# Patient Record
Sex: FEMALE | Race: WHITE | NOT HISPANIC OR LATINO | Employment: OTHER | URBAN - METROPOLITAN AREA
[De-identification: names, ages, dates, MRNs, and addresses within clinical notes are randomized per-mention and may not be internally consistent; named-entity substitution may affect disease eponyms.]

---

## 2017-01-03 RX ORDER — AZITHROMYCIN 250 MG/1
TABLET, FILM COATED ORAL
Qty: 30 TABLET | Refills: 5 | Status: SHIPPED | OUTPATIENT
Start: 2017-01-03 | End: 2017-07-01 | Stop reason: SDUPTHER

## 2017-01-03 RX ORDER — SIMVASTATIN 40 MG
TABLET ORAL
Qty: 30 TABLET | Refills: 5 | Status: SHIPPED | OUTPATIENT
Start: 2017-01-03 | End: 2017-02-28 | Stop reason: SDUPTHER

## 2017-02-01 RX ORDER — PANTOPRAZOLE SODIUM 40 MG/1
TABLET, DELAYED RELEASE ORAL
Qty: 30 TABLET | Refills: 0 | Status: SHIPPED | OUTPATIENT
Start: 2017-02-01 | End: 2017-02-08 | Stop reason: SDUPTHER

## 2017-02-01 RX ORDER — ROFLUMILAST 500 UG/1
TABLET ORAL
Qty: 30 TABLET | Refills: 0 | Status: SHIPPED | OUTPATIENT
Start: 2017-02-01 | End: 2017-02-08 | Stop reason: SDUPTHER

## 2017-02-08 ENCOUNTER — OFFICE VISIT (OUTPATIENT)
Dept: INTERNAL MEDICINE | Facility: CLINIC | Age: 77
End: 2017-02-08

## 2017-02-08 VITALS
RESPIRATION RATE: 18 BRPM | DIASTOLIC BLOOD PRESSURE: 72 MMHG | WEIGHT: 196 LBS | HEART RATE: 64 BPM | BODY MASS INDEX: 34.72 KG/M2 | SYSTOLIC BLOOD PRESSURE: 146 MMHG | TEMPERATURE: 98 F

## 2017-02-08 DIAGNOSIS — K21.00 GASTROESOPHAGEAL REFLUX DISEASE WITH ESOPHAGITIS: Primary | ICD-10-CM

## 2017-02-08 DIAGNOSIS — I10 ESSENTIAL HYPERTENSION: ICD-10-CM

## 2017-02-08 DIAGNOSIS — E03.8 OTHER SPECIFIED HYPOTHYROIDISM: ICD-10-CM

## 2017-02-08 DIAGNOSIS — E78.5 HYPERLIPIDEMIA, UNSPECIFIED HYPERLIPIDEMIA TYPE: ICD-10-CM

## 2017-02-08 LAB
ALBUMIN SERPL-MCNC: 4.4 G/DL (ref 3.2–4.8)
ALBUMIN/GLOB SERPL: 1.6 G/DL (ref 1.5–2.5)
ALP SERPL-CCNC: 91 U/L (ref 25–100)
ALT SERPL W P-5'-P-CCNC: 16 U/L (ref 7–40)
ANION GAP SERPL CALCULATED.3IONS-SCNC: 15 MMOL/L (ref 3–11)
ARTICHOKE IGE QN: 109 MG/DL (ref 0–130)
AST SERPL-CCNC: 19 U/L (ref 0–33)
BILIRUB SERPL-MCNC: 0.6 MG/DL (ref 0.3–1.2)
BUN BLD-MCNC: 9 MG/DL (ref 9–23)
BUN/CREAT SERPL: 18 (ref 7–25)
CALCIUM SPEC-SCNC: 10.1 MG/DL (ref 8.7–10.4)
CHLORIDE SERPL-SCNC: 94 MMOL/L (ref 99–109)
CHOLEST SERPL-MCNC: 180 MG/DL (ref 0–200)
CO2 SERPL-SCNC: 30 MMOL/L (ref 20–31)
CREAT BLD-MCNC: 0.5 MG/DL (ref 0.6–1.3)
GFR SERPL CREATININE-BSD FRML MDRD: 120 ML/MIN/1.73
GLOBULIN UR ELPH-MCNC: 2.8 GM/DL
GLUCOSE BLD-MCNC: 113 MG/DL (ref 70–100)
HDLC SERPL-MCNC: 53 MG/DL (ref 40–60)
POTASSIUM BLD-SCNC: 4 MMOL/L (ref 3.5–5.5)
PROT SERPL-MCNC: 7.2 G/DL (ref 5.7–8.2)
SODIUM BLD-SCNC: 139 MMOL/L (ref 132–146)
TRIGL SERPL-MCNC: 209 MG/DL (ref 0–150)
TSH SERPL DL<=0.05 MIU/L-ACNC: 1.25 MIU/ML (ref 0.35–5.35)

## 2017-02-08 PROCEDURE — 99214 OFFICE O/P EST MOD 30 MIN: CPT | Performed by: INTERNAL MEDICINE

## 2017-02-08 PROCEDURE — 80053 COMPREHEN METABOLIC PANEL: CPT | Performed by: INTERNAL MEDICINE

## 2017-02-08 PROCEDURE — 84443 ASSAY THYROID STIM HORMONE: CPT | Performed by: INTERNAL MEDICINE

## 2017-02-08 PROCEDURE — 36415 COLL VENOUS BLD VENIPUNCTURE: CPT | Performed by: INTERNAL MEDICINE

## 2017-02-08 PROCEDURE — 80061 LIPID PANEL: CPT | Performed by: INTERNAL MEDICINE

## 2017-02-08 RX ORDER — ROFLUMILAST 500 UG/1
500 TABLET ORAL DAILY
Qty: 30 TABLET | Refills: 5 | Status: SHIPPED | OUTPATIENT
Start: 2017-02-08 | End: 2017-08-30 | Stop reason: SDUPTHER

## 2017-02-08 RX ORDER — PANTOPRAZOLE SODIUM 40 MG/1
40 TABLET, DELAYED RELEASE ORAL DAILY
Qty: 30 TABLET | Refills: 5 | Status: SHIPPED | OUTPATIENT
Start: 2017-02-08 | End: 2017-08-30 | Stop reason: SDUPTHER

## 2017-02-08 NOTE — PROGRESS NOTES
Chief Complaint   Patient presents with   • Follow-up      4 MONTH       History of Present Illness      The patient is on pantoprazole for her gastroesophageal reflux. The medication is taken on a regular basis and gives complete relief of the symptoms. She has not elevated the headboard of her bed or tried sleeping on multiple pillows. She reports no abdominal pain, belching, chest pain, diarrhea, dysphagia, early satiety, heartburn, hoarseness, nausea, odynophagia, rectal bleeding, vomiting or weight loss. The GERD has no known aggravating factors.    The patient presents for a follow-up related to hyperlipidemia. She is following a low fat diet. She reports that she is exercising. She is taking simvastatin. The patient is taking her medication as prescribed. She reports no medication side effects, including muscle cramps, abdominal pain, headaches or weakness. She denies shortness of breath, orthopnea, paroxysmal nocturnal dyspnea, dyspnea on exertion, edema, palpitations or syncope.    The patient presents for a follow-up related to hypertension. The patient reports that she has had no headaches or blurred vision. She states that she is taking her medication as prescribed. She is not having medication side effects. She checks her blood pressures at home. The home readings are normal.    As relates to hypothyroidism, the patient reports a good energy level. She reports no hair loss, heat intolerance, cold intolerance, constipation or sweats. She is taking her medication as prescribed her medication as prescribed.    Medications      Current Outpatient Prescriptions:   •  albuterol (PROAIR HFA) 108 (90 BASE) MCG/ACT inhaler, Inhale 2 puffs every 4 (four) hours as needed., Disp: , Rfl:   •  aspirin (ASPIRIN LOW DOSE) 81 MG chewable tablet, Chew 1 tablet daily., Disp: , Rfl:   •  atenolol (TENORMIN) 50 MG tablet, take 1/2 tablet by mouth twice a day, Disp: 30 tablet, Rfl: 5  •  azithromycin (ZITHROMAX) 250 MG  tablet, take 1 tablet by mouth once daily, Disp: 30 tablet, Rfl: 5  •  Calcium Carbonate-Vitamin D (CALCIUM-VITAMIN D) 500-200 MG-UNIT per tablet, Take 1 tablet by mouth daily., Disp: , Rfl:   •  clopidogrel (PLAVIX) 75 MG tablet, take 1 tablet by mouth once daily, Disp: 30 tablet, Rfl: 5  •  COMBIVENT RESPIMAT  MCG/ACT inhaler, inhale 1 puff by mouth four times a day WITH A MAX OF 6 PUFFS IN 24 HOURS, Disp: 4 g, Rfl: 5  •  FLOVENT  MCG/ACT inhaler, use 2 sprays by mouth twice a day Rinse mouth after use, Disp: 12 inhaler, Rfl: 5  •  isosorbide mononitrate (IMDUR) 30 MG 24 hr tablet, take 1 tablet by mouth once daily, Disp: 30 tablet, Rfl: 5  •  levothyroxine (SYNTHROID, LEVOTHROID) 150 MCG tablet, take 1 tablet by mouth once daily as directed, Disp: 30 tablet, Rfl: 5  •  Multiple Vitamins-Minerals (PRESERVISION AREDS) capsule, Take 1 tablet by mouth 2 (two) times a day., Disp: , Rfl:   •  nitroglycerin (NITROSTAT) 0.4 MG SL tablet, Place 1 tablet under the tongue as needed., Disp: , Rfl:   •  pantoprazole (PROTONIX) 40 MG EC tablet, Take 1 tablet by mouth Daily., Disp: 30 tablet, Rfl: 5  •  roflumilast (DALIRESP) 500 MCG tablet tablet, Take 1 tablet by mouth Daily., Disp: 30 tablet, Rfl: 5  •  sertraline (ZOLOFT) 100 MG tablet, take 1 tablet by mouth once daily, Disp: 30 tablet, Rfl: 5  •  simvastatin (ZOCOR) 40 MG tablet, take 1 tablet by mouth at bedtime, Disp: 30 tablet, Rfl: 5     Allergies    Allergies   Allergen Reactions   • Codeine Mental Status Change   • Penicillins Hives and Swelling   • Dopamine Other (See Comments) and Palpitations     Other reaction(s): Hypertension       Problem List    Patient Active Problem List   Diagnosis   • Coronary arteriosclerosis in native artery   • Chronic obstructive pulmonary disease   • Depression   • Gastroesophageal reflux disease with esophagitis   • Hyperlipidemia   • Hypertension   • Hypothyroidism   • Osteoarthritis   • Peripheral arterial occlusive  disease   • Solitary pulmonary nodule   • Vitamin D deficiency   • Lower back pain   • UTI (urinary tract infection)   • Carotid bruit   • Hypoxemia   • Muscle strain of right thigh   • Chest pain       Physical Examination    Visit Vitals   • /72 (BP Location: Left arm, Patient Position: Sitting, Cuff Size: Adult)   • Pulse 64   • Temp 98 °F (36.7 °C) (Temporal Artery )   • Resp 18   • Wt 196 lb (88.9 kg)   • BMI 34.72 kg/m2     HEENT: Head- Normocephalic Atraumatic. Facies- Within normal limits. Pinnas- Normal texture and shape bilaterally. Canals- Normal bilaterally. TMs- Normal bilaterally. Nares- Patent bilaterally. Nasal Septum- is normal. There is no tenderness to palpation over the frontal or maxillary sinuses. Lids- Normal bilaterally. Conjunctiva- Clear bilaterally. Sclera- Anicteric bilaterally. Oropharynx- Moist with no lesions. Tonsils- No enlargement, erythema or exudate.    Neck: Thyroid- non enlarged, symmetric and has no nodules. No bruits are detected. ROM- Normal Range of Motion with no rigidity.    Lungs: Auscultation- Clear to auscultation bilaterally. There are no retractions, clubbing or cyanosis. The Expiratory to Inspiratory ratio is equal.    Cardiovascular: There are no carotid bruits. Heart- Normal Rate with Regular rhythm and no murmurs. There are no gallops. There are no rubs. In the lower extremities there is no edema. The upper extremities do not have edema.    Abdomen: Soft, benign, non-tender with no masses, hernias, organomegaly or scars.    Impression and Assessment    Gastroesophageal Reflux Disease.     Hyperlipidemia.     Hypertension.     Hypothyroidism.     Plan    Gastroesophageal Reflux Disease Plan: The current plan was continued.    Hyperlipidemia Plan: She was instructed to eat a low fat diet. She was encouraged to exercise daily. The patient was instructed to continue the current medications.    Hypertension Plan: The condition is stable. No change was made in the  current plan.    Hypothyroidism Plan: Further plans will be formulated after test results are reviewed.    Avelina was seen today for follow-up.    Diagnoses and all orders for this visit:    Gastroesophageal reflux disease with esophagitis  -     Comprehensive Metabolic Panel    Essential hypertension  -     Comprehensive Metabolic Panel    Other specified hypothyroidism  -     TSH    Hyperlipidemia, unspecified hyperlipidemia type  -     Comprehensive Metabolic Panel  -     Lipid Panel    Other orders  -     roflumilast (DALIRESP) 500 MCG tablet tablet; Take 1 tablet by mouth Daily.  -     pantoprazole (PROTONIX) 40 MG EC tablet; Take 1 tablet by mouth Daily.        Return to Office    The patient was instructed to return for follow-up in 4 months. Next Visit: Follow-up.    The patient was instructed to return sooner if the condition changes, worsens, or doesn't resolve.

## 2017-02-09 ENCOUNTER — TELEPHONE (OUTPATIENT)
Dept: INTERNAL MEDICINE | Facility: CLINIC | Age: 77
End: 2017-02-09

## 2017-02-09 RX ORDER — CYCLOBENZAPRINE HCL 10 MG
10 TABLET ORAL 2 TIMES DAILY PRN
Qty: 60 TABLET | Refills: 5 | Status: SHIPPED | OUTPATIENT
Start: 2017-02-09 | End: 2018-01-11 | Stop reason: SDUPTHER

## 2017-02-09 NOTE — TELEPHONE ENCOUNTER
I'm sorry but I don't recall flexeril.  What is she using this for and how much is she using?  Dre Moura MD  2:10 PM  02/09/17

## 2017-02-09 NOTE — TELEPHONE ENCOUNTER
S/W PT, STATES SHE HAS BEEN ON IT FOR SOMETIME AND JUST RUN OUT A WHILE BACK.  STATES SHE TAKES IT FOR HER BACK PAIN AND THAT IT DOES HELP QUITE A BIT.  STATES SHE USUALLY TAKES IT TWICE DAILY.

## 2017-02-22 ENCOUNTER — TELEPHONE (OUTPATIENT)
Dept: INTERNAL MEDICINE | Facility: CLINIC | Age: 77
End: 2017-02-22

## 2017-02-28 RX ORDER — SIMVASTATIN 40 MG
40 TABLET ORAL NIGHTLY
Qty: 90 TABLET | Refills: 1 | Status: SHIPPED | OUTPATIENT
Start: 2017-02-28 | End: 2017-11-07 | Stop reason: SDUPTHER

## 2017-03-20 RX ORDER — IPRATROPIUM/ALBUTEROL SULFATE 20-100 MCG
MIST INHALER (GRAM) INHALATION
Qty: 4 G | Refills: 5 | Status: SHIPPED | OUTPATIENT
Start: 2017-03-20 | End: 2017-10-04 | Stop reason: SDUPTHER

## 2017-04-03 RX ORDER — SERTRALINE HYDROCHLORIDE 100 MG/1
TABLET, FILM COATED ORAL
Qty: 30 TABLET | Refills: 5 | Status: SHIPPED | OUTPATIENT
Start: 2017-04-03 | End: 2017-10-03 | Stop reason: SDUPTHER

## 2017-04-24 RX ORDER — DEXAMETHASONE 4 MG/1
TABLET ORAL
Qty: 12 INHALER | Refills: 5 | Status: SHIPPED | OUTPATIENT
Start: 2017-04-24 | End: 2017-11-21 | Stop reason: SDUPTHER

## 2017-06-01 RX ORDER — ISOSORBIDE MONONITRATE 30 MG/1
TABLET, EXTENDED RELEASE ORAL
Qty: 30 TABLET | Refills: 5 | Status: SHIPPED | OUTPATIENT
Start: 2017-06-01 | End: 2017-11-28 | Stop reason: SDUPTHER

## 2017-06-01 RX ORDER — ATENOLOL 50 MG/1
TABLET ORAL
Qty: 30 TABLET | Refills: 5 | Status: SHIPPED | OUTPATIENT
Start: 2017-06-01 | End: 2017-11-28 | Stop reason: SDUPTHER

## 2017-06-01 RX ORDER — LEVOTHYROXINE SODIUM 0.15 MG/1
TABLET ORAL
Qty: 30 TABLET | Refills: 5 | Status: SHIPPED | OUTPATIENT
Start: 2017-06-01 | End: 2017-11-30 | Stop reason: SDUPTHER

## 2017-06-01 RX ORDER — CLOPIDOGREL BISULFATE 75 MG/1
TABLET ORAL
Qty: 30 TABLET | Refills: 5 | Status: SHIPPED | OUTPATIENT
Start: 2017-06-01 | End: 2017-11-28 | Stop reason: SDUPTHER

## 2017-06-14 ENCOUNTER — OFFICE VISIT (OUTPATIENT)
Dept: INTERNAL MEDICINE | Facility: CLINIC | Age: 77
End: 2017-06-14

## 2017-06-14 VITALS
RESPIRATION RATE: 18 BRPM | TEMPERATURE: 98.2 F | WEIGHT: 194 LBS | OXYGEN SATURATION: 92 % | SYSTOLIC BLOOD PRESSURE: 130 MMHG | DIASTOLIC BLOOD PRESSURE: 72 MMHG | BODY MASS INDEX: 34.37 KG/M2 | HEART RATE: 68 BPM

## 2017-06-14 DIAGNOSIS — B37.0 ORAL THRUSH: ICD-10-CM

## 2017-06-14 DIAGNOSIS — K21.00 GASTROESOPHAGEAL REFLUX DISEASE WITH ESOPHAGITIS: Primary | ICD-10-CM

## 2017-06-14 DIAGNOSIS — J44.9 CHRONIC OBSTRUCTIVE PULMONARY DISEASE, UNSPECIFIED COPD TYPE (HCC): ICD-10-CM

## 2017-06-14 DIAGNOSIS — E03.8 OTHER SPECIFIED HYPOTHYROIDISM: ICD-10-CM

## 2017-06-14 DIAGNOSIS — I10 ESSENTIAL HYPERTENSION: ICD-10-CM

## 2017-06-14 DIAGNOSIS — E78.5 HYPERLIPIDEMIA, UNSPECIFIED HYPERLIPIDEMIA TYPE: ICD-10-CM

## 2017-06-14 LAB
ALBUMIN SERPL-MCNC: 4.4 G/DL (ref 3.2–4.8)
ALBUMIN/GLOB SERPL: 1.6 G/DL (ref 1.5–2.5)
ALP SERPL-CCNC: 85 U/L (ref 25–100)
ALT SERPL W P-5'-P-CCNC: 13 U/L (ref 7–40)
ANION GAP SERPL CALCULATED.3IONS-SCNC: 6 MMOL/L (ref 3–11)
ARTICHOKE IGE QN: 95 MG/DL (ref 0–130)
AST SERPL-CCNC: 17 U/L (ref 0–33)
BILIRUB SERPL-MCNC: 0.3 MG/DL (ref 0.3–1.2)
BUN BLD-MCNC: 13 MG/DL (ref 9–23)
BUN/CREAT SERPL: 32.5 (ref 7–25)
CALCIUM SPEC-SCNC: 10.3 MG/DL (ref 8.7–10.4)
CHLORIDE SERPL-SCNC: 101 MMOL/L (ref 99–109)
CHOLEST SERPL-MCNC: 164 MG/DL (ref 0–200)
CO2 SERPL-SCNC: 34 MMOL/L (ref 20–31)
CREAT BLD-MCNC: 0.4 MG/DL (ref 0.6–1.3)
GFR SERPL CREATININE-BSD FRML MDRD: >150 ML/MIN/1.73
GLOBULIN UR ELPH-MCNC: 2.7 GM/DL
GLUCOSE BLD-MCNC: 119 MG/DL (ref 70–100)
HDLC SERPL-MCNC: 48 MG/DL (ref 40–60)
POTASSIUM BLD-SCNC: 4.2 MMOL/L (ref 3.5–5.5)
PROT SERPL-MCNC: 7.1 G/DL (ref 5.7–8.2)
SODIUM BLD-SCNC: 141 MMOL/L (ref 132–146)
TRIGL SERPL-MCNC: 184 MG/DL (ref 0–150)
TSH SERPL DL<=0.05 MIU/L-ACNC: 1.22 MIU/ML (ref 0.35–5.35)

## 2017-06-14 PROCEDURE — 36415 COLL VENOUS BLD VENIPUNCTURE: CPT | Performed by: INTERNAL MEDICINE

## 2017-06-14 PROCEDURE — 99214 OFFICE O/P EST MOD 30 MIN: CPT | Performed by: INTERNAL MEDICINE

## 2017-06-14 PROCEDURE — 80053 COMPREHEN METABOLIC PANEL: CPT | Performed by: INTERNAL MEDICINE

## 2017-06-14 PROCEDURE — 84443 ASSAY THYROID STIM HORMONE: CPT | Performed by: INTERNAL MEDICINE

## 2017-06-14 PROCEDURE — 80061 LIPID PANEL: CPT | Performed by: INTERNAL MEDICINE

## 2017-06-14 NOTE — PROGRESS NOTES
Chief Complaint   Patient presents with   • Follow-up     4 mth       History of Present Illness    The patient is on pantoprazole for her gastroesophageal reflux. The medication is taken on a regular basis and gives complete relief of the symptoms. She reports hoarseness but she denies abdominal pain, belching, chest pain, diarrhea, dysphagia, early satiety, heartburn, nausea, odynophagia, rectal bleeding, vomiting or weight loss. The GERD has no known aggravating factors.    The patient presents for a follow-up related to hyperlipidemia. She is following a low fat diet. She reports that she is exercising. She is taking simvastatin. The patient is taking her medication as prescribed. She reports no medication side effects, including muscle cramps, abdominal pain, headaches or weakness. She denies shortness of breath, orthopnea, paroxysmal nocturnal dyspnea, dyspnea on exertion, edema, palpitations or syncope.    The patient presents for a follow-up related to hypertension. The patient reports that she has had no headaches or blurred vision. She states that she is taking her medication as prescribed. She is not having medication side effects. She checks her blood pressures at home. The home readings are normal.    The patient presents for a follow-up related to Chronic Obstructive Pulmonary Disease. Rescue inhaler usage is reported to be multiple times daily. The patient reports that she has no known triggers. She currently reports no wheezing, cough or sputum production. She is not smoking. She has increased her home oxygen to 3 LPM NC and is getting much better relief, has better exercise tolerance and increased stamina.    Medications      Current Outpatient Prescriptions:   •  albuterol (PROAIR HFA) 108 (90 BASE) MCG/ACT inhaler, Inhale 2 puffs every 4 (four) hours as needed., Disp: , Rfl:   •  aspirin (ASPIRIN LOW DOSE) 81 MG chewable tablet, Chew 1 tablet daily., Disp: , Rfl:   •  atenolol (TENORMIN) 50 MG  tablet, take 1/2 tablet by mouth twice a day, Disp: 30 tablet, Rfl: 5  •  azithromycin (ZITHROMAX) 250 MG tablet, take 1 tablet by mouth once daily, Disp: 30 tablet, Rfl: 5  •  Calcium Carbonate-Vitamin D (CALCIUM-VITAMIN D) 500-200 MG-UNIT per tablet, Take 1 tablet by mouth daily., Disp: , Rfl:   •  clopidogrel (PLAVIX) 75 MG tablet, take 1 tablet by mouth once daily, Disp: 30 tablet, Rfl: 5  •  COMBIVENT RESPIMAT  MCG/ACT inhaler, inhale 1 puff by mouth four times a day, Disp: 4 g, Rfl: 5  •  cyclobenzaprine (FLEXERIL) 10 MG tablet, Take 1 tablet by mouth 2 (Two) Times a Day As Needed for muscle spasms., Disp: 60 tablet, Rfl: 5  •  FIBER SELECT GUMMIES PO, Take 2 tablets by mouth Daily., Disp: , Rfl:   •  FLOVENT  MCG/ACT inhaler, inhale 2 puffs by mouth twice a day Rinse mouth after use, Disp: 12 inhaler, Rfl: 5  •  isosorbide mononitrate (IMDUR) 30 MG 24 hr tablet, take 1 tablet by mouth once daily, Disp: 30 tablet, Rfl: 5  •  levothyroxine (SYNTHROID, LEVOTHROID) 150 MCG tablet, take 1 tablet by mouth once daily as directed, Disp: 30 tablet, Rfl: 5  •  Multiple Vitamins-Minerals (PRESERVISION AREDS) capsule, Take 1 tablet by mouth 2 (two) times a day., Disp: , Rfl:   •  nitroglycerin (NITROSTAT) 0.4 MG SL tablet, Place 1 tablet under the tongue as needed., Disp: , Rfl:   •  pantoprazole (PROTONIX) 40 MG EC tablet, Take 1 tablet by mouth Daily., Disp: 30 tablet, Rfl: 5  •  roflumilast (DALIRESP) 500 MCG tablet tablet, Take 1 tablet by mouth Daily., Disp: 30 tablet, Rfl: 5  •  sertraline (ZOLOFT) 100 MG tablet, take 1 tablet by mouth once daily, Disp: 30 tablet, Rfl: 5  •  simvastatin (ZOCOR) 40 MG tablet, Take 1 tablet by mouth Every Night., Disp: 90 tablet, Rfl: 1     Allergies    Allergies   Allergen Reactions   • Codeine Mental Status Change   • Penicillins Hives and Swelling   • Dopamine Other (See Comments) and Palpitations     Other reaction(s): Hypertension       Problem List    Patient  Active Problem List   Diagnosis   • Coronary arteriosclerosis in native artery   • Chronic obstructive pulmonary disease   • Depression   • Gastroesophageal reflux disease with esophagitis   • Hyperlipidemia   • Hypertension   • Hypothyroidism   • Osteoarthritis   • Peripheral arterial occlusive disease   • Solitary pulmonary nodule   • Vitamin D deficiency   • Lower back pain   • UTI (urinary tract infection)   • Carotid bruit   • Hypoxemia   • Muscle strain of right thigh   • Chest pain       Medications, Allergies, Problems List and Past History were reviewed and updated.    Physical Examination    /72 (BP Location: Left arm, Patient Position: Sitting, Cuff Size: Adult)  Pulse 68  Temp 98.2 °F (36.8 °C) (Temporal Artery )   Resp 18  Wt 194 lb (88 kg)  SpO2 92%  BMI 34.37 kg/m2    HEENT: Head- Normocephalic Atraumatic. Facies- Within normal limits. Pinnas- Normal texture and shape bilaterally. Canals- Normal bilaterally. TMs- Normal bilaterally. Nares- Patent bilaterally. Nasal Septum- is normal. There is no tenderness to palpation over the frontal or maxillary sinuses. Lids- Normal bilaterally. Conjunctiva- Clear bilaterally. Sclera- Anicteric bilaterally. Oropharynx- has candidal plaques. Tonsils- No enlargement, erythema or exudate.    Neck: Thyroid- non enlarged, symmetric and has no nodules. No bruits are detected. ROM- Normal Range of Motion with no rigidity.    Lungs: Auscultation- Clear to auscultation bilaterally. There are no retractions, clubbing or cyanosis. The Expiratory to Inspiratory ratio is equal.    Cardiovascular: There are no carotid bruits. Heart- Normal Rate with Regular rhythm and no murmurs. There are no gallops. There are no rubs. In the lower extremities there is no edema. The upper extremities do not have edema.    Abdomen: Soft, benign, non-tender with no masses, hernias, organomegaly or scars.    Impression and Assessment    Gastroesophageal Reflux  Disease.    Hyperlipidemia.    Hypertension.    COPD.    Oropharyngeal Candidiasis.    Plan    Gastroesophageal Reflux Disease Plan: The current plan was continued.    Hyperlipidemia Plan: She was instructed to eat a low fat diet. She was encouraged to exercise daily. Weight loss was encouraged. The patient was instructed to continue the current medications.    Hypertension Plan: The current plan was continued.    COPD Plan: The current plan was continued.    Oropharyngeal Candidiasis Plan: Medication will be added as noted below.    Avelina was seen today for follow-up.    Diagnoses and all orders for this visit:    Gastroesophageal reflux disease with esophagitis  -     Comprehensive Metabolic Panel    Essential hypertension  -     Comprehensive Metabolic Panel    Other specified hypothyroidism  -     TSH    Hyperlipidemia, unspecified hyperlipidemia type  -     Comprehensive Metabolic Panel  -     Lipid Panel    Chronic obstructive pulmonary disease, unspecified COPD type    Oral thrush  -     nystatin (MYCOSTATIN) 744720 UNIT/ML suspension; Swish and swallow 5 mL 4 (Four) Times a Day.        Return to Office    The patient was instructed to return for follow-up in 3 months.    The patient was instructed to return sooner if the condition changes, worsens, or doesn't resolve.

## 2017-07-03 RX ORDER — AZITHROMYCIN 250 MG/1
TABLET, FILM COATED ORAL
Qty: 30 TABLET | Refills: 5 | Status: SHIPPED | OUTPATIENT
Start: 2017-07-03 | End: 2017-12-28 | Stop reason: SDUPTHER

## 2017-07-28 DIAGNOSIS — B37.0 ORAL THRUSH: ICD-10-CM

## 2017-07-28 NOTE — TELEPHONE ENCOUNTER
----- Message from Katy Wolf sent at 7/28/2017 10:27 AM EDT -----  Patient is on a couple of different inhalers and therefore gets thrush in her mouth.  She takes Nystatin for thrush.  She took whole bottle last time and it cleared up but a couple weeks later she started having symptoms again even though she is using water with inhalers.  Pharmacy sent in refill request this morning for nystatin mouthwash to use every day.  It is written for 4 times a day but patient is going to only use at night to keep it from coming back and use saltwater during day the other 3 times.  Would like to go ahead and get this called in today so she can go ahead and start using to get rid of thrush.  Daughter, Clary Harman, can be reached at [phone].

## 2017-08-30 RX ORDER — PANTOPRAZOLE SODIUM 40 MG/1
TABLET, DELAYED RELEASE ORAL
Qty: 30 TABLET | Refills: 5 | Status: SHIPPED | OUTPATIENT
Start: 2017-08-30 | End: 2018-02-02 | Stop reason: SDUPTHER

## 2017-08-30 RX ORDER — ROFLUMILAST 500 UG/1
TABLET ORAL
Qty: 30 TABLET | Refills: 5 | Status: SHIPPED | OUTPATIENT
Start: 2017-08-30 | End: 2018-02-02 | Stop reason: SDUPTHER

## 2017-09-14 ENCOUNTER — OFFICE VISIT (OUTPATIENT)
Dept: INTERNAL MEDICINE | Facility: CLINIC | Age: 77
End: 2017-09-14

## 2017-09-14 VITALS
WEIGHT: 194 LBS | BODY MASS INDEX: 34.37 KG/M2 | HEART RATE: 78 BPM | SYSTOLIC BLOOD PRESSURE: 124 MMHG | RESPIRATION RATE: 18 BRPM | DIASTOLIC BLOOD PRESSURE: 76 MMHG | TEMPERATURE: 98.3 F

## 2017-09-14 DIAGNOSIS — E03.8 OTHER SPECIFIED HYPOTHYROIDISM: ICD-10-CM

## 2017-09-14 DIAGNOSIS — J44.9 CHRONIC OBSTRUCTIVE PULMONARY DISEASE, UNSPECIFIED COPD TYPE (HCC): ICD-10-CM

## 2017-09-14 DIAGNOSIS — K21.00 GASTROESOPHAGEAL REFLUX DISEASE WITH ESOPHAGITIS: Primary | ICD-10-CM

## 2017-09-14 DIAGNOSIS — I10 ESSENTIAL HYPERTENSION: ICD-10-CM

## 2017-09-14 DIAGNOSIS — E78.5 HYPERLIPIDEMIA, UNSPECIFIED HYPERLIPIDEMIA TYPE: ICD-10-CM

## 2017-09-14 LAB
ALBUMIN SERPL-MCNC: 4.2 G/DL (ref 3.2–4.8)
ALBUMIN/GLOB SERPL: 1.5 G/DL (ref 1.5–2.5)
ALP SERPL-CCNC: 100 U/L (ref 25–100)
ALT SERPL W P-5'-P-CCNC: 16 U/L (ref 7–40)
ANION GAP SERPL CALCULATED.3IONS-SCNC: 5 MMOL/L (ref 3–11)
ARTICHOKE IGE QN: 82 MG/DL (ref 0–130)
AST SERPL-CCNC: 19 U/L (ref 0–33)
BILIRUB SERPL-MCNC: 0.6 MG/DL (ref 0.3–1.2)
BUN BLD-MCNC: 9 MG/DL (ref 9–23)
BUN/CREAT SERPL: 22.5 (ref 7–25)
CALCIUM SPEC-SCNC: 9.2 MG/DL (ref 8.7–10.4)
CHLORIDE SERPL-SCNC: 98 MMOL/L (ref 99–109)
CHOLEST SERPL-MCNC: 148 MG/DL (ref 0–200)
CO2 SERPL-SCNC: 36 MMOL/L (ref 20–31)
CREAT BLD-MCNC: 0.4 MG/DL (ref 0.6–1.3)
GFR SERPL CREATININE-BSD FRML MDRD: >150 ML/MIN/1.73
GLOBULIN UR ELPH-MCNC: 2.8 GM/DL
GLUCOSE BLD-MCNC: 118 MG/DL (ref 70–100)
HDLC SERPL-MCNC: 43 MG/DL (ref 40–60)
POTASSIUM BLD-SCNC: 3.9 MMOL/L (ref 3.5–5.5)
PROT SERPL-MCNC: 7 G/DL (ref 5.7–8.2)
SODIUM BLD-SCNC: 139 MMOL/L (ref 132–146)
TRIGL SERPL-MCNC: 155 MG/DL (ref 0–150)
TSH SERPL DL<=0.05 MIU/L-ACNC: 0.7 MIU/ML (ref 0.35–5.35)

## 2017-09-14 PROCEDURE — 99214 OFFICE O/P EST MOD 30 MIN: CPT | Performed by: INTERNAL MEDICINE

## 2017-09-14 PROCEDURE — 80053 COMPREHEN METABOLIC PANEL: CPT | Performed by: INTERNAL MEDICINE

## 2017-09-14 PROCEDURE — 80061 LIPID PANEL: CPT | Performed by: INTERNAL MEDICINE

## 2017-09-14 PROCEDURE — 36415 COLL VENOUS BLD VENIPUNCTURE: CPT | Performed by: INTERNAL MEDICINE

## 2017-09-14 PROCEDURE — 84443 ASSAY THYROID STIM HORMONE: CPT | Performed by: INTERNAL MEDICINE

## 2017-09-14 NOTE — PROGRESS NOTES
Chief Complaint   Patient presents with   • Follow-up     3 Month       History of Present Illness      The patient is on pantoprazole for her gastroesophageal reflux. The medication is taken on a regular basis and gives complete relief of the symptoms. She reports no abdominal pain, belching, chest pain, diarrhea, dysphagia, early satiety, heartburn, hoarseness, nausea, odynophagia, rectal bleeding, vomiting or weight loss. The GERD has no known aggravating factors.    The patient presents for a follow-up related to hyperlipidemia. She is following a low fat diet. She reports that she is exercising. She is taking simvastatin. The patient is taking her medication as prescribed. She reports no medication side effects, including muscle cramps, abdominal pain, headaches or weakness. She denies shortness of breath, orthopnea, paroxysmal nocturnal dyspnea, dyspnea on exertion, edema, palpitations or syncope.    The patient presents for a follow-up related to hypertension. The patient reports that she has had no headaches or blurred vision. She states that she is taking her medication as prescribed. She is not having medication side effects. She does not check her blood pressures at home.    As relates to hypothyroidism, the patient reports a good energy level. She reports no hair loss, heat intolerance, cold intolerance, constipation or sweats. She is taking her medication as prescribed her medication as prescribed.    The patient presents for a follow-up related to Chronic Obstructive Pulmonary Disease. Rescue inhaler usage is reported to be daily. The patient reports that she has no known triggers. She currently reports no wheezing, cough or sputum production. She is not smoking.    Medications      Current Outpatient Prescriptions:   •  albuterol (PROAIR HFA) 108 (90 BASE) MCG/ACT inhaler, Inhale 2 puffs every 4 (four) hours as needed., Disp: , Rfl:   •  aspirin (ASPIRIN LOW DOSE) 81 MG chewable tablet, Chew 1 tablet  daily., Disp: , Rfl:   •  atenolol (TENORMIN) 50 MG tablet, take 1/2 tablet by mouth twice a day, Disp: 30 tablet, Rfl: 5  •  azithromycin (ZITHROMAX) 250 MG tablet, take 1 tablet by mouth once daily, Disp: 30 tablet, Rfl: 5  •  Calcium Carbonate-Vitamin D (CALCIUM-VITAMIN D) 500-200 MG-UNIT per tablet, Take 1 tablet by mouth daily., Disp: , Rfl:   •  clopidogrel (PLAVIX) 75 MG tablet, take 1 tablet by mouth once daily, Disp: 30 tablet, Rfl: 5  •  COMBIVENT RESPIMAT  MCG/ACT inhaler, inhale 1 puff by mouth four times a day, Disp: 4 g, Rfl: 5  •  cyclobenzaprine (FLEXERIL) 10 MG tablet, Take 1 tablet by mouth 2 (Two) Times a Day As Needed for muscle spasms., Disp: 60 tablet, Rfl: 5  •  DALIRESP 500 MCG tablet tablet, take 1 tablet by mouth once daily, Disp: 30 tablet, Rfl: 5  •  FIBER SELECT GUMMIES PO, Take 2 tablets by mouth Daily., Disp: , Rfl:   •  FLOVENT  MCG/ACT inhaler, inhale 2 puffs by mouth twice a day Rinse mouth after use, Disp: 12 inhaler, Rfl: 5  •  isosorbide mononitrate (IMDUR) 30 MG 24 hr tablet, take 1 tablet by mouth once daily, Disp: 30 tablet, Rfl: 5  •  levothyroxine (SYNTHROID, LEVOTHROID) 150 MCG tablet, take 1 tablet by mouth once daily as directed, Disp: 30 tablet, Rfl: 5  •  Multiple Vitamins-Minerals (PRESERVISION AREDS) capsule, Take 1 tablet by mouth 2 (two) times a day., Disp: , Rfl:   •  nitroglycerin (NITROSTAT) 0.4 MG SL tablet, Place 1 tablet under the tongue as needed., Disp: , Rfl:   •  nystatin (MYCOSTATIN) [id number] UNIT/ML suspension, swish and swallow 5 milliliter four times a day, Disp: 200 mL, Rfl: 0  •  pantoprazole (PROTONIX) 40 MG EC tablet, take 1 tablet by mouth once daily, Disp: 30 tablet, Rfl: 5  •  sertraline (ZOLOFT) 100 MG tablet, take 1 tablet by mouth once daily, Disp: 30 tablet, Rfl: 5  •  simvastatin (ZOCOR) 40 MG tablet, Take 1 tablet by mouth Every Night., Disp: 90 tablet, Rfl: 1     Allergies    Allergies   Allergen Reactions   • Codeine Mental  Status Change   • Penicillins Hives and Swelling   • Dopamine Other (See Comments) and Palpitations     Other reaction(s): Hypertension       Problem List    Patient Active Problem List   Diagnosis   • Coronary arteriosclerosis in native artery   • Chronic obstructive pulmonary disease   • Depression   • Gastroesophageal reflux disease with esophagitis   • Hyperlipidemia   • Hypertension   • Hypothyroidism   • Osteoarthritis   • Peripheral arterial occlusive disease   • Solitary pulmonary nodule   • Vitamin D deficiency   • Lower back pain   • Carotid bruit   • Hypoxemia       Medications, Allergies, Problems List and Past History were reviewed and updated.    Physical Examination    /76 (BP Location: Left arm, Patient Position: Sitting, Cuff Size: Adult)  Pulse 78  Temp 98.3 °F (36.8 °C) (Temporal Artery )   Resp 18  Wt 194 lb (88 kg)  BMI 34.37 kg/m2    HEENT: Head- Normocephalic Atraumatic. Facies- Within normal limits. Pinnas- Normal texture and shape bilaterally. Canals- Normal bilaterally. TMs- Normal bilaterally. Nares- Patent bilaterally. Nasal Septum- is normal. There is no tenderness to palpation over the frontal or maxillary sinuses. Lids- Normal bilaterally. Conjunctiva- Clear bilaterally. Sclera- Anicteric bilaterally. Oropharynx- Moist with no lesions. Tonsils- No enlargement, erythema or exudate.    Neck: Thyroid- non enlarged, symmetric and has no nodules. No bruits are detected. ROM- Normal Range of Motion with no rigidity.    Lungs: Auscultation- Clear to auscultation bilaterally. There are no retractions, clubbing or cyanosis. The Expiratory to Inspiratory ratio is equal.    Cardiovascular: There are no carotid bruits. Heart- Normal Rate with Regular rhythm and no murmurs. There are no gallops. There are no rubs. In the lower extremities there is no edema. The upper extremities do not have edema.    Abdomen: Soft, benign, non-tender with no masses, hernias, organomegaly or  scars.    Impression and Assessment    Gastroesophageal Reflux Disease.    Hyperlipidemia.    Essential Hypertension.    Hypothyroidism.    COPD.    Plan    Gastroesophageal Reflux Disease Plan: The current plan was continued.    Hyperlipidemia Plan: She was instructed to eat a low fat diet. She was encouraged to exercise daily. The patient was instructed to continue the current medications.    Essential Hypertension Plan: The current plan was continued.    Hypothyroidism Plan: The current plan was continued.    COPD Plan: The current plan was continued.    Avelina was seen today for follow-up.    Diagnoses and all orders for this visit:    Gastroesophageal reflux disease with esophagitis  -     Comprehensive Metabolic Panel    Essential hypertension  -     Comprehensive Metabolic Panel    Other specified hypothyroidism  -     TSH    Hyperlipidemia, unspecified hyperlipidemia type  -     Comprehensive Metabolic Panel  -     Lipid Panel    Chronic obstructive pulmonary disease, unspecified COPD type          Return to Office    The patient was instructed to return for follow-up in 3 months.    The patient was instructed to return sooner if the condition changes, worsens, or doesn't resolve.

## 2017-09-21 ENCOUNTER — OFFICE VISIT (OUTPATIENT)
Dept: INTERNAL MEDICINE | Facility: CLINIC | Age: 77
End: 2017-09-21

## 2017-09-21 ENCOUNTER — TELEPHONE (OUTPATIENT)
Dept: INTERNAL MEDICINE | Facility: CLINIC | Age: 77
End: 2017-09-21

## 2017-09-21 VITALS
WEIGHT: 197 LBS | HEART RATE: 73 BPM | DIASTOLIC BLOOD PRESSURE: 70 MMHG | SYSTOLIC BLOOD PRESSURE: 138 MMHG | BODY MASS INDEX: 34.9 KG/M2 | OXYGEN SATURATION: 93 % | TEMPERATURE: 97.5 F | RESPIRATION RATE: 16 BRPM

## 2017-09-21 DIAGNOSIS — Z00.00 INITIAL MEDICARE ANNUAL WELLNESS VISIT: Primary | ICD-10-CM

## 2017-09-21 DIAGNOSIS — Z23 NEED FOR PROPHYLACTIC VACCINATION AGAINST STREPTOCOCCUS PNEUMONIAE (PNEUMOCOCCUS): ICD-10-CM

## 2017-09-21 PROCEDURE — G0438 PPPS, INITIAL VISIT: HCPCS | Performed by: PHYSICIAN ASSISTANT

## 2017-09-21 PROCEDURE — 90670 PCV13 VACCINE IM: CPT | Performed by: PHYSICIAN ASSISTANT

## 2017-09-21 PROCEDURE — G0009 ADMIN PNEUMOCOCCAL VACCINE: HCPCS | Performed by: PHYSICIAN ASSISTANT

## 2017-09-21 NOTE — PROGRESS NOTES
QUICK REFERENCE INFORMATION:  The ABCs of the Annual Wellness Visit    Initial Medicare Wellness Visit    HEALTH RISK ASSESSMENT    1940    Recent Hospitalizations:  No hospitalization(s) within the last year..        Current Medical Providers:  Patient Care Team:  Dre Moura MD as PCP - General  Dre Moura MD as PCP - Family Medicine  Dre Moura MD as PCP - Claims Attributed  Rainer Dowd MD as Consulting Physician (Cardiology)  Ascencion Abdullahi MD as Consulting Physician (Ophthalmology)        Smoking Status:  History   Smoking Status   • Former Smoker   • Quit date: 2006   Smokeless Tobacco   • Not on file       Alcohol Consumption:  History   Alcohol Use   • Yes     Comment: 2-5 a year       Depression Screen:   PHQ-2/PHQ-9 Depression Screening 9/21/2017   Little interest or pleasure in doing things 3   Feeling down, depressed, or hopeless 1   Trouble falling or staying asleep, or sleeping too much 3   Feeling tired or having little energy 3   Poor appetite or overeating 3   Feeling bad about yourself - or that you are a failure or have let yourself or your family down 2   Trouble concentrating on things, such as reading the newspaper or watching television 0   Moving or speaking so slowly that other people could have noticed. Or the opposite - being so fidgety or restless that you have been moving around a lot more than usual 0   Thoughts that you would be better off dead, or of hurting yourself in some way 0   Total Score 15   If you checked off any problems, how difficult have these problems made it for you to do your work, take care of things at home, or get along with other people? Somewhat difficult       Health Habits and Functional and Cognitive Screening:  Functional & Cognitive Status 9/21/2017   Do you have difficulty preparing food and eating? No   Do you have difficulty bathing yourself? No   Do you have difficulty getting dressed? No   Do you have  difficulty using the toilet? No   Do you have difficulty moving around from place to place? No   In the past year have you fallen or experienced a near fall? No   Do you need help using the phone?  No   Are you deaf or do you have serious difficulty hearing?  No   Do you need help with transportation? No   Do you need help shopping? No   Do you need help preparing meals?  No   Do you need help with housework?  No   Do you need help with laundry? No   Do you need help taking your medications? No   Do you need help managing money? No   PT is on zoloft 100mg, daughter  and she is still grieving.    Health Habits  Current Diet: Unhealthy Diet  Exercise (times per week): 1 times per week  Current Exercise Activities Include: Walking          Does the patient have evidence of cognitive impairment? No    Asiprin use counseling: Taking ASA appropriately as indicated      Recent Lab Results:    Visual Acuity:  No exam data present    Age-appropriate Screening Schedule:  Refer to the list below for future screening recommendations based on patient's age, sex and/or medical conditions. Orders for these recommended tests are listed in the plan section. The patient has been provided with a written plan.    Health Maintenance   Topic Date Due   • TDAP/TD VACCINES (1 - Tdap) 2006   • PNEUMOCOCCAL VACCINES (65+ LOW/MEDIUM RISK) (2 of 2 - PCV13) 10/05/2007   • COLONOSCOPY  08/10/2016   • ZOSTER VACCINE  08/10/2016   • INFLUENZA VACCINE  2017   • MAMMOGRAM  12/15/2017   • LIPID PANEL  2018        Subjective   History of Present Illness    Avelina Ceron is a 76 y.o. female who presents for an Annual Wellness Visit.    The following portions of the patient's history were reviewed and updated as appropriate: allergies, current medications, past family history, past medical history, past social history, past surgical history and problem list.    Outpatient Medications Prior to Visit   Medication Sig Dispense Refill    • albuterol (PROAIR HFA) 108 (90 BASE) MCG/ACT inhaler Inhale 2 puffs every 4 (four) hours as needed.     • aspirin (ASPIRIN LOW DOSE) 81 MG chewable tablet Chew 1 tablet daily.     • atenolol (TENORMIN) 50 MG tablet take 1/2 tablet by mouth twice a day 30 tablet 5   • azithromycin (ZITHROMAX) 250 MG tablet take 1 tablet by mouth once daily 30 tablet 5   • Calcium Carbonate-Vitamin D (CALCIUM-VITAMIN D) 500-200 MG-UNIT per tablet Take 1 tablet by mouth daily.     • clopidogrel (PLAVIX) 75 MG tablet take 1 tablet by mouth once daily 30 tablet 5   • COMBIVENT RESPIMAT  MCG/ACT inhaler inhale 1 puff by mouth four times a day 4 g 5   • cyclobenzaprine (FLEXERIL) 10 MG tablet Take 1 tablet by mouth 2 (Two) Times a Day As Needed for muscle spasms. 60 tablet 5   • DALIRESP 500 MCG tablet tablet take 1 tablet by mouth once daily 30 tablet 5   • FIBER SELECT GUMMIES PO Take 2 tablets by mouth Daily.     • isosorbide mononitrate (IMDUR) 30 MG 24 hr tablet take 1 tablet by mouth once daily 30 tablet 5   • levothyroxine (SYNTHROID, LEVOTHROID) 150 MCG tablet take 1 tablet by mouth once daily as directed 30 tablet 5   • Multiple Vitamins-Minerals (PRESERVISION AREDS) capsule Take 1 tablet by mouth 2 (two) times a day.     • nitroglycerin (NITROSTAT) 0.4 MG SL tablet Place 1 tablet under the tongue as needed.     • nystatin (MYCOSTATIN) 512201 UNIT/ML suspension swish and swallow 5 milliliter four times a day 200 mL 0   • pantoprazole (PROTONIX) 40 MG EC tablet take 1 tablet by mouth once daily 30 tablet 5   • sertraline (ZOLOFT) 100 MG tablet take 1 tablet by mouth once daily 30 tablet 5   • simvastatin (ZOCOR) 40 MG tablet Take 1 tablet by mouth Every Night. 90 tablet 1   • FLOVENT  MCG/ACT inhaler inhale 2 puffs by mouth twice a day Rinse mouth after use 12 inhaler 5     No facility-administered medications prior to visit.        Patient Active Problem List   Diagnosis   • Coronary arteriosclerosis in native  artery   • Chronic obstructive pulmonary disease   • Depression   • Gastroesophageal reflux disease with esophagitis   • Hyperlipidemia   • Hypertension   • Hypothyroidism   • Osteoarthritis   • Peripheral arterial occlusive disease   • Solitary pulmonary nodule   • Vitamin D deficiency   • Lower back pain   • Carotid bruit   • Hypoxemia       Advance Care Planning:  power of  for healthcare on file    Identification of Risk Factors:  Risk factors include: weight , unhealthy diet, increased fall risk, depression and incontinence.    Review of Systems   Respiratory: Positive for shortness of breath (O2 at 2/min).    Psychiatric/Behavioral: Positive for sleep disturbance (sleeps during the day and has trouble at night.).       Compared to one year ago, the patient feels her physical health is the same.  Compared to one year ago, the patient feels her mental health is the same.    Objective     Physical Exam   Finger Rub Hearing{Test (right ear):passed  Finger Rub Hearing{Test (left ear):passed      Vitals:    09/21/17 0838   BP: 138/70   BP Location: Right arm   Patient Position: Sitting   Pulse: 73   Resp: 16   Temp: 97.5 °F (36.4 °C)   SpO2: 93%   Weight: 197 lb (89.4 kg)   PainSc:   4   PainLoc: Back       Body mass index is 34.9 kg/(m^2).  Discussed the patient's BMI with her. The BMI is above average; BMI management plan is completed.    Assessment/Plan   Patient Self-Management and Personalized Health Advice  The patient has been provided with information about: diet and weight management and preventive services including:   · Influenza vaccine, Pneumococcal vaccine , Zostavax vaccine (Herpes Zoster).    Visit Diagnoses:    ICD-10-CM ICD-9-CM   1. Initial Medicare annual wellness visit Z00.00 V70.0   2. Need for prophylactic vaccination against Streptococcus pneumoniae (pneumococcus) Z23 V03.82       Orders Placed This Encounter   Procedures   • Pneumococcal Conjugate Vaccine 13-Valent All        Outpatient Encounter Prescriptions as of 9/21/2017   Medication Sig Dispense Refill   • albuterol (PROAIR HFA) 108 (90 BASE) MCG/ACT inhaler Inhale 2 puffs every 4 (four) hours as needed.     • aspirin (ASPIRIN LOW DOSE) 81 MG chewable tablet Chew 1 tablet daily.     • atenolol (TENORMIN) 50 MG tablet take 1/2 tablet by mouth twice a day 30 tablet 5   • azithromycin (ZITHROMAX) 250 MG tablet take 1 tablet by mouth once daily 30 tablet 5   • Calcium Carbonate-Vitamin D (CALCIUM-VITAMIN D) 500-200 MG-UNIT per tablet Take 1 tablet by mouth daily.     • clopidogrel (PLAVIX) 75 MG tablet take 1 tablet by mouth once daily 30 tablet 5   • COMBIVENT RESPIMAT  MCG/ACT inhaler inhale 1 puff by mouth four times a day 4 g 5   • cyclobenzaprine (FLEXERIL) 10 MG tablet Take 1 tablet by mouth 2 (Two) Times a Day As Needed for muscle spasms. 60 tablet 5   • DALIRESP 500 MCG tablet tablet take 1 tablet by mouth once daily 30 tablet 5   • FIBER SELECT GUMMIES PO Take 2 tablets by mouth Daily.     • isosorbide mononitrate (IMDUR) 30 MG 24 hr tablet take 1 tablet by mouth once daily 30 tablet 5   • levothyroxine (SYNTHROID, LEVOTHROID) 150 MCG tablet take 1 tablet by mouth once daily as directed 30 tablet 5   • Multiple Vitamins-Minerals (PRESERVISION AREDS) capsule Take 1 tablet by mouth 2 (two) times a day.     • nitroglycerin (NITROSTAT) 0.4 MG SL tablet Place 1 tablet under the tongue as needed.     • nystatin (MYCOSTATIN) 415563 UNIT/ML suspension swish and swallow 5 milliliter four times a day 200 mL 0   • pantoprazole (PROTONIX) 40 MG EC tablet take 1 tablet by mouth once daily 30 tablet 5   • sertraline (ZOLOFT) 100 MG tablet take 1 tablet by mouth once daily 30 tablet 5   • simvastatin (ZOCOR) 40 MG tablet Take 1 tablet by mouth Every Night. 90 tablet 1   • FLOVENT  MCG/ACT inhaler inhale 2 puffs by mouth twice a day Rinse mouth after use 12 inhaler 5     No facility-administered encounter medications on  file as of 9/21/2017.        Reviewed use of high risk medication in the elderly: yes  Reviewed for potential of harmful drug interactions in the elderly: yes    Follow Up:  Return in about 1 year (around 9/21/2018).     An After Visit Summary and PPPS with all of these plans were given to the patient.

## 2017-09-21 NOTE — TELEPHONE ENCOUNTER
----- Message from Dee Garcia PA-C sent at 9/21/2017  9:32 AM EDT -----  pls call [facility] and have them fax last colonoscopy report to our office

## 2017-09-21 NOTE — TELEPHONE ENCOUNTER
Spoke with [facility] and requested pt's latest colonoscopy report. Call transferred to Dr. Gould's office, they do not have pt listed in there charts.

## 2017-10-03 RX ORDER — SERTRALINE HYDROCHLORIDE 100 MG/1
100 TABLET, FILM COATED ORAL DAILY
Qty: 30 TABLET | Refills: 5 | Status: SHIPPED | OUTPATIENT
Start: 2017-10-03 | End: 2018-03-03 | Stop reason: SDUPTHER

## 2017-10-04 ENCOUNTER — TELEPHONE (OUTPATIENT)
Dept: INTERNAL MEDICINE | Facility: CLINIC | Age: 77
End: 2017-10-04

## 2017-10-04 NOTE — TELEPHONE ENCOUNTER
----- Message from Cristel Nolan sent at 10/4/2017  9:54 AM EDT -----  [phone]  REFILL ON COMBIVENT INHALER   [facility] [place]

## 2017-11-07 RX ORDER — SIMVASTATIN 40 MG
TABLET ORAL
Qty: 90 TABLET | Refills: 1 | Status: SHIPPED | OUTPATIENT
Start: 2017-11-07 | End: 2018-03-16 | Stop reason: SDUPTHER

## 2017-11-21 RX ORDER — FLUTICASONE PROPIONATE 110 UG/1
2 AEROSOL, METERED RESPIRATORY (INHALATION)
Qty: 12 INHALER | Refills: 5 | Status: SHIPPED | OUTPATIENT
Start: 2017-11-21 | End: 2018-07-28 | Stop reason: SDUPTHER

## 2017-11-28 RX ORDER — ISOSORBIDE MONONITRATE 30 MG/1
TABLET, EXTENDED RELEASE ORAL
Qty: 30 TABLET | Refills: 5 | Status: SHIPPED | OUTPATIENT
Start: 2017-11-28 | End: 2018-05-07 | Stop reason: SDUPTHER

## 2017-11-28 RX ORDER — ATENOLOL 50 MG/1
TABLET ORAL
Qty: 30 TABLET | Refills: 5 | Status: SHIPPED | OUTPATIENT
Start: 2017-11-28 | End: 2018-05-07 | Stop reason: SDUPTHER

## 2017-11-28 RX ORDER — CLOPIDOGREL BISULFATE 75 MG/1
TABLET ORAL
Qty: 30 TABLET | Refills: 5 | Status: SHIPPED | OUTPATIENT
Start: 2017-11-28 | End: 2018-05-07 | Stop reason: SDUPTHER

## 2017-11-30 RX ORDER — LEVOTHYROXINE SODIUM 0.15 MG/1
150 TABLET ORAL DAILY
Qty: 30 TABLET | Refills: 5 | Status: SHIPPED | OUTPATIENT
Start: 2017-11-30 | End: 2018-05-07 | Stop reason: SDUPTHER

## 2017-12-28 RX ORDER — AZITHROMYCIN 250 MG/1
TABLET, FILM COATED ORAL
Qty: 30 TABLET | Refills: 5 | Status: SHIPPED | OUTPATIENT
Start: 2017-12-28 | End: 2018-05-30 | Stop reason: SDUPTHER

## 2018-01-11 ENCOUNTER — TELEPHONE (OUTPATIENT)
Dept: INTERNAL MEDICINE | Facility: CLINIC | Age: 78
End: 2018-01-11

## 2018-01-11 ENCOUNTER — OFFICE VISIT (OUTPATIENT)
Dept: INTERNAL MEDICINE | Facility: CLINIC | Age: 78
End: 2018-01-11

## 2018-01-11 VITALS
DIASTOLIC BLOOD PRESSURE: 72 MMHG | HEART RATE: 72 BPM | SYSTOLIC BLOOD PRESSURE: 136 MMHG | RESPIRATION RATE: 18 BRPM | WEIGHT: 188 LBS | TEMPERATURE: 97.9 F | BODY MASS INDEX: 33.3 KG/M2

## 2018-01-11 DIAGNOSIS — K21.00 GASTROESOPHAGEAL REFLUX DISEASE WITH ESOPHAGITIS: ICD-10-CM

## 2018-01-11 DIAGNOSIS — E78.5 HYPERLIPIDEMIA, UNSPECIFIED HYPERLIPIDEMIA TYPE: ICD-10-CM

## 2018-01-11 DIAGNOSIS — E03.8 OTHER SPECIFIED HYPOTHYROIDISM: ICD-10-CM

## 2018-01-11 DIAGNOSIS — I10 ESSENTIAL HYPERTENSION: Primary | ICD-10-CM

## 2018-01-11 DIAGNOSIS — F32.A DEPRESSION, UNSPECIFIED DEPRESSION TYPE: ICD-10-CM

## 2018-01-11 DIAGNOSIS — J44.9 CHRONIC OBSTRUCTIVE PULMONARY DISEASE, UNSPECIFIED COPD TYPE (HCC): ICD-10-CM

## 2018-01-11 DIAGNOSIS — Z23 NEED FOR INFLUENZA VACCINATION: ICD-10-CM

## 2018-01-11 LAB
ALBUMIN SERPL-MCNC: 4.5 G/DL (ref 3.2–4.8)
ALBUMIN/GLOB SERPL: 1.7 G/DL (ref 1.5–2.5)
ALP SERPL-CCNC: 88 U/L (ref 25–100)
ALT SERPL W P-5'-P-CCNC: 17 U/L (ref 7–40)
ANION GAP SERPL CALCULATED.3IONS-SCNC: 9 MMOL/L (ref 3–11)
ARTICHOKE IGE QN: 113 MG/DL (ref 0–130)
AST SERPL-CCNC: 20 U/L (ref 0–33)
BASOPHILS # BLD AUTO: 0.09 10*3/MM3 (ref 0–0.2)
BASOPHILS NFR BLD AUTO: 0.6 % (ref 0–1)
BILIRUB SERPL-MCNC: 0.5 MG/DL (ref 0.3–1.2)
BUN BLD-MCNC: 11 MG/DL (ref 9–23)
BUN/CREAT SERPL: 22 (ref 7–25)
CALCIUM SPEC-SCNC: 9.7 MG/DL (ref 8.7–10.4)
CHLORIDE SERPL-SCNC: 97 MMOL/L (ref 99–109)
CHOLEST SERPL-MCNC: 159 MG/DL (ref 0–200)
CO2 SERPL-SCNC: 33 MMOL/L (ref 20–31)
CREAT BLD-MCNC: 0.5 MG/DL (ref 0.6–1.3)
DEPRECATED RDW RBC AUTO: 45.3 FL (ref 37–54)
EOSINOPHIL # BLD AUTO: 0.25 10*3/MM3 (ref 0–0.3)
EOSINOPHIL NFR BLD AUTO: 1.7 % (ref 0–3)
ERYTHROCYTE [DISTWIDTH] IN BLOOD BY AUTOMATED COUNT: 13.5 % (ref 11.3–14.5)
GFR SERPL CREATININE-BSD FRML MDRD: 120 ML/MIN/1.73
GLOBULIN UR ELPH-MCNC: 2.6 GM/DL
GLUCOSE BLD-MCNC: 117 MG/DL (ref 70–100)
HCT VFR BLD AUTO: 40.3 % (ref 34.5–44)
HDLC SERPL-MCNC: 43 MG/DL (ref 40–60)
HGB BLD-MCNC: 12.3 G/DL (ref 11.5–15.5)
IMM GRANULOCYTES # BLD: 0.04 10*3/MM3 (ref 0–0.03)
IMM GRANULOCYTES NFR BLD: 0.3 % (ref 0–0.6)
LYMPHOCYTES # BLD AUTO: 2.81 10*3/MM3 (ref 0.6–4.8)
LYMPHOCYTES NFR BLD AUTO: 19.2 % (ref 24–44)
MCH RBC QN AUTO: 28.1 PG (ref 27–31)
MCHC RBC AUTO-ENTMCNC: 30.5 G/DL (ref 32–36)
MCV RBC AUTO: 92.2 FL (ref 80–99)
MONOCYTES # BLD AUTO: 1 10*3/MM3 (ref 0–1)
MONOCYTES NFR BLD AUTO: 6.8 % (ref 0–12)
NEUTROPHILS # BLD AUTO: 10.45 10*3/MM3 (ref 1.5–8.3)
NEUTROPHILS NFR BLD AUTO: 71.4 % (ref 41–71)
PLATELET # BLD AUTO: 317 10*3/MM3 (ref 150–450)
PMV BLD AUTO: 9.6 FL (ref 6–12)
POTASSIUM BLD-SCNC: 4 MMOL/L (ref 3.5–5.5)
PROT SERPL-MCNC: 7.1 G/DL (ref 5.7–8.2)
RBC # BLD AUTO: 4.37 10*6/MM3 (ref 3.89–5.14)
SODIUM BLD-SCNC: 139 MMOL/L (ref 132–146)
TRIGL SERPL-MCNC: 166 MG/DL (ref 0–150)
TSH SERPL DL<=0.05 MIU/L-ACNC: 0.67 MIU/ML (ref 0.35–5.35)
WBC NRBC COR # BLD: 14.64 10*3/MM3 (ref 3.5–10.8)

## 2018-01-11 PROCEDURE — 99214 OFFICE O/P EST MOD 30 MIN: CPT | Performed by: INTERNAL MEDICINE

## 2018-01-11 PROCEDURE — 90662 IIV NO PRSV INCREASED AG IM: CPT | Performed by: INTERNAL MEDICINE

## 2018-01-11 PROCEDURE — 84443 ASSAY THYROID STIM HORMONE: CPT | Performed by: INTERNAL MEDICINE

## 2018-01-11 PROCEDURE — 85025 COMPLETE CBC W/AUTO DIFF WBC: CPT | Performed by: INTERNAL MEDICINE

## 2018-01-11 PROCEDURE — 80061 LIPID PANEL: CPT | Performed by: INTERNAL MEDICINE

## 2018-01-11 PROCEDURE — G0008 ADMIN INFLUENZA VIRUS VAC: HCPCS | Performed by: INTERNAL MEDICINE

## 2018-01-11 PROCEDURE — 36415 COLL VENOUS BLD VENIPUNCTURE: CPT | Performed by: INTERNAL MEDICINE

## 2018-01-11 PROCEDURE — 80053 COMPREHEN METABOLIC PANEL: CPT | Performed by: INTERNAL MEDICINE

## 2018-01-11 RX ORDER — CYCLOBENZAPRINE HCL 10 MG
10 TABLET ORAL 2 TIMES DAILY PRN
Qty: 60 TABLET | Refills: 5 | Status: SHIPPED | OUTPATIENT
Start: 2018-01-11 | End: 2019-07-27 | Stop reason: SDUPTHER

## 2018-01-11 NOTE — TELEPHONE ENCOUNTER
----- Message from Katy Wolf sent at 1/11/2018  1:22 PM EST -----  Patient seen today and forgot she needs refill on cyclobenzaprine (FLEXERIL) 10 MG tablet sent to [facility] [place].

## 2018-01-11 NOTE — PROGRESS NOTES
Chief Complaint   Patient presents with   • Follow-up     3 MONTH FOLLOW UP CHRONIC MEDICAL PROBLEMS       History of Present Illness  Illness    The patient presents for a follow-up related to Chronic Obstructive Pulmonary Disease. Rescue inhaler usage is reported to be daily. The patient reports that she has no known triggers. She currently reports no wheezing, cough, sputum production or dyspnea. She is not smoking. She continues her home oxygen 24 hours daily with excellent benefit.    The patient presents for a follow-up related to hyperlipidemia. She is following a low fat diet. She reports that she is exercising. She is taking simvastatin. The patient is taking her medication as prescribed. She reports no medication side effects, including muscle cramps, abdominal pain, headaches or weakness. She denies chest pain, orthopnea, paroxysmal nocturnal dyspnea, dyspnea on exertion, edema, palpitations or syncope.    The patient presents for a follow-up related to hypertension. The patient reports that she has had no headaches or blurred vision. She states that she is taking her medication as prescribed. She is not having medication side effects. She does not check her blood pressures at home.    As relates to hypothyroidism, the patient reports a good energy level. She reports no hair loss, heat intolerance, cold intolerance, diarrhea, constipation or sweats. She is taking her medication as prescribed.    The patient is on pantoprazole for her gastroesophageal reflux. The medication is taken on a regular basis and gives complete relief of the symptoms. She reports no abdominal pain, belching, dysphagia, early satiety, heartburn, hoarseness, nausea, odynophagia, rectal bleeding, vomiting or weight loss. The GERD has no known aggravating factors.    The patient presents for follow-up of depression. She denies currently having depression symptoms. She denies suicidal ideation. Her energy level is good. She denies  agorophobia. She sleeps well. She is not tearful. She states that her current depression symptoms are stable. She is currently taking a medication. The current medication regimen consists of sertraline. The patient denies having medication side effects including nausea, headaches, anxiety, increased depression or fatigue.    Review of Systems     GENERAL- Denies Fever, Chills, Sweats, Weakness or Malaise.    CARDIOVASCULAR- Denies Claudication.    PULMONARY- Denies Hemoptysis or Pleuritic Chest Pain.    Medications      Current Outpatient Prescriptions:   •  albuterol (PROAIR HFA) 108 (90 BASE) MCG/ACT inhaler, Inhale 2 puffs every 4 (four) hours as needed., Disp: , Rfl:   •  aspirin (ASPIRIN LOW DOSE) 81 MG chewable tablet, Chew 1 tablet daily., Disp: , Rfl:   •  atenolol (TENORMIN) 50 MG tablet, take 1/2 tablet by mouth twice a day, Disp: 30 tablet, Rfl: 5  •  azithromycin (ZITHROMAX) 250 MG tablet, TAKE 1 TABLET DAILY, Disp: 30 tablet, Rfl: 5  •  Calcium Carbonate-Vitamin D (CALCIUM-VITAMIN D) 500-200 MG-UNIT per tablet, Take 1 tablet by mouth daily., Disp: , Rfl:   •  clopidogrel (PLAVIX) 75 MG tablet, take 1 tablet by mouth once daily, Disp: 30 tablet, Rfl: 5  •  DALIRESP 500 MCG tablet tablet, take 1 tablet by mouth once daily, Disp: 30 tablet, Rfl: 5  •  FIBER SELECT GUMMIES PO, Take 2 tablets by mouth Daily., Disp: , Rfl:   •  fluticasone (FLOVENT HFA) 110 MCG/ACT inhaler, Inhale 2 puffs 2 (Two) Times a Day., Disp: 12 inhaler, Rfl: 5  •  ipratropium-albuterol (COMBIVENT RESPIMAT)  MCG/ACT inhaler, Inhale 1 puff 4 (Four) Times a Day., Disp: 4 g, Rfl: 5  •  isosorbide mononitrate (IMDUR) 30 MG 24 hr tablet, take 1 tablet by mouth once daily, Disp: 30 tablet, Rfl: 5  •  levothyroxine (SYNTHROID, LEVOTHROID) 150 MCG tablet, Take 1 tablet by mouth Daily., Disp: 30 tablet, Rfl: 5  •  Multiple Vitamins-Minerals (PRESERVISION AREDS) capsule, Take 1 tablet by mouth 2 (two) times a day., Disp: , Rfl:   •   pantoprazole (PROTONIX) 40 MG EC tablet, take 1 tablet by mouth once daily, Disp: 30 tablet, Rfl: 5  •  sertraline (ZOLOFT) 100 MG tablet, Take 1 tablet by mouth Daily., Disp: 30 tablet, Rfl: 5  •  simvastatin (ZOCOR) 40 MG tablet, take 1 tablet by mouth at bedtime, Disp: 90 tablet, Rfl: 1  •  cyclobenzaprine (FLEXERIL) 10 MG tablet, Take 1 tablet by mouth 2 (Two) Times a Day As Needed for muscle spasms., Disp: 60 tablet, Rfl: 5  •  nitroglycerin (NITROSTAT) 0.4 MG SL tablet, Place 1 tablet under the tongue as needed., Disp: , Rfl:      Allergies    Allergies   Allergen Reactions   • Codeine Mental Status Change   • Penicillins Hives and Swelling   • Dopamine Other (See Comments) and Palpitations     Other reaction(s): Hypertension       Problem List    Patient Active Problem List   Diagnosis   • Coronary arteriosclerosis in native artery   • Chronic obstructive pulmonary disease   • Depression   • Gastroesophageal reflux disease with esophagitis   • Hyperlipidemia   • Hypertension   • Hypothyroidism   • Osteoarthritis   • Peripheral arterial occlusive disease   • Solitary pulmonary nodule   • Vitamin D deficiency   • Lower back pain   • Carotid bruit   • Hypoxemia       Medications, Allergies, Problems List and Past History were reviewed and updated.    Physical Examination    /72 (BP Location: Left arm, Patient Position: Sitting, Cuff Size: Adult)  Pulse 72  Temp 97.9 °F (36.6 °C) (Temporal Artery )   Resp 18  Wt 85.3 kg (188 lb)  BMI 33.3 kg/m2    HEENT: Head- Normocephalic Atraumatic. Facies- Within normal limits. Pinnas- Normal texture and shape bilaterally. Canals- Normal bilaterally. TMs- Normal bilaterally. Nares- Patent bilaterally. Nasal Septum- is normal. There is no tenderness to palpation over the frontal or maxillary sinuses. Lids- Normal bilaterally. Conjunctiva- Clear bilaterally. Sclera- Anicteric bilaterally. Oropharynx- Moist with no lesions. Tonsils- No enlargement, erythema or  exudate.    Neck: Thyroid- non enlarged, symmetric and has no nodules. No bruits are detected. ROM- Normal Range of Motion with no rigidity.    Lungs: Auscultation- Clear to auscultation bilaterally. There are no retractions, clubbing or cyanosis. The Expiratory to Inspiratory ratio is equal.    Cardiovascular: There are no carotid bruits. Heart- Normal Rate with Regular rhythm and no murmurs. There are no gallops. There are no rubs. In the lower extremities there is no edema. The upper extremities do not have edema.    Abdomen: Soft, benign, non-tender with no masses, hernias, organomegaly or scars.    Impression and Assessment    COPD.    Hyperlipidemia.    Essential Hypertension.    Hypothyroidism.    Gastroesophageal Reflux Disease.    Major Depressive Disorder Single Episode Unspecified.    Plan    Gastroesophageal Reflux Disease Plan: The current plan was continued.    Hyperlipidemia Plan: She was instructed to eat a low fat diet. She was encouraged to exercise daily. The patient was instructed to continue the current medications.    Essential Hypertension Plan: The current plan was continued.    Hypothyroidism Plan: The current plan was continued.    COPD Plan: The current plan was continued.    Major Depressive Disorder Single Episode Unspecified Plan: The current plan was continued.    Avelina was seen today for follow-up.    Diagnoses and all orders for this visit:    Essential hypertension  -     Comprehensive Metabolic Panel    Gastroesophageal reflux disease with esophagitis  -     Comprehensive Metabolic Panel  -     CBC & Differential    Hyperlipidemia, unspecified hyperlipidemia type  -     Comprehensive Metabolic Panel  -     Lipid Panel    Other specified hypothyroidism  -     TSH    Chronic obstructive pulmonary disease, unspecified COPD type    Depression, unspecified depression type          Return to Office    The patient was instructed to return for follow-up in 6 months. Next Visit: Physical.     The patient was instructed to return sooner if the condition changes, worsens, or doesn't resolve.

## 2018-01-11 NOTE — TELEPHONE ENCOUNTER
Please call in 6 month supply (Either 1 month with RF 5 or 3 months with RF 1).  Dre Moura MD  1:48 PM  01/11/18

## 2018-02-02 RX ORDER — ROFLUMILAST 500 UG/1
TABLET ORAL
Qty: 30 TABLET | Refills: 1 | Status: SHIPPED | OUTPATIENT
Start: 2018-02-02 | End: 2018-04-02 | Stop reason: SDUPTHER

## 2018-02-02 RX ORDER — PANTOPRAZOLE SODIUM 40 MG/1
TABLET, DELAYED RELEASE ORAL
Qty: 30 TABLET | Refills: 5 | Status: SHIPPED | OUTPATIENT
Start: 2018-02-02 | End: 2018-08-01 | Stop reason: SDUPTHER

## 2018-03-05 RX ORDER — SERTRALINE HYDROCHLORIDE 100 MG/1
TABLET, FILM COATED ORAL
Qty: 30 TABLET | Refills: 5 | Status: SHIPPED | OUTPATIENT
Start: 2018-03-05 | End: 2018-08-30 | Stop reason: SDUPTHER

## 2018-03-16 RX ORDER — SIMVASTATIN 40 MG
TABLET ORAL
Qty: 90 TABLET | Refills: 1 | Status: SHIPPED | OUTPATIENT
Start: 2018-03-16 | End: 2018-09-29 | Stop reason: SDUPTHER

## 2018-04-02 RX ORDER — ROFLUMILAST 500 UG/1
TABLET ORAL
Qty: 30 TABLET | Refills: 5 | Status: SHIPPED | OUTPATIENT
Start: 2018-04-02 | End: 2018-09-29 | Stop reason: SDUPTHER

## 2018-05-07 RX ORDER — ISOSORBIDE MONONITRATE 30 MG/1
TABLET, EXTENDED RELEASE ORAL
Qty: 30 TABLET | Refills: 5 | Status: SHIPPED | OUTPATIENT
Start: 2018-05-07 | End: 2018-10-30 | Stop reason: SDUPTHER

## 2018-05-07 RX ORDER — ATENOLOL 50 MG/1
TABLET ORAL
Qty: 30 TABLET | Refills: 5 | Status: SHIPPED | OUTPATIENT
Start: 2018-05-07 | End: 2018-10-30 | Stop reason: SDUPTHER

## 2018-05-07 RX ORDER — LEVOTHYROXINE SODIUM 0.15 MG/1
TABLET ORAL
Qty: 30 TABLET | Refills: 5 | Status: SHIPPED | OUTPATIENT
Start: 2018-05-07 | End: 2018-10-30 | Stop reason: SDUPTHER

## 2018-05-07 RX ORDER — CLOPIDOGREL BISULFATE 75 MG/1
TABLET ORAL
Qty: 30 TABLET | Refills: 5 | Status: SHIPPED | OUTPATIENT
Start: 2018-05-07 | End: 2018-10-30 | Stop reason: SDUPTHER

## 2018-05-30 NOTE — TELEPHONE ENCOUNTER
Please call in 6 month supply (Either 1 month with RF 5 or 3 months with RF 1).  Dre Moura MD  1:45 PM  05/30/18

## 2018-05-31 RX ORDER — AZITHROMYCIN 250 MG/1
TABLET, FILM COATED ORAL
Qty: 30 TABLET | Refills: 5 | Status: SHIPPED | OUTPATIENT
Start: 2018-05-31 | End: 2018-11-30 | Stop reason: SDUPTHER

## 2018-06-28 RX ORDER — IPRATROPIUM/ALBUTEROL SULFATE 20-100 MCG
MIST INHALER (GRAM) INHALATION
Qty: 4 G | Refills: 5 | Status: SHIPPED | OUTPATIENT
Start: 2018-06-28 | End: 2018-12-02 | Stop reason: SDUPTHER

## 2018-07-12 ENCOUNTER — OFFICE VISIT (OUTPATIENT)
Dept: INTERNAL MEDICINE | Facility: CLINIC | Age: 78
End: 2018-07-12

## 2018-07-12 VITALS
BODY MASS INDEX: 33.3 KG/M2 | RESPIRATION RATE: 18 BRPM | DIASTOLIC BLOOD PRESSURE: 74 MMHG | TEMPERATURE: 97.5 F | HEART RATE: 72 BPM | WEIGHT: 188 LBS | SYSTOLIC BLOOD PRESSURE: 118 MMHG

## 2018-07-12 DIAGNOSIS — J44.9 CHRONIC OBSTRUCTIVE PULMONARY DISEASE, UNSPECIFIED COPD TYPE (HCC): ICD-10-CM

## 2018-07-12 DIAGNOSIS — I10 ESSENTIAL HYPERTENSION: Primary | ICD-10-CM

## 2018-07-12 DIAGNOSIS — E78.5 HYPERLIPIDEMIA, UNSPECIFIED HYPERLIPIDEMIA TYPE: ICD-10-CM

## 2018-07-12 DIAGNOSIS — E03.8 OTHER SPECIFIED HYPOTHYROIDISM: ICD-10-CM

## 2018-07-12 DIAGNOSIS — K21.00 GASTROESOPHAGEAL REFLUX DISEASE WITH ESOPHAGITIS: ICD-10-CM

## 2018-07-12 LAB
ALBUMIN SERPL-MCNC: 4.55 G/DL (ref 3.2–4.8)
ALBUMIN/GLOB SERPL: 1.9 G/DL (ref 1.5–2.5)
ALP SERPL-CCNC: 92 U/L (ref 25–100)
ALT SERPL W P-5'-P-CCNC: 13 U/L (ref 7–40)
ANION GAP SERPL CALCULATED.3IONS-SCNC: 4 MMOL/L (ref 3–11)
ARTICHOKE IGE QN: 102 MG/DL (ref 0–130)
AST SERPL-CCNC: 16 U/L (ref 0–33)
BILIRUB SERPL-MCNC: 0.4 MG/DL (ref 0.3–1.2)
BUN BLD-MCNC: 10 MG/DL (ref 9–23)
BUN/CREAT SERPL: 19.2 (ref 7–25)
CALCIUM SPEC-SCNC: 9.3 MG/DL (ref 8.7–10.4)
CHLORIDE SERPL-SCNC: 103 MMOL/L (ref 99–109)
CHOLEST SERPL-MCNC: 160 MG/DL (ref 0–200)
CO2 SERPL-SCNC: 34 MMOL/L (ref 20–31)
CREAT BLD-MCNC: 0.52 MG/DL (ref 0.6–1.3)
GFR SERPL CREATININE-BSD FRML MDRD: 114 ML/MIN/1.73
GLOBULIN UR ELPH-MCNC: 2.5 GM/DL
GLUCOSE BLD-MCNC: 106 MG/DL (ref 70–100)
HDLC SERPL-MCNC: 47 MG/DL (ref 40–60)
POTASSIUM BLD-SCNC: 4.4 MMOL/L (ref 3.5–5.5)
PROT SERPL-MCNC: 7 G/DL (ref 5.7–8.2)
SODIUM BLD-SCNC: 141 MMOL/L (ref 132–146)
TRIGL SERPL-MCNC: 159 MG/DL (ref 0–150)
TSH SERPL DL<=0.05 MIU/L-ACNC: 0.6 MIU/ML (ref 0.35–5.35)

## 2018-07-12 PROCEDURE — 80061 LIPID PANEL: CPT | Performed by: INTERNAL MEDICINE

## 2018-07-12 PROCEDURE — 99214 OFFICE O/P EST MOD 30 MIN: CPT | Performed by: INTERNAL MEDICINE

## 2018-07-12 PROCEDURE — 36415 COLL VENOUS BLD VENIPUNCTURE: CPT | Performed by: INTERNAL MEDICINE

## 2018-07-12 PROCEDURE — 84443 ASSAY THYROID STIM HORMONE: CPT | Performed by: INTERNAL MEDICINE

## 2018-07-12 PROCEDURE — 80053 COMPREHEN METABOLIC PANEL: CPT | Performed by: INTERNAL MEDICINE

## 2018-07-12 NOTE — PROGRESS NOTES
Chief Complaint   Patient presents with   • Follow-up     6 MONTH FOLLOW UP CHRONIC MEDICAL PROBLEMS       History of Present Illness      The patient presents for a follow-up related to hyperlipidemia. She is following a low fat diet. She reports that she is exercising. She is taking simvastatin. The patient is taking her medication as prescribed. She reports no medication side effects, including muscle cramps, abdominal pain, headaches or weakness. She denies chest pain, shortness of breath, orthopnea, paroxysmal nocturnal dyspnea, dyspnea on exertion, edema, palpitations or syncope.    The patient presents for a follow-up related to hypertension. The patient reports that she has had no headaches or blurred vision. She states that she is taking her medication as prescribed. She is not having medication side effects.    The patient presents for a follow-up related to GERD. The patient is on pantoprazole for her gastroesophageal reflux. The medication is taken on a regular basis and gives complete relief of the symptoms. She reports no abdominal pain, belching, diarrhea, dysphagia, early satiety, heartburn, hoarseness, nausea, odynophagia, rectal bleeding, vomiting or weight loss. The GERD has no known aggravating factors.    The patient presents for a follow-up related to hypothyroidism. She reports a good energy level. She reports no hair loss, heat intolerance, cold intolerance, constipation or sweats. She is taking her medication as prescribed.    The patient presents for a follow-up related to Chronic Obstructive Pulmonary Disease. Rescue inhaler usage is reported to be multiple times daily. The patient reports that she has no known triggers. She currently reports no wheezing, cough or sputum production. She is not smoking. She continues home Oxygen with good benefit.    Review of Systems    GENERAL/CONSTITUTIONAL- Denies Fever, Chills, Sweats, Weakness or Malaise.    CARDIOVASCULAR- Denies  Claudication.    PULMONARY- Denies Hemoptysis or Pleuritic Chest Pain.    Medications      Current Outpatient Prescriptions:   •  albuterol (PROAIR HFA) 108 (90 BASE) MCG/ACT inhaler, Inhale 2 puffs every 4 (four) hours as needed., Disp: , Rfl:   •  aspirin (ASPIRIN LOW DOSE) 81 MG chewable tablet, Chew 1 tablet daily., Disp: , Rfl:   •  atenolol (TENORMIN) 50 MG tablet, take 1/2 tablet by mouth twice a day, Disp: 30 tablet, Rfl: 5  •  azithromycin (ZITHROMAX) 250 MG tablet, take 1 tablet by mouth once daily, Disp: 30 tablet, Rfl: 5  •  Calcium Carbonate-Vitamin D (CALCIUM-VITAMIN D) 500-200 MG-UNIT per tablet, Take 1 tablet by mouth daily., Disp: , Rfl:   •  clopidogrel (PLAVIX) 75 MG tablet, take 1 tablet by mouth once daily, Disp: 30 tablet, Rfl: 5  •  COMBIVENT RESPIMAT  MCG/ACT inhaler, inhale 1 puff by mouth four times a day, Disp: 4 g, Rfl: 5  •  cyclobenzaprine (FLEXERIL) 10 MG tablet, Take 1 tablet by mouth 2 (Two) Times a Day As Needed for Muscle Spasms., Disp: 60 tablet, Rfl: 5  •  DALIRESP 500 MCG tablet tablet, take 1 tablet once daily, Disp: 30 tablet, Rfl: 5  •  FIBER SELECT GUMMIES PO, Take 2 tablets by mouth Daily., Disp: , Rfl:   •  fluticasone (FLOVENT HFA) 110 MCG/ACT inhaler, Inhale 2 puffs 2 (Two) Times a Day., Disp: 12 inhaler, Rfl: 5  •  isosorbide mononitrate (IMDUR) 30 MG 24 hr tablet, take 1 tablet by mouth once daily, Disp: 30 tablet, Rfl: 5  •  levothyroxine (SYNTHROID, LEVOTHROID) 150 MCG tablet, take 1 tablet by mouth once daily, Disp: 30 tablet, Rfl: 5  •  Multiple Vitamins-Minerals (PRESERVISION AREDS) capsule, Take 1 tablet by mouth 2 (two) times a day., Disp: , Rfl:   •  pantoprazole (PROTONIX) 40 MG EC tablet, take 1 tablet by mouth once daily, Disp: 30 tablet, Rfl: 5  •  sertraline (ZOLOFT) 100 MG tablet, take 1 tablet by mouth once daily, Disp: 30 tablet, Rfl: 5  •  simvastatin (ZOCOR) 40 MG tablet, take 1 tablet by mouth at bedtime, Disp: 90 tablet, Rfl: 1  •   nitroglycerin (NITROSTAT) 0.4 MG SL tablet, Place 1 tablet under the tongue as needed., Disp: , Rfl:   •  Spacer/Aero Chamber Mouthpiece misc, 1 Units Take As Directed., Disp: 1 each, Rfl: 0     Allergies    Allergies   Allergen Reactions   • Codeine Mental Status Change   • Penicillins Hives and Swelling   • Dopamine Other (See Comments) and Palpitations     Other reaction(s): Hypertension       Problem List    Patient Active Problem List   Diagnosis   • Coronary arteriosclerosis in native artery   • Chronic obstructive pulmonary disease (CMS/HCC)   • Depression   • Gastroesophageal reflux disease with esophagitis   • Hyperlipidemia   • Hypertension   • Hypothyroidism   • Osteoarthritis   • Peripheral arterial occlusive disease (CMS/HCC)   • Solitary pulmonary nodule   • Vitamin D deficiency   • Lower back pain   • Carotid bruit   • Hypoxemia       Medications, Allergies, Problems List and Past History were reviewed and updated.    Physical Examination    /74 (BP Location: Left arm, Patient Position: Sitting, Cuff Size: Adult)   Pulse 72   Temp 97.5 °F (36.4 °C) (Temporal Artery )   Resp 18   Wt 85.3 kg (188 lb)   LMP  (LMP Unknown)   Breastfeeding? No   BMI 33.30 kg/m²     HEENT: Head- Normocephalic Atraumatic. Facies- Within normal limits. Pinnas- Normal texture and shape bilaterally. Canals- Normal bilaterally. TMs- Normal bilaterally. Nares- Patent bilaterally. Nasal Septum- is normal. There is no tenderness to palpation over the frontal or maxillary sinuses. Lids- Normal bilaterally. Conjunctiva- Clear bilaterally. Sclera- Anicteric bilaterally. Oropharynx- Moist with no lesions. Tonsils- No enlargement, erythema or exudate.    Neck: Thyroid- non enlarged, symmetric and has no nodules. No bruits are detected. ROM- Normal Range of Motion with no rigidity.    Lungs: Auscultation- Clear to auscultation bilaterally. There are no retractions, clubbing or cyanosis. The Expiratory to Inspiratory ratio is  equal.    Cardiovascular: There are no carotid bruits. Heart- Normal Rate with Regular rhythm and no murmurs. There are no gallops. There are no rubs. In the lower extremities there is no edema. The upper extremities do not have edema.    Abdomen: Soft, benign, non-tender with no masses, hernias, organomegaly or scars.    Impression and Assessment    Hyperlipidemia.    Essential Hypertension.    Gastroesophageal Reflux Disease.    Hypothyroidism.    COPD.    Plan    Gastroesophageal Reflux Disease Plan: The current plan was continued.    Hyperlipidemia Plan: The patient was instructed to exercise daily, eat a low fat diet and continue her medications.    Essential Hypertension Plan: The current plan was continued.    Hypothyroidism Plan: The current plan was continued.    COPD Plan: The current plan was continued.    Avelina was seen today for follow-up.    Diagnoses and all orders for this visit:    Essential hypertension  -     Comprehensive Metabolic Panel    Gastroesophageal reflux disease with esophagitis  -     Comprehensive Metabolic Panel    Hyperlipidemia, unspecified hyperlipidemia type  -     Comprehensive Metabolic Panel  -     Lipid Panel    Other specified hypothyroidism  -     TSH    Chronic obstructive pulmonary disease, unspecified COPD type (CMS/[facility])  -     Spacer/Aero Chamber Mouthpiece misc; 1 Units Take As Directed.        Return to Office    The patient was instructed to return for follow-up in 4 months.    The patient was instructed to return sooner if the condition changes, worsens, or doesn't resolve.

## 2018-07-30 RX ORDER — DEXAMETHASONE 4 MG/1
TABLET ORAL
Qty: 12 G | Refills: 5 | Status: SHIPPED | OUTPATIENT
Start: 2018-07-30 | End: 2019-01-25 | Stop reason: SDUPTHER

## 2018-08-01 RX ORDER — PANTOPRAZOLE SODIUM 40 MG/1
TABLET, DELAYED RELEASE ORAL
Qty: 30 TABLET | Refills: 5 | Status: SHIPPED | OUTPATIENT
Start: 2018-08-01 | End: 2019-04-24 | Stop reason: SDUPTHER

## 2018-08-30 RX ORDER — SERTRALINE HYDROCHLORIDE 100 MG/1
TABLET, FILM COATED ORAL
Qty: 30 TABLET | Refills: 5 | Status: SHIPPED | OUTPATIENT
Start: 2018-08-30 | End: 2020-03-16 | Stop reason: SDUPTHER

## 2018-10-01 RX ORDER — SIMVASTATIN 40 MG
TABLET ORAL
Qty: 90 TABLET | Refills: 1 | Status: SHIPPED | OUTPATIENT
Start: 2018-10-01 | End: 2019-04-02 | Stop reason: SDUPTHER

## 2018-10-01 RX ORDER — ROFLUMILAST 500 UG/1
TABLET ORAL
Qty: 30 TABLET | Refills: 5 | Status: SHIPPED | OUTPATIENT
Start: 2018-10-01 | End: 2019-11-27 | Stop reason: SDUPTHER

## 2018-10-09 ENCOUNTER — TELEPHONE (OUTPATIENT)
Dept: INTERNAL MEDICINE | Facility: CLINIC | Age: 78
End: 2018-10-09

## 2018-10-09 NOTE — TELEPHONE ENCOUNTER
Called pt in regards to scheduling Subsequent Medicare Wellness Visit same day as next f/u scheduled with Dr. Moura 11/14/18 8:45am.  LVM and requested call back.

## 2018-10-30 RX ORDER — LEVOTHYROXINE SODIUM 0.15 MG/1
TABLET ORAL
Qty: 30 TABLET | Refills: 5 | Status: SHIPPED | OUTPATIENT
Start: 2018-10-30 | End: 2019-02-26 | Stop reason: SDUPTHER

## 2018-10-30 RX ORDER — ATENOLOL 50 MG/1
TABLET ORAL
Qty: 30 TABLET | Refills: 5 | Status: SHIPPED | OUTPATIENT
Start: 2018-10-30 | End: 2019-02-26 | Stop reason: SDUPTHER

## 2018-10-30 RX ORDER — ISOSORBIDE MONONITRATE 30 MG/1
TABLET, EXTENDED RELEASE ORAL
Qty: 30 TABLET | Refills: 5 | Status: SHIPPED | OUTPATIENT
Start: 2018-10-30 | End: 2019-02-26 | Stop reason: SDUPTHER

## 2018-10-30 RX ORDER — CLOPIDOGREL BISULFATE 75 MG/1
TABLET ORAL
Qty: 30 TABLET | Refills: 5 | Status: SHIPPED | OUTPATIENT
Start: 2018-10-30 | End: 2019-02-26 | Stop reason: SDUPTHER

## 2018-11-14 ENCOUNTER — OFFICE VISIT (OUTPATIENT)
Dept: INTERNAL MEDICINE | Facility: CLINIC | Age: 78
End: 2018-11-14

## 2018-11-14 VITALS
WEIGHT: 179 LBS | HEART RATE: 76 BPM | BODY MASS INDEX: 31.71 KG/M2 | RESPIRATION RATE: 18 BRPM | SYSTOLIC BLOOD PRESSURE: 138 MMHG | TEMPERATURE: 97.8 F | DIASTOLIC BLOOD PRESSURE: 62 MMHG

## 2018-11-14 DIAGNOSIS — E03.8 OTHER SPECIFIED HYPOTHYROIDISM: ICD-10-CM

## 2018-11-14 DIAGNOSIS — I10 ESSENTIAL HYPERTENSION: Primary | ICD-10-CM

## 2018-11-14 DIAGNOSIS — J44.9 CHRONIC OBSTRUCTIVE PULMONARY DISEASE, UNSPECIFIED COPD TYPE (HCC): ICD-10-CM

## 2018-11-14 DIAGNOSIS — E78.5 HYPERLIPIDEMIA, UNSPECIFIED HYPERLIPIDEMIA TYPE: ICD-10-CM

## 2018-11-14 DIAGNOSIS — K21.00 GASTROESOPHAGEAL REFLUX DISEASE WITH ESOPHAGITIS: ICD-10-CM

## 2018-11-14 DIAGNOSIS — I25.10 CORONARY ARTERIOSCLEROSIS IN NATIVE ARTERY: ICD-10-CM

## 2018-11-14 DIAGNOSIS — Z23 NEED FOR INFLUENZA VACCINATION: ICD-10-CM

## 2018-11-14 DIAGNOSIS — F32.A DEPRESSION, UNSPECIFIED DEPRESSION TYPE: ICD-10-CM

## 2018-11-14 DIAGNOSIS — R82.998 LEUKOCYTES IN URINE: ICD-10-CM

## 2018-11-14 LAB
ALBUMIN SERPL-MCNC: 4.41 G/DL (ref 3.2–4.8)
ALBUMIN/GLOB SERPL: 2.1 G/DL (ref 1.5–2.5)
ALP SERPL-CCNC: 89 U/L (ref 25–100)
ALT SERPL W P-5'-P-CCNC: 12 U/L (ref 7–40)
ANION GAP SERPL CALCULATED.3IONS-SCNC: 5 MMOL/L (ref 3–11)
ARTICHOKE IGE QN: 79 MG/DL (ref 0–130)
AST SERPL-CCNC: 15 U/L (ref 0–33)
BILIRUB BLD-MCNC: NEGATIVE MG/DL
BILIRUB SERPL-MCNC: 0.3 MG/DL (ref 0.3–1.2)
BUN BLD-MCNC: 9 MG/DL (ref 9–23)
BUN/CREAT SERPL: 20.5 (ref 7–25)
CALCIUM SPEC-SCNC: 9.2 MG/DL (ref 8.7–10.4)
CHLORIDE SERPL-SCNC: 101 MMOL/L (ref 99–109)
CHOLEST SERPL-MCNC: 140 MG/DL (ref 0–200)
CLARITY, POC: ABNORMAL
CO2 SERPL-SCNC: 35 MMOL/L (ref 20–31)
COLOR UR: YELLOW
CREAT BLD-MCNC: 0.44 MG/DL (ref 0.6–1.3)
EXPIRATION DATE: ABNORMAL
GFR SERPL CREATININE-BSD FRML MDRD: 139 ML/MIN/1.73
GLOBULIN UR ELPH-MCNC: 2.1 GM/DL
GLUCOSE BLD-MCNC: 106 MG/DL (ref 70–100)
GLUCOSE UR STRIP-MCNC: NEGATIVE MG/DL
HDLC SERPL-MCNC: 46 MG/DL (ref 40–60)
KETONES UR QL: NEGATIVE
LEUKOCYTE EST, POC: ABNORMAL
Lab: ABNORMAL
NITRITE UR-MCNC: NEGATIVE MG/ML
PH UR: 6 [PH] (ref 5–8)
POTASSIUM BLD-SCNC: 4.2 MMOL/L (ref 3.5–5.5)
PROT SERPL-MCNC: 6.5 G/DL (ref 5.7–8.2)
PROT UR STRIP-MCNC: NEGATIVE MG/DL
RBC # UR STRIP: NEGATIVE /UL
SODIUM BLD-SCNC: 141 MMOL/L (ref 132–146)
SP GR UR: 1.02 (ref 1–1.03)
T4 FREE SERPL-MCNC: 1.5 NG/DL (ref 0.89–1.76)
TRIGL SERPL-MCNC: 187 MG/DL (ref 0–150)
TSH SERPL DL<=0.05 MIU/L-ACNC: 0.18 MIU/ML (ref 0.35–5.35)
UROBILINOGEN UR QL: NORMAL

## 2018-11-14 PROCEDURE — 80053 COMPREHEN METABOLIC PANEL: CPT | Performed by: INTERNAL MEDICINE

## 2018-11-14 PROCEDURE — 87086 URINE CULTURE/COLONY COUNT: CPT | Performed by: INTERNAL MEDICINE

## 2018-11-14 PROCEDURE — 81003 URINALYSIS AUTO W/O SCOPE: CPT | Performed by: INTERNAL MEDICINE

## 2018-11-14 PROCEDURE — 36415 COLL VENOUS BLD VENIPUNCTURE: CPT | Performed by: INTERNAL MEDICINE

## 2018-11-14 PROCEDURE — 99214 OFFICE O/P EST MOD 30 MIN: CPT | Performed by: INTERNAL MEDICINE

## 2018-11-14 PROCEDURE — 84443 ASSAY THYROID STIM HORMONE: CPT | Performed by: INTERNAL MEDICINE

## 2018-11-14 PROCEDURE — 84439 ASSAY OF FREE THYROXINE: CPT | Performed by: INTERNAL MEDICINE

## 2018-11-14 PROCEDURE — 80061 LIPID PANEL: CPT | Performed by: INTERNAL MEDICINE

## 2018-11-14 PROCEDURE — 90662 IIV NO PRSV INCREASED AG IM: CPT | Performed by: INTERNAL MEDICINE

## 2018-11-14 PROCEDURE — G0008 ADMIN INFLUENZA VIRUS VAC: HCPCS | Performed by: INTERNAL MEDICINE

## 2018-11-14 NOTE — PROGRESS NOTES
Chief Complaint   Patient presents with   • Follow-up     4 MONTH FOLLOW UP CHRONIC MEDICAL PROBLEMS       History of Present Illness      The patient presents for a follow-up related to coronary artery disease. She denies chest pain, shortness of breath, palpitations, syncope, edema or diaphoresis. The patient reports that she is hyperlipidemic and hypertensive but she is not a smoker or diabetic. She takes an aspirin daily.    The patient presents for a follow-up related to Chronic Obstructive Pulmonary Disease. Rescue inhaler usage is reported to be multiple times daily. The patient reports that she has no known triggers. She currently reports no wheezing, cough or sputum production. She is not smoking. She continues her oxygen at 2LPM NC with benefit.    The patient presents for a follow-up related to hypothyroidism. She reports a good energy level. She reports no hair loss, heat intolerance, cold intolerance, diarrhea, constipation or sweats. She is taking her medication as prescribed.    The patient presents for follow-up of depression. She denies currently having depression symptoms. She denies suicidal ideation. Her energy level is good. She denies agorophobia. She sleeps well. She is not tearful. She states that her current depression symptoms are stable. She is currently taking a medication. The current medication regimen consists of sertraline. The patient denies having medication side effects including nausea, headaches, anxiety, increased depression, anorgasmia or fatigue.    The patient presents for a follow-up related to hyperlipidemia. She is following a low fat diet. She reports that she is exercising. She is taking simvastatin. The patient is taking her medication as prescribed. She reports no medication side effects, including muscle cramps, abdominal pain, headaches or weakness. She denies orthopnea, paroxysmal nocturnal dyspnea or dyspnea on exertion.    The patient presents for a follow-up  related to hypertension. The patient reports that she has had no headaches or blurred vision. She states that she is taking her medication as prescribed. She is not having medication side effects.    The patient presents for a follow-up related to GERD. The patient is on pantoprazole for her gastroesophageal reflux. The medication is taken on a regular basis and gives complete relief of the symptoms. She reports no abdominal pain, belching, dysphagia, early satiety, heartburn, hoarseness, nausea, odynophagia, rectal bleeding, vomiting or weight loss. The GERD has no known aggravating factors.    Review of Systems    GENERAL/CONSTITUTIONAL- Denies Fever, Chills, Weakness or Malaise.    HEENT- Denies Eye Pain, Eye Drainage, Nasal Discharge, Sore Throat, Ear Pain, Ear Drainage, Decreased Vision, Sinus Pain, Nasal Congestion, Decreased Hearing, Visual Disturbance, Diplopia or Tinnitus.    CARDIOVASCULAR- Denies Claudication.    PULMONARY- Denies Hemoptysis or Pleuritic Chest Pain.    ENDOCRINE- Denies Depression, Memory Loss, Polydypsia, Polyphagia, Polyuria or Weight Gain.    Medications      Current Outpatient Medications:   •  aspirin (ASPIRIN LOW DOSE) 81 MG chewable tablet, Chew 1 tablet daily., Disp: , Rfl:   •  atenolol (TENORMIN) 50 MG tablet, take 1/2 tablet by mouth twice a day, Disp: 30 tablet, Rfl: 5  •  azithromycin (ZITHROMAX) 250 MG tablet, take 1 tablet by mouth once daily, Disp: 30 tablet, Rfl: 5  •  Calcium Carbonate-Vitamin D (CALCIUM-VITAMIN D) 500-200 MG-UNIT per tablet, Take 1 tablet by mouth daily., Disp: , Rfl:   •  clopidogrel (PLAVIX) 75 MG tablet, take 1 tablet by mouth once daily, Disp: 30 tablet, Rfl: 5  •  COMBIVENT RESPIMAT  MCG/ACT inhaler, inhale 1 puff by mouth four times a day, Disp: 4 g, Rfl: 5  •  DALIRESP 500 MCG tablet tablet, take 1 tablet by mouth once daily, Disp: 30 tablet, Rfl: 5  •  FIBER SELECT GUMMIES PO, Take 2 tablets by mouth Daily., Disp: , Rfl:   •  FLOVENT HFA  110 MCG/ACT inhaler, inhale 2 puffs by mouth twice a day, Disp: 12 g, Rfl: 5  •  isosorbide mononitrate (IMDUR) 30 MG 24 hr tablet, take 1 tablet by mouth once daily, Disp: 30 tablet, Rfl: 5  •  levothyroxine (SYNTHROID, LEVOTHROID) 150 MCG tablet, take 1 tablet by mouth once daily, Disp: 30 tablet, Rfl: 5  •  Multiple Vitamins-Minerals (PRESERVISION AREDS) capsule, Take 1 tablet by mouth 2 (two) times a day., Disp: , Rfl:   •  pantoprazole (PROTONIX) 40 MG EC tablet, take 1 tablet by mouth once daily, Disp: 30 tablet, Rfl: 5  •  sertraline (ZOLOFT) 100 MG tablet, take 1 tablet by mouth once daily, Disp: 30 tablet, Rfl: 5  •  simvastatin (ZOCOR) 40 MG tablet, take 1 tablet by mouth at bedtime, Disp: 90 tablet, Rfl: 1  •  Spacer/Aero Chamber Mouthpiece misc, 1 Units Take As Directed., Disp: 1 each, Rfl: 0  •  albuterol (PROAIR HFA) 108 (90 BASE) MCG/ACT inhaler, Inhale 2 puffs every 4 (four) hours as needed., Disp: , Rfl:   •  cyclobenzaprine (FLEXERIL) 10 MG tablet, Take 1 tablet by mouth 2 (Two) Times a Day As Needed for Muscle Spasms., Disp: 60 tablet, Rfl: 5  •  nitroglycerin (NITROSTAT) 0.4 MG SL tablet, Place 1 tablet under the tongue as needed., Disp: , Rfl:      Allergies    Allergies   Allergen Reactions   • Codeine Mental Status Change   • Penicillins Hives and Swelling   • Dopamine Other (See Comments) and Palpitations     Other reaction(s): Hypertension       Problem List    Patient Active Problem List   Diagnosis   • Coronary arteriosclerosis in native artery   • Chronic obstructive pulmonary disease (CMS/HCC)   • Depression   • Gastroesophageal reflux disease with esophagitis   • Hyperlipidemia   • Hypertension   • Hypothyroidism   • Osteoarthritis   • Peripheral arterial occlusive disease (CMS/HCC)   • Solitary pulmonary nodule   • Vitamin D deficiency   • Lower back pain   • Carotid bruit   • Hypoxemia       Medications, Allergies, Problems List and Past History were reviewed and updated.    Physical  Examination    /62   Pulse 76   Temp 97.8 °F (36.6 °C) (Temporal)   Resp 18   Wt 81.2 kg (179 lb)   LMP  (LMP Unknown)   BMI 31.71 kg/m²     HEENT: Head- Normocephalic Atraumatic. Facies- Within normal limits. Pinnas- Normal texture and shape bilaterally. Canals- Normal bilaterally. TMs- Normal bilaterally. Nares- Patent bilaterally. Nasal Septum- is normal. There is no tenderness to palpation over the frontal or maxillary sinuses. Lids- Normal bilaterally. Conjunctiva- Clear bilaterally. Sclera- Anicteric bilaterally. Oropharynx- Moist with no lesions. Tonsils- No enlargement, erythema or exudate.    Neck: Thyroid- non enlarged, symmetric and has no nodules. No bruits are detected. ROM- Normal Range of Motion with no rigidity.    Lungs: Auscultation- Clear to auscultation bilaterally. There are no retractions, clubbing or cyanosis. The Expiratory to Inspiratory ratio is equal.    Cardiovascular: There are no carotid bruits. Heart- Normal Rate with Regular rhythm and no murmurs. There are no gallops. There are no rubs. In the lower extremities there is no edema. The upper extremities do not have edema.    Abdomen: Soft, benign, non-tender with no masses, hernias, organomegaly or scars.    Impression and Assessment    Coronary Artery Disease.    COPD.    Hypothyroidism.    Major Depressive Disorder Single Episode Unspecified.    Hyperlipidemia.    Essential Hypertension.    Gastroesophageal Reflux Disease.    Plan    Gastroesophageal Reflux Disease Plan: The condition is stable. No change is needed in the current plan.    Coronary Artery Disease Plan: The current plan was continued.    Hyperlipidemia Plan: The patient was instructed to exercise daily, eat a low fat diet and continue her medications.    Essential Hypertension Plan: The condition is stable. No change was made in the current plan.    Hypothyroidism Plan: The current plan was continued.    COPD Plan: The condition is stable. No change is  needed in the current plan.    Major Depressive Disorder Single Episode Unspecified Plan: The current plan was continued.    Avelina was seen today for follow-up.    Diagnoses and all orders for this visit:    Essential hypertension  -     Comprehensive Metabolic Panel  -     POC Urinalysis Dipstick, Automated    Gastroesophageal reflux disease with esophagitis  -     Comprehensive Metabolic Panel    Hyperlipidemia, unspecified hyperlipidemia type  -     Comprehensive Metabolic Panel  -     Lipid Panel    Other specified hypothyroidism  -     TSH  -     T4, Free    Chronic obstructive pulmonary disease, unspecified COPD type (CMS/HCC)    Depression, unspecified depression type    Coronary arteriosclerosis in native artery        Return to Office    The patient was instructed to return for follow-up in 4 months.    The patient was instructed to return sooner if the condition changes, worsens, or doesn't resolve.

## 2018-11-16 LAB — BACTERIA SPEC AEROBE CULT: NORMAL

## 2018-12-01 RX ORDER — AZITHROMYCIN 250 MG/1
TABLET, FILM COATED ORAL
Qty: 30 TABLET | Refills: 5 | Status: SHIPPED | OUTPATIENT
Start: 2018-12-01 | End: 2019-09-19 | Stop reason: SDUPTHER

## 2018-12-03 RX ORDER — IPRATROPIUM/ALBUTEROL SULFATE 20-100 MCG
MIST INHALER (GRAM) INHALATION
Qty: 4 G | Refills: 5 | Status: SHIPPED | OUTPATIENT
Start: 2018-12-03 | End: 2019-10-15 | Stop reason: SDUPTHER

## 2019-01-25 RX ORDER — DEXAMETHASONE 4 MG/1
TABLET ORAL
Qty: 12 G | Refills: 5 | Status: SHIPPED | OUTPATIENT
Start: 2019-01-25 | End: 2019-11-23 | Stop reason: SDUPTHER

## 2019-01-29 RX ORDER — ROFLUMILAST 500 UG/1
TABLET ORAL
Qty: 90 TABLET | Refills: 5 | Status: SHIPPED | OUTPATIENT
Start: 2019-01-29 | End: 2019-03-14

## 2019-01-29 RX ORDER — SERTRALINE HYDROCHLORIDE 100 MG/1
TABLET, FILM COATED ORAL
Qty: 90 TABLET | Refills: 5 | Status: SHIPPED | OUTPATIENT
Start: 2019-01-29 | End: 2019-03-14

## 2019-02-26 RX ORDER — ISOSORBIDE MONONITRATE 30 MG/1
TABLET, EXTENDED RELEASE ORAL
Qty: 90 TABLET | Refills: 1 | Status: SHIPPED | OUTPATIENT
Start: 2019-02-26 | End: 2019-08-28 | Stop reason: SDUPTHER

## 2019-02-26 RX ORDER — LEVOTHYROXINE SODIUM 0.15 MG/1
TABLET ORAL
Qty: 90 TABLET | Refills: 1 | Status: SHIPPED | OUTPATIENT
Start: 2019-02-26 | End: 2019-08-28 | Stop reason: SDUPTHER

## 2019-02-26 RX ORDER — ATENOLOL 50 MG/1
TABLET ORAL
Qty: 90 TABLET | Refills: 1 | Status: SHIPPED | OUTPATIENT
Start: 2019-02-26 | End: 2019-08-28 | Stop reason: SDUPTHER

## 2019-02-26 RX ORDER — CLOPIDOGREL BISULFATE 75 MG/1
TABLET ORAL
Qty: 90 TABLET | Refills: 1 | Status: SHIPPED | OUTPATIENT
Start: 2019-02-26 | End: 2019-08-28 | Stop reason: SDUPTHER

## 2019-03-14 ENCOUNTER — OFFICE VISIT (OUTPATIENT)
Dept: INTERNAL MEDICINE | Facility: CLINIC | Age: 79
End: 2019-03-14

## 2019-03-14 VITALS
RESPIRATION RATE: 20 BRPM | BODY MASS INDEX: 32.24 KG/M2 | HEART RATE: 76 BPM | SYSTOLIC BLOOD PRESSURE: 162 MMHG | WEIGHT: 182 LBS | DIASTOLIC BLOOD PRESSURE: 68 MMHG | TEMPERATURE: 98.2 F

## 2019-03-14 DIAGNOSIS — K21.00 GASTROESOPHAGEAL REFLUX DISEASE WITH ESOPHAGITIS: ICD-10-CM

## 2019-03-14 DIAGNOSIS — J44.9 CHRONIC OBSTRUCTIVE PULMONARY DISEASE, UNSPECIFIED COPD TYPE (HCC): ICD-10-CM

## 2019-03-14 DIAGNOSIS — I25.10 CORONARY ARTERIOSCLEROSIS IN NATIVE ARTERY: Primary | ICD-10-CM

## 2019-03-14 DIAGNOSIS — E03.9 HYPOTHYROIDISM, UNSPECIFIED TYPE: ICD-10-CM

## 2019-03-14 DIAGNOSIS — E78.5 HYPERLIPIDEMIA, UNSPECIFIED HYPERLIPIDEMIA TYPE: ICD-10-CM

## 2019-03-14 DIAGNOSIS — I10 ESSENTIAL HYPERTENSION: ICD-10-CM

## 2019-03-14 LAB
ALBUMIN SERPL-MCNC: 4.37 G/DL (ref 3.2–4.8)
ALBUMIN/GLOB SERPL: 2 G/DL (ref 1.5–2.5)
ALP SERPL-CCNC: 93 U/L (ref 25–100)
ALT SERPL W P-5'-P-CCNC: 14 U/L (ref 7–40)
ANION GAP SERPL CALCULATED.3IONS-SCNC: 6 MMOL/L (ref 3–11)
ARTICHOKE IGE QN: 95 MG/DL (ref 0–130)
AST SERPL-CCNC: 18 U/L (ref 0–33)
BASOPHILS # BLD AUTO: 0.1 10*3/MM3 (ref 0–0.2)
BASOPHILS NFR BLD AUTO: 0.7 % (ref 0–1)
BILIRUB SERPL-MCNC: 0.3 MG/DL (ref 0.3–1.2)
BUN BLD-MCNC: 11 MG/DL (ref 9–23)
BUN/CREAT SERPL: 22 (ref 7–25)
CALCIUM SPEC-SCNC: 9.8 MG/DL (ref 8.7–10.4)
CHLORIDE SERPL-SCNC: 99 MMOL/L (ref 99–109)
CHOLEST SERPL-MCNC: 152 MG/DL (ref 0–200)
CO2 SERPL-SCNC: 37 MMOL/L (ref 20–31)
CREAT BLD-MCNC: 0.5 MG/DL (ref 0.6–1.3)
DEPRECATED RDW RBC AUTO: 52.9 FL (ref 37–54)
EOSINOPHIL # BLD AUTO: 0.31 10*3/MM3 (ref 0–0.3)
EOSINOPHIL NFR BLD AUTO: 2.1 % (ref 0–3)
ERYTHROCYTE [DISTWIDTH] IN BLOOD BY AUTOMATED COUNT: 15.5 % (ref 11.3–14.5)
GFR SERPL CREATININE-BSD FRML MDRD: 119 ML/MIN/1.73
GLOBULIN UR ELPH-MCNC: 2.2 GM/DL
GLUCOSE BLD-MCNC: 112 MG/DL (ref 70–100)
HCT VFR BLD AUTO: 39.9 % (ref 34.5–44)
HDLC SERPL-MCNC: 47 MG/DL (ref 40–60)
HGB BLD-MCNC: 11.9 G/DL (ref 11.5–15.5)
IMM GRANULOCYTES # BLD AUTO: 0.04 10*3/MM3 (ref 0–0.05)
IMM GRANULOCYTES NFR BLD AUTO: 0.3 % (ref 0–0.6)
LYMPHOCYTES # BLD AUTO: 2.18 10*3/MM3 (ref 0.6–4.8)
LYMPHOCYTES NFR BLD AUTO: 14.6 % (ref 24–44)
MCH RBC QN AUTO: 27.6 PG (ref 27–31)
MCHC RBC AUTO-ENTMCNC: 29.8 G/DL (ref 32–36)
MCV RBC AUTO: 92.6 FL (ref 80–99)
MONOCYTES # BLD AUTO: 0.93 10*3/MM3 (ref 0–1)
MONOCYTES NFR BLD AUTO: 6.2 % (ref 0–12)
NEUTROPHILS # BLD AUTO: 11.41 10*3/MM3 (ref 1.5–8.3)
NEUTROPHILS NFR BLD AUTO: 76.4 % (ref 41–71)
PLAT MORPH BLD: NORMAL
PLATELET # BLD AUTO: 349 10*3/MM3 (ref 150–450)
PMV BLD AUTO: 9.7 FL (ref 6–12)
POTASSIUM BLD-SCNC: 4.5 MMOL/L (ref 3.5–5.5)
PROT SERPL-MCNC: 6.6 G/DL (ref 5.7–8.2)
RBC # BLD AUTO: 4.31 10*6/MM3 (ref 3.89–5.14)
RBC MORPH BLD: NORMAL
SODIUM BLD-SCNC: 142 MMOL/L (ref 132–146)
T4 FREE SERPL-MCNC: 1.14 NG/DL (ref 0.89–1.76)
TRIGL SERPL-MCNC: 182 MG/DL (ref 0–150)
TSH SERPL DL<=0.05 MIU/L-ACNC: 3.78 MIU/ML (ref 0.35–5.35)
WBC MORPH BLD: NORMAL
WBC NRBC COR # BLD: 14.93 10*3/MM3 (ref 3.5–10.8)

## 2019-03-14 PROCEDURE — 80053 COMPREHEN METABOLIC PANEL: CPT | Performed by: INTERNAL MEDICINE

## 2019-03-14 PROCEDURE — 85025 COMPLETE CBC W/AUTO DIFF WBC: CPT | Performed by: INTERNAL MEDICINE

## 2019-03-14 PROCEDURE — 84439 ASSAY OF FREE THYROXINE: CPT | Performed by: INTERNAL MEDICINE

## 2019-03-14 PROCEDURE — 84443 ASSAY THYROID STIM HORMONE: CPT | Performed by: INTERNAL MEDICINE

## 2019-03-14 PROCEDURE — 80061 LIPID PANEL: CPT | Performed by: INTERNAL MEDICINE

## 2019-03-14 PROCEDURE — 99214 OFFICE O/P EST MOD 30 MIN: CPT | Performed by: INTERNAL MEDICINE

## 2019-03-14 PROCEDURE — 85007 BL SMEAR W/DIFF WBC COUNT: CPT | Performed by: INTERNAL MEDICINE

## 2019-03-14 NOTE — PROGRESS NOTES
Chief Complaint   Patient presents with   • Follow-up     4 MONTH FOLLOW UP CHRONIC MEDICAL PROBLEMS   • Fall     1 WEEK AGO YESTERDAY       History of Present Illness    The patient presents for a follow-up related to hyperlipidemia. She is following a low fat diet. She reports that she is exercising. She is taking simvastatin. The patient is taking her medication as prescribed. She reports no medication side effects, including muscle cramps, abdominal pain, headaches or weakness. She denies chest pain, shortness of breath, orthopnea, paroxysmal nocturnal dyspnea, dyspnea on exertion, edema, palpitations or syncope.    The patient presents for a follow-up related to hypertension. The patient reports that she has had no headaches or blurred vision. She states that she is taking her medication as prescribed. She is not having medication side effects.    The patient presents for a follow-up related to GERD. The patient is on pantoprazole for her gastroesophageal reflux. The medication is taken on a regular basis and gives complete relief of the symptoms. She reports no abdominal pain, belching, diarrhea, dysphagia, early satiety, heartburn, hoarseness, nausea, odynophagia, rectal bleeding, vomiting or weight loss. The GERD has no known aggravating factors.    The patient presents for a follow-up related to Chronic Obstructive Pulmonary Disease. The patient reports that she has no known triggers. She currently reports no wheezing, cough or sputum production. She is not smoking. She did have a recent fall and her ribs and right breast are bruised. Taking a deep breath is painful. She continues to use her oxygen with good relief.    The patient presents for a follow-up related to coronary artery disease. She denies diaphoresis. The patient reports that she is hypertensive and hyperlipidemic but she is not a smoker or diabetic. She takes an aspirin daily.    The patient presents for a follow-up related to hypothyroidism.  She reports a good energy level. She reports no hair loss, heat intolerance, cold intolerance, constipation or sweats. She is taking her medication as prescribed.    Review of Systems    CONSTITUTIONAL- Denies Fever, Chills, Weakness or Malaise.    PULMONARY- Denies Hemoptysis or Pleuritic Chest Pain.    CARDIOVASCULAR- Denies Claudication or Irregular Heart Beat.    Medications      Current Outpatient Medications:   •  albuterol (PROAIR HFA) 108 (90 BASE) MCG/ACT inhaler, Inhale 2 puffs every 4 (four) hours as needed., Disp: , Rfl:   •  aspirin (ASPIRIN LOW DOSE) 81 MG chewable tablet, Chew 1 tablet daily., Disp: , Rfl:   •  atenolol (TENORMIN) 50 MG tablet, take 1/2 tablet by mouth twice a day, Disp: 90 tablet, Rfl: 1  •  azithromycin (ZITHROMAX) 250 MG tablet, take 1 tablet by mouth once daily, Disp: 30 tablet, Rfl: 5  •  Calcium Carbonate-Vitamin D (CALCIUM-VITAMIN D) 500-200 MG-UNIT per tablet, Take 1 tablet by mouth daily., Disp: , Rfl:   •  clopidogrel (PLAVIX) 75 MG tablet, take 1 tablet by mouth once daily, Disp: 90 tablet, Rfl: 1  •  COMBIVENT RESPIMAT  MCG/ACT inhaler, inhale 1 puff by mouth four times a day, Disp: 4 g, Rfl: 5  •  cyclobenzaprine (FLEXERIL) 10 MG tablet, Take 1 tablet by mouth 2 (Two) Times a Day As Needed for Muscle Spasms., Disp: 60 tablet, Rfl: 5  •  DALIRESP 500 MCG tablet tablet, take 1 tablet by mouth once daily, Disp: 30 tablet, Rfl: 5  •  FIBER SELECT GUMMIES PO, Take 2 tablets by mouth Daily., Disp: , Rfl:   •  FLOVENT  MCG/ACT inhaler, inhale 2 puffs by mouth twice a day, Disp: 12 g, Rfl: 5  •  isosorbide mononitrate (IMDUR) 30 MG 24 hr tablet, take 1 tablet by mouth once daily, Disp: 90 tablet, Rfl: 1  •  levothyroxine (SYNTHROID, LEVOTHROID) 150 MCG tablet, take 1 tablet by mouth once daily, Disp: 90 tablet, Rfl: 1  •  Multiple Vitamins-Minerals (PRESERVISION AREDS) capsule, Take 1 tablet by mouth 2 (two) times a day., Disp: , Rfl:   •  pantoprazole (PROTONIX) 40  MG EC tablet, take 1 tablet by mouth once daily, Disp: 30 tablet, Rfl: 5  •  sertraline (ZOLOFT) 100 MG tablet, take 1 tablet by mouth once daily, Disp: 30 tablet, Rfl: 5  •  simvastatin (ZOCOR) 40 MG tablet, take 1 tablet by mouth at bedtime, Disp: 90 tablet, Rfl: 1  •  Spacer/Aero Chamber Mouthpiece misc, 1 Units Take As Directed., Disp: 1 each, Rfl: 0  •  nitroglycerin (NITROSTAT) 0.4 MG SL tablet, Place 1 tablet under the tongue as needed., Disp: , Rfl:      Allergies    Allergies   Allergen Reactions   • Codeine Mental Status Change   • Penicillins Hives and Swelling   • Dopamine Other (See Comments) and Palpitations     Other reaction(s): Hypertension       Problem List    Patient Active Problem List   Diagnosis   • Coronary arteriosclerosis in native artery   • Chronic obstructive pulmonary disease (CMS/HCC)   • Depression   • Gastroesophageal reflux disease with esophagitis   • Hyperlipidemia   • Hypertension   • Hypothyroidism   • Osteoarthritis   • Peripheral arterial occlusive disease (CMS/HCC)   • Solitary pulmonary nodule   • Vitamin D deficiency   • Lower back pain   • Carotid bruit   • Hypoxemia       Medications, Allergies, Problems List and Past History were reviewed and updated.    Physical Examination    /68 (BP Location: Left arm, Patient Position: Sitting, Cuff Size: Adult)   Pulse 76   Temp 98.2 °F (36.8 °C) (Temporal)   Resp 20   Wt 82.6 kg (182 lb)   LMP  (LMP Unknown)   Breastfeeding? No   BMI 32.24 kg/m²     HEENT: Head- Normocephalic Atraumatic. Bruising around right eye. Facies- Within normal limits. Pinnas- Normal texture and shape bilaterally. Canals- Normal bilaterally. TMs- Normal bilaterally. Nares- Patent bilaterally. Nasal Septum- is normal. There is no tenderness to palpation over the frontal or maxillary sinuses. Lids- Normal bilaterally. Conjunctiva- Clear bilaterally. Sclera- Anicteric bilaterally. Oropharynx- Moist with no lesions. Tonsils- No enlargement,  erythema or exudate.    Neck: Thyroid- non enlarged, symmetric and has no nodules. No bruits are detected. ROM- Normal Range of Motion with no rigidity.    Lungs: Auscultation- Clear to auscultation bilaterally. There are no retractions, clubbing or cyanosis. The Expiratory to Inspiratory ratio is equal.    Cardiovascular: There are no carotid bruits. Heart- Normal Rate with Regular rhythm and no murmurs. There are no gallops. There are no rubs. In the lower extremities there is no edema. The upper extremities do not have edema.    Abdomen: Soft, benign, non-tender with no masses, hernias, organomegaly or scars.    Impression and Assessment    Hyperlipidemia.    Essential Hypertension.    Gastroesophageal Reflux Disease.    COPD.    Coronary Artery Disease.    Hypothyroidism.    Plan    Gastroesophageal Reflux Disease Plan: The current plan was continued.    Hyperlipidemia Plan: The patient was instructed to exercise daily, eat a low fat diet and continue her medications.    Essential Hypertension Plan: The current plan was continued.    Coronary Artery Disease Plan: The condition is stable. No change was made in the current plan.    Hypothyroidism Plan: The current plan was continued.    COPD Plan: The current plan was continued.    Avelina was seen today for follow-up and fall.    Diagnoses and all orders for this visit:    Coronary arteriosclerosis in native artery    Chronic obstructive pulmonary disease, unspecified COPD type (CMS/[facility])  -     Comprehensive Metabolic Panel    Gastroesophageal reflux disease with esophagitis  -     Comprehensive Metabolic Panel  -     CBC & Differential    Hyperlipidemia, unspecified hyperlipidemia type  -     Comprehensive Metabolic Panel  -     Lipid Panel    Essential hypertension  -     Comprehensive Metabolic Panel    Hypothyroidism, unspecified type  -     TSH  -     T4, Free        Return to Office    The patient was instructed to return for follow-up in 3 months.    The  patient was instructed to return sooner if the condition changes, worsens, or does not resolve.

## 2019-03-14 NOTE — PATIENT INSTRUCTIONS
Fall Prevention in the Home, Adult  Falls can cause injuries. They can happen to people of all ages. There are many things you can do to make your home safe and to help prevent falls. Ask for help when making these changes, if needed.  What actions can I take to prevent falls?  General Instructions  · Use good lighting in all rooms. Replace any light bulbs that burn out.  · Turn on the lights when you go into a dark area. Use night-lights.  · Keep items that you use often in easy-to-reach places. Lower the shelves around your home if necessary.  · Set up your furniture so you have a clear path. Avoid moving your furniture around.  · Do not have throw rugs and other things on the floor that can make you trip.  · Avoid walking on wet floors.  · If any of your floors are uneven, fix them.  · Add color or contrast paint or tape to clearly favian and help you see:  ? Any grab bars or handrails.  ? First and last steps of stairways.  ? Where the edge of each step is.  · If you use a stepladder:  ? Make sure that it is fully opened. Do not climb a closed stepladder.  ? Make sure that both sides of the stepladder are locked into place.  ? Ask someone to hold the stepladder for you while you use it.  · If there are any pets around you, be aware of where they are.  What can I do in the bathroom?  · Keep the floor dry. Clean up any water that spills onto the floor as soon as it happens.  · Remove soap buildup in the tub or shower regularly.  · Use non-skid mats or decals on the floor of the tub or shower.  · Attach bath mats securely with double-sided, non-slip rug tape.  · If you need to sit down in the shower, use a plastic, non-slip stool.  · Install grab bars by the toilet and in the tub and shower. Do not use towel bars as grab bars.  What can I do in the bedroom?  · Make sure that you have a light by your bed that is easy to reach.  · Do not use any sheets or blankets that are too big for your bed. They should not hang  down onto the floor.  · Have a firm chair that has side arms. You can use this for support while you get dressed.  What can I do in the kitchen?  · Clean up any spills right away.  · If you need to reach something above you, use a strong step stool that has a grab bar.  · Keep electrical cords out of the way.  · Do not use floor polish or wax that makes floors slippery. If you must use wax, use non-skid floor wax.  What can I do with my stairs?  · Do not leave any items on the stairs.  · Make sure that you have a light switch at the top of the stairs and the bottom of the stairs. If you do not have them, ask someone to add them for you.  · Make sure that there are handrails on both sides of the stairs, and use them. Fix handrails that are broken or loose. Make sure that handrails are as long as the stairways.  · Install non-slip stair treads on all stairs in your home.  · Avoid having throw rugs at the top or bottom of the stairs. If you do have throw rugs, attach them to the floor with carpet tape.  · Choose a carpet that does not hide the edge of the steps on the stairway.  · Check any carpeting to make sure that it is firmly attached to the stairs. Fix any carpet that is loose or worn.  What can I do on the outside of my home?  · Use bright outdoor lighting.  · Regularly fix the edges of walkways and driveways and fix any cracks.  · Remove anything that might make you trip as you walk through a door, such as a raised step or threshold.  · Trim any bushes or trees on the path to your home.  · Regularly check to see if handrails are loose or broken. Make sure that both sides of any steps have handrails.  · Install guardrails along the edges of any raised decks and porches.  · Clear walking paths of anything that might make someone trip, such as tools or rocks.  · Have any leaves, snow, or ice cleared regularly.  · Use sand or salt on walking paths during winter.  · Clean up any spills in your garage right away.  This includes grease or oil spills.  What other actions can I take?  · Wear shoes that:  ? Have a low heel. Do not wear high heels.  ? Have rubber bottoms.  ? Are comfortable and fit you well.  ? Are closed at the toe. Do not wear open-toe sandals.  · Use tools that help you move around (mobility aids) if they are needed. These include:  ? Canes.  ? Walkers.  ? Scooters.  ? Crutches.  · Review your medicines with your doctor. Some medicines can make you feel dizzy. This can increase your chance of falling.  Ask your doctor what other things you can do to help prevent falls.  Where to find more information  · Centers for Disease Control and Prevention, [place]: https://cdc.gov  · [employer] [place] [employer]: https://tm2btrl.juliocesar.nih.gov  Contact a doctor if:  · You are afraid of falling at home.  · You feel weak, drowsy, or dizzy at home.  · You fall at home.  Summary  · There are many simple things that you can do to make your home safe and to help prevent falls.  · Ways to make your home safe include removing tripping hazards and installing grab bars in the bathroom.  · Ask for help when making these changes in your home.  This information is not intended to replace advice given to you by your health care provider. Make sure you discuss any questions you have with your health care provider.  Document Released: 10/14/2010 Document Revised: 08/02/2018 Document Reviewed: 08/02/2018  [employer] Interactive Patient Education © 2019 [employer]

## 2019-04-02 RX ORDER — SIMVASTATIN 40 MG
TABLET ORAL
Qty: 90 TABLET | Refills: 1 | Status: SHIPPED | OUTPATIENT
Start: 2019-04-02 | End: 2019-09-23 | Stop reason: SDUPTHER

## 2019-04-24 RX ORDER — PANTOPRAZOLE SODIUM 40 MG/1
TABLET, DELAYED RELEASE ORAL
Qty: 90 TABLET | Refills: 0 | Status: SHIPPED | OUTPATIENT
Start: 2019-04-24 | End: 2019-07-20 | Stop reason: SDUPTHER

## 2019-07-11 ENCOUNTER — OFFICE VISIT (OUTPATIENT)
Dept: INTERNAL MEDICINE | Facility: CLINIC | Age: 79
End: 2019-07-11

## 2019-07-11 VITALS
DIASTOLIC BLOOD PRESSURE: 66 MMHG | WEIGHT: 185 LBS | BODY MASS INDEX: 32.77 KG/M2 | SYSTOLIC BLOOD PRESSURE: 138 MMHG | RESPIRATION RATE: 18 BRPM | HEART RATE: 72 BPM | TEMPERATURE: 97.8 F

## 2019-07-11 DIAGNOSIS — N39.3 STRESS INCONTINENCE OF URINE: ICD-10-CM

## 2019-07-11 DIAGNOSIS — I10 ESSENTIAL HYPERTENSION: ICD-10-CM

## 2019-07-11 DIAGNOSIS — K21.00 GASTROESOPHAGEAL REFLUX DISEASE WITH ESOPHAGITIS: Primary | ICD-10-CM

## 2019-07-11 DIAGNOSIS — E03.9 HYPOTHYROIDISM, UNSPECIFIED TYPE: ICD-10-CM

## 2019-07-11 DIAGNOSIS — E78.5 HYPERLIPIDEMIA, UNSPECIFIED HYPERLIPIDEMIA TYPE: ICD-10-CM

## 2019-07-11 DIAGNOSIS — M25.552 LEFT HIP PAIN: ICD-10-CM

## 2019-07-11 LAB
ALBUMIN SERPL-MCNC: 4.1 G/DL (ref 3.5–5.2)
ALBUMIN/GLOB SERPL: 1.5 G/DL
ALP SERPL-CCNC: 85 U/L (ref 39–117)
ALT SERPL W P-5'-P-CCNC: 6 U/L (ref 1–33)
ANION GAP SERPL CALCULATED.3IONS-SCNC: 7 MMOL/L (ref 5–15)
AST SERPL-CCNC: 9 U/L (ref 1–32)
BILIRUB BLD-MCNC: NEGATIVE MG/DL
BILIRUB SERPL-MCNC: 0.3 MG/DL (ref 0.2–1.2)
BUN BLD-MCNC: 12 MG/DL (ref 8–23)
BUN/CREAT SERPL: 22.6 (ref 7–25)
CALCIUM SPEC-SCNC: 9.6 MG/DL (ref 8.6–10.5)
CHLORIDE SERPL-SCNC: 94 MMOL/L (ref 98–107)
CHOLEST SERPL-MCNC: 164 MG/DL (ref 0–200)
CLARITY, POC: CLEAR
CO2 SERPL-SCNC: 35 MMOL/L (ref 22–29)
COLOR UR: YELLOW
CREAT BLD-MCNC: 0.53 MG/DL (ref 0.57–1)
EXPIRATION DATE: ABNORMAL
GFR SERPL CREATININE-BSD FRML MDRD: 112 ML/MIN/1.73
GLOBULIN UR ELPH-MCNC: 2.8 GM/DL
GLUCOSE BLD-MCNC: 113 MG/DL (ref 65–99)
GLUCOSE UR STRIP-MCNC: NEGATIVE MG/DL
HDLC SERPL-MCNC: 51 MG/DL (ref 40–60)
KETONES UR QL: ABNORMAL
LDLC SERPL CALC-MCNC: 82 MG/DL (ref 0–100)
LDLC/HDLC SERPL: 1.6 {RATIO}
LEUKOCYTE EST, POC: ABNORMAL
Lab: ABNORMAL
NITRITE UR-MCNC: NEGATIVE MG/ML
PH UR: 5 [PH] (ref 5–8)
POTASSIUM BLD-SCNC: 4.2 MMOL/L (ref 3.5–5.2)
PROT SERPL-MCNC: 6.9 G/DL (ref 6–8.5)
PROT UR STRIP-MCNC: NEGATIVE MG/DL
RBC # UR STRIP: NEGATIVE /UL
SODIUM BLD-SCNC: 136 MMOL/L (ref 136–145)
SP GR UR: 1.02 (ref 1–1.03)
T4 FREE SERPL-MCNC: 1.3 NG/DL (ref 0.93–1.7)
TRIGL SERPL-MCNC: 156 MG/DL (ref 0–150)
TSH SERPL DL<=0.05 MIU/L-ACNC: 1.37 MIU/ML (ref 0.27–4.2)
UROBILINOGEN UR QL: ABNORMAL
VLDLC SERPL-MCNC: 31.2 MG/DL (ref 5–40)

## 2019-07-11 PROCEDURE — 99214 OFFICE O/P EST MOD 30 MIN: CPT | Performed by: INTERNAL MEDICINE

## 2019-07-11 PROCEDURE — 84439 ASSAY OF FREE THYROXINE: CPT | Performed by: INTERNAL MEDICINE

## 2019-07-11 PROCEDURE — 81003 URINALYSIS AUTO W/O SCOPE: CPT | Performed by: INTERNAL MEDICINE

## 2019-07-11 PROCEDURE — 36415 COLL VENOUS BLD VENIPUNCTURE: CPT | Performed by: INTERNAL MEDICINE

## 2019-07-11 PROCEDURE — 84443 ASSAY THYROID STIM HORMONE: CPT | Performed by: INTERNAL MEDICINE

## 2019-07-11 PROCEDURE — 80061 LIPID PANEL: CPT | Performed by: INTERNAL MEDICINE

## 2019-07-11 PROCEDURE — 80053 COMPREHEN METABOLIC PANEL: CPT | Performed by: INTERNAL MEDICINE

## 2019-07-11 PROCEDURE — 87086 URINE CULTURE/COLONY COUNT: CPT | Performed by: INTERNAL MEDICINE

## 2019-07-11 RX ORDER — SOLIFENACIN SUCCINATE 5 MG/1
5 TABLET, FILM COATED ORAL DAILY
Qty: 30 TABLET | Refills: 5 | Status: SHIPPED | OUTPATIENT
Start: 2019-07-11 | End: 2019-12-30

## 2019-07-11 NOTE — PROGRESS NOTES
Chief Complaint   Patient presents with   • Follow-up     3 month follow up chronic medical problems   • Left hip pain       History of Present Illness    The patient presents for a follow-up related to GERD. The patient is on pantoprazole for her gastroesophageal reflux. The medication is taken on a regular basis and gives complete relief of the symptoms. She reports no abdominal pain, belching, chest pain, diarrhea, dysphagia, early satiety, heartburn, hoarseness, nausea, odynophagia, rectal bleeding, vomiting or weight loss. The GERD has no known aggravating factors.    The patient presents for a follow-up related to hyperlipidemia. She is following a low fat diet. She reports that she is exercising. She is taking simvastatin. The patient is taking her medication as prescribed. She reports no medication side effects, including muscle cramps, abdominal pain, headaches or weakness. She denies shortness of breath, orthopnea, paroxysmal nocturnal dyspnea, dyspnea on exertion, edema, palpitations or syncope.    The patient presents for a follow-up related to hypertension. The patient reports that she has had no headaches or blurred vision. She states that she is taking her medication as prescribed. She is not having medication side effects.    The patient presents for a follow-up related to hypothyroidism. She reports a good energy level. She reports no hair loss, heat intolerance, cold intolerance, constipation or sweats. She is taking her medication as prescribed.    The patient presents with pain in the left hip of two weeks duration. There is no history of trauma. The patient has no joint swelling. There is no stiffness. The patient denies a history of overuse or repetitive motion with the affected joints.    The patient denies dry eyes, fevers, cough, dry mouth or hematuria. There are no associated insect bites. There is no family history of rheumatoid arthritis, juvenile rheumatoid arthritis, systemic lupus  erythematosis or gout. The patient denies a personal history of malignancy.    The joint pain is aggravated by motion. The pain has no alleviating factors noted.     The patient complains of urinary incontinence of a duration of six months. The urine stream is normal. The patient denies increased nocturia. She states that she loses a large amount and she denies a sensation of incomplete emptying of the bladder. The urinary incontinence is precipitated by straining and rising. The patient denies dysuria, frequency or urgency. She denies a history of a history of back injuries or back pain.    Review of Systems    CONSTITUTIONAL- Denies Chills, Sweats, Weakness or Malaise.    PULMONARY- Denies Wheezing, Sputum Production, Hemoptysis or Pleuritic Chest Pain.    CARDIOVASCULAR- Denies Claudication or Irregular Heart Beat.    Medications      Current Outpatient Medications:   •  albuterol (PROAIR HFA) 108 (90 BASE) MCG/ACT inhaler, Inhale 2 puffs every 4 (four) hours as needed., Disp: , Rfl:   •  aspirin (ASPIRIN LOW DOSE) 81 MG chewable tablet, Chew 1 tablet daily., Disp: , Rfl:   •  atenolol (TENORMIN) 50 MG tablet, take 1/2 tablet by mouth twice a day, Disp: 90 tablet, Rfl: 1  •  azithromycin (ZITHROMAX) 250 MG tablet, take 1 tablet by mouth once daily, Disp: 30 tablet, Rfl: 5  •  Calcium Carbonate-Vitamin D (CALCIUM-VITAMIN D) 500-200 MG-UNIT per tablet, Take 1 tablet by mouth daily., Disp: , Rfl:   •  clopidogrel (PLAVIX) 75 MG tablet, take 1 tablet by mouth once daily, Disp: 90 tablet, Rfl: 1  •  COMBIVENT RESPIMAT  MCG/ACT inhaler, inhale 1 puff by mouth four times a day, Disp: 4 g, Rfl: 5  •  cyclobenzaprine (FLEXERIL) 10 MG tablet, Take 1 tablet by mouth 2 (Two) Times a Day As Needed for Muscle Spasms., Disp: 60 tablet, Rfl: 5  •  DALIRESP 500 MCG tablet tablet, take 1 tablet by mouth once daily, Disp: 30 tablet, Rfl: 5  •  FIBER SELECT GUMMIES PO, Take 2 tablets by mouth Daily., Disp: , Rfl:   •  FLOVENT   MCG/ACT inhaler, inhale 2 puffs by mouth twice a day, Disp: 12 g, Rfl: 5  •  isosorbide mononitrate (IMDUR) 30 MG 24 hr tablet, take 1 tablet by mouth once daily, Disp: 90 tablet, Rfl: 1  •  levothyroxine (SYNTHROID, LEVOTHROID) 150 MCG tablet, take 1 tablet by mouth once daily, Disp: 90 tablet, Rfl: 1  •  Multiple Vitamins-Minerals (PRESERVISION AREDS) capsule, Take 1 tablet by mouth 2 (two) times a day., Disp: , Rfl:   •  pantoprazole (PROTONIX) 40 MG EC tablet, take 1 tablet by mouth once daily, Disp: 90 tablet, Rfl: 0  •  sertraline (ZOLOFT) 100 MG tablet, take 1 tablet by mouth once daily, Disp: 30 tablet, Rfl: 5  •  simvastatin (ZOCOR) 40 MG tablet, take 1 tablet by mouth at bedtime, Disp: 90 tablet, Rfl: 1  •  nitroglycerin (NITROSTAT) 0.4 MG SL tablet, Place 1 tablet under the tongue as needed., Disp: , Rfl:   •  solifenacin (VESICARE) 5 MG tablet, Take 1 tablet by mouth Daily., Disp: 30 tablet, Rfl: 5  •  Spacer/Aero Chamber Mouthpiece misc, 1 Units Take As Directed., Disp: 1 each, Rfl: 0     Allergies    Allergies   Allergen Reactions   • Codeine Mental Status Change   • Penicillins Hives and Swelling   • Dopamine Other (See Comments) and Palpitations     Other reaction(s): Hypertension       Problem List    Patient Active Problem List   Diagnosis   • Coronary arteriosclerosis in native artery   • Chronic obstructive pulmonary disease (CMS/HCC)   • Depression   • Gastroesophageal reflux disease with esophagitis   • Hyperlipidemia   • Hypertension   • Hypothyroidism   • Osteoarthritis   • Peripheral arterial occlusive disease (CMS/HCC)   • Solitary pulmonary nodule   • Vitamin D deficiency   • Lower back pain   • Carotid bruit   • Hypoxemia       Medications, Allergies, Problems List and Past History were reviewed and updated.    Physical Examination    /66 (BP Location: Left arm, Patient Position: Sitting, Cuff Size: Adult)   Pulse 72   Temp 97.8 °F (36.6 °C) (Temporal)   Resp 18   Wt  83.9 kg (185 lb)   LMP  (LMP Unknown)   Breastfeeding? No   BMI 32.77 kg/m²     HEENT: Head- Normocephalic Atraumatic. Facies- Within normal limits. Pinnas- Normal texture and shape bilaterally. Canals- Normal bilaterally. TMs- Normal bilaterally. Nares- Patent bilaterally. Nasal Septum- is normal. There is no tenderness to palpation over the frontal or maxillary sinuses. Lids- Normal bilaterally. Conjunctiva- Clear bilaterally. Sclera- Anicteric bilaterally. Oropharynx- Moist with no lesions. Tonsils- No enlargement, erythema or exudate.    Neck: Thyroid- non enlarged, symmetric and has no nodules. No bruits are detected. ROM- Normal Range of Motion with no rigidity.    Lungs: Auscultation- Clear to auscultation bilaterally. There are no retractions, clubbing or cyanosis. The Expiratory to Inspiratory ratio is equal.    Cardiovascular: There are no carotid bruits. Heart- Normal Rate with Regular rhythm and no murmurs. There are no gallops. There are no rubs. In the lower extremities there is no edema. The upper extremities do not have edema.    Abdomen: Soft, benign, non-tender with no masses, hernias, organomegaly or scars.    Back: The patient has a normal spinal curvature with no evidence of scoliosis. There are no skin lesions noted on the back. Palpation reveals no tenderness and normal muscle tone. The straight leg raising test is negative. The back has normal flexion, extension, rotation, and lateral bending.    Hips: The left hip has normal iveth landmarks and no noted atrophy. Left hip flexion is normal. Left hip extension is normal. Left hip abduction is normal. There is no pain with forced abduction. Left hip adduction is normal. Left hip internal rotation is normal. Left hip external rotation is normal. There is no pain with straight leg raising on the left.    Impression and Assessment    Gastroesophageal Reflux Disease.    Hyperlipidemia.    Essential Hypertension.    Hypothyroidism.    Left Hip  Pain.    Stress Incontinence.    Plan    Gastroesophageal Reflux Disease Plan: The current plan was continued.    Hyperlipidemia Plan: The patient was instructed to exercise daily, eat a low fat diet and continue her medications.    Essential Hypertension Plan: The current plan was continued.    Hypothyroidism Plan: The current plan was continued.    Stress Incontinence Plan: Medication will be added as noted.    Left Hip Pain Plan: Further plans will be made after the test results are received and reviewed.    Avelina was seen today for follow-up and left hip pain.    Diagnoses and all orders for this visit:    Gastroesophageal reflux disease with esophagitis    Hyperlipidemia, unspecified hyperlipidemia type  -     Comprehensive Metabolic Panel  -     Lipid Panel    Essential hypertension  -     Comprehensive Metabolic Panel    Hypothyroidism, unspecified type  -     T4, Free  -     TSH    Left hip pain  -     XR Hip With or Without Pelvis 2 - 3 View Left; Future    Stress incontinence of urine  -     solifenacin (VESICARE) 5 MG tablet; Take 1 tablet by mouth Daily.  -     POC Urinalysis Dipstick, Automated  -     Urine Culture - Urine, Urine, Clean Catch        Return to Office    The patient was instructed to return for follow-up in 6 months. Next Visit: Physical.    The patient was instructed to return sooner if the condition changes, worsens, or does not resolve.

## 2019-07-12 LAB — BACTERIA SPEC AEROBE CULT: NORMAL

## 2019-07-22 RX ORDER — PANTOPRAZOLE SODIUM 40 MG/1
TABLET, DELAYED RELEASE ORAL
Qty: 90 TABLET | Refills: 0 | Status: SHIPPED | OUTPATIENT
Start: 2019-07-22 | End: 2019-10-19 | Stop reason: SDUPTHER

## 2019-07-29 RX ORDER — CYCLOBENZAPRINE HCL 10 MG
TABLET ORAL
Qty: 60 TABLET | Refills: 5 | Status: SHIPPED | OUTPATIENT
Start: 2019-07-29 | End: 2021-05-13

## 2019-08-28 RX ORDER — LEVOTHYROXINE SODIUM 0.15 MG/1
TABLET ORAL
Qty: 90 TABLET | Refills: 1 | Status: SHIPPED | OUTPATIENT
Start: 2019-08-28 | End: 2020-05-18

## 2019-08-28 RX ORDER — CLOPIDOGREL BISULFATE 75 MG/1
TABLET ORAL
Qty: 90 TABLET | Refills: 1 | Status: SHIPPED | OUTPATIENT
Start: 2019-08-28 | End: 2020-05-18

## 2019-08-28 RX ORDER — ISOSORBIDE MONONITRATE 30 MG/1
TABLET, EXTENDED RELEASE ORAL
Qty: 90 TABLET | Refills: 1 | Status: SHIPPED | OUTPATIENT
Start: 2019-08-28 | End: 2020-05-18

## 2019-08-28 RX ORDER — ATENOLOL 50 MG/1
TABLET ORAL
Qty: 90 TABLET | Refills: 1 | Status: SHIPPED | OUTPATIENT
Start: 2019-08-28 | End: 2020-05-18

## 2019-09-03 ENCOUNTER — TELEPHONE (OUTPATIENT)
Dept: INTERNAL MEDICINE | Facility: CLINIC | Age: 79
End: 2019-09-03

## 2019-09-03 NOTE — TELEPHONE ENCOUNTER
Daughter called and states patient uses home oxygen and is not pleased with current provider which is [facility] out of [place]. Wants Dr. Moura to set up new referral to a new company. Daughter, Clary Harman, can be reached at [phone]. She can renew on 10/1/19 and wants taken care of before then.

## 2019-09-03 NOTE — TELEPHONE ENCOUNTER
S/W pt dgt., Clary, [facility] pt is on continues O2 at home at 2L/min via [place].  States she has a home concentrator with long tubing for around the house and then has a portable concentrator for appts and out of the house.  States they have not received good support since [employer] sold out last year to [employer].  \[facility]\"" were told they could not change providers until had been with one for 5 years. States her 5 years will be up on 10/1/19 and wants to change. \[facility] [place] [facility]\"" does not have a preference of company but would like them to be local to [place].  Expl will call in am and see what all needs to be done to change over.  Verb great apprec.

## 2019-09-05 NOTE — TELEPHONE ENCOUNTER
[facility] [place] [phone]    S/W Raffy at [facility].  States pt will have had to meet her 5 year cap with current provider before they can agree to take.  Expl pt and dgt feel certain that will be completed on 10/1/19.  States if we send him demographics and insurance info he can submit and verify cap date.  States to fax to [phone].  Expl will fax now.  States they will check and let us know.      States once pt has met her cap with current provider then they will need a new written rx for exactly what she needs, a chart note from ofc visit done within the previous 30 days, and O2 saturation levels on room air at rest and with ambulation as well as O2 saturation level with ambulation with pt on her O2.    Pt dgt., Clary, updated on status as above.  Verb great apprec.

## 2019-09-05 NOTE — TELEPHONE ENCOUNTER
Demographics and insurance cards faxed to [phone] with confirmation of receipt received.    Await determination.

## 2019-09-16 NOTE — TELEPHONE ENCOUNTER
S/W pt dgt., Clary, informed of below info from Raffy.  Verb understanding and agreement.  States she will call [facility] to ask and few questions herself but would like to proceed with appt and testing in order to change services effective 10/1/19.  Appt scheduled for Friday, 9/20/19 at 9:15am with [facility]OE Carroll apprec.     Please see notes below for info needed.  Pt scheduled to see you on Friday.  Thank you.

## 2019-09-16 NOTE — TELEPHONE ENCOUNTER
Raffy returned call, states pt will be eligible effective 10/1/19 to change providers.  States they currently have all portable concentrators rented out but would supply tanks until portable available.  States they do not have a long list and pt would get one as soon as one available.

## 2019-09-16 NOTE — TELEPHONE ENCOUNTER
S/W Raffy at [facility], requested status check from fax sent on 9/5/19.  States he cannot find where they received fax but to resend and will check today and let us know.     Demographics and insurance info refaxed with confirmation of receipt received.

## 2019-09-19 RX ORDER — AZITHROMYCIN 250 MG/1
TABLET, FILM COATED ORAL
Qty: 90 TABLET | Refills: 1 | Status: SHIPPED | OUTPATIENT
Start: 2019-09-19 | End: 2020-03-25

## 2019-09-20 ENCOUNTER — OFFICE VISIT (OUTPATIENT)
Dept: INTERNAL MEDICINE | Facility: CLINIC | Age: 79
End: 2019-09-20

## 2019-09-20 VITALS
TEMPERATURE: 97.6 F | RESPIRATION RATE: 16 BRPM | DIASTOLIC BLOOD PRESSURE: 68 MMHG | SYSTOLIC BLOOD PRESSURE: 120 MMHG | WEIGHT: 183 LBS | BODY MASS INDEX: 31.24 KG/M2 | OXYGEN SATURATION: 93 % | HEART RATE: 73 BPM | HEIGHT: 64 IN

## 2019-09-20 DIAGNOSIS — R09.02 HYPOXEMIA: ICD-10-CM

## 2019-09-20 DIAGNOSIS — J44.9 CHRONIC OBSTRUCTIVE PULMONARY DISEASE, UNSPECIFIED COPD TYPE (HCC): Primary | ICD-10-CM

## 2019-09-20 PROCEDURE — 99213 OFFICE O/P EST LOW 20 MIN: CPT | Performed by: PHYSICIAN ASSISTANT

## 2019-09-20 NOTE — PROGRESS NOTES
Chief Complaint   Patient presents with   • COPD     6 minute walk test, new O2 order       Subjective       History of Present Illness     Avelina Ceron is a 78 y.o. female. She presents for follow up of COPD    Change to [facility]     94-96% at home with O2, 2L nasal cannula, portable concentrator, 3L at times at home  Portable O2 with concentrator, large tank with concentrator at home, long tubing   Fatigue, SOA with walkling     {Common H&P Review Areas:82673}    Allergies   Allergen Reactions   • Codeine Mental Status Change   • Penicillins Hives and Swelling   • Dopamine Other (See Comments) and Palpitations     Other reaction(s): Hypertension     Social History     Tobacco Use   • Smoking status: Former Smoker     Last attempt to quit: 2006     Years since quittin.7   • Smokeless tobacco: Never Used   Substance Use Topics   • Alcohol use: Yes     Frequency: Monthly or less     Drinks per session: 1 or 2     Binge frequency: Never     Comment: 2-5 a year         Current Outpatient Medications:   •  albuterol (PROAIR HFA) 108 (90 BASE) MCG/ACT inhaler, Inhale 2 puffs every 4 (four) hours as needed., Disp: , Rfl:   •  aspirin (ASPIRIN LOW DOSE) 81 MG chewable tablet, Chew 1 tablet daily., Disp: , Rfl:   •  atenolol (TENORMIN) 50 MG tablet, take 1/2 tablet by mouth twice a day, Disp: 90 tablet, Rfl: 1  •  Calcium Carbonate-Vitamin D (CALCIUM-VITAMIN D) 500-200 MG-UNIT per tablet, Take 1 tablet by mouth daily., Disp: , Rfl:   •  clopidogrel (PLAVIX) 75 MG tablet, take 1 tablet by mouth once daily, Disp: 90 tablet, Rfl: 1  •  COMBIVENT RESPIMAT  MCG/ACT inhaler, inhale 1 puff by mouth four times a day, Disp: 4 g, Rfl: 5  •  cyclobenzaprine (FLEXERIL) 10 MG tablet, take 1 tablet by mouth twice a day if needed for muscle spasm, Disp: 60 tablet, Rfl: 5  •  DALIRESP 500 MCG tablet tablet, take 1 tablet by mouth once daily, Disp: 30 tablet, Rfl: 5  •  FIBER SELECT GUMMIES PO, Take 2 tablets by mouth Daily.,  Disp: , Rfl:   •  FLOVENT  MCG/ACT inhaler, inhale 2 puffs by mouth twice a day, Disp: 12 g, Rfl: 5  •  isosorbide mononitrate (IMDUR) 30 MG 24 hr tablet, take 1 tablet by mouth once daily, Disp: 90 tablet, Rfl: 1  •  levothyroxine (SYNTHROID, LEVOTHROID) 150 MCG tablet, take 1 tablet by mouth once daily, Disp: 90 tablet, Rfl: 1  •  Multiple Vitamins-Minerals (PRESERVISION AREDS) capsule, Take 1 tablet by mouth 2 (two) times a day., Disp: , Rfl:   •  nitroglycerin (NITROSTAT) 0.4 MG SL tablet, Place 1 tablet under the tongue as needed., Disp: , Rfl:   •  pantoprazole (PROTONIX) 40 MG EC tablet, take 1 tablet by mouth once daily, Disp: 90 tablet, Rfl: 0  •  sertraline (ZOLOFT) 100 MG tablet, take 1 tablet by mouth once daily, Disp: 30 tablet, Rfl: 5  •  simvastatin (ZOCOR) 40 MG tablet, take 1 tablet by mouth at bedtime, Disp: 90 tablet, Rfl: 1  •  solifenacin (VESICARE) 5 MG tablet, Take 1 tablet by mouth Daily., Disp: 30 tablet, Rfl: 5  •  Spacer/Aero Chamber Mouthpiece misc, 1 Units Take As Directed., Disp: 1 each, Rfl: 0  •  azithromycin (ZITHROMAX) 250 MG tablet, take 1 tablet by mouth once daily, Disp: 90 tablet, Rfl: 1    Review of Systems    Objective   Vitals:    09/20/19 0913   BP: 120/68   Pulse: 73   Resp: 16   Temp: 97.6 °F (36.4 °C)   SpO2: 92%     SpO2 at rest:   SpO2 at rest with 2L via nasal cannula: 92%          Physical Exam           Assessment/Plan   Avelina was seen today for copd.    Diagnoses and all orders for this visit:    Chronic obstructive pulmonary disease, unspecified COPD type (CMS/HCC)  -     Walking Oximetry      O2 therapy to [facility]              Return for Next scheduled follow up.

## 2019-09-20 NOTE — PROGRESS NOTES
Chief Complaint   Patient presents with   • COPD     6 minute walk test, new O2 order       Subjective       History of Present Illness     Avelina Ceron is a 78 y.o. female. She presents for face to face evaluation for supplemental O2. Pt has been with current provider for 5 years and is not pleased with their services for oxygen, and would like new order to be sent to [facility]. Pt does have COPD and uses home oxygen daily and continuous at night. She has SOA with even mild exertion without her oxygen. She does report fatigue both with and without oxygen, but worsened without her O2. She reports home O2 saturation at 94% to 96% on average with O2 use, and this is her baseline with O2 use. No recent worsening symptoms. She does report occasional cough but chronic with her COPD. No wheezing. She is using her Combivent as directed, and rescue inhaler PRN. She is taking Daliresp as directed. These therapies + O2 keep her COPD symptoms stable and SOA at a minimum.     Pt has portable O2 with concentrator, large tank at home with concentrator, extra long tubing accessory, and nasal cannula.     The following portions of the patient's history were reviewed and updated as appropriate: allergies, current medications, past medical history, past social history and problem list.    Allergies   Allergen Reactions   • Codeine Mental Status Change   • Penicillins Hives and Swelling   • Dopamine Other (See Comments) and Palpitations     Other reaction(s): Hypertension     Social History     Tobacco Use   • Smoking status: Former Smoker     Last attempt to quit: 2006     Years since quittin.7   • Smokeless tobacco: Never Used   Substance Use Topics   • Alcohol use: Yes     Frequency: Monthly or less     Drinks per session: 1 or 2     Binge frequency: Never     Comment: 2-5 a year         Current Outpatient Medications:   •  albuterol (PROAIR HFA) 108 (90 BASE) MCG/ACT inhaler, Inhale 2 puffs every 4 (four) hours as needed.,  Disp: , Rfl:   •  aspirin (ASPIRIN LOW DOSE) 81 MG chewable tablet, Chew 1 tablet daily., Disp: , Rfl:   •  atenolol (TENORMIN) 50 MG tablet, take 1/2 tablet by mouth twice a day, Disp: 90 tablet, Rfl: 1  •  Calcium Carbonate-Vitamin D (CALCIUM-VITAMIN D) 500-200 MG-UNIT per tablet, Take 1 tablet by mouth daily., Disp: , Rfl:   •  clopidogrel (PLAVIX) 75 MG tablet, take 1 tablet by mouth once daily, Disp: 90 tablet, Rfl: 1  •  COMBIVENT RESPIMAT  MCG/ACT inhaler, inhale 1 puff by mouth four times a day, Disp: 4 g, Rfl: 5  •  cyclobenzaprine (FLEXERIL) 10 MG tablet, take 1 tablet by mouth twice a day if needed for muscle spasm, Disp: 60 tablet, Rfl: 5  •  DALIRESP 500 MCG tablet tablet, take 1 tablet by mouth once daily, Disp: 30 tablet, Rfl: 5  •  FIBER SELECT GUMMIES PO, Take 2 tablets by mouth Daily., Disp: , Rfl:   •  FLOVENT  MCG/ACT inhaler, inhale 2 puffs by mouth twice a day, Disp: 12 g, Rfl: 5  •  isosorbide mononitrate (IMDUR) 30 MG 24 hr tablet, take 1 tablet by mouth once daily, Disp: 90 tablet, Rfl: 1  •  levothyroxine (SYNTHROID, LEVOTHROID) 150 MCG tablet, take 1 tablet by mouth once daily, Disp: 90 tablet, Rfl: 1  •  Multiple Vitamins-Minerals (PRESERVISION AREDS) capsule, Take 1 tablet by mouth 2 (two) times a day., Disp: , Rfl:   •  nitroglycerin (NITROSTAT) 0.4 MG SL tablet, Place 1 tablet under the tongue as needed., Disp: , Rfl:   •  pantoprazole (PROTONIX) 40 MG EC tablet, take 1 tablet by mouth once daily, Disp: 90 tablet, Rfl: 0  •  sertraline (ZOLOFT) 100 MG tablet, take 1 tablet by mouth once daily, Disp: 30 tablet, Rfl: 5  •  solifenacin (VESICARE) 5 MG tablet, Take 1 tablet by mouth Daily., Disp: 30 tablet, Rfl: 5  •  Spacer/Aero Chamber Mouthpiece misc, 1 Units Take As Directed., Disp: 1 each, Rfl: 0  •  azithromycin (ZITHROMAX) 250 MG tablet, take 1 tablet by mouth once daily, Disp: 90 tablet, Rfl: 1  •  simvastatin (ZOCOR) 40 MG tablet, take 1 tablet by mouth at bedtime,  "Disp: 90 tablet, Rfl: 1    Review of Systems   Constitutional: Positive for fatigue. Negative for chills and fever.   HENT: Negative for congestion, ear pain and sore throat.    Eyes: Negative for visual disturbance.   Respiratory: Positive for cough and shortness of breath. Negative for wheezing.    Cardiovascular: Negative for chest pain.   Gastrointestinal: Negative for abdominal pain, nausea and vomiting.   Genitourinary: Negative for hematuria.   Skin: Negative for rash.   Neurological: Negative for dizziness, syncope and headache.       Objective   Vitals:    09/20/19 0913 09/20/19 1001 09/20/19 1002   BP: 120/68     BP Location: Left arm     Patient Position: Sitting     Cuff Size: Adult     Pulse: 73     Resp: 16     Temp: 97.6 °F (36.4 °C)     TempSrc: Temporal     SpO2: 92% (!) 87% 93%   Weight: 83 kg (183 lb)     Height: 161.3 cm (63.5\")       SpO2 at rest (room air): 88%  SpO2 at rest with 2L supplemental O2 via nasal cannula: 92%  SpO2 walking oximetry (room air): 87%  SpO2 walking oximetry with 2L supplemental O2 via nasal canula: 93%      Physical Exam   Constitutional: She appears well-developed and well-nourished.   HENT:   Head: Normocephalic and atraumatic.   Right Ear: Tympanic membrane, external ear and ear canal normal.   Left Ear: Tympanic membrane, external ear and ear canal normal.   Mouth/Throat: Oropharynx is clear and moist and mucous membranes are normal.   Eyes: Conjunctivae are normal.   Cardiovascular: Normal rate, regular rhythm and intact distal pulses.   No murmur heard.  Pulmonary/Chest: Effort normal. No respiratory distress. She has decreased breath sounds. She has no wheezes. She has no rhonchi. She has no rales.   +decreased breath sounds of R and L lungs.    Abdominal: There is no hepatosplenomegaly.   Lymphadenopathy:     She has no cervical adenopathy.   Psychiatric: She has a normal mood and affect.              Assessment/Plan   Avelina was seen today for copd.    Diagnoses " and all orders for this visit:    Chronic obstructive pulmonary disease, unspecified COPD type (CMS/[facility])  -     Walking Oximetry  -     Oxygen Therapy    Hypoxemia  -     Oxygen Therapy        Six minute walk test as noted above- pt does qualify for continuation of oxygen therapy.  [facility] with expiration of 5 year contract with current provider.                Return for Next scheduled follow up.

## 2019-09-23 RX ORDER — SIMVASTATIN 40 MG
TABLET ORAL
Qty: 90 TABLET | Refills: 1 | Status: SHIPPED | OUTPATIENT
Start: 2019-09-23 | End: 2020-06-15

## 2019-09-26 ENCOUNTER — TELEPHONE (OUTPATIENT)
Dept: INTERNAL MEDICINE | Facility: CLINIC | Age: 79
End: 2019-09-26

## 2019-09-26 NOTE — TELEPHONE ENCOUNTER
Patients daughter Clary Harman called and stated that they need the paperwork sent in for her oxygen tank renewal. Clary said she just got off the phone with the company and they had not received it yet. Clary can be reached at [phone].

## 2019-09-26 NOTE — TELEPHONE ENCOUNTER
Clary  [phone].  Spoke to pt's daughter. Confirmed orders have been faxed to [employer] @ [phone]. Fax confirmation has been received, advised they will contact the pt to schedule delivery. Good verbal understanding. Closing call.

## 2019-10-17 RX ORDER — IPRATROPIUM/ALBUTEROL SULFATE 20-100 MCG
MIST INHALER (GRAM) INHALATION
Qty: 4 G | Refills: 3 | Status: SHIPPED | OUTPATIENT
Start: 2019-10-17 | End: 2020-02-26

## 2019-10-21 RX ORDER — PANTOPRAZOLE SODIUM 40 MG/1
TABLET, DELAYED RELEASE ORAL
Qty: 90 TABLET | Refills: 1 | Status: SHIPPED | OUTPATIENT
Start: 2019-10-21 | End: 2020-04-15 | Stop reason: SDUPTHER

## 2019-10-24 RX ORDER — NITROGLYCERIN 0.4 MG/1
TABLET SUBLINGUAL
Qty: 25 TABLET | Refills: 0 | Status: SHIPPED | OUTPATIENT
Start: 2019-10-24 | End: 2020-03-23 | Stop reason: SDUPTHER

## 2019-11-25 RX ORDER — DEXAMETHASONE 4 MG/1
TABLET ORAL
Qty: 12 G | Refills: 5 | Status: SHIPPED | OUTPATIENT
Start: 2019-11-25 | End: 2020-07-27 | Stop reason: SDUPTHER

## 2019-11-27 RX ORDER — ROFLUMILAST 500 UG/1
TABLET ORAL
Qty: 90 TABLET | Refills: 2 | Status: SHIPPED | OUTPATIENT
Start: 2019-11-27 | End: 2020-02-17

## 2019-12-28 DIAGNOSIS — N39.3 STRESS INCONTINENCE OF URINE: ICD-10-CM

## 2019-12-30 RX ORDER — SOLIFENACIN SUCCINATE 5 MG/1
TABLET, FILM COATED ORAL
Qty: 90 TABLET | Refills: 0 | Status: SHIPPED | OUTPATIENT
Start: 2019-12-30 | End: 2020-03-23 | Stop reason: SDUPTHER

## 2020-01-14 ENCOUNTER — OFFICE VISIT (OUTPATIENT)
Dept: INTERNAL MEDICINE | Facility: CLINIC | Age: 80
End: 2020-01-14

## 2020-01-14 ENCOUNTER — HOSPITAL ENCOUNTER (OUTPATIENT)
Dept: GENERAL RADIOLOGY | Facility: HOSPITAL | Age: 80
Discharge: HOME | End: 2020-01-14
Admitting: INTERNAL MEDICINE

## 2020-01-14 VITALS
DIASTOLIC BLOOD PRESSURE: 64 MMHG | RESPIRATION RATE: 20 BRPM | BODY MASS INDEX: 31.73 KG/M2 | SYSTOLIC BLOOD PRESSURE: 142 MMHG | WEIGHT: 182 LBS | TEMPERATURE: 98.2 F | HEART RATE: 72 BPM

## 2020-01-14 DIAGNOSIS — K21.00 GASTROESOPHAGEAL REFLUX DISEASE WITH ESOPHAGITIS: Primary | ICD-10-CM

## 2020-01-14 DIAGNOSIS — R82.998 LEUKOCYTES IN URINE: ICD-10-CM

## 2020-01-14 DIAGNOSIS — E03.9 HYPOTHYROIDISM, UNSPECIFIED TYPE: ICD-10-CM

## 2020-01-14 DIAGNOSIS — F32.A DEPRESSION, UNSPECIFIED DEPRESSION TYPE: ICD-10-CM

## 2020-01-14 DIAGNOSIS — I10 ESSENTIAL HYPERTENSION: ICD-10-CM

## 2020-01-14 DIAGNOSIS — E78.5 HYPERLIPIDEMIA, UNSPECIFIED HYPERLIPIDEMIA TYPE: ICD-10-CM

## 2020-01-14 DIAGNOSIS — Z12.31 ENCOUNTER FOR SCREENING MAMMOGRAM FOR BREAST CANCER: ICD-10-CM

## 2020-01-14 DIAGNOSIS — M25.552 LEFT HIP PAIN: ICD-10-CM

## 2020-01-14 DIAGNOSIS — J44.9 CHRONIC OBSTRUCTIVE PULMONARY DISEASE, UNSPECIFIED COPD TYPE (HCC): ICD-10-CM

## 2020-01-14 LAB
ALBUMIN SERPL-MCNC: 4.3 G/DL (ref 3.5–5.2)
ALBUMIN/GLOB SERPL: 1.3 G/DL
ALP SERPL-CCNC: 80 U/L (ref 39–117)
ALT SERPL W P-5'-P-CCNC: 8 U/L (ref 1–33)
ANION GAP SERPL CALCULATED.3IONS-SCNC: 12.6 MMOL/L (ref 5–15)
AST SERPL-CCNC: 14 U/L (ref 1–32)
BASOPHILS # BLD AUTO: 0.12 10*3/MM3 (ref 0–0.2)
BASOPHILS NFR BLD AUTO: 0.9 % (ref 0–1.5)
BILIRUB BLD-MCNC: NEGATIVE MG/DL
BILIRUB SERPL-MCNC: 0.5 MG/DL (ref 0.2–1.2)
BUN BLD-MCNC: 12 MG/DL (ref 8–23)
BUN/CREAT SERPL: 20 (ref 7–25)
CALCIUM SPEC-SCNC: 9.5 MG/DL (ref 8.6–10.5)
CHLORIDE SERPL-SCNC: 95 MMOL/L (ref 98–107)
CHOLEST SERPL-MCNC: 134 MG/DL (ref 0–200)
CLARITY, POC: ABNORMAL
CO2 SERPL-SCNC: 31.4 MMOL/L (ref 22–29)
COLOR UR: YELLOW
CREAT BLD-MCNC: 0.6 MG/DL (ref 0.57–1)
DEPRECATED RDW RBC AUTO: 43.7 FL (ref 37–54)
EOSINOPHIL # BLD AUTO: 0.17 10*3/MM3 (ref 0–0.4)
EOSINOPHIL NFR BLD AUTO: 1.2 % (ref 0.3–6.2)
ERYTHROCYTE [DISTWIDTH] IN BLOOD BY AUTOMATED COUNT: 13.2 % (ref 12.3–15.4)
EXPIRATION DATE: ABNORMAL
GFR SERPL CREATININE-BSD FRML MDRD: 96 ML/MIN/1.73
GLOBULIN UR ELPH-MCNC: 3.2 GM/DL
GLUCOSE BLD-MCNC: 99 MG/DL (ref 65–99)
GLUCOSE UR STRIP-MCNC: NEGATIVE MG/DL
HCT VFR BLD AUTO: 37.4 % (ref 34–46.6)
HDLC SERPL-MCNC: 45 MG/DL (ref 40–60)
HGB BLD-MCNC: 12.1 G/DL (ref 12–15.9)
IMM GRANULOCYTES # BLD AUTO: 0.04 10*3/MM3 (ref 0–0.05)
IMM GRANULOCYTES NFR BLD AUTO: 0.3 % (ref 0–0.5)
KETONES UR QL: ABNORMAL
LDLC SERPL CALC-MCNC: 65 MG/DL (ref 0–100)
LDLC/HDLC SERPL: 1.45 {RATIO}
LEUKOCYTE EST, POC: ABNORMAL
LYMPHOCYTES # BLD AUTO: 2.15 10*3/MM3 (ref 0.7–3.1)
LYMPHOCYTES NFR BLD AUTO: 15.7 % (ref 19.6–45.3)
Lab: ABNORMAL
MCH RBC QN AUTO: 29.2 PG (ref 26.6–33)
MCHC RBC AUTO-ENTMCNC: 32.4 G/DL (ref 31.5–35.7)
MCV RBC AUTO: 90.1 FL (ref 79–97)
MONOCYTES # BLD AUTO: 0.84 10*3/MM3 (ref 0.1–0.9)
MONOCYTES NFR BLD AUTO: 6.1 % (ref 5–12)
NEUTROPHILS # BLD AUTO: 10.35 10*3/MM3 (ref 1.7–7)
NEUTROPHILS NFR BLD AUTO: 75.8 % (ref 42.7–76)
NITRITE UR-MCNC: NEGATIVE MG/ML
NRBC BLD AUTO-RTO: 0 /100 WBC (ref 0–0.2)
PH UR: 5 [PH] (ref 5–8)
PLATELET # BLD AUTO: 312 10*3/MM3 (ref 140–450)
PMV BLD AUTO: 10.6 FL (ref 6–12)
POTASSIUM BLD-SCNC: 4 MMOL/L (ref 3.5–5.2)
PROT SERPL-MCNC: 7.5 G/DL (ref 6–8.5)
PROT UR STRIP-MCNC: ABNORMAL MG/DL
RBC # BLD AUTO: 4.15 10*6/MM3 (ref 3.77–5.28)
RBC # UR STRIP: NEGATIVE /UL
SODIUM BLD-SCNC: 139 MMOL/L (ref 136–145)
SP GR UR: 1.02 (ref 1–1.03)
T4 FREE SERPL-MCNC: 1.39 NG/DL (ref 0.93–1.7)
TRIGL SERPL-MCNC: 119 MG/DL (ref 0–150)
TSH SERPL DL<=0.05 MIU/L-ACNC: 1.28 UIU/ML (ref 0.27–4.2)
UROBILINOGEN UR QL: NORMAL
VLDLC SERPL-MCNC: 23.8 MG/DL (ref 5–40)
WBC NRBC COR # BLD: 13.67 10*3/MM3 (ref 3.4–10.8)

## 2020-01-14 PROCEDURE — 87086 URINE CULTURE/COLONY COUNT: CPT | Performed by: INTERNAL MEDICINE

## 2020-01-14 PROCEDURE — 84443 ASSAY THYROID STIM HORMONE: CPT | Performed by: INTERNAL MEDICINE

## 2020-01-14 PROCEDURE — 36415 COLL VENOUS BLD VENIPUNCTURE: CPT | Performed by: INTERNAL MEDICINE

## 2020-01-14 PROCEDURE — 81003 URINALYSIS AUTO W/O SCOPE: CPT | Performed by: INTERNAL MEDICINE

## 2020-01-14 PROCEDURE — 85025 COMPLETE CBC W/AUTO DIFF WBC: CPT | Performed by: INTERNAL MEDICINE

## 2020-01-14 PROCEDURE — 80061 LIPID PANEL: CPT | Performed by: INTERNAL MEDICINE

## 2020-01-14 PROCEDURE — 80053 COMPREHEN METABOLIC PANEL: CPT | Performed by: INTERNAL MEDICINE

## 2020-01-14 PROCEDURE — 99214 OFFICE O/P EST MOD 30 MIN: CPT | Performed by: INTERNAL MEDICINE

## 2020-01-14 PROCEDURE — 73502 X-RAY EXAM HIP UNI 2-3 VIEWS: CPT

## 2020-01-14 PROCEDURE — 84439 ASSAY OF FREE THYROXINE: CPT | Performed by: INTERNAL MEDICINE

## 2020-01-14 NOTE — PATIENT INSTRUCTIONS
Heart-Healthy Eating Plan  Heart-healthy meal planning includes:  · Eating less unhealthy fats.  · Eating more healthy fats.  · Making other changes in your diet.  · Talk with your doctor or a diet specialist (dietitian) to create an eating plan that is right for you.  What are tips for following this plan?  Cooking  Avoid frying your food. Try to bake, boil, grill, or broil it instead. You can also reduce fat by:  · Removing the skin from poultry.  · Removing all visible fats from meats.  · Steaming vegetables in water or broth.  Meal planning    · At meals, divide your plate into four equal parts:  ? Fill one-half of your plate with vegetables and green salads.  ? Fill one-fourth of your plate with whole grains.  ? Fill one-fourth of your plate with lean protein foods.  · Eat 4-5 servings of vegetables per day. A serving of vegetables is:  ? 1 cup of raw or cooked vegetables.  ? 2 cups of raw leafy greens.  · Eat 4-5 servings of fruit per day. A serving of fruit is:  ? 1 medium whole fruit.  ? ¼ cup of dried fruit.  ? ½ cup of fresh, frozen, or canned fruit.  ? ½ cup of 100% fruit juice.  · Eat more foods that have soluble fiber. These are apples, broccoli, carrots, beans, peas, and barley. Try to get 20-30 g of fiber per day.  · Eat 4-5 servings of nuts, legumes, and seeds per week:  ? 1 serving of dried beans or legumes equals ½ cup after being cooked.  ? 1 serving of nuts is ¼ cup.  ? 1 serving of seeds equals 1 tablespoon.  General information  · Eat more home-cooked food. Eat less restaurant, buffet, and fast food.  · Limit or avoid alcohol.  · Limit foods that are high in starch and sugar.  · Avoid fried foods.  · Lose weight if you are overweight.  · Keep track of how much salt (sodium) you eat. This is important if you have high blood pressure. Ask your doctor to tell you more about this.  · Try to add vegetarian meals each week.  Fats  · Choose healthy fats. These include olive oil and canola oil,  flaxseeds, walnuts, almonds, and seeds.  · Eat more omega-3 fats. These include salmon, mackerel, sardines, tuna, flaxseed oil, and ground flaxseeds. Try to eat fish at least 2 times each week.  · Check food labels. Avoid foods with trans fats or high amounts of saturated fat.  · Limit saturated fats.  ? These are often found in animal products, such as meats, butter, and cream.  ? These are also found in plant foods, such as palm oil, palm kernel oil, and coconut oil.  · Avoid foods with partially hydrogenated oils in them. These have trans fats. Examples are stick margarine, some tub margarines, cookies, crackers, and other baked goods.  What foods can I eat?  Fruits  All fresh, canned (in natural juice), or frozen fruits.  Vegetables  Fresh or frozen vegetables (raw, steamed, roasted, or grilled). Green salads.  Grains  Most grains. Choose whole wheat and whole grains most of the time. Rice and pasta, including brown rice and pastas made with whole wheat.  Meats and other proteins  Lean, well-trimmed beef, veal, pork, and lamb. Chicken and turkey without skin. All fish and shellfish. Wild duck, rabbit, pheasant, and venison. Egg whites or low-cholesterol egg substitutes. Dried beans, peas, lentils, and tofu. Seeds and most nuts.  Dairy  Low-fat or nonfat cheeses, including ricotta and mozzarella. Skim or 1% milk that is liquid, powdered, or evaporated. Buttermilk that is made with low-fat milk. Nonfat or low-fat yogurt.  Fats and oils  Non-hydrogenated (trans-free) margarines. Vegetable oils, including soybean, sesame, sunflower, olive, peanut, safflower, corn, canola, and cottonseed. Salad dressings or mayonnaise made with a vegetable oil.  Beverages  Mineral water. Coffee and tea. Diet carbonated beverages.  Sweets and desserts  Sherbet, gelatin, and fruit ice. Small amounts of dark chocolate.  Limit all sweets and desserts.  Seasonings and condiments  All seasonings and condiments.  The items listed above may  not be a complete list of foods and drinks you can eat. Contact a dietitian for more options.  What foods should I avoid?  Fruits  Canned fruit in heavy syrup. Fruit in cream or butter sauce. Fried fruit. Limit coconut.  Vegetables  Vegetables cooked in cheese, cream, or butter sauce. Fried vegetables.  Grains  Breads that are made with saturated or trans fats, oils, or whole milk. Croissants. Sweet rolls. Donuts. High-fat crackers, such as cheese crackers.  Meats and other proteins  Fatty meats, such as hot dogs, ribs, sausage, salcedo, rib-eye roast or steak. High-fat deli meats, such as salami and bologna. Caviar. Domestic duck and goose. Organ meats, such as liver.  Dairy  Cream, sour cream, cream cheese, and creamed cottage cheese. Whole-milk cheeses. Whole or 2% milk that is liquid, evaporated, or condensed. Whole buttermilk. Cream sauce or high-fat cheese sauce. Yogurt that is made from whole milk.  Fats and oils  Meat fat, or shortening. Cocoa butter, hydrogenated oils, palm oil, coconut oil, palm kernel oil. Solid fats and shortenings, including salcedo fat, salt pork, lard, and butter. Nondairy cream substitutes. Salad dressings with cheese or sour cream.  Beverages  Regular sodas and juice drinks with added sugar.  Sweets and desserts  Frosting. Pudding. Cookies. Cakes. Pies. Milk chocolate or white chocolate. Buttered syrups. Full-fat ice cream or ice cream drinks.  The items listed above may not be a complete list of foods and drinks to avoid. Contact a dietitian for more information.  Summary  · Heart-healthy meal planning includes eating less unhealthy fats, eating more healthy fats, and making other changes in your diet.  · Eat a balanced diet. This includes fruits and vegetables, low-fat or nonfat dairy, lean protein, nuts and legumes, whole grains, and heart-healthy oils and fats.  This information is not intended to replace advice given to you by your health care provider. Make sure you discuss any  questions you have with your health care provider.  Document Released: 06/18/2013 Document Revised: 01/25/2019 Document Reviewed: 01/25/2019  [employer] Interactive Patient Education © 2019 [employer]

## 2020-01-14 NOTE — PROGRESS NOTES
Chief Complaint   Patient presents with   • Extended follow up       History of Present Illness    The patient presents for a follow-up related to GERD. The patient is on pantoprazole for her gastroesophageal reflux. The medication is taken on a regular basis and gives complete relief of the symptoms. She reports no abdominal pain, belching, chest pain, diarrhea, dysphagia, early satiety, heartburn, hoarseness, nausea, odynophagia, rectal bleeding, vomiting or weight loss. The GERD has no known aggravating factors.    The patient presents for a follow-up related to hyperlipidemia. She is following a low fat diet. She reports that she is exercising. She is taking simvastatin. The patient is taking her medication as prescribed. She reports no medication side effects, including muscle cramps, abdominal pain, headaches or weakness. She denies shortness of breath, orthopnea, paroxysmal nocturnal dyspnea, dyspnea on exertion, edema, palpitations or syncope.    The patient presents for a follow-up related to hypothyroidism. She reports a good energy level. She reports no hair loss, heat intolerance, cold intolerance, constipation or sweats. She is taking her medication as prescribed.    The patient presents for a follow-up related to hypertension. She states that she is taking her medication as prescribed. She is not having medication side effects.    The patient presents with pain in the left hip of many months duration. There is no history of trauma. The patient has no joint swelling. There is stiffness. The joint stiffness is present most of the time. The patient denies a history of overuse or repetitive motion with the affected joints.    The patient denies dry eyes or dry mouth. There are no associated insect bites. There is no family history of rheumatoid arthritis, juvenile rheumatoid arthritis, systemic lupus erythematosis or gout. The patient denies a personal history of malignancy.    The joint pain is aggravated  by motion and walking. The pain has no alleviating factors noted.    The patient presents for a follow-up related to Chronic Obstructive Pulmonary Disease. Rescue inhaler usage is reported to be multiple times daily. The patient reports that she has no known triggers. She is not smoking.    The patient presents for follow-up of depression. She reports currently having depression symptoms. She denies suicidal ideation. Her energy level is good. She denies agorophobia. She sleeps well. She is not tearful. She states that her current depression symptoms are worsened. She is currently taking a medication. The current medication regimen consists of sertraline. The patient denies having medication side effects including nausea, headaches, anxiety, increased depression, anorgasmia or fatigue.    Review of Systems    CONSTITUTIONAL- Denies Fever, Chills, Sweats, Weakness or Malaise.    NECK- Denies Decreased Range of Motion, Stiffness, Thyroid Nodules, Enlarged Lymph Nodes or Goiter.    EYES- Denies Eye Pain, Eye Drainage, Eye Redness, Eye Discharge, Decreased Vision, Visual Disturbance, Diplopia, Visual Loss or Swollen Eyelid.    HENT- Denies Nasal Discharge, Sore Throat, Ear Pain, Ear Drainage, Headache, Sinus Pain, Nasal Congestion, Decreased Hearing or Tinnitus.    PULMONARY- Denies Wheezing, Sputum Production, Cough, Hemoptysis or Pleuritic Chest Pain.    CARDIOVASCULAR- Denies Claudication or Irregular Heart Beat.    GENITOURINARY- Denies Dysuria, Hematuria, Urinary Frequency, Urinary Leakage, Pelvic Pain, Vaginal Prolapse, Vaginal Discharge, Dyspareunia or Abnormal Menses.    ENDOCRINE- Reports: Depression. Denies: Memory Loss, Polydypsia, Polyphagia, Polyuria, Sleep Disturbance or Weight Gain.    NEUROLOGIC- Denies Seizures, Visual Changes or Confusion.    MUSCULOSKELETAL- Reports: Joint Pain. Denies: Joint Stiffness, Decreased Range of Motion or Joint Swelling.    INTEGUMENTARY- Denies Rash, Lumps, Sores, Itching,  Dryness, Color Change, Changes in Hair, Brittle Nails, Discolored Nails, Thickened Nails, Growths or Alopecia.    Medications      Current Outpatient Medications:   •  albuterol (PROAIR HFA) 108 (90 BASE) MCG/ACT inhaler, Inhale 2 puffs every 4 (four) hours as needed., Disp: , Rfl:   •  aspirin (ASPIRIN LOW DOSE) 81 MG chewable tablet, Chew 1 tablet daily., Disp: , Rfl:   •  atenolol (TENORMIN) 50 MG tablet, take 1/2 tablet by mouth twice a day, Disp: 90 tablet, Rfl: 1  •  azithromycin (ZITHROMAX) 250 MG tablet, take 1 tablet by mouth once daily, Disp: 90 tablet, Rfl: 1  •  Calcium Carbonate-Vitamin D (CALCIUM-VITAMIN D) 500-200 MG-UNIT per tablet, Take 1 tablet by mouth daily., Disp: , Rfl:   •  clopidogrel (PLAVIX) 75 MG tablet, take 1 tablet by mouth once daily, Disp: 90 tablet, Rfl: 1  •  COMBIVENT RESPIMAT  MCG/ACT inhaler, inhale 1 puff by mouth four times a day, Disp: 4 g, Rfl: 3  •  cyclobenzaprine (FLEXERIL) 10 MG tablet, take 1 tablet by mouth twice a day if needed for muscle spasm, Disp: 60 tablet, Rfl: 5  •  DALIRESP 500 MCG tablet tablet, take 1 tablet by mouth once daily, Disp: 90 tablet, Rfl: 2  •  FIBER SELECT GUMMIES PO, Take 2 tablets by mouth Daily., Disp: , Rfl:   •  FLOVENT  MCG/ACT inhaler, inhale 2 puffs by mouth twice a day, Disp: 12 g, Rfl: 5  •  isosorbide mononitrate (IMDUR) 30 MG 24 hr tablet, take 1 tablet by mouth once daily, Disp: 90 tablet, Rfl: 1  •  levothyroxine (SYNTHROID, LEVOTHROID) 150 MCG tablet, take 1 tablet by mouth once daily, Disp: 90 tablet, Rfl: 1  •  Multiple Vitamins-Minerals (PRESERVISION AREDS) capsule, Take 1 tablet by mouth 2 (two) times a day., Disp: , Rfl:   •  pantoprazole (PROTONIX) 40 MG EC tablet, take 1 tablet by mouth once daily, Disp: 90 tablet, Rfl: 1  •  sertraline (ZOLOFT) 100 MG tablet, take 1 tablet by mouth once daily, Disp: 30 tablet, Rfl: 5  •  simvastatin (ZOCOR) 40 MG tablet, take 1 tablet by mouth at bedtime, Disp: 90 tablet, Rfl:  1  •  solifenacin (VESICARE) 5 MG tablet, take 1 tablet by mouth once daily, Disp: 90 tablet, Rfl: 0  •  Spacer/Aero Chamber Mouthpiece misc, 1 Units Take As Directed., Disp: 1 each, Rfl: 0  •  nitroglycerin (NITROSTAT) 0.4 MG SL tablet, place 1 tablet under the tongue if needed every 5 minutes for chest pain for 3 doses IF NO RELIEF AF, Disp: 25 tablet, Rfl: 0     Allergies    Allergies   Allergen Reactions   • Codeine Mental Status Change   • Penicillins Hives and Swelling   • Dopamine Other (See Comments) and Palpitations     Other reaction(s): Hypertension       Problem List    Patient Active Problem List   Diagnosis   • Coronary arteriosclerosis in native artery   • Chronic obstructive pulmonary disease (CMS/HCC)   • Depression   • Gastroesophageal reflux disease with esophagitis   • Hyperlipidemia   • Hypertension   • Hypothyroidism   • Osteoarthritis   • Peripheral arterial occlusive disease (CMS/HCC)   • Solitary pulmonary nodule   • Vitamin D deficiency   • Lower back pain   • Carotid bruit   • Hypoxemia       Medications, Allergies, Problems List and Past History were reviewed and updated.    Physical Examination    /64 (BP Location: Left arm, Patient Position: Sitting, Cuff Size: Adult)   Pulse 72   Temp 98.2 °F (36.8 °C) (Temporal)   Resp 20   Wt 82.6 kg (182 lb)   LMP  (LMP Unknown)   Breastfeeding No   BMI 31.73 kg/m²     HEENT: Head- Normocephalic Atraumatic. Facies- Within normal limits. Pinnas- Normal texture and shape bilaterally. Canals- Normal bilaterally. TMs- Normal bilaterally. Nares- Patent bilaterally. Nasal Septum- is normal. There is no tenderness to palpation over the frontal or maxillary sinuses. Lids- Normal bilaterally. Conjunctiva- Clear bilaterally. Sclera- Anicteric bilaterally. Oropharynx- Moist with no lesions. Tonsils- No enlargement, erythema or exudate.    Neck: Thyroid- non enlarged, symmetric and has no nodules. No bruits are detected. ROM- Normal Range of Motion  with no rigidity.    Lungs: Auscultation- Clear to auscultation bilaterally. There are no retractions, clubbing or cyanosis. The Expiratory to Inspiratory ratio is equal.    Lymph Nodes: Cervical- no enlarged lymph nodes noted. Clavicular- no enlarged supraclavicular lymph nodes noted. Axillary- no enlarged axillary lymph nodes noted. Inguinal- no enlarged inguinal lymph nodes noted.    Cardiovascular: There are no carotid bruits. Heart- Normal Rate with Regular rhythm and no murmurs. There are no gallops. There are no rubs. In the lower extremities there is no edema. The upper extremities do not have edema.    Abdomen: Soft, benign, non-tender with no masses, hernias, organomegaly or scars.    Breast: Normal contours bilaterally with no masses, discharge, skin changes or lumps. There are no scars noted.    Hips: The hips are symmetric with normal iveth landmarks, equal iliac crest height, and no noted atrophy. Hip flexion is normal bilaterally. Hip Extension is normal bilaterally. Hip abduction is normal bilaterally. There is no pain with forced abduction. Hip adduction is normal bilaterally. Internal rotation is normal bilaterally. External rotation is normal bilaterally. There is no pain with straight leg raising on either side.    Radiology    My personal interpretation of the x-rays is as stated below:    The X-rays of the left hip reveal degenerative joint. The degenerative changes seen include joint space narrowing and iveth sclerosis of the articular margins.    Impression and Assessment    Encounter for Screening Mammogram for Malignant Neoplasm of the Breast.    Gastroesophageal Reflux Disease.    Hyperlipidemia.    Hypothyroidism.    Essential Hypertension.    Osteoarthritis.    COPD.    Major Depressive Disorder Single Episode Unspecified.    Plan    Gastroesophageal Reflux Disease Plan: The current plan was continued.    Hyperlipidemia Plan: The patient was instructed to exercise daily and eat a low fat  diet.    Essential Hypertension Plan: The current plan was continued.    Hypothyroidism Plan: The current plan was continued.    COPD Plan: The current plan was continued.    Osteoarthritis Plan: She will consider whether she wants to see orthopedic surgery and will let me know. She has seen Dr. Krishna in the past.    Major Depressive Disorder Single Episode Unspecified Plan: She will add vitamin D3 1000- IU daily.    The following was ordered for screening and health maintenance: Screening Mammogram.    Avelina was seen today for extended follow up.    Diagnoses and all orders for this visit:    Gastroesophageal reflux disease with esophagitis  -     CBC & Differential    Hyperlipidemia, unspecified hyperlipidemia type  -     Comprehensive Metabolic Panel  -     Lipid Panel    Essential hypertension  -     Comprehensive Metabolic Panel  -     CBC & Differential  -     POC Urinalysis Dipstick, Automated    Hypothyroidism, unspecified type  -     TSH  -     T4, Free    Left hip pain  -     XR Hip With or Without Pelvis 2 - 3 View Left; Future    Chronic obstructive pulmonary disease, unspecified COPD type (CMS/HCC)    Depression, unspecified depression type  -     TSH    Encounter for screening mammogram for breast cancer  -     Mammo Screening Digital Tomosynthesis Bilateral With CAD; Future        Return to Office    The patient was instructed to return for follow-up in 6 months. Next Visit: Follow-up.    The patient was instructed to return sooner if the condition changes, worsens, or does not resolve.

## 2020-01-15 LAB — BACTERIA SPEC AEROBE CULT: NO GROWTH

## 2020-02-17 RX ORDER — ROFLUMILAST 500 UG/1
TABLET ORAL
Qty: 90 TABLET | Refills: 2 | Status: SHIPPED | OUTPATIENT
Start: 2020-02-17 | End: 2020-11-16 | Stop reason: SDUPTHER

## 2020-02-26 RX ORDER — IPRATROPIUM/ALBUTEROL SULFATE 20-100 MCG
MIST INHALER (GRAM) INHALATION
Qty: 4 G | Refills: 3 | Status: SHIPPED | OUTPATIENT
Start: 2020-02-26 | End: 2020-04-09 | Stop reason: SDUPTHER

## 2020-03-02 ENCOUNTER — TELEPHONE (OUTPATIENT)
Dept: INTERNAL MEDICINE | Facility: CLINIC | Age: 80
End: 2020-03-02

## 2020-03-02 NOTE — TELEPHONE ENCOUNTER
S/W pt dgt., Clary, informed that Dr Guajardo said that he can do that.  Verb good understanding and great apprec.

## 2020-03-02 NOTE — TELEPHONE ENCOUNTER
Avelina is having hip surgery with Dr Alfred Pelayo on 9/29/2020   She has her Cardiac Clearance exam scheduled with her Cardiologist .   Her  preop Clearance is scheduled with Dr Moura.     The surgeon wants her to have a Pulmonology Clearance done and said it is ok for Dr Moura to give clearance.   Nan - daughter  is wondering if Dr Moura will be able to do that at her pre op Clearance appointment  On 9/8/2020

## 2020-03-02 NOTE — TELEPHONE ENCOUNTER
PT DAUGHTER CALLED IN STATED THEY WOULD LIKE A CB PERTAINING TO HER HIP REPLACEMENT SGY AND THEY HAVE A QUESTION REGARDING A PRE-OP CLEARANCE      PT CB NUMBER: [phone]

## 2020-03-04 ENCOUNTER — TELEPHONE (OUTPATIENT)
Dept: INTERNAL MEDICINE | Facility: CLINIC | Age: 80
End: 2020-03-04

## 2020-03-04 NOTE — TELEPHONE ENCOUNTER
----- Message from Avelina Ceron sent at 3/4/2020  4:54 PM EST -----  Regarding: Non-Urgent Medical Question  Contact: [phone]  I have surgery scheduled for April 1 2020 with Dr Hamlet Thomas to be performed at [facility] (drooping eyelids corrected). He requires pre-op blood metabolic panel, chest x-ray and EKG.  He also needs to know if and when I might be able to temporarily go off blood thinning meds (clopidigrel & low dose aspirin) prior to the surgery.   I’m requesting an appointment ASAP to comply with these pre-op requirements or if an appointment isn’t necessary then the orders put in for the required actions please. Thanks.

## 2020-03-04 NOTE — TELEPHONE ENCOUNTER
S/W pt, informed will need pre-op appt .  Appt scheduled for Wed., 3/18 at 10:15am with Dr Guajardo.  Aminata yates apprec.

## 2020-03-16 RX ORDER — SERTRALINE HYDROCHLORIDE 100 MG/1
100 TABLET, FILM COATED ORAL DAILY
Qty: 90 TABLET | Refills: 1 | Status: SHIPPED | OUTPATIENT
Start: 2020-03-16 | End: 2020-09-08

## 2020-03-23 DIAGNOSIS — N39.3 STRESS INCONTINENCE OF URINE: ICD-10-CM

## 2020-03-23 RX ORDER — NITROGLYCERIN 0.4 MG/1
0.4 TABLET SUBLINGUAL SEE ADMIN INSTRUCTIONS
Qty: 25 TABLET | Refills: 0 | Status: SHIPPED | OUTPATIENT
Start: 2020-03-23 | End: 2020-09-16 | Stop reason: SDUPTHER

## 2020-03-23 RX ORDER — SOLIFENACIN SUCCINATE 5 MG/1
5 TABLET, FILM COATED ORAL DAILY
Qty: 90 TABLET | Refills: 1 | Status: SHIPPED | OUTPATIENT
Start: 2020-03-23 | End: 2020-09-14

## 2020-03-24 ENCOUNTER — APPOINTMENT (OUTPATIENT)
Dept: MAMMOGRAPHY | Facility: HOSPITAL | Age: 80
End: 2020-03-24

## 2020-03-25 RX ORDER — AZITHROMYCIN 250 MG/1
TABLET, FILM COATED ORAL
Qty: 90 TABLET | Refills: 1 | Status: SHIPPED | OUTPATIENT
Start: 2020-03-25 | End: 2020-05-23 | Stop reason: SDUPTHER

## 2020-04-15 RX ORDER — PANTOPRAZOLE SODIUM 40 MG/1
40 TABLET, DELAYED RELEASE ORAL DAILY
Qty: 90 TABLET | Refills: 1 | Status: SHIPPED | OUTPATIENT
Start: 2020-04-15 | End: 2020-10-14

## 2020-05-18 RX ORDER — LEVOTHYROXINE SODIUM 0.15 MG/1
TABLET ORAL
Qty: 90 TABLET | Refills: 0 | Status: SHIPPED | OUTPATIENT
Start: 2020-05-18 | End: 2020-08-15

## 2020-05-18 RX ORDER — ISOSORBIDE MONONITRATE 30 MG/1
TABLET, EXTENDED RELEASE ORAL
Qty: 90 TABLET | Refills: 0 | Status: SHIPPED | OUTPATIENT
Start: 2020-05-18 | End: 2020-08-15

## 2020-05-18 RX ORDER — ATENOLOL 50 MG/1
TABLET ORAL
Qty: 90 TABLET | Refills: 0 | Status: SHIPPED | OUTPATIENT
Start: 2020-05-18 | End: 2020-08-15

## 2020-05-18 RX ORDER — CLOPIDOGREL BISULFATE 75 MG/1
TABLET ORAL
Qty: 90 TABLET | Refills: 0 | Status: SHIPPED | OUTPATIENT
Start: 2020-05-18 | End: 2020-08-15

## 2020-05-26 RX ORDER — AZITHROMYCIN 250 MG/1
250 TABLET, FILM COATED ORAL DAILY
Qty: 90 TABLET | Refills: 1 | Status: SHIPPED | OUTPATIENT
Start: 2020-05-26 | End: 2020-05-29 | Stop reason: SDUPTHER

## 2020-05-29 ENCOUNTER — TELEPHONE (OUTPATIENT)
Dept: INTERNAL MEDICINE | Facility: CLINIC | Age: 80
End: 2020-05-29

## 2020-05-29 DIAGNOSIS — J44.9 CHRONIC OBSTRUCTIVE PULMONARY DISEASE, UNSPECIFIED COPD TYPE (HCC): Primary | ICD-10-CM

## 2020-05-29 RX ORDER — AZITHROMYCIN 250 MG/1
250 TABLET, FILM COATED ORAL DAILY
Qty: 90 TABLET | Refills: 1 | Status: SHIPPED | OUTPATIENT
Start: 2020-05-29 | End: 2020-11-09 | Stop reason: SDUPTHER

## 2020-05-29 NOTE — TELEPHONE ENCOUNTER
Sorin from [facility] states she needs a diagnosis in order to fill the Azithromycin.  She can be reached at [phone]

## 2020-06-11 ENCOUNTER — TELEPHONE (OUTPATIENT)
Dept: INTERNAL MEDICINE | Facility: CLINIC | Age: 80
End: 2020-06-11

## 2020-06-11 NOTE — TELEPHONE ENCOUNTER
Pt daughter called stating pts pre-op appointments needed to be rescheduled before 7/13    Please advise at: [phone]

## 2020-06-15 RX ORDER — SIMVASTATIN 40 MG
TABLET ORAL
Qty: 90 TABLET | Refills: 1 | Status: SHIPPED | OUTPATIENT
Start: 2020-06-15 | End: 2020-12-13

## 2020-06-30 ENCOUNTER — HOSPITAL ENCOUNTER (OUTPATIENT)
Dept: GENERAL RADIOLOGY | Facility: HOSPITAL | Age: 80
Discharge: HOME | End: 2020-06-30
Admitting: INTERNAL MEDICINE

## 2020-06-30 ENCOUNTER — OFFICE VISIT (OUTPATIENT)
Dept: INTERNAL MEDICINE | Facility: CLINIC | Age: 80
End: 2020-06-30

## 2020-06-30 VITALS
RESPIRATION RATE: 20 BRPM | TEMPERATURE: 97.5 F | HEART RATE: 68 BPM | DIASTOLIC BLOOD PRESSURE: 70 MMHG | SYSTOLIC BLOOD PRESSURE: 162 MMHG | BODY MASS INDEX: 32.08 KG/M2 | WEIGHT: 184 LBS

## 2020-06-30 DIAGNOSIS — E78.5 HYPERLIPIDEMIA, UNSPECIFIED HYPERLIPIDEMIA TYPE: ICD-10-CM

## 2020-06-30 DIAGNOSIS — Z01.818 PREOP EXAM FOR INTERNAL MEDICINE: ICD-10-CM

## 2020-06-30 DIAGNOSIS — E03.9 HYPOTHYROIDISM, UNSPECIFIED TYPE: ICD-10-CM

## 2020-06-30 DIAGNOSIS — F32.A DEPRESSION, UNSPECIFIED DEPRESSION TYPE: ICD-10-CM

## 2020-06-30 DIAGNOSIS — I10 ESSENTIAL HYPERTENSION: ICD-10-CM

## 2020-06-30 DIAGNOSIS — J44.9 CHRONIC OBSTRUCTIVE PULMONARY DISEASE, UNSPECIFIED COPD TYPE (HCC): ICD-10-CM

## 2020-06-30 DIAGNOSIS — Z00.00 MEDICARE ANNUAL WELLNESS VISIT, SUBSEQUENT: Primary | ICD-10-CM

## 2020-06-30 DIAGNOSIS — K21.00 GASTROESOPHAGEAL REFLUX DISEASE WITH ESOPHAGITIS: ICD-10-CM

## 2020-06-30 LAB
APTT PPP: 26.7 SECONDS (ref 24–37)
BASOPHILS # BLD AUTO: 0.09 10*3/MM3 (ref 0–0.2)
BASOPHILS NFR BLD AUTO: 0.6 % (ref 0–1.5)
DEPRECATED RDW RBC AUTO: 41.9 FL (ref 37–54)
EOSINOPHIL # BLD AUTO: 0.18 10*3/MM3 (ref 0–0.4)
EOSINOPHIL NFR BLD AUTO: 1.3 % (ref 0.3–6.2)
ERYTHROCYTE [DISTWIDTH] IN BLOOD BY AUTOMATED COUNT: 12.9 % (ref 12.3–15.4)
HCT VFR BLD AUTO: 34.7 % (ref 34–46.6)
HGB BLD-MCNC: 11.3 G/DL (ref 12–15.9)
IMM GRANULOCYTES # BLD AUTO: 0.07 10*3/MM3 (ref 0–0.05)
IMM GRANULOCYTES NFR BLD AUTO: 0.5 % (ref 0–0.5)
INR PPP: 1.04 (ref 0.85–1.16)
LYMPHOCYTES # BLD AUTO: 2.56 10*3/MM3 (ref 0.7–3.1)
LYMPHOCYTES NFR BLD AUTO: 18.2 % (ref 19.6–45.3)
MCH RBC QN AUTO: 28.8 PG (ref 26.6–33)
MCHC RBC AUTO-ENTMCNC: 32.6 G/DL (ref 31.5–35.7)
MCV RBC AUTO: 88.3 FL (ref 79–97)
MONOCYTES # BLD AUTO: 0.85 10*3/MM3 (ref 0.1–0.9)
MONOCYTES NFR BLD AUTO: 6.1 % (ref 5–12)
NEUTROPHILS NFR BLD AUTO: 10.28 10*3/MM3 (ref 1.7–7)
NEUTROPHILS NFR BLD AUTO: 73.3 % (ref 42.7–76)
NRBC BLD AUTO-RTO: 0 /100 WBC (ref 0–0.2)
PLATELET # BLD AUTO: 312 10*3/MM3 (ref 140–450)
PMV BLD AUTO: 10.1 FL (ref 6–12)
PROTHROMBIN TIME: 13.3 SECONDS (ref 11.5–14)
RBC # BLD AUTO: 3.93 10*6/MM3 (ref 3.77–5.28)
WBC # BLD AUTO: 14.03 10*3/MM3 (ref 3.4–10.8)

## 2020-06-30 PROCEDURE — 80053 COMPREHEN METABOLIC PANEL: CPT | Performed by: INTERNAL MEDICINE

## 2020-06-30 PROCEDURE — 84443 ASSAY THYROID STIM HORMONE: CPT | Performed by: INTERNAL MEDICINE

## 2020-06-30 PROCEDURE — G0439 PPPS, SUBSEQ VISIT: HCPCS | Performed by: INTERNAL MEDICINE

## 2020-06-30 PROCEDURE — 93000 ELECTROCARDIOGRAM COMPLETE: CPT | Performed by: INTERNAL MEDICINE

## 2020-06-30 PROCEDURE — 36415 COLL VENOUS BLD VENIPUNCTURE: CPT | Performed by: INTERNAL MEDICINE

## 2020-06-30 PROCEDURE — 71046 X-RAY EXAM CHEST 2 VIEWS: CPT

## 2020-06-30 PROCEDURE — 85730 THROMBOPLASTIN TIME PARTIAL: CPT | Performed by: INTERNAL MEDICINE

## 2020-06-30 PROCEDURE — 85025 COMPLETE CBC W/AUTO DIFF WBC: CPT | Performed by: INTERNAL MEDICINE

## 2020-06-30 PROCEDURE — 86803 HEPATITIS C AB TEST: CPT | Performed by: INTERNAL MEDICINE

## 2020-06-30 PROCEDURE — 85610 PROTHROMBIN TIME: CPT | Performed by: INTERNAL MEDICINE

## 2020-06-30 PROCEDURE — 84439 ASSAY OF FREE THYROXINE: CPT | Performed by: INTERNAL MEDICINE

## 2020-06-30 RX ORDER — VITAMIN E 268 MG
400 CAPSULE ORAL DAILY
COMMUNITY
End: 2021-01-14

## 2020-06-30 NOTE — PATIENT INSTRUCTIONS
Medicare Wellness  Personal Prevention Plan of Service     Date of Office Visit:  2020  Encounter Provider:  Dre Moura MD  Place of Service:  [facility]  Patient Name: Avelina Ceron  :  1940    As part of the Medicare Wellness portion of your visit today, we are providing you with this personalized preventive plan of services (PPPS). This plan is based upon recommendations of the [place] Preventive Services Task Force (USPSTF) and the Advisory Committee on Immunization Practices (ACIP).    This lists the preventive care services that should be considered, and provides dates of when you are due. Items listed as completed are up-to-date and do not require any further intervention.    Health Maintenance   Topic Date Due   • TDAP/TD VACCINES (1 - Tdap) 1951   • ZOSTER VACCINE (1 of 2) 1990   • HEPATITIS C SCREENING  08/10/2016   • MAMMOGRAM  12/15/2017   • MEDICARE ANNUAL WELLNESS  2018   • INFLUENZA VACCINE  2020   • LIPID PANEL  2021   • COLONOSCOPY  10/15/2024   • Pneumococcal Vaccine Once at 65 Years Old  Completed       Orders Placed This Encounter   Procedures   • Hepatitis C Antibody       No follow-ups on file.          Advance Care Planning and Advance Directives     You make decisions on a daily basis - decisions about where you want to live, your career, your home, your life. Perhaps one of the most important decisions you face is your choice for future medical care. Take time to talk with your family and your healthcare team and start planning today.  Advance Care Planning is a process that can help you:  · Understand possible future healthcare decisions in light of your own experiences  · Reflect on those decision in light of your goals and values  · Discuss your decisions with those closest to you and the healthcare professionals that care for you  · Make a plan by creating a document that  reflects your wishes    Surrogate Decision Maker  In the event of a medical emergency, which has left you unable to communicate or to make your own decisions, you would need someone to make decisions for you.  It is important to discuss your preferences for medical treatment with this person while you are in good health.     Qualities of a surrogate decision maker:  • Willing to take on this role and responsibility  • Knows what you want for future medical care  • Willing to follow your wishes even if they don't agree with them  • Able to make difficult medical decisions under stressful circumstances    Advance Directives  These are legal documents you can create that will guide your healthcare team and decision maker(s) when needed. These documents can be stored in the electronic medical record.    · Living Will - a legal document to guide your care if you have a terminal condition or a serious illness and are unable to communicate. States vary by statute in document names/types, but most forms may include one or more of the following:        -  Directions regarding life-prolonging treatments        -  Directions regarding artificially provided nutrition/hydration        -  Choosing a healthcare decision maker        -  Direction regarding organ/tissue donation    · Durable Power of  for Healthcare - this document names an -in-fact to make medical decisions for you, but it may also allow this person to make personal and financial decisions for you. Please seek the advice of an  if you need this type of document.    **Advance Directives are not required and no one may discriminate against you if you do not sign one.    Medical Orders  Many states allow specific forms/orders signed by your physician to record your wishes for medical treatment in your current state of health. This form, signed in personal communication with your physician, addresses resuscitation and other medical interventions  that you may or may not want.      For more information or to schedule a time with a [facility] Advance Care Planning Facilitator contact: [facility] or call [phone] and someone will contact you directly.    Fall Prevention in the Home, Adult  Falls can cause injuries. They can happen to people of all ages. There are many things you can do to make your home safe and to help prevent falls. Ask for help when making these changes, if needed.  What actions can I take to prevent falls?  General Instructions  · Use good lighting in all rooms. Replace any light bulbs that burn out.  · Turn on the lights when you go into a dark area. Use night-lights.  · Keep items that you use often in easy-to-reach places. Lower the shelves around your home if necessary.  · Set up your furniture so you have a clear path. Avoid moving your furniture around.  · Do not have throw rugs and other things on the floor that can make you trip.  · Avoid walking on wet floors.  · If any of your floors are uneven, fix them.  · Add color or contrast paint or tape to clearly favian and help you see:  ? Any grab bars or handrails.  ? First and last steps of stairways.  ? Where the edge of each step is.  · If you use a stepladder:  ? Make sure that it is fully opened. Do not climb a closed stepladder.  ? Make sure that both sides of the stepladder are locked into place.  ? Ask someone to hold the stepladder for you while you use it.  · If there are any pets around you, be aware of where they are.  What can I do in the bathroom?         · Keep the floor dry. Clean up any water that spills onto the floor as soon as it happens.  · Remove soap buildup in the tub or shower regularly.  · Use non-skid mats or decals on the floor of the tub or shower.  · Attach bath mats securely with double-sided, non-slip rug tape.  · If you need to sit down in the shower, use a plastic, non-slip stool.  · Install grab bars by the toilet and in the tub and  shower. Do not use towel bars as grab bars.  What can I do in the bedroom?  · Make sure that you have a light by your bed that is easy to reach.  · Do not use any sheets or blankets that are too big for your bed. They should not hang down onto the floor.  · Have a firm chair that has side arms. You can use this for support while you get dressed.  What can I do in the kitchen?  · Clean up any spills right away.  · If you need to reach something above you, use a strong step stool that has a grab bar.  · Keep electrical cords out of the way.  · Do not use floor polish or wax that makes floors slippery. If you must use wax, use non-skid floor wax.  What can I do with my stairs?  · Do not leave any items on the stairs.  · Make sure that you have a light switch at the top of the stairs and the bottom of the stairs. If you do not have them, ask someone to add them for you.  · Make sure that there are handrails on both sides of the stairs, and use them. Fix handrails that are broken or loose. Make sure that handrails are as long as the stairways.  · Install non-slip stair treads on all stairs in your home.  · Avoid having throw rugs at the top or bottom of the stairs. If you do have throw rugs, attach them to the floor with carpet tape.  · Choose a carpet that does not hide the edge of the steps on the stairway.  · Check any carpeting to make sure that it is firmly attached to the stairs. Fix any carpet that is loose or worn.  What can I do on the outside of my home?  · Use bright outdoor lighting.  · Regularly fix the edges of walkways and driveways and fix any cracks.  · Remove anything that might make you trip as you walk through a door, such as a raised step or threshold.  · Trim any bushes or trees on the path to your home.  · Regularly check to see if handrails are loose or broken. Make sure that both sides of any steps have handrails.  · Install guardrails along the edges of any raised decks and porches.  · Clear  walking paths of anything that might make someone trip, such as tools or rocks.  · Have any leaves, snow, or ice cleared regularly.  · Use sand or salt on walking paths during winter.  · Clean up any spills in your garage right away. This includes grease or oil spills.  What other actions can I take?  · Wear shoes that:  ? Have a low heel. Do not wear high heels.  ? Have rubber bottoms.  ? Are comfortable and fit you well.  ? Are closed at the toe. Do not wear open-toe sandals.  · Use tools that help you move around (mobility aids) if they are needed. These include:  ? Canes.  ? Walkers.  ? Scooters.  ? Crutches.  · Review your medicines with your doctor. Some medicines can make you feel dizzy. This can increase your chance of falling.  Ask your doctor what other things you can do to help prevent falls.  Where to find more information  · Centers for Disease Control and Prevention, [place]: https://cdc.gov  · National [place] on Aging: https://mm2cvdh.juliocesar.nih.gov  Contact a doctor if:  · You are afraid of falling at home.  · You feel weak, drowsy, or dizzy at home.  · You fall at home.  Summary  · There are many simple things that you can do to make your home safe and to help prevent falls.  · Ways to make your home safe include removing tripping hazards and installing grab bars in the bathroom.  · Ask for help when making these changes in your home.  This information is not intended to replace advice given to you by your health care provider. Make sure you discuss any questions you have with your health care provider.  Document Released: 10/14/2010 Document Revised: 04/09/2020 Document Reviewed: 08/02/2018  [employer] Patient Education © 2020 [employer]      Heart-Healthy Eating Plan  Heart-healthy meal planning includes:  · Eating less unhealthy fats.  · Eating more healthy fats.  · Making other changes in your diet.  Talk with your doctor or a diet specialist (dietitian) to create an eating plan that is right  for you.  What is my plan?  Your doctor may recommend an eating plan that includes:  · Total fat: ______% or less of total calories a day.  · Saturated fat: ______% or less of total calories a day.  · Cholesterol: less than _________mg a day.  What are tips for following this plan?  Cooking  Avoid frying your food. Try to bake, boil, grill, or broil it instead. You can also reduce fat by:  · Removing the skin from poultry.  · Removing all visible fats from meats.  · Steaming vegetables in water or broth.  Meal planning    · At meals, divide your plate into four equal parts:  ? Fill one-half of your plate with vegetables and green salads.  ? Fill one-fourth of your plate with whole grains.  ? Fill one-fourth of your plate with lean protein foods.  · Eat 4-5 servings of vegetables per day. A serving of vegetables is:  ? 1 cup of raw or cooked vegetables.  ? 2 cups of raw leafy greens.  · Eat 4-5 servings of fruit per day. A serving of fruit is:  ? 1 medium whole fruit.  ? ¼ cup of dried fruit.  ? ½ cup of fresh, frozen, or canned fruit.  ? ½ cup of 100% fruit juice.  · Eat more foods that have soluble fiber. These are apples, broccoli, carrots, beans, peas, and barley. Try to get 20-30 g of fiber per day.  · Eat 4-5 servings of nuts, legumes, and seeds per week:  ? 1 serving of dried beans or legumes equals ½ cup after being cooked.  ? 1 serving of nuts is ¼ cup.  ? 1 serving of seeds equals 1 tablespoon.  General information  · Eat more home-cooked food. Eat less restaurant, buffet, and fast food.  · Limit or avoid alcohol.  · Limit foods that are high in starch and sugar.  · Avoid fried foods.  · Lose weight if you are overweight.  · Keep track of how much salt (sodium) you eat. This is important if you have high blood pressure. Ask your doctor to tell you more about this.  · Try to add vegetarian meals each week.  Fats  · Choose healthy fats. These include olive oil and canola oil, flaxseeds, walnuts, almonds, and  seeds.  · Eat more omega-3 fats. These include salmon, mackerel, sardines, tuna, flaxseed oil, and ground flaxseeds. Try to eat fish at least 2 times each week.  · Check food labels. Avoid foods with trans fats or high amounts of saturated fat.  · Limit saturated fats.  ? These are often found in animal products, such as meats, butter, and cream.  ? These are also found in plant foods, such as palm oil, palm kernel oil, and coconut oil.  · Avoid foods with partially hydrogenated oils in them. These have trans fats. Examples are stick margarine, some tub margarines, cookies, crackers, and other baked goods.  What foods can I eat?  Fruits  All fresh, canned (in natural juice), or frozen fruits.  Vegetables  Fresh or frozen vegetables (raw, steamed, roasted, or grilled). Green salads.  Grains  Most grains. Choose whole wheat and whole grains most of the time. Rice and pasta, including brown rice and pastas made with whole wheat.  Meats and other proteins  Lean, well-trimmed beef, veal, pork, and lamb. Chicken and turkey without skin. All fish and shellfish. Wild duck, rabbit, pheasant, and venison. Egg whites or low-cholesterol egg substitutes. Dried beans, peas, lentils, and tofu. Seeds and most nuts.  Dairy  Low-fat or nonfat cheeses, including ricotta and mozzarella. Skim or 1% milk that is liquid, powdered, or evaporated. Buttermilk that is made with low-fat milk. Nonfat or low-fat yogurt.  Fats and oils  Non-hydrogenated (trans-free) margarines. Vegetable oils, including soybean, sesame, sunflower, olive, peanut, safflower, corn, canola, and cottonseed. Salad dressings or mayonnaise made with a vegetable oil.  Beverages  Mineral water. Coffee and tea. Diet carbonated beverages.  Sweets and desserts  Sherbet, gelatin, and fruit ice. Small amounts of dark chocolate.  Limit all sweets and desserts.  Seasonings and condiments  All seasonings and condiments.  The items listed above may not be a complete list of foods  and drinks you can eat. Contact a dietitian for more options.  What foods should I avoid?  Fruits  Canned fruit in heavy syrup. Fruit in cream or butter sauce. Fried fruit. Limit coconut.  Vegetables  Vegetables cooked in cheese, cream, or butter sauce. Fried vegetables.  Grains  Breads that are made with saturated or trans fats, oils, or whole milk. Croissants. Sweet rolls. Donuts. High-fat crackers, such as cheese crackers.  Meats and other proteins  Fatty meats, such as hot dogs, ribs, sausage, salcedo, rib-eye roast or steak. High-fat deli meats, such as salami and bologna. Caviar. Domestic duck and goose. Organ meats, such as liver.  Dairy  Cream, sour cream, cream cheese, and creamed cottage cheese. Whole-milk cheeses. Whole or 2% milk that is liquid, evaporated, or condensed. Whole buttermilk. Cream sauce or high-fat cheese sauce. Yogurt that is made from whole milk.  Fats and oils  Meat fat, or shortening. Cocoa butter, hydrogenated oils, palm oil, coconut oil, palm kernel oil. Solid fats and shortenings, including salcedo fat, salt pork, lard, and butter. Nondairy cream substitutes. Salad dressings with cheese or sour cream.  Beverages  Regular sodas and juice drinks with added sugar.  Sweets and desserts  Frosting. Pudding. Cookies. Cakes. Pies. Milk chocolate or white chocolate. Buttered syrups. Full-fat ice cream or ice cream drinks.  The items listed above may not be a complete list of foods and drinks to avoid. Contact a dietitian for more information.  Summary  · Heart-healthy meal planning includes eating less unhealthy fats, eating more healthy fats, and making other changes in your diet.  · Eat a balanced diet. This includes fruits and vegetables, low-fat or nonfat dairy, lean protein, nuts and legumes, whole grains, and heart-healthy oils and fats.  This information is not intended to replace advice given to you by your health care provider. Make sure you discuss any questions you have with your  health care provider.  Document Released: 06/18/2013 Document Revised: 02/21/2019 Document Reviewed: 01/25/2019  [employer] Patient Education © 2020 [employer]

## 2020-06-30 NOTE — PROGRESS NOTES
Chief Complaint   Patient presents with   • pre-op consult     Left total hip replacement 7/13/20 by Dr Krishna   • Medicare Wellness-subsequent       History of Present Illness      The patient present for a pre-operative examination prior to elective non-cardiovascular surgery.    The patient presents for a follow-up related to chronic obstructive pulmonary disease. Rescue inhaler usage is reported to be daily. The patient reports that she has no known triggers. Since the last office visit, the patient has not sought emergency care. She currently reports that she is experiencing cough and sputum production but experiences no wheezing. The patient is using an inhaled steroid. She does rinse her mouth after using her steroid inhaler. She thrush symptoms or a sore mouth. She denies hemoptysis. She is not a smoker.    The patient presents for a follow-up related to GERD. The patient is on pantoprazole for her gastroesophageal reflux. The medication is taken on a regular basis and gives complete relief of the symptoms. She reports no abdominal pain, belching, chest pain, diarrhea, dysphagia, early satiety, heartburn, hoarseness, nausea, odynophagia, rectal bleeding, vomiting or weight loss. The GERD has no known aggravating factors.    The patient presents for a follow-up related to hypertension. She states that she is taking her medication as prescribed. She is not having medication side effects.    The patient presents for a follow-up related to hyperlipidemia. She is following a low fat diet. She reports that she is not exercising. She is taking simvastatin. The patient is taking her medication as prescribed. She reports no medication side effects, including muscle cramps, abdominal pain, headaches or weakness. She denies orthopnea, paroxysmal nocturnal dyspnea or dyspnea on exertion.    The patient presents for a follow-up related to hypothyroidism. She reports a good energy level. She reports no sweats. She is  taking her medication as prescribed.    The patient presents for a follow-up related to depression. She denies currently having depression symptoms. She denies suicidal ideation. Her energy level is good. She denies agorophobia. She sleeps well. She is not tearful. She states that her current depression symptoms are stable. She is currently taking a medication. The current medication regimen consists of sertraline. The patient denies having medication side effects including nausea, headaches, anxiety, increased depression or fatigue.    The patient presents for a follow-up related to coronary artery disease. She takes an aspirin daily.    Review of Systems    CONSTITUTIONAL- Denies Fever, Chills, Sweats, Fatigue, Weakness or Malaise.    NECK- Denies Decreased Range of Motion, Stiffness, Thyroid Nodules, Enlarged Lymph Nodes or Goiter.    EYES- Denies Eye Pain, Eye Drainage, Eye Redness, Eye Discharge, Decreased Vision, Visual Disturbance, Diplopia, Visual Loss or Swollen Eyelid.    HENT- Denies Nasal Discharge, Sore Throat, Ear Pain, Ear Drainage, Headache, Sinus Pain, Nasal Congestion, Decreased Hearing or Tinnitus.    PULMONARY- Denies Hemoptysis.    GASTROINTESTINAL- Denies Constipation.    CARDIOVASCULAR- Denies Claudication, Edema, Syncope, Palpitations or Irregular Heart Beat.    GENITOURINARY- Denies Dysuria, Hematuria, Urinary Frequency, Urinary Leakage, Pelvic Pain, Vaginal Prolapse, Vaginal Discharge, Dyspareunia or Abnormal Menses.    ENDOCRINE- Denies Cold Intolerance, Depression, Hair Loss, Heat Intolerance, Memory Loss, Polydypsia, Polyphagia, Polyuria, Sleep Disturbance or Weight Gain.    NEUROLOGIC- Denies Seizures, Visual Changes, Confusion or Excessive Daytime Sleepiness.    MUSCULOSKELETAL- Denies Joint Pain, Joint Stiffness, Decreased Range of Motion, Joint Swelling or Erythema of Joints.    INTEGUMENTARY- Denies Rash, Lumps, Sores, Itching, Dryness, Color Change, Changes in Hair, Brittle Nails,  Discolored Nails, Thickened Nails, Growths or Alopecia.    Medications      Current Outpatient Medications:   •  albuterol (PROAIR HFA) 108 (90 BASE) MCG/ACT inhaler, Inhale 2 puffs every 4 (four) hours as needed., Disp: , Rfl:   •  aspirin (ASPIRIN LOW DOSE) 81 MG chewable tablet, Chew 1 tablet daily., Disp: , Rfl:   •  atenolol (TENORMIN) 50 MG tablet, TAKE 1/2 TABLET BY MOUTH TWICE A DAY, Disp: 90 tablet, Rfl: 0  •  azithromycin (ZITHROMAX) 250 MG tablet, Take 1 tablet by mouth Daily., Disp: 90 tablet, Rfl: 1  •  Calcium Carbonate-Vitamin D (CALCIUM-VITAMIN D) 500-200 MG-UNIT per tablet, Take 1 tablet by mouth daily., Disp: , Rfl:   •  clopidogrel (PLAVIX) 75 MG tablet, TAKE 1 TABLET BY MOUTH ONCE DAILY, Disp: 90 tablet, Rfl: 0  •  DALIRESP 500 MCG tablet tablet, TAKE 1 TABLET BY MOUTH ONCE DAILY, Disp: 90 tablet, Rfl: 2  •  FIBER SELECT GUMMIES PO, Take 2 tablets by mouth Daily., Disp: , Rfl:   •  FLOVENT  MCG/ACT inhaler, inhale 2 puffs by mouth twice a day, Disp: 12 g, Rfl: 5  •  ipratropium-albuterol (Combivent Respimat)  MCG/ACT inhaler, Inhale 1 puff 4 (Four) Times a Day., Disp: 12 g, Rfl: 1  •  isosorbide mononitrate (IMDUR) 30 MG 24 hr tablet, TAKE 1 TABLET BY MOUTH ONCE DAILY, Disp: 90 tablet, Rfl: 0  •  levothyroxine (SYNTHROID, LEVOTHROID) 150 MCG tablet, TAKE 1 TABLET BY MOUTH ONCE DAILY, Disp: 90 tablet, Rfl: 0  •  Multiple Vitamins-Minerals (PRESERVISION AREDS) capsule, Take 1 tablet by mouth 2 (two) times a day., Disp: , Rfl:   •  pantoprazole (PROTONIX) 40 MG EC tablet, Take 1 tablet by mouth Daily., Disp: 90 tablet, Rfl: 1  •  sertraline (ZOLOFT) 100 MG tablet, Take 1 tablet by mouth Daily., Disp: 90 tablet, Rfl: 1  •  simvastatin (ZOCOR) 40 MG tablet, TAKE 1 TABLET BY MOUTH AT BEDTIME, Disp: 90 tablet, Rfl: 1  •  solifenacin (VESICARE) 5 MG tablet, Take 1 tablet by mouth Daily., Disp: 90 tablet, Rfl: 1  •  Spacer/Aero Chamber Mouthpiece misc, 1 Units Take As Directed., Disp: 1 each,  Rfl: 0  •  vitamin E 400 UNIT capsule, Take 400 Units by mouth Daily., Disp: , Rfl:   •  cyclobenzaprine (FLEXERIL) 10 MG tablet, take 1 tablet by mouth twice a day if needed for muscle spasm, Disp: 60 tablet, Rfl: 5  •  nitroglycerin (NITROSTAT) 0.4 MG SL tablet, Place 1 tablet under the tongue See Admin Instructions. Place 1 tablet under the tongue if needed every 5 minutes for chest pain for 3 doses if no relief af, Disp: 25 tablet, Rfl: 0     Allergies    Allergies   Allergen Reactions   • Codeine Mental Status Change   • Penicillins Hives and Swelling   • Dopamine Other (See Comments) and Palpitations     Other reaction(s): Hypertension       Problem List    Patient Active Problem List   Diagnosis   • Coronary arteriosclerosis in native artery   • Chronic obstructive pulmonary disease (CMS/HCC)   • Depression   • Gastroesophageal reflux disease with esophagitis   • Hyperlipidemia   • Hypertension   • Hypothyroidism   • Osteoarthritis   • Peripheral arterial occlusive disease (CMS/HCC)   • Solitary pulmonary nodule   • Vitamin D deficiency   • Lower back pain   • Carotid bruit   • Hypoxemia     Past Medical History:   Diagnosis Date   • COPD (chronic obstructive pulmonary disease) (CMS/HCC)    • Degenerative lumbar disc    • History of colonoscopy    • Osteoarthritis      Past Surgical History:   Procedure Laterality Date   • GALLBLADDER SURGERY     • HYSTERECTOMY     • PARTIAL HIP ARTHROPLASTY Right 2005     Social History     Socioeconomic History   • Marital status:      Spouse name: Not on file   • Number of children: Not on file   • Years of education: Not on file   • Highest education level: Not on file   Tobacco Use   • Smoking status: Former Smoker     Last attempt to quit: 2006     Years since quittin.5   • Smokeless tobacco: Never Used   Substance and Sexual Activity   • Alcohol use: Not Currently     Comment: 2-5 a year   • Drug use: No        Medications, Allergies, Problems  List and Past History were reviewed and updated.    Physical Examination    /70 (BP Location: Left arm, Patient Position: Sitting, Cuff Size: Adult)   Pulse 68   Temp 97.5 °F (36.4 °C) (Temporal)   Resp 20   Wt 83.5 kg (184 lb)   LMP  (LMP Unknown)   Breastfeeding No   BMI 32.08 kg/m²       HEENT: Head- Normocephalic Atraumatic. Facies- Within normal limits. Pinnas- Normal texture and shape bilaterally. Canals- Normal bilaterally. TMs- Normal bilaterally. Nares- Patent bilaterally. Nasal Septum- is normal. There is no tenderness to palpation over the frontal or maxillary sinuses. Lids- Normal bilaterally. Conjunctiva- Clear bilaterally. Sclera- Anicteric bilaterally. Oropharynx- Moist with no lesions. Tonsils- No enlargement, erythema or exudate.    Neck: Thyroid- non enlarged, symmetric and has no nodules. No bruits are detected. ROM- Normal Range of Motion with no rigidity.    Lungs: Auscultation- Clear to auscultation bilaterally. There are no retractions, clubbing or cyanosis. The Expiratory to Inspiratory ratio is equal.    Lymph Nodes: Cervical- no enlarged lymph nodes noted. Clavicular- Deferred. Axillary- Deferred. Inguinal- Deferred.    Cardiovascular: There are no carotid bruits. Heart- Normal Rate with Regular rhythm and no murmurs. There are no gallops. There are no rubs. In the lower extremities there is no edema. The upper extremities do not have edema.    Abdomen: Soft, benign, non-tender with no masses, hernias, organomegaly or scars.      ECG 12 Lead  Date/Time: 6/30/2020 1:21 PM  Performed by: Dre Moura MD  Authorized by: Dre Moura MD   Comparison: not compared with previous ECG   Rhythm: sinus rhythm  Rate: normal  Conduction: conduction normal  ST Segments: ST segments normal  T Waves: T waves normal  QRS axis: normal  Other: no other findings    Clinical impression: normal ECG            Radiology    My personal interpretation of the x-rays is as stated  below:    Chest X-ray (PA and Lateral) 06/30/2020 : The CXR is normal with no infiltrates, atelectasis, cardiomegaly or effusions.    Impression and Assessment    Pre-operative evaluation prior to a left total hip replacement.    Chronic Obstructive Pulmonary Disease.    Gastroesophageal Reflux Disease.    Essential Hypertension.    Hyperlipidemia.    Hypothyroidism.    Major Depressive Disorder Single Episode Unspecified.    Coronary Artery Disease.    Plan    Pre-operative Evaluation Plan: The patient is an acceptable risk for the proposed procedure under general or local anesthesia. She will see her cardiologist prior to surgery. Hold aspirin and plavix for 10 days prior to surgery.    Gastroesophageal Reflux Disease Plan: The current plan was continued.    Essential Hypertension Plan: The current plan was continued.    Hyperlipidemia Plan: The patient was instructed to exercise daily, eat a low fat diet and continue her medications.    Coronary Artery Disease Plan: The patient will follow-up with her cardiologist. The patient was instructed to continue the current medications.    Hypothyroidism Plan: The current plan was continued.    Chronic Obstructive Pulmonary Disease Plan: Continue the current medication regimen.  Aggressive pulmonary toilet around time of surgery.    Major Depressive Disorder Single Episode Unspecified Plan: The current plan was continued.    Avelina was seen today for pre-op consult and medicare wellness-subsequent.    Diagnoses and all orders for this visit:    Medicare annual wellness visit, subsequent  -     Hepatitis C Antibody    Chronic obstructive pulmonary disease, unspecified COPD type (CMS/[facility])  -     XR Chest PA & Lateral; Future  -     aPTT  -     Protime-INR    Gastroesophageal reflux disease with esophagitis    Hyperlipidemia, unspecified hyperlipidemia type  -     Comprehensive Metabolic Panel    Essential hypertension  -     ECG 12 Lead  -     XR Chest PA & Lateral;  Future  -     Comprehensive Metabolic Panel  -     CBC & Differential  -     aPTT  -     Protime-INR  -     CBC Auto Differential    Hypothyroidism, unspecified type  -     TSH  -     T4, Free    Depression, unspecified depression type    Preop exam for internal medicine  -     aPTT  -     Protime-INR        Return to Office    The patient was instructed to return for follow-up in 3 months.    The patient was instructed to return sooner if the condition changes, worsens, or does not resolve.

## 2020-06-30 NOTE — PROGRESS NOTES
The ABCs of the Annual Wellness Visit  Subsequent Medicare Wellness Visit    Chief Complaint   Patient presents with   • pre-op consult     Left total hip replacement 20 by Dr Krishna   • Medicare Wellness-subsequent       Subjective   History of Present Illness:  Avelina Ceron is a 79 y.o. female who presents for a Subsequent Medicare Wellness Visit.    HEALTH RISK ASSESSMENT    Recent Hospitalizations:  No hospitalization(s) within the last year.    Current Medical Providers:  Patient Care Team:  Dre Moura MD as PCP - General  Dre Moura MD as PCP - Family Medicine  Ascencion Abdullahi MD as PCP - Claims Attributed  Rainer Dowd MD as Consulting Physician (Cardiology)  Ascencion Abdullahi MD as Consulting Physician (Ophthalmology)    Smoking Status:  Social History     Tobacco Use   Smoking Status Former Smoker   • Last attempt to quit:    • Years since quittin.5   Smokeless Tobacco Never Used       Alcohol Consumption:  Social History     Substance and Sexual Activity   Alcohol Use Not Currently    Comment: 2-5 a year       Depression Screen:   PHQ-2/PHQ-9 Depression Screening 2020   Little interest or pleasure in doing things 3   Feeling down, depressed, or hopeless 1   Trouble falling or staying asleep, or sleeping too much 3   Feeling tired or having little energy 3   Poor appetite or overeating 0   Feeling bad about yourself - or that you are a failure or have let yourself or your family down 3   Trouble concentrating on things, such as reading the newspaper or watching television 0   Moving or speaking so slowly that other people could have noticed. Or the opposite - being so fidgety or restless that you have been moving around a lot more than usual 0   Thoughts that you would be better off dead, or of hurting yourself in some way 0   Total Score 13   If you checked off any problems, how difficult have these problems made it for you to do your work, take  care of things at home, or get along with other people? Very difficult       Fall Risk Screen:  TONY Fall Risk Assessment was completed, and patient is at HIGH risk for falls. Assessment completed on:6/30/2020    Health Habits and Functional and Cognitive Screening:  Functional & Cognitive Status 6/30/2020   Do you have difficulty preparing food and eating? Yes   Do you have difficulty bathing yourself, getting dressed or grooming yourself? No   Do you have difficulty using the toilet? No   Do you have difficulty moving around from place to place? Yes   Do you have trouble with steps or getting out of a bed or a chair? Yes   Current Diet Well Balanced Diet   Dental Exam Up to date   Eye Exam Up to date   Exercise (times per week) 0 times per week   Current Exercise Activities Include None   Do you need help using the phone?  No   Are you deaf or do you have serious difficulty hearing?  Yes   Do you need help with transportation? Yes   Do you need help shopping? Yes   Do you need help preparing meals?  Yes   Do you need help with housework?  Yes   Do you need help with laundry? Yes   Do you need help taking your medications? No   Do you need help managing money? No   Do you ever drive or ride in a car without wearing a seat belt? No   Have you felt unusual stress, anger or loneliness in the last month? Yes   Who do you live with? Child   If you need help, do you have trouble finding someone available to you? No   Have you been bothered in the last four weeks by sexual problems? No   Do you have difficulty concentrating, remembering or making decisions? Yes         Does the patient have evidence of cognitive impairment? Yes    Asprin use counseling:Taking ASA appropriately as indicated    Age-appropriate Screening Schedule:  Refer to the list below for future screening recommendations based on patient's age, sex and/or medical conditions. Orders for these recommended tests are listed in the plan section. The patient  has been provided with a written plan.    Health Maintenance   Topic Date Due   • TDAP/TD VACCINES (1 - Tdap) 12/16/1951   • ZOSTER VACCINE (1 of 2) 12/16/1990   • MAMMOGRAM  12/15/2017   • INFLUENZA VACCINE  08/01/2020   • LIPID PANEL  01/14/2021   • COLONOSCOPY  10/15/2024          The following portions of the patient's history were reviewed and updated as appropriate: allergies, current medications, past family history, past medical history, past social history, past surgical history and problem list.    Outpatient Medications Prior to Visit   Medication Sig Dispense Refill   • albuterol (PROAIR HFA) 108 (90 BASE) MCG/ACT inhaler Inhale 2 puffs every 4 (four) hours as needed.     • aspirin (ASPIRIN LOW DOSE) 81 MG chewable tablet Chew 1 tablet daily.     • atenolol (TENORMIN) 50 MG tablet TAKE 1/2 TABLET BY MOUTH TWICE A DAY 90 tablet 0   • azithromycin (ZITHROMAX) 250 MG tablet Take 1 tablet by mouth Daily. 90 tablet 1   • Calcium Carbonate-Vitamin D (CALCIUM-VITAMIN D) 500-200 MG-UNIT per tablet Take 1 tablet by mouth daily.     • clopidogrel (PLAVIX) 75 MG tablet TAKE 1 TABLET BY MOUTH ONCE DAILY 90 tablet 0   • DALIRESP 500 MCG tablet tablet TAKE 1 TABLET BY MOUTH ONCE DAILY 90 tablet 2   • FIBER SELECT GUMMIES PO Take 2 tablets by mouth Daily.     • FLOVENT  MCG/ACT inhaler inhale 2 puffs by mouth twice a day 12 g 5   • ipratropium-albuterol (Combivent Respimat)  MCG/ACT inhaler Inhale 1 puff 4 (Four) Times a Day. 12 g 1   • isosorbide mononitrate (IMDUR) 30 MG 24 hr tablet TAKE 1 TABLET BY MOUTH ONCE DAILY 90 tablet 0   • levothyroxine (SYNTHROID, LEVOTHROID) 150 MCG tablet TAKE 1 TABLET BY MOUTH ONCE DAILY 90 tablet 0   • Multiple Vitamins-Minerals (PRESERVISION AREDS) capsule Take 1 tablet by mouth 2 (two) times a day.     • pantoprazole (PROTONIX) 40 MG EC tablet Take 1 tablet by mouth Daily. 90 tablet 1   • sertraline (ZOLOFT) 100 MG tablet Take 1 tablet by mouth Daily. 90 tablet 1   •  simvastatin (ZOCOR) 40 MG tablet TAKE 1 TABLET BY MOUTH AT BEDTIME 90 tablet 1   • solifenacin (VESICARE) 5 MG tablet Take 1 tablet by mouth Daily. 90 tablet 1   • Spacer/Aero Chamber Mouthpiece misc 1 Units Take As Directed. 1 each 0   • vitamin E 400 UNIT capsule Take 400 Units by mouth Daily.     • cyclobenzaprine (FLEXERIL) 10 MG tablet take 1 tablet by mouth twice a day if needed for muscle spasm 60 tablet 5   • nitroglycerin (NITROSTAT) 0.4 MG SL tablet Place 1 tablet under the tongue See Admin Instructions. Place 1 tablet under the tongue if needed every 5 minutes for chest pain for 3 doses if no relief af 25 tablet 0     No facility-administered medications prior to visit.        Patient Active Problem List   Diagnosis   • Coronary arteriosclerosis in native artery   • Chronic obstructive pulmonary disease (CMS/HCC)   • Depression   • Gastroesophageal reflux disease with esophagitis   • Hyperlipidemia   • Hypertension   • Hypothyroidism   • Osteoarthritis   • Peripheral arterial occlusive disease (CMS/HCC)   • Solitary pulmonary nodule   • Vitamin D deficiency   • Lower back pain   • Carotid bruit   • Hypoxemia       Advanced Care Planning:  ACP discussion was held with the patient during this visit. Patient has an advance directive (not in EMR), copy requested.    Review of Systems    Compared to one year ago, the patient feels her physical health is the same.  Compared to one year ago, the patient feels her mental health is the same.    Reviewed chart for potential of high risk medication in the elderly: yes  Reviewed chart for potential of harmful drug interactions in the elderly:yes    Objective         Vitals:    06/30/20 1046   BP: 162/70   BP Location: Left arm   Patient Position: Sitting   Cuff Size: Adult   Pulse: 68   Resp: 20   Temp: 97.5 °F (36.4 °C)   TempSrc: Temporal   Weight: 83.5 kg (184 lb)   PainSc:   6   PainLoc: Hip       Body mass index is 32.08 kg/m².  Discussed the patient's BMI with  her. The BMI is above average; BMI management plan is completed.    Physical Exam Finger Rub Hearing{Test (right ear):passed  Finger Rub Hearing{Test (left ear):passed              Assessment/Plan   Medicare Risks and Personalized Health Plan  CMS Preventative Services Quick Reference  Advance Directive Discussion  Fall Risk  Immunizations Discussed/Encouraged (specific immunizations; adacel Tdap and Shingrix )  Obesity/Overweight     The above risks/problems have been discussed with the patient.  Pertinent information has been shared with the patient in the After Visit Summary.  Follow up plans and orders are seen below in the Assessment/Plan Section.    Diagnoses and all orders for this visit:    1. Medicare annual wellness visit, subsequent (Primary)  -     Hepatitis C Antibody      Follow Up:  No follow-ups on file.     An After Visit Summary and PPPS were given to the patient.

## 2020-07-01 LAB
ALBUMIN SERPL-MCNC: 4.3 G/DL (ref 3.5–5.2)
ALBUMIN/GLOB SERPL: 1.4 G/DL
ALP SERPL-CCNC: 66 U/L (ref 39–117)
ALT SERPL W P-5'-P-CCNC: 14 U/L (ref 1–33)
ANION GAP SERPL CALCULATED.3IONS-SCNC: 11.5 MMOL/L (ref 5–15)
AST SERPL-CCNC: 15 U/L (ref 1–32)
BILIRUB SERPL-MCNC: 0.3 MG/DL (ref 0.2–1.2)
BUN SERPL-MCNC: 8 MG/DL (ref 8–23)
BUN/CREAT SERPL: 18.6 (ref 7–25)
CALCIUM SPEC-SCNC: 9.8 MG/DL (ref 8.6–10.5)
CHLORIDE SERPL-SCNC: 94 MMOL/L (ref 98–107)
CO2 SERPL-SCNC: 33.5 MMOL/L (ref 22–29)
CREAT SERPL-MCNC: 0.43 MG/DL (ref 0.57–1)
GFR SERPL CREATININE-BSD FRML MDRD: 142 ML/MIN/1.73
GLOBULIN UR ELPH-MCNC: 3.1 GM/DL
GLUCOSE SERPL-MCNC: 105 MG/DL (ref 65–99)
HCV AB SER DONR QL: NORMAL
POTASSIUM SERPL-SCNC: 4.3 MMOL/L (ref 3.5–5.2)
PROT SERPL-MCNC: 7.4 G/DL (ref 6–8.5)
SODIUM SERPL-SCNC: 139 MMOL/L (ref 136–145)
T4 FREE SERPL-MCNC: 1.32 NG/DL (ref 0.93–1.7)
TSH SERPL DL<=0.05 MIU/L-ACNC: 1.48 UIU/ML (ref 0.27–4.2)

## 2020-07-07 ENCOUNTER — TELEPHONE (OUTPATIENT)
Dept: INTERNAL MEDICINE | Facility: CLINIC | Age: 80
End: 2020-07-07

## 2020-07-07 NOTE — TELEPHONE ENCOUNTER
[facility] returned our call about surgery clearance. She stated that they need the note addended for this patient. She can be reached at [phone]. Fax [phone].

## 2020-07-08 ENCOUNTER — TELEPHONE (OUTPATIENT)
Dept: INTERNAL MEDICINE | Facility: CLINIC | Age: 80
End: 2020-07-08

## 2020-07-27 DIAGNOSIS — J44.9 CHRONIC OBSTRUCTIVE PULMONARY DISEASE, UNSPECIFIED COPD TYPE (HCC): Primary | ICD-10-CM

## 2020-07-27 RX ORDER — FLUTICASONE PROPIONATE 110 UG/1
2 AEROSOL, METERED RESPIRATORY (INHALATION) 2 TIMES DAILY
Qty: 12 G | Refills: 5 | Status: SHIPPED | OUTPATIENT
Start: 2020-07-27 | End: 2020-08-24

## 2020-07-27 NOTE — TELEPHONE ENCOUNTER
----- Message from Una Mejia RN sent at 7/27/2020 11:03 AM EDT -----  Regarding: FW: Referral Request  Contact: [phone]      ----- Message -----  From: Avelina Ceron  Sent: 7/27/2020  10:52 AM EDT  To: Niko Montague [facility]  Subject: Referral Request                                 In light of recent circumstances, we need a referral for a Pulmonary specialist for continued care.  If you could please refer us to someone who is taking patients - without extreme wait times for appointments - we would very much appreciate. Someone in the [facility] or [facility] is fine if [facility] is full.  Thanks,  Avelina Ceron / nl

## 2020-08-04 ENCOUNTER — HOSPITAL ENCOUNTER (OUTPATIENT)
Dept: CT IMAGING | Facility: HOSPITAL | Age: 80
Discharge: HOME | End: 2020-08-04

## 2020-08-04 ENCOUNTER — OFFICE VISIT (OUTPATIENT)
Dept: INTERNAL MEDICINE | Facility: CLINIC | Age: 80
End: 2020-08-04

## 2020-08-04 ENCOUNTER — HOSPITAL ENCOUNTER (OUTPATIENT)
Dept: GENERAL RADIOLOGY | Facility: HOSPITAL | Age: 80
Discharge: HOME | End: 2020-08-04
Admitting: INTERNAL MEDICINE

## 2020-08-04 VITALS
BODY MASS INDEX: 31.55 KG/M2 | TEMPERATURE: 97.3 F | HEART RATE: 81 BPM | OXYGEN SATURATION: 93 % | HEIGHT: 64 IN | SYSTOLIC BLOOD PRESSURE: 132 MMHG | DIASTOLIC BLOOD PRESSURE: 74 MMHG | WEIGHT: 184.8 LBS | RESPIRATION RATE: 18 BRPM

## 2020-08-04 DIAGNOSIS — R53.83 FATIGUE, UNSPECIFIED TYPE: ICD-10-CM

## 2020-08-04 DIAGNOSIS — M25.512 ACUTE PAIN OF LEFT SHOULDER: ICD-10-CM

## 2020-08-04 DIAGNOSIS — R06.00 DYSPNEA, UNSPECIFIED TYPE: ICD-10-CM

## 2020-08-04 DIAGNOSIS — M25.552 LEFT HIP PAIN: Primary | ICD-10-CM

## 2020-08-04 DIAGNOSIS — R10.84 GENERALIZED ABDOMINAL PAIN: ICD-10-CM

## 2020-08-04 DIAGNOSIS — D64.9 ANEMIA, UNSPECIFIED TYPE: ICD-10-CM

## 2020-08-04 DIAGNOSIS — R60.9 EDEMA, UNSPECIFIED TYPE: ICD-10-CM

## 2020-08-04 DIAGNOSIS — M25.552 LEFT HIP PAIN: ICD-10-CM

## 2020-08-04 LAB
ALBUMIN SERPL-MCNC: 3.6 G/DL (ref 3.5–5.2)
ALBUMIN/GLOB SERPL: 1.2 G/DL
ALP SERPL-CCNC: 182 U/L (ref 39–117)
ALT SERPL W P-5'-P-CCNC: 8 U/L (ref 1–33)
ANION GAP SERPL CALCULATED.3IONS-SCNC: 10 MMOL/L (ref 5–15)
AST SERPL-CCNC: 15 U/L (ref 1–32)
BASOPHILS # BLD AUTO: 0.12 10*3/MM3 (ref 0–0.2)
BASOPHILS NFR BLD AUTO: 0.8 % (ref 0–1.5)
BILIRUB SERPL-MCNC: 0.3 MG/DL (ref 0–1.2)
BUN SERPL-MCNC: 8 MG/DL (ref 8–23)
BUN/CREAT SERPL: 15.7 (ref 7–25)
CALCIUM SPEC-SCNC: 9 MG/DL (ref 8.6–10.5)
CHLORIDE SERPL-SCNC: 101 MMOL/L (ref 98–107)
CO2 SERPL-SCNC: 29 MMOL/L (ref 22–29)
CREAT SERPL-MCNC: 0.51 MG/DL (ref 0.57–1)
D DIMER PPP FEU-MCNC: 3.87 MCGFEU/ML (ref 0–0.56)
DEPRECATED RDW RBC AUTO: 50.9 FL (ref 37–54)
EOSINOPHIL # BLD AUTO: 0.19 10*3/MM3 (ref 0–0.4)
EOSINOPHIL NFR BLD AUTO: 1.2 % (ref 0.3–6.2)
ERYTHROCYTE [DISTWIDTH] IN BLOOD BY AUTOMATED COUNT: 14.8 % (ref 12.3–15.4)
EXPIRATION DATE: ABNORMAL
GFR SERPL CREATININE-BSD FRML MDRD: 116 ML/MIN/1.73
GLOBULIN UR ELPH-MCNC: 3.1 GM/DL
GLUCOSE SERPL-MCNC: 111 MG/DL (ref 65–99)
HCT VFR BLD AUTO: 30.3 % (ref 34–46.6)
HGB BLD-MCNC: 8.7 G/DL (ref 12–15.9)
HGB BLDA-MCNC: 8.8 G/DL (ref 12–17)
IMM GRANULOCYTES # BLD AUTO: 0.07 10*3/MM3 (ref 0–0.05)
IMM GRANULOCYTES NFR BLD AUTO: 0.5 % (ref 0–0.5)
LYMPHOCYTES # BLD AUTO: 2.53 10*3/MM3 (ref 0.7–3.1)
LYMPHOCYTES NFR BLD AUTO: 16.3 % (ref 19.6–45.3)
Lab: ABNORMAL
MCH RBC QN AUTO: 27 PG (ref 26.6–33)
MCHC RBC AUTO-ENTMCNC: 28.7 G/DL (ref 31.5–35.7)
MCV RBC AUTO: 94.1 FL (ref 79–97)
MONOCYTES # BLD AUTO: 0.86 10*3/MM3 (ref 0.1–0.9)
MONOCYTES NFR BLD AUTO: 5.6 % (ref 5–12)
NEUTROPHILS NFR BLD AUTO: 11.72 10*3/MM3 (ref 1.7–7)
NEUTROPHILS NFR BLD AUTO: 75.6 % (ref 42.7–76)
NRBC BLD AUTO-RTO: 0 /100 WBC (ref 0–0.2)
PLATELET # BLD AUTO: 405 10*3/MM3 (ref 140–450)
PMV BLD AUTO: 8.6 FL (ref 6–12)
POTASSIUM SERPL-SCNC: 4.5 MMOL/L (ref 3.5–5.2)
PROT SERPL-MCNC: 6.7 G/DL (ref 6–8.5)
RBC # BLD AUTO: 3.22 10*6/MM3 (ref 3.77–5.28)
SODIUM SERPL-SCNC: 140 MMOL/L (ref 136–145)
WBC # BLD AUTO: 15.49 10*3/MM3 (ref 3.4–10.8)

## 2020-08-04 PROCEDURE — 0 IOPAMIDOL PER 1 ML: Performed by: INTERNAL MEDICINE

## 2020-08-04 PROCEDURE — 73030 X-RAY EXAM OF SHOULDER: CPT

## 2020-08-04 PROCEDURE — 71275 CT ANGIOGRAPHY CHEST: CPT

## 2020-08-04 PROCEDURE — 99214 OFFICE O/P EST MOD 30 MIN: CPT | Performed by: INTERNAL MEDICINE

## 2020-08-04 PROCEDURE — 73502 X-RAY EXAM HIP UNI 2-3 VIEWS: CPT

## 2020-08-04 PROCEDURE — 82565 ASSAY OF CREATININE: CPT

## 2020-08-04 PROCEDURE — 74176 CT ABD & PELVIS W/O CONTRAST: CPT

## 2020-08-04 PROCEDURE — 80053 COMPREHEN METABOLIC PANEL: CPT | Performed by: INTERNAL MEDICINE

## 2020-08-04 PROCEDURE — 71046 X-RAY EXAM CHEST 2 VIEWS: CPT

## 2020-08-04 PROCEDURE — 85018 HEMOGLOBIN: CPT | Performed by: INTERNAL MEDICINE

## 2020-08-04 PROCEDURE — 85379 FIBRIN DEGRADATION QUANT: CPT | Performed by: INTERNAL MEDICINE

## 2020-08-04 PROCEDURE — 85025 COMPLETE CBC W/AUTO DIFF WBC: CPT | Performed by: INTERNAL MEDICINE

## 2020-08-04 RX ADMIN — IOPAMIDOL 75 ML: 755 INJECTION, SOLUTION INTRAVENOUS at 13:40

## 2020-08-04 NOTE — PROGRESS NOTES
Chief Complaint   Patient presents with   • Fall     Out of the bed, landed on surgical side, S/P Left total hip replacement 7/13/20 by Dr Krishna       History of Present Illness    Had a hip replacement on 7/13 and then went into respiratory failure and was in ICU for several days.  After discharge, went home and had a fall onto left hip.  Now has severe pain up and down left side along with edema and dyspnea.  No nausea, vomiting, diarrhea.  Denies bruising on left side.  No wheezing.  Describes the pain as constant and severe.   She also has left shoulder pain.  She denies chest pain other than bruising.      The patient presents with pain in the left hip of one week duration. There is a history of trauma. The trauma is described as a fall onto lateral hip. The trauma occurred one week ago. She fell onto the hip that was recently replaced.       The patient has no joint swelling. There is stiffness. The joint stiffness is present most of the time.    The joint pain is aggravated by motion, walking, internal rotation and external rotation. The pain has no alleviating factors noted.    The patient reports pain in the left shoulder of one week duration. There is a history of trauma. The trauma is described as a fall onto the shoulder. The trauma occurred one week ago.       The patient has no joint swelling. There is stiffness. The joint stiffness is present most of the time. The patient denies a history of overuse or repetitive motion with the affected joints.    The patient presents for an acute visit for anemia. There is a chronic history of anemia. There are no reports of blood loss. The patient has fatigue and abdominal pain but does not have a dry cough, a wet cough, wheezing, fever, a headache, nausea, vomiting, diarrhea, myalgias, abdominal pain, sweats, weight loss, decreased appetite, chills or lightheadedness. The patient's energy level is decreased. There are no reports of chest pain, shortness of  breath, palpitations or syncope.    The patient has complaints of being fatigued. She describes her symptoms as decreased energy and the symptoms have been present for one week.       She denies tearfulness, loneliness, hopelessness, anxiety or suicidal thoughts.       The patient denies facial pain, eye drainage, ear pain, ear drainage, sore throat, nasal discharge, rectal bleeding or night sweats. There are no symptoms of heat intolerance, cold intolerance, excessive hair loss or constipation. The patient denies polyuria, polydypsia or polyphagia. There are no known exposures to mononucleosis. The patient does not exercise regularly.    The patient falls asleep easily and typically does not awaken at night. The patient does not snore and denies gasping or stopping breathing at night. She feels well rested during the day and she denies falling asleep while watching television, while driving or during conversation.       Review of Systems    CONSTITUTIONAL- Denies Weakness.    HENT- Denies Sinus Pain, Nasal Congestion, Decreased Hearing or Tinnitus.    GASTROINTESTINAL- Denies Heartburn.    PULMONARY- Denies Sputum Production, Cough, Hemoptysis or Pleuritic Chest Pain.    CARDIOVASCULAR- Reports: Edema. Denies: Claudication or Irregular Heart Beat.    Medications      Current Outpatient Medications:   •  albuterol (PROAIR HFA) 108 (90 BASE) MCG/ACT inhaler, Inhale 2 puffs every 4 (four) hours as needed., Disp: , Rfl:   •  aspirin (ASPIRIN LOW DOSE) 81 MG chewable tablet, Chew 1 tablet daily., Disp: , Rfl:   •  azithromycin (ZITHROMAX) 250 MG tablet, Take 1 tablet by mouth Daily., Disp: 90 tablet, Rfl: 1  •  Calcium Carbonate-Vitamin D (CALCIUM-VITAMIN D) 500-200 MG-UNIT per tablet, Take 1 tablet by mouth daily., Disp: , Rfl:   •  cyclobenzaprine (FLEXERIL) 10 MG tablet, take 1 tablet by mouth twice a day if needed for muscle spasm, Disp: 60 tablet, Rfl: 5  •  DALIRESP 500 MCG tablet tablet, TAKE 1 TABLET BY MOUTH  ONCE DAILY, Disp: 90 tablet, Rfl: 2  •  FIBER SELECT GUMMIES PO, Take 2 tablets by mouth Daily., Disp: , Rfl:   •  fluticasone (Flovent HFA) 110 MCG/ACT inhaler, Inhale 2 puffs 2 (Two) Times a Day., Disp: 12 g, Rfl: 5  •  ipratropium-albuterol (Combivent Respimat)  MCG/ACT inhaler, Inhale 1 puff 4 (Four) Times a Day., Disp: 12 g, Rfl: 1  •  Multiple Vitamins-Minerals (PRESERVISION AREDS) capsule, Take 1 tablet by mouth 2 (two) times a day., Disp: , Rfl:   •  nitroglycerin (NITROSTAT) 0.4 MG SL tablet, Place 1 tablet under the tongue See Admin Instructions. Place 1 tablet under the tongue if needed every 5 minutes for chest pain for 3 doses if no relief af, Disp: 25 tablet, Rfl: 0  •  pantoprazole (PROTONIX) 40 MG EC tablet, Take 1 tablet by mouth Daily., Disp: 90 tablet, Rfl: 1  •  sertraline (ZOLOFT) 100 MG tablet, Take 1 tablet by mouth Daily., Disp: 90 tablet, Rfl: 1  •  simvastatin (ZOCOR) 40 MG tablet, TAKE 1 TABLET BY MOUTH AT BEDTIME, Disp: 90 tablet, Rfl: 1  •  solifenacin (VESICARE) 5 MG tablet, Take 1 tablet by mouth Daily., Disp: 90 tablet, Rfl: 1  •  Spacer/Aero Chamber Mouthpiece misc, 1 Units Take As Directed., Disp: 1 each, Rfl: 0  •  vitamin E 400 UNIT capsule, Take 400 Units by mouth Daily., Disp: , Rfl:   •  atenolol (TENORMIN) 50 MG tablet, TAKE 1/2 TABLET BY MOUTH TWICE DAILY, Disp: 90 tablet, Rfl: 1  •  clopidogrel (PLAVIX) 75 MG tablet, TAKE 1 TABLET BY MOUTH EVERY DAY, Disp: 90 tablet, Rfl: 1  •  Iron, Ferrous Sulfate, 325 (65 Fe) MG tablet, Take 1 tablet by mouth 2 (two) times a day., Disp: 60 tablet, Rfl: 2  •  isosorbide mononitrate (IMDUR) 30 MG 24 hr tablet, TAKE 1 TABLET BY MOUTH EVERY DAY, Disp: 90 tablet, Rfl: 1  •  levothyroxine (SYNTHROID, LEVOTHROID) 150 MCG tablet, TAKE 1 TABLET BY MOUTH EVERY DAY, Disp: 90 tablet, Rfl: 1  •  traMADol (ULTRAM) 50 MG tablet, Take 1 tablet by mouth Every 6 (Six) Hours As Needed for Moderate Pain ., Disp: 30 tablet, Rfl: 0    "  Allergies    Allergies   Allergen Reactions   • Codeine Mental Status Change   • Penicillins Hives and Swelling   • Dopamine Other (See Comments) and Palpitations     Other reaction(s): Hypertension       Problem List    Patient Active Problem List   Diagnosis   • Coronary arteriosclerosis in native artery   • Chronic obstructive pulmonary disease (CMS/HCC)   • Depression   • Gastroesophageal reflux disease with esophagitis   • Hyperlipidemia   • Hypertension   • Hypothyroidism   • Osteoarthritis   • Peripheral arterial occlusive disease (CMS/HCC)   • Solitary pulmonary nodule   • Vitamin D deficiency   • Lower back pain   • Carotid bruit   • Hypoxemia       Medications, Allergies, Problems List and Past History were reviewed and updated.    Physical Examination    /74   Pulse 81   Temp 97.3 °F (36.3 °C) (Temporal)   Resp 18   Ht 161.3 cm (63.5\")   Wt 83.8 kg (184 lb 12.8 oz)   LMP  (LMP Unknown)   SpO2 93% Comment: 2 liters of o2  BMI 32.22 kg/m²     HEENT: Head- Normocephalic Atraumatic. Facies- Within normal limits. Pinnas- Normal texture and shape bilaterally. Canals- Normal bilaterally. TMs- Normal bilaterally. Nares- Patent bilaterally. Nasal Septum- is normal. There is no tenderness to palpation over the frontal or maxillary sinuses. Lids- Normal bilaterally. Conjunctiva- Clear bilaterally. Sclera- Anicteric bilaterally. Oropharynx- Moist with no lesions. Tonsils- No enlargement, erythema or exudate.    Neck: Thyroid- non enlarged, symmetric and has no nodules. No bruits are detected. ROM- Normal Range of Motion with no rigidity.    Lungs: Auscultation- Clear to auscultation bilaterally. There are no retractions, clubbing or cyanosis. The Expiratory to Inspiratory ratio is equal.    Lymph Nodes: Cervical- no enlarged lymph nodes noted.    Cardiovascular: There are no carotid bruits. Heart- Normal Rate with Regular rhythm and no murmurs. There are no gallops. There are no rubs. In the lower " extremities there is no edema. The upper extremities do not have edema.    Abdomen: Soft, benign, non-tender with no masses, hernias, organomegaly or scars.    Shoulder: The left shoulder has normal iveth landmarks and no noted atrophy. There are no skin lesions noted. There is no scapular winging on the left. Abduction to 90 degrees with the left palm facing downward (pure glenohumeral motion test) is normal. Abduction from 90 degrees to 150 degrees with the left palm facing upward (ScapuloThoracic Motion Test) is normal on the left. The patient is able to fully externally rotate and abduct the left shoulder by placing the left hand behind the neck. The patient is able to fully internally rotate and abduct the left shoulder by placing the right hand behind the back. The left acromioclavicular joint is normal when the left arm is adducted across the chest (Cross-Over test). There is no tenderness over the greater tuberosity of the humerus at the insertion of the Supraspinatus, Infraspinatus, and Teres minor on the left. The patient is able to hold the left arm fully abducted at 90 degress (Drop Arm test). The left biceps tendon is normal. The left supraspinatus is normal as demonstrated by abduction of the shoulder against resistance. The left subscapularis is normal as demonstrated by medial rotation of the shoulder against resistance. The left-sided infraspinatus and teres minor are normal as demonstrated by lateral rotation of the shoulder against resistance. The left clavicle is normal.    Hips: The left hip has normal iveth landmarks and no noted atrophy. Left hip flexion is normal. Left hip extension is normal. Left hip abduction is normal. There is no pain with forced abduction. Left hip adduction is normal. Left hip internal rotation is painful. Left hip external rotation is painful.    Impression and Assessment    Left Hip Pain.    Left Shoulder Pain.    Anemia.    Fatigue.    Plan    Left Hip Pain Plan:  Monitor the symptoms closely.    Left Shoulder Pain Plan: Monitor the symptoms closely.    Fatigue Plan: Further plans will be made after test results are received and reviewed.    Anemia Plan: The current plan was continued.    Avelina was seen today for fall.    Diagnoses and all orders for this visit:    Left hip pain  -     XR Hip With or Without Pelvis 2 - 3 View Left; Future  -     CT Abdomen Pelvis Without Contrast; Future    Acute pain of left shoulder  -     XR Shoulder 2+ View Left; Future    Anemia, unspecified type  -     POC Hemoglobin  -     CT Abdomen Pelvis Without Contrast; Future  -     CBC & Differential  -     Comprehensive Metabolic Panel  -     D-dimer, Quantitative  -     CBC Auto Differential    Fatigue, unspecified type  -     POC Hemoglobin  -     CBC & Differential  -     Comprehensive Metabolic Panel  -     D-dimer, Quantitative  -     CBC Auto Differential    Dyspnea, unspecified type  -     XR Chest PA & Lateral; Future  -     CT Angiogram Chest With & Without Contrast; Future  -     CBC & Differential  -     Comprehensive Metabolic Panel  -     D-dimer, Quantitative  -     CBC Auto Differential    Edema, unspecified type  -     CT Angiogram Chest With & Without Contrast; Future  -     CBC & Differential  -     Comprehensive Metabolic Panel  -     D-dimer, Quantitative  -     CBC Auto Differential    Generalized abdominal pain  -     CT Abdomen Pelvis Without Contrast; Future        Return to Office    The patient was instructed to return for follow-up in 1 week.    The patient was instructed to return sooner if the condition changes, worsens, or does not resolve.

## 2020-08-05 ENCOUNTER — OFFICE VISIT (OUTPATIENT)
Dept: INTERNAL MEDICINE | Facility: CLINIC | Age: 80
End: 2020-08-05

## 2020-08-05 VITALS
RESPIRATION RATE: 22 BRPM | SYSTOLIC BLOOD PRESSURE: 144 MMHG | BODY MASS INDEX: 32.26 KG/M2 | WEIGHT: 185 LBS | TEMPERATURE: 97.3 F | DIASTOLIC BLOOD PRESSURE: 58 MMHG | HEART RATE: 84 BPM

## 2020-08-05 DIAGNOSIS — D64.9 ANEMIA, UNSPECIFIED TYPE: Primary | ICD-10-CM

## 2020-08-05 LAB
EXPIRATION DATE: ABNORMAL
HGB BLDA-MCNC: 8.7 G/DL (ref 12–17)
Lab: ABNORMAL

## 2020-08-05 PROCEDURE — 99212 OFFICE O/P EST SF 10 MIN: CPT | Performed by: INTERNAL MEDICINE

## 2020-08-05 PROCEDURE — 85018 HEMOGLOBIN: CPT | Performed by: INTERNAL MEDICINE

## 2020-08-05 NOTE — PROGRESS NOTES
Chief Complaint   Patient presents with   • Follow-up     1 day follow up       History of Present Illness    The patient presents for a follow-up related to anemia. There are no reports of blood loss. The patient has fatigue but does not have a dry cough, a wet cough, wheezing, fever, a headache, nausea, vomiting, diarrhea, myalgias, abdominal pain, sweats, weight loss, decreased appetite or chills. The patient's energy level is decreased. There are no reports of chest pain, shortness of breath, palpitations or syncope.    Review of Systems    CONSTITUTIONAL- Denies Weakness.    PULMONARY- Denies Sputum Production, Cough, Hemoptysis or Pleuritic Chest Pain.    GASTROINTESTINAL- Reports: bloating. Denies: Blood per Rectum, Constipation or Heartburn.    CARDIOVASCULAR- Denies Claudication, Edema or Irregular Heart Beat.    Medications      Current Outpatient Medications:   •  albuterol (PROAIR HFA) 108 (90 BASE) MCG/ACT inhaler, Inhale 2 puffs every 4 (four) hours as needed., Disp: , Rfl:   •  aspirin (ASPIRIN LOW DOSE) 81 MG chewable tablet, Chew 1 tablet daily., Disp: , Rfl:   •  atenolol (TENORMIN) 50 MG tablet, TAKE 1/2 TABLET BY MOUTH TWICE A DAY, Disp: 90 tablet, Rfl: 0  •  azithromycin (ZITHROMAX) 250 MG tablet, Take 1 tablet by mouth Daily., Disp: 90 tablet, Rfl: 1  •  Calcium Carbonate-Vitamin D (CALCIUM-VITAMIN D) 500-200 MG-UNIT per tablet, Take 1 tablet by mouth daily., Disp: , Rfl:   •  clopidogrel (PLAVIX) 75 MG tablet, TAKE 1 TABLET BY MOUTH ONCE DAILY, Disp: 90 tablet, Rfl: 0  •  cyclobenzaprine (FLEXERIL) 10 MG tablet, take 1 tablet by mouth twice a day if needed for muscle spasm, Disp: 60 tablet, Rfl: 5  •  DALIRESP 500 MCG tablet tablet, TAKE 1 TABLET BY MOUTH ONCE DAILY, Disp: 90 tablet, Rfl: 2  •  FIBER SELECT GUMMIES PO, Take 2 tablets by mouth Daily., Disp: , Rfl:   •  fluticasone (Flovent HFA) 110 MCG/ACT inhaler, Inhale 2 puffs 2 (Two) Times a Day., Disp: 12 g, Rfl: 5  •   ipratropium-albuterol (Combivent Respimat)  MCG/ACT inhaler, Inhale 1 puff 4 (Four) Times a Day., Disp: 12 g, Rfl: 1  •  isosorbide mononitrate (IMDUR) 30 MG 24 hr tablet, TAKE 1 TABLET BY MOUTH ONCE DAILY, Disp: 90 tablet, Rfl: 0  •  levothyroxine (SYNTHROID, LEVOTHROID) 150 MCG tablet, TAKE 1 TABLET BY MOUTH ONCE DAILY, Disp: 90 tablet, Rfl: 0  •  Multiple Vitamins-Minerals (PRESERVISION AREDS) capsule, Take 1 tablet by mouth 2 (two) times a day., Disp: , Rfl:   •  pantoprazole (PROTONIX) 40 MG EC tablet, Take 1 tablet by mouth Daily., Disp: 90 tablet, Rfl: 1  •  sertraline (ZOLOFT) 100 MG tablet, Take 1 tablet by mouth Daily., Disp: 90 tablet, Rfl: 1  •  simvastatin (ZOCOR) 40 MG tablet, TAKE 1 TABLET BY MOUTH AT BEDTIME, Disp: 90 tablet, Rfl: 1  •  solifenacin (VESICARE) 5 MG tablet, Take 1 tablet by mouth Daily., Disp: 90 tablet, Rfl: 1  •  Spacer/Aero Chamber Mouthpiece misc, 1 Units Take As Directed., Disp: 1 each, Rfl: 0  •  vitamin E 400 UNIT capsule, Take 400 Units by mouth Daily., Disp: , Rfl:   •  nitroglycerin (NITROSTAT) 0.4 MG SL tablet, Place 1 tablet under the tongue See Admin Instructions. Place 1 tablet under the tongue if needed every 5 minutes for chest pain for 3 doses if no relief af, Disp: 25 tablet, Rfl: 0  No current facility-administered medications for this visit.      Allergies    Allergies   Allergen Reactions   • Codeine Mental Status Change   • Penicillins Hives and Swelling   • Dopamine Other (See Comments) and Palpitations     Other reaction(s): Hypertension       Problem List    Patient Active Problem List   Diagnosis   • Coronary arteriosclerosis in native artery   • Chronic obstructive pulmonary disease (CMS/HCC)   • Depression   • Gastroesophageal reflux disease with esophagitis   • Hyperlipidemia   • Hypertension   • Hypothyroidism   • Osteoarthritis   • Peripheral arterial occlusive disease (CMS/HCC)   • Solitary pulmonary nodule   • Vitamin D deficiency   • Lower back  pain   • Carotid bruit   • Hypoxemia       Medications, Allergies, Problems List and Past History were reviewed and updated.    Physical Examination    /58 (BP Location: Left arm, Patient Position: Sitting, Cuff Size: Adult)   Pulse 84   Temp 97.3 °F (36.3 °C) (Infrared)   Resp 22   Wt 83.9 kg (185 lb)   LMP  (LMP Unknown)   Breastfeeding No   BMI 32.26 kg/m²     HEENT: Facies- Within normal limits. Lids- Normal bilaterally. Conjunctiva- Clear bilaterally. Sclera- Anicteric bilaterally.    Neck: Thyroid- non enlarged, symmetric and has no nodules. No bruits are detected.    Lungs: Auscultation- Clear to auscultation bilaterally. There are no retractions, clubbing or cyanosis. The Expiratory to Inspiratory ratio is equal.    Lymph Nodes: Cervical- no enlarged lymph nodes noted.    Cardiovascular: There are no carotid bruits. Heart- Normal Rate with Regular rhythm and no murmurs. There are no gallops. There are no rubs. In the lower extremities there is no edema. The upper extremities do not have edema.    Abdomen: Soft, benign, non-tender with no masses, hernias, organomegaly or scars.    Impression and Assessment    Anemia.    Plan    Anemia Plan: Stable. Labs reviewed. Will recheck in one week. No evidence of bleeding on CT scan.    Avelina was seen today for follow-up.    Diagnoses and all orders for this visit:    Anemia, unspecified type  -     POC Hemoglobin        Return to Office    The patient was instructed to return for follow-up in 1 week.    The patient was instructed to return sooner if the condition changes, worsens, or does not resolve.

## 2020-08-06 LAB — CREAT BLDA-MCNC: 0.4 MG/DL (ref 0.6–1.3)

## 2020-08-10 RX ORDER — TRAMADOL HYDROCHLORIDE 50 MG/1
50 TABLET ORAL EVERY 6 HOURS PRN
Qty: 30 TABLET | Refills: 0 | Status: SHIPPED | OUTPATIENT
Start: 2020-08-10 | End: 2020-10-01 | Stop reason: SDUPTHER

## 2020-08-12 ENCOUNTER — OFFICE VISIT (OUTPATIENT)
Dept: INTERNAL MEDICINE | Facility: CLINIC | Age: 80
End: 2020-08-12

## 2020-08-12 VITALS
DIASTOLIC BLOOD PRESSURE: 74 MMHG | OXYGEN SATURATION: 96 % | HEART RATE: 76 BPM | BODY MASS INDEX: 31.28 KG/M2 | HEIGHT: 64 IN | WEIGHT: 183.2 LBS | RESPIRATION RATE: 18 BRPM | TEMPERATURE: 98 F | SYSTOLIC BLOOD PRESSURE: 142 MMHG

## 2020-08-12 DIAGNOSIS — D64.9 ANEMIA, UNSPECIFIED TYPE: Primary | ICD-10-CM

## 2020-08-12 DIAGNOSIS — M25.552 LEFT HIP PAIN: ICD-10-CM

## 2020-08-12 LAB
EXPIRATION DATE: ABNORMAL
HGB BLDA-MCNC: 9.1 G/DL (ref 12–17)
Lab: ABNORMAL

## 2020-08-12 PROCEDURE — 85018 HEMOGLOBIN: CPT | Performed by: INTERNAL MEDICINE

## 2020-08-12 PROCEDURE — 85025 COMPLETE CBC W/AUTO DIFF WBC: CPT | Performed by: INTERNAL MEDICINE

## 2020-08-12 PROCEDURE — 36415 COLL VENOUS BLD VENIPUNCTURE: CPT | Performed by: INTERNAL MEDICINE

## 2020-08-12 PROCEDURE — 80048 BASIC METABOLIC PNL TOTAL CA: CPT | Performed by: INTERNAL MEDICINE

## 2020-08-12 PROCEDURE — 99213 OFFICE O/P EST LOW 20 MIN: CPT | Performed by: INTERNAL MEDICINE

## 2020-08-13 LAB
ANION GAP SERPL CALCULATED.3IONS-SCNC: 11.3 MMOL/L (ref 5–15)
BASOPHILS # BLD AUTO: 0.07 10*3/MM3 (ref 0–0.2)
BASOPHILS NFR BLD AUTO: 0.7 % (ref 0–1.5)
BUN SERPL-MCNC: 8 MG/DL (ref 8–23)
BUN/CREAT SERPL: 19.5 (ref 7–25)
CALCIUM SPEC-SCNC: 9.7 MG/DL (ref 8.6–10.5)
CHLORIDE SERPL-SCNC: 99 MMOL/L (ref 98–107)
CO2 SERPL-SCNC: 28.7 MMOL/L (ref 22–29)
CREAT SERPL-MCNC: 0.41 MG/DL (ref 0.57–1)
DEPRECATED RDW RBC AUTO: 43.4 FL (ref 37–54)
EOSINOPHIL # BLD AUTO: 0.22 10*3/MM3 (ref 0–0.4)
EOSINOPHIL NFR BLD AUTO: 2.1 % (ref 0.3–6.2)
ERYTHROCYTE [DISTWIDTH] IN BLOOD BY AUTOMATED COUNT: 13.6 % (ref 12.3–15.4)
GFR SERPL CREATININE-BSD FRML MDRD: 150 ML/MIN/1.73
GLUCOSE SERPL-MCNC: 82 MG/DL (ref 65–99)
HCT VFR BLD AUTO: 28.4 % (ref 34–46.6)
HGB BLD-MCNC: 8.5 G/DL (ref 12–15.9)
IMM GRANULOCYTES # BLD AUTO: 0.04 10*3/MM3 (ref 0–0.05)
IMM GRANULOCYTES NFR BLD AUTO: 0.4 % (ref 0–0.5)
LYMPHOCYTES # BLD AUTO: 2.55 10*3/MM3 (ref 0.7–3.1)
LYMPHOCYTES NFR BLD AUTO: 24.2 % (ref 19.6–45.3)
MCH RBC QN AUTO: 26.1 PG (ref 26.6–33)
MCHC RBC AUTO-ENTMCNC: 29.9 G/DL (ref 31.5–35.7)
MCV RBC AUTO: 87.1 FL (ref 79–97)
MONOCYTES # BLD AUTO: 0.96 10*3/MM3 (ref 0.1–0.9)
MONOCYTES NFR BLD AUTO: 9.1 % (ref 5–12)
NEUTROPHILS NFR BLD AUTO: 6.71 10*3/MM3 (ref 1.7–7)
NEUTROPHILS NFR BLD AUTO: 63.5 % (ref 42.7–76)
NRBC BLD AUTO-RTO: 0.1 /100 WBC (ref 0–0.2)
PLATELET # BLD AUTO: 427 10*3/MM3 (ref 140–450)
PMV BLD AUTO: 9.3 FL (ref 6–12)
POTASSIUM SERPL-SCNC: 4.4 MMOL/L (ref 3.5–5.2)
RBC # BLD AUTO: 3.26 10*6/MM3 (ref 3.77–5.28)
SODIUM SERPL-SCNC: 139 MMOL/L (ref 136–145)
WBC # BLD AUTO: 10.55 10*3/MM3 (ref 3.4–10.8)

## 2020-08-15 RX ORDER — CLOPIDOGREL BISULFATE 75 MG/1
TABLET ORAL
Qty: 90 TABLET | Refills: 1 | Status: SHIPPED | OUTPATIENT
Start: 2020-08-15 | End: 2021-02-13

## 2020-08-15 RX ORDER — ISOSORBIDE MONONITRATE 30 MG/1
TABLET, EXTENDED RELEASE ORAL
Qty: 90 TABLET | Refills: 1 | Status: SHIPPED | OUTPATIENT
Start: 2020-08-15 | End: 2021-02-15 | Stop reason: SDUPTHER

## 2020-08-15 RX ORDER — LEVOTHYROXINE SODIUM 0.15 MG/1
TABLET ORAL
Qty: 90 TABLET | Refills: 1 | Status: SHIPPED | OUTPATIENT
Start: 2020-08-15 | End: 2021-02-13

## 2020-08-15 RX ORDER — ATENOLOL 50 MG/1
TABLET ORAL
Qty: 90 TABLET | Refills: 1 | Status: SHIPPED | OUTPATIENT
Start: 2020-08-15 | End: 2021-02-13

## 2020-08-19 ENCOUNTER — OFFICE VISIT (OUTPATIENT)
Dept: INTERNAL MEDICINE | Facility: CLINIC | Age: 80
End: 2020-08-19

## 2020-08-19 VITALS
RESPIRATION RATE: 20 BRPM | SYSTOLIC BLOOD PRESSURE: 166 MMHG | BODY MASS INDEX: 31.91 KG/M2 | TEMPERATURE: 97.5 F | HEART RATE: 80 BPM | WEIGHT: 183 LBS | DIASTOLIC BLOOD PRESSURE: 72 MMHG

## 2020-08-19 DIAGNOSIS — D64.9 ANEMIA, UNSPECIFIED TYPE: Primary | ICD-10-CM

## 2020-08-19 LAB
EXPIRATION DATE: ABNORMAL
HGB BLDA-MCNC: 8.4 G/DL (ref 12–17)
Lab: ABNORMAL

## 2020-08-19 PROCEDURE — 85018 HEMOGLOBIN: CPT | Performed by: INTERNAL MEDICINE

## 2020-08-19 PROCEDURE — 99213 OFFICE O/P EST LOW 20 MIN: CPT | Performed by: INTERNAL MEDICINE

## 2020-08-19 PROCEDURE — 36415 COLL VENOUS BLD VENIPUNCTURE: CPT | Performed by: INTERNAL MEDICINE

## 2020-08-19 NOTE — PROGRESS NOTES
Chief Complaint   Patient presents with   • Follow-up     1 week follow up       History of Present Illness      The patient presents for a follow-up related to anemia. There are no reports of blood loss. The patient has fatigue but does not have a dry cough, a wet cough, wheezing, fever, a headache, nausea, vomiting, diarrhea, myalgias, abdominal pain, sweats, weight loss, decreased appetite or chills. The patient's energy level is decreased. There are no reports of chest pain, shortness of breath, palpitations or syncope. Overall, she is feeling better.    Review of Systems    HENT- Denies Nasal Discharge, Sore Throat, Ear Pain, Ear Drainage, Sinus Pain, Nasal Congestion, Decreased Hearing or Tinnitus.    GASTROINTESTINAL- Denies Blood per Rectum, Constipation or Heartburn.    PULMONARY- Denies Sputum Production, Cough, Hemoptysis or Pleuritic Chest Pain.    CARDIOVASCULAR- Denies Claudication, Edema or Irregular Heart Beat.    Medications      Current Outpatient Medications:   •  albuterol (PROAIR HFA) 108 (90 BASE) MCG/ACT inhaler, Inhale 2 puffs every 4 (four) hours as needed., Disp: , Rfl:   •  aspirin (ASPIRIN LOW DOSE) 81 MG chewable tablet, Chew 1 tablet daily., Disp: , Rfl:   •  atenolol (TENORMIN) 50 MG tablet, TAKE 1/2 TABLET BY MOUTH TWICE DAILY, Disp: 90 tablet, Rfl: 1  •  azithromycin (ZITHROMAX) 250 MG tablet, Take 1 tablet by mouth Daily., Disp: 90 tablet, Rfl: 1  •  Calcium Carbonate-Vitamin D (CALCIUM-VITAMIN D) 500-200 MG-UNIT per tablet, Take 1 tablet by mouth daily., Disp: , Rfl:   •  clopidogrel (PLAVIX) 75 MG tablet, TAKE 1 TABLET BY MOUTH EVERY DAY, Disp: 90 tablet, Rfl: 1  •  cyclobenzaprine (FLEXERIL) 10 MG tablet, take 1 tablet by mouth twice a day if needed for muscle spasm, Disp: 60 tablet, Rfl: 5  •  DALIRESP 500 MCG tablet tablet, TAKE 1 TABLET BY MOUTH ONCE DAILY, Disp: 90 tablet, Rfl: 2  •  FIBER SELECT GUMMIES PO, Take 2 tablets by mouth Daily., Disp: , Rfl:   •  fluticasone  (Flovent HFA) 110 MCG/ACT inhaler, Inhale 2 puffs 2 (Two) Times a Day., Disp: 12 g, Rfl: 5  •  ipratropium-albuterol (Combivent Respimat)  MCG/ACT inhaler, Inhale 1 puff 4 (Four) Times a Day., Disp: 12 g, Rfl: 1  •  isosorbide mononitrate (IMDUR) 30 MG 24 hr tablet, TAKE 1 TABLET BY MOUTH EVERY DAY, Disp: 90 tablet, Rfl: 1  •  levothyroxine (SYNTHROID, LEVOTHROID) 150 MCG tablet, TAKE 1 TABLET BY MOUTH EVERY DAY, Disp: 90 tablet, Rfl: 1  •  Multiple Vitamins-Minerals (PRESERVISION AREDS) capsule, Take 1 tablet by mouth 2 (two) times a day., Disp: , Rfl:   •  pantoprazole (PROTONIX) 40 MG EC tablet, Take 1 tablet by mouth Daily., Disp: 90 tablet, Rfl: 1  •  sertraline (ZOLOFT) 100 MG tablet, Take 1 tablet by mouth Daily., Disp: 90 tablet, Rfl: 1  •  simvastatin (ZOCOR) 40 MG tablet, TAKE 1 TABLET BY MOUTH AT BEDTIME, Disp: 90 tablet, Rfl: 1  •  solifenacin (VESICARE) 5 MG tablet, Take 1 tablet by mouth Daily., Disp: 90 tablet, Rfl: 1  •  Spacer/Aero Chamber Mouthpiece misc, 1 Units Take As Directed., Disp: 1 each, Rfl: 0  •  traMADol (ULTRAM) 50 MG tablet, Take 1 tablet by mouth Every 6 (Six) Hours As Needed for Moderate Pain ., Disp: 30 tablet, Rfl: 0  •  vitamin E 400 UNIT capsule, Take 400 Units by mouth Daily., Disp: , Rfl:   •  Iron, Ferrous Sulfate, 325 (65 Fe) MG tablet, Take 1 tablet by mouth 2 (two) times a day., Disp: 60 tablet, Rfl: 2  •  nitroglycerin (NITROSTAT) 0.4 MG SL tablet, Place 1 tablet under the tongue See Admin Instructions. Place 1 tablet under the tongue if needed every 5 minutes for chest pain for 3 doses if no relief af, Disp: 25 tablet, Rfl: 0     Allergies    Allergies   Allergen Reactions   • Codeine Mental Status Change   • Penicillins Hives and Swelling   • Dopamine Other (See Comments) and Palpitations     Other reaction(s): Hypertension       Problem List    Patient Active Problem List   Diagnosis   • Coronary arteriosclerosis in native artery   • Chronic obstructive pulmonary  disease (CMS/HCC)   • Depression   • Gastroesophageal reflux disease with esophagitis   • Hyperlipidemia   • Hypertension   • Hypothyroidism   • Osteoarthritis   • Peripheral arterial occlusive disease (CMS/HCC)   • Solitary pulmonary nodule   • Vitamin D deficiency   • Lower back pain   • Carotid bruit   • Hypoxemia       Medications, Allergies, Problems List and Past History were reviewed and updated.    Physical Examination    /72 (BP Location: Left arm, Patient Position: Sitting, Cuff Size: Adult)   Pulse 80   Temp 97.5 °F (36.4 °C) (Infrared)   Resp 20   Wt 83 kg (183 lb)   LMP  (LMP Unknown)   Breastfeeding No   BMI 31.91 kg/m²     HEENT: Facies- Within normal limits. Lids- Normal bilaterally. Conjunctiva- Clear bilaterally. Sclera- Anicteric bilaterally.    Neck: Thyroid- non enlarged, symmetric and has no nodules. No bruits are detected.    Lungs: Auscultation- Clear to auscultation bilaterally. There are no retractions, clubbing or cyanosis. The Expiratory to Inspiratory ratio is equal.    Lymph Nodes: Cervical- no enlarged lymph nodes noted.    Cardiovascular: There are no carotid bruits. Heart- Normal Rate with Regular rhythm and no murmurs. There are no gallops. There are no rubs. In the lower extremities there is no edema. The upper extremities do not have edema.    Impression and Assessment    Anemia.    Plan    Anemia Plan: Medication was added as noted below.    Avelina was seen today for follow-up.    Diagnoses and all orders for this visit:    Anemia, unspecified type  -     POC Hemoglobin  -     Iron, Ferrous Sulfate, 325 (65 Fe) MG tablet; Take 1 tablet by mouth 2 (two) times a day.        Return to Office    The patient was instructed to return for follow-up in 6 weeks.    The patient was instructed to return sooner if the condition changes, worsens, or does not resolve.

## 2020-08-23 NOTE — PROGRESS NOTES
Chief Complaint   Patient presents with   • Follow-up       History of Present Illness    The patient presents for a follow-up related to anemia. The patient has fatigue but does not have a dry cough, a wet cough, wheezing, fever, a headache, nausea, vomiting, diarrhea, myalgias, abdominal pain, sweats, weight loss, decreased appetite or chills. The patient's energy level is decreased. There are no reports of chest pain, shortness of breath, palpitations or syncope.    The patient presents with pain in the left hip of two weeks duration. There is no history of trauma. The patient has no joint swelling. There is no stiffness. The patient denies a history of overuse or repetitive motion with the affected joints.    The joint pain is aggravated by motion, walking, internal rotation and external rotation. The pain has no alleviating factors noted.    Review of Systems    CONSTITUTIONAL- Denies Weakness.    PULMONARY- Denies Sputum Production, Cough, Hemoptysis or Pleuritic Chest Pain.    CARDIOVASCULAR- Denies Claudication, Edema or Irregular Heart Beat.    Medications      Current Outpatient Medications:   •  albuterol (PROAIR HFA) 108 (90 BASE) MCG/ACT inhaler, Inhale 2 puffs every 4 (four) hours as needed., Disp: , Rfl:   •  aspirin (ASPIRIN LOW DOSE) 81 MG chewable tablet, Chew 1 tablet daily., Disp: , Rfl:   •  azithromycin (ZITHROMAX) 250 MG tablet, Take 1 tablet by mouth Daily., Disp: 90 tablet, Rfl: 1  •  Calcium Carbonate-Vitamin D (CALCIUM-VITAMIN D) 500-200 MG-UNIT per tablet, Take 1 tablet by mouth daily., Disp: , Rfl:   •  cyclobenzaprine (FLEXERIL) 10 MG tablet, take 1 tablet by mouth twice a day if needed for muscle spasm, Disp: 60 tablet, Rfl: 5  •  DALIRESP 500 MCG tablet tablet, TAKE 1 TABLET BY MOUTH ONCE DAILY, Disp: 90 tablet, Rfl: 2  •  FIBER SELECT GUMMIES PO, Take 2 tablets by mouth Daily., Disp: , Rfl:   •  fluticasone (Flovent HFA) 110 MCG/ACT inhaler, Inhale 2 puffs 2 (Two) Times a Day., Disp:  12 g, Rfl: 5  •  ipratropium-albuterol (Combivent Respimat)  MCG/ACT inhaler, Inhale 1 puff 4 (Four) Times a Day., Disp: 12 g, Rfl: 1  •  Multiple Vitamins-Minerals (PRESERVISION AREDS) capsule, Take 1 tablet by mouth 2 (two) times a day., Disp: , Rfl:   •  nitroglycerin (NITROSTAT) 0.4 MG SL tablet, Place 1 tablet under the tongue See Admin Instructions. Place 1 tablet under the tongue if needed every 5 minutes for chest pain for 3 doses if no relief af, Disp: 25 tablet, Rfl: 0  •  pantoprazole (PROTONIX) 40 MG EC tablet, Take 1 tablet by mouth Daily., Disp: 90 tablet, Rfl: 1  •  sertraline (ZOLOFT) 100 MG tablet, Take 1 tablet by mouth Daily., Disp: 90 tablet, Rfl: 1  •  simvastatin (ZOCOR) 40 MG tablet, TAKE 1 TABLET BY MOUTH AT BEDTIME, Disp: 90 tablet, Rfl: 1  •  solifenacin (VESICARE) 5 MG tablet, Take 1 tablet by mouth Daily., Disp: 90 tablet, Rfl: 1  •  Spacer/Aero Chamber Mouthpiece misc, 1 Units Take As Directed., Disp: 1 each, Rfl: 0  •  traMADol (ULTRAM) 50 MG tablet, Take 1 tablet by mouth Every 6 (Six) Hours As Needed for Moderate Pain ., Disp: 30 tablet, Rfl: 0  •  vitamin E 400 UNIT capsule, Take 400 Units by mouth Daily., Disp: , Rfl:   •  atenolol (TENORMIN) 50 MG tablet, TAKE 1/2 TABLET BY MOUTH TWICE DAILY, Disp: 90 tablet, Rfl: 1  •  clopidogrel (PLAVIX) 75 MG tablet, TAKE 1 TABLET BY MOUTH EVERY DAY, Disp: 90 tablet, Rfl: 1  •  Iron, Ferrous Sulfate, 325 (65 Fe) MG tablet, Take 1 tablet by mouth 2 (two) times a day., Disp: 60 tablet, Rfl: 2  •  isosorbide mononitrate (IMDUR) 30 MG 24 hr tablet, TAKE 1 TABLET BY MOUTH EVERY DAY, Disp: 90 tablet, Rfl: 1  •  levothyroxine (SYNTHROID, LEVOTHROID) 150 MCG tablet, TAKE 1 TABLET BY MOUTH EVERY DAY, Disp: 90 tablet, Rfl: 1     Allergies    Allergies   Allergen Reactions   • Codeine Mental Status Change   • Penicillins Hives and Swelling   • Dopamine Other (See Comments) and Palpitations     Other reaction(s): Hypertension       Problem  "List    Patient Active Problem List   Diagnosis   • Coronary arteriosclerosis in native artery   • Chronic obstructive pulmonary disease (CMS/HCC)   • Depression   • Gastroesophageal reflux disease with esophagitis   • Hyperlipidemia   • Hypertension   • Hypothyroidism   • Osteoarthritis   • Peripheral arterial occlusive disease (CMS/HCC)   • Solitary pulmonary nodule   • Vitamin D deficiency   • Lower back pain   • Carotid bruit   • Hypoxemia       Medications, Allergies, Problems List and Past History were reviewed and updated.    Physical Examination    /74 (BP Location: Right arm, Patient Position: Sitting, Cuff Size: Adult)   Pulse 76   Temp 98 °F (36.7 °C) (Temporal)   Resp 18   Ht 161.3 cm (63.5\")   Wt 83.1 kg (183 lb 3.2 oz)   LMP  (LMP Unknown)   SpO2 96%   BMI 31.94 kg/m²     HEENT: Facies- Within normal limits. Lids- Normal bilaterally. Conjunctiva- Clear bilaterally. Sclera- Anicteric bilaterally.    Neck: Thyroid- non enlarged, symmetric and has no nodules. No bruits are detected. ROM- Normal Range of Motion with no rigidity.    Lungs: Auscultation- Clear to auscultation bilaterally. There are no retractions, clubbing or cyanosis. The Expiratory to Inspiratory ratio is equal.    Cardiovascular: Heart- Normal Rate with Regular rhythm and no murmurs.    Abdomen: Soft, benign, non-tender with no masses, hernias, organomegaly or scars.    Impression and Assessment    Anemia.    Hip Pain.    Plan    Hip Pain Plan: The current plan was continued.    Anemia Plan: The condition is stable. No change is needed in the current plan.    Avelina was seen today for follow-up.    Diagnoses and all orders for this visit:    Anemia, unspecified type  -     POC Hemoglobin  -     Basic Metabolic Panel  -     CBC & Differential  -     CBC Auto Differential    Left hip pain          Return to Office    The patient was instructed to return for follow-up in 10 days.    The patient was instructed to return sooner if " the condition changes, worsens, or does not resolve.

## 2020-08-24 RX ORDER — DEXAMETHASONE 4 MG/1
TABLET ORAL
Qty: 12 G | Refills: 5 | Status: SHIPPED | OUTPATIENT
Start: 2020-08-24 | End: 2020-09-02

## 2020-09-02 ENCOUNTER — OFFICE VISIT (OUTPATIENT)
Dept: PULMONOLOGY | Facility: CLINIC | Age: 80
End: 2020-09-02

## 2020-09-02 VITALS
TEMPERATURE: 97.1 F | DIASTOLIC BLOOD PRESSURE: 84 MMHG | SYSTOLIC BLOOD PRESSURE: 138 MMHG | HEIGHT: 64 IN | BODY MASS INDEX: 31 KG/M2 | HEART RATE: 88 BPM | WEIGHT: 181.6 LBS | OXYGEN SATURATION: 98 %

## 2020-09-02 DIAGNOSIS — J96.12 CHRONIC RESPIRATORY FAILURE WITH HYPOXIA AND HYPERCAPNIA (HCC): ICD-10-CM

## 2020-09-02 DIAGNOSIS — J96.11 CHRONIC RESPIRATORY FAILURE WITH HYPOXIA AND HYPERCAPNIA (HCC): ICD-10-CM

## 2020-09-02 DIAGNOSIS — J44.9 CHRONIC OBSTRUCTIVE PULMONARY DISEASE, UNSPECIFIED COPD TYPE (HCC): Primary | ICD-10-CM

## 2020-09-02 PROCEDURE — 99205 OFFICE O/P NEW HI 60 MIN: CPT | Performed by: INTERNAL MEDICINE

## 2020-09-02 NOTE — PROGRESS NOTES
Initial Pulmonary Consult Note    Subjective   Reason for consultation/Chief Complaint: Shortness of Breath    Avelina Ceron is a 79 y.o. female is being seen for consultation today at the request of Dre Moura MD    History of Present Illness    Ms. Ceron is a 80yo F who is referred for shortness of breath and hypoxia. She was admitted to [facility] for hip replacement surgery in July 2020. After surgery, she developed acute on chronic respiratory failure requiring Bipap. She was treated for a COPD exacerbation with bronchodilators and IV steroids. She was also given scheduled IV Lasix for diuresis. She was weaned back to nasal cannula and discharged home with a Trilogy machine at night.     She presents to clinic today for further evaluation. She has been continued on Flovent inhaler and Combivent. She is on Daliresp 500mcg tablet daily. She is also on Azithromycin 250mg daily for recurrent exacerbations. She has an albuterol inhaler.     She was first diagnosed with COPD in 2014 and saw Dr. Louis Jain at [facility]. He retired a year later and Dr. Moura has managed her COPD since that time. She has not had any episodes of respiratory failure until after her hip surgery. She has been on home O2 since 2014. She has some shortness of breath which is stable. She is currently in PT for her hip and does water therapy. She is using her Trilogy at least 4 hours nightly. She does sleep a lot during the day.      Active Ambulatory Problems     Diagnosis Date Noted   • Coronary arteriosclerosis in native artery 08/10/2016   • Chronic obstructive pulmonary disease (CMS/HCC) 08/10/2016   • Depression 08/10/2016   • Gastroesophageal reflux disease with esophagitis 08/10/2016   • Hyperlipidemia 08/10/2016   • Hypertension 08/10/2016   • Hypothyroidism 08/10/2016   • Osteoarthritis 08/10/2016   • Peripheral arterial occlusive disease (CMS/HCC) 08/10/2016   • Solitary pulmonary nodule 08/10/2016   • Vitamin  D deficiency 08/10/2016   • Lower back pain 2016   • Carotid bruit 2016   • Hypoxemia 2016   • Chronic respiratory failure with hypoxia and hypercapnia (CMS/HCC) 2020     Resolved Ambulatory Problems     Diagnosis Date Noted   • UTI (urinary tract infection) 2016   • Muscle strain of right thigh 2016   • Chest pain 2016     Past Medical History:   Diagnosis Date   • COPD (chronic obstructive pulmonary disease) (CMS/[facility])    • Degenerative lumbar disc    • History of colonoscopy        Past Surgical History:   Procedure Laterality Date   • GALLBLADDER SURGERY     • HYSTERECTOMY     • PARTIAL HIP ARTHROPLASTY Right 2005       Family History   Problem Relation Age of Onset   • Cancer Other         BREAST, COLON, OVARIAN   • Depression Other    • Hyperlipidemia Other    • Hypertension Other    • Heart disease Mother    • Hypertension Mother    • Arthritis Daughter    • Cancer Daughter    • Hyperlipidemia Daughter    • Liver disease Daughter    • Thyroid disease Daughter    • Cancer Maternal Aunt    • Diabetes Maternal Uncle    • Alzheimer's disease Sister        Social History     Socioeconomic History   • Marital status:      Spouse name: Not on file   • Number of children: Not on file   • Years of education: Not on file   • Highest education level: Not on file   Tobacco Use   • Smoking status: Former Smoker     Packs/day: 1.00     Years: 50.00     Pack years: 50.00     Types: Cigarettes     Last attempt to quit: 2006     Years since quittin.6   • Smokeless tobacco: Never Used   Substance and Sexual Activity   • Alcohol use: Not Currently     Comment: 2-5 a year   • Drug use: No   • Sexual activity: Defer       Allergies   Allergen Reactions   • Codeine Mental Status Change   • Penicillins Hives and Swelling   • Dopamine Other (See Comments) and Palpitations     Other reaction(s): Hypertension       Current Outpatient Medications   Medication Sig Dispense  Refill   • albuterol (PROAIR HFA) 108 (90 BASE) MCG/ACT inhaler Inhale 2 puffs every 4 (four) hours as needed.     • aspirin (ASPIRIN LOW DOSE) 81 MG chewable tablet Chew 1 tablet daily.     • atenolol (TENORMIN) 50 MG tablet TAKE 1/2 TABLET BY MOUTH TWICE DAILY 90 tablet 1   • azithromycin (ZITHROMAX) 250 MG tablet Take 1 tablet by mouth Daily. 90 tablet 1   • Calcium Carbonate-Vitamin D (CALCIUM-VITAMIN D) 500-200 MG-UNIT per tablet Take 1 tablet by mouth daily.     • clopidogrel (PLAVIX) 75 MG tablet TAKE 1 TABLET BY MOUTH EVERY DAY 90 tablet 1   • cyclobenzaprine (FLEXERIL) 10 MG tablet take 1 tablet by mouth twice a day if needed for muscle spasm 60 tablet 5   • DALIRESP 500 MCG tablet tablet TAKE 1 TABLET BY MOUTH ONCE DAILY 90 tablet 2   • FIBER SELECT GUMMIES PO Take 2 tablets by mouth Daily.     • ipratropium-albuterol (Combivent Respimat)  MCG/ACT inhaler Inhale 1 puff 4 (Four) Times a Day. 12 g 1   • Iron, Ferrous Sulfate, 325 (65 Fe) MG tablet Take 1 tablet by mouth 2 (two) times a day. 60 tablet 2   • isosorbide mononitrate (IMDUR) 30 MG 24 hr tablet TAKE 1 TABLET BY MOUTH EVERY DAY 90 tablet 1   • levothyroxine (SYNTHROID, LEVOTHROID) 150 MCG tablet TAKE 1 TABLET BY MOUTH EVERY DAY 90 tablet 1   • Multiple Vitamins-Minerals (PRESERVISION AREDS) capsule Take 1 tablet by mouth 2 (two) times a day.     • nitroglycerin (NITROSTAT) 0.4 MG SL tablet Place 1 tablet under the tongue See Admin Instructions. Place 1 tablet under the tongue if needed every 5 minutes for chest pain for 3 doses if no relief af 25 tablet 0   • pantoprazole (PROTONIX) 40 MG EC tablet Take 1 tablet by mouth Daily. 90 tablet 1   • sertraline (ZOLOFT) 100 MG tablet Take 1 tablet by mouth Daily. 90 tablet 1   • simvastatin (ZOCOR) 40 MG tablet TAKE 1 TABLET BY MOUTH AT BEDTIME 90 tablet 1   • solifenacin (VESICARE) 5 MG tablet Take 1 tablet by mouth Daily. 90 tablet 1   • Spacer/Aero Chamber Mouthpiece misc 1 Units Take As  "Directed. 1 each 0   • traMADol (ULTRAM) 50 MG tablet Take 1 tablet by mouth Every 6 (Six) Hours As Needed for Moderate Pain . 30 tablet 0   • vitamin E 400 UNIT capsule Take 400 Units by mouth Daily.     • Fluticasone-Umeclidin-Vilant 100-62.5-25 MCG/INH aerosol powder  Inhale 1 puff Daily. 28 each 6     No current facility-administered medications for this visit.        Review of Systems   Constitutional: Positive for activity change and fatigue.   HENT: Positive for sneezing.    Eyes: Negative.    Respiratory: Positive for chest tightness and shortness of breath.    Cardiovascular: Positive for leg swelling.   Gastrointestinal: Negative.    Endocrine: Negative.    Genitourinary: Negative.    Musculoskeletal: Positive for arthralgias, back pain, gait problem and joint swelling.   Skin: Negative.    Allergic/Immunologic: Negative.    Hematological: Bruises/bleeds easily.   Psychiatric/Behavioral: Positive for agitation, dysphoric mood and sleep disturbance. The patient is nervous/anxious.          Objective   Blood pressure 138/84, pulse 88, temperature 97.1 °F (36.2 °C), height 161.3 cm (63.5\"), weight 82.4 kg (181 lb 9.6 oz), SpO2 98 %, not currently breastfeeding.  Physical Exam   Constitutional: She is oriented to person, place, and time. She appears well-developed and well-nourished. No distress.   HENT:   Head: Normocephalic and atraumatic.   Mouth/Throat: Oropharynx is clear and moist.   Eyes: Pupils are equal, round, and reactive to light. Conjunctivae are normal. No scleral icterus.   Neck: Normal range of motion. Neck supple. No tracheal deviation present. No thyromegaly present.   Cardiovascular: Normal rate, regular rhythm and normal heart sounds.   Pulmonary/Chest: Effort normal. No respiratory distress. She has decreased breath sounds. She has no wheezes.   Abdominal: Soft. Bowel sounds are normal. There is no tenderness.   Musculoskeletal: Normal range of motion. She exhibits no edema. "   Lymphadenopathy:     She has no cervical adenopathy.   Neurological: She is alert and oriented to person, place, and time. She exhibits normal muscle tone. Coordination normal.   Skin: Skin is warm and dry. No rash noted. No erythema.   Psychiatric: She has a normal mood and affect. Her speech is normal and behavior is normal. Judgment normal.   Nursing note and vitals reviewed.      PFTs:  No PFTs available.     Imaging:  CTA of the chest from 8/4/20 reviewed. There is no PE. There is a moderate sized hiatal hernia. There are small areas of subpleural scarring/interstitial disease with peribronchial thickening in the posterior right upper lobe and left upper lobe. There are no consolidations. There is moderate upper lobe bullous change. There are similar but milder interstitial changes present on the prior study.       Assessment/Plan   Avelina was seen today for copd.    Diagnoses and all orders for this visit:    Chronic obstructive pulmonary disease, unspecified COPD type (CMS/HCC)    Chronic respiratory failure with hypoxia and hypercapnia (CMS/HCC)    Other orders  -     Fluticasone-Umeclidin-Vilant 100-62.5-25 MCG/INH aerosol powder ; Inhale 1 puff Daily.        Discussion:  Ms. Ceron is a 80yo F who presents to establish care for COPD.     1. Severe COPD  - Currently on Flovent, Combivent and PRN Albuterol  - We will change her to Trelegy inhaler. She will stop Flovent. She may continue to use Combivent or Albuterol as needed.   - Rx for Trelegy sent. I have given her samples in clinic today with instructions for use.   - Continue Daliresp 500mcg daily.   - Continue Azithromycin 250mg daily. If she remains exacerbation free, we may be able to discontinue this at her next visit  - Flu shot in the Fall.     2. Chronic Hypoxic and Hypercapnic Respiratory Failure  - Continue supplemental O2 during the day. I will send a new Rx to [facility] for 2L continuous via portable tanks rather than her POC as she does not  feel that the POC works as well.   - Continue Trilogy at night. [facility] if she continues to have difficulty with mask placement at night.     Follow up in 3 months. I have asked her to call us with any questions or concerns.          I have spent 60 minutes face to face with the patient with greater than 50% of the time spent counseling on her history of COPD, management of COPD, management of portable O2 and Trilogy.           Florence V Case, DO  Pulmonary and Critical Care Medicine  Note Electronically Signed

## 2020-09-08 RX ORDER — SERTRALINE HYDROCHLORIDE 100 MG/1
100 TABLET, FILM COATED ORAL DAILY
Qty: 90 TABLET | Refills: 1 | Status: SHIPPED | OUTPATIENT
Start: 2020-09-08 | End: 2020-12-12 | Stop reason: SDUPTHER

## 2020-09-13 DIAGNOSIS — N39.3 STRESS INCONTINENCE OF URINE: ICD-10-CM

## 2020-09-14 RX ORDER — SOLIFENACIN SUCCINATE 5 MG/1
5 TABLET, FILM COATED ORAL DAILY
Qty: 90 TABLET | Refills: 1 | Status: SHIPPED | OUTPATIENT
Start: 2020-09-14 | End: 2020-12-12 | Stop reason: SDUPTHER

## 2020-09-16 RX ORDER — NITROGLYCERIN 0.4 MG/1
0.4 TABLET SUBLINGUAL SEE ADMIN INSTRUCTIONS
Qty: 25 TABLET | Refills: 0 | Status: SHIPPED | OUTPATIENT
Start: 2020-09-16 | End: 2020-12-14 | Stop reason: SDUPTHER

## 2020-10-01 ENCOUNTER — OFFICE VISIT (OUTPATIENT)
Dept: INTERNAL MEDICINE | Facility: CLINIC | Age: 80
End: 2020-10-01

## 2020-10-01 VITALS
TEMPERATURE: 97.1 F | DIASTOLIC BLOOD PRESSURE: 78 MMHG | WEIGHT: 188 LBS | BODY MASS INDEX: 32.78 KG/M2 | RESPIRATION RATE: 20 BRPM | HEART RATE: 84 BPM | SYSTOLIC BLOOD PRESSURE: 168 MMHG

## 2020-10-01 DIAGNOSIS — K21.00 GASTROESOPHAGEAL REFLUX DISEASE WITH ESOPHAGITIS WITHOUT HEMORRHAGE: ICD-10-CM

## 2020-10-01 DIAGNOSIS — E78.5 HYPERLIPIDEMIA, UNSPECIFIED HYPERLIPIDEMIA TYPE: ICD-10-CM

## 2020-10-01 DIAGNOSIS — J44.9 CHRONIC OBSTRUCTIVE PULMONARY DISEASE, UNSPECIFIED COPD TYPE (HCC): Primary | ICD-10-CM

## 2020-10-01 DIAGNOSIS — E03.9 HYPOTHYROIDISM, UNSPECIFIED TYPE: ICD-10-CM

## 2020-10-01 DIAGNOSIS — I10 ESSENTIAL HYPERTENSION: ICD-10-CM

## 2020-10-01 DIAGNOSIS — Z23 IMMUNIZATION DUE: ICD-10-CM

## 2020-10-01 LAB
ALBUMIN SERPL-MCNC: 4.2 G/DL (ref 3.5–5.2)
ALBUMIN/GLOB SERPL: 1.4 G/DL
ALP SERPL-CCNC: 195 U/L (ref 39–117)
ALT SERPL W P-5'-P-CCNC: 12 U/L (ref 1–33)
ANION GAP SERPL CALCULATED.3IONS-SCNC: 5.4 MMOL/L (ref 5–15)
AST SERPL-CCNC: 18 U/L (ref 1–32)
BILIRUB SERPL-MCNC: 0.2 MG/DL (ref 0–1.2)
BUN SERPL-MCNC: 8 MG/DL (ref 8–23)
BUN/CREAT SERPL: 15.7 (ref 7–25)
CALCIUM SPEC-SCNC: 9.8 MG/DL (ref 8.6–10.5)
CHLORIDE SERPL-SCNC: 99 MMOL/L (ref 98–107)
CHOLEST SERPL-MCNC: 183 MG/DL (ref 0–200)
CO2 SERPL-SCNC: 34.6 MMOL/L (ref 22–29)
CREAT SERPL-MCNC: 0.51 MG/DL (ref 0.57–1)
GFR SERPL CREATININE-BSD FRML MDRD: 116 ML/MIN/1.73
GLOBULIN UR ELPH-MCNC: 3.1 GM/DL
GLUCOSE SERPL-MCNC: 108 MG/DL (ref 65–99)
HDLC SERPL-MCNC: 49 MG/DL (ref 40–60)
LDLC SERPL CALC-MCNC: 93 MG/DL (ref 0–100)
LDLC/HDLC SERPL: 1.89 {RATIO}
POTASSIUM SERPL-SCNC: 4.1 MMOL/L (ref 3.5–5.2)
PROT SERPL-MCNC: 7.3 G/DL (ref 6–8.5)
SODIUM SERPL-SCNC: 139 MMOL/L (ref 136–145)
T4 FREE SERPL-MCNC: 0.93 NG/DL (ref 0.93–1.7)
TRIGL SERPL-MCNC: 207 MG/DL (ref 0–150)
TSH SERPL DL<=0.05 MIU/L-ACNC: 7.02 UIU/ML (ref 0.27–4.2)
VLDLC SERPL-MCNC: 41.4 MG/DL (ref 5–40)

## 2020-10-01 PROCEDURE — 84443 ASSAY THYROID STIM HORMONE: CPT | Performed by: INTERNAL MEDICINE

## 2020-10-01 PROCEDURE — 99214 OFFICE O/P EST MOD 30 MIN: CPT | Performed by: INTERNAL MEDICINE

## 2020-10-01 PROCEDURE — 80053 COMPREHEN METABOLIC PANEL: CPT | Performed by: INTERNAL MEDICINE

## 2020-10-01 PROCEDURE — 36415 COLL VENOUS BLD VENIPUNCTURE: CPT | Performed by: INTERNAL MEDICINE

## 2020-10-01 PROCEDURE — G0008 ADMIN INFLUENZA VIRUS VAC: HCPCS | Performed by: INTERNAL MEDICINE

## 2020-10-01 PROCEDURE — 84439 ASSAY OF FREE THYROXINE: CPT | Performed by: INTERNAL MEDICINE

## 2020-10-01 PROCEDURE — 80061 LIPID PANEL: CPT | Performed by: INTERNAL MEDICINE

## 2020-10-01 PROCEDURE — 90694 VACC AIIV4 NO PRSRV 0.5ML IM: CPT | Performed by: INTERNAL MEDICINE

## 2020-10-01 RX ORDER — CETIRIZINE HYDROCHLORIDE 10 MG/1
10 TABLET ORAL DAILY
COMMUNITY

## 2020-10-01 RX ORDER — GABAPENTIN 300 MG/1
300 CAPSULE ORAL NIGHTLY
COMMUNITY
End: 2020-11-16 | Stop reason: SDUPTHER

## 2020-10-01 RX ORDER — ACETAMINOPHEN 500 MG
1000 TABLET ORAL EVERY 8 HOURS PRN
COMMUNITY

## 2020-10-01 RX ORDER — TRAMADOL HYDROCHLORIDE 50 MG/1
50 TABLET ORAL EVERY 6 HOURS PRN
Qty: 30 TABLET | Refills: 0 | Status: SHIPPED | OUTPATIENT
Start: 2020-10-01 | End: 2021-05-13

## 2020-10-01 NOTE — PROGRESS NOTES
Chief Complaint   Patient presents with   • Follow-up     6 week follow up anemia       History of Present Illness    The patient presents for a follow-up related to hyperlipidemia. She is following a low fat diet. She reports that she is exercising. She is taking simvastatin. The patient is taking her medication as prescribed. She reports no medication side effects, including muscle cramps, abdominal pain, headaches or weakness. She denies chest pain, shortness of breath, orthopnea, paroxysmal nocturnal dyspnea, dyspnea on exertion, edema, palpitations or syncope.    The patient presents for a follow-up related to hypertension. The patient reports that she has had no headaches or blurred vision. She states that she is taking her medication as prescribed. She is not having medication side effects.    The patient presents for a follow-up related to hypothyroidism. She reports a good energy level. She reports no hair loss, heat intolerance, cold intolerance, diarrhea, constipation or sweats. She is taking her medication as prescribed.    The patient presents for a follow-up related to GERD. The patient is on pantoprazole for her gastroesophageal reflux. The medication is taken on a regular basis and gives complete relief of the symptoms. She reports no abdominal pain, belching, dysphagia, early satiety, heartburn, hoarseness, nausea, odynophagia, rectal bleeding, vomiting or weight loss. The GERD has no known aggravating factors.    The patient presents for a follow-up related to chronic obstructive pulmonary disease. Rescue inhaler usage is reported to be daily. The patient reports that she has no known triggers. Since the last office visit, the patient has not sought emergency care. She currently reports no wheezing, cough or sputum production. The patient is using an inhaled steroid. She does rinse her mouth after using her steroid inhaler. She thrush symptoms or a sore mouth. She denies hemoptysis. She is not a  smoker.    Review of Systems    CONSTITUTIONAL- Denies Fever, Chills, Sweats, Weakness or Malaise.    PULMONARY- Denies Hemoptysis or Pleuritic Chest Pain.    CARDIOVASCULAR- Denies Claudication or Irregular Heart Beat.    Medications      Current Outpatient Medications:   •  acetaminophen (TYLENOL) 500 MG tablet, Take 1,000 mg by mouth Every 8 (Eight) Hours As Needed for Mild Pain ., Disp: , Rfl:   •  albuterol (PROAIR HFA) 108 (90 BASE) MCG/ACT inhaler, Inhale 2 puffs every 4 (four) hours as needed., Disp: , Rfl:   •  aspirin (ASPIRIN LOW DOSE) 81 MG chewable tablet, Chew 1 tablet daily., Disp: , Rfl:   •  atenolol (TENORMIN) 50 MG tablet, TAKE 1/2 TABLET BY MOUTH TWICE DAILY, Disp: 90 tablet, Rfl: 1  •  azithromycin (ZITHROMAX) 250 MG tablet, Take 1 tablet by mouth Daily., Disp: 90 tablet, Rfl: 1  •  Calcium Carbonate-Vitamin D (CALCIUM-VITAMIN D) 500-200 MG-UNIT per tablet, Take 1 tablet by mouth daily., Disp: , Rfl:   •  cetirizine (zyrTEC) 10 MG tablet, Take 10 mg by mouth Daily., Disp: , Rfl:   •  clopidogrel (PLAVIX) 75 MG tablet, TAKE 1 TABLET BY MOUTH EVERY DAY, Disp: 90 tablet, Rfl: 1  •  cyclobenzaprine (FLEXERIL) 10 MG tablet, take 1 tablet by mouth twice a day if needed for muscle spasm, Disp: 60 tablet, Rfl: 5  •  DALIRESP 500 MCG tablet tablet, TAKE 1 TABLET BY MOUTH ONCE DAILY, Disp: 90 tablet, Rfl: 2  •  FIBER SELECT GUMMIES PO, Take 2 tablets by mouth Daily., Disp: , Rfl:   •  Fluticasone-Umeclidin-Vilant 100-62.5-25 MCG/INH aerosol powder , Inhale 1 puff Daily., Disp: 28 each, Rfl: 6  •  gabapentin (NEURONTIN) 300 MG capsule, Take 300 mg by mouth Every Night., Disp: , Rfl:   •  ipratropium-albuterol (Combivent Respimat)  MCG/ACT inhaler, Inhale 1 puff 4 (Four) Times a Day. (Patient taking differently: Inhale 1 puff 4 (Four) Times a Day As Needed.), Disp: 12 g, Rfl: 1  •  Iron, Ferrous Sulfate, 325 (65 Fe) MG tablet, Take 1 tablet by mouth 2 (two) times a day., Disp: 60 tablet, Rfl: 2  •   isosorbide mononitrate (IMDUR) 30 MG 24 hr tablet, TAKE 1 TABLET BY MOUTH EVERY DAY, Disp: 90 tablet, Rfl: 1  •  Latanoprost 0.005 % emulsion, Apply 1 drop to eye(s) as directed by provider Daily., Disp: , Rfl:   •  levothyroxine (SYNTHROID, LEVOTHROID) 150 MCG tablet, TAKE 1 TABLET BY MOUTH EVERY DAY, Disp: 90 tablet, Rfl: 1  •  Multiple Vitamins-Minerals (PRESERVISION AREDS) capsule, Take 1 tablet by mouth 2 (two) times a day., Disp: , Rfl:   •  pantoprazole (PROTONIX) 40 MG EC tablet, Take 1 tablet by mouth Daily., Disp: 90 tablet, Rfl: 1  •  sertraline (ZOLOFT) 100 MG tablet, TAKE 1 TABLET BY MOUTH DAILY, Disp: 90 tablet, Rfl: 1  •  simvastatin (ZOCOR) 40 MG tablet, TAKE 1 TABLET BY MOUTH AT BEDTIME, Disp: 90 tablet, Rfl: 1  •  solifenacin (VESICARE) 5 MG tablet, TAKE 1 TABLET BY MOUTH DAILY, Disp: 90 tablet, Rfl: 1  •  Spacer/Aero Chamber Mouthpiece misc, 1 Units Take As Directed., Disp: 1 each, Rfl: 0  •  traMADol (ULTRAM) 50 MG tablet, Take 1 tablet by mouth Every 6 (Six) Hours As Needed for Moderate Pain ., Disp: 30 tablet, Rfl: 0  •  vitamin E 400 UNIT capsule, Take 400 Units by mouth Daily., Disp: , Rfl:   •  nitroglycerin (NITROSTAT) 0.4 MG SL tablet, Place 1 tablet under the tongue See Admin Instructions. Place 1 tablet under the tongue if needed every 5 minutes for chest pain for 3 doses if no relief af, Disp: 25 tablet, Rfl: 0     Allergies    Allergies   Allergen Reactions   • Codeine Mental Status Change   • Penicillins Hives and Swelling   • Dopamine Other (See Comments) and Palpitations     Other reaction(s): Hypertension       Problem List    Patient Active Problem List   Diagnosis   • Coronary arteriosclerosis in native artery   • Chronic obstructive pulmonary disease (CMS/HCC)   • Depression   • Gastroesophageal reflux disease with esophagitis   • Hyperlipidemia   • Hypertension   • Hypothyroidism   • Osteoarthritis   • Peripheral arterial occlusive disease (CMS/HCC)   • Solitary pulmonary  nodule   • Vitamin D deficiency   • Lower back pain   • Carotid bruit   • Hypoxemia   • Chronic respiratory failure with hypoxia and hypercapnia (CMS/HCC)       Medications, Allergies, Problems List and Past History were reviewed and updated.    Physical Examination    /78 (BP Location: Left arm, Patient Position: Sitting, Cuff Size: Adult)   Pulse 84   Temp 97.1 °F (36.2 °C) (Infrared)   Resp 20   Wt 85.3 kg (188 lb)   LMP  (LMP Unknown)   Breastfeeding No   BMI 32.78 kg/m²     HEENT: Head- Normocephalic Atraumatic. Facies- Within normal limits. Pinnas- Normal texture and shape bilaterally. Canals- Normal bilaterally. TMs- Normal bilaterally. Nares- Patent bilaterally. Nasal Septum- is normal. There is no tenderness to palpation over the frontal or maxillary sinuses. Lids- Normal bilaterally. Conjunctiva- Clear bilaterally. Sclera- Anicteric bilaterally. Oropharynx- Moist with no lesions. Tonsils- No enlargement, erythema or exudate.    Neck: Thyroid- non enlarged, symmetric and has no nodules. No bruits are detected. ROM- Normal Range of Motion with no rigidity.    Lungs: Auscultation- Clear to auscultation bilaterally. There are no retractions, clubbing or cyanosis. The Expiratory to Inspiratory ratio is equal.    Cardiovascular: There are no carotid bruits. Heart- Normal Rate with Regular rhythm and no murmurs. There are no gallops. There are no rubs. In the lower extremities there is no edema. The upper extremities do not have edema.    Abdomen: Soft, benign, non-tender with no masses, hernias, organomegaly or scars.    Impression and Assessment    Encounter for Immunization Administration.    Hyperlipidemia.    Essential Hypertension.    Hypothyroidism.    Gastroesophageal Reflux Disease.    Chronic Obstructive Pulmonary Disease.    Plan    Gastroesophageal Reflux Disease Plan: The current plan was continued.    Hyperlipidemia Plan: The patient was instructed to exercise daily, eat a low fat diet  and continue her medications.    Essential Hypertension Plan: The current plan was continued.    Hypothyroidism Plan: The current plan was continued.    Chronic Obstructive Pulmonary Disease Plan: The current plan was continued.    Counseled regarding immunizations and applicable VIS given.    Immunizations Ordered and Administered: InfluenzaElla Quan was seen today for follow-up.    Diagnoses and all orders for this visit:    Chronic obstructive pulmonary disease, unspecified COPD type (CMS/[facility])    Gastroesophageal reflux disease with esophagitis without hemorrhage    Hyperlipidemia, unspecified hyperlipidemia type  -     Comprehensive Metabolic Panel  -     Lipid Panel    Essential hypertension  -     Comprehensive Metabolic Panel    Hypothyroidism, unspecified type  -     TSH  -     T4, Free    Immunization due  -     Fluad Quad >65 years    Other orders  -     traMADol (ULTRAM) 50 MG tablet; Take 1 tablet by mouth Every 6 (Six) Hours As Needed for Moderate Pain .        Return to Office    The patient was instructed to return for follow-up in 1 month. Next Visit: Recheck Blood Pressure.    The patient was instructed to return sooner if the condition changes, worsens, or does not resolve.

## 2020-10-14 RX ORDER — IPRATROPIUM/ALBUTEROL SULFATE 20-100 MCG
MIST INHALER (GRAM) INHALATION
Qty: 12 G | Refills: 3 | Status: SHIPPED | OUTPATIENT
Start: 2020-10-14 | End: 2021-01-14

## 2020-10-14 RX ORDER — PANTOPRAZOLE SODIUM 40 MG/1
40 TABLET, DELAYED RELEASE ORAL DAILY
Qty: 90 TABLET | Refills: 1 | Status: SHIPPED | OUTPATIENT
Start: 2020-10-14 | End: 2021-04-11

## 2020-11-05 ENCOUNTER — OFFICE VISIT (OUTPATIENT)
Dept: INTERNAL MEDICINE | Facility: CLINIC | Age: 80
End: 2020-11-05

## 2020-11-05 VITALS
SYSTOLIC BLOOD PRESSURE: 138 MMHG | DIASTOLIC BLOOD PRESSURE: 78 MMHG | HEART RATE: 56 BPM | WEIGHT: 183 LBS | RESPIRATION RATE: 20 BRPM | BODY MASS INDEX: 31.91 KG/M2 | TEMPERATURE: 97.1 F

## 2020-11-05 DIAGNOSIS — J44.9 CHRONIC OBSTRUCTIVE PULMONARY DISEASE, UNSPECIFIED COPD TYPE (HCC): Primary | ICD-10-CM

## 2020-11-05 DIAGNOSIS — D64.9 ANEMIA, UNSPECIFIED TYPE: ICD-10-CM

## 2020-11-05 DIAGNOSIS — I10 ESSENTIAL HYPERTENSION: ICD-10-CM

## 2020-11-05 LAB
BASOPHILS # BLD AUTO: 0.12 10*3/MM3 (ref 0–0.2)
BASOPHILS NFR BLD AUTO: 0.9 % (ref 0–1.5)
DEPRECATED RDW RBC AUTO: 52.1 FL (ref 37–54)
EOSINOPHIL # BLD AUTO: 0.14 10*3/MM3 (ref 0–0.4)
EOSINOPHIL NFR BLD AUTO: 1 % (ref 0.3–6.2)
ERYTHROCYTE [DISTWIDTH] IN BLOOD BY AUTOMATED COUNT: 16.7 % (ref 12.3–15.4)
HCT VFR BLD AUTO: 40.2 % (ref 34–46.6)
HGB BLD-MCNC: 13.1 G/DL (ref 12–15.9)
IMM GRANULOCYTES # BLD AUTO: 0.08 10*3/MM3 (ref 0–0.05)
IMM GRANULOCYTES NFR BLD AUTO: 0.6 % (ref 0–0.5)
LYMPHOCYTES # BLD AUTO: 2.59 10*3/MM3 (ref 0.7–3.1)
LYMPHOCYTES NFR BLD AUTO: 18.4 % (ref 19.6–45.3)
MCH RBC QN AUTO: 28.2 PG (ref 26.6–33)
MCHC RBC AUTO-ENTMCNC: 32.6 G/DL (ref 31.5–35.7)
MCV RBC AUTO: 86.5 FL (ref 79–97)
MONOCYTES # BLD AUTO: 0.94 10*3/MM3 (ref 0.1–0.9)
MONOCYTES NFR BLD AUTO: 6.7 % (ref 5–12)
NEUTROPHILS NFR BLD AUTO: 10.19 10*3/MM3 (ref 1.7–7)
NEUTROPHILS NFR BLD AUTO: 72.4 % (ref 42.7–76)
NRBC BLD AUTO-RTO: 0 /100 WBC (ref 0–0.2)
PLATELET # BLD AUTO: 306 10*3/MM3 (ref 140–450)
PMV BLD AUTO: 10.5 FL (ref 6–12)
RBC # BLD AUTO: 4.65 10*6/MM3 (ref 3.77–5.28)
WBC # BLD AUTO: 14.06 10*3/MM3 (ref 3.4–10.8)

## 2020-11-05 PROCEDURE — 84466 ASSAY OF TRANSFERRIN: CPT | Performed by: INTERNAL MEDICINE

## 2020-11-05 PROCEDURE — 99214 OFFICE O/P EST MOD 30 MIN: CPT | Performed by: INTERNAL MEDICINE

## 2020-11-05 PROCEDURE — 80053 COMPREHEN METABOLIC PANEL: CPT | Performed by: INTERNAL MEDICINE

## 2020-11-05 PROCEDURE — 85025 COMPLETE CBC W/AUTO DIFF WBC: CPT | Performed by: INTERNAL MEDICINE

## 2020-11-05 PROCEDURE — 36415 COLL VENOUS BLD VENIPUNCTURE: CPT | Performed by: INTERNAL MEDICINE

## 2020-11-05 PROCEDURE — 82728 ASSAY OF FERRITIN: CPT | Performed by: INTERNAL MEDICINE

## 2020-11-05 PROCEDURE — 83540 ASSAY OF IRON: CPT | Performed by: INTERNAL MEDICINE

## 2020-11-05 RX ORDER — DOCUSATE SODIUM 100 MG/1
100 CAPSULE, LIQUID FILLED ORAL DAILY
Qty: 30 CAPSULE | Refills: 5 | Status: SHIPPED | OUTPATIENT
Start: 2020-11-05 | End: 2021-05-13

## 2020-11-05 RX ORDER — DOCUSATE SODIUM 100 MG/1
100 CAPSULE, LIQUID FILLED ORAL DAILY
COMMUNITY
End: 2020-11-05 | Stop reason: SDUPTHER

## 2020-11-05 NOTE — PROGRESS NOTES
Chief Complaint   Patient presents with   • Follow-up     1 MONTH FOLLOW UP        History of Present Illness      The patient presents for a follow-up related to hypertension. The patient reports that she has had no headaches, chest pain, dyspnea, edema, syncope, blurred vision or palpitations. She states that she is taking her medication as prescribed. She is not having medication side effects.    The patient presents for a follow-up related to chronic obstructive pulmonary disease. The patient reports that she has no known triggers. Since the last office visit, the patient has not sought emergency care. She currently reports no wheezing, cough or sputum production. The patient is using an inhaled steroid. She does rinse her mouth after using her steroid inhaler. She thrush symptoms or a sore mouth. She denies hemoptysis.    The patient presents for a follow-up related to anemia. There are no reports of blood loss. The patient has fatigue but does not have a dry cough, a wet cough, fever, nausea, vomiting, diarrhea, myalgias, abdominal pain, sweats, weight loss, decreased appetite or chills. The patient's energy level is decreased.    Review of Systems    CONSTITUTIONAL- Denies Weakness.    PULMONARY- Denies Hemoptysis or Pleuritic Chest Pain.    CARDIOVASCULAR- Denies Claudication or Irregular Heart Beat.    Medications      Current Outpatient Medications:   •  acetaminophen (TYLENOL) 500 MG tablet, Take 1,000 mg by mouth Every 8 (Eight) Hours As Needed for Mild Pain ., Disp: , Rfl:   •  albuterol (PROAIR HFA) 108 (90 BASE) MCG/ACT inhaler, Inhale 2 puffs every 4 (four) hours as needed., Disp: , Rfl:   •  aspirin (ASPIRIN LOW DOSE) 81 MG chewable tablet, Chew 1 tablet daily., Disp: , Rfl:   •  atenolol (TENORMIN) 50 MG tablet, TAKE 1/2 TABLET BY MOUTH TWICE DAILY, Disp: 90 tablet, Rfl: 1  •  azithromycin (ZITHROMAX) 250 MG tablet, Take 1 tablet by mouth Daily., Disp: 90 tablet, Rfl: 1  •  Calcium  Carbonate-Vitamin D (CALCIUM-VITAMIN D) 500-200 MG-UNIT per tablet, Take 1 tablet by mouth daily., Disp: , Rfl:   •  cetirizine (zyrTEC) 10 MG tablet, Take 10 mg by mouth Daily., Disp: , Rfl:   •  clopidogrel (PLAVIX) 75 MG tablet, TAKE 1 TABLET BY MOUTH EVERY DAY, Disp: 90 tablet, Rfl: 1  •  Combivent Respimat  MCG/ACT inhaler, INHALE 1 PUFF BY MOUTH FOUR TIMES DAILY, Disp: 12 g, Rfl: 3  •  cyclobenzaprine (FLEXERIL) 10 MG tablet, take 1 tablet by mouth twice a day if needed for muscle spasm, Disp: 60 tablet, Rfl: 5  •  DALIRESP 500 MCG tablet tablet, TAKE 1 TABLET BY MOUTH ONCE DAILY, Disp: 90 tablet, Rfl: 2  •  docusate sodium (COLACE) 100 MG capsule, Take 1 capsule by mouth Daily., Disp: 30 capsule, Rfl: 5  •  FIBER SELECT GUMMIES PO, Take 2 tablets by mouth Daily., Disp: , Rfl:   •  Fluticasone-Umeclidin-Vilant 100-62.5-25 MCG/INH aerosol powder , Inhale 1 puff Daily., Disp: 28 each, Rfl: 6  •  gabapentin (NEURONTIN) 300 MG capsule, Take 300 mg by mouth Every Night., Disp: , Rfl:   •  Iron, Ferrous Sulfate, 325 (65 Fe) MG tablet, Take 1 tablet by mouth 2 (two) times a day., Disp: 60 tablet, Rfl: 2  •  isosorbide mononitrate (IMDUR) 30 MG 24 hr tablet, TAKE 1 TABLET BY MOUTH EVERY DAY, Disp: 90 tablet, Rfl: 1  •  Latanoprost 0.005 % emulsion, Apply 1 drop to eye(s) as directed by provider Daily., Disp: , Rfl:   •  levothyroxine (SYNTHROID, LEVOTHROID) 150 MCG tablet, TAKE 1 TABLET BY MOUTH EVERY DAY, Disp: 90 tablet, Rfl: 1  •  Multiple Vitamins-Minerals (PRESERVISION AREDS) capsule, Take 1 tablet by mouth 2 (two) times a day., Disp: , Rfl:   •  pantoprazole (PROTONIX) 40 MG EC tablet, TAKE 1 TABLET BY MOUTH DAILY, Disp: 90 tablet, Rfl: 1  •  sertraline (ZOLOFT) 100 MG tablet, TAKE 1 TABLET BY MOUTH DAILY, Disp: 90 tablet, Rfl: 1  •  simvastatin (ZOCOR) 40 MG tablet, TAKE 1 TABLET BY MOUTH AT BEDTIME, Disp: 90 tablet, Rfl: 1  •  solifenacin (VESICARE) 5 MG tablet, TAKE 1 TABLET BY MOUTH DAILY, Disp: 90  tablet, Rfl: 1  •  Spacer/Aero Chamber Mouthpiece misc, 1 Units Take As Directed., Disp: 1 each, Rfl: 0  •  traMADol (ULTRAM) 50 MG tablet, Take 1 tablet by mouth Every 6 (Six) Hours As Needed for Moderate Pain ., Disp: 30 tablet, Rfl: 0  •  vitamin E 400 UNIT capsule, Take 400 Units by mouth Daily., Disp: , Rfl:   •  nitroglycerin (NITROSTAT) 0.4 MG SL tablet, Place 1 tablet under the tongue See Admin Instructions. Place 1 tablet under the tongue if needed every 5 minutes for chest pain for 3 doses if no relief af, Disp: 25 tablet, Rfl: 0     Allergies    Allergies   Allergen Reactions   • Codeine Mental Status Change   • Penicillins Hives and Swelling   • Dopamine Other (See Comments) and Palpitations     Other reaction(s): Hypertension       Problem List    Patient Active Problem List   Diagnosis   • Coronary arteriosclerosis in native artery   • Chronic obstructive pulmonary disease (CMS/HCC)   • Depression   • Gastroesophageal reflux disease with esophagitis   • Hyperlipidemia   • Hypertension   • Hypothyroidism   • Osteoarthritis   • Peripheral arterial occlusive disease (CMS/HCC)   • Solitary pulmonary nodule   • Vitamin D deficiency   • Lower back pain   • Carotid bruit   • Hypoxemia   • Chronic respiratory failure with hypoxia and hypercapnia (CMS/HCC)       Medications, Allergies, Problems List and Past History were reviewed and updated.    Physical Examination    /78 (BP Location: Left arm, Patient Position: Sitting, Cuff Size: Adult)   Pulse 56   Temp 97.1 °F (36.2 °C) (Infrared)   Resp 20   Wt 83 kg (183 lb)   LMP  (LMP Unknown)   Breastfeeding No   BMI 31.91 kg/m²     HEENT: Head- Normocephalic Atraumatic. Facies- Within normal limits. Pinnas- Normal texture and shape bilaterally. Canals- Normal bilaterally. TMs- Normal bilaterally. Nares- Patent bilaterally. Nasal Septum- is normal. There is no tenderness to palpation over the frontal or maxillary sinuses. Lids- Normal bilaterally.  Conjunctiva- Clear bilaterally. Sclera- Anicteric bilaterally. Oropharynx- Moist with no lesions. Tonsils- No enlargement, erythema or exudate.    Neck: Thyroid- non enlarged, symmetric and has no nodules. No bruits are detected. ROM- Normal Range of Motion with no rigidity.    Lungs: Auscultation- Clear to auscultation bilaterally. There are no retractions, clubbing or cyanosis. The Expiratory to Inspiratory ratio is equal.    Lymph Nodes: Cervical- no enlarged lymph nodes noted.    Cardiovascular: There are no carotid bruits. Heart- Normal Rate with Regular rhythm and no murmurs. There are no gallops. There are no rubs. In the lower extremities there is no edema. The upper extremities do not have edema.    Abdomen: Soft, benign, non-tender with no masses, hernias, organomegaly or scars.    Impression and Assessment    Essential Hypertension.    Chronic Obstructive Pulmonary Disease.    Anemia.    Plan    Essential Hypertension Plan: The current plan was continued.    Chronic Obstructive Pulmonary Disease Plan: The current plan was continued.    Anemia Plan: Further plans will be made after results of tests are available.    Diagnoses and all orders for this visit:    1. Chronic obstructive pulmonary disease, unspecified COPD type (CMS/HCC) (Primary)  -     Comprehensive Metabolic Panel    2. Essential hypertension  -     Comprehensive Metabolic Panel    3. Anemia, unspecified type  -     CBC & Differential  -     Ferritin  -     Iron Profile  -     Iron, Ferrous Sulfate, 325 (65 Fe) MG tablet; Take 1 tablet by mouth 2 (two) times a day.  Dispense: 60 tablet; Refill: 2  -     Comprehensive Metabolic Panel    Other orders  -     docusate sodium (COLACE) 100 MG capsule; Take 1 capsule by mouth Daily.  Dispense: 30 capsule; Refill: 5        Return to Office    The patient was instructed to return for follow-up at the next scheduled visit.    The patient was instructed to return sooner if the condition changes,  worsens, or does not resolve.

## 2020-11-06 LAB
ALBUMIN SERPL-MCNC: 4.5 G/DL (ref 3.5–5.2)
ALBUMIN/GLOB SERPL: 1.6 G/DL
ALP SERPL-CCNC: 161 U/L (ref 39–117)
ALT SERPL W P-5'-P-CCNC: 12 U/L (ref 1–33)
ANION GAP SERPL CALCULATED.3IONS-SCNC: 6.9 MMOL/L (ref 5–15)
AST SERPL-CCNC: 18 U/L (ref 1–32)
BILIRUB SERPL-MCNC: 0.3 MG/DL (ref 0–1.2)
BUN SERPL-MCNC: 10 MG/DL (ref 8–23)
BUN/CREAT SERPL: 19.6 (ref 7–25)
CALCIUM SPEC-SCNC: 9.7 MG/DL (ref 8.6–10.5)
CHLORIDE SERPL-SCNC: 101 MMOL/L (ref 98–107)
CO2 SERPL-SCNC: 34.1 MMOL/L (ref 22–29)
CREAT SERPL-MCNC: 0.51 MG/DL (ref 0.57–1)
FERRITIN SERPL-MCNC: 57.4 NG/ML (ref 13–150)
GFR SERPL CREATININE-BSD FRML MDRD: 116 ML/MIN/1.73
GLOBULIN UR ELPH-MCNC: 2.9 GM/DL
GLUCOSE SERPL-MCNC: 101 MG/DL (ref 65–99)
IRON 24H UR-MRATE: 85 MCG/DL (ref 37–145)
IRON SATN MFR SERPL: 19 % (ref 20–50)
POTASSIUM SERPL-SCNC: 4.9 MMOL/L (ref 3.5–5.2)
PROT SERPL-MCNC: 7.4 G/DL (ref 6–8.5)
SODIUM SERPL-SCNC: 142 MMOL/L (ref 136–145)
TIBC SERPL-MCNC: 448 MCG/DL (ref 298–536)
TRANSFERRIN SERPL-MCNC: 301 MG/DL (ref 200–360)

## 2020-11-09 DIAGNOSIS — J44.9 CHRONIC OBSTRUCTIVE PULMONARY DISEASE, UNSPECIFIED COPD TYPE (HCC): ICD-10-CM

## 2020-11-09 RX ORDER — AZITHROMYCIN 250 MG/1
250 TABLET, FILM COATED ORAL DAILY
Qty: 90 TABLET | Refills: 1 | Status: SHIPPED | OUTPATIENT
Start: 2020-11-09 | End: 2021-05-13

## 2020-11-10 DIAGNOSIS — D64.9 ANEMIA, UNSPECIFIED TYPE: ICD-10-CM

## 2020-11-10 RX ORDER — FERROUS SULFATE 325(65) MG
TABLET ORAL
Qty: 60 TABLET | Refills: 2 | Status: SHIPPED | OUTPATIENT
Start: 2020-11-10 | End: 2021-02-01

## 2020-11-16 RX ORDER — ROFLUMILAST 500 UG/1
500 TABLET ORAL DAILY
Qty: 90 TABLET | Refills: 1 | Status: SHIPPED | OUTPATIENT
Start: 2020-11-16 | End: 2021-05-16

## 2020-11-16 RX ORDER — GABAPENTIN 300 MG/1
300 CAPSULE ORAL NIGHTLY
Qty: 30 CAPSULE | Refills: 3 | Status: SHIPPED | OUTPATIENT
Start: 2020-11-16 | End: 2020-12-12 | Stop reason: SDUPTHER

## 2020-12-12 DIAGNOSIS — N39.3 STRESS INCONTINENCE OF URINE: ICD-10-CM

## 2020-12-13 RX ORDER — SERTRALINE HYDROCHLORIDE 100 MG/1
100 TABLET, FILM COATED ORAL DAILY
Qty: 90 TABLET | Refills: 1 | Status: SHIPPED | OUTPATIENT
Start: 2020-12-13 | End: 2021-09-08

## 2020-12-13 RX ORDER — SIMVASTATIN 40 MG
TABLET ORAL
Qty: 90 TABLET | Refills: 1 | Status: SHIPPED | OUTPATIENT
Start: 2020-12-13 | End: 2021-01-14

## 2020-12-13 RX ORDER — SOLIFENACIN SUCCINATE 5 MG/1
5 TABLET, FILM COATED ORAL DAILY
Qty: 90 TABLET | Refills: 1 | Status: SHIPPED | OUTPATIENT
Start: 2020-12-13 | End: 2021-09-08

## 2020-12-13 RX ORDER — SIMVASTATIN 40 MG
40 TABLET ORAL
Qty: 90 TABLET | Refills: 1 | Status: SHIPPED | OUTPATIENT
Start: 2020-12-13 | End: 2021-09-20

## 2020-12-13 RX ORDER — GABAPENTIN 300 MG/1
300 CAPSULE ORAL NIGHTLY
Qty: 30 CAPSULE | Refills: 3 | Status: SHIPPED | OUTPATIENT
Start: 2020-12-13 | End: 2021-04-10 | Stop reason: SDUPTHER

## 2020-12-14 RX ORDER — NITROGLYCERIN 0.4 MG/1
0.4 TABLET SUBLINGUAL SEE ADMIN INSTRUCTIONS
Qty: 25 TABLET | Refills: 0 | Status: SHIPPED | OUTPATIENT
Start: 2020-12-14 | End: 2021-01-07

## 2021-01-07 RX ORDER — NITROGLYCERIN 0.4 MG/1
TABLET SUBLINGUAL
Qty: 25 TABLET | Refills: 0 | Status: SHIPPED | OUTPATIENT
Start: 2021-01-07

## 2021-01-13 ENCOUNTER — TELEPHONE (OUTPATIENT)
Dept: INTERNAL MEDICINE | Facility: CLINIC | Age: 81
End: 2021-01-13

## 2021-01-13 NOTE — TELEPHONE ENCOUNTER
PLEASE CALL CECILIO -[phone] PT IS STILL HAVING DRAINAGE/SNEEZING; PLEASE LEAVE MESSAGE IF NO ANSWER.

## 2021-01-13 NOTE — TELEPHONE ENCOUNTER
S/W pt dgt., Clary, states her mom has an appt with Dr Guajardo scheduled tomorrow but when they got their reminder call it said to call the ofc with any respiratory symptoms.  States she does not feel her mom could have Covid as they have stayed isolated and not around anyone.  States they even cancelled doctor appts in December because of increase numbers.  States her mom started sneezing back in September and continue to have increase issues with sneezing and runny nose so started Zyrtec Oct 1.  States has not seemed to help at all and that issues were at an all time intensity in November. Denies fever.  O2 sats 95%.  But continues to have frequent sneezing fits and a runny nose.  States the only change at all in environment, food or meds is that she started the Trilogy Inhaler and machine in September, right about time this started.  States pt doesn't want to stop med as it is once daily and she feels very beneficial to her.  States they want to keep appt tomorrow but was calling as requested.  [facility] pt also had some mystery bruising along her jaw couple weeks ago but has seemed to resolve but they took pics that they want to show Dr Guajardo.

## 2021-01-13 NOTE — TELEPHONE ENCOUNTER
S/W pt  Dgt., Clary, informed that Dr Guajardo said okay to keep in person appt and he will go over issues at appt.  Verb understanding and great apprec.

## 2021-01-14 ENCOUNTER — OFFICE VISIT (OUTPATIENT)
Dept: INTERNAL MEDICINE | Facility: CLINIC | Age: 81
End: 2021-01-14

## 2021-01-14 VITALS
RESPIRATION RATE: 20 BRPM | BODY MASS INDEX: 33.3 KG/M2 | SYSTOLIC BLOOD PRESSURE: 156 MMHG | HEART RATE: 72 BPM | WEIGHT: 191 LBS | TEMPERATURE: 97.1 F | DIASTOLIC BLOOD PRESSURE: 78 MMHG

## 2021-01-14 DIAGNOSIS — K21.00 GASTROESOPHAGEAL REFLUX DISEASE WITH ESOPHAGITIS WITHOUT HEMORRHAGE: ICD-10-CM

## 2021-01-14 DIAGNOSIS — I10 ESSENTIAL HYPERTENSION: Primary | ICD-10-CM

## 2021-01-14 DIAGNOSIS — D64.9 ANEMIA, UNSPECIFIED TYPE: ICD-10-CM

## 2021-01-14 DIAGNOSIS — E03.9 HYPOTHYROIDISM, UNSPECIFIED TYPE: ICD-10-CM

## 2021-01-14 DIAGNOSIS — J44.9 CHRONIC OBSTRUCTIVE PULMONARY DISEASE, UNSPECIFIED COPD TYPE (HCC): ICD-10-CM

## 2021-01-14 DIAGNOSIS — E78.5 HYPERLIPIDEMIA, UNSPECIFIED HYPERLIPIDEMIA TYPE: ICD-10-CM

## 2021-01-14 PROCEDURE — 84439 ASSAY OF FREE THYROXINE: CPT | Performed by: INTERNAL MEDICINE

## 2021-01-14 PROCEDURE — 85025 COMPLETE CBC W/AUTO DIFF WBC: CPT | Performed by: INTERNAL MEDICINE

## 2021-01-14 PROCEDURE — 80061 LIPID PANEL: CPT | Performed by: INTERNAL MEDICINE

## 2021-01-14 PROCEDURE — 84443 ASSAY THYROID STIM HORMONE: CPT | Performed by: INTERNAL MEDICINE

## 2021-01-14 PROCEDURE — 80053 COMPREHEN METABOLIC PANEL: CPT | Performed by: INTERNAL MEDICINE

## 2021-01-14 PROCEDURE — 99214 OFFICE O/P EST MOD 30 MIN: CPT | Performed by: INTERNAL MEDICINE

## 2021-01-14 NOTE — PROGRESS NOTES
Chief Complaint   Patient presents with   • Follow-up     3 month follow up chronic medical problems       History of Present Illness    The patient presents for a follow-up related to hypertension. The patient reports that she has had no headaches, chest pain, dyspnea, edema, syncope, blurred vision or palpitations. She states that she is taking her medication as prescribed. She is not having medication side effects.    The patient presents for a follow-up related to GERD. The patient is on pantoprazole for her gastroesophageal reflux. The medication is taken on a regular basis and gives complete relief of the symptoms. She reports no abdominal pain, belching, diarrhea, dysphagia, early satiety, heartburn, hoarseness, nausea, odynophagia, rectal bleeding, vomiting or weight loss. The GERD has no known aggravating factors.    The patient presents for a follow-up related to hyperlipidemia. She is following a low fat diet. She reports that she is exercising. She is taking simvastatin. The patient is taking her medication as prescribed. She reports no medication side effects, including muscle cramps, abdominal pain, headaches or weakness. She denies orthopnea, paroxysmal nocturnal dyspnea or dyspnea on exertion.    The patient presents for a follow-up related to hypothyroidism. She reports a good energy level. She reports no hair loss, heat intolerance, cold intolerance, constipation or sweats. She is taking her medication as prescribed.    The patient presents for a follow-up related to chronic obstructive pulmonary disease. Rescue inhaler usage is reported to be daily. The patient reports that she has no known triggers. Since the last office visit, the patient has not sought emergency care. She currently reports that she is experiencing sputum production but experiences no wheezing or cough. The patient is using an inhaled steroid. She does rinse her mouth after using her steroid inhaler. She thrush symptoms or a  sore mouth. She denies hemoptysis. She is not a smoker.    The patient presents for a follow-up related to anemia. There are no reports of blood loss. The patient has no symptoms of a dry cough, a wet cough, fever, myalgias, sweats, decreased appetite, chills or paresthesias. The patient's energy level is normal.    Review of Systems    CONSTITUTIONAL- Denies Weakness or Malaise.    HENT- Denies Nasal Discharge, Sore Throat, Ear Pain, Ear Drainage, Sinus Pain, Nasal Congestion, Decreased Hearing or Tinnitus.    PULMONARY- Denies Hemoptysis.    CARDIOVASCULAR- Denies Claudication or Irregular Heart Beat.    INTEGUMENTARY- Denies Rash, Lumps, Sores, Itching or Dryness.    Medications      Current Outpatient Medications:   •  acetaminophen (TYLENOL) 500 MG tablet, Take 1,000 mg by mouth Every 8 (Eight) Hours As Needed for Mild Pain ., Disp: , Rfl:   •  aspirin (ASPIRIN LOW DOSE) 81 MG chewable tablet, Chew 1 tablet daily., Disp: , Rfl:   •  atenolol (TENORMIN) 50 MG tablet, TAKE 1/2 TABLET BY MOUTH TWICE DAILY, Disp: 90 tablet, Rfl: 1  •  azithromycin (ZITHROMAX) 250 MG tablet, Take 1 tablet by mouth Daily., Disp: 90 tablet, Rfl: 1  •  Calcium Carbonate-Vitamin D (CALCIUM-VITAMIN D) 500-200 MG-UNIT per tablet, Take 1 tablet by mouth daily., Disp: , Rfl:   •  cetirizine (zyrTEC) 10 MG tablet, Take 10 mg by mouth Daily., Disp: , Rfl:   •  clopidogrel (PLAVIX) 75 MG tablet, TAKE 1 TABLET BY MOUTH EVERY DAY, Disp: 90 tablet, Rfl: 1  •  docusate sodium (COLACE) 100 MG capsule, Take 1 capsule by mouth Daily., Disp: 30 capsule, Rfl: 5  •  FeroSul 325 (65 Fe) MG tablet, TAKE 1 TABLET BY MOUTH TWICE DAILY, Disp: 60 tablet, Rfl: 2  •  FIBER SELECT GUMMIES PO, Take 2 tablets by mouth Daily., Disp: , Rfl:   •  Fluticasone-Umeclidin-Vilant 100-62.5-25 MCG/INH aerosol powder , Inhale 1 puff Daily., Disp: 3 each, Rfl: 3  •  gabapentin (NEURONTIN) 300 MG capsule, Take 1 capsule by mouth Every Night., Disp: 30 capsule, Rfl: 3  •   isosorbide mononitrate (IMDUR) 30 MG 24 hr tablet, TAKE 1 TABLET BY MOUTH EVERY DAY, Disp: 90 tablet, Rfl: 1  •  Latanoprost 0.005 % emulsion, Apply 1 drop to eye(s) as directed by provider Daily., Disp: , Rfl:   •  levothyroxine (SYNTHROID, LEVOTHROID) 150 MCG tablet, TAKE 1 TABLET BY MOUTH EVERY DAY, Disp: 90 tablet, Rfl: 1  •  Multiple Vitamins-Minerals (PRESERVISION AREDS) capsule, Take 1 tablet by mouth 2 (two) times a day., Disp: , Rfl:   •  pantoprazole (PROTONIX) 40 MG EC tablet, TAKE 1 TABLET BY MOUTH DAILY, Disp: 90 tablet, Rfl: 1  •  roflumilast (Daliresp) 500 MCG tablet tablet, Take 1 tablet by mouth Daily., Disp: 90 tablet, Rfl: 1  •  sertraline (ZOLOFT) 100 MG tablet, Take 1 tablet by mouth Daily., Disp: 90 tablet, Rfl: 1  •  simvastatin (ZOCOR) 40 MG tablet, Take 1 tablet by mouth every night at bedtime., Disp: 90 tablet, Rfl: 1  •  solifenacin (VESICARE) 5 MG tablet, Take 1 tablet by mouth Daily., Disp: 90 tablet, Rfl: 1  •  Spacer/Aero Chamber Mouthpiece misc, 1 Units Take As Directed., Disp: 1 each, Rfl: 0  •  traMADol (ULTRAM) 50 MG tablet, Take 1 tablet by mouth Every 6 (Six) Hours As Needed for Moderate Pain ., Disp: 30 tablet, Rfl: 0  •  albuterol (PROAIR HFA) 108 (90 BASE) MCG/ACT inhaler, Inhale 2 puffs every 4 (four) hours as needed., Disp: , Rfl:   •  cyclobenzaprine (FLEXERIL) 10 MG tablet, take 1 tablet by mouth twice a day if needed for muscle spasm, Disp: 60 tablet, Rfl: 5  •  nitroglycerin (NITROSTAT) 0.4 MG SL tablet, PLACE 1 TABLET UNDER THE TONGUE AS NEEDED EVERY 5 MINUTES FOR CHEST PAIN. CALL 911 IF NO RELIEF AFTER 2ND DOSE, Disp: 25 tablet, Rfl: 0     Allergies    Allergies   Allergen Reactions   • Codeine Mental Status Change   • Penicillins Hives and Swelling   • Dopamine Other (See Comments) and Palpitations     Other reaction(s): Hypertension       Problem List    Patient Active Problem List   Diagnosis   • Coronary arteriosclerosis in native artery   • Chronic obstructive  pulmonary disease (CMS/HCC)   • Depression   • Gastroesophageal reflux disease with esophagitis   • Hyperlipidemia   • Hypertension   • Hypothyroidism   • Osteoarthritis   • Peripheral arterial occlusive disease (CMS/HCC)   • Solitary pulmonary nodule   • Vitamin D deficiency   • Lower back pain   • Carotid bruit   • Hypoxemia   • Chronic respiratory failure with hypoxia and hypercapnia (CMS/HCC)       Medications, Allergies, Problems List and Past History were reviewed and updated.    Physical Examination    /78 (BP Location: Left arm, Patient Position: Sitting, Cuff Size: Adult)   Pulse 72   Temp 97.1 °F (36.2 °C) (Infrared)   Resp 20   Wt 86.6 kg (191 lb)   LMP  (LMP Unknown)   Breastfeeding No   BMI 33.30 kg/m²     HEENT: Head- Normocephalic Atraumatic. Facies- Within normal limits. Pinnas- Normal texture and shape bilaterally. Canals- Normal bilaterally. TMs- Normal bilaterally. Nares- Patent bilaterally. Nasal Septum- is normal. There is no tenderness to palpation over the frontal or maxillary sinuses. Lids- Normal bilaterally. Conjunctiva- Clear bilaterally. Sclera- Anicteric bilaterally. Oropharynx- Moist with no lesions. Tonsils- No enlargement, erythema or exudate.    Neck: Thyroid- non enlarged, symmetric and has no nodules. No bruits are detected. ROM- Normal Range of Motion with no rigidity.    Lungs: Auscultation- Clear to auscultation bilaterally. There are no retractions, clubbing or cyanosis. The Expiratory to Inspiratory ratio is equal.    Lymph Nodes: Cervical- no enlarged lymph nodes noted. Clavicular- Deferred. Axillary- Deferred. Inguinal- Deferred.    Cardiovascular: There are no carotid bruits. Heart- Normal Rate with Regular rhythm and no murmurs. There are no gallops. There are no rubs. In the lower extremities there is no edema. The upper extremities do not have edema.    Abdomen: Soft, benign, non-tender with no masses, hernias, organomegaly or scars.    Impression and  Assessment    Essential Hypertension.    Gastroesophageal Reflux Disease.    Hyperlipidemia.    Hypothyroidism.    Chronic Obstructive Pulmonary Disease.    Anemia.    Plan    Gastroesophageal Reflux Disease Plan: The current plan was continued.    Essential Hypertension Plan: The current plan was continued.    Hyperlipidemia Plan: The patient was instructed to exercise daily, eat a low fat diet and continue her medications.    Hypothyroidism Plan: The current plan was continued.    Chronic Obstructive Pulmonary Disease Plan: She will follow-up with pulmonology. Continue the current medication regimen.    Anemia Plan: Further plans will be made after results of tests are available.    Diagnoses and all orders for this visit:    1. Essential hypertension (Primary)  -     Comprehensive Metabolic Panel    2. Gastroesophageal reflux disease with esophagitis without hemorrhage    3. Hyperlipidemia, unspecified hyperlipidemia type  -     Comprehensive Metabolic Panel  -     Lipid Panel    4. Hypothyroidism, unspecified type  -     TSH  -     T4, Free    5. Chronic obstructive pulmonary disease, unspecified COPD type (CMS/[facility])    6. Anemia, unspecified type  -     CBC & Differential  -     CBC Auto Differential        Return to Office    The patient was instructed to return for follow-up in 4 months. The patient was instructed to return sooner if the condition changes, worsens, or does not resolve.

## 2021-01-15 LAB
ALBUMIN SERPL-MCNC: 4.7 G/DL (ref 3.5–5.2)
ALBUMIN/GLOB SERPL: 1.7 G/DL
ALP SERPL-CCNC: 116 U/L (ref 39–117)
ALT SERPL W P-5'-P-CCNC: 9 U/L (ref 1–33)
ANION GAP SERPL CALCULATED.3IONS-SCNC: 10.4 MMOL/L (ref 5–15)
AST SERPL-CCNC: 13 U/L (ref 1–32)
BASOPHILS # BLD AUTO: 0.14 10*3/MM3 (ref 0–0.2)
BASOPHILS NFR BLD AUTO: 0.9 % (ref 0–1.5)
BILIRUB SERPL-MCNC: 0.4 MG/DL (ref 0–1.2)
BUN SERPL-MCNC: 12 MG/DL (ref 8–23)
BUN/CREAT SERPL: 20.3 (ref 7–25)
CALCIUM SPEC-SCNC: 10 MG/DL (ref 8.6–10.5)
CHLORIDE SERPL-SCNC: 99 MMOL/L (ref 98–107)
CHOLEST SERPL-MCNC: 127 MG/DL (ref 0–200)
CO2 SERPL-SCNC: 31.6 MMOL/L (ref 22–29)
CREAT SERPL-MCNC: 0.59 MG/DL (ref 0.57–1)
DEPRECATED RDW RBC AUTO: 44.6 FL (ref 37–54)
EOSINOPHIL # BLD AUTO: 0.16 10*3/MM3 (ref 0–0.4)
EOSINOPHIL NFR BLD AUTO: 1 % (ref 0.3–6.2)
ERYTHROCYTE [DISTWIDTH] IN BLOOD BY AUTOMATED COUNT: 13.1 % (ref 12.3–15.4)
GFR SERPL CREATININE-BSD FRML MDRD: 98 ML/MIN/1.73
GLOBULIN UR ELPH-MCNC: 2.7 GM/DL
GLUCOSE SERPL-MCNC: 93 MG/DL (ref 65–99)
HCT VFR BLD AUTO: 39.7 % (ref 34–46.6)
HDLC SERPL-MCNC: 41 MG/DL (ref 40–60)
HGB BLD-MCNC: 13.3 G/DL (ref 12–15.9)
IMM GRANULOCYTES # BLD AUTO: 0.09 10*3/MM3 (ref 0–0.05)
IMM GRANULOCYTES NFR BLD AUTO: 0.6 % (ref 0–0.5)
LDLC SERPL CALC-MCNC: 59 MG/DL (ref 0–100)
LDLC/HDLC SERPL: 1.34 {RATIO}
LYMPHOCYTES # BLD AUTO: 2.52 10*3/MM3 (ref 0.7–3.1)
LYMPHOCYTES NFR BLD AUTO: 16 % (ref 19.6–45.3)
MCH RBC QN AUTO: 31.2 PG (ref 26.6–33)
MCHC RBC AUTO-ENTMCNC: 33.5 G/DL (ref 31.5–35.7)
MCV RBC AUTO: 93.2 FL (ref 79–97)
MONOCYTES # BLD AUTO: 0.87 10*3/MM3 (ref 0.1–0.9)
MONOCYTES NFR BLD AUTO: 5.5 % (ref 5–12)
NEUTROPHILS NFR BLD AUTO: 11.99 10*3/MM3 (ref 1.7–7)
NEUTROPHILS NFR BLD AUTO: 76 % (ref 42.7–76)
NRBC BLD AUTO-RTO: 0 /100 WBC (ref 0–0.2)
PLATELET # BLD AUTO: 316 10*3/MM3 (ref 140–450)
PMV BLD AUTO: 10.3 FL (ref 6–12)
POTASSIUM SERPL-SCNC: 4 MMOL/L (ref 3.5–5.2)
PROT SERPL-MCNC: 7.4 G/DL (ref 6–8.5)
RBC # BLD AUTO: 4.26 10*6/MM3 (ref 3.77–5.28)
SODIUM SERPL-SCNC: 141 MMOL/L (ref 136–145)
T4 FREE SERPL-MCNC: 1.32 NG/DL (ref 0.93–1.7)
TRIGL SERPL-MCNC: 155 MG/DL (ref 0–150)
TSH SERPL DL<=0.05 MIU/L-ACNC: 4.38 UIU/ML (ref 0.27–4.2)
VLDLC SERPL-MCNC: 27 MG/DL (ref 5–40)
WBC # BLD AUTO: 15.77 10*3/MM3 (ref 3.4–10.8)

## 2021-02-01 DIAGNOSIS — D64.9 ANEMIA, UNSPECIFIED TYPE: ICD-10-CM

## 2021-02-01 RX ORDER — FERROUS SULFATE 325(65) MG
TABLET ORAL
Qty: 60 TABLET | Refills: 3 | Status: SHIPPED | OUTPATIENT
Start: 2021-02-01 | End: 2021-06-09 | Stop reason: SDUPTHER

## 2021-02-12 NOTE — TELEPHONE ENCOUNTER
Last Office Visit: 01/14/2021  Next Office Visit: 05/13/2021    Labs completed in past 6 months? yes  Labs completed in past year? yes    Last Refill Date: 08/15/2020  Quantity: 90  Refills: 1     Pharmacy: [id number] [address] AT [facility] [address]  - [phone]  - [phone] FX

## 2021-02-13 RX ORDER — LEVOTHYROXINE SODIUM 0.15 MG/1
TABLET ORAL
Qty: 90 TABLET | Refills: 1 | Status: SHIPPED | OUTPATIENT
Start: 2021-02-13 | End: 2021-08-13

## 2021-02-13 RX ORDER — CLOPIDOGREL BISULFATE 75 MG/1
TABLET ORAL
Qty: 90 TABLET | Refills: 1 | Status: SHIPPED | OUTPATIENT
Start: 2021-02-13 | End: 2021-08-13

## 2021-02-13 RX ORDER — ATENOLOL 50 MG/1
TABLET ORAL
Qty: 90 TABLET | Refills: 1 | Status: SHIPPED | OUTPATIENT
Start: 2021-02-13 | End: 2021-08-13

## 2021-02-15 RX ORDER — ISOSORBIDE MONONITRATE 30 MG/1
30 TABLET, EXTENDED RELEASE ORAL DAILY
Qty: 90 TABLET | Refills: 1 | Status: SHIPPED | OUTPATIENT
Start: 2021-02-15 | End: 2021-08-13

## 2021-03-13 RX ORDER — SERTRALINE HYDROCHLORIDE 100 MG/1
100 TABLET, FILM COATED ORAL DAILY
Qty: 90 TABLET | Refills: 1 | Status: CANCELLED | OUTPATIENT
Start: 2021-03-13

## 2021-04-11 RX ORDER — PANTOPRAZOLE SODIUM 40 MG/1
40 TABLET, DELAYED RELEASE ORAL DAILY
Qty: 90 TABLET | Refills: 1 | Status: SHIPPED | OUTPATIENT
Start: 2021-04-11 | End: 2021-10-06

## 2021-04-12 RX ORDER — GABAPENTIN 300 MG/1
300 CAPSULE ORAL NIGHTLY
Qty: 30 CAPSULE | Refills: 2 | Status: SHIPPED | OUTPATIENT
Start: 2021-04-12 | End: 2021-04-14

## 2021-04-13 DIAGNOSIS — J44.9 CHRONIC OBSTRUCTIVE PULMONARY DISEASE, UNSPECIFIED COPD TYPE (HCC): Primary | ICD-10-CM

## 2021-04-14 RX ORDER — GABAPENTIN 300 MG/1
300 CAPSULE ORAL NIGHTLY
Qty: 30 CAPSULE | Refills: 2 | Status: SHIPPED | OUTPATIENT
Start: 2021-04-14 | End: 2021-07-19 | Stop reason: SDUPTHER

## 2021-05-06 ENCOUNTER — OFFICE VISIT (OUTPATIENT)
Dept: PULMONOLOGY | Facility: CLINIC | Age: 81
End: 2021-05-06

## 2021-05-06 VITALS
HEART RATE: 76 BPM | OXYGEN SATURATION: 91 % | HEIGHT: 64 IN | WEIGHT: 192 LBS | TEMPERATURE: 96.8 F | DIASTOLIC BLOOD PRESSURE: 80 MMHG | BODY MASS INDEX: 32.78 KG/M2 | SYSTOLIC BLOOD PRESSURE: 142 MMHG

## 2021-05-06 DIAGNOSIS — J96.11 CHRONIC RESPIRATORY FAILURE WITH HYPOXIA AND HYPERCAPNIA (HCC): ICD-10-CM

## 2021-05-06 DIAGNOSIS — J96.12 CHRONIC RESPIRATORY FAILURE WITH HYPOXIA AND HYPERCAPNIA (HCC): ICD-10-CM

## 2021-05-06 DIAGNOSIS — J44.9 CHRONIC OBSTRUCTIVE PULMONARY DISEASE, UNSPECIFIED COPD TYPE (HCC): Primary | ICD-10-CM

## 2021-05-06 PROCEDURE — 99214 OFFICE O/P EST MOD 30 MIN: CPT | Performed by: INTERNAL MEDICINE

## 2021-05-06 NOTE — PROGRESS NOTES
"Pulmonary Office Follow Up      Subjective   Chief Complaint: Shortness of Breath    Avelina Ceron is a 80 y.o. female is being seen in follow up for COPD    History of Present Illness    Ms. Ceron is an 79yo F who is followed for COPD and Chronic Respiratory Failure.     She was last seen in clinic on 9/2/20. Since that time, she has not had any exacerbations. She continues on Trelegy, Daliresp and Azithromycin. She notes that she started developing allergy type symptoms in November that resolved when she stopped using her Trilogy machine. She then resumed use and the symptoms returned so she has not used her Trilogy since that time.     The following portions of the patient's history were reviewed and updated as appropriate: allergies, current medications, past family history, past medical history, past social history, past surgical history and problem list.    Review of Systems   Constitutional: Negative.    HENT: Negative.    Eyes: Negative.    Respiratory: Positive for shortness of breath.    Cardiovascular: Negative.    Gastrointestinal: Negative.    Endocrine: Negative.    Genitourinary: Negative.    Musculoskeletal: Negative.    Skin: Negative.    Allergic/Immunologic: Negative.    Neurological: Negative.    Hematological: Negative.    Psychiatric/Behavioral: Negative.          Objective   Blood pressure 142/80, pulse 76, temperature 96.8 °F (36 °C), height 161.3 cm (63.5\"), weight 87.1 kg (192 lb), SpO2 91 %, not currently breastfeeding.  Physical Exam  Vitals and nursing note reviewed.   Constitutional:       General: She is not in acute distress.     Appearance: She is well-developed.   HENT:      Head: Normocephalic and atraumatic.   Eyes:      General: No scleral icterus.     Conjunctiva/sclera: Conjunctivae normal.      Pupils: Pupils are equal, round, and reactive to light.   Neck:      Thyroid: No thyromegaly.      Trachea: No tracheal deviation.   Cardiovascular:      Rate and Rhythm: Normal rate " and regular rhythm.      Heart sounds: Normal heart sounds.   Pulmonary:      Effort: Pulmonary effort is normal. No respiratory distress.      Breath sounds: Decreased breath sounds present. No wheezing.   Abdominal:      General: Bowel sounds are normal.      Palpations: Abdomen is soft.      Tenderness: There is no abdominal tenderness.   Musculoskeletal:         General: Normal range of motion.      Cervical back: Normal range of motion and neck supple.   Lymphadenopathy:      Cervical: No cervical adenopathy.   Skin:     General: Skin is warm and dry.      Findings: No erythema or rash.   Neurological:      Mental Status: She is alert and oriented to person, place, and time.      Motor: No abnormal muscle tone.      Coordination: Coordination normal.   Psychiatric:         Speech: Speech normal.         Behavior: Behavior normal.         Judgment: Judgment normal.         PFTs:  No new PFTs.     Imaging:  No new imaging.     Assessment/Plan   Diagnoses and all orders for this visit:    1. Chronic obstructive pulmonary disease, unspecified COPD type (CMS/HCC) (Primary)    2. Chronic respiratory failure with hypoxia and hypercapnia (CMS/HCC)        Discussion:  Ms. Ceron is a 79yo F who presents to establish care for COPD.      1. Severe COPD  - Continue Trelegy inhaler.    - Continue Daliresp 500mcg daily.   - Combivent/Albuterol as needed.   - We will attempt to stop Azithromycin 250mg daily. I have advised her to resume this if she notices more cough, wheezing or increased Albuterol usage.   - Mucinex as needed.   - COVID vaccination x 2.      2. Chronic Hypoxic and Hypercapnic Respiratory Failure  - Continue supplemental O2 during the day.   - I have asked her to try and resume her Trilogy. If she continues to have respiratory issues, we may need to see if we can get the machine switched out as she has already changed her mask and the filters.     Follow up in 6 months.        Florence Landrum, DO  Pulmonary  and Critical Care Medicine  Note Electronically Signed      Addendum:   Ms. Ceron tried a completely new Trilogy machine and continued to have an allergic reaction. We will unfortunately have to discontinue Trilogy at this time.     Electronically signed by Florence Landrum DO, 05/24/21, 3:48 PM EDT.

## 2021-05-11 ENCOUNTER — TELEPHONE (OUTPATIENT)
Dept: PULMONOLOGY | Facility: CLINIC | Age: 81
End: 2021-05-11

## 2021-05-11 NOTE — TELEPHONE ENCOUNTER
Patient called back to let us know she tried theTrilogy again, and she began all of the same symptoms. I am sending an order to [facility] to set her up with a new machine. To see if this will help.

## 2021-05-13 ENCOUNTER — OFFICE VISIT (OUTPATIENT)
Dept: INTERNAL MEDICINE | Facility: CLINIC | Age: 81
End: 2021-05-13

## 2021-05-13 VITALS
HEART RATE: 76 BPM | RESPIRATION RATE: 22 BRPM | BODY MASS INDEX: 34 KG/M2 | TEMPERATURE: 97.3 F | WEIGHT: 195 LBS | SYSTOLIC BLOOD PRESSURE: 158 MMHG | DIASTOLIC BLOOD PRESSURE: 76 MMHG

## 2021-05-13 DIAGNOSIS — K21.00 GASTROESOPHAGEAL REFLUX DISEASE WITH ESOPHAGITIS WITHOUT HEMORRHAGE: ICD-10-CM

## 2021-05-13 DIAGNOSIS — E78.5 HYPERLIPIDEMIA, UNSPECIFIED HYPERLIPIDEMIA TYPE: Primary | ICD-10-CM

## 2021-05-13 DIAGNOSIS — I10 ESSENTIAL HYPERTENSION: ICD-10-CM

## 2021-05-13 DIAGNOSIS — E03.9 HYPOTHYROIDISM, UNSPECIFIED TYPE: ICD-10-CM

## 2021-05-13 LAB
ALBUMIN SERPL-MCNC: 4.2 G/DL (ref 3.5–5.2)
ALBUMIN/GLOB SERPL: 1.6 G/DL
ALP SERPL-CCNC: 87 U/L (ref 39–117)
ALT SERPL W P-5'-P-CCNC: 14 U/L (ref 1–33)
ANION GAP SERPL CALCULATED.3IONS-SCNC: 7.2 MMOL/L (ref 5–15)
AST SERPL-CCNC: 13 U/L (ref 1–32)
BASOPHILS # BLD AUTO: 0.13 10*3/MM3 (ref 0–0.2)
BASOPHILS NFR BLD AUTO: 1.2 % (ref 0–1.5)
BILIRUB SERPL-MCNC: 0.2 MG/DL (ref 0–1.2)
BUN SERPL-MCNC: 11 MG/DL (ref 8–23)
BUN/CREAT SERPL: 25 (ref 7–25)
CALCIUM SPEC-SCNC: 9.7 MG/DL (ref 8.6–10.5)
CHLORIDE SERPL-SCNC: 99 MMOL/L (ref 98–107)
CHOLEST SERPL-MCNC: 137 MG/DL (ref 0–200)
CO2 SERPL-SCNC: 33.8 MMOL/L (ref 22–29)
CREAT SERPL-MCNC: 0.44 MG/DL (ref 0.57–1)
DEPRECATED RDW RBC AUTO: 41.1 FL (ref 37–54)
EOSINOPHIL # BLD AUTO: 0.2 10*3/MM3 (ref 0–0.4)
EOSINOPHIL NFR BLD AUTO: 1.8 % (ref 0.3–6.2)
ERYTHROCYTE [DISTWIDTH] IN BLOOD BY AUTOMATED COUNT: 11.8 % (ref 12.3–15.4)
GFR SERPL CREATININE-BSD FRML MDRD: 138 ML/MIN/1.73
GLOBULIN UR ELPH-MCNC: 2.6 GM/DL
GLUCOSE SERPL-MCNC: 92 MG/DL (ref 65–99)
HCT VFR BLD AUTO: 39.2 % (ref 34–46.6)
HDLC SERPL-MCNC: 40 MG/DL (ref 40–60)
HGB BLD-MCNC: 13.1 G/DL (ref 12–15.9)
IMM GRANULOCYTES # BLD AUTO: 0.09 10*3/MM3 (ref 0–0.05)
IMM GRANULOCYTES NFR BLD AUTO: 0.8 % (ref 0–0.5)
LDLC SERPL CALC-MCNC: 64 MG/DL (ref 0–100)
LDLC/HDLC SERPL: 1.42 {RATIO}
LYMPHOCYTES # BLD AUTO: 2.46 10*3/MM3 (ref 0.7–3.1)
LYMPHOCYTES NFR BLD AUTO: 21.9 % (ref 19.6–45.3)
MCH RBC QN AUTO: 31.9 PG (ref 26.6–33)
MCHC RBC AUTO-ENTMCNC: 33.4 G/DL (ref 31.5–35.7)
MCV RBC AUTO: 95.4 FL (ref 79–97)
MONOCYTES # BLD AUTO: 0.85 10*3/MM3 (ref 0.1–0.9)
MONOCYTES NFR BLD AUTO: 7.6 % (ref 5–12)
NEUTROPHILS NFR BLD AUTO: 66.7 % (ref 42.7–76)
NEUTROPHILS NFR BLD AUTO: 7.5 10*3/MM3 (ref 1.7–7)
NRBC BLD AUTO-RTO: 0 /100 WBC (ref 0–0.2)
PLATELET # BLD AUTO: 280 10*3/MM3 (ref 140–450)
PMV BLD AUTO: 10.2 FL (ref 6–12)
POTASSIUM SERPL-SCNC: 4.6 MMOL/L (ref 3.5–5.2)
PROT SERPL-MCNC: 6.8 G/DL (ref 6–8.5)
RBC # BLD AUTO: 4.11 10*6/MM3 (ref 3.77–5.28)
SODIUM SERPL-SCNC: 140 MMOL/L (ref 136–145)
T4 FREE SERPL-MCNC: 1.23 NG/DL (ref 0.93–1.7)
TRIGL SERPL-MCNC: 201 MG/DL (ref 0–150)
TSH SERPL DL<=0.05 MIU/L-ACNC: 8.44 UIU/ML (ref 0.27–4.2)
VLDLC SERPL-MCNC: 33 MG/DL (ref 5–40)
WBC # BLD AUTO: 11.23 10*3/MM3 (ref 3.4–10.8)

## 2021-05-13 PROCEDURE — 85025 COMPLETE CBC W/AUTO DIFF WBC: CPT | Performed by: INTERNAL MEDICINE

## 2021-05-13 PROCEDURE — 80061 LIPID PANEL: CPT | Performed by: INTERNAL MEDICINE

## 2021-05-13 PROCEDURE — 80053 COMPREHEN METABOLIC PANEL: CPT | Performed by: INTERNAL MEDICINE

## 2021-05-13 PROCEDURE — 84439 ASSAY OF FREE THYROXINE: CPT | Performed by: INTERNAL MEDICINE

## 2021-05-13 PROCEDURE — 84443 ASSAY THYROID STIM HORMONE: CPT | Performed by: INTERNAL MEDICINE

## 2021-05-13 PROCEDURE — 99214 OFFICE O/P EST MOD 30 MIN: CPT | Performed by: INTERNAL MEDICINE

## 2021-05-13 PROCEDURE — 36415 COLL VENOUS BLD VENIPUNCTURE: CPT | Performed by: INTERNAL MEDICINE

## 2021-05-13 RX ORDER — MULTIVIT-MIN/IRON/FOLIC ACID/K 18-600-40
CAPSULE ORAL DAILY
COMMUNITY

## 2021-05-13 NOTE — PROGRESS NOTES
Chief Complaint   Patient presents with   • Follow-up     4 MONTH FOLLOW UP CHRONIC MEDICAL PROBLEMS       History of Present Illness    The patient presents for a follow-up related to hypertension. The patient reports that she has had no headaches, chest pain, dyspnea, edema, syncope, blurred vision or palpitations. She states that she is taking her medication as prescribed. She is not having medication side effects. She checks her blood pressures at home. The home readings are normal.    The patient presents for a follow-up related to hyperlipidemia. She is following a low fat diet. She reports that she is exercising. She is not taking medication for hyperlipidemia. She denies orthopnea, paroxysmal nocturnal dyspnea or dyspnea on exertion.    The patient presents for a follow-up related to GERD. The patient is on pantoprazole for her gastroesophageal reflux. The medication is taken on a regular basis and gives complete relief of the symptoms. She reports no abdominal pain, belching, diarrhea, dysphagia, early satiety, heartburn, hoarseness, nausea, odynophagia, rectal bleeding or vomiting. The GERD has no known aggravating factors.    The patient presents for a follow-up related to hypothyroidism. She reports a good energy level. She reports no hair loss, heat intolerance, cold intolerance, constipation or sweats. She is taking her medication as prescribed.    Review of Systems    CONSTITUTIONAL- Denies Unexplained Weight Loss, Fever, Chills, Sweats, Fatigue, Weakness or Malaise.    PULMONARY- Denies Wheezing, Sputum Production, Cough, Hemoptysis or Pleuritic Chest Pain.    CARDIOVASCULAR- Denies Claudication or Irregular Heart Beat.    Medications      Current Outpatient Medications:   •  acetaminophen (TYLENOL) 500 MG tablet, Take 1,000 mg by mouth Every 8 (Eight) Hours As Needed for Mild Pain ., Disp: , Rfl:   •  albuterol (PROAIR HFA) 108 (90 BASE) MCG/ACT inhaler, Inhale 2 puffs every 4 (four) hours as  needed., Disp: , Rfl:   •  Ascorbic Acid (Vitamin C) 500 MG capsule, Take  by mouth Daily., Disp: , Rfl:   •  aspirin (ASPIRIN LOW DOSE) 81 MG chewable tablet, Chew 1 tablet daily., Disp: , Rfl:   •  atenolol (TENORMIN) 50 MG tablet, TAKE 1/2 TABLET BY MOUTH TWICE DAILY, Disp: 90 tablet, Rfl: 1  •  Calcium Carbonate-Vitamin D (CALCIUM-VITAMIN D) 500-200 MG-UNIT per tablet, Take 1 tablet by mouth daily., Disp: , Rfl:   •  cetirizine (zyrTEC) 10 MG tablet, Take 10 mg by mouth Daily., Disp: , Rfl:   •  Cholecalciferol (Vitamin D3) 25 MCG (1000 UT) capsule, Take  by mouth Daily., Disp: , Rfl:   •  clopidogrel (PLAVIX) 75 MG tablet, TAKE 1 TABLET BY MOUTH EVERY DAY, Disp: 90 tablet, Rfl: 1  •  FeroSul 325 (65 Fe) MG tablet, TAKE 1 TABLET BY MOUTH TWICE DAILY, Disp: 60 tablet, Rfl: 3  •  FIBER SELECT GUMMIES PO, Take 2 tablets by mouth Daily., Disp: , Rfl:   •  gabapentin (NEURONTIN) 300 MG capsule, TAKE 1 CAPSULE BY MOUTH EVERY NIGHT, Disp: 30 capsule, Rfl: 2  •  isosorbide mononitrate (IMDUR) 30 MG 24 hr tablet, Take 1 tablet by mouth Daily., Disp: 90 tablet, Rfl: 1  •  Latanoprost 0.005 % emulsion, Apply 1 drop to eye(s) as directed by provider Daily., Disp: , Rfl:   •  levothyroxine (SYNTHROID, LEVOTHROID) 150 MCG tablet, TAKE 1 TABLET BY MOUTH EVERY DAY, Disp: 90 tablet, Rfl: 1  •  Multiple Vitamins-Minerals (PRESERVISION AREDS) capsule, Take 1 tablet by mouth 2 (two) times a day., Disp: , Rfl:   •  pantoprazole (PROTONIX) 40 MG EC tablet, TAKE 1 TABLET BY MOUTH DAILY, Disp: 90 tablet, Rfl: 1  •  roflumilast (Daliresp) 500 MCG tablet tablet, Take 1 tablet by mouth Daily., Disp: 90 tablet, Rfl: 1  •  sertraline (ZOLOFT) 100 MG tablet, Take 1 tablet by mouth Daily., Disp: 90 tablet, Rfl: 1  •  simvastatin (ZOCOR) 40 MG tablet, Take 1 tablet by mouth every night at bedtime., Disp: 90 tablet, Rfl: 1  •  solifenacin (VESICARE) 5 MG tablet, Take 1 tablet by mouth Daily., Disp: 90 tablet, Rfl: 1  •  Spacer/Aero Chamber  Mouthpiece misc, 1 Units Take As Directed., Disp: 1 each, Rfl: 0  •  Trelegy Ellipta 100-62.5-25 MCG/INH aerosol powder , INHALE 1 PUFF BY MOUTH DAILY, Disp: 60 each, Rfl: 6  •  nitroglycerin (NITROSTAT) 0.4 MG SL tablet, PLACE 1 TABLET UNDER THE TONGUE AS NEEDED EVERY 5 MINUTES FOR CHEST PAIN. CALL 911 IF NO RELIEF AFTER 2ND DOSE, Disp: 25 tablet, Rfl: 0     Allergies    Allergies   Allergen Reactions   • Codeine Mental Status Change   • Penicillins Hives and Swelling   • Dopamine Other (See Comments) and Palpitations     Other reaction(s): Hypertension       Problem List    Patient Active Problem List   Diagnosis   • Coronary arteriosclerosis in native artery   • Chronic obstructive pulmonary disease (CMS/HCC)   • Depression   • Gastroesophageal reflux disease with esophagitis   • Hyperlipidemia   • Hypertension   • Hypothyroidism   • Osteoarthritis   • Peripheral arterial occlusive disease (CMS/HCC)   • Solitary pulmonary nodule   • Vitamin D deficiency   • Lower back pain   • Carotid bruit   • Hypoxemia   • Chronic respiratory failure with hypoxia and hypercapnia (CMS/HCC)       Medications, Allergies, Problems List and Past History were reviewed and updated.    Physical Examination    /76 (BP Location: Left arm, Patient Position: Sitting, Cuff Size: Adult)   Pulse 76   Temp 97.3 °F (36.3 °C) (Infrared)   Resp 22   Wt 88.5 kg (195 lb)   LMP  (LMP Unknown)   Breastfeeding No   BMI 34.00 kg/m²     HEENT: Facies- Within normal limits. Lids- Normal bilaterally. Conjunctiva- Clear bilaterally. Sclera- Anicteric bilaterally.    Neck: Thyroid- non enlarged, symmetric and has no nodules. No bruits are detected. ROM- Normal Range of Motion with no rigidity.    Lungs: Auscultation- Clear to auscultation bilaterally. There are no retractions, clubbing or cyanosis. The Expiratory to Inspiratory ratio is equal.    Cardiovascular: There are no carotid bruits. Heart- Normal Rate with Regular rhythm and no murmurs.  There are no gallops. There are no rubs. In the lower extremities there is no edema. The upper extremities do not have edema.    Abdomen: Soft, benign, non-tender with no masses, hernias, organomegaly or scars.    Impression and Assessment    Essential Hypertension.    Hyperlipidemia.    Gastroesophageal Reflux Disease.    Hypothyroidism.    Plan    Gastroesophageal Reflux Disease Plan: The patient was instructed to continue the current medications.    Essential Hypertension Plan: The patient was instructed to continue the current medications.    Hyperlipidemia Plan: The patient was instructed to exercise daily and eat a low fat diet.    Hypothyroidism Plan: The patient was instructed to continue the current medications.    Diagnoses and all orders for this visit:    1. Hyperlipidemia, unspecified hyperlipidemia type (Primary)  -     Comprehensive Metabolic Panel  -     Lipid Panel    2. Essential hypertension  -     Comprehensive Metabolic Panel  -     CBC & Differential    3. Gastroesophageal reflux disease with esophagitis without hemorrhage    4. Hypothyroidism, unspecified type  -     TSH  -     T4, Free        Return to Office    The patient was instructed to return for follow-up in 6 months. The patient was instructed to return sooner if the condition changes, worsens, or does not resolve.

## 2021-05-14 ENCOUNTER — TELEPHONE (OUTPATIENT)
Dept: PULMONOLOGY | Facility: CLINIC | Age: 81
End: 2021-05-14

## 2021-05-14 NOTE — TELEPHONE ENCOUNTER
I had a phone call from Melissa at [facility]. They went out to the patients house to set up a brand new machine and whitnessed the patient Sneezing 50 plus times while trying on new mask and set up. The patient c/o stopping up and stuffy nose. They believe she is allergic to some material and they would like for you to D/C Trilogy b/c the patient cannot use it due to allergic reaction.

## 2021-05-16 RX ORDER — ROFLUMILAST 500 UG/1
TABLET ORAL
Qty: 90 TABLET | Refills: 1 | Status: SHIPPED | OUTPATIENT
Start: 2021-05-16 | End: 2021-06-12 | Stop reason: SDUPTHER

## 2021-05-24 ENCOUNTER — TELEPHONE (OUTPATIENT)
Dept: PULMONOLOGY | Facility: CLINIC | Age: 81
End: 2021-05-24

## 2021-05-24 NOTE — TELEPHONE ENCOUNTER
I called and spoke with the patients daughter and told her that Dr. Landrum had written an order to [facility]. Order was faxed to [facility].

## 2021-06-09 DIAGNOSIS — D64.9 ANEMIA, UNSPECIFIED TYPE: ICD-10-CM

## 2021-06-10 RX ORDER — FERROUS SULFATE 325(65) MG
1 TABLET ORAL 2 TIMES DAILY
Qty: 60 TABLET | Refills: 3 | Status: SHIPPED | OUTPATIENT
Start: 2021-06-10 | End: 2021-10-04

## 2021-06-12 DIAGNOSIS — J44.9 CHRONIC OBSTRUCTIVE PULMONARY DISEASE, UNSPECIFIED COPD TYPE (HCC): ICD-10-CM

## 2021-06-14 RX ORDER — ROFLUMILAST 500 UG/1
500 TABLET ORAL DAILY
Qty: 90 TABLET | Refills: 1 | Status: SHIPPED | OUTPATIENT
Start: 2021-06-14 | End: 2021-11-15

## 2021-07-21 RX ORDER — GABAPENTIN 300 MG/1
300 CAPSULE ORAL NIGHTLY
Qty: 30 CAPSULE | Refills: 2 | Status: SHIPPED | OUTPATIENT
Start: 2021-07-21 | End: 2021-10-18 | Stop reason: SDUPTHER

## 2021-08-13 RX ORDER — ISOSORBIDE MONONITRATE 30 MG/1
30 TABLET, EXTENDED RELEASE ORAL DAILY
Qty: 90 TABLET | Refills: 1 | Status: SHIPPED | OUTPATIENT
Start: 2021-08-13 | End: 2021-11-18

## 2021-08-13 RX ORDER — CLOPIDOGREL BISULFATE 75 MG/1
TABLET ORAL
Qty: 90 TABLET | Refills: 1 | Status: SHIPPED | OUTPATIENT
Start: 2021-08-13 | End: 2021-11-18

## 2021-08-13 RX ORDER — LEVOTHYROXINE SODIUM 0.15 MG/1
TABLET ORAL
Qty: 90 TABLET | Refills: 1 | Status: SHIPPED | OUTPATIENT
Start: 2021-08-13 | End: 2021-11-18

## 2021-08-13 RX ORDER — ATENOLOL 50 MG/1
TABLET ORAL
Qty: 90 TABLET | Refills: 1 | Status: SHIPPED | OUTPATIENT
Start: 2021-08-13 | End: 2021-11-18

## 2021-09-08 DIAGNOSIS — N39.3 STRESS INCONTINENCE OF URINE: ICD-10-CM

## 2021-09-08 RX ORDER — SOLIFENACIN SUCCINATE 5 MG/1
5 TABLET, FILM COATED ORAL DAILY
Qty: 90 TABLET | Refills: 1 | Status: SHIPPED | OUTPATIENT
Start: 2021-09-08 | End: 2022-01-24 | Stop reason: SDUPTHER

## 2021-09-08 RX ORDER — SERTRALINE HYDROCHLORIDE 100 MG/1
100 TABLET, FILM COATED ORAL DAILY
Qty: 90 TABLET | Refills: 1 | Status: SHIPPED | OUTPATIENT
Start: 2021-09-08 | End: 2022-01-24 | Stop reason: SDUPTHER

## 2021-09-20 RX ORDER — SIMVASTATIN 40 MG
40 TABLET ORAL
Qty: 90 TABLET | Refills: 1 | Status: SHIPPED | OUTPATIENT
Start: 2021-09-20 | End: 2022-01-24 | Stop reason: SDUPTHER

## 2021-10-04 DIAGNOSIS — D64.9 ANEMIA, UNSPECIFIED TYPE: ICD-10-CM

## 2021-10-04 RX ORDER — FERROUS SULFATE 325(65) MG
TABLET ORAL
Qty: 60 TABLET | Refills: 3 | Status: SHIPPED | OUTPATIENT
Start: 2021-10-04 | End: 2022-01-07

## 2021-10-06 RX ORDER — PANTOPRAZOLE SODIUM 40 MG/1
40 TABLET, DELAYED RELEASE ORAL DAILY
Qty: 90 TABLET | Refills: 1 | Status: SHIPPED | OUTPATIENT
Start: 2021-10-06 | End: 2022-01-17

## 2021-10-18 RX ORDER — GABAPENTIN 300 MG/1
300 CAPSULE ORAL NIGHTLY
Qty: 30 CAPSULE | Refills: 2 | Status: SHIPPED | OUTPATIENT
Start: 2021-10-18 | End: 2022-01-17 | Stop reason: SDUPTHER

## 2021-10-19 ENCOUNTER — FLU SHOT (OUTPATIENT)
Dept: INTERNAL MEDICINE | Facility: CLINIC | Age: 81
End: 2021-10-19

## 2021-10-19 DIAGNOSIS — Z23 NEED FOR INFLUENZA VACCINATION: Primary | ICD-10-CM

## 2021-10-19 PROCEDURE — 90662 IIV NO PRSV INCREASED AG IM: CPT | Performed by: INTERNAL MEDICINE

## 2021-10-19 PROCEDURE — G0008 ADMIN INFLUENZA VIRUS VAC: HCPCS | Performed by: INTERNAL MEDICINE

## 2021-11-04 ENCOUNTER — OFFICE VISIT (OUTPATIENT)
Dept: PULMONOLOGY | Facility: CLINIC | Age: 81
End: 2021-11-04

## 2021-11-04 VITALS
WEIGHT: 194 LBS | DIASTOLIC BLOOD PRESSURE: 62 MMHG | BODY MASS INDEX: 33.12 KG/M2 | SYSTOLIC BLOOD PRESSURE: 110 MMHG | TEMPERATURE: 98.7 F | HEART RATE: 76 BPM | OXYGEN SATURATION: 92 % | HEIGHT: 64 IN

## 2021-11-04 DIAGNOSIS — J96.12 CHRONIC RESPIRATORY FAILURE WITH HYPOXIA AND HYPERCAPNIA (HCC): ICD-10-CM

## 2021-11-04 DIAGNOSIS — J96.11 CHRONIC RESPIRATORY FAILURE WITH HYPOXIA AND HYPERCAPNIA (HCC): ICD-10-CM

## 2021-11-04 DIAGNOSIS — J44.9 CHRONIC OBSTRUCTIVE PULMONARY DISEASE, UNSPECIFIED COPD TYPE (HCC): Primary | ICD-10-CM

## 2021-11-04 PROCEDURE — 99214 OFFICE O/P EST MOD 30 MIN: CPT | Performed by: NURSE PRACTITIONER

## 2021-11-04 NOTE — PROGRESS NOTES
"[facility], [place], operated by [facility] Follow up      Chief Complaint  Shortness of Breath and COPD (follow up )    Subjective          Avelina Ceron presents to [facility] for follow-up on her COPD with chronic respiratory failure.    She did well over the summer.  She would get in the pool daily.  She was rather active and felt a bit better.  Since the weather turned cool she is really slacked off on her activity.  Her daughter states she does not sleep most of the day now and stays up during the night.  She is dropping her saturations with activity fairly regularly.  She is currently on 2 L.  She has some mild congestion and occasional cough mostly in the mornings.  Nothing different or new for her.    She did try a trilogy for her chronic respiratory failure.  Unfortunately she was not able to tolerate that secondary to chronic sinus and congestion issues.    Objective     Vital Signs:   /62   Pulse 76   Temp 98.7 °F (37.1 °C)   Ht 161.3 cm (63.5\")   Wt 88 kg (194 lb)   SpO2 92% Comment: 2 liters of oxygen at rest  BMI 33.83 kg/m²       Immunization History   Administered Date(s) Administered   • COVID-19 (MODERNA) 02/04/2021, 03/04/2021   • Fluad Quad 65+ 10/01/2020   • Fluzone High Dose =>65 Years ([employer] ONLY) 10/05/2016, 01/11/2018, 11/14/2018, 10/29/2019   • Fluzone High-Dose 65+yrs 10/19/2021   • Pneumococcal Conjugate 13-Valent (PCV13) 09/21/2017   • Pneumococcal Polysaccharide (PPSV23) 10/05/2006   • Td 05/22/2006       Physical Exam  Vitals reviewed.   Constitutional:       General: She is not in acute distress.     Appearance: She is well-developed. She is obese. She is not ill-appearing.   HENT:      Head: Normocephalic and atraumatic.   Eyes:      Pupils: Pupils are equal, round, and reactive to light.   Cardiovascular:      Rate and Rhythm: Normal rate and regular rhythm.      Heart sounds: No murmur heard.      Pulmonary:      Effort: Pulmonary effort is " normal. No respiratory distress.      Breath sounds: Normal breath sounds. No wheezing or rales.      Comments: Decreased but no wheezing or rales  Abdominal:      General: Bowel sounds are normal. There is no distension.      Palpations: Abdomen is soft.   Musculoskeletal:         General: Normal range of motion.      Cervical back: Normal range of motion and neck supple.   Skin:     General: Skin is warm and dry.      Findings: No erythema.   Neurological:      Mental Status: She is alert and oriented to person, place, and time.   Psychiatric:         Behavior: Behavior normal.          Result Review :                         Assessment and Plan    Problem List Items Addressed This Visit        Pulmonary and Pneumonias    Chronic obstructive pulmonary disease (HCC) - Primary    Relevant Orders    Ambulatory Referral to Pulmonary Rehab    Chronic respiratory failure with hypoxia and hypercapnia (HCC)        We did discuss the importance of trying to keep up her activity today in the office.  She did do well over the summer when she was getting in the pool daily.  I encouraged her to try to exercise at home daily.  We discussed things such as exercise bike and NuStep.  Ultimately we did decide on referring her over to pulmonary rehab to come exercise here at the office a couple times a week.  They do live here in town, she lives with her daughter who could bring her here to the office.    She will continue on her current regimen of Trelegy daily and Daliresp.    She did try a trilogy, NIV at night and was not able to tolerate it.    Routine follow-up with Dr. Landrum in 6 months.    She is up-to-date on her vaccines and is getting her COVID-19 booster next week.      Follow Up     No follow-ups on file.  Patient was given instructions and counseling regarding her condition or for health maintenance advice. Please see specific information pulled into the AVS if appropriate.     I spent 35 minutes caring for Avelina on  this date of service. This time includes time spent by me in the following activities:preparing for the visit, reviewing tests, obtaining and/or reviewing a separately obtained history, performing a medically appropriate examination and/or evaluation , counseling and educating the patient/family/caregiver, ordering medications, tests, or procedures, referring and communicating with other health care professionals , documenting information in the medical record and independently interpreting results and communicating that information with the patient/family/caregiver    Moderate level of Medical Decision Making complexity based on 2 or more chronic stable illnesses and prescription drug management.    AMADOR Lucio, ACNP  [facility]  Electronically signed

## 2021-11-15 RX ORDER — ROFLUMILAST 500 UG/1
TABLET ORAL
Qty: 90 TABLET | Refills: 1 | Status: SHIPPED | OUTPATIENT
Start: 2021-11-15 | End: 2022-01-24 | Stop reason: SDUPTHER

## 2021-11-18 RX ORDER — ATENOLOL 50 MG/1
TABLET ORAL
Qty: 90 TABLET | Refills: 1 | Status: SHIPPED | OUTPATIENT
Start: 2021-11-18 | End: 2022-01-24 | Stop reason: SDUPTHER

## 2021-11-18 RX ORDER — ISOSORBIDE MONONITRATE 30 MG/1
30 TABLET, EXTENDED RELEASE ORAL DAILY
Qty: 90 TABLET | Refills: 1 | Status: SHIPPED | OUTPATIENT
Start: 2021-11-18 | End: 2022-01-24 | Stop reason: SDUPTHER

## 2021-11-18 RX ORDER — LEVOTHYROXINE SODIUM 0.15 MG/1
TABLET ORAL
Qty: 90 TABLET | Refills: 1 | Status: SHIPPED | OUTPATIENT
Start: 2021-11-18 | End: 2022-01-24 | Stop reason: SDUPTHER

## 2021-11-18 RX ORDER — CLOPIDOGREL BISULFATE 75 MG/1
TABLET ORAL
Qty: 90 TABLET | Refills: 1 | Status: SHIPPED | OUTPATIENT
Start: 2021-11-18 | End: 2022-01-24 | Stop reason: SDUPTHER

## 2021-11-24 ENCOUNTER — OFFICE VISIT (OUTPATIENT)
Dept: INTERNAL MEDICINE | Facility: CLINIC | Age: 81
End: 2021-11-24

## 2021-11-24 VITALS
RESPIRATION RATE: 28 BRPM | WEIGHT: 190 LBS | HEART RATE: 62 BPM | TEMPERATURE: 96.9 F | DIASTOLIC BLOOD PRESSURE: 62 MMHG | BODY MASS INDEX: 34.96 KG/M2 | HEIGHT: 62 IN | SYSTOLIC BLOOD PRESSURE: 138 MMHG

## 2021-11-24 DIAGNOSIS — G89.29 CHRONIC MIDLINE LOW BACK PAIN WITHOUT SCIATICA: ICD-10-CM

## 2021-11-24 DIAGNOSIS — M54.50 CHRONIC MIDLINE LOW BACK PAIN WITHOUT SCIATICA: ICD-10-CM

## 2021-11-24 DIAGNOSIS — I10 ESSENTIAL HYPERTENSION: ICD-10-CM

## 2021-11-24 DIAGNOSIS — E78.5 HYPERLIPIDEMIA, UNSPECIFIED HYPERLIPIDEMIA TYPE: ICD-10-CM

## 2021-11-24 DIAGNOSIS — Z00.00 MEDICARE ANNUAL WELLNESS VISIT, SUBSEQUENT: Primary | ICD-10-CM

## 2021-11-24 DIAGNOSIS — E03.9 HYPOTHYROIDISM, UNSPECIFIED TYPE: ICD-10-CM

## 2021-11-24 DIAGNOSIS — K21.00 GASTROESOPHAGEAL REFLUX DISEASE WITH ESOPHAGITIS WITHOUT HEMORRHAGE: ICD-10-CM

## 2021-11-24 LAB
ALBUMIN SERPL-MCNC: 4.4 G/DL (ref 3.5–5.2)
ALBUMIN/GLOB SERPL: 1.6 G/DL
ALP SERPL-CCNC: 60 U/L (ref 39–117)
ALT SERPL W P-5'-P-CCNC: 12 U/L (ref 1–33)
ANION GAP SERPL CALCULATED.3IONS-SCNC: 3.7 MMOL/L (ref 5–15)
AST SERPL-CCNC: 16 U/L (ref 1–32)
BASOPHILS # BLD AUTO: 0.08 10*3/MM3 (ref 0–0.2)
BASOPHILS NFR BLD AUTO: 0.6 % (ref 0–1.5)
BILIRUB SERPL-MCNC: 0.2 MG/DL (ref 0–1.2)
BUN SERPL-MCNC: 11 MG/DL (ref 8–23)
BUN/CREAT SERPL: 22 (ref 7–25)
CALCIUM SPEC-SCNC: 9.7 MG/DL (ref 8.6–10.5)
CHLORIDE SERPL-SCNC: 98 MMOL/L (ref 98–107)
CHOLEST SERPL-MCNC: 163 MG/DL (ref 0–200)
CO2 SERPL-SCNC: 39.3 MMOL/L (ref 22–29)
CREAT SERPL-MCNC: 0.5 MG/DL (ref 0.57–1)
DEPRECATED RDW RBC AUTO: 43.1 FL (ref 37–54)
EOSINOPHIL # BLD AUTO: 0.18 10*3/MM3 (ref 0–0.4)
EOSINOPHIL NFR BLD AUTO: 1.4 % (ref 0.3–6.2)
ERYTHROCYTE [DISTWIDTH] IN BLOOD BY AUTOMATED COUNT: 12.6 % (ref 12.3–15.4)
GFR SERPL CREATININE-BSD FRML MDRD: 119 ML/MIN/1.73
GLOBULIN UR ELPH-MCNC: 2.7 GM/DL
GLUCOSE SERPL-MCNC: 101 MG/DL (ref 65–99)
HCT VFR BLD AUTO: 38.1 % (ref 34–46.6)
HDLC SERPL-MCNC: 44 MG/DL (ref 40–60)
HGB BLD-MCNC: 12.6 G/DL (ref 12–15.9)
IMM GRANULOCYTES # BLD AUTO: 0.07 10*3/MM3 (ref 0–0.05)
IMM GRANULOCYTES NFR BLD AUTO: 0.5 % (ref 0–0.5)
LDLC SERPL CALC-MCNC: 83 MG/DL (ref 0–100)
LDLC/HDLC SERPL: 1.71 {RATIO}
LYMPHOCYTES # BLD AUTO: 2.13 10*3/MM3 (ref 0.7–3.1)
LYMPHOCYTES NFR BLD AUTO: 16.5 % (ref 19.6–45.3)
MCH RBC QN AUTO: 31 PG (ref 26.6–33)
MCHC RBC AUTO-ENTMCNC: 33.1 G/DL (ref 31.5–35.7)
MCV RBC AUTO: 93.6 FL (ref 79–97)
MONOCYTES # BLD AUTO: 0.78 10*3/MM3 (ref 0.1–0.9)
MONOCYTES NFR BLD AUTO: 6 % (ref 5–12)
NEUTROPHILS NFR BLD AUTO: 75 % (ref 42.7–76)
NEUTROPHILS NFR BLD AUTO: 9.66 10*3/MM3 (ref 1.7–7)
NRBC BLD AUTO-RTO: 0 /100 WBC (ref 0–0.2)
PLATELET # BLD AUTO: 276 10*3/MM3 (ref 140–450)
PMV BLD AUTO: 9.5 FL (ref 6–12)
POTASSIUM SERPL-SCNC: 4.7 MMOL/L (ref 3.5–5.2)
PROT SERPL-MCNC: 7.1 G/DL (ref 6–8.5)
RBC # BLD AUTO: 4.07 10*6/MM3 (ref 3.77–5.28)
SODIUM SERPL-SCNC: 141 MMOL/L (ref 136–145)
T4 FREE SERPL-MCNC: 1.24 NG/DL (ref 0.93–1.7)
TRIGL SERPL-MCNC: 218 MG/DL (ref 0–150)
TSH SERPL DL<=0.05 MIU/L-ACNC: 2.6 UIU/ML (ref 0.27–4.2)
VLDLC SERPL-MCNC: 36 MG/DL (ref 5–40)
WBC NRBC COR # BLD: 12.9 10*3/MM3 (ref 3.4–10.8)

## 2021-11-24 PROCEDURE — 84439 ASSAY OF FREE THYROXINE: CPT | Performed by: INTERNAL MEDICINE

## 2021-11-24 PROCEDURE — 84443 ASSAY THYROID STIM HORMONE: CPT | Performed by: INTERNAL MEDICINE

## 2021-11-24 PROCEDURE — 85025 COMPLETE CBC W/AUTO DIFF WBC: CPT | Performed by: INTERNAL MEDICINE

## 2021-11-24 PROCEDURE — G0439 PPPS, SUBSEQ VISIT: HCPCS | Performed by: INTERNAL MEDICINE

## 2021-11-24 PROCEDURE — 80053 COMPREHEN METABOLIC PANEL: CPT | Performed by: INTERNAL MEDICINE

## 2021-11-24 PROCEDURE — 80061 LIPID PANEL: CPT | Performed by: INTERNAL MEDICINE

## 2021-11-24 PROCEDURE — 1160F RVW MEDS BY RX/DR IN RCRD: CPT | Performed by: INTERNAL MEDICINE

## 2021-11-24 PROCEDURE — 36415 COLL VENOUS BLD VENIPUNCTURE: CPT | Performed by: INTERNAL MEDICINE

## 2021-11-24 PROCEDURE — 1170F FXNL STATUS ASSESSED: CPT | Performed by: INTERNAL MEDICINE

## 2021-11-24 RX ORDER — TRAMADOL HYDROCHLORIDE 50 MG/1
50 TABLET ORAL EVERY 8 HOURS PRN
Qty: 90 TABLET | Refills: 2 | Status: SHIPPED | OUTPATIENT
Start: 2021-11-24 | End: 2022-01-24 | Stop reason: SDUPTHER

## 2021-11-24 NOTE — PROGRESS NOTES
Chief Complaint   Patient presents with   • Medicare Wellness-subsequent       History of Present Illness      The patient presents for a follow-up related to hypertension. The patient reports that she has had no headaches, chest pain, dyspnea, edema, syncope, blurred vision or palpitations. She states that she is taking her medication as prescribed. She is not having medication side effects.    The patient presents for a follow-up related to hyperlipidemia. She is following a low fat diet. She reports that she is exercising. She is taking simvastatin. The patient is taking her medication as prescribed. She reports no medication side effects, including muscle cramps, abdominal pain, headaches or weakness. She denies orthopnea, paroxysmal nocturnal dyspnea or dyspnea on exertion.    The patient presents for a follow-up related to GERD. The patient is on pantoprazole for her gastroesophageal reflux. The medication is taken on a regular basis and gives complete relief of the symptoms. She reports no abdominal pain, belching, diarrhea, dysphagia, early satiety, heartburn, hoarseness, nausea, odynophagia, rectal bleeding, vomiting or weight loss. The GERD has no known aggravating factors.    The patient presents for a follow-up related to hypothyroidism. She reports a good energy level. She reports no hair loss, heat intolerance, cold intolerance, constipation or sweats. She is taking her medication as prescribed.    The patient presents for a follow-up related to low back pain which has been a chronic problem. There was no trauma prior to the start of the pain. The pain is not associated with rashes, dysuria, hematuria or a decreased urine stream. The pain does not radiate. There is no numbness. The patient denies loss of bladder control. The patient denies loss of bowel control. The patient has attempted Acetominophen. Tylenol did not give relief of the pain. The patient reports no aggravating factors.    Review of  Systems    CONSTITUTIONAL- Denies Fever, Chills, Sweats, Fatigue, Weakness or Malaise.    PULMONARY- Denies Wheezing, Sputum Production, Cough, Hemoptysis or Pleuritic Chest Pain.    CARDIOVASCULAR- Denies Claudication or Irregular Heart Beat.    Medications      Current Outpatient Medications:   •  acetaminophen (TYLENOL) 500 MG tablet, Take 1,000 mg by mouth Every 8 (Eight) Hours As Needed for Mild Pain ., Disp: , Rfl:   •  albuterol (PROAIR HFA) 108 (90 BASE) MCG/ACT inhaler, Inhale 2 puffs every 4 (four) hours as needed., Disp: , Rfl:   •  Ascorbic Acid (Vitamin C) 500 MG capsule, Take  by mouth Daily., Disp: , Rfl:   •  aspirin (ASPIRIN LOW DOSE) 81 MG chewable tablet, Chew 1 tablet daily., Disp: , Rfl:   •  atenolol (TENORMIN) 50 MG tablet, TAKE 1/2 TABLET BY MOUTH TWICE DAILY, Disp: 90 tablet, Rfl: 1  •  Calcium Carbonate-Vitamin D (CALCIUM-VITAMIN D) 500-200 MG-UNIT per tablet, Take 1 tablet by mouth daily., Disp: , Rfl:   •  cetirizine (zyrTEC) 10 MG tablet, Take 10 mg by mouth Daily., Disp: , Rfl:   •  Cholecalciferol (Vitamin D3) 25 MCG (1000 UT) capsule, Take  by mouth Daily., Disp: , Rfl:   •  clopidogrel (PLAVIX) 75 MG tablet, TAKE 1 TABLET BY MOUTH EVERY DAY, Disp: 90 tablet, Rfl: 1  •  Daliresp 500 MCG tablet tablet, TAKE 1 TABLET BY MOUTH EVERY DAY, Disp: 90 tablet, Rfl: 1  •  FeroSul 325 (65 Fe) MG tablet, TAKE 1 TABLET BY MOUTH TWICE DAILY, Disp: 60 tablet, Rfl: 3  •  FIBER SELECT GUMMIES PO, Take 2 tablets by mouth Daily., Disp: , Rfl:   •  Fluticasone-Umeclidin-Vilant (Trelegy Ellipta) 100-62.5-25 MCG/INH inhaler, Inhale 1 puff Daily., Disp: 60 each, Rfl: 6  •  gabapentin (NEURONTIN) 300 MG capsule, Take 1 capsule by mouth Every Night., Disp: 30 capsule, Rfl: 2  •  isosorbide mononitrate (IMDUR) 30 MG 24 hr tablet, TAKE 1 TABLET BY MOUTH DAILY, Disp: 90 tablet, Rfl: 1  •  Latanoprost 0.005 % emulsion, Apply 1 drop to eye(s) as directed by provider Daily., Disp: , Rfl:   •  levothyroxine  (SYNTHROID, LEVOTHROID) 150 MCG tablet, TAKE 1 TABLET BY MOUTH EVERY DAY, Disp: 90 tablet, Rfl: 1  •  Multiple Vitamins-Minerals (PRESERVISION AREDS) capsule, Take 1 tablet by mouth 2 (two) times a day., Disp: , Rfl:   •  nitroglycerin (NITROSTAT) 0.4 MG SL tablet, PLACE 1 TABLET UNDER THE TONGUE AS NEEDED EVERY 5 MINUTES FOR CHEST PAIN. CALL 911 IF NO RELIEF AFTER 2ND DOSE, Disp: 25 tablet, Rfl: 0  •  nystatin (MYCOSTATIN) 100,000 unit/mL suspension, Take 5 mL by mouth 4 (Four) Times a Day., Disp: 473 mL, Rfl: 0  •  pantoprazole (PROTONIX) 40 MG EC tablet, TAKE 1 TABLET BY MOUTH DAILY, Disp: 90 tablet, Rfl: 1  •  sertraline (ZOLOFT) 100 MG tablet, TAKE 1 TABLET BY MOUTH DAILY, Disp: 90 tablet, Rfl: 1  •  simvastatin (ZOCOR) 40 MG tablet, TAKE 1 TABLET BY MOUTH EVERY NIGHT AT BEDTIME, Disp: 90 tablet, Rfl: 1  •  solifenacin (VESICARE) 5 MG tablet, TAKE 1 TABLET BY MOUTH DAILY, Disp: 90 tablet, Rfl: 1  •  Spacer/Aero Chamber Mouthpiece misc, 1 Units Take As Directed., Disp: 1 each, Rfl: 0  •  traMADol (ULTRAM) 50 MG tablet, Take 1 tablet by mouth Every 8 (Eight) Hours As Needed for Moderate Pain ., Disp: 90 tablet, Rfl: 2     Allergies    Allergies   Allergen Reactions   • Codeine Mental Status Change   • Penicillins Hives and Swelling   • Dopamine Other (See Comments) and Palpitations     Other reaction(s): Hypertension       Problem List    Patient Active Problem List   Diagnosis   • Coronary arteriosclerosis in native artery   • Chronic obstructive pulmonary disease (HCC)   • Depression   • Gastroesophageal reflux disease with esophagitis   • Hyperlipidemia   • Hypertension   • Hypothyroidism   • Osteoarthritis   • Peripheral arterial occlusive disease (HCC)   • Solitary pulmonary nodule   • Vitamin D deficiency   • Lower back pain   • Carotid bruit   • Hypoxemia   • Chronic respiratory failure with hypoxia and hypercapnia (HCC)       Medications, Allergies, Problems List and Past History were reviewed and  "updated.    Physical Examination    /62 (BP Location: Right arm, Patient Position: Sitting, Cuff Size: Adult)   Pulse 62   Temp 96.9 °F (36.1 °C) (Infrared)   Resp 28   Ht 157.5 cm (62\")   Wt 86.2 kg (190 lb)   LMP  (LMP Unknown)   Breastfeeding No   BMI 34.75 kg/m²     HEENT: Facies- Within normal limits. Lids- Normal bilaterally. Conjunctiva- Clear bilaterally. Sclera- Anicteric bilaterally.    Neck: Thyroid- non enlarged, symmetric and has no nodules. No bruits are detected. ROM- Normal Range of Motion with no rigidity.    Lungs: Auscultation- Clear to auscultation bilaterally. There are no retractions, clubbing or cyanosis. The Expiratory to Inspiratory ratio is equal.    Cardiovascular: There are no carotid bruits. Heart- Normal Rate with Regular rhythm and no murmurs. There are no gallops. There are no rubs. In the lower extremities there is no edema. The upper extremities do not have edema.    Abdomen: Soft, benign, non-tender with no masses, hernias, organomegaly or scars.    Back: The patient has a normal spinal curvature with no evidence of scoliosis. There are no skin lesions noted on the back. Palpation reveals no tenderness and normal muscle tone. The straight leg raising test is negative. The back has normal flexion, extension, rotation, and lateral bending.    Lower Extremity Neuro Exam: Muscle Strength is 5/5 in all major lower extremity muscle groups. Deep Tendon Reflexes are 2+ and equal in the patellar and Achilles distributions bilaterally. Sensation is normal in all distributions.    Impression and Assessment    Essential Hypertension.    Hyperlipidemia.    Gastroesophageal Reflux Disease.    Hypothyroidism.    Low Back Pain.    Plan    Gastroesophageal Reflux Disease Plan: The patient was instructed to continue the current medications.    Essential Hypertension Plan: The patient was instructed to continue the current medications.    Hyperlipidemia Plan: The patient was instructed " to exercise daily, eat a low fat diet and continue her medications.    Hypothyroidism Plan: The patient was instructed to continue the current medications.    Low Back Pain Plan: Medication will be added as noted.    Diagnoses and all orders for this visit:    1. Medicare annual wellness visit, subsequent (Primary)    2. Hyperlipidemia, unspecified hyperlipidemia type  -     Comprehensive Metabolic Panel; Future  -     Lipid Panel; Future  -     Comprehensive Metabolic Panel  -     Lipid Panel    3. Essential hypertension  -     CBC & Differential; Future  -     Comprehensive Metabolic Panel; Future  -     CBC & Differential  -     Comprehensive Metabolic Panel    4. Gastroesophageal reflux disease with esophagitis without hemorrhage    5. Hypothyroidism, unspecified type  -     TSH; Future  -     T4, Free; Future  -     TSH  -     T4, Free    6. Chronic midline low back pain without sciatica  -     traMADol (ULTRAM) 50 MG tablet; Take 1 tablet by mouth Every 8 (Eight) Hours As Needed for Moderate Pain .  Dispense: 90 tablet; Refill: 2        Return to Office    The patient was instructed to return for follow-up in 3 months. The patient was instructed to return sooner if the condition changes, worsens, or does not resolve.

## 2021-11-24 NOTE — PROGRESS NOTES
The ABCs of the Annual Wellness Visit  Subsequent Medicare Wellness Visit    Chief Complaint   Patient presents with   • Medicare Wellness-subsequent      Subjective    History of Present Illness:  Avelina Ceron is a 80 y.o. female who presents for a Subsequent Medicare Wellness Visit.    The following portions of the patient's history were reviewed and   updated as appropriate: allergies, current medications, past family history, past medical history, past social history, past surgical history and problem list.    Compared to one year ago, the patient feels her physical   health is the same.    Compared to one year ago, the patient feels her mental   health is the same.    Recent Hospitalizations:  She was not admitted to the hospital during the last year.       Current Medical Providers:  Patient Care Team:  Dre Moura MD as PCP - General  Dre Moura MD as PCP - Family Medicine  Rainer Dowd MD as Consulting Physician (Cardiology)  Ascencion Abdullahi MD as Consulting Physician (Ophthalmology)    Outpatient Medications Prior to Visit   Medication Sig Dispense Refill   • acetaminophen (TYLENOL) 500 MG tablet Take 1,000 mg by mouth Every 8 (Eight) Hours As Needed for Mild Pain .     • albuterol (PROAIR HFA) 108 (90 BASE) MCG/ACT inhaler Inhale 2 puffs every 4 (four) hours as needed.     • Ascorbic Acid (Vitamin C) 500 MG capsule Take  by mouth Daily.     • aspirin (ASPIRIN LOW DOSE) 81 MG chewable tablet Chew 1 tablet daily.     • atenolol (TENORMIN) 50 MG tablet TAKE 1/2 TABLET BY MOUTH TWICE DAILY 90 tablet 1   • Calcium Carbonate-Vitamin D (CALCIUM-VITAMIN D) 500-200 MG-UNIT per tablet Take 1 tablet by mouth daily.     • cetirizine (zyrTEC) 10 MG tablet Take 10 mg by mouth Daily.     • Cholecalciferol (Vitamin D3) 25 MCG (1000 UT) capsule Take  by mouth Daily.     • clopidogrel (PLAVIX) 75 MG tablet TAKE 1 TABLET BY MOUTH EVERY DAY 90 tablet 1   • Daliresp 500 MCG tablet tablet TAKE  1 TABLET BY MOUTH EVERY DAY 90 tablet 1   • FeroSul 325 (65 Fe) MG tablet TAKE 1 TABLET BY MOUTH TWICE DAILY 60 tablet 3   • FIBER SELECT GUMMIES PO Take 2 tablets by mouth Daily.     • Fluticasone-Umeclidin-Vilant (Trelegy Ellipta) 100-62.5-25 MCG/INH inhaler Inhale 1 puff Daily. 60 each 6   • gabapentin (NEURONTIN) 300 MG capsule Take 1 capsule by mouth Every Night. 30 capsule 2   • isosorbide mononitrate (IMDUR) 30 MG 24 hr tablet TAKE 1 TABLET BY MOUTH DAILY 90 tablet 1   • Latanoprost 0.005 % emulsion Apply 1 drop to eye(s) as directed by provider Daily.     • levothyroxine (SYNTHROID, LEVOTHROID) 150 MCG tablet TAKE 1 TABLET BY MOUTH EVERY DAY 90 tablet 1   • Multiple Vitamins-Minerals (PRESERVISION AREDS) capsule Take 1 tablet by mouth 2 (two) times a day.     • nitroglycerin (NITROSTAT) 0.4 MG SL tablet PLACE 1 TABLET UNDER THE TONGUE AS NEEDED EVERY 5 MINUTES FOR CHEST PAIN. CALL 911 IF NO RELIEF AFTER 2ND DOSE 25 tablet 0   • nystatin (MYCOSTATIN) 100,000 unit/mL suspension Take 5 mL by mouth 4 (Four) Times a Day. 473 mL 0   • pantoprazole (PROTONIX) 40 MG EC tablet TAKE 1 TABLET BY MOUTH DAILY 90 tablet 1   • sertraline (ZOLOFT) 100 MG tablet TAKE 1 TABLET BY MOUTH DAILY 90 tablet 1   • simvastatin (ZOCOR) 40 MG tablet TAKE 1 TABLET BY MOUTH EVERY NIGHT AT BEDTIME 90 tablet 1   • solifenacin (VESICARE) 5 MG tablet TAKE 1 TABLET BY MOUTH DAILY 90 tablet 1   • Spacer/Aero Chamber Mouthpiece misc 1 Units Take As Directed. 1 each 0     No facility-administered medications prior to visit.       No opioid medication identified on active medication list. I have reviewed chart for other potential  high risk medication/s and harmful drug interactions in the elderly.          Aspirin is on active medication list. Aspirin use is indicated based on review of current medical condition/s. Pros and cons of this therapy have been discussed today. Benefits of this medication outweigh potential harm.  Patient has been  "encouraged to continue taking this medication.  .      Patient Active Problem List   Diagnosis   • Coronary arteriosclerosis in native artery   • Chronic obstructive pulmonary disease (HCC)   • Depression   • Gastroesophageal reflux disease with esophagitis   • Hyperlipidemia   • Hypertension   • Hypothyroidism   • Osteoarthritis   • Peripheral arterial occlusive disease (HCC)   • Solitary pulmonary nodule   • Vitamin D deficiency   • Lower back pain   • Carotid bruit   • Hypoxemia   • Chronic respiratory failure with hypoxia and hypercapnia (HCC)     Advance Care Planning  Advance Directive is not on file.  ACP discussion was held with the patient during this visit. Patient has an advance directive (not in EMR), copy requested.          Objective    Vitals:    11/24/21 1252   BP: 138/62   BP Location: Right arm   Patient Position: Sitting   Cuff Size: Adult   Pulse: 62   Resp: 28   Temp: 96.9 °F (36.1 °C)   TempSrc: Infrared   SpO2: 96%   Weight: 86.2 kg (190 lb)   Height: 157.5 cm (62\")   PainSc:   8     BMI Readings from Last 1 Encounters:   11/24/21 34.75 kg/m²   BMI is above normal parameters. Recommendations include: none (medical contraindication)    Does the patient have evidence of cognitive impairment? No    Physical Exam       Finger Rub Hearing{Test (right ear):passed  Finger Rub Hearing{Test (left ear):passed          HEALTH RISK ASSESSMENT    Smoking Status:  Social History     Tobacco Use   Smoking Status Former Smoker   • Packs/day: 1.00   • Years: 50.00   • Pack years: 50.00   • Types: Cigarettes   • Quit date: 2006   • Years since quitting: 15.9   Smokeless Tobacco Never Used     Alcohol Consumption:  Social History     Substance and Sexual Activity   Alcohol Use Not Currently    Comment: 2-5 a year     Fall Risk Screen:    STEADI Fall Risk Assessment was completed, and patient is at LOW risk for falls.Assessment completed on:11/24/2021    Depression Screening:  PHQ-2/PHQ-9 Depression Screening " 11/24/2021   Little interest or pleasure in doing things 0   Feeling down, depressed, or hopeless 0   Trouble falling or staying asleep, or sleeping too much -   Feeling tired or having little energy -   Poor appetite or overeating -   Feeling bad about yourself - or that you are a failure or have let yourself or your family down -   Trouble concentrating on things, such as reading the newspaper or watching television -   Moving or speaking so slowly that other people could have noticed. Or the opposite - being so fidgety or restless that you have been moving around a lot more than usual -   Thoughts that you would be better off dead, or of hurting yourself in some way -   Total Score 0   If you checked off any problems, how difficult have these problems made it for you to do your work, take care of things at home, or get along with other people? -       Health Habits and Functional and Cognitive Screening:  Functional & Cognitive Status 11/24/2021   Do you have difficulty preparing food and eating? Yes   Do you have difficulty bathing yourself, getting dressed or grooming yourself? Yes   Do you have difficulty using the toilet? Yes   Do you have difficulty moving around from place to place? Yes   Do you have trouble with steps or getting out of a bed or a chair? Yes   Current Diet Well Balanced Diet   Dental Exam Not up to date   Eye Exam Up to date   Exercise (times per week) 0 times per week   Current Exercises Include No Regular Exercise   Current Exercise Activities Include -   Do you need help using the phone?  No   Are you deaf or do you have serious difficulty hearing?  No   Do you need help with transportation? Yes   Do you need help shopping? Yes   Do you need help preparing meals?  Yes   Do you need help with housework?  Yes   Do you need help with laundry? Yes   Do you need help taking your medications? Yes   Do you need help managing money? No   Do you ever drive or ride in a car without wearing a seat  belt? No   Have you felt unusual stress, anger or loneliness in the last month? Yes   Who do you live with? Child   If you need help, do you have trouble finding someone available to you? No   Have you been bothered in the last four weeks by sexual problems? No   Do you have difficulty concentrating, remembering or making decisions? Yes       Age-appropriate Screening Schedule:  Refer to the list below for future screening recommendations based on patient's age, sex and/or medical conditions. Orders for these recommended tests are listed in the plan section. The patient has been provided with a written plan.    Health Maintenance   Topic Date Due   • DXA SCAN  Never done   • ZOSTER VACCINE (1 of 2) Never done   • TDAP/TD VACCINES (2 - Tdap) 05/22/2016   • MAMMOGRAM  12/15/2017   • LIPID PANEL  05/13/2022   • INFLUENZA VACCINE  Completed              Assessment/Plan   CMS Preventative Services Quick Reference  Risk Factors Identified During Encounter  Immunizations Discussed/Encouraged (specific Immunizations; Shingrix  The above risks/problems have been discussed with the patient.  Follow up actions/plans if indicated are seen below in the Assessment/Plan Section.  Pertinent information has been shared with the patient in the After Visit Summary.    Diagnoses and all orders for this visit:    1. Medicare annual wellness visit, subsequent (Primary)    2. Hyperlipidemia, unspecified hyperlipidemia type  -     Comprehensive Metabolic Panel; Future  -     Lipid Panel; Future  -     Comprehensive Metabolic Panel  -     Lipid Panel    3. Essential hypertension  -     CBC & Differential; Future  -     Comprehensive Metabolic Panel; Future  -     CBC & Differential  -     Comprehensive Metabolic Panel    4. Gastroesophageal reflux disease with esophagitis without hemorrhage    5. Hypothyroidism, unspecified type  -     TSH; Future  -     T4, Free; Future  -     TSH  -     T4, Free    6. Chronic midline low back pain without  sciatica  -     traMADol (ULTRAM) 50 MG tablet; Take 1 tablet by mouth Every 8 (Eight) Hours As Needed for Moderate Pain .  Dispense: 90 tablet; Refill: 2

## 2021-12-06 RX ORDER — ASPIRIN 81 MG
100 TABLET, DELAYED RELEASE (ENTERIC COATED) ORAL 2 TIMES DAILY
Qty: 60 TABLET | Refills: 2 | Status: SHIPPED | OUTPATIENT
Start: 2021-12-06

## 2022-01-07 DIAGNOSIS — D64.9 ANEMIA, UNSPECIFIED TYPE: ICD-10-CM

## 2022-01-07 RX ORDER — FERROUS SULFATE 325(65) MG
TABLET ORAL
Qty: 60 TABLET | Refills: 3 | Status: SHIPPED | OUTPATIENT
Start: 2022-01-07

## 2022-01-07 NOTE — TELEPHONE ENCOUNTER
Rx Refill Note  Requested Prescriptions     Pending Prescriptions Disp Refills   • FeroSul 325 (65 Fe) MG tablet [Pharmacy Med Name: FERROUS SULFATE 325MG (5GR) TABS] 60 tablet 3     Sig: TAKE 1 TABLET BY MOUTH TWICE DAILY      Last office visit with prescribing clinician: 11/24/2021      Next office visit with prescribing clinician: 3/24/2022        11/05/2020 (no recent Iron Profile labs to corollate)     TY CHOW MA  01/07/22, 12:52 EST

## 2022-01-17 RX ORDER — PANTOPRAZOLE SODIUM 40 MG/1
40 TABLET, DELAYED RELEASE ORAL DAILY
Qty: 90 TABLET | Refills: 1 | Status: SHIPPED | OUTPATIENT
Start: 2022-01-17 | End: 2022-01-24 | Stop reason: SDUPTHER

## 2022-01-17 RX ORDER — GABAPENTIN 300 MG/1
300 CAPSULE ORAL NIGHTLY
Qty: 30 CAPSULE | Refills: 2 | Status: SHIPPED | OUTPATIENT
Start: 2022-01-17 | End: 2022-01-24 | Stop reason: SDUPTHER

## 2022-01-24 DIAGNOSIS — N39.3 STRESS INCONTINENCE OF URINE: ICD-10-CM

## 2022-01-24 DIAGNOSIS — M54.50 CHRONIC MIDLINE LOW BACK PAIN WITHOUT SCIATICA: ICD-10-CM

## 2022-01-24 DIAGNOSIS — J44.9 CHRONIC OBSTRUCTIVE PULMONARY DISEASE, UNSPECIFIED COPD TYPE: ICD-10-CM

## 2022-01-24 DIAGNOSIS — G89.29 CHRONIC MIDLINE LOW BACK PAIN WITHOUT SCIATICA: ICD-10-CM

## 2022-01-24 RX ORDER — SIMVASTATIN 40 MG
40 TABLET ORAL
Qty: 90 TABLET | Refills: 1 | Status: SHIPPED | OUTPATIENT
Start: 2022-01-24 | End: 2022-01-24 | Stop reason: SDUPTHER

## 2022-01-24 RX ORDER — CLOPIDOGREL BISULFATE 75 MG/1
75 TABLET ORAL DAILY
Qty: 90 TABLET | Refills: 1 | Status: SHIPPED | OUTPATIENT
Start: 2022-01-24 | End: 2022-01-24 | Stop reason: SDUPTHER

## 2022-01-24 RX ORDER — SOLIFENACIN SUCCINATE 5 MG/1
5 TABLET, FILM COATED ORAL DAILY
Qty: 90 TABLET | Refills: 1 | Status: SHIPPED | OUTPATIENT
Start: 2022-01-24 | End: 2022-01-24 | Stop reason: SDUPTHER

## 2022-01-24 RX ORDER — GABAPENTIN 300 MG/1
300 CAPSULE ORAL NIGHTLY
Qty: 90 CAPSULE | Refills: 0 | Status: SHIPPED | OUTPATIENT
Start: 2022-01-24 | End: 2022-01-26 | Stop reason: SDUPTHER

## 2022-01-24 RX ORDER — TRAMADOL HYDROCHLORIDE 50 MG/1
50 TABLET ORAL EVERY 8 HOURS PRN
Qty: 90 TABLET | Refills: 2 | Status: SHIPPED | OUTPATIENT
Start: 2022-01-24 | End: 2022-03-24

## 2022-01-24 RX ORDER — ATENOLOL 50 MG/1
25 TABLET ORAL 2 TIMES DAILY
Qty: 90 TABLET | Refills: 1 | Status: SHIPPED | OUTPATIENT
Start: 2022-01-24 | End: 2022-01-24 | Stop reason: SDUPTHER

## 2022-01-24 RX ORDER — PANTOPRAZOLE SODIUM 40 MG/1
40 TABLET, DELAYED RELEASE ORAL DAILY
Qty: 90 TABLET | Refills: 1 | Status: SHIPPED | OUTPATIENT
Start: 2022-01-24 | End: 2022-08-30

## 2022-01-24 RX ORDER — ATENOLOL 50 MG/1
25 TABLET ORAL 2 TIMES DAILY
Qty: 90 TABLET | Refills: 1 | Status: SHIPPED | OUTPATIENT
Start: 2022-01-24 | End: 2022-07-14

## 2022-01-24 RX ORDER — SERTRALINE HYDROCHLORIDE 100 MG/1
100 TABLET, FILM COATED ORAL DAILY
Qty: 90 TABLET | Refills: 1 | Status: SHIPPED | OUTPATIENT
Start: 2022-01-24 | End: 2022-08-02

## 2022-01-24 RX ORDER — GABAPENTIN 300 MG/1
300 CAPSULE ORAL NIGHTLY
Qty: 90 CAPSULE | Refills: 0 | Status: SHIPPED | OUTPATIENT
Start: 2022-01-24 | End: 2022-01-24 | Stop reason: SDUPTHER

## 2022-01-24 RX ORDER — ISOSORBIDE MONONITRATE 30 MG/1
30 TABLET, EXTENDED RELEASE ORAL DAILY
Qty: 90 TABLET | Refills: 1 | Status: SHIPPED | OUTPATIENT
Start: 2022-01-24 | End: 2022-08-02

## 2022-01-24 RX ORDER — ROFLUMILAST 500 UG/1
500 TABLET ORAL DAILY
Qty: 90 TABLET | Refills: 1 | Status: SHIPPED | OUTPATIENT
Start: 2022-01-24 | End: 2022-07-14

## 2022-01-24 RX ORDER — ISOSORBIDE MONONITRATE 30 MG/1
30 TABLET, EXTENDED RELEASE ORAL DAILY
Qty: 90 TABLET | Refills: 1 | Status: SHIPPED | OUTPATIENT
Start: 2022-01-24 | End: 2022-01-24 | Stop reason: SDUPTHER

## 2022-01-24 RX ORDER — SOLIFENACIN SUCCINATE 5 MG/1
5 TABLET, FILM COATED ORAL DAILY
Qty: 90 TABLET | Refills: 1 | Status: SHIPPED | OUTPATIENT
Start: 2022-01-24 | End: 2022-08-02

## 2022-01-24 RX ORDER — SERTRALINE HYDROCHLORIDE 100 MG/1
100 TABLET, FILM COATED ORAL DAILY
Qty: 90 TABLET | Refills: 1 | Status: SHIPPED | OUTPATIENT
Start: 2022-01-24 | End: 2022-01-24 | Stop reason: SDUPTHER

## 2022-01-24 RX ORDER — CLOPIDOGREL BISULFATE 75 MG/1
75 TABLET ORAL DAILY
Qty: 90 TABLET | Refills: 1 | Status: SHIPPED | OUTPATIENT
Start: 2022-01-24 | End: 2022-07-14

## 2022-01-24 RX ORDER — LEVOTHYROXINE SODIUM 0.15 MG/1
150 TABLET ORAL DAILY
Qty: 90 TABLET | Refills: 1 | Status: SHIPPED | OUTPATIENT
Start: 2022-01-24 | End: 2022-12-29

## 2022-01-24 RX ORDER — LEVOTHYROXINE SODIUM 0.15 MG/1
150 TABLET ORAL DAILY
Qty: 90 TABLET | Refills: 1 | Status: SHIPPED | OUTPATIENT
Start: 2022-01-24 | End: 2022-01-24 | Stop reason: SDUPTHER

## 2022-01-24 RX ORDER — SIMVASTATIN 40 MG
40 TABLET ORAL
Qty: 90 TABLET | Refills: 1 | Status: SHIPPED | OUTPATIENT
Start: 2022-01-24 | End: 2022-08-02

## 2022-01-24 RX ORDER — PANTOPRAZOLE SODIUM 40 MG/1
40 TABLET, DELAYED RELEASE ORAL DAILY
Qty: 90 TABLET | Refills: 1 | Status: SHIPPED | OUTPATIENT
Start: 2022-01-24 | End: 2022-01-24 | Stop reason: SDUPTHER

## 2022-01-24 RX ORDER — ROFLUMILAST 500 UG/1
500 TABLET ORAL DAILY
Qty: 90 TABLET | Refills: 1 | Status: SHIPPED | OUTPATIENT
Start: 2022-01-24 | End: 2022-01-24 | Stop reason: SDUPTHER

## 2022-01-24 NOTE — TELEPHONE ENCOUNTER
Caller: Clary Harman    Relationship: Emergency Contact    Best call back number: [phone]    Requested Prescriptions:   Requested Prescriptions     Pending Prescriptions Disp Refills   • gabapentin (NEURONTIN) 300 MG capsule 90 capsule 0     Sig: Take 1 capsule by mouth Every Night.        Pharmacy where request should be sent: [facility]/PHARMACY #[id number] [place], [place] - [address] [facility] [phone] [facility] [phone] FX     Additional details provided by patient: PATIENT CHANGED TO MAIL ORDER HOWEVER THEY AREN'T SURE WHEN MAIL ORDER WILL COME IN AND PATIENT HAS 1 PILL LEFT. PATIENT NEEDS A 1 MONTH SENT TO LOCAL PHARMACY    Does the patient have less than a 3 day supply:  [x] Yes  [] No    Adrian Nielsen Rep   01/24/22 10:04 EST

## 2022-01-25 ENCOUNTER — TELEPHONE (OUTPATIENT)
Dept: INTERNAL MEDICINE | Facility: CLINIC | Age: 82
End: 2022-01-25

## 2022-01-25 NOTE — TELEPHONE ENCOUNTER
Patient daughter (Clary)is calling about her mother meds: gabapentin. She would like a 30 day supply. The  pharmacy is [facility] on [address] In [place], [place]. She is there waiting.

## 2022-01-26 DIAGNOSIS — G89.29 CHRONIC MIDLINE LOW BACK PAIN WITHOUT SCIATICA: ICD-10-CM

## 2022-01-26 DIAGNOSIS — M54.50 CHRONIC MIDLINE LOW BACK PAIN WITHOUT SCIATICA: ICD-10-CM

## 2022-01-26 RX ORDER — GABAPENTIN 300 MG/1
300 CAPSULE ORAL NIGHTLY
Qty: 30 CAPSULE | Refills: 0 | Status: SHIPPED | OUTPATIENT
Start: 2022-01-26 | End: 2022-01-26 | Stop reason: SDUPTHER

## 2022-01-26 RX ORDER — GABAPENTIN 300 MG/1
300 CAPSULE ORAL NIGHTLY
Qty: 30 CAPSULE | Refills: 0 | Status: SHIPPED | OUTPATIENT
Start: 2022-01-26 | End: 2022-05-02

## 2022-01-28 ENCOUNTER — TELEPHONE (OUTPATIENT)
Dept: INTERNAL MEDICINE | Facility: CLINIC | Age: 82
End: 2022-01-28

## 2022-01-28 NOTE — TELEPHONE ENCOUNTER
[facility] [phone]  Dee  Option 2  Advised pt has tolerated Tramadol in the past, OK to continue. Good verbal understanding.

## 2022-01-28 NOTE — TELEPHONE ENCOUNTER
Pharmacy Name:  [facility]    Pharmacy representative name: CASPER    Pharmacy representative phone number: [phone] [facility] [id number]    What medication are you calling in regards to: TRAMADOL    What question does the pharmacy have: ALLERGY TO CODEINE. NEED CLARIFICATION    Who is the provider that prescribed the medication: THOMAS    Additional notes:

## 2022-01-28 NOTE — TELEPHONE ENCOUNTER
Please call rep below and let her know that patient has tolerated Tramadol several times in the past, OK to continue with medication.

## 2022-03-24 ENCOUNTER — OFFICE VISIT (OUTPATIENT)
Dept: INTERNAL MEDICINE | Facility: CLINIC | Age: 82
End: 2022-03-24

## 2022-03-24 ENCOUNTER — LAB (OUTPATIENT)
Dept: LAB | Facility: HOSPITAL | Age: 82
End: 2022-03-24

## 2022-03-24 VITALS
HEART RATE: 86 BPM | DIASTOLIC BLOOD PRESSURE: 74 MMHG | TEMPERATURE: 97.3 F | WEIGHT: 197.2 LBS | BODY MASS INDEX: 36.07 KG/M2 | SYSTOLIC BLOOD PRESSURE: 180 MMHG | RESPIRATION RATE: 18 BRPM

## 2022-03-24 DIAGNOSIS — M54.50 CHRONIC MIDLINE LOW BACK PAIN WITHOUT SCIATICA: ICD-10-CM

## 2022-03-24 DIAGNOSIS — K21.00 GASTROESOPHAGEAL REFLUX DISEASE WITH ESOPHAGITIS WITHOUT HEMORRHAGE: ICD-10-CM

## 2022-03-24 DIAGNOSIS — G89.29 CHRONIC MIDLINE LOW BACK PAIN WITHOUT SCIATICA: ICD-10-CM

## 2022-03-24 DIAGNOSIS — E03.9 HYPOTHYROIDISM, UNSPECIFIED TYPE: ICD-10-CM

## 2022-03-24 DIAGNOSIS — I10 ESSENTIAL HYPERTENSION: ICD-10-CM

## 2022-03-24 DIAGNOSIS — J44.9 CHRONIC OBSTRUCTIVE PULMONARY DISEASE, UNSPECIFIED COPD TYPE: ICD-10-CM

## 2022-03-24 DIAGNOSIS — E78.5 HYPERLIPIDEMIA, UNSPECIFIED HYPERLIPIDEMIA TYPE: Primary | ICD-10-CM

## 2022-03-24 LAB
ALBUMIN SERPL-MCNC: 4.3 G/DL (ref 3.5–5.2)
ALBUMIN/GLOB SERPL: 1.5 G/DL
ALP SERPL-CCNC: 70 U/L (ref 39–117)
ALT SERPL W P-5'-P-CCNC: 12 U/L (ref 1–33)
ANION GAP SERPL CALCULATED.3IONS-SCNC: 9 MMOL/L (ref 5–15)
AST SERPL-CCNC: 11 U/L (ref 1–32)
BASOPHILS # BLD AUTO: 0.11 10*3/MM3 (ref 0–0.2)
BASOPHILS NFR BLD AUTO: 0.7 % (ref 0–1.5)
BILIRUB SERPL-MCNC: 0.3 MG/DL (ref 0–1.2)
BUN SERPL-MCNC: 16 MG/DL (ref 8–23)
BUN/CREAT SERPL: 29.1 (ref 7–25)
CALCIUM SPEC-SCNC: 10.2 MG/DL (ref 8.6–10.5)
CHLORIDE SERPL-SCNC: 99 MMOL/L (ref 98–107)
CHOLEST SERPL-MCNC: 174 MG/DL (ref 0–200)
CO2 SERPL-SCNC: 32 MMOL/L (ref 22–29)
CREAT SERPL-MCNC: 0.55 MG/DL (ref 0.57–1)
DEPRECATED RDW RBC AUTO: 39.7 FL (ref 37–54)
EGFRCR SERPLBLD CKD-EPI 2021: 92.2 ML/MIN/1.73
EOSINOPHIL # BLD AUTO: 0.25 10*3/MM3 (ref 0–0.4)
EOSINOPHIL NFR BLD AUTO: 1.5 % (ref 0.3–6.2)
ERYTHROCYTE [DISTWIDTH] IN BLOOD BY AUTOMATED COUNT: 11.7 % (ref 12.3–15.4)
GLOBULIN UR ELPH-MCNC: 2.8 GM/DL
GLUCOSE SERPL-MCNC: 101 MG/DL (ref 65–99)
HCT VFR BLD AUTO: 40 % (ref 34–46.6)
HDLC SERPL-MCNC: 40 MG/DL (ref 40–60)
HGB BLD-MCNC: 13.3 G/DL (ref 12–15.9)
IMM GRANULOCYTES # BLD AUTO: 0.08 10*3/MM3 (ref 0–0.05)
IMM GRANULOCYTES NFR BLD AUTO: 0.5 % (ref 0–0.5)
LDLC SERPL CALC-MCNC: 84 MG/DL (ref 0–100)
LDLC/HDLC SERPL: 1.84 {RATIO}
LYMPHOCYTES # BLD AUTO: 2.71 10*3/MM3 (ref 0.7–3.1)
LYMPHOCYTES NFR BLD AUTO: 16.4 % (ref 19.6–45.3)
MCH RBC QN AUTO: 31.1 PG (ref 26.6–33)
MCHC RBC AUTO-ENTMCNC: 33.3 G/DL (ref 31.5–35.7)
MCV RBC AUTO: 93.7 FL (ref 79–97)
MONOCYTES # BLD AUTO: 1.05 10*3/MM3 (ref 0.1–0.9)
MONOCYTES NFR BLD AUTO: 6.4 % (ref 5–12)
NEUTROPHILS NFR BLD AUTO: 12.32 10*3/MM3 (ref 1.7–7)
NEUTROPHILS NFR BLD AUTO: 74.5 % (ref 42.7–76)
NRBC BLD AUTO-RTO: 0 /100 WBC (ref 0–0.2)
PLATELET # BLD AUTO: 278 10*3/MM3 (ref 140–450)
PMV BLD AUTO: 9.3 FL (ref 6–12)
POTASSIUM SERPL-SCNC: 4.1 MMOL/L (ref 3.5–5.2)
PROT SERPL-MCNC: 7.1 G/DL (ref 6–8.5)
RBC # BLD AUTO: 4.27 10*6/MM3 (ref 3.77–5.28)
SODIUM SERPL-SCNC: 140 MMOL/L (ref 136–145)
T4 FREE SERPL-MCNC: 1.15 NG/DL (ref 0.93–1.7)
TRIGL SERPL-MCNC: 302 MG/DL (ref 0–150)
TSH SERPL DL<=0.05 MIU/L-ACNC: 9.06 UIU/ML (ref 0.27–4.2)
VLDLC SERPL-MCNC: 50 MG/DL (ref 5–40)
WBC NRBC COR # BLD: 16.52 10*3/MM3 (ref 3.4–10.8)

## 2022-03-24 PROCEDURE — 84439 ASSAY OF FREE THYROXINE: CPT | Performed by: INTERNAL MEDICINE

## 2022-03-24 PROCEDURE — 84443 ASSAY THYROID STIM HORMONE: CPT | Performed by: INTERNAL MEDICINE

## 2022-03-24 PROCEDURE — 99214 OFFICE O/P EST MOD 30 MIN: CPT | Performed by: INTERNAL MEDICINE

## 2022-03-24 PROCEDURE — 80061 LIPID PANEL: CPT | Performed by: INTERNAL MEDICINE

## 2022-03-24 PROCEDURE — 85025 COMPLETE CBC W/AUTO DIFF WBC: CPT | Performed by: INTERNAL MEDICINE

## 2022-03-24 PROCEDURE — 80053 COMPREHEN METABOLIC PANEL: CPT | Performed by: INTERNAL MEDICINE

## 2022-03-24 RX ORDER — TRAMADOL HYDROCHLORIDE 50 MG/1
50 TABLET ORAL EVERY 6 HOURS PRN
Qty: 120 TABLET | Refills: 2 | Status: SHIPPED | OUTPATIENT
Start: 2022-03-24 | End: 2022-05-02

## 2022-03-24 NOTE — PROGRESS NOTES
Chief Complaint   Patient presents with   • Follow-up     4 month follow up, chronic medical problem        History of Present Illness    The patient presents for a follow-up related to hyperlipidemia. She is not following a low fat diet. She reports that she is not exercising. She is taking simvastatin. The patient is taking her medication as prescribed. She reports no medication side effects, including muscle cramps, abdominal pain, headaches or weakness. She denies chest pain, shortness of breath, orthopnea, paroxysmal nocturnal dyspnea, dyspnea on exertion, edema, palpitations or syncope.    The patient presents for a follow-up related to hypertension. The patient reports that she has had no headaches or blurred vision. She states that she is taking her medication as prescribed. She is not having medication side effects.    The patient presents for a follow-up related to GERD. The patient is on pantoprazole for her gastroesophageal reflux. The medication is taken on a regular basis and gives complete relief of the symptoms. She reports no abdominal pain, belching, diarrhea, dysphagia, early satiety, heartburn, hoarseness, nausea, odynophagia, rectal bleeding, vomiting or weight loss. The GERD has no known aggravating factors.    The patient presents for a follow-up related to hypothyroidism. She reports a good energy level. She reports no hair loss, heat intolerance, cold intolerance, constipation or sweats. She is taking her medication as prescribed.    The patient presents for a follow-up related to chronic obstructive pulmonary disease. Rescue inhaler usage is reported to be daily. The patient reports that she has no known triggers. Since the last office visit, the patient has not sought emergency care. She currently reports no wheezing, cough or sputum production. The patient is using an inhaled steroid. She does rinse her mouth after using her steroid inhaler. She denies hemoptysis. She is not a  smoker.    The patient presents for follow-up of low back pain. She reports that the pain is worsened. The current treatment consists of gabapentin and tramadol.  She does not have associated fever, chills, weight loss, rashes, dysuria, hematuria, or a decreased urine stream. The pain does not radiate. There is no numbness. The patient denies loss of bladder control. The patient denies loss of bowel control. The patient reports no aggravating factors.    Review of Systems    CONSTITUTIONAL- Denies Fever, Chills, Sweats, Weakness or Malaise.    HENT- Denies Nasal Discharge, Sore Throat, Ear Pain, Ear Drainage, Sinus Pain, Nasal Congestion, Decreased Hearing or Tinnitus.    PULMONARY- Denies Hemoptysis or Pleuritic Chest Pain.    CARDIOVASCULAR- Denies Claudication or Irregular Heart Beat.    Medications      Current Outpatient Medications:   •  acetaminophen (TYLENOL) 500 MG tablet, Take 1,000 mg by mouth Every 8 (Eight) Hours As Needed for Mild Pain ., Disp: , Rfl:   •  albuterol sulfate  (90 Base) MCG/ACT inhaler, Inhale 2 puffs every 4 (four) hours as needed., Disp: , Rfl:   •  Ascorbic Acid (Vitamin C) 500 MG capsule, Take  by mouth Daily., Disp: , Rfl:   •  aspirin 81 MG chewable tablet, Chew 1 tablet daily., Disp: , Rfl:   •  atenolol (TENORMIN) 50 MG tablet, Take 0.5 tablets by mouth 2 (Two) Times a Day., Disp: 90 tablet, Rfl: 1  •  Calcium Carbonate-Vitamin D (CALCIUM-VITAMIN D) 500-200 MG-UNIT per tablet, Take 1 tablet by mouth daily., Disp: , Rfl:   •  cetirizine (zyrTEC) 10 MG tablet, Take 10 mg by mouth Daily., Disp: , Rfl:   •  Cholecalciferol (Vitamin D3) 25 MCG (1000 UT) capsule, Take  by mouth Daily., Disp: , Rfl:   •  clopidogrel (PLAVIX) 75 MG tablet, Take 1 tablet by mouth Daily., Disp: 90 tablet, Rfl: 1  •  Docusate Sodium 100 MG capsule, Take 100 mg by mouth 2 (Two) Times a Day., Disp: 60 tablet, Rfl: 2  •  FeroSul 325 (65 Fe) MG tablet, TAKE 1 TABLET BY MOUTH TWICE DAILY, Disp: 60 tablet,  Rfl: 3  •  FIBER SELECT GUMMIES PO, Take 2 tablets by mouth Daily., Disp: , Rfl:   •  Fluticasone-Umeclidin-Vilant (Trelegy Ellipta) 100-62.5-25 MCG/INH inhaler, Inhale 1 puff Daily., Disp: 180 each, Rfl: 3  •  gabapentin (NEURONTIN) 300 MG capsule, Take 1 capsule by mouth Every Night., Disp: 30 capsule, Rfl: 0  •  isosorbide mononitrate (IMDUR) 30 MG 24 hr tablet, Take 1 tablet by mouth Daily., Disp: 90 tablet, Rfl: 1  •  Latanoprost 0.005 % emulsion, Apply 1 drop to eye(s) as directed by provider Daily., Disp: , Rfl:   •  levothyroxine (SYNTHROID, LEVOTHROID) 150 MCG tablet, Take 1 tablet by mouth Daily., Disp: 90 tablet, Rfl: 1  •  pantoprazole (PROTONIX) 40 MG EC tablet, Take 1 tablet by mouth Daily., Disp: 90 tablet, Rfl: 1  •  roflumilast (Daliresp) 500 MCG tablet tablet, Take 1 tablet by mouth Daily., Disp: 90 tablet, Rfl: 1  •  sertraline (ZOLOFT) 100 MG tablet, Take 1 tablet by mouth Daily., Disp: 90 tablet, Rfl: 1  •  simvastatin (ZOCOR) 40 MG tablet, Take 1 tablet by mouth every night at bedtime., Disp: 90 tablet, Rfl: 1  •  solifenacin (VESICARE) 5 MG tablet, Take 1 tablet by mouth Daily., Disp: 90 tablet, Rfl: 1  •  Spacer/Aero Chamber Mouthpiece misc, 1 Units Take As Directed., Disp: 1 each, Rfl: 0  •  traMADol (ULTRAM) 50 MG tablet, Take 1 tablet by mouth Every 8 (Eight) Hours As Needed for Moderate Pain ., Disp: 90 tablet, Rfl: 2  •  Multiple Vitamins-Minerals (PRESERVISION AREDS) capsule, Take 1 tablet by mouth 2 (two) times a day., Disp: , Rfl:   •  nitroglycerin (NITROSTAT) 0.4 MG SL tablet, PLACE 1 TABLET UNDER THE TONGUE AS NEEDED EVERY 5 MINUTES FOR CHEST PAIN. CALL 911 IF NO RELIEF AFTER 2ND DOSE, Disp: 25 tablet, Rfl: 0     Allergies    Allergies   Allergen Reactions   • Codeine Mental Status Change   • Penicillins Hives and Swelling   • Dopamine Other (See Comments) and Palpitations     Other reaction(s): Hypertension       Problem List    Patient Active Problem List   Diagnosis   • Coronary  arteriosclerosis in native artery   • Chronic obstructive pulmonary disease (HCC)   • Depression   • Gastroesophageal reflux disease with esophagitis   • Hyperlipidemia   • Hypertension   • Hypothyroidism   • Osteoarthritis   • Peripheral arterial occlusive disease (HCC)   • Solitary pulmonary nodule   • Vitamin D deficiency   • Lower back pain   • Carotid bruit   • Hypoxemia   • Chronic respiratory failure with hypoxia and hypercapnia (HCC)       Medications, Allergies, Problems List and Past History were reviewed and updated.    Physical Examination    /74 (BP Location: Left arm, Patient Position: Sitting, Cuff Size: Adult)   Pulse 86   Temp 97.3 °F (36.3 °C) (Infrared)   Resp 18   Wt 89.4 kg (197 lb 3.2 oz)   LMP  (LMP Unknown)   Breastfeeding No   BMI 36.07 kg/m²       HEENT: Head- Normocephalic Atraumatic. Facies- Within normal limits. Pinnas- Normal texture and shape bilaterally. Canals- Normal bilaterally. TMs- Normal bilaterally. Nares- Patent bilaterally. Nasal Septum- is normal. There is no tenderness to palpation over the frontal or maxillary sinuses. Lids- Normal bilaterally. Conjunctiva- Clear bilaterally. Sclera- Anicteric bilaterally. Oropharynx- Moist with no lesions. Tonsils- No enlargement, erythema or exudate.    Neck: Thyroid- non enlarged, symmetric and has no nodules. No bruits are detected. ROM- Normal Range of Motion with no rigidity.    Lungs: Auscultation- Clear to auscultation bilaterally. There are no retractions, clubbing or cyanosis. The Expiratory to Inspiratory ratio is equal.    Cardiovascular: There are no carotid bruits. Heart- Normal Rate with Regular rhythm and no murmurs. There are no gallops. There are no rubs. In the lower extremities there is no edema. The upper extremities do not have edema.    Abdomen: Soft, benign, non-tender with no masses, hernias, organomegaly or scars.    Back: The patient has a normal spinal curvature with no evidence of scoliosis. There  are no skin lesions noted on the back. Palpation reveals no tenderness and normal muscle tone. The straight leg raising test is negative. The back has normal flexion, extension, rotation, and lateral bending.    Impression and Assessment    Hyperlipidemia.    Essential Hypertension.    Gastroesophageal Reflux Disease.    Hypothyroidism.    Chronic Obstructive Pulmonary Disease.    Low Back Pain.    Plan    Gastroesophageal Reflux Disease Plan: The patient was instructed to continue the current medications.    Hyperlipidemia Plan: She was instructed to eat a low fat diet. She was encouraged to exercise daily. Weight loss was encouraged. The patient was instructed to continue the current medications.    Essential Hypertension Plan: The patient was instructed to continue the current medications.    Hypothyroidism Plan: The patient was instructed to continue the current medications.    Chronic Obstructive Pulmonary Disease Plan: Continue the current medication regimen.    Low Back Pain Plan: The medications were adjusted as noted.    Diagnoses and all orders for this visit:    1. Hyperlipidemia, unspecified hyperlipidemia type (Primary)  -     Comprehensive Metabolic Panel; Future  -     Lipid Panel; Future  -     Comprehensive Metabolic Panel  -     Lipid Panel    2. Essential hypertension  -     CBC & Differential; Future  -     Comprehensive Metabolic Panel; Future  -     CBC & Differential  -     Comprehensive Metabolic Panel    3. Gastroesophageal reflux disease with esophagitis without hemorrhage    4. Hypothyroidism, unspecified type  -     TSH; Future  -     T4, Free; Future  -     TSH  -     T4, Free    5. Chronic obstructive pulmonary disease, unspecified COPD type (HCC)  -     CBC & Differential; Future  -     CBC & Differential    6. Chronic midline low back pain without sciatica  -     traMADol (ULTRAM) 50 MG tablet; Take 1 tablet by mouth Every 6 (Six) Hours As Needed.  Dispense: 120 tablet; Refill: 2  -      CBC & Differential; Future  -     CBC & Differential      Medications Discontinued During This Encounter   Medication Reason   • nystatin (MYCOSTATIN) 100,000 unit/mL suspension *Therapy completed   • traMADol (ULTRAM) 50 MG tablet        Return to Office    The patient was instructed to return for follow-up in 6 months. The patient was instructed to return sooner if the condition changes, worsens, or does not resolve.

## 2022-05-02 DIAGNOSIS — M54.50 CHRONIC MIDLINE LOW BACK PAIN WITHOUT SCIATICA: ICD-10-CM

## 2022-05-02 DIAGNOSIS — G89.29 CHRONIC MIDLINE LOW BACK PAIN WITHOUT SCIATICA: ICD-10-CM

## 2022-05-02 RX ORDER — TRAMADOL HYDROCHLORIDE 50 MG/1
TABLET ORAL
Qty: 90 TABLET | Refills: 2 | Status: SHIPPED | OUTPATIENT
Start: 2022-05-02 | End: 2022-06-20 | Stop reason: SDUPTHER

## 2022-05-02 RX ORDER — GABAPENTIN 300 MG/1
CAPSULE ORAL
Qty: 90 CAPSULE | Refills: 0 | Status: SHIPPED | OUTPATIENT
Start: 2022-05-02 | End: 2022-05-31

## 2022-05-31 RX ORDER — GABAPENTIN 300 MG/1
CAPSULE ORAL
Qty: 90 CAPSULE | Refills: 0 | Status: SHIPPED | OUTPATIENT
Start: 2022-05-31 | End: 2022-10-16

## 2022-06-20 DIAGNOSIS — M54.50 CHRONIC MIDLINE LOW BACK PAIN WITHOUT SCIATICA: ICD-10-CM

## 2022-06-20 DIAGNOSIS — G89.29 CHRONIC MIDLINE LOW BACK PAIN WITHOUT SCIATICA: ICD-10-CM

## 2022-06-20 RX ORDER — TRAMADOL HYDROCHLORIDE 50 MG/1
50 TABLET ORAL EVERY 6 HOURS PRN
Qty: 120 TABLET | Refills: 2 | Status: SHIPPED | OUTPATIENT
Start: 2022-06-20 | End: 2022-09-20 | Stop reason: SDUPTHER

## 2022-07-06 ENCOUNTER — OFFICE VISIT (OUTPATIENT)
Dept: PULMONOLOGY | Facility: CLINIC | Age: 82
End: 2022-07-06

## 2022-07-06 VITALS
WEIGHT: 183 LBS | TEMPERATURE: 96.8 F | SYSTOLIC BLOOD PRESSURE: 128 MMHG | HEIGHT: 62 IN | HEART RATE: 84 BPM | OXYGEN SATURATION: 93 % | BODY MASS INDEX: 33.68 KG/M2 | DIASTOLIC BLOOD PRESSURE: 72 MMHG

## 2022-07-06 DIAGNOSIS — J44.9 CHRONIC OBSTRUCTIVE PULMONARY DISEASE, UNSPECIFIED COPD TYPE: ICD-10-CM

## 2022-07-06 DIAGNOSIS — J96.12 CHRONIC RESPIRATORY FAILURE WITH HYPOXIA AND HYPERCAPNIA: Primary | ICD-10-CM

## 2022-07-06 DIAGNOSIS — J96.11 CHRONIC RESPIRATORY FAILURE WITH HYPOXIA AND HYPERCAPNIA: Primary | ICD-10-CM

## 2022-07-06 PROCEDURE — 99214 OFFICE O/P EST MOD 30 MIN: CPT | Performed by: INTERNAL MEDICINE

## 2022-07-06 RX ORDER — LATANOPROST 50 UG/ML
SOLUTION/ DROPS OPHTHALMIC
COMMUNITY
Start: 2022-04-20

## 2022-07-06 NOTE — PROGRESS NOTES
"Pulmonary Office Follow Up      Subjective   Chief Complaint: Shortness of Breath    Avelina Ceron is a 81 y.o. female is being seen in follow up for COPD    History of Present Illness    Ms. Ceron is an 82yo F who is followed for COPD and Chronic Respiratory Failure.     She was last seen in clinic on 11/4/21 by AMADOR Lucio. Since her last visit, she has not had any exacerbations. She did not start Pulmonary Rehab. She has not been as active as previous secondary to balance issues.     The following portions of the patient's history were reviewed and updated as appropriate: allergies, current medications, past family history, past medical history, past social history, past surgical history and problem list.    Review of Systems   Constitutional: Negative.    HENT: Negative.    Eyes: Negative.    Respiratory: Positive for shortness of breath.    Cardiovascular: Negative.    Gastrointestinal: Negative.    Endocrine: Negative.    Genitourinary: Negative.    Musculoskeletal: Negative.    Skin: Negative.    Allergic/Immunologic: Negative.    Neurological: Negative.    Hematological: Negative.    Psychiatric/Behavioral: Negative.          Objective   Blood pressure 128/72, pulse 84, temperature 96.8 °F (36 °C), height 157.5 cm (62\"), weight 83 kg (183 lb), SpO2 93 %, not currently breastfeeding.  Physical Exam  Vitals and nursing note reviewed.   Constitutional:       General: She is not in acute distress.     Appearance: She is well-developed.   HENT:      Head: Normocephalic and atraumatic.   Eyes:      General: No scleral icterus.     Conjunctiva/sclera: Conjunctivae normal.      Pupils: Pupils are equal, round, and reactive to light.   Neck:      Thyroid: No thyromegaly.      Trachea: No tracheal deviation.   Cardiovascular:      Rate and Rhythm: Normal rate and regular rhythm.      Heart sounds: Normal heart sounds.   Pulmonary:      Effort: Pulmonary effort is normal. No respiratory distress.      Breath " sounds: Decreased breath sounds present. No wheezing.   Abdominal:      General: Bowel sounds are normal.      Palpations: Abdomen is soft.      Tenderness: There is no abdominal tenderness.   Musculoskeletal:         General: Normal range of motion.      Cervical back: Normal range of motion and neck supple.   Lymphadenopathy:      Cervical: No cervical adenopathy.   Skin:     General: Skin is warm and dry.      Findings: No erythema or rash.   Neurological:      Mental Status: She is alert and oriented to person, place, and time.      Motor: No abnormal muscle tone.      Coordination: Coordination normal.   Psychiatric:         Speech: Speech normal.         Behavior: Behavior normal.         Judgment: Judgment normal.         PFTs:  No new PFTs.     Imaging:  No new imaging.     Assessment & Plan   Diagnoses and all orders for this visit:    1. Chronic respiratory failure with hypoxia and hypercapnia (HCC) (Primary)    2. Chronic obstructive pulmonary disease, unspecified COPD type (HCC)        Discussion:  Ms. Ceron is a 80yo F who presents for follow up of COPD.      1. Severe COPD  - Continue Trelegy inhaler.    - Continue Daliresp 500mcg daily.   - Combivent/Albuterol as needed. She hasn't needed to use these.   - Mucinex as needed.   - No exacerbations since her last visit.      2. Chronic Hypoxic and Hypercapnic Respiratory Failure  - Continue supplemental O2. Currently on 2L.   - Previously used Trilogy machine but unable to tolerate secondary to recurrent sinus infections.     Follow up in 6 months.        Florence Landrum,   Pulmonary and Critical Care Medicine  Note Electronically Signed

## 2022-07-14 RX ORDER — CLOPIDOGREL BISULFATE 75 MG/1
TABLET ORAL
Qty: 90 TABLET | Refills: 1 | Status: SHIPPED | OUTPATIENT
Start: 2022-07-14 | End: 2023-01-11

## 2022-07-14 RX ORDER — ATENOLOL 50 MG/1
TABLET ORAL
Qty: 90 TABLET | Refills: 1 | Status: SHIPPED | OUTPATIENT
Start: 2022-07-14 | End: 2023-01-11

## 2022-07-14 RX ORDER — ROFLUMILAST 500 UG/1
TABLET ORAL
Qty: 90 TABLET | Refills: 1 | Status: SHIPPED | OUTPATIENT
Start: 2022-07-14 | End: 2023-01-11

## 2022-07-25 ENCOUNTER — LAB (OUTPATIENT)
Dept: LAB | Facility: HOSPITAL | Age: 82
End: 2022-07-25

## 2022-07-25 ENCOUNTER — OFFICE VISIT (OUTPATIENT)
Dept: INTERNAL MEDICINE | Facility: CLINIC | Age: 82
End: 2022-07-25

## 2022-07-25 VITALS
HEART RATE: 68 BPM | RESPIRATION RATE: 22 BRPM | BODY MASS INDEX: 33.29 KG/M2 | TEMPERATURE: 97.8 F | WEIGHT: 182 LBS | SYSTOLIC BLOOD PRESSURE: 158 MMHG | DIASTOLIC BLOOD PRESSURE: 64 MMHG

## 2022-07-25 DIAGNOSIS — E03.9 HYPOTHYROIDISM, UNSPECIFIED TYPE: ICD-10-CM

## 2022-07-25 DIAGNOSIS — E78.5 HYPERLIPIDEMIA, UNSPECIFIED HYPERLIPIDEMIA TYPE: Primary | ICD-10-CM

## 2022-07-25 DIAGNOSIS — R94.31 ABNORMAL ECG: ICD-10-CM

## 2022-07-25 DIAGNOSIS — K21.00 GASTROESOPHAGEAL REFLUX DISEASE WITH ESOPHAGITIS WITHOUT HEMORRHAGE: ICD-10-CM

## 2022-07-25 DIAGNOSIS — R61 DIAPHORESIS: ICD-10-CM

## 2022-07-25 DIAGNOSIS — I10 ESSENTIAL HYPERTENSION: ICD-10-CM

## 2022-07-25 LAB
ALBUMIN SERPL-MCNC: 4.5 G/DL (ref 3.5–5.2)
ALBUMIN/GLOB SERPL: 1.7 G/DL
ALP SERPL-CCNC: 65 U/L (ref 39–117)
ALT SERPL W P-5'-P-CCNC: 15 U/L (ref 1–33)
ANION GAP SERPL CALCULATED.3IONS-SCNC: 11.3 MMOL/L (ref 5–15)
AST SERPL-CCNC: 15 U/L (ref 1–32)
BASOPHILS # BLD AUTO: 0.11 10*3/MM3 (ref 0–0.2)
BASOPHILS NFR BLD AUTO: 0.8 % (ref 0–1.5)
BILIRUB SERPL-MCNC: 0.3 MG/DL (ref 0–1.2)
BUN SERPL-MCNC: 11 MG/DL (ref 8–23)
BUN/CREAT SERPL: 23.9 (ref 7–25)
CALCIUM SPEC-SCNC: 10.2 MG/DL (ref 8.6–10.5)
CHLORIDE SERPL-SCNC: 96 MMOL/L (ref 98–107)
CHOLEST SERPL-MCNC: 154 MG/DL (ref 0–200)
CO2 SERPL-SCNC: 33.7 MMOL/L (ref 22–29)
CREAT SERPL-MCNC: 0.46 MG/DL (ref 0.57–1)
DEPRECATED RDW RBC AUTO: 42.1 FL (ref 37–54)
EGFRCR SERPLBLD CKD-EPI 2021: 96.3 ML/MIN/1.73
EOSINOPHIL # BLD AUTO: 0.19 10*3/MM3 (ref 0–0.4)
EOSINOPHIL NFR BLD AUTO: 1.4 % (ref 0.3–6.2)
ERYTHROCYTE [DISTWIDTH] IN BLOOD BY AUTOMATED COUNT: 12 % (ref 12.3–15.4)
GLOBULIN UR ELPH-MCNC: 2.7 GM/DL
GLUCOSE SERPL-MCNC: 93 MG/DL (ref 65–99)
HCT VFR BLD AUTO: 39.4 % (ref 34–46.6)
HDLC SERPL-MCNC: 41 MG/DL (ref 40–60)
HGB BLD-MCNC: 13.1 G/DL (ref 12–15.9)
IMM GRANULOCYTES # BLD AUTO: 0.05 10*3/MM3 (ref 0–0.05)
IMM GRANULOCYTES NFR BLD AUTO: 0.4 % (ref 0–0.5)
LDLC SERPL CALC-MCNC: 77 MG/DL (ref 0–100)
LDLC/HDLC SERPL: 1.69 {RATIO}
LYMPHOCYTES # BLD AUTO: 2.6 10*3/MM3 (ref 0.7–3.1)
LYMPHOCYTES NFR BLD AUTO: 19 % (ref 19.6–45.3)
MCH RBC QN AUTO: 32 PG (ref 26.6–33)
MCHC RBC AUTO-ENTMCNC: 33.2 G/DL (ref 31.5–35.7)
MCV RBC AUTO: 96.1 FL (ref 79–97)
MONOCYTES # BLD AUTO: 0.81 10*3/MM3 (ref 0.1–0.9)
MONOCYTES NFR BLD AUTO: 5.9 % (ref 5–12)
NEUTROPHILS NFR BLD AUTO: 72.5 % (ref 42.7–76)
NEUTROPHILS NFR BLD AUTO: 9.91 10*3/MM3 (ref 1.7–7)
NRBC BLD AUTO-RTO: 0 /100 WBC (ref 0–0.2)
PLATELET # BLD AUTO: 263 10*3/MM3 (ref 140–450)
PMV BLD AUTO: 9.4 FL (ref 6–12)
POTASSIUM SERPL-SCNC: 4.1 MMOL/L (ref 3.5–5.2)
PROT SERPL-MCNC: 7.2 G/DL (ref 6–8.5)
RBC # BLD AUTO: 4.1 10*6/MM3 (ref 3.77–5.28)
SODIUM SERPL-SCNC: 141 MMOL/L (ref 136–145)
T4 FREE SERPL-MCNC: 1.07 NG/DL (ref 0.93–1.7)
TRIGL SERPL-MCNC: 218 MG/DL (ref 0–150)
TSH SERPL DL<=0.05 MIU/L-ACNC: 5.62 UIU/ML (ref 0.27–4.2)
VLDLC SERPL-MCNC: 36 MG/DL (ref 5–40)
WBC NRBC COR # BLD: 13.67 10*3/MM3 (ref 3.4–10.8)

## 2022-07-25 PROCEDURE — 80061 LIPID PANEL: CPT | Performed by: INTERNAL MEDICINE

## 2022-07-25 PROCEDURE — 99214 OFFICE O/P EST MOD 30 MIN: CPT | Performed by: INTERNAL MEDICINE

## 2022-07-25 PROCEDURE — 85025 COMPLETE CBC W/AUTO DIFF WBC: CPT | Performed by: INTERNAL MEDICINE

## 2022-07-25 PROCEDURE — 84443 ASSAY THYROID STIM HORMONE: CPT | Performed by: INTERNAL MEDICINE

## 2022-07-25 PROCEDURE — 80053 COMPREHEN METABOLIC PANEL: CPT | Performed by: INTERNAL MEDICINE

## 2022-07-25 PROCEDURE — 84439 ASSAY OF FREE THYROXINE: CPT | Performed by: INTERNAL MEDICINE

## 2022-07-25 PROCEDURE — 93000 ELECTROCARDIOGRAM COMPLETE: CPT | Performed by: INTERNAL MEDICINE

## 2022-07-25 NOTE — PROGRESS NOTES
Chief Complaint   Patient presents with   • Follow-up     4 month follow up chronic medical problems       History of Present Illness    The patient presents for a follow-up related to hyperlipidemia. She is following a low fat diet. She reports that she is exercising. She is taking simvastatin. The patient is taking her medication as prescribed. She reports no medication side effects, including muscle cramps, abdominal pain, headaches or weakness. She denies chest pain, shortness of breath, orthopnea, paroxysmal nocturnal dyspnea, dyspnea on exertion, edema, palpitations or syncope.    The patient presents for a follow-up related to hypertension. The patient reports that she has had diaphoresis but she denies having headaches or blurred vision. She states that she is taking her medication as prescribed. She is not having medication side effects.    The patient presents for a follow-up related to GERD. The patient is on pantoprazole for her gastroesophageal reflux. The medication is taken on a regular basis and gives complete relief of the symptoms. She reports no abdominal pain, belching, diarrhea, dysphagia, early satiety, heartburn, hoarseness, nausea, odynophagia, rectal bleeding, vomiting or weight loss. The GERD has no known aggravating factors.    The patient presents for a follow-up related to hypothyroidism. She reports a good energy level. She reports no hair loss, heat intolerance, cold intolerance, constipation or sweats. She is taking her medication as prescribed.    Medications      Current Outpatient Medications:   •  acetaminophen (TYLENOL) 500 MG tablet, Take 1,000 mg by mouth Every 8 (Eight) Hours As Needed for Mild Pain ., Disp: , Rfl:   •  albuterol sulfate  (90 Base) MCG/ACT inhaler, Inhale 2 puffs every 4 (four) hours as needed., Disp: , Rfl:   •  Ascorbic Acid (Vitamin C) 500 MG capsule, Take  by mouth Daily., Disp: , Rfl:   •  aspirin 81 MG chewable tablet, Chew 1 tablet daily., Disp:  , Rfl:   •  atenolol (TENORMIN) 50 MG tablet, TAKE 1/2 TABLET TWICE A DAY, Disp: 90 tablet, Rfl: 1  •  Calcium Carbonate-Vitamin D (CALCIUM-VITAMIN D) 500-200 MG-UNIT per tablet, Take 1 tablet by mouth daily., Disp: , Rfl:   •  cetirizine (zyrTEC) 10 MG tablet, Take 10 mg by mouth Daily., Disp: , Rfl:   •  Cholecalciferol (Vitamin D3) 25 MCG (1000 UT) capsule, Take  by mouth Daily., Disp: , Rfl:   •  clopidogrel (PLAVIX) 75 MG tablet, TAKE 1 TABLET DAILY, Disp: 90 tablet, Rfl: 1  •  Daliresp 500 MCG tablet tablet, TAKE 1 TABLET DAILY, Disp: 90 tablet, Rfl: 1  •  Docusate Sodium 100 MG capsule, Take 100 mg by mouth 2 (Two) Times a Day., Disp: 60 tablet, Rfl: 2  •  FeroSul 325 (65 Fe) MG tablet, TAKE 1 TABLET BY MOUTH TWICE DAILY, Disp: 60 tablet, Rfl: 3  •  FIBER SELECT GUMMIES PO, Take 2 tablets by mouth Daily., Disp: , Rfl:   •  Fluticasone-Umeclidin-Vilant (Trelegy Ellipta) 100-62.5-25 MCG/INH inhaler, Inhale 1 puff Daily., Disp: 180 each, Rfl: 3  •  gabapentin (NEURONTIN) 300 MG capsule, TAKE 1 CAPSULE EVERY NIGHT, Disp: 90 capsule, Rfl: 0  •  isosorbide mononitrate (IMDUR) 30 MG 24 hr tablet, Take 1 tablet by mouth Daily., Disp: 90 tablet, Rfl: 1  •  latanoprost (XALATAN) 0.005 % ophthalmic solution, INSTILL 1 DROP IN BOTH EYES EVERY EVENING, Disp: , Rfl:   •  levothyroxine (SYNTHROID, LEVOTHROID) 150 MCG tablet, Take 1 tablet by mouth Daily., Disp: 90 tablet, Rfl: 1  •  Multiple Vitamins-Minerals (PRESERVISION AREDS) capsule, Take 1 tablet by mouth 2 (two) times a day., Disp: , Rfl:   •  pantoprazole (PROTONIX) 40 MG EC tablet, Take 1 tablet by mouth Daily., Disp: 90 tablet, Rfl: 1  •  sertraline (ZOLOFT) 100 MG tablet, Take 1 tablet by mouth Daily., Disp: 90 tablet, Rfl: 1  •  simvastatin (ZOCOR) 40 MG tablet, Take 1 tablet by mouth every night at bedtime., Disp: 90 tablet, Rfl: 1  •  solifenacin (VESICARE) 5 MG tablet, Take 1 tablet by mouth Daily., Disp: 90 tablet, Rfl: 1  •  Spacer/Aero Chamber Mouthpiece  misc, 1 Units Take As Directed., Disp: 1 each, Rfl: 0  •  traMADol (ULTRAM) 50 MG tablet, Take 1 tablet by mouth Every 6 (Six) Hours As Needed for Moderate Pain ., Disp: 120 tablet, Rfl: 2  •  nitroglycerin (NITROSTAT) 0.4 MG SL tablet, PLACE 1 TABLET UNDER THE TONGUE AS NEEDED EVERY 5 MINUTES FOR CHEST PAIN. CALL 911 IF NO RELIEF AFTER 2ND DOSE, Disp: 25 tablet, Rfl: 0     Allergies    Allergies   Allergen Reactions   • Codeine Mental Status Change   • Penicillins Hives and Swelling   • Dopamine Other (See Comments) and Palpitations     Other reaction(s): Hypertension       Problem List    Patient Active Problem List   Diagnosis   • Coronary arteriosclerosis in native artery   • Chronic obstructive pulmonary disease (HCC)   • Depression   • Gastroesophageal reflux disease with esophagitis   • Hyperlipidemia   • Hypertension   • Hypothyroidism   • Osteoarthritis   • Peripheral arterial occlusive disease (HCC)   • Solitary pulmonary nodule   • Vitamin D deficiency   • Lower back pain   • Carotid bruit   • Hypoxemia   • Chronic respiratory failure with hypoxia and hypercapnia (HCC)       Medications, Allergies, Problems List and Past History were reviewed and updated.    Physical Examination    /64 (BP Location: Left arm, Patient Position: Sitting, Cuff Size: Adult)   Pulse 68   Temp 97.8 °F (36.6 °C) (Infrared)   Resp 22   Wt 82.6 kg (182 lb)   LMP  (LMP Unknown)   Breastfeeding No   BMI 33.29 kg/m²     HEENT: Facies- Within normal limits. Lids- Normal bilaterally. Conjunctiva- Clear bilaterally. Sclera- Anicteric bilaterally.    Neck: Thyroid- non enlarged, symmetric and has no nodules. No bruits are detected. ROM- Normal Range of Motion with no rigidity.    Lungs: Auscultation- Clear to auscultation bilaterally. There are no retractions, clubbing or cyanosis. The Expiratory to Inspiratory ratio is equal.    Cardiovascular: There are no carotid bruits. Heart- Normal Rate with Regular rhythm and no  murmurs. There are no gallops. There are no rubs. In the lower extremities there is no edema. The upper extremities do not have edema.    Abdomen: Soft, benign, non-tender with no masses, hernias, organomegaly or scars.      ECG 12 Lead    Date/Time: 7/25/2022 1:08 PM  Performed by: Dre Moura MD  Authorized by: Dre Moura MD   Comparison: not compared with previous ECG   Rhythm: sinus rhythm  Ectopy: atrial premature contractions  Rate: normal  Conduction: conduction normal  ST Segments: ST segments normal  T Waves: T waves normal  QRS axis: normal  Other: no other findings    Clinical impression: abnormal EKG            Impression and Assessment    Diaphoresis.    Hyperlipidemia.    Essential Hypertension.    Gastroesophageal Reflux Disease.    Hypothyroidism.    Plan    Gastroesophageal Reflux Disease Plan: The patient was instructed to continue the current medications.    Hyperlipidemia Plan: The patient was instructed to exercise daily, eat a low fat diet and continue her medications.    Essential Hypertension Plan: The patient was instructed to continue the current medications.    Diaphoresis Plan: Further plans will be made following the receipt of the test results.    Hypothyroidism Plan: The patient was instructed to continue the current medications.    Diagnoses and all orders for this visit:    1. Hyperlipidemia, unspecified hyperlipidemia type (Primary)  -     Lipid Panel; Future  -     Lipid Panel    2. Essential hypertension  -     CBC & Differential; Future  -     CBC & Differential    3. Gastroesophageal reflux disease with esophagitis without hemorrhage    4. Hypothyroidism, unspecified type  -     TSH; Future  -     T4, Free; Future  -     TSH  -     T4, Free    5. Diaphoresis  -     ECG 12 Lead  -     CBC & Differential; Future  -     Comprehensive Metabolic Panel; Future  -     Stress Test With Myocardial Perfusion One Day; Future  -     CBC & Differential  -     Comprehensive  Metabolic Panel    6. Abnormal ECG  -     Stress Test With Myocardial Perfusion One Day; Future        Return to Office    The patient was instructed to return for follow-up in 6 months. The patient was instructed to return sooner if the condition changes, worsens, or does not resolve.

## 2022-08-02 DIAGNOSIS — N39.3 STRESS INCONTINENCE OF URINE: ICD-10-CM

## 2022-08-02 RX ORDER — SOLIFENACIN SUCCINATE 5 MG/1
TABLET, FILM COATED ORAL
Qty: 90 TABLET | Refills: 1 | Status: SHIPPED | OUTPATIENT
Start: 2022-08-02 | End: 2023-01-31

## 2022-08-02 RX ORDER — SERTRALINE HYDROCHLORIDE 100 MG/1
TABLET, FILM COATED ORAL
Qty: 90 TABLET | Refills: 1 | Status: SHIPPED | OUTPATIENT
Start: 2022-08-02 | End: 2023-01-31

## 2022-08-02 RX ORDER — SIMVASTATIN 40 MG
TABLET ORAL
Qty: 90 TABLET | Refills: 1 | Status: SHIPPED | OUTPATIENT
Start: 2022-08-02 | End: 2023-01-31

## 2022-08-02 RX ORDER — ISOSORBIDE MONONITRATE 30 MG/1
TABLET, EXTENDED RELEASE ORAL
Qty: 90 TABLET | Refills: 1 | Status: SHIPPED | OUTPATIENT
Start: 2022-08-02 | End: 2023-01-11

## 2022-08-30 RX ORDER — PANTOPRAZOLE SODIUM 40 MG/1
TABLET, DELAYED RELEASE ORAL
Qty: 90 TABLET | Refills: 1 | Status: SHIPPED | OUTPATIENT
Start: 2022-08-30 | End: 2023-02-27

## 2022-09-20 DIAGNOSIS — G89.29 CHRONIC MIDLINE LOW BACK PAIN WITHOUT SCIATICA: ICD-10-CM

## 2022-09-20 DIAGNOSIS — M54.50 CHRONIC MIDLINE LOW BACK PAIN WITHOUT SCIATICA: ICD-10-CM

## 2022-09-20 RX ORDER — TRAMADOL HYDROCHLORIDE 50 MG/1
50 TABLET ORAL EVERY 6 HOURS PRN
Qty: 120 TABLET | Refills: 2 | Status: SHIPPED | OUTPATIENT
Start: 2022-09-20 | End: 2022-12-19

## 2022-10-10 ENCOUNTER — HOSPITAL ENCOUNTER (EMERGENCY)
Facility: HOSPITAL | Age: 82
Discharge: HOME | End: 2022-10-10
Attending: EMERGENCY MEDICINE | Admitting: EMERGENCY MEDICINE

## 2022-10-10 ENCOUNTER — TELEPHONE (OUTPATIENT)
Dept: PULMONOLOGY | Facility: CLINIC | Age: 82
End: 2022-10-10

## 2022-10-10 ENCOUNTER — APPOINTMENT (OUTPATIENT)
Dept: GENERAL RADIOLOGY | Facility: HOSPITAL | Age: 82
End: 2022-10-10

## 2022-10-10 VITALS
BODY MASS INDEX: 32.78 KG/M2 | RESPIRATION RATE: 20 BRPM | HEART RATE: 61 BPM | WEIGHT: 185 LBS | OXYGEN SATURATION: 96 % | TEMPERATURE: 97.4 F | HEIGHT: 63 IN | SYSTOLIC BLOOD PRESSURE: 142 MMHG | DIASTOLIC BLOOD PRESSURE: 58 MMHG

## 2022-10-10 DIAGNOSIS — Z87.09 HISTORY OF COPD: ICD-10-CM

## 2022-10-10 DIAGNOSIS — J06.9 UPPER RESPIRATORY TRACT INFECTION, UNSPECIFIED TYPE: Primary | ICD-10-CM

## 2022-10-10 LAB
ALBUMIN SERPL-MCNC: 4 G/DL (ref 3.5–5.2)
ALBUMIN/GLOB SERPL: 1.3 G/DL
ALP SERPL-CCNC: 69 U/L (ref 39–117)
ALT SERPL W P-5'-P-CCNC: 13 U/L (ref 1–33)
ANION GAP SERPL CALCULATED.3IONS-SCNC: 7 MMOL/L (ref 5–15)
AST SERPL-CCNC: 14 U/L (ref 1–32)
B PARAPERT DNA SPEC QL NAA+PROBE: NOT DETECTED
B PERT DNA SPEC QL NAA+PROBE: NOT DETECTED
BASOPHILS # BLD AUTO: 0.07 10*3/MM3 (ref 0–0.2)
BASOPHILS NFR BLD AUTO: 0.6 % (ref 0–1.5)
BILIRUB SERPL-MCNC: 0.5 MG/DL (ref 0–1.2)
BUN SERPL-MCNC: 8 MG/DL (ref 8–23)
BUN/CREAT SERPL: 19.5 (ref 7–25)
C PNEUM DNA NPH QL NAA+NON-PROBE: NOT DETECTED
CALCIUM SPEC-SCNC: 9.6 MG/DL (ref 8.6–10.5)
CHLORIDE SERPL-SCNC: 96 MMOL/L (ref 98–107)
CO2 SERPL-SCNC: 34 MMOL/L (ref 22–29)
CREAT SERPL-MCNC: 0.41 MG/DL (ref 0.57–1)
DEPRECATED RDW RBC AUTO: 45.5 FL (ref 37–54)
EGFRCR SERPLBLD CKD-EPI 2021: 99 ML/MIN/1.73
EOSINOPHIL # BLD AUTO: 0.26 10*3/MM3 (ref 0–0.4)
EOSINOPHIL NFR BLD AUTO: 2.1 % (ref 0.3–6.2)
ERYTHROCYTE [DISTWIDTH] IN BLOOD BY AUTOMATED COUNT: 12.6 % (ref 12.3–15.4)
FLUAV SUBTYP SPEC NAA+PROBE: NOT DETECTED
FLUBV RNA ISLT QL NAA+PROBE: NOT DETECTED
GLOBULIN UR ELPH-MCNC: 3 GM/DL
GLUCOSE SERPL-MCNC: 107 MG/DL (ref 65–99)
HADV DNA SPEC NAA+PROBE: NOT DETECTED
HCOV 229E RNA SPEC QL NAA+PROBE: NOT DETECTED
HCOV HKU1 RNA SPEC QL NAA+PROBE: NOT DETECTED
HCOV NL63 RNA SPEC QL NAA+PROBE: NOT DETECTED
HCOV OC43 RNA SPEC QL NAA+PROBE: NOT DETECTED
HCT VFR BLD AUTO: 39 % (ref 34–46.6)
HGB BLD-MCNC: 12.6 G/DL (ref 12–15.9)
HMPV RNA NPH QL NAA+NON-PROBE: NOT DETECTED
HOLD SPECIMEN: NORMAL
HPIV1 RNA ISLT QL NAA+PROBE: NOT DETECTED
HPIV2 RNA SPEC QL NAA+PROBE: NOT DETECTED
HPIV3 RNA NPH QL NAA+PROBE: NOT DETECTED
HPIV4 P GENE NPH QL NAA+PROBE: NOT DETECTED
IMM GRANULOCYTES # BLD AUTO: 0.06 10*3/MM3 (ref 0–0.05)
IMM GRANULOCYTES NFR BLD AUTO: 0.5 % (ref 0–0.5)
LYMPHOCYTES # BLD AUTO: 2.68 10*3/MM3 (ref 0.7–3.1)
LYMPHOCYTES NFR BLD AUTO: 21.3 % (ref 19.6–45.3)
M PNEUMO IGG SER IA-ACNC: NOT DETECTED
MCH RBC QN AUTO: 31.9 PG (ref 26.6–33)
MCHC RBC AUTO-ENTMCNC: 32.3 G/DL (ref 31.5–35.7)
MCV RBC AUTO: 98.7 FL (ref 79–97)
MONOCYTES # BLD AUTO: 0.87 10*3/MM3 (ref 0.1–0.9)
MONOCYTES NFR BLD AUTO: 6.9 % (ref 5–12)
NEUTROPHILS NFR BLD AUTO: 68.6 % (ref 42.7–76)
NEUTROPHILS NFR BLD AUTO: 8.64 10*3/MM3 (ref 1.7–7)
NRBC BLD AUTO-RTO: 0 /100 WBC (ref 0–0.2)
NT-PROBNP SERPL-MCNC: 184.2 PG/ML (ref 0–1800)
PLATELET # BLD AUTO: 221 10*3/MM3 (ref 140–450)
PMV BLD AUTO: 8.9 FL (ref 6–12)
POTASSIUM SERPL-SCNC: 4.3 MMOL/L (ref 3.5–5.2)
PROT SERPL-MCNC: 7 G/DL (ref 6–8.5)
QT INTERVAL: 434 MS
QTC INTERVAL: 434 MS
RBC # BLD AUTO: 3.95 10*6/MM3 (ref 3.77–5.28)
RHINOVIRUS RNA SPEC NAA+PROBE: NOT DETECTED
RSV RNA NPH QL NAA+NON-PROBE: NOT DETECTED
SARS-COV-2 RNA NPH QL NAA+NON-PROBE: NOT DETECTED
SODIUM SERPL-SCNC: 137 MMOL/L (ref 136–145)
TROPONIN T SERPL-MCNC: <0.01 NG/ML (ref 0–0.03)
WBC NRBC COR # BLD: 12.58 10*3/MM3 (ref 3.4–10.8)
WHOLE BLOOD HOLD COAG: NORMAL
WHOLE BLOOD HOLD SPECIMEN: NORMAL

## 2022-10-10 PROCEDURE — 93005 ELECTROCARDIOGRAM TRACING: CPT | Performed by: EMERGENCY MEDICINE

## 2022-10-10 PROCEDURE — 83880 ASSAY OF NATRIURETIC PEPTIDE: CPT | Performed by: EMERGENCY MEDICINE

## 2022-10-10 PROCEDURE — 71045 X-RAY EXAM CHEST 1 VIEW: CPT

## 2022-10-10 PROCEDURE — 0202U NFCT DS 22 TRGT SARS-COV-2: CPT | Performed by: NURSE PRACTITIONER

## 2022-10-10 PROCEDURE — 84484 ASSAY OF TROPONIN QUANT: CPT | Performed by: EMERGENCY MEDICINE

## 2022-10-10 PROCEDURE — 99284 EMERGENCY DEPT VISIT MOD MDM: CPT

## 2022-10-10 PROCEDURE — 85025 COMPLETE CBC W/AUTO DIFF WBC: CPT | Performed by: EMERGENCY MEDICINE

## 2022-10-10 PROCEDURE — 80053 COMPREHEN METABOLIC PANEL: CPT | Performed by: EMERGENCY MEDICINE

## 2022-10-10 PROCEDURE — 93005 ELECTROCARDIOGRAM TRACING: CPT

## 2022-10-10 RX ORDER — SODIUM CHLORIDE 0.9 % (FLUSH) 0.9 %
10 SYRINGE (ML) INJECTION AS NEEDED
Status: DISCONTINUED | OUTPATIENT
Start: 2022-10-10 | End: 2022-10-10 | Stop reason: HOSPADM

## 2022-10-10 RX ORDER — DOXYCYCLINE 100 MG/1
100 CAPSULE ORAL 2 TIMES DAILY
Qty: 20 CAPSULE | Refills: 0 | Status: SHIPPED | OUTPATIENT
Start: 2022-10-10 | End: 2022-12-06

## 2022-10-10 NOTE — DISCHARGE INSTRUCTIONS
Home to rest.  Maintain your very best hydration and nutrition.  Take these medications with ample food and fluids.  A prescription for doxycycline is been forwarded to your personal pharmacy.  Return to the emergency department as needed for worsening symptoms or concerns.  Follow-up with your primary care provider to monitor your recovery.  Thank you

## 2022-10-10 NOTE — TELEPHONE ENCOUNTER
Patient dgtr called wanting abx for recently developed wet cough and sinus drainage. She has since went to ER and is being evaluated there.

## 2022-10-14 ENCOUNTER — TELEPHONE (OUTPATIENT)
Dept: OTHER | Facility: HOSPITAL | Age: 82
End: 2022-10-14

## 2022-10-14 NOTE — TELEPHONE ENCOUNTER
COPD Nurse Navigator attempted to call patient in regard to Emergency Department visit 10/10/2022.  There was no answer at the time of the call.

## 2022-10-16 RX ORDER — GABAPENTIN 300 MG/1
CAPSULE ORAL
Qty: 90 CAPSULE | Refills: 0 | Status: SHIPPED | OUTPATIENT
Start: 2022-10-16 | End: 2023-02-20 | Stop reason: SDUPTHER

## 2022-10-27 ENCOUNTER — OFFICE VISIT (OUTPATIENT)
Dept: PULMONOLOGY | Facility: CLINIC | Age: 82
End: 2022-10-27

## 2022-10-27 VITALS
HEART RATE: 64 BPM | RESPIRATION RATE: 15 BRPM | OXYGEN SATURATION: 97 % | DIASTOLIC BLOOD PRESSURE: 88 MMHG | WEIGHT: 185.8 LBS | BODY MASS INDEX: 32.92 KG/M2 | HEIGHT: 63 IN | SYSTOLIC BLOOD PRESSURE: 156 MMHG | TEMPERATURE: 97.5 F

## 2022-10-27 DIAGNOSIS — J30.9 ALLERGIC RHINITIS, UNSPECIFIED SEASONALITY, UNSPECIFIED TRIGGER: ICD-10-CM

## 2022-10-27 DIAGNOSIS — J44.9 CHRONIC OBSTRUCTIVE PULMONARY DISEASE, UNSPECIFIED COPD TYPE: ICD-10-CM

## 2022-10-27 DIAGNOSIS — J96.11 CHRONIC RESPIRATORY FAILURE WITH HYPOXIA AND HYPERCAPNIA: Primary | ICD-10-CM

## 2022-10-27 DIAGNOSIS — J96.12 CHRONIC RESPIRATORY FAILURE WITH HYPOXIA AND HYPERCAPNIA: Primary | ICD-10-CM

## 2022-10-27 PROCEDURE — 99213 OFFICE O/P EST LOW 20 MIN: CPT | Performed by: NURSE PRACTITIONER

## 2022-10-27 NOTE — PROGRESS NOTES
"[facility], [place] [facility] Follow up      Chief Complaint  COPD    Subjective          Avelina Ceron presents to [facility] for a worsening cough.  She was seen in the emergency department a couple weeks ago for worsening cough and shortness of breath.  She was diagnosed with upper respiratory tract infection.  She completed a 2-week course of doxycycline.  The cough continued to linger so they made a follow-up appointment here in the office.    Today when she comes in her cough is actually resolved.  The last couple days she has done well.  They almost canceled appointment but thought they just come in and get a checkup.    She does have some seasonal allergy symptoms with some postnasal drainage and mild sinus congestion.  She does take her antihistamine daily.      Objective     Vital Signs:   /88 (BP Location: Right arm, Patient Position: Sitting, Cuff Size: Adult)   Pulse 64   Temp 97.5 °F (36.4 °C) (Infrared)   Resp 15   Ht 160 cm (63\")   Wt 84.3 kg (185 lb 12.8 oz)   SpO2 97%   BMI 32.91 kg/m²       Immunization History   Administered Date(s) Administered   • COVID-19 (MODERNA) 1st, 2nd, 3rd Dose Only 02/04/2021, 03/04/2021, 11/04/2021   • COVID-19 (MODERNA) BOOSTER 04/19/2022   • Fluad Quad 65+ 10/01/2020   • Fluzone High Dose =>65 Years (Vaxcare ONLY) 10/05/2016, 01/11/2018, 11/14/2018, 10/29/2019   • Fluzone High-Dose 65+yrs 10/19/2021   • Pneumococcal Conjugate 13-Valent (PCV13) 09/21/2017   • Pneumococcal Polysaccharide (PPSV23) 10/05/2006   • Td 05/22/2006       Physical Exam  Vitals reviewed.   Constitutional:       Appearance: She is well-developed.   HENT:      Head: Normocephalic and atraumatic.   Eyes:      Pupils: Pupils are equal, round, and reactive to light.   Cardiovascular:      Rate and Rhythm: Normal rate and regular rhythm.      Heart sounds: No murmur heard.  Pulmonary:      Effort: Pulmonary effort is normal. No respiratory " distress.      Breath sounds: Normal breath sounds. No wheezing or rales.   Abdominal:      General: Bowel sounds are normal. There is no distension.      Palpations: Abdomen is soft.   Musculoskeletal:         General: Normal range of motion.      Cervical back: Normal range of motion and neck supple.   Skin:     General: Skin is warm and dry.      Findings: No erythema.   Neurological:      Mental Status: She is alert and oriented to person, place, and time.   Psychiatric:         Behavior: Behavior normal.          Result Review :                         Assessment and Plan    Problem List Items Addressed This Visit        Allergies and Adverse Reactions    Allergic rhinitis       Pulmonary and Pneumonias    Chronic obstructive pulmonary disease (HCC)    Chronic respiratory failure with hypoxia and hypercapnia (HCC) - Primary     I did review her chest x-ray.  There is question of some vascular congestion however she denies any edema.  Her cough at this time has resolved.  Continue on her maintenance therapy of Trelegy daily and albuterol as needed.    She is up-to-date on her vaccines.    Routine follow-up in 6 months.      Follow Up     No follow-ups on file.  Patient was given instructions and counseling regarding her condition or for health maintenance advice. Please see specific information pulled into the AVS if appropriate.     I spent 25 minutes caring for Avelina on this date of service. This time includes time spent by me in the following activities:preparing for the visit, reviewing tests, obtaining and/or reviewing a separately obtained history, performing a medically appropriate examination and/or evaluation , counseling and educating the patient/family/caregiver and documenting information in the medical record        AMADOR Lucio, ACNP  [facility]  Electronically signed

## 2022-12-06 ENCOUNTER — LAB (OUTPATIENT)
Dept: LAB | Facility: HOSPITAL | Age: 82
End: 2022-12-06

## 2022-12-06 ENCOUNTER — OFFICE VISIT (OUTPATIENT)
Dept: INTERNAL MEDICINE | Facility: CLINIC | Age: 82
End: 2022-12-06

## 2022-12-06 VITALS
HEART RATE: 72 BPM | DIASTOLIC BLOOD PRESSURE: 62 MMHG | SYSTOLIC BLOOD PRESSURE: 140 MMHG | BODY MASS INDEX: 32.7 KG/M2 | WEIGHT: 184.6 LBS | RESPIRATION RATE: 16 BRPM | TEMPERATURE: 97.3 F

## 2022-12-06 DIAGNOSIS — I10 ESSENTIAL HYPERTENSION: ICD-10-CM

## 2022-12-06 DIAGNOSIS — E78.5 HYPERLIPIDEMIA, UNSPECIFIED HYPERLIPIDEMIA TYPE: ICD-10-CM

## 2022-12-06 DIAGNOSIS — I10 ESSENTIAL HYPERTENSION: Primary | ICD-10-CM

## 2022-12-06 DIAGNOSIS — K21.00 GASTROESOPHAGEAL REFLUX DISEASE WITH ESOPHAGITIS WITHOUT HEMORRHAGE: ICD-10-CM

## 2022-12-06 DIAGNOSIS — E03.9 HYPOTHYROIDISM, UNSPECIFIED TYPE: ICD-10-CM

## 2022-12-06 DIAGNOSIS — J44.9 CHRONIC OBSTRUCTIVE PULMONARY DISEASE, UNSPECIFIED COPD TYPE: ICD-10-CM

## 2022-12-06 LAB
ALBUMIN SERPL-MCNC: 3.9 G/DL (ref 3.5–5.2)
ALBUMIN/GLOB SERPL: 1.2 G/DL
ALP SERPL-CCNC: 66 U/L (ref 39–117)
ALT SERPL W P-5'-P-CCNC: 8 U/L (ref 1–33)
ANION GAP SERPL CALCULATED.3IONS-SCNC: 10.3 MMOL/L (ref 5–15)
AST SERPL-CCNC: 13 U/L (ref 1–32)
BASOPHILS # BLD AUTO: 0.09 10*3/MM3 (ref 0–0.2)
BASOPHILS NFR BLD AUTO: 0.6 % (ref 0–1.5)
BILIRUB SERPL-MCNC: 0.4 MG/DL (ref 0–1.2)
BILIRUB UR QL STRIP: NEGATIVE
BUN SERPL-MCNC: 9 MG/DL (ref 8–23)
BUN/CREAT SERPL: 14.1 (ref 7–25)
CALCIUM SPEC-SCNC: 10.2 MG/DL (ref 8.6–10.5)
CHLORIDE SERPL-SCNC: 97 MMOL/L (ref 98–107)
CHOLEST SERPL-MCNC: 191 MG/DL (ref 0–200)
CLARITY UR: ABNORMAL
CO2 SERPL-SCNC: 32.7 MMOL/L (ref 22–29)
COLOR UR: YELLOW
CREAT SERPL-MCNC: 0.64 MG/DL (ref 0.57–1)
DEPRECATED RDW RBC AUTO: 41.6 FL (ref 37–54)
EGFRCR SERPLBLD CKD-EPI 2021: 88.9 ML/MIN/1.73
EOSINOPHIL # BLD AUTO: 0.18 10*3/MM3 (ref 0–0.4)
EOSINOPHIL NFR BLD AUTO: 1.2 % (ref 0.3–6.2)
ERYTHROCYTE [DISTWIDTH] IN BLOOD BY AUTOMATED COUNT: 12 % (ref 12.3–15.4)
GLOBULIN UR ELPH-MCNC: 3.2 GM/DL
GLUCOSE SERPL-MCNC: 81 MG/DL (ref 65–99)
GLUCOSE UR STRIP-MCNC: NEGATIVE MG/DL
HCT VFR BLD AUTO: 38.9 % (ref 34–46.6)
HDLC SERPL-MCNC: 43 MG/DL (ref 40–60)
HGB BLD-MCNC: 13.3 G/DL (ref 12–15.9)
HGB UR QL STRIP.AUTO: NEGATIVE
IMM GRANULOCYTES # BLD AUTO: 0.09 10*3/MM3 (ref 0–0.05)
IMM GRANULOCYTES NFR BLD AUTO: 0.6 % (ref 0–0.5)
KETONES UR QL STRIP: ABNORMAL
LDLC SERPL CALC-MCNC: 103 MG/DL (ref 0–100)
LDLC/HDLC SERPL: 2.2 {RATIO}
LEUKOCYTE ESTERASE UR QL STRIP.AUTO: ABNORMAL
LYMPHOCYTES # BLD AUTO: 2.93 10*3/MM3 (ref 0.7–3.1)
LYMPHOCYTES NFR BLD AUTO: 19.5 % (ref 19.6–45.3)
MCH RBC QN AUTO: 31.8 PG (ref 26.6–33)
MCHC RBC AUTO-ENTMCNC: 34.2 G/DL (ref 31.5–35.7)
MCV RBC AUTO: 93.1 FL (ref 79–97)
MONOCYTES # BLD AUTO: 1.1 10*3/MM3 (ref 0.1–0.9)
MONOCYTES NFR BLD AUTO: 7.3 % (ref 5–12)
NEUTROPHILS NFR BLD AUTO: 10.67 10*3/MM3 (ref 1.7–7)
NEUTROPHILS NFR BLD AUTO: 70.8 % (ref 42.7–76)
NITRITE UR QL STRIP: POSITIVE
NRBC BLD AUTO-RTO: 0 /100 WBC (ref 0–0.2)
PH UR STRIP.AUTO: 5.5 [PH] (ref 5–8)
PLATELET # BLD AUTO: 273 10*3/MM3 (ref 140–450)
PMV BLD AUTO: 9.3 FL (ref 6–12)
POTASSIUM SERPL-SCNC: 3.8 MMOL/L (ref 3.5–5.2)
PROT SERPL-MCNC: 7.1 G/DL (ref 6–8.5)
PROT UR QL STRIP: ABNORMAL
RBC # BLD AUTO: 4.18 10*6/MM3 (ref 3.77–5.28)
SODIUM SERPL-SCNC: 140 MMOL/L (ref 136–145)
SP GR UR STRIP: >=1.03 (ref 1–1.03)
T4 FREE SERPL-MCNC: 0.85 NG/DL (ref 0.93–1.7)
TRIGL SERPL-MCNC: 266 MG/DL (ref 0–150)
TSH SERPL DL<=0.05 MIU/L-ACNC: 21.3 UIU/ML (ref 0.27–4.2)
UROBILINOGEN UR QL STRIP: ABNORMAL
VLDLC SERPL-MCNC: 45 MG/DL (ref 5–40)
WBC NRBC COR # BLD: 15.06 10*3/MM3 (ref 3.4–10.8)

## 2022-12-06 PROCEDURE — 81001 URINALYSIS AUTO W/SCOPE: CPT

## 2022-12-06 PROCEDURE — 84443 ASSAY THYROID STIM HORMONE: CPT

## 2022-12-06 PROCEDURE — 85025 COMPLETE CBC W/AUTO DIFF WBC: CPT

## 2022-12-06 PROCEDURE — 99214 OFFICE O/P EST MOD 30 MIN: CPT | Performed by: INTERNAL MEDICINE

## 2022-12-06 PROCEDURE — 84439 ASSAY OF FREE THYROXINE: CPT

## 2022-12-06 PROCEDURE — 80061 LIPID PANEL: CPT

## 2022-12-06 PROCEDURE — 80053 COMPREHEN METABOLIC PANEL: CPT

## 2022-12-06 NOTE — PATIENT INSTRUCTIONS
Heart-Healthy Eating Plan  Heart-healthy meal planning includes:  Eating less unhealthy fats.  Eating more healthy fats.  Making other changes in your diet.  Talk with your doctor or a diet specialist (dietitian) to create an eating plan that is right for you.  What is my plan?  Your doctor may recommend an eating plan that includes:  Total fat: ______% or less of total calories a day.  Saturated fat: ______% or less of total calories a day.  Cholesterol: less than _________mg a day.  What are tips for following this plan?  Cooking  Avoid frying your food. Try to bake, boil, grill, or broil it instead. You can also reduce fat by:  Removing the skin from poultry.  Removing all visible fats from meats.  Steaming vegetables in water or broth.  Meal planning    At meals, divide your plate into four equal parts:  Fill one-half of your plate with vegetables and green salads.  Fill one-fourth of your plate with whole grains.  Fill one-fourth of your plate with lean protein foods.  Eat 4-5 servings of vegetables per day. A serving of vegetables is:  1 cup of raw or cooked vegetables.  2 cups of raw leafy greens.  Eat 4-5 servings of fruit per day. A serving of fruit is:  1 medium whole fruit.  ¼ cup of dried fruit.  ½ cup of fresh, frozen, or canned fruit.  ½ cup of 100% fruit juice.  Eat more foods that have soluble fiber. These are apples, broccoli, carrots, beans, peas, and barley. Try to get 20-30 g of fiber per day.  Eat 4-5 servings of nuts, legumes, and seeds per week:  1 serving of dried beans or legumes equals ½ cup after being cooked.  1 serving of nuts is ¼ cup.  1 serving of seeds equals 1 tablespoon.  General information  Eat more home-cooked food. Eat less restaurant, buffet, and fast food.  Limit or avoid alcohol.  Limit foods that are high in starch and sugar.  Avoid fried foods.  Lose weight if you are overweight.  Keep track of how much salt (sodium) you eat. This is important if you have high blood  pressure. Ask your doctor to tell you more about this.  Try to add vegetarian meals each week.  Fats  Choose healthy fats. These include olive oil and canola oil, flaxseeds, walnuts, almonds, and seeds.  Eat more omega-3 fats. These include salmon, mackerel, sardines, tuna, flaxseed oil, and ground flaxseeds. Try to eat fish at least 2 times each week.  Check food labels. Avoid foods with trans fats or high amounts of saturated fat.  Limit saturated fats.  These are often found in animal products, such as meats, butter, and cream.  These are also found in plant foods, such as palm oil, palm kernel oil, and coconut oil.  Avoid foods with partially hydrogenated oils in them. These have trans fats. Examples are stick margarine, some tub margarines, cookies, crackers, and other baked goods.  What foods can I eat?  Fruits  All fresh, canned (in natural juice), or frozen fruits.  Vegetables  Fresh or frozen vegetables (raw, steamed, roasted, or grilled). Green salads.  Grains  Most grains. Choose whole wheat and whole grains most of the time. Rice and pasta, including brown rice and pastas made with whole wheat.  Meats and other proteins  Lean, well-trimmed beef, veal, pork, and lamb. Chicken and turkey without skin. All fish and shellfish. Wild duck, rabbit, pheasant, and venison. Egg whites or low-cholesterol egg substitutes. Dried beans, peas, lentils, and tofu. Seeds and most nuts.  Dairy  Low-fat or nonfat cheeses, including ricotta and mozzarella. Skim or 1% milk that is liquid, powdered, or evaporated. Buttermilk that is made with low-fat milk. Nonfat or low-fat yogurt.  Fats and oils  Non-hydrogenated (trans-free) margarines. Vegetable oils, including soybean, sesame, sunflower, olive, peanut, safflower, corn, canola, and cottonseed. Salad dressings or mayonnaise made with a vegetable oil.  Beverages  Mineral water. Coffee and tea. Diet carbonated beverages.  Sweets and desserts  Sherbet, gelatin, and fruit ice.  Small amounts of dark chocolate.  Limit all sweets and desserts.  Seasonings and condiments  All seasonings and condiments.  The items listed above may not be a complete list of foods and drinks you can eat. Contact a dietitian for more options.  What foods should I avoid?  Fruits  Canned fruit in heavy syrup. Fruit in cream or butter sauce. Fried fruit. Limit coconut.  Vegetables  Vegetables cooked in cheese, cream, or butter sauce. Fried vegetables.  Grains  Breads that are made with saturated or trans fats, oils, or whole milk. Croissants. Sweet rolls. Donuts. High-fat crackers, such as cheese crackers.  Meats and other proteins  Fatty meats, such as hot dogs, ribs, sausage, salcedo, rib-eye roast or steak. High-fat deli meats, such as salami and bologna. Caviar. Domestic duck and goose. Organ meats, such as liver.  Dairy  Cream, sour cream, cream cheese, and creamed cottage cheese. Whole-milk cheeses. Whole or 2% milk that is liquid, evaporated, or condensed. Whole buttermilk. Cream sauce or high-fat cheese sauce. Yogurt that is made from whole milk.  Fats and oils  Meat fat, or shortening. Cocoa butter, hydrogenated oils, palm oil, coconut oil, palm kernel oil. Solid fats and shortenings, including salcedo fat, salt pork, lard, and butter. Nondairy cream substitutes. Salad dressings with cheese or sour cream.  Beverages  Regular sodas and juice drinks with added sugar.  Sweets and desserts  Frosting. Pudding. Cookies. Cakes. Pies. Milk chocolate or white chocolate. Buttered syrups. Full-fat ice cream or ice cream drinks.  The items listed above may not be a complete list of foods and drinks to avoid. Contact a dietitian for more information.  Summary  Heart-healthy meal planning includes eating less unhealthy fats, eating more healthy fats, and making other changes in your diet.  Eat a balanced diet. This includes fruits and vegetables, low-fat or nonfat dairy, lean protein, nuts and legumes, whole grains, and  heart-healthy oils and fats.  This information is not intended to replace advice given to you by your health care provider. Make sure you discuss any questions you have with your health care provider.  Document Revised: 04/28/2022 Document Reviewed: 04/28/2022  [place] Patient Education © 2022 [employer]

## 2022-12-06 NOTE — PROGRESS NOTES
Chief Complaint   Patient presents with   • Follow-up     5 month follow up -- chronic medical problems       History of Present Illness      The patient presents for a follow-up related to hypertension. The patient reports that she has had no headaches, chest pain, dyspnea, edema, syncope, blurred vision or palpitations. She states that she is taking her medication as prescribed. She is not having medication side effects.    The patient presents for a follow-up related to GERD. The patient is on pantoprazole for her gastroesophageal reflux. The medication is taken on a regular basis and gives complete relief of the symptoms. She reports no abdominal pain, belching, diarrhea, dysphagia, early satiety, heartburn, hoarseness, nausea, odynophagia, rectal bleeding, vomiting or weight loss. The GERD has no known aggravating factors.    The patient presents for a follow-up related to hypothyroidism. She reports that she does not have as much energy as usual. She reports no hair loss, heat intolerance, cold intolerance, constipation or sweats. She is taking her medication as prescribed.    The patient presents for a follow-up related to hyperlipidemia. She is following a low fat diet. She reports that she is exercising. She is taking simvastatin. The patient is taking her medication as prescribed. She reports no medication side effects, including muscle cramps, abdominal pain, headaches or weakness. She denies orthopnea, paroxysmal nocturnal dyspnea or dyspnea on exertion.    The patient presents for a follow-up related to chronic obstructive pulmonary disease. Rescue inhaler usage is reported to be daily. The patient reports that she has no known triggers. Since the last office visit, the patient has not sought emergency care. She currently reports no wheezing, cough or sputum production. The patient is using an inhaled steroid. She does rinse her mouth after using her steroid inhaler. She continues using home oxygen 2lpm  NC. She denies hemoptysis. She is not a smoker.    Medications      Current Outpatient Medications:   •  acetaminophen (TYLENOL) 500 MG tablet, Take 1,000 mg by mouth Every 8 (Eight) Hours As Needed for Mild Pain ., Disp: , Rfl:   •  albuterol sulfate  (90 Base) MCG/ACT inhaler, Inhale 2 puffs every 4 (four) hours as needed., Disp: , Rfl:   •  Ascorbic Acid (Vitamin C) 500 MG capsule, Take  by mouth Daily., Disp: , Rfl:   •  aspirin 81 MG chewable tablet, Chew 1 tablet daily., Disp: , Rfl:   •  atenolol (TENORMIN) 50 MG tablet, TAKE 1/2 TABLET TWICE A DAY, Disp: 90 tablet, Rfl: 1  •  Calcium Carbonate-Vitamin D (CALCIUM-VITAMIN D) 500-200 MG-UNIT per tablet, Take 1 tablet by mouth daily., Disp: , Rfl:   •  cetirizine (zyrTEC) 10 MG tablet, Take 10 mg by mouth Daily., Disp: , Rfl:   •  Cholecalciferol (Vitamin D3) 25 MCG (1000 UT) capsule, Take  by mouth Daily., Disp: , Rfl:   •  clopidogrel (PLAVIX) 75 MG tablet, TAKE 1 TABLET DAILY, Disp: 90 tablet, Rfl: 1  •  Daliresp 500 MCG tablet tablet, TAKE 1 TABLET DAILY, Disp: 90 tablet, Rfl: 1  •  Docusate Sodium 100 MG capsule, Take 100 mg by mouth 2 (Two) Times a Day., Disp: 60 tablet, Rfl: 2  •  FeroSul 325 (65 Fe) MG tablet, TAKE 1 TABLET BY MOUTH TWICE DAILY, Disp: 60 tablet, Rfl: 3  •  FIBER SELECT GUMMIES PO, Take 2 tablets by mouth Daily., Disp: , Rfl:   •  Fluticasone-Umeclidin-Vilant (Trelegy Ellipta) 100-62.5-25 MCG/INH inhaler, Inhale 1 puff Daily., Disp: 180 each, Rfl: 3  •  gabapentin (NEURONTIN) 300 MG capsule, TAKE 1 CAPSULE EVERY NIGHT, Disp: 90 capsule, Rfl: 0  •  isosorbide mononitrate (IMDUR) 30 MG 24 hr tablet, TAKE 1 TABLET DAILY, Disp: 90 tablet, Rfl: 1  •  latanoprost (XALATAN) 0.005 % ophthalmic solution, INSTILL 1 DROP IN BOTH EYES EVERY EVENING, Disp: , Rfl:   •  levothyroxine (SYNTHROID, LEVOTHROID) 150 MCG tablet, Take 1 tablet by mouth Daily., Disp: 90 tablet, Rfl: 1  •  Multiple Vitamins-Minerals (PRESERVISION AREDS) capsule, Take 1  tablet by mouth 2 (two) times a day., Disp: , Rfl:   •  nitroglycerin (NITROSTAT) 0.4 MG SL tablet, PLACE 1 TABLET UNDER THE TONGUE AS NEEDED EVERY 5 MINUTES FOR CHEST PAIN. CALL 911 IF NO RELIEF AFTER 2ND DOSE, Disp: 25 tablet, Rfl: 0  •  pantoprazole (PROTONIX) 40 MG EC tablet, TAKE 1 TABLET DAILY, Disp: 90 tablet, Rfl: 1  •  sertraline (ZOLOFT) 100 MG tablet, TAKE 1 TABLET DAILY, Disp: 90 tablet, Rfl: 1  •  simvastatin (ZOCOR) 40 MG tablet, TAKE 1 TABLET NIGHTLY AT   BEDTIME, Disp: 90 tablet, Rfl: 1  •  solifenacin (VESICARE) 5 MG tablet, TAKE 1 TABLET DAILY, Disp: 90 tablet, Rfl: 1  •  Spacer/Aero Chamber Mouthpiece misc, 1 Units Take As Directed., Disp: 1 each, Rfl: 0  •  traMADol (ULTRAM) 50 MG tablet, Take 1 tablet by mouth Every 6 (Six) Hours As Needed for Moderate Pain., Disp: 120 tablet, Rfl: 2     Allergies    Allergies   Allergen Reactions   • Codeine Mental Status Change   • Penicillins Hives and Swelling   • Dopamine Other (See Comments) and Palpitations     Other reaction(s): Hypertension       Problem List    Patient Active Problem List   Diagnosis   • Coronary arteriosclerosis in native artery   • Chronic obstructive pulmonary disease (HCC)   • Depression   • Gastroesophageal reflux disease with esophagitis   • Hyperlipidemia   • Hypertension   • Hypothyroidism   • Osteoarthritis   • Peripheral arterial occlusive disease (HCC)   • Solitary pulmonary nodule   • Vitamin D deficiency   • Lower back pain   • Carotid bruit   • Hypoxemia   • Chronic respiratory failure with hypoxia and hypercapnia (HCC)   • Allergic rhinitis       Medications, Allergies, Problems List and Past History were reviewed and updated.    Physical Examination    /62 (BP Location: Left arm, Patient Position: Sitting, Cuff Size: Adult)   Pulse 72   Temp 97.3 °F (36.3 °C) (Infrared)   Resp 16   Wt 83.7 kg (184 lb 9.6 oz)   LMP  (LMP Unknown)   BMI 32.70 kg/m²     HEENT: Head- Normocephalic Atraumatic. Facies- Within  normal limits. Pinnas- Normal texture and shape bilaterally. Canals- Normal bilaterally. TMs- Normal bilaterally. Nares- Patent bilaterally. Nasal Septum- is normal. There is no tenderness to palpation over the frontal or maxillary sinuses. Lids- Normal bilaterally. Conjunctiva- Clear bilaterally. Sclera- Anicteric bilaterally. Oropharynx- Moist with no lesions. Tonsils- No enlargement, erythema or exudate.    Neck: Thyroid- non enlarged, symmetric and has no nodules. No bruits are detected. ROM- Normal Range of Motion with no rigidity.    Lungs: Auscultation- Clear to auscultation bilaterally. There are no retractions, clubbing or cyanosis. The Expiratory to Inspiratory ratio is equal.    Cardiovascular: There are no carotid bruits. Heart- Normal Rate with Regular rhythm and no murmurs. There are no gallops. There are no rubs. In the lower extremities there is no edema. The upper extremities do not have edema.    Abdomen: Soft, benign, non-tender with no masses, hernias, organomegaly or scars.    Impression and Assessment    Essential Hypertension.    Gastroesophageal Reflux Disease.    Hypothyroidism.    Hyperlipidemia.    Chronic Obstructive Pulmonary Disease.    Plan    Gastroesophageal Reflux Disease Plan: The patient was instructed to continue the current medications.    Essential Hypertension Plan: The patient was instructed to continue the current medications.    Hyperlipidemia Plan: She was instructed to eat a low fat diet. She was encouraged to exercise daily. Weight loss was encouraged. The patient was instructed to continue the current medications.    Hypothyroidism Plan: The patient was instructed to continue the current medications.    Chronic Obstructive Pulmonary Disease Plan: Continue the current medication regimen.    Diagnoses and all orders for this visit:    1. Essential hypertension (Primary)  -     CBC & Differential; Future  -     Comprehensive Metabolic Panel; Future  -     Urinalysis With  Microscopic If Indicated (No Culture) - Urine, Clean Catch; Future    2. Gastroesophageal reflux disease with esophagitis without hemorrhage    3. Hypothyroidism, unspecified type  -     T4, Free; Future  -     TSH; Future    4. Hyperlipidemia, unspecified hyperlipidemia type  -     Comprehensive Metabolic Panel; Future  -     Lipid Panel; Future    5. Chronic obstructive pulmonary disease, unspecified COPD type (HCC)      BMI is >= 30 and <35. (Class 1 Obesity). The following options were offered after discussion;: weight loss educational material (shared in after visit summary), exercise counseling/recommendations and nutrition counseling/recommendations    Return to Office    The patient was instructed to return for follow-up in 6 months. The patient was instructed to return sooner if the condition changes, worsens, or does not resolve.

## 2022-12-07 LAB
BACTERIA UR QL AUTO: ABNORMAL /HPF
COD CRY URNS QL: ABNORMAL /HPF
HYALINE CASTS UR QL AUTO: ABNORMAL /LPF
RBC # UR STRIP: ABNORMAL /HPF
REF LAB TEST METHOD: ABNORMAL
SQUAMOUS #/AREA URNS HPF: ABNORMAL /HPF
TRANS CELLS #/AREA URNS HPF: ABNORMAL /HPF
WBC # UR STRIP: ABNORMAL /HPF

## 2022-12-17 DIAGNOSIS — M54.50 CHRONIC MIDLINE LOW BACK PAIN WITHOUT SCIATICA: ICD-10-CM

## 2022-12-17 DIAGNOSIS — G89.29 CHRONIC MIDLINE LOW BACK PAIN WITHOUT SCIATICA: ICD-10-CM

## 2022-12-19 RX ORDER — TRAMADOL HYDROCHLORIDE 50 MG/1
TABLET ORAL
Qty: 120 TABLET | Refills: 2 | Status: SHIPPED | OUTPATIENT
Start: 2022-12-19 | End: 2023-03-15

## 2022-12-29 RX ORDER — LEVOTHYROXINE SODIUM 0.15 MG/1
TABLET ORAL
Qty: 90 TABLET | Refills: 1 | Status: SHIPPED | OUTPATIENT
Start: 2022-12-29 | End: 2023-04-06 | Stop reason: SDUPTHER

## 2023-01-11 RX ORDER — ATENOLOL 50 MG/1
TABLET ORAL
Qty: 90 TABLET | Refills: 1 | Status: SHIPPED | OUTPATIENT
Start: 2023-01-11 | End: 2023-04-06 | Stop reason: SDUPTHER

## 2023-01-11 RX ORDER — ISOSORBIDE MONONITRATE 30 MG/1
TABLET, EXTENDED RELEASE ORAL
Qty: 90 TABLET | Refills: 1 | Status: SHIPPED | OUTPATIENT
Start: 2023-01-11

## 2023-01-11 RX ORDER — CLOPIDOGREL BISULFATE 75 MG/1
TABLET ORAL
Qty: 90 TABLET | Refills: 1 | Status: SHIPPED | OUTPATIENT
Start: 2023-01-11 | End: 2023-04-06 | Stop reason: SDUPTHER

## 2023-01-11 RX ORDER — ROFLUMILAST 500 UG/1
TABLET ORAL
Qty: 90 TABLET | Refills: 1 | Status: SHIPPED | OUTPATIENT
Start: 2023-01-11 | End: 2023-04-06 | Stop reason: SDUPTHER

## 2023-01-30 DIAGNOSIS — N39.3 STRESS INCONTINENCE OF URINE: ICD-10-CM

## 2023-01-31 RX ORDER — SERTRALINE HYDROCHLORIDE 100 MG/1
TABLET, FILM COATED ORAL
Qty: 90 TABLET | Refills: 1 | Status: SHIPPED | OUTPATIENT
Start: 2023-01-31 | End: 2023-04-06 | Stop reason: SDUPTHER

## 2023-01-31 RX ORDER — SOLIFENACIN SUCCINATE 5 MG/1
TABLET, FILM COATED ORAL
Qty: 90 TABLET | Refills: 1 | Status: SHIPPED | OUTPATIENT
Start: 2023-01-31 | End: 2023-04-06 | Stop reason: SDUPTHER

## 2023-01-31 RX ORDER — SIMVASTATIN 40 MG
TABLET ORAL
Qty: 90 TABLET | Refills: 1 | Status: SHIPPED | OUTPATIENT
Start: 2023-01-31 | End: 2023-02-09 | Stop reason: SDUPTHER

## 2023-02-09 RX ORDER — SIMVASTATIN 40 MG
40 TABLET ORAL NIGHTLY
Qty: 90 TABLET | Refills: 1 | Status: SHIPPED | OUTPATIENT
Start: 2023-02-09

## 2023-02-20 RX ORDER — GABAPENTIN 300 MG/1
300 CAPSULE ORAL NIGHTLY
Qty: 90 CAPSULE | Refills: 0 | Status: SHIPPED | OUTPATIENT
Start: 2023-02-20

## 2023-02-20 NOTE — TELEPHONE ENCOUNTER
Caller: Clary Harman    Relationship: Emergency Contact    Best call back number: [phone]    Requested Prescriptions:   Requested Prescriptions     Pending Prescriptions Disp Refills   • gabapentin (NEURONTIN) 300 MG capsule 90 capsule 0     Sig: Take 1 capsule by mouth Every Night.        Pharmacy where request should be sent: [facility]/PHARMACY #[id number] [place], [place] - [address] [facility] [address] [phone] [address] [phone] FX     Additional details provided by patient: PATIENT USUALLY THE MAIL SERVICE, PATIENT IS OUT OF MEDICATION. PATIENTS DAUGHTER IS REQUESTING A TEMPORARY REFILL OR THE WHOLE 90 DAY SUPPLY BE SENT TO THIS PHARMACY.     Does the patient have less than a 3 day supply:  [x] Yes  [] No    Would you like a call back once the refill request has been completed: [x] Yes [] No    If the office needs to give you a call back, can they leave a voicemail: [x] Yes [] No    Adrian Lima Rep   02/20/23 09:24 EST

## 2023-02-27 RX ORDER — PANTOPRAZOLE SODIUM 40 MG/1
TABLET, DELAYED RELEASE ORAL
Qty: 90 TABLET | Refills: 1 | Status: SHIPPED | OUTPATIENT
Start: 2023-02-27

## 2023-03-15 DIAGNOSIS — G89.29 CHRONIC MIDLINE LOW BACK PAIN WITHOUT SCIATICA: ICD-10-CM

## 2023-03-15 DIAGNOSIS — M54.50 CHRONIC MIDLINE LOW BACK PAIN WITHOUT SCIATICA: ICD-10-CM

## 2023-03-15 RX ORDER — TRAMADOL HYDROCHLORIDE 50 MG/1
TABLET ORAL
Qty: 120 TABLET | Refills: 2 | Status: SHIPPED | OUTPATIENT
Start: 2023-03-15 | End: 2023-03-20 | Stop reason: SDUPTHER

## 2023-03-20 DIAGNOSIS — G89.29 CHRONIC MIDLINE LOW BACK PAIN WITHOUT SCIATICA: ICD-10-CM

## 2023-03-20 DIAGNOSIS — M54.50 CHRONIC MIDLINE LOW BACK PAIN WITHOUT SCIATICA: ICD-10-CM

## 2023-03-21 RX ORDER — TRAMADOL HYDROCHLORIDE 50 MG/1
50 TABLET ORAL EVERY 6 HOURS PRN
Qty: 120 TABLET | Refills: 2 | Status: SHIPPED | OUTPATIENT
Start: 2023-03-21

## 2023-04-04 ENCOUNTER — APPOINTMENT (OUTPATIENT)
Dept: CT IMAGING | Facility: HOSPITAL | Age: 83
End: 2023-04-04
Payer: MEDICARE

## 2023-04-04 ENCOUNTER — HOSPITAL ENCOUNTER (EMERGENCY)
Facility: HOSPITAL | Age: 83
Discharge: HOME | End: 2023-04-04
Attending: EMERGENCY MEDICINE | Admitting: EMERGENCY MEDICINE
Payer: MEDICARE

## 2023-04-04 VITALS
OXYGEN SATURATION: 94 % | DIASTOLIC BLOOD PRESSURE: 68 MMHG | WEIGHT: 193 LBS | HEART RATE: 68 BPM | TEMPERATURE: 98 F | RESPIRATION RATE: 16 BRPM | BODY MASS INDEX: 34.2 KG/M2 | HEIGHT: 63 IN | SYSTOLIC BLOOD PRESSURE: 158 MMHG
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
## 2023-04-04 DIAGNOSIS — I25.10 CORONARY ARTERIOSCLEROSIS IN NATIVE ARTERY: ICD-10-CM

## 2023-04-04 DIAGNOSIS — J44.9 CHRONIC OBSTRUCTIVE PULMONARY DISEASE, UNSPECIFIED COPD TYPE: ICD-10-CM

## 2023-04-04 DIAGNOSIS — Z74.09 DECREASED AMBULATION STATUS: ICD-10-CM
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
## 2023-04-04 DIAGNOSIS — J90 PLEURAL EFFUSION, RIGHT: ICD-10-CM

## 2023-04-04 DIAGNOSIS — S22.41XA MULTIPLE FRACTURES OF RIBS, RIGHT SIDE, INIT FOR CLOS FX: ICD-10-CM

## 2023-04-04 LAB
ALBUMIN SERPL-MCNC: 3.7 G/DL (ref 3.5–5.2)
ALBUMIN/GLOB SERPL: 1.2 G/DL
ALP SERPL-CCNC: 72 U/L (ref 39–117)
ALT SERPL W P-5'-P-CCNC: 6 U/L (ref 1–33)
ANION GAP SERPL CALCULATED.3IONS-SCNC: 14 MMOL/L (ref 5–15)
AST SERPL-CCNC: 11 U/L (ref 1–32)
BASOPHILS # BLD AUTO: 0.1 10*3/MM3 (ref 0–0.2)
BASOPHILS NFR BLD AUTO: 0.7 % (ref 0–1.5)
BILIRUB SERPL-MCNC: 0.3 MG/DL (ref 0–1.2)
BUN SERPL-MCNC: 7 MG/DL (ref 8–23)
BUN/CREAT SERPL: 15.6 (ref 7–25)
CALCIUM SPEC-SCNC: 9.2 MG/DL (ref 8.6–10.5)
CHLORIDE SERPL-SCNC: 98 MMOL/L (ref 98–107)
CO2 SERPL-SCNC: 30 MMOL/L (ref 22–29)
CREAT SERPL-MCNC: 0.45 MG/DL (ref 0.57–1)
DEPRECATED RDW RBC AUTO: 49.6 FL (ref 37–54)
EGFRCR SERPLBLD CKD-EPI 2021: 96.2 ML/MIN/1.73
EOSINOPHIL # BLD AUTO: 0.16 10*3/MM3 (ref 0–0.4)
EOSINOPHIL NFR BLD AUTO: 1.1 % (ref 0.3–6.2)
ERYTHROCYTE [DISTWIDTH] IN BLOOD BY AUTOMATED COUNT: 12.6 % (ref 12.3–15.4)
GLOBULIN UR ELPH-MCNC: 3.2 GM/DL
GLUCOSE SERPL-MCNC: 123 MG/DL (ref 65–99)
HCT VFR BLD AUTO: 42.1 % (ref 34–46.6)
HGB BLD-MCNC: 13.1 G/DL (ref 12–15.9)
IMM GRANULOCYTES # BLD AUTO: 0.08 10*3/MM3 (ref 0–0.05)
IMM GRANULOCYTES NFR BLD AUTO: 0.5 % (ref 0–0.5)
LYMPHOCYTES # BLD AUTO: 1.46 10*3/MM3 (ref 0.7–3.1)
LYMPHOCYTES NFR BLD AUTO: 9.8 % (ref 19.6–45.3)
MCH RBC QN AUTO: 32.7 PG (ref 26.6–33)
MCHC RBC AUTO-ENTMCNC: 31.1 G/DL (ref 31.5–35.7)
MCV RBC AUTO: 105 FL (ref 79–97)
MONOCYTES # BLD AUTO: 1.15 10*3/MM3 (ref 0.1–0.9)
MONOCYTES NFR BLD AUTO: 7.7 % (ref 5–12)
NEUTROPHILS NFR BLD AUTO: 12 10*3/MM3 (ref 1.7–7)
NEUTROPHILS NFR BLD AUTO: 80.2 % (ref 42.7–76)
NRBC BLD AUTO-RTO: 0 /100 WBC (ref 0–0.2)
PLATELET # BLD AUTO: 297 10*3/MM3 (ref 140–450)
PMV BLD AUTO: 9.2 FL (ref 6–12)
POTASSIUM SERPL-SCNC: 3.5 MMOL/L (ref 3.5–5.2)
PROT SERPL-MCNC: 6.9 G/DL (ref 6–8.5)
QT INTERVAL: 400 MS
QTC INTERVAL: 422 MS
RBC # BLD AUTO: 4.01 10*6/MM3 (ref 3.77–5.28)
SODIUM SERPL-SCNC: 142 MMOL/L (ref 136–145)
WBC NRBC COR # BLD: 14.95 10*3/MM3 (ref 3.4–10.8)

## 2023-04-04 PROCEDURE — 93005 ELECTROCARDIOGRAM TRACING: CPT | Performed by: EMERGENCY MEDICINE

## 2023-04-04 PROCEDURE — 80053 COMPREHEN METABOLIC PANEL: CPT | Performed by: EMERGENCY MEDICINE

## 2023-04-04 PROCEDURE — 99283 EMERGENCY DEPT VISIT LOW MDM: CPT

## 2023-04-04 PROCEDURE — 25010000002 ONDANSETRON PER 1 MG: Performed by: EMERGENCY MEDICINE

## 2023-04-04 PROCEDURE — 96376 TX/PRO/DX INJ SAME DRUG ADON: CPT

## 2023-04-04 PROCEDURE — 96374 THER/PROPH/DIAG INJ IV PUSH: CPT

## 2023-04-04 PROCEDURE — 96375 TX/PRO/DX INJ NEW DRUG ADDON: CPT

## 2023-04-04 PROCEDURE — 71250 CT THORAX DX C-: CPT

## 2023-04-04 PROCEDURE — 25010000002 HYDROMORPHONE PER 4 MG: Performed by: EMERGENCY MEDICINE

## 2023-04-04 PROCEDURE — 85025 COMPLETE CBC W/AUTO DIFF WBC: CPT | Performed by: EMERGENCY MEDICINE

## 2023-04-04 RX ORDER — HYDROMORPHONE HYDROCHLORIDE 1 MG/ML
0.25 INJECTION, SOLUTION INTRAMUSCULAR; INTRAVENOUS; SUBCUTANEOUS
Status: DISCONTINUED | OUTPATIENT
Start: 2023-04-04 | End: 2023-04-04 | Stop reason: HOSPADM

## 2023-04-04 RX ORDER — LIDOCAINE 50 MG/G
2 PATCH TOPICAL
Status: DISCONTINUED | OUTPATIENT
Start: 2023-04-04 | End: 2023-04-04 | Stop reason: HOSPADM

## 2023-04-04 RX ORDER — NALOXONE HYDROCHLORIDE 4 MG/.1ML
SPRAY NASAL
Qty: 2 EACH | Refills: 0 | Status: SHIPPED | OUTPATIENT
Start: 2023-04-04

## 2023-04-04 RX ORDER — LIDOCAINE 50 MG/G
1 PATCH TOPICAL EVERY 24 HOURS
Qty: 30 EACH | Refills: 0 | Status: SHIPPED | OUTPATIENT
Start: 2023-04-04

## 2023-04-04 RX ORDER — OXYCODONE HYDROCHLORIDE AND ACETAMINOPHEN 5; 325 MG/1; MG/1
1 TABLET ORAL EVERY 6 HOURS PRN
Qty: 120 TABLET | Refills: 0 | Status: SHIPPED | OUTPATIENT
Start: 2023-04-04 | End: 2023-04-13 | Stop reason: SINTOL

## 2023-04-04 RX ORDER — ONDANSETRON 4 MG/1
4 TABLET, ORALLY DISINTEGRATING ORAL EVERY 8 HOURS PRN
Qty: 15 TABLET | Refills: 0 | Status: SHIPPED | OUTPATIENT
Start: 2023-04-04 | End: 2023-04-13

## 2023-04-04 RX ORDER — ONDANSETRON 2 MG/ML
4 INJECTION INTRAMUSCULAR; INTRAVENOUS ONCE
Status: COMPLETED | OUTPATIENT
Start: 2023-04-04 | End: 2023-04-04

## 2023-04-04 RX ORDER — SODIUM CHLORIDE 0.9 % (FLUSH) 0.9 %
10 SYRINGE (ML) INJECTION AS NEEDED
Status: DISCONTINUED | OUTPATIENT
Start: 2023-04-04 | End: 2023-04-04 | Stop reason: HOSPADM

## 2023-04-04 RX ADMIN — LIDOCAINE 2 PATCH: 140 PATCH CUTANEOUS at 10:49

## 2023-04-04 RX ADMIN — ONDANSETRON 4 MG: 2 INJECTION INTRAMUSCULAR; INTRAVENOUS at 08:20

## 2023-04-04 RX ADMIN — HYDROMORPHONE HYDROCHLORIDE 0.25 MG: 1 INJECTION, SOLUTION INTRAMUSCULAR; INTRAVENOUS; SUBCUTANEOUS at 08:20

## 2023-04-04 RX ADMIN — HYDROMORPHONE HYDROCHLORIDE 0.25 MG: 1 INJECTION, SOLUTION INTRAMUSCULAR; INTRAVENOUS; SUBCUTANEOUS at 10:50

## 2023-04-04 NOTE — ED PROVIDER NOTES
"Subjective   History of Present Illness  This is a pleasant 82-year-old female is company by her daughter.  They live together.  At her baseline she is ambulatory room to room in house with difficulty using cane and walker she has a lift chair and a lift bed.  She has chronic respiratory failure is on 2 L of oxygen at home since 2014.  She is followed by pulmonary and primary care here and I reviewed her recent notes.  They currently have no extra help at home such as an aide or visiting nurses.    She comes emergency room today with right rib and back pain that occurred after a fall last Thursday she slipped and hit her right chest.  Took everything her daughter could do to get her up.  She has declined in her mobility since then.  Her pain is persistent.  She is on 4 times a day Ultram normally for back pain and that is unchanged.  She had no loss of consciousness with this and there was no syncopal component to it.  When she arrived here was noted oxygen saturation was 89% on 2 L and she was increased to 3 L.  She does have some mild dyspnea.  No fevers or chills.  She cannot cough because it hurts too bad.  She has chronic nausea without vomiting.  Bowel movements and urine have been her baseline.  She has not passed blood per any orifice.  She is predisposed to constipation.  She has no extremity pain or swelling.        All other systems reviewed and are negative except as noted above.        Review of Systems   All other systems reviewed and are negative.      Past Medical History:   Diagnosis Date   • COPD (chronic obstructive pulmonary disease)    • Degenerative lumbar disc    • Disease of thyroid gland     \"low thyroid\"   • History of colonoscopy    • Osteoarthritis        Allergies   Allergen Reactions   • Codeine Mental Status Change   • Penicillins Hives and Swelling   • Dopamine Other (See Comments) and Palpitations     Other reaction(s): Hypertension       Past Surgical History:   Procedure Laterality " Date   • EYE SURGERY  10/17/2022    yag surgery   • GALLBLADDER SURGERY  2012   • HYSTERECTOMY     • TOTAL HIP ARTHROPLASTY Bilateral        Family History   Problem Relation Age of Onset   • Cancer Other         BREAST, COLON, OVARIAN   • Depression Other    • Hyperlipidemia Other    • Hypertension Other    • Heart disease Mother    • Hypertension Mother    • Arthritis Daughter    • Cancer Daughter    • Hyperlipidemia Daughter    • Liver disease Daughter    • Thyroid disease Daughter    • Cancer Maternal Aunt    • Diabetes Maternal Uncle    • Alzheimer's disease Sister        Social History     Socioeconomic History   • Marital status:    Tobacco Use   • Smoking status: Former     Packs/day: 1.00     Years: 50.00     Pack years: 50.00     Types: Cigarettes     Quit date:      Years since quittin.2   • Smokeless tobacco: Never   Vaping Use   • Vaping Use: Never used   Substance and Sexual Activity   • Alcohol use: Not Currently     Comment: no etoh for past 10 years   • Drug use: No   • Sexual activity: Defer           Objective   Physical Exam  Vitals and nursing note reviewed.   Constitutional:       Comments: This is a pleasant 82-year-old who even with small movement has quite a bit of pain she is alert and oriented GCS is 15.   HENT:      Head: Normocephalic and atraumatic.      Right Ear: External ear normal.      Left Ear: External ear normal.      Nose: Nose normal.      Mouth/Throat:      Mouth: Mucous membranes are moist.      Pharynx: Oropharynx is clear.   Eyes:      Extraocular Movements: Extraocular movements intact.      Conjunctiva/sclera: Conjunctivae normal.      Pupils: Pupils are equal, round, and reactive to light.   Neck:      Vascular: No carotid bruit.   Cardiovascular:      Rate and Rhythm: Normal rate and regular rhythm.      Pulses: Normal pulses.      Heart sounds: Normal heart sounds.      Comments: Her right chest from the right posterior to the right lateral ribs are  quite tender to palpation.  Anterior chest nontender.  Pulmonary:      Effort: Pulmonary effort is normal.      Breath sounds: Normal breath sounds.   Abdominal:      Comments: BMI 34 soft and nontender no organomegaly, masses, or guarding.   Musculoskeletal:      Cervical back: Normal range of motion and neck supple. No rigidity or tenderness.      Comments: Equal pulses without edema, synovitis, rash, or venous cords.   Lymphadenopathy:      Cervical: No cervical adenopathy.   Skin:     General: Skin is warm and dry.      Capillary Refill: Capillary refill takes less than 2 seconds.   Neurological:      Mental Status: She is alert.      Comments: Face is symmetric, voice soft, tongue midline.  Vision, hearing, and speech preserved.  She is not very mobile and has modest generalized weakness         Procedures           ED Course  ED Course as of 04/04/23 1649   Tue Apr 04, 2023   1628 I called and spoke with Yvette, pharmacist at [facility].  Discussed treatment.  We agree that since patient has taken Ultram in the past without difficulty and had hydromorphone here without difficulty likely she would not have difficulty with Percocet.  Sounds like the problem she had with codeine was mental status changes.  Gamaliel with Yvette that they can decrease the dose to half pill to a whole pill if she has difficulties and she may need to stop it good Ultram if she cannot tolerate it.  They can discuss this as well with Dr. Carvalho.    All agreeable with the plan [ER]      ED Course User Index  [ER] Jett An MD            Recent Results (from the past 24 hour(s))   Comprehensive Metabolic Panel    Collection Time: 04/04/23  8:25 AM    Specimen: Blood   Result Value Ref Range    Glucose 123 (H) 65 - 99 mg/dL    BUN 7 (L) 8 - 23 mg/dL    Creatinine 0.45 (L) 0.57 - 1.00 mg/dL    Sodium 142 136 - 145 mmol/L    Potassium 3.5 3.5 - 5.2 mmol/L    Chloride 98 98 - 107 mmol/L    CO2 30.0 (H) 22.0 - 29.0 mmol/L    Calcium 9.2 8.6 -  10.5 mg/dL    Total Protein 6.9 6.0 - 8.5 g/dL    Albumin 3.7 3.5 - 5.2 g/dL    ALT (SGPT) 6 1 - 33 U/L    AST (SGOT) 11 1 - 32 U/L    Alkaline Phosphatase 72 39 - 117 U/L    Total Bilirubin 0.3 0.0 - 1.2 mg/dL    Globulin 3.2 gm/dL    A/G Ratio 1.2 g/dL    BUN/Creatinine Ratio 15.6 7.0 - 25.0    Anion Gap 14.0 5.0 - 15.0 mmol/L    eGFR 96.2 >60.0 mL/min/1.73   CBC Auto Differential    Collection Time: 04/04/23  8:25 AM    Specimen: Blood   Result Value Ref Range    WBC 14.95 (H) 3.40 - 10.80 10*3/mm3    RBC 4.01 3.77 - 5.28 10*6/mm3    Hemoglobin 13.1 12.0 - 15.9 g/dL    Hematocrit 42.1 34.0 - 46.6 %    .0 (H) 79.0 - 97.0 fL    MCH 32.7 26.6 - 33.0 pg    MCHC 31.1 (L) 31.5 - 35.7 g/dL    RDW 12.6 12.3 - 15.4 %    RDW-SD 49.6 37.0 - 54.0 fl    MPV 9.2 6.0 - 12.0 fL    Platelets 297 140 - 450 10*3/mm3    Neutrophil % 80.2 (H) 42.7 - 76.0 %    Lymphocyte % 9.8 (L) 19.6 - 45.3 %    Monocyte % 7.7 5.0 - 12.0 %    Eosinophil % 1.1 0.3 - 6.2 %    Basophil % 0.7 0.0 - 1.5 %    Immature Grans % 0.5 0.0 - 0.5 %    Neutrophils, Absolute 12.00 (H) 1.70 - 7.00 10*3/mm3    Lymphocytes, Absolute 1.46 0.70 - 3.10 10*3/mm3    Monocytes, Absolute 1.15 (H) 0.10 - 0.90 10*3/mm3    Eosinophils, Absolute 0.16 0.00 - 0.40 10*3/mm3    Basophils, Absolute 0.10 0.00 - 0.20 10*3/mm3    Immature Grans, Absolute 0.08 (H) 0.00 - 0.05 10*3/mm3    nRBC 0.0 0.0 - 0.2 /100 WBC   ECG 12 Lead Chest Pain    Collection Time: 04/04/23  8:29 AM   Result Value Ref Range    QT Interval 400 ms    QTC Interval 422 ms     Note: In addition to lab results from this visit, the labs listed above may include labs taken at another facility or during a different encounter within the last 24 hours. Please correlate lab times with ED admission and discharge times for further clarification of the services performed during this visit.    CT Chest Without Contrast Diagnostic   Final Result   Impression:   1.Acute mildly displaced right posterior sixth and ninth  rib fractures.   2.Small right pleural effusion with bibasilar atelectasis.   3.Mild emphysema.   4.Cardiomegaly.   5.Moderate hiatal hernia.      Electronically Signed: Hamlet Ted     4/4/2023 9:26 AM EDT     Workstation ID: [id number]        Vitals:    04/04/23 1000 04/04/23 1030 04/04/23 1100 04/04/23 1136   BP: 178/76 162/70 151/61 158/68   BP Location:    Left arm   Patient Position:    Sitting   Pulse: 74 71 73 68   Resp:    16   Temp:    98 °F (36.7 °C)   TempSrc:    Oral   SpO2: 94% 93% 93% 94%   Weight:       Height:         Medications   ondansetron (ZOFRAN) injection 4 mg (4 mg Intravenous Given 4/4/23 0820)     ECG/EMG Results (last 24 hours)     ** No results found for the last 24 hours. **        ECG 12 Lead Chest Pain   Final Result   Test Reason : Chest Pain   Blood Pressure :   */*   mmHG   Vent. Rate :  67 BPM     Atrial Rate :  67 BPM      P-R Int : 178 ms          QRS Dur :  86 ms       QT Int : 400 ms       P-R-T Axes :  58  48  46 degrees      QTc Int : 422 ms      Normal sinus rhythm   Normal ECG   When compared with ECG of 10-OCT-2022 13:00,   No significant change was found   Confirmed by JETT AN MD ([id number]) on 4/4/2023 11:16:46 AM      Referred By: EDMD           Confirmed By: JETT AN MD                                  [facility] reviewed by Jett An MD       Medical Decision Making        I have reviewed all available studies at the bedside with the patient and her daughter.  Her chest CT shows 2 rib fractures on the right side and likely sympathetic pleural effusion from this.  I have personally reviewed the study at the bedside with family.      Her labs are fairly bland.  She has a mild leukocytosis and her daughter tells me this is actually a fairly chronic finding for her.        She got relief here with some IV hydromorphone.    Lidoderm patches were applied I prescribed these as well if insurance is unable to pay for them then they can use  over-the-counter.    Difficult situation as lady is already on chronic oxygen is not a great ambulator and now has rib fractures is impeding her even more.    Had a long discussion at the bedside with the patient and her family on what to do next.  I thought hospital admission to be reasonable but I also warned them that likely her ambulation status would likely decline fairly precipitously in the hospital and they understand this.    I thought an alternative this would be a trial of home treatment.  She is on Ultram for pain chronically as tolerated this but I think with the rib pain she does not have adequate pain control.  She had previous itching to codeine but I think it is reasonable to try her on Percocet instead of the Ultram.  I talked to them about this and I will have her stop the Ultram and try the Percocet.  I wrote her a prescription for a month supply which is a bit out of the ordinary for the ER but her ability to get into her doctor I think is going to be low so we will try this and if she has improvement with this Dr. Moura can continue it.  Her E. Roman is reviewed.    Hopefully the pleural effusion will resolve with time but again I would have her follow-up with Dr. Moura to follow this to resolution.    I have consulted case management to see if we can get home health and perhaps additional resources to have her be at home and independent as possible.    I prescribed incentive spirometry with 10 deep breaths every hour while awake hopefully this will improve her respiratory function.    She will return to the emergency department if worse in any way.    All are agreeable with the plan    Decreased ambulation status: undiagnosed new problem with uncertain prognosis  Fall at home, initial encounter: complicated acute illness or injury with systemic symptoms  Multiple fractures of ribs, right side, init for clos fx: complicated acute illness or injury with systemic symptoms that poses a  threat to life or bodily functions  Pleural effusion, right: undiagnosed new problem with uncertain prognosis  Amount and/or Complexity of Data Reviewed  Independent Historian: caregiver  External Data Reviewed: labs, radiology and notes.  Labs: ordered. Decision-making details documented in ED Course.  Radiology: ordered and independent interpretation performed. Decision-making details documented in ED Course.  ECG/medicine tests: ordered and independent interpretation performed. Decision-making details documented in ED Course.      Risk  Prescription drug management.  Parenteral controlled substances.  Decision regarding hospitalization.          Final diagnoses:   Fall at home, initial encounter   Multiple fractures of ribs, right side, init for clos fx   Pleural effusion, right   Decreased ambulation status       ED Disposition  ED Disposition     ED Disposition   Discharge    Condition   Stable    Comment   --             Dre Moura MD  [address] [facility] [address]  [place] [id number] [phone]    Schedule an appointment as soon as possible for a visit       [place] [address]  [facility] [address]  [phone]             Medication List      New Prescriptions    lidocaine 5 %  Commonly known as: LIDODERM  Place 1 patch on the skin as directed by provider Daily. Remove & Discard patch within 12 hours or as directed by MD     naloxone 4 MG/0.1ML nasal spray  Commonly known as: NARCAN  CALL 911. Do not prime. Spray into nostril upon signs of opioid overdose. May repeat in 2 to 3 minutes in opposite nostril if no or minimal breathing and responsiveness, then as needed (if doses are available) every 2 to 3 minutes.     ondansetron ODT 4 MG disintegrating tablet  Commonly known as: ZOFRAN-ODT  Place 1 tablet on the tongue Every 8 (Eight) Hours As Needed for Nausea or Vomiting.     oxyCODONE-acetaminophen 5-325 MG per tablet  Commonly known as:  PERCOCET  Take 1 tablet by mouth Every 6 (Six) Hours As Needed for Severe Pain.           Where to Get Your Medications      These medications were sent to [facility]/pharmacy #[id number] - [place], [place] - [address] [facility] - [phone]  - [phone] FX  [address] [facility] [address]    Phone: [phone]   · lidocaine 5 %  · naloxone 4 MG/0.1ML nasal spray  · ondansetron ODT 4 MG disintegrating tablet  · oxyCODONE-acetaminophen 5-325 MG per tablet          Jett An MD  04/04/23 1536       Jett An MD  04/04/23 1538       Jett An MD  04/04/23 4334

## 2023-04-04 NOTE — CASE MANAGEMENT/SOCIAL WORK
"Case Management Discharge Note      Final Note: RENARD met with pt and her daughter in the ED.  SW answered questions about home health and provided support.  Pt was agreeable to home health services being arranged by RENARD.  Initial request was for services to be arranged with [facility], however they did not have availability.  Home Health services (SN, PT, OT, and aid) was arranged with [facility] in [place].  RENARD confirmed plans with [facility] liaison [place] and the referral was accepted.  RENARD called pt's daughter and left her a message with the final plan and notified her that the [facility] team would call her tomorrow to provide \"welcome\" and arrange first visit. No further needs are reported by pt and daughter.  Pt has home O2, walker, cane, and the home is handicap accessible.  Daughter will provide transportation.         Selected Continued Care - Discharged on 4/4/2023 Admission date: 4/4/2023 - Discharge disposition: Home or Self Care    Destination    No services have been selected for the patient.              Durable Medical Equipment    No services have been selected for the patient.              Dialysis/Infusion    No services have been selected for the patient.              Home Medical Care Coordination complete.    Service Provider Selected Services Address Phone Fax Patient Preferred    [facility], [place] [id number] [phone] --          Therapy    No services have been selected for the patient.              Community Resources    No services have been selected for the patient.              Community & DME    No services have been selected for the patient.                       Final Discharge Disposition Code: 06 - home with home health care  "

## 2023-04-06 DIAGNOSIS — N39.3 STRESS INCONTINENCE OF URINE: ICD-10-CM

## 2023-04-06 RX ORDER — CLOPIDOGREL BISULFATE 75 MG/1
75 TABLET ORAL DAILY
Qty: 90 TABLET | Refills: 1 | Status: SHIPPED | OUTPATIENT
Start: 2023-04-06

## 2023-04-06 RX ORDER — SOLIFENACIN SUCCINATE 5 MG/1
5 TABLET, FILM COATED ORAL DAILY
Qty: 90 TABLET | Refills: 1 | Status: SHIPPED | OUTPATIENT
Start: 2023-04-06

## 2023-04-06 RX ORDER — ROFLUMILAST 500 UG/1
500 TABLET ORAL DAILY
Qty: 90 TABLET | Refills: 1 | Status: SHIPPED | OUTPATIENT
Start: 2023-04-06

## 2023-04-06 RX ORDER — LEVOTHYROXINE SODIUM 0.15 MG/1
150 TABLET ORAL DAILY
Qty: 90 TABLET | Refills: 1 | Status: SHIPPED | OUTPATIENT
Start: 2023-04-06

## 2023-04-06 RX ORDER — SERTRALINE HYDROCHLORIDE 100 MG/1
100 TABLET, FILM COATED ORAL DAILY
Qty: 90 TABLET | Refills: 1 | Status: SHIPPED | OUTPATIENT
Start: 2023-04-06

## 2023-04-06 RX ORDER — ATENOLOL 50 MG/1
25 TABLET ORAL 2 TIMES DAILY
Qty: 90 TABLET | Refills: 1 | Status: SHIPPED | OUTPATIENT
Start: 2023-04-06

## 2023-04-13 ENCOUNTER — HOSPITAL ENCOUNTER (OUTPATIENT)
Dept: GENERAL RADIOLOGY | Facility: HOSPITAL | Age: 83
Discharge: HOME | End: 2023-04-13
Admitting: INTERNAL MEDICINE
Payer: MEDICARE

## 2023-04-13 ENCOUNTER — OFFICE VISIT (OUTPATIENT)
Dept: INTERNAL MEDICINE | Facility: CLINIC | Age: 83
End: 2023-04-13
Payer: MEDICARE

## 2023-04-13 VITALS
DIASTOLIC BLOOD PRESSURE: 72 MMHG | HEART RATE: 64 BPM | RESPIRATION RATE: 20 BRPM | BODY MASS INDEX: 33.48 KG/M2 | TEMPERATURE: 97.8 F | SYSTOLIC BLOOD PRESSURE: 152 MMHG | WEIGHT: 189 LBS

## 2023-04-13 DIAGNOSIS — S22.41XA CLOSED FRACTURE OF MULTIPLE RIBS OF RIGHT SIDE, INITIAL ENCOUNTER: ICD-10-CM

## 2023-04-13 DIAGNOSIS — S22.41XA CLOSED FRACTURE OF MULTIPLE RIBS OF RIGHT SIDE, INITIAL ENCOUNTER: Primary | ICD-10-CM

## 2023-04-13 DIAGNOSIS — J44.9 CHRONIC OBSTRUCTIVE PULMONARY DISEASE, UNSPECIFIED COPD TYPE: ICD-10-CM

## 2023-04-13 PROCEDURE — 71046 X-RAY EXAM CHEST 2 VIEWS: CPT

## 2023-04-13 RX ORDER — GUAIFENESIN 600 MG/1
1200 TABLET, EXTENDED RELEASE ORAL 2 TIMES DAILY
COMMUNITY

## 2023-04-13 RX ORDER — FLUTICASONE FUROATE, UMECLIDINIUM BROMIDE AND VILANTEROL TRIFENATATE 100; 62.5; 25 UG/1; UG/1; UG/1
POWDER RESPIRATORY (INHALATION)
Qty: 180 EACH | Refills: 3 | Status: SHIPPED | OUTPATIENT
Start: 2023-04-13

## 2023-04-13 NOTE — PROGRESS NOTES
Chief Complaint   Patient presents with   • Follow-up     Follow up [facility] ER 4/4/2023       History of Present Illness    The patient presents for an emergency department follow-up of pain of the right ribs. The pain is described as moderate and sharp. The symptoms have been present for 3 weeks. There has been recent trauma to the affected area. The symptoms are constant.       There is no pain in the spine. There has been no trauma to the neck or back. The patient denies loss of bowel or bladder control. The patient does not report foreign travel. There are no insect bites reported. The patient denies new sexual exposures. There are no high risk exposures. There have not been recent viral infections.       The patient does not report a dry cough, a wet cough, wheezing, fever, facial pain, a headache, eye drainage, ear pain, ear drainage, nausea, vomiting, diarrhea, myalgias, sore throat, abdominal pain, nasal discharge, weight loss, decreased appetite, chills, rash, joint pain, a breast lump or GI bleeding.    Medications      Current Outpatient Medications:   •  acetaminophen (TYLENOL) 500 MG tablet, Take 1 tablet by mouth Every 6 (Six) Hours., Disp: , Rfl:   •  albuterol sulfate  (90 Base) MCG/ACT inhaler, Inhale 2 puffs Every 4 (Four) Hours As Needed., Disp: , Rfl:   •  Ascorbic Acid (Vitamin C) 500 MG capsule, Take  by mouth Daily., Disp: , Rfl:   •  aspirin 81 MG chewable tablet, Chew 1 tablet Daily., Disp: , Rfl:   •  atenolol (TENORMIN) 50 MG tablet, Take 0.5 tablets by mouth 2 (Two) Times a Day., Disp: 90 tablet, Rfl: 1  •  Calcium Carbonate-Vitamin D (CALCIUM-VITAMIN D) 500-200 MG-UNIT per tablet, Take 1 tablet by mouth Daily., Disp: , Rfl:   •  cetirizine (zyrTEC) 10 MG tablet, Take 1 tablet by mouth Daily., Disp: , Rfl:   •  Cholecalciferol (Vitamin D3) 25 MCG (1000 UT) capsule, Take  by mouth Daily., Disp: , Rfl:   •  clopidogrel (PLAVIX) 75 MG tablet, Take 1 tablet by mouth Daily., Disp: 90  tablet, Rfl: 1  •  Docusate Sodium 100 MG capsule, Take 100 mg by mouth 2 (Two) Times a Day., Disp: 60 tablet, Rfl: 2  •  FeroSul 325 (65 Fe) MG tablet, TAKE 1 TABLET BY MOUTH TWICE DAILY, Disp: 60 tablet, Rfl: 3  •  FIBER SELECT GUMMIES PO, Take 2 tablets by mouth Daily., Disp: , Rfl:   •  gabapentin (NEURONTIN) 300 MG capsule, Take 1 capsule by mouth Every Night., Disp: 90 capsule, Rfl: 0  •  guaiFENesin (MUCINEX) 600 MG 12 hr tablet, Take 2 tablets by mouth 2 (Two) Times a Day., Disp: , Rfl:   •  isosorbide mononitrate (IMDUR) 30 MG 24 hr tablet, TAKE 1 TABLET DAILY, Disp: 90 tablet, Rfl: 1  •  latanoprost (XALATAN) 0.005 % ophthalmic solution, INSTILL 1 DROP IN BOTH EYES EVERY EVENING, Disp: , Rfl:   •  levothyroxine (SYNTHROID, LEVOTHROID) 150 MCG tablet, Take 1 tablet by mouth Daily., Disp: 90 tablet, Rfl: 1  •  lidocaine (LIDODERM) 5 %, Place 1 patch on the skin as directed by provider Daily. Remove & Discard patch within 12 hours or as directed by MD, Disp: 30 each, Rfl: 0  •  Multiple Vitamins-Minerals (PRESERVISION AREDS) capsule, Take 1 tablet by mouth 2 (two) times a day., Disp: , Rfl:   •  pantoprazole (PROTONIX) 40 MG EC tablet, TAKE 1 TABLET DAILY, Disp: 90 tablet, Rfl: 1  •  roflumilast (Daliresp) 500 MCG tablet tablet, Take 1 tablet by mouth Daily., Disp: 90 tablet, Rfl: 1  •  sertraline (ZOLOFT) 100 MG tablet, Take 1 tablet by mouth Daily., Disp: 90 tablet, Rfl: 1  •  simvastatin (ZOCOR) 40 MG tablet, Take 1 tablet by mouth Every Night., Disp: 90 tablet, Rfl: 1  •  solifenacin (VESICARE) 5 MG tablet, Take 1 tablet by mouth Daily., Disp: 90 tablet, Rfl: 1  •  traMADol (ULTRAM) 50 MG tablet, Take 1 tablet by mouth Every 6 (Six) Hours As Needed for Moderate Pain., Disp: 120 tablet, Rfl: 2  •  Trelegy Ellipta 100-62.5-25 MCG/ACT inhaler, INHALE 1 PUFF DAILY, Disp: 180 each, Rfl: 3  •  naloxone (NARCAN) 4 MG/0.1ML nasal spray, CALL 911. Do not prime. Spray into nostril upon signs of opioid overdose. May  repeat in 2 to 3 minutes in opposite nostril if no or minimal breathing and responsiveness, then as needed (if doses are available) every 2 to 3 minutes. (Patient not taking: Reported on 4/13/2023), Disp: 2 each, Rfl: 0  •  nitroglycerin (NITROSTAT) 0.4 MG SL tablet, PLACE 1 TABLET UNDER THE TONGUE AS NEEDED EVERY 5 MINUTES FOR CHEST PAIN. CALL 911 IF NO RELIEF AFTER 2ND DOSE (Patient not taking: Reported on 4/13/2023), Disp: 25 tablet, Rfl: 0     Allergies    Allergies   Allergen Reactions   • Codeine Mental Status Change   • Penicillins Hives and Swelling   • Dopamine Other (See Comments) and Palpitations     Other reaction(s): Hypertension       Problem List    Patient Active Problem List   Diagnosis   • Coronary arteriosclerosis in native artery   • Chronic obstructive pulmonary disease   • Depression   • Gastroesophageal reflux disease with esophagitis   • Hyperlipidemia   • Hypertension   • Hypothyroidism   • Osteoarthritis   • Peripheral arterial occlusive disease   • Solitary pulmonary nodule   • Vitamin D deficiency   • Lower back pain   • Carotid bruit   • Hypoxemia   • Chronic respiratory failure with hypoxia and hypercapnia   • Allergic rhinitis       Medications, Allergies, Problems List and Past History were reviewed and updated.    Physical Examination    /72 (BP Location: Right arm, Patient Position: Sitting, Cuff Size: Adult)   Pulse 64   Temp 97.8 °F (36.6 °C) (Infrared)   Resp 20   Wt 85.7 kg (189 lb)   LMP  (LMP Unknown)   BMI 33.48 kg/m²     HEENT: Facies- Within normal limits. Lids- Normal bilaterally. Conjunctiva- Clear bilaterally. Sclera- Anicteric bilaterally.    Neck: Thyroid- non enlarged, symmetric and has no nodules. No bruits are detected.    Lungs: Auscultation- Clear to auscultation bilaterally. There are no retractions, clubbing or cyanosis. The Expiratory to Inspiratory ratio is equal.    Cardiovascular: There are no carotid bruits. Heart- Normal Rate with Regular  rhythm and no murmurs. There are no gallops. There are no rubs. In the lower extremities there is no edema. The upper extremities do not have edema.    Impression and Assessment    Rib Fracture.    Plan    Rib Fracture Plan: The patient was encouraged to use an incentive spirometer. Further plans will be made after testing.    Diagnoses and all orders for this visit:    1. Closed fracture of multiple ribs of right side, initial encounter (Primary)  -     XR Chest PA & Lateral; Future        Return to Office    The patient was instructed to return for follow-up at the next scheduled visit. The patient was instructed to return sooner if the condition changes, worsens, or does not resolve.

## 2023-04-14 ENCOUNTER — TELEPHONE (OUTPATIENT)
Dept: INTERNAL MEDICINE | Facility: CLINIC | Age: 83
End: 2023-04-14
Payer: MEDICARE

## 2023-04-14 NOTE — TELEPHONE ENCOUNTER
Caller: [facility]    Relationship: HOME HEALTH     Best call back number: [phone] ASKING FOR A MESAGE TO BE LEFT IF HE DOES NOT ANSWER.     What is the best time to reach you: ANYTIME     Who are you requesting to speak with (clinical staff, provider,  specific staff member): CLINICAL STAFF     What was the call regarding: MILADY WITH [facility] IS ASKING FOR VERBAL ORDER TO OMIT THE EVALUATION. THE PATIENT HAS DECIDED THAT SHE DOES NOT NEED OR WANT OCCUPATIONAL THERAPY AT THIS TIME.     Do you require a callback: YES

## 2023-04-25 ENCOUNTER — OFFICE VISIT (OUTPATIENT)
Dept: PULMONOLOGY | Facility: CLINIC | Age: 83
End: 2023-04-25
Payer: MEDICARE

## 2023-04-25 VITALS
BODY MASS INDEX: 32.92 KG/M2 | SYSTOLIC BLOOD PRESSURE: 142 MMHG | HEIGHT: 63 IN | WEIGHT: 185.8 LBS | TEMPERATURE: 97.3 F | HEART RATE: 72 BPM | DIASTOLIC BLOOD PRESSURE: 70 MMHG | OXYGEN SATURATION: 94 %

## 2023-04-25 DIAGNOSIS — J96.12 CHRONIC RESPIRATORY FAILURE WITH HYPOXIA AND HYPERCAPNIA: Primary | ICD-10-CM

## 2023-04-25 DIAGNOSIS — J44.9 CHRONIC OBSTRUCTIVE PULMONARY DISEASE, UNSPECIFIED COPD TYPE: ICD-10-CM

## 2023-04-25 DIAGNOSIS — J96.11 CHRONIC RESPIRATORY FAILURE WITH HYPOXIA AND HYPERCAPNIA: Primary | ICD-10-CM

## 2023-04-25 PROCEDURE — 3077F SYST BP >= 140 MM HG: CPT | Performed by: NURSE PRACTITIONER

## 2023-04-25 PROCEDURE — 99213 OFFICE O/P EST LOW 20 MIN: CPT | Performed by: NURSE PRACTITIONER

## 2023-04-25 PROCEDURE — 3078F DIAST BP <80 MM HG: CPT | Performed by: NURSE PRACTITIONER

## 2023-04-25 NOTE — PROGRESS NOTES
"[facility] Pulmonary Follow up      Chief Complaint  COPD (F/u )    Subjective          Avelina Ceron presents to [facility] for routine follow-up.  Follow here in the office for COPD with chronic respiratory failure.  She does have a history of tobacco use of 50 pack/year but quit 17 years ago.    Chronically she uses Trelegy daily.  She has no rescue inhaler use.  She denies any recent acute exacerbation.  She does have some chronic shortness of breath with activity.  Currently she is on her oxygen at 3 L pulsed dose with activity.  She uses 2 L continuous flow at home.    Unfortunately last month she fell and ended up in the emergency department with some broken ribs.  She did have a bit of a atelectasis and small pleural effusion on that right side.  She is doing much better now.  She is more comfortable.  She denies any shortness of breath.  She has no cough or sputum production.      Objective     Vital Signs:   /70 (BP Location: Left arm, Patient Position: Sitting, Cuff Size: Adult)   Pulse 72   Temp 97.3 °F (36.3 °C) (Infrared)   Ht 160 cm (62.99\")   Wt 84.3 kg (185 lb 12.8 oz)   SpO2 94% Comment: 3 liters  BMI 32.92 kg/m²       Immunization History   Administered Date(s) Administered   • COVID-19 (MODERNA) 1st,2nd,3rd Dose Monovalent 02/04/2021, 03/04/2021, 11/04/2021   • COVID-19 (MODERNA) BIVALENT 12+YRS 10/24/2022   • COVID-19 (MODERNA) Monovalent Original Booster 04/19/2022   • Fluad Quad 65+ 10/01/2020, 10/24/2022   • Fluzone High Dose =>65 Years ([employer] ONLY) 10/05/2016, 01/11/2018, 11/14/2018, 10/29/2019   • Fluzone High-Dose 65+yrs 10/19/2021   • Influenza Quad Vaccine (Inpatient) 10/14/2010   • Pneumococcal Conjugate 13-Valent (PCV13) 09/21/2017   • Pneumococcal Polysaccharide (PPSV23) 10/05/2006   • Td 05/22/2006       Physical Exam  Vitals reviewed.   Constitutional:       Appearance: She is well-developed.   HENT:      Head: " Normocephalic and atraumatic.   Eyes:      Pupils: Pupils are equal, round, and reactive to light.   Cardiovascular:      Rate and Rhythm: Normal rate and regular rhythm.      Heart sounds: No murmur heard.  Pulmonary:      Effort: Pulmonary effort is normal. No respiratory distress.      Breath sounds: Normal breath sounds. No wheezing or rales.   Abdominal:      General: Bowel sounds are normal. There is no distension.      Palpations: Abdomen is soft.   Musculoskeletal:         General: Normal range of motion.      Cervical back: Normal range of motion and neck supple.   Skin:     General: Skin is warm and dry.      Findings: No erythema.   Neurological:      Mental Status: She is alert and oriented to person, place, and time.   Psychiatric:         Behavior: Behavior normal.          Result Review :                         Assessment and Plan    Problem List Items Addressed This Visit        Pulmonary and Pneumonias    Chronic obstructive pulmonary disease    Chronic respiratory failure with hypoxia and hypercapnia - Primary     Fortunately she is recovering from a recent fall with rib fractures.  I encouraged her to use her incentive spirometer several times a day to help with the atelectasis as well as increasing her activity.    She will continue on her Trelegy for her COPD.    Continue on her oxygen.    Routine follow-up in 6 months with Dr. Landrum, sooner if needed.    Follow Up     No follow-ups on file.  Patient was given instructions and counseling regarding her condition or for health maintenance advice. Please see specific information pulled into the AVS if appropriate.           AMADOR Lucio, ACNP  [facility]  Electronically signed

## 2023-05-02 ENCOUNTER — OUTSIDE FACILITY SERVICE (OUTPATIENT)
Dept: INTERNAL MEDICINE | Facility: CLINIC | Age: 83
End: 2023-05-02
Payer: MEDICARE

## 2023-05-15 RX ORDER — GABAPENTIN 300 MG/1
CAPSULE ORAL
Qty: 90 CAPSULE | Refills: 0 | Status: SHIPPED | OUTPATIENT
Start: 2023-05-15

## 2023-05-17 ENCOUNTER — OUTSIDE FACILITY SERVICE (OUTPATIENT)
Dept: INTERNAL MEDICINE | Facility: CLINIC | Age: 83
End: 2023-05-17
Payer: MEDICARE

## 2023-05-17 PROCEDURE — G0180 MD CERTIFICATION HHA PATIENT: HCPCS | Performed by: INTERNAL MEDICINE

## 2023-06-06 ENCOUNTER — OFFICE VISIT (OUTPATIENT)
Dept: INTERNAL MEDICINE | Facility: CLINIC | Age: 83
End: 2023-06-06
Payer: MEDICARE

## 2023-06-06 VITALS
DIASTOLIC BLOOD PRESSURE: 64 MMHG | WEIGHT: 184 LBS | HEART RATE: 64 BPM | TEMPERATURE: 97.8 F | BODY MASS INDEX: 32.6 KG/M2 | SYSTOLIC BLOOD PRESSURE: 136 MMHG | RESPIRATION RATE: 18 BRPM

## 2023-06-06 DIAGNOSIS — E03.9 HYPOTHYROIDISM, UNSPECIFIED TYPE: ICD-10-CM

## 2023-06-06 DIAGNOSIS — J44.9 CHRONIC OBSTRUCTIVE PULMONARY DISEASE, UNSPECIFIED COPD TYPE: ICD-10-CM

## 2023-06-06 DIAGNOSIS — R07.9 CHEST PAIN, UNSPECIFIED TYPE: ICD-10-CM

## 2023-06-06 DIAGNOSIS — E78.5 HYPERLIPIDEMIA, UNSPECIFIED HYPERLIPIDEMIA TYPE: ICD-10-CM

## 2023-06-06 DIAGNOSIS — I10 ESSENTIAL HYPERTENSION: Primary | ICD-10-CM

## 2023-06-06 DIAGNOSIS — R82.998 LEUKOCYTES IN URINE: ICD-10-CM

## 2023-06-06 DIAGNOSIS — K21.00 GASTROESOPHAGEAL REFLUX DISEASE WITH ESOPHAGITIS WITHOUT HEMORRHAGE: ICD-10-CM

## 2023-06-06 LAB
ALBUMIN SERPL-MCNC: 4.4 G/DL (ref 3.5–5.2)
ALBUMIN/GLOB SERPL: 1.7 G/DL
ALP SERPL-CCNC: 66 U/L (ref 39–117)
ALT SERPL W P-5'-P-CCNC: 13 U/L (ref 1–33)
ANION GAP SERPL CALCULATED.3IONS-SCNC: 8.3 MMOL/L (ref 5–15)
AST SERPL-CCNC: 12 U/L (ref 1–32)
BASOPHILS # BLD AUTO: 0.12 10*3/MM3 (ref 0–0.2)
BASOPHILS NFR BLD AUTO: 0.9 % (ref 0–1.5)
BILIRUB BLD-MCNC: NEGATIVE MG/DL
BILIRUB SERPL-MCNC: 0.3 MG/DL (ref 0–1.2)
BUN SERPL-MCNC: 7 MG/DL (ref 8–23)
BUN/CREAT SERPL: 13.7 (ref 7–25)
CALCIUM SPEC-SCNC: 10.1 MG/DL (ref 8.6–10.5)
CHLORIDE SERPL-SCNC: 96 MMOL/L (ref 98–107)
CHOLEST SERPL-MCNC: 158 MG/DL (ref 0–200)
CLARITY, POC: ABNORMAL
CO2 SERPL-SCNC: 33.7 MMOL/L (ref 22–29)
COLOR UR: ABNORMAL
CREAT SERPL-MCNC: 0.51 MG/DL (ref 0.57–1)
DEPRECATED RDW RBC AUTO: 42.5 FL (ref 37–54)
EGFRCR SERPLBLD CKD-EPI 2021: 93.3 ML/MIN/1.73
EOSINOPHIL # BLD AUTO: 0.24 10*3/MM3 (ref 0–0.4)
EOSINOPHIL NFR BLD AUTO: 1.8 % (ref 0.3–6.2)
ERYTHROCYTE [DISTWIDTH] IN BLOOD BY AUTOMATED COUNT: 11.9 % (ref 12.3–15.4)
EXPIRATION DATE: ABNORMAL
GLOBULIN UR ELPH-MCNC: 2.6 GM/DL
GLUCOSE SERPL-MCNC: 101 MG/DL (ref 65–99)
GLUCOSE UR STRIP-MCNC: NEGATIVE MG/DL
HCT VFR BLD AUTO: 38.8 % (ref 34–46.6)
HDLC SERPL-MCNC: 40 MG/DL (ref 40–60)
HGB BLD-MCNC: 13 G/DL (ref 12–15.9)
IMM GRANULOCYTES # BLD AUTO: 0.07 10*3/MM3 (ref 0–0.05)
IMM GRANULOCYTES NFR BLD AUTO: 0.5 % (ref 0–0.5)
KETONES UR QL: NEGATIVE
LDLC SERPL CALC-MCNC: 77 MG/DL (ref 0–100)
LDLC/HDLC SERPL: 1.69 {RATIO}
LEUKOCYTE EST, POC: ABNORMAL
LYMPHOCYTES # BLD AUTO: 2.96 10*3/MM3 (ref 0.7–3.1)
LYMPHOCYTES NFR BLD AUTO: 22.2 % (ref 19.6–45.3)
Lab: ABNORMAL
MCH RBC QN AUTO: 32.2 PG (ref 26.6–33)
MCHC RBC AUTO-ENTMCNC: 33.5 G/DL (ref 31.5–35.7)
MCV RBC AUTO: 96 FL (ref 79–97)
MONOCYTES # BLD AUTO: 0.87 10*3/MM3 (ref 0.1–0.9)
MONOCYTES NFR BLD AUTO: 6.5 % (ref 5–12)
NEUTROPHILS NFR BLD AUTO: 68.1 % (ref 42.7–76)
NEUTROPHILS NFR BLD AUTO: 9.1 10*3/MM3 (ref 1.7–7)
NITRITE UR-MCNC: POSITIVE MG/ML
NRBC BLD AUTO-RTO: 0 /100 WBC (ref 0–0.2)
PH UR: 7 [PH] (ref 5–8)
PLATELET # BLD AUTO: 263 10*3/MM3 (ref 140–450)
PMV BLD AUTO: 9.4 FL (ref 6–12)
POTASSIUM SERPL-SCNC: 4.1 MMOL/L (ref 3.5–5.2)
PROT SERPL-MCNC: 7 G/DL (ref 6–8.5)
PROT UR STRIP-MCNC: NEGATIVE MG/DL
RBC # BLD AUTO: 4.04 10*6/MM3 (ref 3.77–5.28)
RBC # UR STRIP: NEGATIVE /UL
SODIUM SERPL-SCNC: 138 MMOL/L (ref 136–145)
SP GR UR: 1.01 (ref 1–1.03)
T4 FREE SERPL-MCNC: 0.97 NG/DL (ref 0.93–1.7)
TRIGL SERPL-MCNC: 252 MG/DL (ref 0–150)
TSH SERPL DL<=0.05 MIU/L-ACNC: 12.3 UIU/ML (ref 0.27–4.2)
UROBILINOGEN UR QL: NORMAL
VLDLC SERPL-MCNC: 41 MG/DL (ref 5–40)
WBC NRBC COR # BLD: 13.36 10*3/MM3 (ref 3.4–10.8)

## 2023-06-06 PROCEDURE — 80061 LIPID PANEL: CPT | Performed by: INTERNAL MEDICINE

## 2023-06-06 PROCEDURE — 84439 ASSAY OF FREE THYROXINE: CPT | Performed by: INTERNAL MEDICINE

## 2023-06-06 PROCEDURE — 85025 COMPLETE CBC W/AUTO DIFF WBC: CPT | Performed by: INTERNAL MEDICINE

## 2023-06-06 PROCEDURE — 80053 COMPREHEN METABOLIC PANEL: CPT | Performed by: INTERNAL MEDICINE

## 2023-06-06 PROCEDURE — 87086 URINE CULTURE/COLONY COUNT: CPT | Performed by: INTERNAL MEDICINE

## 2023-06-06 PROCEDURE — 87186 SC STD MICRODIL/AGAR DIL: CPT | Performed by: INTERNAL MEDICINE

## 2023-06-06 PROCEDURE — 87077 CULTURE AEROBIC IDENTIFY: CPT | Performed by: INTERNAL MEDICINE

## 2023-06-06 PROCEDURE — 84443 ASSAY THYROID STIM HORMONE: CPT | Performed by: INTERNAL MEDICINE

## 2023-06-06 NOTE — PROGRESS NOTES
Chief Complaint   Patient presents with   • Follow-up     6 month follow up chronic medical problems       History of Present Illness      The patient presents for a follow-up related to hypertension. The patient reports that she has had chest pain but she denies having headaches, dyspnea, edema, syncope or blurred vision. She states that she is taking her medication as prescribed. She is not having medication side effects.    The patient presents for a follow-up related to GERD. The patient is on pantoprazole for her gastroesophageal reflux. The medication is taken on a regular basis and gives complete relief of the symptoms. She reports no abdominal pain, belching, diarrhea, dysphagia, early satiety, heartburn, hoarseness, nausea, odynophagia, rectal bleeding or vomiting. The GERD has no known aggravating factors.    The patient presents for a follow-up related to hypothyroidism. She reports a good energy level. She reports no hair loss, heat intolerance, cold intolerance, constipation or sweats. She is taking her medication as prescribed.    The patient presents for a follow-up related to hyperlipidemia. She is following a low fat diet. She reports that she is exercising. She is taking simvastatin. The patient is taking her medication as prescribed. She reports no medication side effects, including muscle cramps, abdominal pain, headaches or weakness. She denies orthopnea, paroxysmal nocturnal dyspnea or dyspnea on exertion.    The patient presents for a follow-up related to chronic obstructive pulmonary disease. She currently reports no wheezing, cough or sputum production. The patient is using an inhaled steroid. She does rinse her mouth after using her steroid inhaler. She denies hemoptysis. She is not a smoker.    The patient presents for an acute visit for one episode of chest pain. The patient has noted no alleviating factors. The pain onset was sudden. The pain is not made worse with activity and it is not  relieved with rest. The pain is not made worse with deep breaths. It is characterized as squeezing. The pain does not radiate. The patient states the pain is in the left chest. The pain is not associated with clamminess, diaphoresis, dizziness, fever, indigestion or lightheadedness.    Medications      Current Outpatient Medications:   •  acetaminophen (TYLENOL) 500 MG tablet, Take 1 tablet by mouth Every 6 (Six) Hours., Disp: , Rfl:   •  albuterol sulfate  (90 Base) MCG/ACT inhaler, Inhale 2 puffs Every 4 (Four) Hours As Needed., Disp: , Rfl:   •  Ascorbic Acid (Vitamin C) 500 MG capsule, Take  by mouth Daily., Disp: , Rfl:   •  aspirin 81 MG chewable tablet, Chew 1 tablet Daily., Disp: , Rfl:   •  atenolol (TENORMIN) 50 MG tablet, Take 0.5 tablets by mouth 2 (Two) Times a Day., Disp: 90 tablet, Rfl: 1  •  Calcium Carbonate-Vitamin D (CALCIUM-VITAMIN D) 500-200 MG-UNIT per tablet, Take 1 tablet by mouth Daily., Disp: , Rfl:   •  cetirizine (zyrTEC) 10 MG tablet, Take 1 tablet by mouth Daily., Disp: , Rfl:   •  Cholecalciferol (Vitamin D3) 25 MCG (1000 UT) capsule, Take  by mouth Daily., Disp: , Rfl:   •  clopidogrel (PLAVIX) 75 MG tablet, Take 1 tablet by mouth Daily., Disp: 90 tablet, Rfl: 1  •  Docusate Sodium 100 MG capsule, Take 100 mg by mouth 2 (Two) Times a Day., Disp: 60 tablet, Rfl: 2  •  FeroSul 325 (65 Fe) MG tablet, TAKE 1 TABLET BY MOUTH TWICE DAILY, Disp: 60 tablet, Rfl: 3  •  FIBER SELECT GUMMIES PO, Take 2 tablets by mouth Daily., Disp: , Rfl:   •  gabapentin (NEURONTIN) 300 MG capsule, TAKE 1 CAPSULE BY MOUTH EVERY DAY AT NIGHT, Disp: 90 capsule, Rfl: 0  •  guaiFENesin (MUCINEX) 600 MG 12 hr tablet, Take 2 tablets by mouth 2 (Two) Times a Day., Disp: , Rfl:   •  isosorbide mononitrate (IMDUR) 30 MG 24 hr tablet, TAKE 1 TABLET DAILY, Disp: 90 tablet, Rfl: 1  •  latanoprost (XALATAN) 0.005 % ophthalmic solution, INSTILL 1 DROP IN BOTH EYES EVERY EVENING, Disp: , Rfl:   •  levothyroxine  (SYNTHROID, LEVOTHROID) 150 MCG tablet, Take 1 tablet by mouth Daily., Disp: 90 tablet, Rfl: 1  •  Multiple Vitamins-Minerals (PRESERVISION AREDS) capsule, Take 1 tablet by mouth 2 (two) times a day., Disp: , Rfl:   •  pantoprazole (PROTONIX) 40 MG EC tablet, TAKE 1 TABLET DAILY, Disp: 90 tablet, Rfl: 1  •  roflumilast (Daliresp) 500 MCG tablet tablet, Take 1 tablet by mouth Daily., Disp: 90 tablet, Rfl: 1  •  sertraline (ZOLOFT) 100 MG tablet, Take 1 tablet by mouth Daily., Disp: 90 tablet, Rfl: 1  •  simvastatin (ZOCOR) 40 MG tablet, Take 1 tablet by mouth Every Night., Disp: 90 tablet, Rfl: 1  •  solifenacin (VESICARE) 5 MG tablet, Take 1 tablet by mouth Daily., Disp: 90 tablet, Rfl: 1  •  traMADol (ULTRAM) 50 MG tablet, Take 1 tablet by mouth Every 6 (Six) Hours As Needed for Moderate Pain., Disp: 120 tablet, Rfl: 2  •  Trelegy Ellipta 100-62.5-25 MCG/ACT inhaler, INHALE 1 PUFF DAILY, Disp: 180 each, Rfl: 3  •  nitroglycerin (NITROSTAT) 0.4 MG SL tablet, PLACE 1 TABLET UNDER THE TONGUE AS NEEDED EVERY 5 MINUTES FOR CHEST PAIN. CALL 911 IF NO RELIEF AFTER 2ND DOSE (Patient not taking: Reported on 6/6/2023), Disp: 25 tablet, Rfl: 0     Allergies    Allergies   Allergen Reactions   • Codeine Mental Status Change   • Penicillins Hives and Swelling   • Dopamine Other (See Comments) and Palpitations     Other reaction(s): Hypertension       Problem List    Patient Active Problem List   Diagnosis   • Coronary arteriosclerosis in native artery   • Chronic obstructive pulmonary disease   • Depression   • Gastroesophageal reflux disease with esophagitis   • Hyperlipidemia   • Hypertension   • Hypothyroidism   • Osteoarthritis   • Peripheral arterial occlusive disease   • Solitary pulmonary nodule   • Vitamin D deficiency   • Lower back pain   • Carotid bruit   • Hypoxemia   • Chronic respiratory failure with hypoxia and hypercapnia   • Allergic rhinitis       Medications, Allergies, Problems List and Past History were  reviewed and updated.    Physical Examination    /64   Pulse 64   Temp 97.8 °F (36.6 °C) (Infrared)   Resp 18   Wt 83.5 kg (184 lb)   LMP  (LMP Unknown)   BMI 32.60 kg/m²       HEENT: Head- Normocephalic Atraumatic. Facies- Within normal limits. Pinnas- Normal texture and shape bilaterally. Canals- Normal bilaterally. TMs- Normal bilaterally. Nares- Patent bilaterally. Nasal Septum- is normal. There is no tenderness to palpation over the frontal or maxillary sinuses. Lids- Normal bilaterally. Conjunctiva- Clear bilaterally. Sclera- Anicteric bilaterally. Oropharynx- Moist with no lesions. Tonsils- No enlargement, erythema or exudate.    Neck: Thyroid- non enlarged, symmetric and has no nodules. No bruits are detected. ROM- Normal Range of Motion with no rigidity.    Lungs: Auscultation- Clear to auscultation bilaterally. There are no retractions, clubbing or cyanosis. The Expiratory to Inspiratory ratio is equal.    Cardiovascular: There are no carotid bruits. Heart- Normal Rate with Regular rhythm and no murmurs. There are no gallops. There are no rubs. In the lower extremities there is no edema. The upper extremities do not have edema.    Abdomen: Soft, benign, non-tender with no masses, hernias, organomegaly or scars.      ECG 12 Lead    Date/Time: 6/6/2023 12:14 PM  Performed by: Dre Moura MD  Authorized by: Dre Moura MD   Comparison: not compared with previous ECG   Rhythm: sinus rhythm  Rate: normal  Conduction: conduction normal  ST Segments: ST segments normal  T Waves: T waves normal  QRS axis: normal  Other: no other findings    Clinical impression: normal ECG      Impression and Assessment    Essential Hypertension.    Gastroesophageal Reflux Disease.    Hypothyroidism.    Hyperlipidemia.    Chronic Obstructive Pulmonary Disease.    Chest Pain.    Plan    Gastroesophageal Reflux Disease Plan: The patient was instructed to continue the current medications.    Essential  Hypertension Plan: The patient was instructed to continue the current medications.    Hyperlipidemia Plan: The patient was instructed to exercise daily, eat a low fat diet and continue her medications.    Chest Pain Plan: This seems musculoskeletal. Her ECG is normal. She declined a stress test at this time.    Hypothyroidism Plan: The patient was instructed to continue the current medications.    Chronic Obstructive Pulmonary Disease Plan: Continue the current medication regimen.    Diagnoses and all orders for this visit:    1. Essential hypertension (Primary)  -     Comprehensive Metabolic Panel; Future  -     CBC & Differential; Future  -     POC Urinalysis Dipstick, Automated; Future  -     ECG 12 Lead    2. Gastroesophageal reflux disease with esophagitis without hemorrhage    3. Hypothyroidism, unspecified type  -     T4, Free; Future  -     TSH; Future    4. Hyperlipidemia, unspecified hyperlipidemia type  -     Comprehensive Metabolic Panel; Future  -     Lipid Panel; Future    5. Chronic obstructive pulmonary disease, unspecified COPD type    6. Chest pain, unspecified type  -     ECG 12 Lead          Return to Office    The patient was instructed to return for follow-up in 6 months. The patient was instructed to return sooner if the condition changes, worsens, or does not resolve.

## 2023-06-08 LAB — BACTERIA SPEC AEROBE CULT: ABNORMAL

## 2023-06-12 DIAGNOSIS — E78.5 HYPERLIPIDEMIA, UNSPECIFIED HYPERLIPIDEMIA TYPE: Primary | ICD-10-CM

## 2023-06-12 DIAGNOSIS — N39.0 URINARY TRACT INFECTION WITHOUT HEMATURIA, SITE UNSPECIFIED: ICD-10-CM

## 2023-06-12 RX ORDER — LEVOTHYROXINE SODIUM 175 UG/1
175 CAPSULE ORAL DAILY
Qty: 90 CAPSULE | Refills: 1 | Status: SHIPPED | OUTPATIENT
Start: 2023-06-12 | End: 2023-06-13

## 2023-06-12 RX ORDER — NITROFURANTOIN 25; 75 MG/1; MG/1
100 CAPSULE ORAL 2 TIMES DAILY
Qty: 20 CAPSULE | Refills: 0 | Status: SHIPPED | OUTPATIENT
Start: 2023-06-12 | End: 2023-06-22

## 2023-06-13 RX ORDER — LEVOTHYROXINE SODIUM 175 UG/1
175 TABLET ORAL DAILY
Qty: 90 TABLET | Refills: 1 | Status: SHIPPED | OUTPATIENT
Start: 2023-06-13 | End: 2023-06-19 | Stop reason: SDUPTHER

## 2023-06-19 ENCOUNTER — TELEPHONE (OUTPATIENT)
Dept: INTERNAL MEDICINE | Facility: CLINIC | Age: 83
End: 2023-06-19
Payer: MEDICARE

## 2023-06-19 RX ORDER — LEVOTHYROXINE SODIUM 175 UG/1
175 TABLET ORAL DAILY
Qty: 90 TABLET | Refills: 1 | Status: SHIPPED | OUTPATIENT
Start: 2023-06-19

## 2023-06-19 NOTE — TELEPHONE ENCOUNTER
----- Message from Fatmata Marcelo MA sent at 6/16/2023 11:46 AM EDT -----  Regarding: FW: new levothyroxine 175  Contact: [phone]    ----- Message -----  From: Avelina Ceron  Sent: 6/16/2023  10:42 AM EDT  To: Niko HernandezFormerly named [facility]  Subject: new levothyroxine 175                            Received a message from [employer] (mail order [employer]) to contact you regarding the new Levothyroxine scrip - said they can't fill and need more info from you (Want us to ask you to contact them because they haven't received reply to their requests) And just FYI, we'd prefer a 90 day fill please. Thank you! :)

## 2023-07-25 ENCOUNTER — HOSPITAL ENCOUNTER (INPATIENT)
Facility: HOSPITAL | Age: 83
LOS: 2 days | Discharge: HOME | DRG: 660 | End: 2023-07-27
Attending: EMERGENCY MEDICINE | Admitting: HOSPITALIST
Payer: MEDICARE

## 2023-07-25 ENCOUNTER — HOSPITAL ENCOUNTER (OUTPATIENT)
Facility: HOSPITAL | Age: 83
Setting detail: SURGERY ADMIT
DRG: 660 | End: 2023-07-25
Attending: STUDENT IN AN ORGANIZED HEALTH CARE EDUCATION/TRAINING PROGRAM | Admitting: STUDENT IN AN ORGANIZED HEALTH CARE EDUCATION/TRAINING PROGRAM
Payer: MEDICARE

## 2023-07-25 ENCOUNTER — APPOINTMENT (OUTPATIENT)
Dept: GENERAL RADIOLOGY | Facility: HOSPITAL | Age: 83
DRG: 660 | End: 2023-07-25
Payer: MEDICARE

## 2023-07-25 ENCOUNTER — APPOINTMENT (OUTPATIENT)
Dept: CT IMAGING | Facility: HOSPITAL | Age: 83
DRG: 660 | End: 2023-07-25
Payer: MEDICARE

## 2023-07-25 DIAGNOSIS — R31.9 URINARY TRACT INFECTION WITH HEMATURIA, SITE UNSPECIFIED: ICD-10-CM

## 2023-07-25 DIAGNOSIS — N39.0 URINARY TRACT INFECTION WITH HEMATURIA, SITE UNSPECIFIED: ICD-10-CM

## 2023-07-25 DIAGNOSIS — N20.1 RIGHT URETERAL STONE: Primary | ICD-10-CM

## 2023-07-25 PROBLEM — D72.829 LEUKOCYTOSIS: Status: ACTIVE | Noted: 2023-07-25

## 2023-07-25 PROBLEM — N20.0 NEPHROLITHIASIS: Status: ACTIVE | Noted: 2023-07-25

## 2023-07-25 LAB
ALBUMIN SERPL-MCNC: 4 G/DL (ref 3.5–5.2)
ALBUMIN/GLOB SERPL: 1.3 G/DL
ALP SERPL-CCNC: 65 U/L (ref 39–117)
ALT SERPL W P-5'-P-CCNC: 12 U/L (ref 1–33)
ANION GAP SERPL CALCULATED.3IONS-SCNC: 10 MMOL/L (ref 5–15)
AST SERPL-CCNC: 16 U/L (ref 1–32)
BACTERIA UR QL AUTO: ABNORMAL /HPF
BASOPHILS # BLD AUTO: 0.08 10*3/MM3 (ref 0–0.2)
BASOPHILS NFR BLD AUTO: 0.5 % (ref 0–1.5)
BILIRUB SERPL-MCNC: 0.4 MG/DL (ref 0–1.2)
BILIRUB UR QL STRIP: NEGATIVE
BUN SERPL-MCNC: 8 MG/DL (ref 8–23)
BUN/CREAT SERPL: 19.5 (ref 7–25)
CALCIUM SPEC-SCNC: 9.3 MG/DL (ref 8.6–10.5)
CHLORIDE SERPL-SCNC: 98 MMOL/L (ref 98–107)
CLARITY UR: ABNORMAL
CO2 SERPL-SCNC: 33 MMOL/L (ref 22–29)
COLOR UR: ABNORMAL
CREAT SERPL-MCNC: 0.41 MG/DL (ref 0.57–1)
D-LACTATE SERPL-SCNC: 1.5 MMOL/L (ref 0.5–2)
D-LACTATE SERPL-SCNC: 2.3 MMOL/L (ref 0.5–2)
D-LACTATE SERPL-SCNC: 2.3 MMOL/L (ref 0.5–2)
DEPRECATED RDW RBC AUTO: 47.1 FL (ref 37–54)
EGFRCR SERPLBLD CKD-EPI 2021: 98.4 ML/MIN/1.73
EOSINOPHIL # BLD AUTO: 0.05 10*3/MM3 (ref 0–0.4)
EOSINOPHIL NFR BLD AUTO: 0.3 % (ref 0.3–6.2)
ERYTHROCYTE [DISTWIDTH] IN BLOOD BY AUTOMATED COUNT: 12.6 % (ref 12.3–15.4)
GLOBULIN UR ELPH-MCNC: 3 GM/DL
GLUCOSE SERPL-MCNC: 146 MG/DL (ref 65–99)
GLUCOSE UR STRIP-MCNC: NEGATIVE MG/DL
HCT VFR BLD AUTO: 41.7 % (ref 34–46.6)
HGB BLD-MCNC: 13.5 G/DL (ref 12–15.9)
HGB UR QL STRIP.AUTO: ABNORMAL
HOLD SPECIMEN: NORMAL
HYALINE CASTS UR QL AUTO: ABNORMAL /LPF
IMM GRANULOCYTES # BLD AUTO: 0.06 10*3/MM3 (ref 0–0.05)
IMM GRANULOCYTES NFR BLD AUTO: 0.4 % (ref 0–0.5)
KETONES UR QL STRIP: NEGATIVE
LEUKOCYTE ESTERASE UR QL STRIP.AUTO: ABNORMAL
LIPASE SERPL-CCNC: 26 U/L (ref 13–60)
LYMPHOCYTES # BLD AUTO: 1.13 10*3/MM3 (ref 0.7–3.1)
LYMPHOCYTES NFR BLD AUTO: 7.1 % (ref 19.6–45.3)
MCH RBC QN AUTO: 32.7 PG (ref 26.6–33)
MCHC RBC AUTO-ENTMCNC: 32.4 G/DL (ref 31.5–35.7)
MCV RBC AUTO: 101 FL (ref 79–97)
MONOCYTES # BLD AUTO: 0.49 10*3/MM3 (ref 0.1–0.9)
MONOCYTES NFR BLD AUTO: 3.1 % (ref 5–12)
NEUTROPHILS NFR BLD AUTO: 14.2 10*3/MM3 (ref 1.7–7)
NEUTROPHILS NFR BLD AUTO: 88.6 % (ref 42.7–76)
NITRITE UR QL STRIP: NEGATIVE
NRBC BLD AUTO-RTO: 0 /100 WBC (ref 0–0.2)
PH UR STRIP.AUTO: 6.5 [PH] (ref 5–8)
PLATELET # BLD AUTO: 215 10*3/MM3 (ref 140–450)
PMV BLD AUTO: 9.2 FL (ref 6–12)
POTASSIUM SERPL-SCNC: 3.4 MMOL/L (ref 3.5–5.2)
PROCALCITONIN SERPL-MCNC: 0.03 NG/ML (ref 0–0.25)
PROT SERPL-MCNC: 7 G/DL (ref 6–8.5)
PROT UR QL STRIP: ABNORMAL
RBC # BLD AUTO: 4.13 10*6/MM3 (ref 3.77–5.28)
RBC # UR STRIP: ABNORMAL /HPF
REF LAB TEST METHOD: ABNORMAL
SODIUM SERPL-SCNC: 141 MMOL/L (ref 136–145)
SP GR UR STRIP: 1.02 (ref 1–1.03)
SQUAMOUS #/AREA URNS HPF: ABNORMAL /HPF
UROBILINOGEN UR QL STRIP: ABNORMAL
WBC # UR STRIP: ABNORMAL /HPF
WBC NRBC COR # BLD: 16.01 10*3/MM3 (ref 3.4–10.8)
WHOLE BLOOD HOLD COAG: NORMAL
WHOLE BLOOD HOLD SPECIMEN: NORMAL

## 2023-07-25 PROCEDURE — 80053 COMPREHEN METABOLIC PANEL: CPT | Performed by: EMERGENCY MEDICINE

## 2023-07-25 PROCEDURE — 83605 ASSAY OF LACTIC ACID: CPT | Performed by: EMERGENCY MEDICINE

## 2023-07-25 PROCEDURE — 71045 X-RAY EXAM CHEST 1 VIEW: CPT

## 2023-07-25 PROCEDURE — 83690 ASSAY OF LIPASE: CPT | Performed by: EMERGENCY MEDICINE

## 2023-07-25 PROCEDURE — 87040 BLOOD CULTURE FOR BACTERIA: CPT | Performed by: EMERGENCY MEDICINE

## 2023-07-25 PROCEDURE — 52332 CYSTOSCOPY AND TREATMENT: CPT | Performed by: STUDENT IN AN ORGANIZED HEALTH CARE EDUCATION/TRAINING PROGRAM

## 2023-07-25 PROCEDURE — 87186 SC STD MICRODIL/AGAR DIL: CPT | Performed by: EMERGENCY MEDICINE

## 2023-07-25 PROCEDURE — 36415 COLL VENOUS BLD VENIPUNCTURE: CPT

## 2023-07-25 PROCEDURE — 25010000002 MORPHINE PER 10 MG: Performed by: EMERGENCY MEDICINE

## 2023-07-25 PROCEDURE — 99285 EMERGENCY DEPT VISIT HI MDM: CPT

## 2023-07-25 PROCEDURE — 84145 PROCALCITONIN (PCT): CPT | Performed by: EMERGENCY MEDICINE

## 2023-07-25 PROCEDURE — 87077 CULTURE AEROBIC IDENTIFY: CPT | Performed by: EMERGENCY MEDICINE

## 2023-07-25 PROCEDURE — 81001 URINALYSIS AUTO W/SCOPE: CPT | Performed by: EMERGENCY MEDICINE

## 2023-07-25 PROCEDURE — 99222 1ST HOSP IP/OBS MODERATE 55: CPT | Performed by: NURSE PRACTITIONER

## 2023-07-25 PROCEDURE — 94640 AIRWAY INHALATION TREATMENT: CPT

## 2023-07-25 PROCEDURE — 74420 UROGRAPHY RTRGR +-KUB: CPT | Performed by: STUDENT IN AN ORGANIZED HEALTH CARE EDUCATION/TRAINING PROGRAM

## 2023-07-25 PROCEDURE — 87086 URINE CULTURE/COLONY COUNT: CPT | Performed by: EMERGENCY MEDICINE

## 2023-07-25 PROCEDURE — 25010000002 CEFTRIAXONE PER 250 MG: Performed by: PHYSICIAN ASSISTANT

## 2023-07-25 PROCEDURE — 85025 COMPLETE CBC W/AUTO DIFF WBC: CPT

## 2023-07-25 PROCEDURE — 74176 CT ABD & PELVIS W/O CONTRAST: CPT

## 2023-07-25 PROCEDURE — 25010000002 CEFTRIAXONE PER 250 MG: Performed by: EMERGENCY MEDICINE

## 2023-07-25 PROCEDURE — 94799 UNLISTED PULMONARY SVC/PX: CPT

## 2023-07-25 RX ORDER — CALCIUM CARBONATE 500 MG/1
2 TABLET, CHEWABLE ORAL 3 TIMES DAILY PRN
Status: DISCONTINUED | OUTPATIENT
Start: 2023-07-25 | End: 2023-07-27 | Stop reason: HOSPADM

## 2023-07-25 RX ORDER — ONDANSETRON 2 MG/ML
4 INJECTION INTRAMUSCULAR; INTRAVENOUS
Status: DISCONTINUED | OUTPATIENT
Start: 2023-07-25 | End: 2023-07-25

## 2023-07-25 RX ORDER — ACETAMINOPHEN 500 MG
500 TABLET ORAL EVERY 6 HOURS
Status: DISCONTINUED | OUTPATIENT
Start: 2023-07-25 | End: 2023-07-27 | Stop reason: HOSPADM

## 2023-07-25 RX ORDER — MORPHINE SULFATE 4 MG/ML
4 INJECTION, SOLUTION INTRAMUSCULAR; INTRAVENOUS
Status: DISCONTINUED | OUTPATIENT
Start: 2023-07-25 | End: 2023-07-25

## 2023-07-25 RX ORDER — HYDRALAZINE HYDROCHLORIDE 10 MG/1
10 TABLET, FILM COATED ORAL EVERY 6 HOURS PRN
Status: DISCONTINUED | OUTPATIENT
Start: 2023-07-25 | End: 2023-07-27 | Stop reason: HOSPADM

## 2023-07-25 RX ORDER — FERROUS SULFATE 325(65) MG
325 TABLET ORAL 2 TIMES DAILY
Status: DISCONTINUED | OUTPATIENT
Start: 2023-07-26 | End: 2023-07-27 | Stop reason: HOSPADM

## 2023-07-25 RX ORDER — BUDESONIDE AND FORMOTEROL FUMARATE DIHYDRATE 160; 4.5 UG/1; UG/1
2 AEROSOL RESPIRATORY (INHALATION)
Status: DISCONTINUED | OUTPATIENT
Start: 2023-07-25 | End: 2023-07-27 | Stop reason: HOSPADM

## 2023-07-25 RX ORDER — SODIUM CHLORIDE 0.9 % (FLUSH) 0.9 %
10 SYRINGE (ML) INJECTION AS NEEDED
Status: DISCONTINUED | OUTPATIENT
Start: 2023-07-25 | End: 2023-07-27 | Stop reason: HOSPADM

## 2023-07-25 RX ORDER — POTASSIUM CHLORIDE 20 MEQ/1
40 TABLET, EXTENDED RELEASE ORAL EVERY 4 HOURS
Status: COMPLETED | OUTPATIENT
Start: 2023-07-25 | End: 2023-07-25

## 2023-07-25 RX ORDER — KETOROLAC TROMETHAMINE 30 MG/ML
15 INJECTION, SOLUTION INTRAMUSCULAR; INTRAVENOUS EVERY 6 HOURS PRN
Status: DISCONTINUED | OUTPATIENT
Start: 2023-07-25 | End: 2023-07-27 | Stop reason: HOSPADM

## 2023-07-25 RX ORDER — TAMSULOSIN HYDROCHLORIDE 0.4 MG/1
0.4 CAPSULE ORAL DAILY
Status: DISCONTINUED | OUTPATIENT
Start: 2023-07-25 | End: 2023-07-27 | Stop reason: HOSPADM

## 2023-07-25 RX ORDER — IPRATROPIUM BROMIDE AND ALBUTEROL SULFATE 2.5; .5 MG/3ML; MG/3ML
3 SOLUTION RESPIRATORY (INHALATION) EVERY 4 HOURS PRN
Status: DISCONTINUED | OUTPATIENT
Start: 2023-07-25 | End: 2023-07-27 | Stop reason: HOSPADM

## 2023-07-25 RX ORDER — ONDANSETRON 4 MG/1
4 TABLET, FILM COATED ORAL EVERY 6 HOURS PRN
Status: DISCONTINUED | OUTPATIENT
Start: 2023-07-25 | End: 2023-07-27 | Stop reason: HOSPADM

## 2023-07-25 RX ORDER — SODIUM CHLORIDE 9 MG/ML
100 INJECTION, SOLUTION INTRAVENOUS CONTINUOUS
Status: ACTIVE | OUTPATIENT
Start: 2023-07-25 | End: 2023-07-26

## 2023-07-25 RX ORDER — SODIUM CHLORIDE 9 MG/ML
40 INJECTION, SOLUTION INTRAVENOUS AS NEEDED
Status: DISCONTINUED | OUTPATIENT
Start: 2023-07-25 | End: 2023-07-27 | Stop reason: HOSPADM

## 2023-07-25 RX ORDER — CLOPIDOGREL BISULFATE 75 MG/1
75 TABLET ORAL DAILY
Status: DISCONTINUED | OUTPATIENT
Start: 2023-07-26 | End: 2023-07-27 | Stop reason: HOSPADM

## 2023-07-25 RX ORDER — MORPHINE SULFATE 2 MG/ML
2 INJECTION, SOLUTION INTRAMUSCULAR; INTRAVENOUS
Status: DISCONTINUED | OUTPATIENT
Start: 2023-07-25 | End: 2023-07-27 | Stop reason: HOSPADM

## 2023-07-25 RX ORDER — TRAMADOL HYDROCHLORIDE 50 MG/1
50 TABLET ORAL EVERY 6 HOURS
Status: DISCONTINUED | OUTPATIENT
Start: 2023-07-25 | End: 2023-07-27 | Stop reason: HOSPADM

## 2023-07-25 RX ORDER — ATENOLOL 50 MG/1
50 TABLET ORAL 2 TIMES DAILY
Status: DISCONTINUED | OUTPATIENT
Start: 2023-07-25 | End: 2023-07-26

## 2023-07-25 RX ORDER — LEVOTHYROXINE SODIUM 175 UG/1
175 TABLET ORAL
Status: DISCONTINUED | OUTPATIENT
Start: 2023-07-26 | End: 2023-07-27 | Stop reason: HOSPADM

## 2023-07-25 RX ORDER — ROFLUMILAST 500 UG/1
500 TABLET ORAL NIGHTLY
Status: DISCONTINUED | OUTPATIENT
Start: 2023-07-25 | End: 2023-07-27 | Stop reason: HOSPADM

## 2023-07-25 RX ORDER — NITROGLYCERIN 0.4 MG/1
0.4 TABLET SUBLINGUAL
Status: DISCONTINUED | OUTPATIENT
Start: 2023-07-25 | End: 2023-07-27 | Stop reason: HOSPADM

## 2023-07-25 RX ORDER — PANTOPRAZOLE SODIUM 40 MG/1
40 TABLET, DELAYED RELEASE ORAL
Status: DISCONTINUED | OUTPATIENT
Start: 2023-07-26 | End: 2023-07-27 | Stop reason: HOSPADM

## 2023-07-25 RX ORDER — ONDANSETRON 2 MG/ML
4 INJECTION INTRAMUSCULAR; INTRAVENOUS EVERY 6 HOURS PRN
Status: DISCONTINUED | OUTPATIENT
Start: 2023-07-25 | End: 2023-07-27 | Stop reason: HOSPADM

## 2023-07-25 RX ORDER — ASPIRIN 81 MG/1
81 TABLET, CHEWABLE ORAL DAILY
Status: DISCONTINUED | OUTPATIENT
Start: 2023-07-26 | End: 2023-07-27 | Stop reason: HOSPADM

## 2023-07-25 RX ORDER — LATANOPROST 50 UG/ML
1 SOLUTION/ DROPS OPHTHALMIC NIGHTLY
Status: DISCONTINUED | OUTPATIENT
Start: 2023-07-25 | End: 2023-07-27 | Stop reason: HOSPADM

## 2023-07-25 RX ORDER — DOCUSATE SODIUM 100 MG/1
200 CAPSULE, LIQUID FILLED ORAL DAILY
Status: DISCONTINUED | OUTPATIENT
Start: 2023-07-25 | End: 2023-07-27 | Stop reason: HOSPADM

## 2023-07-25 RX ORDER — ISOSORBIDE MONONITRATE 30 MG/1
30 TABLET, EXTENDED RELEASE ORAL DAILY
Status: DISCONTINUED | OUTPATIENT
Start: 2023-07-25 | End: 2023-07-27 | Stop reason: HOSPADM

## 2023-07-25 RX ORDER — GUAIFENESIN 600 MG/1
1200 TABLET, EXTENDED RELEASE ORAL 2 TIMES DAILY
Status: DISCONTINUED | OUTPATIENT
Start: 2023-07-25 | End: 2023-07-27 | Stop reason: HOSPADM

## 2023-07-25 RX ORDER — SODIUM CHLORIDE 9 MG/ML
10 INJECTION INTRAVENOUS AS NEEDED
Status: DISCONTINUED | OUTPATIENT
Start: 2023-07-25 | End: 2023-07-27 | Stop reason: HOSPADM

## 2023-07-25 RX ORDER — CETIRIZINE HYDROCHLORIDE 10 MG/1
10 TABLET ORAL NIGHTLY
Status: DISCONTINUED | OUTPATIENT
Start: 2023-07-25 | End: 2023-07-27 | Stop reason: HOSPADM

## 2023-07-25 RX ORDER — GABAPENTIN 300 MG/1
300 CAPSULE ORAL NIGHTLY
Status: DISCONTINUED | OUTPATIENT
Start: 2023-07-25 | End: 2023-07-27 | Stop reason: HOSPADM

## 2023-07-25 RX ORDER — FAMOTIDINE 20 MG/1
20 TABLET, FILM COATED ORAL 2 TIMES DAILY PRN
Status: DISCONTINUED | OUTPATIENT
Start: 2023-07-25 | End: 2023-07-27 | Stop reason: HOSPADM

## 2023-07-25 RX ORDER — ATORVASTATIN CALCIUM 20 MG/1
20 TABLET, FILM COATED ORAL NIGHTLY
Status: DISCONTINUED | OUTPATIENT
Start: 2023-07-25 | End: 2023-07-27 | Stop reason: HOSPADM

## 2023-07-25 RX ORDER — SODIUM CHLORIDE 0.9 % (FLUSH) 0.9 %
10 SYRINGE (ML) INJECTION EVERY 12 HOURS SCHEDULED
Status: DISCONTINUED | OUTPATIENT
Start: 2023-07-25 | End: 2023-07-27 | Stop reason: HOSPADM

## 2023-07-25 RX ADMIN — MORPHINE SULFATE 4 MG: 4 INJECTION, SOLUTION INTRAMUSCULAR; INTRAVENOUS at 14:33

## 2023-07-25 RX ADMIN — TRAMADOL HYDROCHLORIDE 50 MG: 50 TABLET ORAL at 17:53

## 2023-07-25 RX ADMIN — POTASSIUM CHLORIDE 40 MEQ: 1500 TABLET, EXTENDED RELEASE ORAL at 23:30

## 2023-07-25 RX ADMIN — ATENOLOL 50 MG: 50 TABLET ORAL at 20:32

## 2023-07-25 RX ADMIN — SODIUM CHLORIDE 2000 MG: 900 INJECTION INTRAVENOUS at 17:53

## 2023-07-25 RX ADMIN — LATANOPROST 1 DROP: 50 SOLUTION OPHTHALMIC at 21:17

## 2023-07-25 RX ADMIN — POTASSIUM CHLORIDE 40 MEQ: 1500 TABLET, EXTENDED RELEASE ORAL at 20:32

## 2023-07-25 RX ADMIN — SODIUM CHLORIDE 100 ML/HR: 9 INJECTION, SOLUTION INTRAVENOUS at 17:54

## 2023-07-25 RX ADMIN — SODIUM CHLORIDE 1000 ML: 9 INJECTION, SOLUTION INTRAVENOUS at 14:33

## 2023-07-25 RX ADMIN — CETIRIZINE HYDROCHLORIDE 10 MG: 10 TABLET, FILM COATED ORAL at 20:32

## 2023-07-25 RX ADMIN — DOCUSATE SODIUM 200 MG: 100 CAPSULE, LIQUID FILLED ORAL at 17:53

## 2023-07-25 RX ADMIN — DOXYCYCLINE 100 MG: 100 INJECTION, POWDER, LYOPHILIZED, FOR SOLUTION INTRAVENOUS at 17:54

## 2023-07-25 RX ADMIN — TAMSULOSIN HYDROCHLORIDE 0.4 MG: 0.4 CAPSULE ORAL at 17:53

## 2023-07-25 RX ADMIN — TRAMADOL HYDROCHLORIDE 50 MG: 50 TABLET ORAL at 23:30

## 2023-07-25 RX ADMIN — ACETAMINOPHEN 500 MG: 500 TABLET ORAL at 23:30

## 2023-07-25 RX ADMIN — ACETAMINOPHEN 500 MG: 500 TABLET ORAL at 17:54

## 2023-07-25 RX ADMIN — BUDESONIDE AND FORMOTEROL FUMARATE DIHYDRATE 2 PUFF: 160; 4.5 AEROSOL RESPIRATORY (INHALATION) at 20:04

## 2023-07-25 RX ADMIN — ATORVASTATIN CALCIUM 20 MG: 20 TABLET, FILM COATED ORAL at 20:32

## 2023-07-25 RX ADMIN — ISOSORBIDE MONONITRATE 30 MG: 30 TABLET, EXTENDED RELEASE ORAL at 17:53

## 2023-07-25 RX ADMIN — ROFLUMILAST 500 MCG: 500 TABLET ORAL at 21:17

## 2023-07-25 RX ADMIN — GABAPENTIN 300 MG: 300 CAPSULE ORAL at 20:32

## 2023-07-25 RX ADMIN — GUAIFENESIN 1200 MG: 600 TABLET, EXTENDED RELEASE ORAL at 20:32

## 2023-07-25 RX ADMIN — SERTRALINE 100 MG: 50 TABLET, FILM COATED ORAL at 20:32

## 2023-07-25 NOTE — Clinical Note
Level of Care: Telemetry [5]   Diagnosis: Nephrolithiasis [199102]   Certification: I Certify That Inpatient Hospital Services Are Medically Necessary For Greater Than 2 Midnights

## 2023-07-25 NOTE — H&P (VIEW-ONLY)
"[facility] [place]   HISTORY AND PHYSICAL    Patient Name: Avelina Ceron  : 1940  MRN: [id number]  Primary Care Physician:  Dre Moura MD  Date of admission: 2023    Subjective   Subjective     Chief Complaint: Right flank pain/RLQ abdominal pain    HPI:    Avelina Ceron is a 82 y.o. female with PMH of HTN, HLD, PAD, Carotid artery disease, chronic respiratory failure due to COPD on 2L O2 at home and chronic leukocytosis. She presents to [facility] for worsening right flank pain, right lower quadrant abdominal pain and nausea/vomiting. She was previously treated with 10 day course of Macrobid in  for E.Coli UTI. She reports she has not felt well since her UTI. She reports developing right flank pain 10 days ago and for the last 5 days she has had decreased PO intake and weakness. She developed nausea/vomiting today prompting presentation to the ER.     CT AP wo ; 7 x 5 mm right distal ureteral calculus with mild right hydronephrosis. There are additional nonobstructing bilateral renal calculi.     Labs reviewed; UA 21-30 wbc, 4+ bacteria, TNTC RBC. Cr 0.41, WBC 16.01. She has been afebrile.     She is currently resting in bed. Her daughter is at the bedside. She reports RLQ abdominal pain. She is voiding yas/dark urine output to purewick.     Review of Systems   All systems were reviewed and negative except for: right lower quadrant abdominal pain, right flank pain, nausea/vomiting.    Personal History     Past Medical History:   Diagnosis Date    Broken ribs     COPD (chronic obstructive pulmonary disease)     Degenerative lumbar disc     Disease of thyroid gland     \"low thyroid\"    History of colonoscopy     Osteoarthritis        Past Surgical History:   Procedure Laterality Date    EYE SURGERY  10/17/2022    yag surgery    GALLBLADDER SURGERY  2012    HYSTERECTOMY      TOTAL HIP ARTHROPLASTY Bilateral        Family History: family history includes Alzheimer's disease in her sister; " Arthritis in her daughter; Cancer in her daughter, maternal aunt, and another family member; Depression in an other family member; Diabetes in her maternal uncle; Heart disease in her mother; Hyperlipidemia in her daughter and another family member; Hypertension in her mother and another family member; Liver disease in her daughter; Thyroid disease in her daughter. Otherwise pertinent FHx was reviewed and not pertinent to current issue.    Social History:  reports that she quit smoking about 17 years ago. Her smoking use included cigarettes. She has a 50.00 pack-year smoking history. She has never used smokeless tobacco. She reports that she does not currently use alcohol. She reports that she does not use drugs.    Home Medications:  Docusate Sodium, Fiber, Fluticasone-Umeclidin-Vilant, PreserVision AREDS, Vitamin C, Vitamin D3, acetaminophen, albuterol sulfate HFA, aspirin, atenolol, calcium-vitamin D, cetirizine, clopidogrel, ferrous sulfate, gabapentin, guaiFENesin, isosorbide mononitrate, latanoprost, levothyroxine, nitroglycerin, pantoprazole, roflumilast, sertraline, simvastatin, solifenacin, and traMADol      Allergies:  Allergies   Allergen Reactions    Codeine Mental Status Change    Penicillins Hives and Swelling    Dopamine Other (See Comments) and Palpitations     Other reaction(s): Hypertension       Objective   Objective     Vitals:   Temp:  [98.9 øF (37.2 øC)] 98.9 øF (37.2 øC)  Heart Rate:  [69-72] 72  Resp:  [18] 18  BP: (174-176)/(58-72) 176/72  Flow (L/min):  [2] 2  Physical Exam    Constitutional: Awake in bed, alert    Eyes: PERRLA, sclerae anicteric, no conjunctival injection   HENT: Normocephalic, atraumatic, mucous membranes moist   Neck: Supple, trachea midline   Respiratory:Equal chest rise, 2 L O2 nasal cannula.    Cardiovascular: RRR   Gastrointestinal: Soft.   Musculoskeletal: No bilateral ankle edema.   Genitourinary: RLQ abdominal pain. Right flank pain.    Psychiatric: Appropriate  affect, cooperative   Neurologic: Oriented x 3, Cranial Nerves grossly intact, speech clear   Skin: No rashes     Result Review    Result Review:  I have personally reviewed the results from the time of this admission to 7/25/2023 15:57 EDT and agree with these findings:  [x]  Laboratory  [x]  Microbiology  [x]  Radiology  []  EKG/Telemetry   []  Cardiology/Vascular   []  Pathology  [x]  Old records  [x]  Other: Vital signs    Most notable findings include: 7 x 5 mm right distal ureteral calculus with mild right hydronephrosis. UA 21-30 wbc, 4+ bacteria, TNTC RBC.     Assessment & Plan   Assessment / Plan     Brief Patient Summary:  Avelina Ceron is a 82 y.o. female who presented to [facility] for worsening right flank pain/RLQ abdominal pain found to have 7 x 5mm right distal ureteral calculus and UTI.     Active Hospital Problems:  Active Hospital Problems    Diagnosis     **Nephrolithiasis      We discussed in detail the risks, benefits and alternatives to Cystoscopy, right ureteroscopy, laser lithotripsy, right ureteral stent placement. We discussed risks of infection, bleeding and anesthesia. We also discussed possibly only proceeding with Cystoscopy, right ureteral stent placement if urine appears infected. She is understanding and agreeable with plan of care.     Patient and patients daughter have expressed concern regarding anesthesia given history of respiratory issues after hip replacement surgery in 2020. D/w Dr. Kim.     Plan:   -Consent.  -NPO after MN.  -Continue abx, urine culture pending.  -OR tomorrow afternoon with Dr. Kim.   D/w Dr. iKm.     DVT prophylaxis:  No DVT prophylaxis order currently exists.    CODE STATUS:       Admission Status:  I believe this patient meets inpatient status.    Electronically signed by AMADOR Casas, 07/25/23, 3:57 PM EDT.

## 2023-07-25 NOTE — ED PROVIDER NOTES
"  [place]    EMERGENCY DEPARTMENT ENCOUNTER      Pt Name: Avelina Ceron  MRN: [id number]  YOB: 1940  Date of evaluation: 7/25/2023  Provider: Jhonatan Hampton DO    CHIEF COMPLAINT       Chief Complaint   Patient presents with    Back Pain    Vomiting         HISTORY OF PRESENT ILLNESS  (Location/Symptom, Timing/Onset, Context/Setting, Quality, Duration, Modifying Factors, Severity.)   Avelina Ceron is a 82 y.o. female who presents to the emergency department for evaluation of right-sided flank lower quadrant abdominal pain which she was started feeling earlier today and overnight last night.  She notes dark urine but no dysuria, has been previously diagnosed with urinary tract infection and finished up a round of antibiotics as an outpatient.  She notes nausea, small episode of vomiting earlier today.  Denies any fever or chills, no fall, injury or trauma.  No history of underlying kidney stones, denies any chest pain or difficulty breathing.  Notes majority of her pain is in the right lower anterior abdomen.  Denies any CVA tenderness.  No other acute systemic complaints at this time.    She notes she has had issues with anesthesia induction, general anesthesia with cardiac arrest in the past.  Has elected for no further general esthesia procedures.      Nursing notes were reviewed.      PAST MEDICAL HISTORY     Past Medical History:   Diagnosis Date    Broken ribs     COPD (chronic obstructive pulmonary disease)     Degenerative lumbar disc     Disease of thyroid gland     \"low thyroid\"    History of colonoscopy     Osteoarthritis          SURGICAL HISTORY       Past Surgical History:   Procedure Laterality Date    EYE SURGERY  10/17/2022    yag surgery    GALLBLADDER SURGERY  2012    HYSTERECTOMY      TOTAL HIP ARTHROPLASTY Bilateral          CURRENT MEDICATIONS       Current Facility-Administered Medications:     Morphine sulfate (PF) injection 4 mg, 4 mg, Intravenous, Q30 Min PRNBerta" Jhonatan Mejía DO, 4 mg at 07/25/23 1433    ondansetron (ZOFRAN) injection 4 mg, 4 mg, Intravenous, Q30 Min PRN, Jhonatan Hampton DO    Sodium Chloride (PF) 0.9 % 10 mL, 10 mL, Intravenous, PRN, Jhonatan Hampton DO    Current Outpatient Medications:     acetaminophen (TYLENOL) 500 MG tablet, Take 1 tablet by mouth Every 6 (Six) Hours., Disp: , Rfl:     albuterol sulfate  (90 Base) MCG/ACT inhaler, Inhale 2 puffs Every 4 (Four) Hours As Needed., Disp: , Rfl:     Ascorbic Acid (Vitamin C) 500 MG capsule, Take  by mouth Daily., Disp: , Rfl:     aspirin 81 MG chewable tablet, Chew 1 tablet Daily., Disp: , Rfl:     atenolol (TENORMIN) 50 MG tablet, Take 0.5 tablets by mouth 2 (Two) Times a Day., Disp: 90 tablet, Rfl: 1    Calcium Carbonate-Vitamin D (CALCIUM-VITAMIN D) 500-200 MG-UNIT per tablet, Take 1 tablet by mouth Daily., Disp: , Rfl:     cetirizine (zyrTEC) 10 MG tablet, Take 1 tablet by mouth Daily., Disp: , Rfl:     Cholecalciferol (Vitamin D3) 25 MCG (1000 UT) capsule, Take  by mouth Daily., Disp: , Rfl:     clopidogrel (PLAVIX) 75 MG tablet, Take 1 tablet by mouth Daily., Disp: 90 tablet, Rfl: 1    Docusate Sodium 100 MG capsule, Take 100 mg by mouth 2 (Two) Times a Day., Disp: 60 tablet, Rfl: 2    FeroSul 325 (65 Fe) MG tablet, TAKE 1 TABLET BY MOUTH TWICE DAILY, Disp: 60 tablet, Rfl: 3    FIBER SELECT GUMMIES PO, Take 2 tablets by mouth Daily., Disp: , Rfl:     gabapentin (NEURONTIN) 300 MG capsule, TAKE 1 CAPSULE BY MOUTH EVERY DAY AT NIGHT, Disp: 90 capsule, Rfl: 0    guaiFENesin (MUCINEX) 600 MG 12 hr tablet, Take 2 tablets by mouth 2 (Two) Times a Day., Disp: , Rfl:     isosorbide mononitrate (IMDUR) 30 MG 24 hr tablet, TAKE 1 TABLET DAILY, Disp: 90 tablet, Rfl: 1    latanoprost (XALATAN) 0.005 % ophthalmic solution, INSTILL 1 DROP IN BOTH EYES EVERY EVENING, Disp: , Rfl:     levothyroxine (SYNTHROID, LEVOTHROID) 175 MCG tablet, Take 1 tablet by mouth Daily., Disp: 90 tablet, Rfl: 1     Multiple Vitamins-Minerals (PRESERVISION AREDS) capsule, Take 1 tablet by mouth 2 (two) times a day., Disp: , Rfl:     nitroglycerin (NITROSTAT) 0.4 MG SL tablet, PLACE 1 TABLET UNDER THE TONGUE AS NEEDED EVERY 5 MINUTES FOR CHEST PAIN. CALL 911 IF NO RELIEF AFTER 2ND DOSE (Patient not taking: Reported on 2023), Disp: 25 tablet, Rfl: 0    pantoprazole (PROTONIX) 40 MG EC tablet, TAKE 1 TABLET DAILY, Disp: 90 tablet, Rfl: 1    roflumilast (Daliresp) 500 MCG tablet tablet, Take 1 tablet by mouth Daily., Disp: 90 tablet, Rfl: 1    sertraline (ZOLOFT) 100 MG tablet, Take 1 tablet by mouth Daily., Disp: 90 tablet, Rfl: 1    simvastatin (ZOCOR) 40 MG tablet, Take 1 tablet by mouth Every Night., Disp: 90 tablet, Rfl: 1    solifenacin (VESICARE) 5 MG tablet, Take 1 tablet by mouth Daily., Disp: 90 tablet, Rfl: 1    traMADol (ULTRAM) 50 MG tablet, TAKE 1 TABLET BY MOUTH EVERY 6 HOURS AS NEEDED FOR MODERATE PAIN, Disp: 120 tablet, Rfl: 2    Trelegy Ellipta 100-62.5-25 MCG/ACT inhaler, INHALE 1 PUFF DAILY, Disp: 180 each, Rfl: 3    ALLERGIES     Codeine, Penicillins, and Dopamine    FAMILY HISTORY       Family History   Problem Relation Age of Onset    Cancer Other         BREAST, COLON, OVARIAN    Depression Other     Hyperlipidemia Other     Hypertension Other     Heart disease Mother     Hypertension Mother     Arthritis Daughter     Cancer Daughter     Hyperlipidemia Daughter     Liver disease Daughter     Thyroid disease Daughter     Cancer Maternal Aunt     Diabetes Maternal Uncle     Alzheimer's disease Sister           SOCIAL HISTORY       Social History     Socioeconomic History    Marital status:    Tobacco Use    Smoking status: Former     Packs/day: 1.00     Years: 50.00     Pack years: 50.00     Types: Cigarettes     Quit date:      Years since quittin.5    Smokeless tobacco: Never   Vaping Use    Vaping Use: Never used   Substance and Sexual Activity    Alcohol use: Not Currently     Comment:  "no etoh for past 10 years    Drug use: No    Sexual activity: Defer         PHYSICAL EXAM    (up to 7 for level 4, 8 or more for level 5)     Vitals:    07/25/23 1303 07/25/23 1318 07/25/23 1430   BP:  174/58 176/72   BP Location:  Right arm    Patient Position:  Sitting    Pulse: 69  72   Resp: 18     Temp: 98.9 °F (37.2 °C)     TempSrc: Oral     SpO2: 92%  92%   Weight: 89.8 kg (198 lb)     Height: 160 cm (63\")         Physical Exam  General : Patient is awake, alert, oriented, in no acute distress, nontoxic appearing  HEENT: Pupils are equally round, EOMI, conjunctivae clear, there is no injection no icterus.  Oral mucosa is moist, uvula midline  Neck: Neck is supple, full range of motion, trachea midline  Cardiac: Heart regular rate, rhythm, no murmurs, rubs, or gallops  Lungs: Lungs are clear to auscultation, there is no wheezing, rhonchi, or rales. There is no use of accessory muscles  Chest wall: There is no tenderness to palpation over the chest wall or over ribs  Abdomen: Abdomen is soft, nontender, nondistended. There are no firm or pulsatile masses, no rebound rigidity or guarding  Musculoskeletal: 5 out of 5 strength in all 4 extremities.  No focal muscle deficits are appreciated  Neuro: Motor intact, sensory intact, level of consciousness is normal, cerebellar function is normal, reflexes are grossly normal. No evidence of incontinence or loss of bowel or bladder function, no saddle anesthesia noted   Dermatology: Skin is warm and dry  Psych: Mentation is grossly normal, cognition is grossly normal. Affect is appropriate      DIAGNOSTIC RESULTS     EKG:  All EKGs are interpreted by the Emergency Department Physician who either signs or Co-signs this chart in the absence of a cardiologist.    No orders to display       RADIOLOGY:     [] Radiologist's Report Reviewed:  CT Abdomen Pelvis Without Contrast   Final Result   Impression:   1.7 x 5 mm obstructing calculus within the right distal ureter causing " mild right hydroureteronephrosis.   2.Additional bilateral nonobstructive nephrolithiasis.   3.Colonic diverticulosis.   4.Moderate hiatal hernia.   5.Additional findings as detailed above.         Electronically Signed: Ramon Escobar     7/25/2023 2:56 PM EDT     Workstation ID: [id number]          I ordered and independently reviewed the above noted radiographic studies.      I viewed images of CT abdomen/pelvis which showed distal right UVJ stone, bilateral nonobstructive kidney stones per my independent interpretation.    See radiologist's dictation for official interpretation.      ED BEDSIDE ULTRASOUND:   Performed by ED Physician - none    LABS:    I have reviewed and interpreted all of the currently available lab results from this visit (if applicable):  Results for orders placed or performed during the hospital encounter of 07/25/23   Comprehensive Metabolic Panel    Specimen: Blood   Result Value Ref Range    Glucose 146 (H) 65 - 99 mg/dL    BUN 8 8 - 23 mg/dL    Creatinine 0.41 (L) 0.57 - 1.00 mg/dL    Sodium 141 136 - 145 mmol/L    Potassium 3.4 (L) 3.5 - 5.2 mmol/L    Chloride 98 98 - 107 mmol/L    CO2 33.0 (H) 22.0 - 29.0 mmol/L    Calcium 9.3 8.6 - 10.5 mg/dL    Total Protein 7.0 6.0 - 8.5 g/dL    Albumin 4.0 3.5 - 5.2 g/dL    ALT (SGPT) 12 1 - 33 U/L    AST (SGOT) 16 1 - 32 U/L    Alkaline Phosphatase 65 39 - 117 U/L    Total Bilirubin 0.4 0.0 - 1.2 mg/dL    Globulin 3.0 gm/dL    A/G Ratio 1.3 g/dL    BUN/Creatinine Ratio 19.5 7.0 - 25.0    Anion Gap 10.0 5.0 - 15.0 mmol/L    eGFR 98.4 >60.0 mL/min/1.73   Lipase    Specimen: Blood   Result Value Ref Range    Lipase 26 13 - 60 U/L   Urinalysis With Microscopic If Indicated (No Culture) - Urine, Clean Catch    Specimen: Urine, Clean Catch   Result Value Ref Range    Color, UA Orange (A) Yellow, Straw    Appearance, UA Cloudy (A) Clear    pH, UA 6.5 5.0 - 8.0    Specific Gravity, UA 1.019 1.001 - 1.030    Glucose, UA Negative Negative    Ketones, UA  Negative Negative    Bilirubin, UA Negative Negative    Blood, UA Large (3+) (A) Negative    Protein,  mg/dL (2+) (A) Negative    Leuk Esterase, UA Small (1+) (A) Negative    Nitrite, UA Negative Negative    Urobilinogen, UA 0.2 E.U./dL 0.2 - 1.0 E.U./dL   Lactic Acid, Plasma    Specimen: Blood   Result Value Ref Range    Lactate 2.3 (C) 0.5 - 2.0 mmol/L   CBC Auto Differential    Specimen: Blood   Result Value Ref Range    WBC 16.01 (H) 3.40 - 10.80 10*3/mm3    RBC 4.13 3.77 - 5.28 10*6/mm3    Hemoglobin 13.5 12.0 - 15.9 g/dL    Hematocrit 41.7 34.0 - 46.6 %    .0 (H) 79.0 - 97.0 fL    MCH 32.7 26.6 - 33.0 pg    MCHC 32.4 31.5 - 35.7 g/dL    RDW 12.6 12.3 - 15.4 %    RDW-SD 47.1 37.0 - 54.0 fl    MPV 9.2 6.0 - 12.0 fL    Platelets 215 140 - 450 10*3/mm3    Neutrophil % 88.6 (H) 42.7 - 76.0 %    Lymphocyte % 7.1 (L) 19.6 - 45.3 %    Monocyte % 3.1 (L) 5.0 - 12.0 %    Eosinophil % 0.3 0.3 - 6.2 %    Basophil % 0.5 0.0 - 1.5 %    Immature Grans % 0.4 0.0 - 0.5 %    Neutrophils, Absolute 14.20 (H) 1.70 - 7.00 10*3/mm3    Lymphocytes, Absolute 1.13 0.70 - 3.10 10*3/mm3    Monocytes, Absolute 0.49 0.10 - 0.90 10*3/mm3    Eosinophils, Absolute 0.05 0.00 - 0.40 10*3/mm3    Basophils, Absolute 0.08 0.00 - 0.20 10*3/mm3    Immature Grans, Absolute 0.06 (H) 0.00 - 0.05 10*3/mm3    nRBC 0.0 0.0 - 0.2 /100 WBC   Urinalysis, Microscopic Only - Urine, Clean Catch    Specimen: Urine, Clean Catch   Result Value Ref Range    RBC, UA Too Numerous to Count (A) None Seen, 0-2 /HPF    WBC, UA 21-30 (A) None Seen, 0-2 /HPF    Bacteria, UA 4+ (A) None Seen, Trace /HPF    Squamous Epithelial Cells, UA 0-2 None Seen, 0-2 /HPF    Hyaline Casts, UA 0-6 0 - 6 /LPF    Methodology Automated Microscopy    Procalcitonin    Specimen: Blood   Result Value Ref Range    Procalcitonin 0.03 0.00 - 0.25 ng/mL   Green Top (Gel)   Result Value Ref Range    Extra Tube Hold for add-ons.    Lavender Top   Result Value Ref Range    Extra Tube hold  for add-on    Gold Top - SST   Result Value Ref Range    Extra Tube Hold for add-ons.    Light Blue Top   Result Value Ref Range    Extra Tube Hold for add-ons.         If labs were ordered, I independently reviewed the results and considered them in treating the patient.      EMERGENCY DEPARTMENT COURSE and DIFFERENTIAL DIAGNOSIS/MDM:   Vitals:  AS OF 15:50 EDT    BP - 176/72  HR - 72  TEMP - 98.9 °F (37.2 °C) (Oral)  O2 SATS - 92%      Orders placed during this visit:  Orders Placed This Encounter   Procedures    Urine Culture - Urine, Urine, Clean Catch    Blood Culture - Blood,    Blood Culture - Blood,    CT Abdomen Pelvis Without Contrast    Sacred Heart Draw    Comprehensive Metabolic Panel    Lipase    Urinalysis With Microscopic If Indicated (No Culture) - Urine, Clean Catch    Lactic Acid, Plasma    CBC Auto Differential    Urinalysis, Microscopic Only - Urine, Clean Catch    STAT Lactic Acid, Reflex    Procalcitonin    NPO Diet NPO Type: Strict NPO    Undress & Gown    Insert Peripheral IV    CBC & Differential    Green Top (Gel)    Lavender Top    Gold Top - SST    Gray Top    Light Blue Top       All labs have been independently reviewed by me.  All radiology studies have been reviewed by me and the radiologist dictating the report.  All EKG's have been independently viewed and interpreted by me.      Discussion below represents my analysis of pertinent findings related to patient's condition, differential diagnosis, treatment plan and final disposition.    Differential diagnosis:  The differential diagnosis associated with the patient's presentation includes: Urinary tract infection, pyelonephritis, kidney stone, diverticulitis, colitis    Additional sources  Discussed/ obtained information from independent historians:   [] Spouse  [] Parent  [x] Family member  [] Friend  [] EMS   [] Other:    External (non-ED) record review:   [] Inpatient record:   [] Office record:   [] Outpatient record:   [] Prior  Outpatient labs:   [] Prior Outpatient radiology:   [] Primary Care record:   [] Outside ED record:   [] Other:     Patient's care impacted by:   [] Diabetes  [] Hypertension  [] CHF  [] Hyperlipidemia  [] Coronary Artery Disease   [] COPD   [] Cancer   [] Tobacco Abuse   [] Substance Abuse    [] Other:     Care significantly affected by Social Determinants of Health (housing and economic circumstances, unemployment)    [] Yes     [x] No   If yes, Patient's care significantly limited by Social Determinants of Health including:   [] Inadequate housing   [] Low income   [] Alcoholism and drug addiction in family   [] Problems related to primary support group   [] Unemployment   [] Problems related to employment   [] Other Social Determinants of Health:       MEDICATIONS ADMINISTERED IN ED:  Medications   Sodium Chloride (PF) 0.9 % 10 mL (has no administration in time range)   Morphine sulfate (PF) injection 4 mg (4 mg Intravenous Given 7/25/23 1433)   ondansetron (ZOFRAN) injection 4 mg (has no administration in time range)   cefTRIAXone (ROCEPHIN) 2000 mg/100 mL 0.9% NS IVPB (MBP) (0 mg Intravenous Stopped 7/25/23 1548)   sodium chloride 0.9 % bolus 1,000 mL (1,000 mL Intravenous New Bag 7/25/23 1433)         ED Course as of 07/25/23 1550   Tue Jul 25, 2023   1545 She notes she has had issues with anesthesia induction, general anesthesia with cardiac arrest in the past.  Has elected for no further general esthesia procedures. [AP]      ED Course User Index  [AP] Jhonatan Hampton,        This is a very pleasant 82-year-old female with right lower quadrant abdominal pain, dark urine with a recent diagnosis of urinary tract infection a complete antibiotics per symptoms have persisted.  She has had some increased pain, nausea vomiting earlier today.  She is nontoxic-appearing, does have underlying chronic comorbidities, wears nasal cannula oxygen at home.  With her continued symptoms.  Plan IV, labs and imaging as  well as a urinalysis with results reviewed as above.  White count of 16.0, left shift, lactic acid 2.3.  Kidney function liver function electrolytes are stable.  Urinalysis with positive leukocytes, 4+ bacteria, 20-30 WBCs with large blood.  She was given IV Rocephin.  The CT scan of the abdomen pelvis revealed a 7.5 of right distal UVJ obstruction with mild hydronephrosis.  With a concern for an underlying retained infected stone I did discuss the case with urology, Dr. Kim, who recommends admission to the hospital for further work-up and treatment urology assessment with possible urological intervention in the morning.    PROCEDURES:  Procedures    CRITICAL CARE TIME    Total Critical Care time was 0 minutes, excluding separately reportable procedures.   There was a high probability of clinically significant/life threatening deterioration in the patient's condition which required my urgent intervention.      FINAL IMPRESSION      1. Right ureteral stone    2. Urinary tract infection with hematuria, site unspecified          DISPOSITION/PLAN     ED Disposition       ED Disposition   Decision to Admit    Condition   --    Comment   --                   Comment: Please note this report has been produced using speech recognition software.      Jhonatan Hampton DO  Attending Emergency Physician         Jhonatan Hampton,   07/25/23 6271

## 2023-07-25 NOTE — H&P
[facility] Medicine Services  HISTORY AND PHYSICAL    Patient Name: Avelina Ceron  : 1940  MRN: [id number]  Primary Care Physician: Dre Moura MD  Date of admission: 2023    Subjective   Subjective     Chief Complaint: nausea/vomiting     HPI:  Avelina Ceron is a 82 y.o. female with PMH significant for HTN, HLD, PAD (s/p bilateral iliac artery stents), carotid artery disease (s/p bilateral ICA stents), chronic respiratory failure with hypoxia secondary to COPD (baseline 2L NC), GERD, chronic leukocytosis (baseline 13-15K), hypothyroidism and chronic back pain. She was treated for E. Coli UTI with Macrobid x 10 days in 2023. She hasn't quite felt herself since this time. Approximately 10 days ago, developed acute on chronic back pain. Back pain primarily right-sided, occasional pain in R groin as well. She has had subjective fevers and chills but never checked her temperature to verify. No dysuria, hematuria or urinary frequency noted.     On , she developed nausea, vomiting and generalized weakness. Per daughter, patient choked while vomiting and became short of breath as well. Daughter called EMS and she was brought to [facility] ED for further evaluation.     In the ED, she was noted to be hypertensive and required 3L NC to maintain O2 saturations. No fever or tachycardia. Daughter thinks patient may have vomited up AM blood pressure medications. WBCs 16K. K+ 3.4. Lactic acid 2.3. UA concerning for UTI. CT abdomen/pelvis showed 7 x 5 mm obstructing calculus within the R distal ureter with mild R hydroureteronephrosis. She was given 1L IV fluids and 2g IV Ceftriaxone. Urology notified in ED. Hospital medicine will admit.      Of note, patient/daughter report post-op respiratory arrest in  after hip arthroplasty.     Review of Systems   Constitutional:  Positive for activity change, appetite change, chills, fatigue and fever.   HENT:  "Negative.     Respiratory:  Positive for choking.    Cardiovascular: Negative.    Gastrointestinal:  Positive for nausea and vomiting.   Genitourinary:  Positive for flank pain.   Musculoskeletal:  Positive for back pain and myalgias.   Neurological:  Positive for weakness.   Psychiatric/Behavioral: Negative.        Personal History     Past Medical History:   Diagnosis Date    Broken ribs     COPD (chronic obstructive pulmonary disease)     Degenerative lumbar disc     Disease of thyroid gland     \"low thyroid\"    History of colonoscopy     Osteoarthritis      Past Surgical History:   Procedure Laterality Date    EYE SURGERY  10/17/2022    yag surgery    GALLBLADDER SURGERY  2012    HYSTERECTOMY      TOTAL HIP ARTHROPLASTY Bilateral      Family History: family history includes Alzheimer's disease in her sister; Arthritis in her daughter; Cancer in her daughter, maternal aunt, and another family member; Depression in an other family member; Diabetes in her maternal uncle; Heart disease in her mother; Hyperlipidemia in her daughter and another family member; Hypertension in her mother and another family member; Liver disease in her daughter; Thyroid disease in her daughter.     Social History:  reports that she quit smoking about 17 years ago. Her smoking use included cigarettes. She has a 50.00 pack-year smoking history. She has never used smokeless tobacco. She reports that she does not currently use alcohol. She reports that she does not use drugs.    Social History     Social History Narrative    Not on file     Medications:  Docusate Sodium, Fiber, Fluticasone-Umeclidin-Vilant, PreserVision AREDS, Vitamin C, Vitamin D3, acetaminophen, albuterol sulfate HFA, aspirin, atenolol, calcium-vitamin D, cetirizine, clopidogrel, ferrous sulfate, gabapentin, guaiFENesin, isosorbide mononitrate, latanoprost, levothyroxine, nitroglycerin, pantoprazole, roflumilast, sertraline, simvastatin, solifenacin, and " traMADol    Allergies   Allergen Reactions    Codeine Mental Status Change    Penicillins Hives and Swelling    Dopamine Other (See Comments) and Palpitations     Other reaction(s): Hypertension     Objective     Vital Signs:   Temp:  [98.9 °F (37.2 °C)] 98.9 °F (37.2 °C)  Heart Rate:  [69-72] 72  Resp:  [18] 18  BP: (174-176)/(58-72) 176/72  Flow (L/min):  [2] 2    Physical Exam   Constitutional: No acute distress. Awake, alert and conversational. Chronically ill appearing  Eyes: PERRLA, sclerae anicteric, no conjunctival injection  HENT: NCAT, mucous membranes moist  Neck: Supple, no thyromegaly, no lymphadenopathy, trachea midline  Respiratory: Faint expiratory wheezing bilaterally without overt rales or rhonchi. Normal respiratory effort. Sat 92-94% on 3L NC  Cardiovascular: RRR, (+) murmur without rub or gallop. Palpable pedal pulses bilaterally  Gastrointestinal: Positive bowel sounds, soft, nontender, nondistended  Musculoskeletal: No bilateral ankle edema  Psychiatric: Appropriate affect, cooperative  Neurologic: Oriented x 3, moves all extremities spontaneously without focal deficits, globally weak, speech clear  Skin: No rashes to exposed surfaces     Result Review:  I have personally reviewed the results from the time of this admission to 7/25/2023 16:19 EDT and agree with these findings:  [x]  Laboratory list / accordion  [x]  Microbiology  [x]  Radiology  []  EKG/Telemetry   [x]  Cardiology/Vascular   []  Pathology  [x]  Old records  []  Other:    LAB RESULTS:      Lab 07/25/23  1322   WBC 16.01*   HEMOGLOBIN 13.5   HEMATOCRIT 41.7   PLATELETS 215   NEUTROS ABS 14.20*   IMMATURE GRANS (ABS) 0.06*   LYMPHS ABS 1.13   MONOS ABS 0.49   EOS ABS 0.05   .0*   PROCALCITONIN 0.03   LACTATE 2.3*         Lab 07/25/23  1322   SODIUM 141   POTASSIUM 3.4*   CHLORIDE 98   CO2 33.0*   ANION GAP 10.0   BUN 8   CREATININE 0.41*   EGFR 98.4   GLUCOSE 146*   CALCIUM 9.3         Lab 07/25/23  1322   TOTAL PROTEIN  7.0   ALBUMIN 4.0   GLOBULIN 3.0   ALT (SGPT) 12   AST (SGOT) 16   BILIRUBIN 0.4   ALK PHOS 65   LIPASE 26     Brief Urine Lab Results  (Last result in the past 365 days)        Color   Clarity   Blood   Leuk Est   Nitrite   Protein   CREAT   Urine HCG        07/25/23 1357 [place]   Cloudy   Large (3+)   Small (1+)   Negative   100 mg/dL (2+)                 Microbiology Results (last 10 days)       ** No results found for the last 240 hours. **          CT Abdomen Pelvis Without Contrast    Result Date: 7/25/2023  CT ABDOMEN PELVIS WO CONTRAST Date of Exam: 7/25/2023 2:37 PM EDT Indication: Right flank pain, UTI/pyelo-, kidney stone?. Comparison: CT of the abdomen and pelvis dated 8/4/2020 Technique: Axial CT images were obtained of the abdomen and pelvis without the administration of contrast. Reconstructed coronal and sagittal images were also obtained. Automated exposure control and iterative construction methods were used. Findings: Liver: The liver is unremarkable in morphology. Evaluation for focal liver lesions is limited without IV contrast. There is mild fatty infiltration near the falciform ligament. No biliary dilation is seen. Gallbladder: Surgically absent. Pancreas: Unremarkable. Spleen: Unremarkable. Adrenal glands: Unremarkable. Genitourinary tract: There is a 7 x 5 mm obstructing calculus within the right distal ureter causing mild right hydroureteronephrosis. Additional 5 mm nonobstructing calculus within the right kidney. Right perinephric stranding/mild fluid is present. Small right renal hypodensity is incompletely characterized. 3 mm nonobstructing calculus at the lower pole of the left kidney. Left kidney is otherwise unremarkable. No left hydronephrosis. Visualized left ureter is unremarkable. The urinary bladder is decompressed. Patient is status post hysterectomy. Gastrointestinal tract: Mid colon is decompressed which limits its evaluation. There are surgical changes of the right colon.  Colonic diverticulosis is present. Moderate hiatal hernia is present. No findings to suggest bowel obstruction. Further evaluation of the hollow viscera is limited without IV contrast. Other findings: No free air or free fluid is identified. No pathologically enlarged lymph nodes are seen. Moderate vascular calcifications. Left common iliac artery stent. Right external iliac artery stent. Bones and soft tissues: No acute osseous lesion is identified. Bones are demineralized. There are degenerative changes within the spine. Bilateral hip arthroplasties are noted. Femoral stems are incompletely imaged. Superficial soft tissues demonstrate no acute abnormality. Lung bases: Scattered bibasilar atelectasis, scarring, or atypical infiltrates. Moderate hiatal hernia. Coronary artery calcifications.     Impression: Impression: 1.7 x 5 mm obstructing calculus within the right distal ureter causing mild right hydroureteronephrosis. 2.Additional bilateral nonobstructive nephrolithiasis. 3.Colonic diverticulosis. 4.Moderate hiatal hernia. 5.Additional findings as detailed above. Electronically Signed: Ramon Escobar  7/25/2023 2:56 PM EDT  Workstation ID: [id number]     Results for orders placed in visit on 07/15/20    SCANNED - ECHOCARDIOGRAM    Assessment & Plan       Ureterolithiasis    Chronic obstructive pulmonary disease    Hypertension    Hypothyroidism    Lower back pain    Chronic respiratory failure with hypoxia and hypercapnia    Chronic leukocytosis    Avelina Ceron is a 82 y.o. female with PMH significant for HTN, HLD, PAD (s/p bilateral iliac artery stents), carotid artery disease (s/p bilateral ICA stents), chronic respiratory failure with hypoxia secondary to COPD (baseline 2L NC), GERD, chronic leukocytosis (baseline 13-15K), hypothyroidism and chronic back pain. She was treated for E. Coli UTI with Macrobid x 10 days in June 2023. Admitted to [facility] 7/25/23 for UTI with obstructing R distal  ureteral stone.     Acute R obstructing ureterolithiasis  Leukocytosis  Lactic acidosis   Acute UTI  - CT abdomen/pelvis showed 7 x 5 mm obstructing calculus within the R distal ureter with mild R hydroureteronephrosis   - UA concerning for UTI. Follow culture   - Continue IV Ceftriaxone  - Daughter reports chronic leukocytosis (baseline appears to be 13-15K). WBCs 16K today  - Lactic acid 2.3 - does not meet criteria for sepsis as no fever or tachycardia. May be more related to volume depletion  - s/p 1L IV fluids in ED, continue IV fluids. Trend lactic   - Urology aware    Hypoxia, concern for aspiration  Chronic respiratory failure with hypoxia/hypercapnia 2/2 COPD  - Baseline 2L NC - currently requiring 3L NC  - Daughter reports choking and worsening dyspnea during episode of vomiting this morning  - Obtain CXR  - Add Doxy for pneumonia coverage--can likely de-escalate to just rocephin soon    Hypertension  - BP elevated in ED - daughter concerned patient may have vomited up AM BP medications  - Give PM Atenolol now. PRN Hydralazine    Hypothyroidism, continue Synthroid     Chronic back pain   - Continue routine Tramadol 50mg Q6H    Generalized weakness  - PT/OT consults     DVT prophylaxis: Mechanical given planned OR tomorrow     CODE STATUS:         Expected Discharge Expected Discharge Date: 7/28/2023; Expected Discharge Time:     This note has been completed as part of a split-shared workflow.     Electronically signed by Neisha Harris PA-C, 07/25/23, 4:40 PM EDT.    Total time spent: 80 minutes  Time spent includes time reviewing chart, face-to-face time, counseling patient/family/caregiver, ordering medications/tests/procedures, communicating with other health care professionals, documenting clinical information in the electronic health record, and coordination of care.      Attending   Admission Attestation       I have performed an independent face-to-face diagnostic evaluation including  performing an independent physical examination as documented here.  The documented plan of care above was reviewed and developed with the advanced practice clinician (APC).      Brief Summary Statement:   Avelina Ceron is a 82 y.o. female with h/o COPD home O2 dependent on 2LNC, HTN, hypothyroid, carotid artery disease s/p bilateral stents, with right groin pain radiating towards suprapubic area.  Ct shows an obstructing kidney stone in the right ureter.  Denies n/v and dysuria.  She has has n/v and states that she may have choked while vomiting.     Remainder of detailed HPI is as noted by APC and has been reviewed and/or edited by me for completeness.    Attending Physical Exam:  Temp:  [98.6 °F (37 °C)-98.9 °F (37.2 °C)] 98.6 °F (37 °C)  Heart Rate:  [62-72] 63  Resp:  [18-20] 20  BP: (154-203)/(58-94) 154/58  Flow (L/min):  [2] 2    Constitutional: Awake, alert  Eyes: PERRLA, sclerae anicteric, no conjunctival injection  HENT: NCAT, mucous membranes moist  Neck: Supple, no thyromegaly, no lymphadenopathy, trachea midline  Respiratory: Clear to auscultation bilaterally, nonlabored respirations on 3LNC  Cardiovascular: RRR, no murmurs, rubs, or gallops, palpable pedal pulses bilaterally  Gastrointestinal: Positive bowel sounds, soft, nondistended, Right CVA tenderness  Musculoskeletal: No bilateral ankle edema, no clubbing or cyanosis to extremities  Psychiatric: Appropriate affect, cooperative  Neurologic: Oriented x 3, strength symmetric in all extremities, Cranial Nerves grossly intact to confrontation, speech clear  Skin: No rashes      Brief Assessment/Plan :  See detailed assessment and plan developed with APC which I have reviewed and/or edited for completeness.      More than 50% of time spent counseling on current illness and plan of care. Case discussed with: Neisha Harris  Total time spent face to face with the patient was 25 minutes.  Total time of the encounter was 105 minutes.        Blade VINCENT  MD Mayra  07/25/23

## 2023-07-25 NOTE — CONSULTS
"[facility]   HISTORY AND PHYSICAL    Patient Name: Avelina Ceron  : 1940  MRN: [id number]  Primary Care Physician:  Dre Moura MD  Date of admission: 2023    Subjective   Subjective     Chief Complaint: Right flank pain/RLQ abdominal pain    HPI:    Avelina Ceron is a 82 y.o. female with PMH of HTN, HLD, PAD, Carotid artery disease, chronic respiratory failure due to COPD on 2L O2 at home and chronic leukocytosis. She presents to [facility] for worsening right flank pain, right lower quadrant abdominal pain and nausea/vomiting. She was previously treated with 10 day course of Macrobid in  for E.Coli UTI. She reports she has not felt well since her UTI. She reports developing right flank pain 10 days ago and for the last 5 days she has had decreased PO intake and weakness. She developed nausea/vomiting today prompting presentation to the ER.     CT AP wo ; 7 x 5 mm right distal ureteral calculus with mild right hydronephrosis. There are additional nonobstructing bilateral renal calculi.     Labs reviewed; UA 21-30 wbc, 4+ bacteria, TNTC RBC. Cr 0.41, WBC 16.01. She has been afebrile.     She is currently resting in bed. Her daughter is at the bedside. She reports RLQ abdominal pain. She is voiding yas/dark urine output to purewick.     Review of Systems   All systems were reviewed and negative except for: right lower quadrant abdominal pain, right flank pain, nausea/vomiting.    Personal History     Past Medical History:   Diagnosis Date    Broken ribs     COPD (chronic obstructive pulmonary disease)     Degenerative lumbar disc     Disease of thyroid gland     \"low thyroid\"    History of colonoscopy     Osteoarthritis        Past Surgical History:   Procedure Laterality Date    EYE SURGERY  10/17/2022    yag surgery    GALLBLADDER SURGERY  2012    HYSTERECTOMY      TOTAL HIP ARTHROPLASTY Bilateral        Family History: family history includes Alzheimer's disease in her sister; " Arthritis in her daughter; Cancer in her daughter, maternal aunt, and another family member; Depression in an other family member; Diabetes in her maternal uncle; Heart disease in her mother; Hyperlipidemia in her daughter and another family member; Hypertension in her mother and another family member; Liver disease in her daughter; Thyroid disease in her daughter. Otherwise pertinent FHx was reviewed and not pertinent to current issue.    Social History:  reports that she quit smoking about 17 years ago. Her smoking use included cigarettes. She has a 50.00 pack-year smoking history. She has never used smokeless tobacco. She reports that she does not currently use alcohol. She reports that she does not use drugs.    Home Medications:  Docusate Sodium, Fiber, Fluticasone-Umeclidin-Vilant, PreserVision AREDS, Vitamin C, Vitamin D3, acetaminophen, albuterol sulfate HFA, aspirin, atenolol, calcium-vitamin D, cetirizine, clopidogrel, ferrous sulfate, gabapentin, guaiFENesin, isosorbide mononitrate, latanoprost, levothyroxine, nitroglycerin, pantoprazole, roflumilast, sertraline, simvastatin, solifenacin, and traMADol      Allergies:  Allergies   Allergen Reactions    Codeine Mental Status Change    Penicillins Hives and Swelling    Dopamine Other (See Comments) and Palpitations     Other reaction(s): Hypertension       Objective   Objective     Vitals:   Temp:  [98.9 °F (37.2 °C)] 98.9 °F (37.2 °C)  Heart Rate:  [69-72] 72  Resp:  [18] 18  BP: (174-176)/(58-72) 176/72  Flow (L/min):  [2] 2  Physical Exam    Constitutional: Awake in bed, alert    Eyes: PERRLA, sclerae anicteric, no conjunctival injection   HENT: Normocephalic, atraumatic, mucous membranes moist   Neck: Supple, trachea midline   Respiratory:Equal chest rise, 2 L O2 nasal cannula.    Cardiovascular: RRR   Gastrointestinal: Soft.   Musculoskeletal: No bilateral ankle edema.   Genitourinary: RLQ abdominal pain. Right flank pain.    Psychiatric: Appropriate  affect, cooperative   Neurologic: Oriented x 3, Cranial Nerves grossly intact, speech clear   Skin: No rashes     Result Review    Result Review:  I have personally reviewed the results from the time of this admission to 7/25/2023 15:57 EDT and agree with these findings:  [x]  Laboratory  [x]  Microbiology  [x]  Radiology  []  EKG/Telemetry   []  Cardiology/Vascular   []  Pathology  [x]  Old records  [x]  Other: Vital signs    Most notable findings include: 7 x 5 mm right distal ureteral calculus with mild right hydronephrosis. UA 21-30 wbc, 4+ bacteria, TNTC RBC.     Assessment & Plan   Assessment / Plan     Brief Patient Summary:  Avelina Ceron is a 82 y.o. female who presented to [facility] for worsening right flank pain/RLQ abdominal pain found to have 7 x 5mm right distal ureteral calculus and UTI.     Active Hospital Problems:  Active Hospital Problems    Diagnosis     **Nephrolithiasis      We discussed in detail the risks, benefits and alternatives to Cystoscopy, right ureteroscopy, laser lithotripsy, right ureteral stent placement. We discussed risks of infection, bleeding and anesthesia. We also discussed possibly only proceeding with Cystoscopy, right ureteral stent placement if urine appears infected. She is understanding and agreeable with plan of care.     Patient and patients daughter have expressed concern regarding anesthesia given history of respiratory issues after hip replacement surgery in 2020. D/w Dr. Kim.     Plan:   -Consent.  -NPO after MN.  -Continue abx, urine culture pending.  -OR tomorrow afternoon with Dr. Kim.   D/w Dr. Kim.     DVT prophylaxis:  No DVT prophylaxis order currently exists.    CODE STATUS:       Admission Status:  I believe this patient meets inpatient status.    Electronically signed by AMADOR Casas, 07/25/23, 3:57 PM EDT.

## 2023-07-25 NOTE — PLAN OF CARE
Goal Outcome Evaluation:  Plan of Care Reviewed With: patient        Progress: improving  Outcome Evaluation: VSS on 2LNC baseline at home. Complaints of pain relieved with scheduled meds. HTN improved with home meds. Purewick in place. Discussed plan of care with patient who verbalizes understanding.

## 2023-07-26 ENCOUNTER — ANESTHESIA EVENT (OUTPATIENT)
Dept: PERIOP | Facility: HOSPITAL | Age: 83
DRG: 660 | End: 2023-07-26
Payer: MEDICARE

## 2023-07-26 ENCOUNTER — ANESTHESIA (OUTPATIENT)
Dept: PERIOP | Facility: HOSPITAL | Age: 83
DRG: 660 | End: 2023-07-26
Payer: MEDICARE

## 2023-07-26 ENCOUNTER — APPOINTMENT (OUTPATIENT)
Dept: GENERAL RADIOLOGY | Facility: HOSPITAL | Age: 83
DRG: 660 | End: 2023-07-26
Payer: MEDICARE

## 2023-07-26 LAB
ANION GAP SERPL CALCULATED.3IONS-SCNC: 9 MMOL/L (ref 5–15)
BUN SERPL-MCNC: 7 MG/DL (ref 8–23)
BUN/CREAT SERPL: 17.1 (ref 7–25)
CALCIUM SPEC-SCNC: 8.9 MG/DL (ref 8.6–10.5)
CHLORIDE SERPL-SCNC: 106 MMOL/L (ref 98–107)
CO2 SERPL-SCNC: 29 MMOL/L (ref 22–29)
CREAT SERPL-MCNC: 0.41 MG/DL (ref 0.57–1)
DEPRECATED RDW RBC AUTO: 48.2 FL (ref 37–54)
EGFRCR SERPLBLD CKD-EPI 2021: 98.4 ML/MIN/1.73
ERYTHROCYTE [DISTWIDTH] IN BLOOD BY AUTOMATED COUNT: 12.9 % (ref 12.3–15.4)
GLUCOSE SERPL-MCNC: 111 MG/DL (ref 65–99)
HCT VFR BLD AUTO: 36.8 % (ref 34–46.6)
HGB BLD-MCNC: 11.4 G/DL (ref 12–15.9)
MAGNESIUM SERPL-MCNC: 1.6 MG/DL (ref 1.6–2.4)
MCH RBC QN AUTO: 31.6 PG (ref 26.6–33)
MCHC RBC AUTO-ENTMCNC: 31 G/DL (ref 31.5–35.7)
MCV RBC AUTO: 101.9 FL (ref 79–97)
PHOSPHATE SERPL-MCNC: 3.4 MG/DL (ref 2.5–4.5)
PLATELET # BLD AUTO: 205 10*3/MM3 (ref 140–450)
PMV BLD AUTO: 9.5 FL (ref 6–12)
POTASSIUM SERPL-SCNC: 5.4 MMOL/L (ref 3.5–5.2)
RBC # BLD AUTO: 3.61 10*6/MM3 (ref 3.77–5.28)
SODIUM SERPL-SCNC: 144 MMOL/L (ref 136–145)
WBC NRBC COR # BLD: 14.89 10*3/MM3 (ref 3.4–10.8)

## 2023-07-26 PROCEDURE — 83735 ASSAY OF MAGNESIUM: CPT | Performed by: PHYSICIAN ASSISTANT

## 2023-07-26 PROCEDURE — 25010000002 KETOROLAC TROMETHAMINE PER 15 MG: Performed by: INTERNAL MEDICINE

## 2023-07-26 PROCEDURE — 85027 COMPLETE CBC AUTOMATED: CPT | Performed by: PHYSICIAN ASSISTANT

## 2023-07-26 PROCEDURE — 0T768DZ DILATION OF RIGHT URETER WITH INTRALUMINAL DEVICE, VIA NATURAL OR ARTIFICIAL OPENING ENDOSCOPIC: ICD-10-PCS | Performed by: STUDENT IN AN ORGANIZED HEALTH CARE EDUCATION/TRAINING PROGRAM

## 2023-07-26 PROCEDURE — 94799 UNLISTED PULMONARY SVC/PX: CPT

## 2023-07-26 PROCEDURE — 80048 BASIC METABOLIC PNL TOTAL CA: CPT | Performed by: PHYSICIAN ASSISTANT

## 2023-07-26 PROCEDURE — C1769 GUIDE WIRE: HCPCS | Performed by: STUDENT IN AN ORGANIZED HEALTH CARE EDUCATION/TRAINING PROGRAM

## 2023-07-26 PROCEDURE — 94761 N-INVAS EAR/PLS OXIMETRY MLT: CPT

## 2023-07-26 PROCEDURE — 25010000002 DEXAMETHASONE PER 1 MG: Performed by: ANESTHESIOLOGY

## 2023-07-26 PROCEDURE — 25010000002 ESMOLOL 100 MG/10ML SOLUTION: Performed by: ANESTHESIOLOGY

## 2023-07-26 PROCEDURE — C1758 CATHETER, URETERAL: HCPCS | Performed by: STUDENT IN AN ORGANIZED HEALTH CARE EDUCATION/TRAINING PROGRAM

## 2023-07-26 PROCEDURE — 97166 OT EVAL MOD COMPLEX 45 MIN: CPT

## 2023-07-26 PROCEDURE — C2617 STENT, NON-COR, TEM W/O DEL: HCPCS | Performed by: STUDENT IN AN ORGANIZED HEALTH CARE EDUCATION/TRAINING PROGRAM

## 2023-07-26 PROCEDURE — 25010000002 MORPHINE PER 10 MG: Performed by: INTERNAL MEDICINE

## 2023-07-26 PROCEDURE — 25010000002 PROPOFOL 10 MG/ML EMULSION: Performed by: ANESTHESIOLOGY

## 2023-07-26 PROCEDURE — 97162 PT EVAL MOD COMPLEX 30 MIN: CPT

## 2023-07-26 PROCEDURE — 84100 ASSAY OF PHOSPHORUS: CPT | Performed by: PHYSICIAN ASSISTANT

## 2023-07-26 PROCEDURE — 76000 FLUOROSCOPY <1 HR PHYS/QHP: CPT

## 2023-07-26 PROCEDURE — 25010000002 SUGAMMADEX 200 MG/2ML SOLUTION: Performed by: ANESTHESIOLOGY

## 2023-07-26 PROCEDURE — 25010000002 ONDANSETRON PER 1 MG: Performed by: ANESTHESIOLOGY

## 2023-07-26 PROCEDURE — 52351 CYSTOURETERO & OR PYELOSCOPE: CPT | Performed by: STUDENT IN AN ORGANIZED HEALTH CARE EDUCATION/TRAINING PROGRAM

## 2023-07-26 PROCEDURE — 25510000001 IOPAMIDOL 61 % SOLUTION: Performed by: STUDENT IN AN ORGANIZED HEALTH CARE EDUCATION/TRAINING PROGRAM

## 2023-07-26 PROCEDURE — 99024 POSTOP FOLLOW-UP VISIT: CPT | Performed by: STUDENT IN AN ORGANIZED HEALTH CARE EDUCATION/TRAINING PROGRAM

## 2023-07-26 PROCEDURE — 25010000002 CEFTRIAXONE PER 250 MG: Performed by: STUDENT IN AN ORGANIZED HEALTH CARE EDUCATION/TRAINING PROGRAM

## 2023-07-26 PROCEDURE — 99232 SBSQ HOSP IP/OBS MODERATE 35: CPT | Performed by: STUDENT IN AN ORGANIZED HEALTH CARE EDUCATION/TRAINING PROGRAM

## 2023-07-26 PROCEDURE — BT1D1ZZ FLUOROSCOPY OF RIGHT KIDNEY, URETER AND BLADDER USING LOW OSMOLAR CONTRAST: ICD-10-PCS | Performed by: STUDENT IN AN ORGANIZED HEALTH CARE EDUCATION/TRAINING PROGRAM

## 2023-07-26 PROCEDURE — 25010000002 MORPHINE PER 10 MG: Performed by: STUDENT IN AN ORGANIZED HEALTH CARE EDUCATION/TRAINING PROGRAM

## 2023-07-26 DEVICE — URETERAL STENT
Type: IMPLANTABLE DEVICE | Site: URETER | Status: FUNCTIONAL
Brand: PERCUFLEX™ PLUS

## 2023-07-26 RX ORDER — ATENOLOL 25 MG/1
25 TABLET ORAL 2 TIMES DAILY
Status: DISCONTINUED | OUTPATIENT
Start: 2023-07-26 | End: 2023-07-27 | Stop reason: HOSPADM

## 2023-07-26 RX ORDER — LIDOCAINE HYDROCHLORIDE 10 MG/ML
INJECTION, SOLUTION EPIDURAL; INFILTRATION; INTRACAUDAL; PERINEURAL AS NEEDED
Status: DISCONTINUED | OUTPATIENT
Start: 2023-07-26 | End: 2023-07-26 | Stop reason: SURG

## 2023-07-26 RX ORDER — PHENYLEPHRINE HCL IN 0.9% NACL 1 MG/10 ML
SYRINGE (ML) INTRAVENOUS AS NEEDED
Status: DISCONTINUED | OUTPATIENT
Start: 2023-07-26 | End: 2023-07-26 | Stop reason: SURG

## 2023-07-26 RX ORDER — ESMOLOL HYDROCHLORIDE 10 MG/ML
INJECTION INTRAVENOUS AS NEEDED
Status: DISCONTINUED | OUTPATIENT
Start: 2023-07-26 | End: 2023-07-26 | Stop reason: SURG

## 2023-07-26 RX ORDER — MAGNESIUM HYDROXIDE 1200 MG/15ML
LIQUID ORAL AS NEEDED
Status: DISCONTINUED | OUTPATIENT
Start: 2023-07-26 | End: 2023-07-26 | Stop reason: HOSPADM

## 2023-07-26 RX ORDER — ONDANSETRON 2 MG/ML
4 INJECTION INTRAMUSCULAR; INTRAVENOUS EVERY 6 HOURS PRN
Status: DISCONTINUED | OUTPATIENT
Start: 2023-07-26 | End: 2023-07-26 | Stop reason: HOSPADM

## 2023-07-26 RX ORDER — OXYBUTYNIN CHLORIDE 5 MG/1
10 TABLET, EXTENDED RELEASE ORAL DAILY
Status: DISCONTINUED | OUTPATIENT
Start: 2023-07-26 | End: 2023-07-27 | Stop reason: HOSPADM

## 2023-07-26 RX ORDER — MIDAZOLAM HYDROCHLORIDE 1 MG/ML
0.5 INJECTION INTRAMUSCULAR; INTRAVENOUS
Status: DISCONTINUED | OUTPATIENT
Start: 2023-07-26 | End: 2023-07-26

## 2023-07-26 RX ORDER — SODIUM CHLORIDE, SODIUM LACTATE, POTASSIUM CHLORIDE, CALCIUM CHLORIDE 600; 310; 30; 20 MG/100ML; MG/100ML; MG/100ML; MG/100ML
100 INJECTION, SOLUTION INTRAVENOUS CONTINUOUS
Status: DISCONTINUED | OUTPATIENT
Start: 2023-07-26 | End: 2023-07-27 | Stop reason: HOSPADM

## 2023-07-26 RX ORDER — ONDANSETRON 2 MG/ML
4 INJECTION INTRAMUSCULAR; INTRAVENOUS ONCE AS NEEDED
Status: DISCONTINUED | OUTPATIENT
Start: 2023-07-26 | End: 2023-07-26 | Stop reason: HOSPADM

## 2023-07-26 RX ORDER — LIDOCAINE HYDROCHLORIDE 20 MG/ML
JELLY TOPICAL AS NEEDED
Status: DISCONTINUED | OUTPATIENT
Start: 2023-07-26 | End: 2023-07-26 | Stop reason: HOSPADM

## 2023-07-26 RX ORDER — PHENAZOPYRIDINE HYDROCHLORIDE 100 MG/1
200 TABLET, FILM COATED ORAL 3 TIMES DAILY PRN
Status: DISCONTINUED | OUTPATIENT
Start: 2023-07-26 | End: 2023-07-27 | Stop reason: HOSPADM

## 2023-07-26 RX ORDER — SODIUM CHLORIDE 0.9 % (FLUSH) 0.9 %
10 SYRINGE (ML) INJECTION EVERY 12 HOURS SCHEDULED
Status: DISCONTINUED | OUTPATIENT
Start: 2023-07-26 | End: 2023-07-26

## 2023-07-26 RX ORDER — SODIUM CHLORIDE 0.9 % (FLUSH) 0.9 %
10 SYRINGE (ML) INJECTION AS NEEDED
Status: DISCONTINUED | OUTPATIENT
Start: 2023-07-26 | End: 2023-07-26

## 2023-07-26 RX ORDER — LIDOCAINE HYDROCHLORIDE 10 MG/ML
0.5 INJECTION, SOLUTION EPIDURAL; INFILTRATION; INTRACAUDAL; PERINEURAL ONCE AS NEEDED
Status: DISCONTINUED | OUTPATIENT
Start: 2023-07-26 | End: 2023-07-26

## 2023-07-26 RX ORDER — NALOXONE HCL 0.4 MG/ML
0.4 VIAL (ML) INJECTION AS NEEDED
Status: DISCONTINUED | OUTPATIENT
Start: 2023-07-26 | End: 2023-07-26 | Stop reason: HOSPADM

## 2023-07-26 RX ORDER — MIDAZOLAM HYDROCHLORIDE 1 MG/ML
0.5 INJECTION INTRAMUSCULAR; INTRAVENOUS
Status: DISCONTINUED | OUTPATIENT
Start: 2023-07-26 | End: 2023-07-26 | Stop reason: HOSPADM

## 2023-07-26 RX ORDER — DEXAMETHASONE SODIUM PHOSPHATE 4 MG/ML
INJECTION, SOLUTION INTRA-ARTICULAR; INTRALESIONAL; INTRAMUSCULAR; INTRAVENOUS; SOFT TISSUE AS NEEDED
Status: DISCONTINUED | OUTPATIENT
Start: 2023-07-26 | End: 2023-07-26 | Stop reason: SURG

## 2023-07-26 RX ORDER — SODIUM CHLORIDE, SODIUM LACTATE, POTASSIUM CHLORIDE, CALCIUM CHLORIDE 600; 310; 30; 20 MG/100ML; MG/100ML; MG/100ML; MG/100ML
9 INJECTION, SOLUTION INTRAVENOUS CONTINUOUS
Status: DISCONTINUED | OUTPATIENT
Start: 2023-07-26 | End: 2023-07-27 | Stop reason: HOSPADM

## 2023-07-26 RX ORDER — ROCURONIUM BROMIDE 10 MG/ML
INJECTION, SOLUTION INTRAVENOUS AS NEEDED
Status: DISCONTINUED | OUTPATIENT
Start: 2023-07-26 | End: 2023-07-26 | Stop reason: SURG

## 2023-07-26 RX ORDER — PROPOFOL 10 MG/ML
VIAL (ML) INTRAVENOUS AS NEEDED
Status: DISCONTINUED | OUTPATIENT
Start: 2023-07-26 | End: 2023-07-26 | Stop reason: SURG

## 2023-07-26 RX ORDER — FENTANYL CITRATE 50 UG/ML
50 INJECTION, SOLUTION INTRAMUSCULAR; INTRAVENOUS
Status: DISCONTINUED | OUTPATIENT
Start: 2023-07-26 | End: 2023-07-26 | Stop reason: HOSPADM

## 2023-07-26 RX ORDER — SODIUM CHLORIDE 9 MG/ML
40 INJECTION, SOLUTION INTRAVENOUS AS NEEDED
Status: DISCONTINUED | OUTPATIENT
Start: 2023-07-26 | End: 2023-07-26

## 2023-07-26 RX ORDER — EPHEDRINE SULFATE 50 MG/ML
5 INJECTION, SOLUTION INTRAVENOUS ONCE AS NEEDED
Status: DISCONTINUED | OUTPATIENT
Start: 2023-07-26 | End: 2023-07-26 | Stop reason: HOSPADM

## 2023-07-26 RX ADMIN — DEXAMETHASONE SODIUM PHOSPHATE 8 MG: 4 INJECTION, SOLUTION INTRAMUSCULAR; INTRAVENOUS at 15:54

## 2023-07-26 RX ADMIN — GABAPENTIN 300 MG: 300 CAPSULE ORAL at 21:45

## 2023-07-26 RX ADMIN — GUAIFENESIN 1200 MG: 600 TABLET, EXTENDED RELEASE ORAL at 08:13

## 2023-07-26 RX ADMIN — ACETAMINOPHEN 500 MG: 500 TABLET ORAL at 06:04

## 2023-07-26 RX ADMIN — Medication 200 MCG: at 15:56

## 2023-07-26 RX ADMIN — ISOSORBIDE MONONITRATE 30 MG: 30 TABLET, EXTENDED RELEASE ORAL at 08:13

## 2023-07-26 RX ADMIN — TRAMADOL HYDROCHLORIDE 50 MG: 50 TABLET ORAL at 12:09

## 2023-07-26 RX ADMIN — Medication 100 MCG: at 15:50

## 2023-07-26 RX ADMIN — PROPOFOL 100 MG: 10 INJECTION, EMULSION INTRAVENOUS at 15:40

## 2023-07-26 RX ADMIN — PANTOPRAZOLE SODIUM 40 MG: 40 TABLET, DELAYED RELEASE ORAL at 06:04

## 2023-07-26 RX ADMIN — LATANOPROST 1 DROP: 50 SOLUTION OPHTHALMIC at 21:45

## 2023-07-26 RX ADMIN — LIDOCAINE HYDROCHLORIDE 30 MG: 10 INJECTION, SOLUTION EPIDURAL; INFILTRATION; INTRACAUDAL; PERINEURAL at 15:40

## 2023-07-26 RX ADMIN — MORPHINE SULFATE 2 MG: 2 INJECTION, SOLUTION INTRAMUSCULAR; INTRAVENOUS at 04:09

## 2023-07-26 RX ADMIN — FERROUS SULFATE TAB 325 MG (65 MG ELEMENTAL FE) 325 MG: 325 (65 FE) TAB at 08:13

## 2023-07-26 RX ADMIN — OXYBUTYNIN CHLORIDE 10 MG: 5 TABLET, EXTENDED RELEASE ORAL at 17:29

## 2023-07-26 RX ADMIN — ATORVASTATIN CALCIUM 20 MG: 20 TABLET, FILM COATED ORAL at 21:45

## 2023-07-26 RX ADMIN — ROFLUMILAST 500 MCG: 500 TABLET ORAL at 22:09

## 2023-07-26 RX ADMIN — CLOPIDOGREL BISULFATE 75 MG: 75 TABLET ORAL at 17:29

## 2023-07-26 RX ADMIN — ROCURONIUM BROMIDE 50 MG: 10 SOLUTION INTRAVENOUS at 15:40

## 2023-07-26 RX ADMIN — Medication 100 MCG: at 15:53

## 2023-07-26 RX ADMIN — CETIRIZINE HYDROCHLORIDE 10 MG: 10 TABLET, FILM COATED ORAL at 21:46

## 2023-07-26 RX ADMIN — TAMSULOSIN HYDROCHLORIDE 0.4 MG: 0.4 CAPSULE ORAL at 08:13

## 2023-07-26 RX ADMIN — SERTRALINE 100 MG: 50 TABLET, FILM COATED ORAL at 21:45

## 2023-07-26 RX ADMIN — SODIUM CHLORIDE, POTASSIUM CHLORIDE, SODIUM LACTATE AND CALCIUM CHLORIDE: 600; 310; 30; 20 INJECTION, SOLUTION INTRAVENOUS at 15:38

## 2023-07-26 RX ADMIN — ACETAMINOPHEN 500 MG: 500 TABLET ORAL at 21:46

## 2023-07-26 RX ADMIN — LEVOTHYROXINE SODIUM 175 MCG: 175 TABLET ORAL at 06:04

## 2023-07-26 RX ADMIN — GUAIFENESIN 1200 MG: 600 TABLET, EXTENDED RELEASE ORAL at 21:45

## 2023-07-26 RX ADMIN — SUGAMMADEX 400 MG: 100 INJECTION, SOLUTION INTRAVENOUS at 16:03

## 2023-07-26 RX ADMIN — BUDESONIDE AND FORMOTEROL FUMARATE DIHYDRATE 2 PUFF: 160; 4.5 AEROSOL RESPIRATORY (INHALATION) at 21:14

## 2023-07-26 RX ADMIN — ONDANSETRON 4 MG: 2 INJECTION INTRAMUSCULAR; INTRAVENOUS at 15:32

## 2023-07-26 RX ADMIN — Medication 10 ML: at 21:46

## 2023-07-26 RX ADMIN — SODIUM CHLORIDE, POTASSIUM CHLORIDE, SODIUM LACTATE AND CALCIUM CHLORIDE 100 ML/HR: 600; 310; 30; 20 INJECTION, SOLUTION INTRAVENOUS at 17:29

## 2023-07-26 RX ADMIN — KETOROLAC TROMETHAMINE 15 MG: 30 INJECTION, SOLUTION INTRAMUSCULAR; INTRAVENOUS at 06:08

## 2023-07-26 RX ADMIN — DOXYCYCLINE 100 MG: 100 INJECTION, POWDER, LYOPHILIZED, FOR SOLUTION INTRAVENOUS at 06:03

## 2023-07-26 RX ADMIN — TRAMADOL HYDROCHLORIDE 50 MG: 50 TABLET ORAL at 17:29

## 2023-07-26 RX ADMIN — ESMOLOL HYDROCHLORIDE 30 MG: 10 INJECTION, SOLUTION INTRAVENOUS at 15:40

## 2023-07-26 RX ADMIN — TRAMADOL HYDROCHLORIDE 50 MG: 50 TABLET ORAL at 06:04

## 2023-07-26 RX ADMIN — SODIUM CHLORIDE 100 ML/HR: 9 INJECTION, SOLUTION INTRAVENOUS at 04:09

## 2023-07-26 RX ADMIN — ATENOLOL 25 MG: 25 TABLET ORAL at 21:45

## 2023-07-26 RX ADMIN — ACETAMINOPHEN 500 MG: 500 TABLET ORAL at 12:09

## 2023-07-26 RX ADMIN — ATENOLOL 25 MG: 25 TABLET ORAL at 08:13

## 2023-07-26 RX ADMIN — FERROUS SULFATE TAB 325 MG (65 MG ELEMENTAL FE) 325 MG: 325 (65 FE) TAB at 21:46

## 2023-07-26 RX ADMIN — SODIUM CHLORIDE 2000 MG: 900 INJECTION INTRAVENOUS at 17:29

## 2023-07-26 RX ADMIN — ASPIRIN 81 MG CHEWABLE TABLET 81 MG: 81 TABLET CHEWABLE at 17:29

## 2023-07-26 RX ADMIN — DOCUSATE SODIUM 200 MG: 100 CAPSULE, LIQUID FILLED ORAL at 08:13

## 2023-07-26 RX ADMIN — MORPHINE SULFATE 2 MG: 2 INJECTION, SOLUTION INTRAMUSCULAR; INTRAVENOUS at 21:44

## 2023-07-26 RX ADMIN — BUDESONIDE AND FORMOTEROL FUMARATE DIHYDRATE 2 PUFF: 160; 4.5 AEROSOL RESPIRATORY (INHALATION) at 07:09

## 2023-07-26 RX ADMIN — MORPHINE SULFATE 2 MG: 2 INJECTION, SOLUTION INTRAMUSCULAR; INTRAVENOUS at 08:20

## 2023-07-26 RX ADMIN — TIOTROPIUM BROMIDE INHALATION SPRAY 2 PUFF: 3.12 SPRAY, METERED RESPIRATORY (INHALATION) at 07:09

## 2023-07-26 RX ADMIN — DOXYCYCLINE 100 MG: 100 INJECTION, POWDER, LYOPHILIZED, FOR SOLUTION INTRAVENOUS at 17:29

## 2023-07-26 NOTE — THERAPY EVALUATION
"Patient Name: Avelina Ceron  : 1940    MRN: [id number]                              Today's Date: 2023       Admit Date: 2023    Visit Dx:     ICD-10-CM ICD-9-CM   1. Right ureteral stone  N20.1 592.1   2. Urinary tract infection with hematuria, site unspecified  N39.0 599.0    R31.9 599.70     Patient Active Problem List   Diagnosis    Coronary arteriosclerosis in native artery    Chronic obstructive pulmonary disease    Depression    Gastroesophageal reflux disease with esophagitis    Hyperlipidemia    Hypertension    Hypothyroidism    Osteoarthritis    Peripheral arterial occlusive disease    Solitary pulmonary nodule    Vitamin D deficiency    Lower back pain    Carotid bruit    Hypoxemia    Chronic respiratory failure with hypoxia and hypercapnia    Allergic rhinitis    Chronic leukocytosis    Ureterolithiasis     Past Medical History:   Diagnosis Date    Broken ribs     COPD (chronic obstructive pulmonary disease)     Degenerative lumbar disc     Disease of thyroid gland     \"low thyroid\"    History of colonoscopy     Osteoarthritis      Past Surgical History:   Procedure Laterality Date    EYE SURGERY  10/17/2022    yag surgery    GALLBLADDER SURGERY  2012    HYSTERECTOMY      TOTAL HIP ARTHROPLASTY Bilateral       General Information       Row Name 23 1146          Physical Therapy Time and Intention    Document Type evaluation  -AB     Mode of Treatment physical therapy  -AB       Row Name 23 1146          General Information    Patient Profile Reviewed yes  -AB     Prior Level of Function independent:;all household mobility;gait;transfer;bed mobility;ADL's  prior to admission pt reports using a 3 wheeled walker and cane within the home, rollator for community distances. pt and daughter report recent falls  -AB     Existing Precautions/Restrictions fall;oxygen therapy device and L/min  -AB     Barriers to Rehab medically complex;previous functional deficit  -AB       Row Name " 07/26/23 1146          Living Environment    People in Home child(gabriela), adult  -AB       Row Name 07/26/23 1146          Home Main Entrance    Number of Stairs, Main Entrance two  -AB     Stair Railings, Main Entrance other (see comments)  Grab bars  -AB       Row Name 07/26/23 1146          Stairs Within Home, Primary    Stairs, Within Home, Primary One step down into family room  -AB     Number of Stairs, Within Home, Primary one  -AB       Row Name 07/26/23 1146          Cognition    Orientation Status (Cognition) oriented x 4  -AB       Row Name 07/26/23 1146          Safety Issues, Functional Mobility    Safety Issues Affecting Function (Mobility) awareness of need for assistance;insight into deficits/self-awareness;safety precaution awareness;safety precautions follow-through/compliance  -AB     Impairments Affecting Function (Mobility) balance;endurance/activity tolerance;pain;strength;shortness of breath  -AB     Comment, Safety Issues/Impairments (Mobility) Activity limited by pain.  -AB               User Key  (r) = Recorded By, (t) = Taken By, (c) = Cosigned By      Initials Name Provider Type    AB Carly Barnard, PT Physical Therapist                   Mobility       Row Name 07/26/23 1150          Bed Mobility    Comment, (Bed Mobility) Received sitting EOB with OT.  -AB       Row Name 07/26/23 1150          Transfers    Comment, (Transfers) Cues for hand placement, sequencing, and safety awareness. Declined use of RW despite encouragement. Pt with O2 sat dropping to 86% on O2NC post ambulation to chair.  -AB       Row Name 07/26/23 1150          Bed-Chair Transfer    Bed-Chair [place] (Transfers) minimum assist (75% patient effort);2 person assist;verbal cues;nonverbal cues (demo/gesture)  -AB     Assistive Device (Bed-Chair Transfers) other (see comments)  HHA  -AB       Row Name 07/26/23 1150          Sit-Stand Transfer    Sit-Stand [place] (Transfers) verbal cues;nonverbal cues  (demo/gesture);minimum assist (75% patient effort);2 person assist  -AB     Assistive Device (Sit-Stand Transfers) other (see comments)  HHA  -AB       Row Name 07/26/23 1150          Gait/Stairs (Locomotion)    [place] Level (Gait) minimum assist (75% patient effort);verbal cues;2 person assist;nonverbal cues (demo/gesture)  -AB     Assistive Device (Gait) other (see comments)  HHA  -AB     Distance in Feet (Gait) 10'  -AB     Deviations/Abnormal Patterns (Gait) bilateral deviations;base of support, narrow;clarisse decreased;gait speed decreased;stride length decreased;weight shifting decreased  -AB     Bilateral Gait Deviations forward flexed posture;heel strike decreased  -AB     [place] Level (Stairs) unable to assess  -AB     Comment, (Gait/Stairs) Pt demo's step to gait pattern with short/shuffling steps and forward flexed posture. Min A for stabilization. Cues for upright posture and increased step length. Further activity limited by pain and SOA.  -AB               User Key  (r) = Recorded By, (t) = Taken By, (c) = Cosigned By      Initials Name Provider Type    AB Carly Barnard, PT Physical Therapist                   Obj/Interventions       Row Name 07/26/23 1153          Range of Motion Comprehensive    General Range of Motion bilateral lower extremity ROM WFL  -AB       Row Name 07/26/23 1153          Strength Comprehensive (MMT)    General Manual Muscle Testing (MMT) Assessment lower extremity strength deficits identified  -AB     Comment, General Manual Muscle Testing (MMT) Assessment BLE strenght grossly 4-/5  -AB       Row Name 07/26/23 1153          Balance    Balance Assessment sitting static balance;sitting dynamic balance;standing static balance;standing dynamic balance  -AB     Static Sitting Balance standby assist  -AB     Dynamic Sitting Balance contact guard  -AB     Position, Sitting Balance unsupported;sitting edge of bed  -AB     Static Standing Balance minimal assist;2-person  assist  -AB     Dynamic Standing Balance minimal assist;2-person assist;verbal cues  -AB     Position/Device Used, Standing Balance supported;other (see comments)  HHA  -AB     Balance Interventions sitting;sit to stand;standing;supported;static;dynamic  -AB     Comment, Balance No overt LOB. Instability in standing.  -AB       Row Name 07/26/23 1153          Sensory Assessment (Somatosensory)    Sensory Assessment (Somatosensory) LE sensation intact  -AB               User Key  (r) = Recorded By, (t) = Taken By, (c) = Cosigned By      Initials Name Provider Type    AB Carly Barnard, PT Physical Therapist                   Goals/Plan       Row Name 07/26/23 1157          Bed Mobility Goal 1 (PT)    Activity/Assistive Device (Bed Mobility Goal 1, PT) sit to supine;supine to sit  -AB     [place] Level/Cues Needed (Bed Mobility Goal 1, PT) independent  -AB     Time Frame (Bed Mobility Goal 1, PT) long term goal (LTG);10 days  -AB       Row Name 07/26/23 1157          Transfer Goal 1 (PT)    Activity/Assistive Device (Transfer Goal 1, PT) sit-to-stand/stand-to-sit;bed-to-chair/chair-to-bed;walker, rolling  -AB     [place] Level/Cues Needed (Transfer Goal 1, PT) modified independence  -AB     Time Frame (Transfer Goal 1, PT) long term goal (LTG);10 days  -AB       Row Name 07/26/23 1157          Gait Training Goal 1 (PT)    Activity/Assistive Device (Gait Training Goal 1, PT) gait (walking locomotion);assistive device use;walker, rolling  -AB     [place] Level (Gait Training Goal 1, PT) supervision required  -AB     Distance (Gait Training Goal 1, PT) 150'  -AB     Time Frame (Gait Training Goal 1, PT) long term goal (LTG);10 days  -AB       Row Name 07/26/23 1157          Stairs Goal 1 (PT)    Activity/Assistive Device (Stairs Goal 1, PT) ascending stairs;descending stairs;using handrail, right;using handrail, left  -AB     [place] Level/Cues Needed (Stairs Goal 1, PT) contact guard required  -AB      Number of Stairs (Stairs Goal 1, PT) 2  -AB     Time Frame (Stairs Goal 1, PT) long term goal (LTG);10 days  -AB       Row Name 07/26/23 1155          Therapy Assessment/Plan (PT)    Planned Therapy Interventions (PT) balance training;bed mobility training;gait training;home exercise program;postural re-education;patient/family education;transfer training;stretching;strengthening;stair training;ROM (range of motion)  -AB               User Key  (r) = Recorded By, (t) = Taken By, (c) = Cosigned By      Initials Name Provider Type    AB Carly Barnard, PT Physical Therapist                   Clinical Impression       Row Name 07/26/23 1151          Pain Scale: FACES Pre/Post-Treatment    Pain: FACES Scale, Pretreatment 4-->hurts little more  -AB     Posttreatment Pain Rating 4-->hurts little more  -AB     Pain Location generalized  -AB     Pain Location - flank  -AB     Pre/Posttreatment Pain Comment Tolerated. RN managing.  -AB       Row Name 07/26/23 1151          Plan of Care Review    Plan of Care Reviewed With patient;daughter  -AB     Progress no change  -AB     Outcome Evaluation PT initial eval completed. Pt presents below her functional baseline with generalized weakness, increased pain, impaired balance, and SOA. Pt ambulated 10' with min Ax2 and BUE support. Further IPPT is warrented for addressing deficits. PT rec d/c to IPR for best functional outcomes.  -AB       Row Name 07/26/23 1151          Therapy Assessment/Plan (PT)    Rehab Potential (PT) good, to achieve stated therapy goals  -AB     Criteria for Skilled Interventions Met (PT) yes;meets criteria;skilled treatment is necessary  -AB     Therapy Frequency (PT) daily  -AB       Row Name 07/26/23 1157          Vital Signs    Post Systolic BP Rehab 138  -AB     Post Treatment Diastolic BP 51  -AB     Posttreatment Heart Rate (beats/min) 68  -AB     O2 Delivery Pre Treatment nasal cannula  -AB     Intra SpO2 (%) 86  -AB     O2 Delivery Intra  Treatment nasal cannula  -AB     Post SpO2 (%) 91  -AB     O2 Delivery Post Treatment nasal cannula  -AB     Pre Patient Position Sitting  -AB     Intra Patient Position Standing  -AB     Post Patient Position Sitting  -AB       Row Name 07/26/23 1154          Positioning and Restraints    Pre-Treatment Position in bed  -AB     Post Treatment Position chair  -AB     In Chair notified nsg;reclined;sitting;call light within reach;encouraged to call for assist;exit alarm on;with family/caregiver;legs elevated;waffle cushion  -AB               User Key  (r) = Recorded By, (t) = Taken By, (c) = Cosigned By      Initials Name Provider Type    Carly Sosa, PT Physical Therapist                   Outcome Measures       Row Name 07/26/23 1158 07/26/23 0820       How much help from another person do you currently need...    Turning from your back to your side while in flat bed without using bedrails? 3  -AB 3  -CG    Moving from lying on back to sitting on the side of a flat bed without bedrails? 2  -AB 3  -CG    Moving to and from a bed to a chair (including a wheelchair)? 3  -AB 3  -CG    Standing up from a chair using your arms (e.g., wheelchair, bedside chair)? 3  -AB 3  -CG    Climbing 3-5 steps with a railing? 2  -AB 3  -CG    To walk in hospital room? 3  -AB 3  -CG    AM-PAC 6 Clicks Score (PT) 16  -AB 18  -CG    Highest level of mobility 5 --> Static standing  -AB 6 --> Walked 10 steps or more  -CG      Row Name 07/26/23 1158 07/26/23 1135       Functional Assessment    Outcome Measure Options AM-PAC 6 Clicks Basic Mobility (PT)  -AB AM-PAC 6 Clicks Daily Activity (OT)  -AN              User Key  (r) = Recorded By, (t) = Taken By, (c) = Cosigned By      Initials Name Provider Type    CG Luis Whitney RN Registered Nurse    Dorinda Lujan OT Occupational Therapist    Carly Sosa, PT Physical Therapist                                 Physical Therapy Education       Title: PT OT SLP Therapies (In  Progress)       Topic: Physical Therapy (In Progress)       Point: Mobility training (Done)       Learning Progress Summary             Patient Acceptance, E,D, VU,NR by AB at 7/26/2023 1158                         Point: Home exercise program (Not Started)       Learner Progress:  Not documented in this visit.              Point: Body mechanics (Done)       Learning Progress Summary             Patient Acceptance, E,D, VU,NR by AB at 7/26/2023 1158                         Point: Precautions (Done)       Learning Progress Summary             Patient Acceptance, E,D, VU,NR by AB at 7/26/2023 1158                                         User Key       Initials Effective Dates Name Provider Type Discipline    AB 09/22/22 -  Carly Barnard, PT Physical Therapist PT                  PT Recommendation and Plan  Planned Therapy Interventions (PT): balance training, bed mobility training, gait training, home exercise program, postural re-education, patient/family education, transfer training, stretching, strengthening, stair training, ROM (range of motion)  Plan of Care Reviewed With: patient, daughter  Progress: no change  Outcome Evaluation: PT initial eval completed. Pt presents below her functional baseline with generalized weakness, increased pain, impaired balance, and SOA. Pt ambulated 10' with min Ax2 and BUE support. Further IPPT is warrented for addressing deficits. PT rec d/c to IPR for best functional outcomes.     Time Calculation:   PT Evaluation Complexity  History, PT Evaluation Complexity: 3 or more personal factors and/or comorbidities  Examination of Body Systems (PT Eval Complexity): total of 3 or more elements  Clinical Presentation (PT Evaluation Complexity): evolving  Clinical Decision Making (PT Evaluation Complexity): moderate complexity  Overall Complexity (PT Evaluation Complexity): moderate complexity     PT Charges       Row Name 07/26/23 8123             Time Calculation    Start Time 1044   -AB      PT Received On 07/26/23  -AB      PT Goal Re-Cert Due Date 08/05/23  -AB         Untimed Charges    PT Eval/Re-eval Minutes 46  -AB         Total Minutes    Untimed Charges Total Minutes 46  -AB       Total Minutes 46  -AB                User Key  (r) = Recorded By, (t) = Taken By, (c) = Cosigned By      Initials Name Provider Type    AB Carly Barnard, PT Physical Therapist                  Therapy Charges for Today       Code Description Service Date Service Provider Modifiers Qty    49739671107 HC PT EVAL MOD COMPLEXITY 4 7/26/2023 Carly Barnard, PT GP 1            PT G-Codes  Outcome Measure Options: AM-PAC 6 Clicks Basic Mobility (PT)  AM-PAC 6 Clicks Score (PT): 16  AM-PAC 6 Clicks Score (OT): 14  PT Discharge Summary  Anticipated Discharge Disposition (PT): inpatient rehabilitation facility    Carly Barnard PT  7/26/2023

## 2023-07-26 NOTE — CASE MANAGEMENT/SOCIAL WORK
Discharge Planning Assessment  [facility]     Patient Name: Avelina Ceron  MRN: [id number]  Today's Date: 7/26/2023    Admit Date: 7/25/2023    Plan: Home with daughter   Discharge Needs Assessment       Row Name 07/26/23 1319       Living Environment    People in Home child(gabriela), adult    Name(s) of People in Home Lives with Daughter, Nan    Potentially Unsafe Housing Conditions none    Primary Care Provided by self;child(gabriela)    Provides Primary Care For no one    Quality of Family Relationships supportive    Able to Return to Prior Arrangements yes       Resource/Environmental Concerns    Resource/Environmental Concerns none    Transportation Concerns none       Transition Planning    Patient/Family Anticipates Transition to home with family    Patient/Family Anticipated Services at Transition none    Transportation Anticipated family or friend will provide       Discharge Needs Assessment    Readmission Within the Last 30 Days planned readmission    Equipment Currently Used at Home cane, straight;rollator;oxygen;grab bar  [facility]    Concerns to be Addressed no discharge needs identified    Anticipated Changes Related to Illness none    Equipment Needed After Discharge none    Provided Post Acute Provider List? N/A    N/A Provider List Comment Does not anticipate any discharge needs at this time    Provided Post Acute Provider Quality & Resource List? N/A                   Discharge Plan       Row Name 07/26/23 1321       Plan    Plan Home with daughter    Patient/Family in Agreement with Plan yes    Plan Comments MSW spoke with pt and pt's daughter at bedside. Pt is independent in ADL's and IADL's with the use of her rollator, or cane. Pt [facility] Pt has prescription coverage with her insurance. Pt's PCP is Dre Moura. Pt's daughter is very supportive and assists pt as needed at home. Pt has had hoem O2 in the past, most recently earlier this year and did  not have them fo long. Pt does not anticipate any discharge needs at this time. Pt plans to discharge home with daughter when ready. Case management remains available and following for discharge needs as arise.    Final Discharge Disposition Code 01 - home or self-care                  Continued Care and Services - Admitted Since 7/25/2023    Coordination has not been started for this encounter.       Expected Discharge Date and Time       Expected Discharge Date Expected Discharge Time    Jul 28, 2023            Demographic Summary       Row Name 07/26/23 1318       General Information    Reason for Consult discharge planning                   Functional Status       Row Name 07/26/23 1319       Functional Status, IADL    Medications independent    Meal Preparation assistive equipment    Housekeeping assistive equipment    Laundry assistive equipment    Shopping assistive equipment                   Psychosocial    No documentation.                  Abuse/Neglect    No documentation.                  Legal    No documentation.                  Substance Abuse    No documentation.                  Patient Forms    No documentation.                     DAWSON Molina

## 2023-07-26 NOTE — PROGRESS NOTES
[facility] Medicine Services  PROGRESS NOTE    Patient Name: Avelina Ceron  : 1940  MRN: [id number]    Date of Admission: 2023  Primary Care Physician: Dre Moura MD    Subjective   Subjective     CC:  Nausea/vomiting    HPI:  Patient resting in bed. Ongoing right flank/abdominal pain present and patient lying on her left side to try to help alleviate the pain. Nausea/vomiting improved.    Objective   Objective     Vital Signs:   Temp:  [97.5 °F (36.4 °C)-98.9 °F (37.2 °C)] 98.4 °F (36.9 °C)  Heart Rate:  [62-80] 72  Resp:  [16-20] 16  BP: (123-203)/(51-94) 138/51  Flow (L/min):  [2-3] 3     Physical Exam:  Constitutional: No acute distress, awake, alert  HENT: NCAT, mucous membranes moist  Respiratory: decreased to auscultation bilaterally, respiratory effort normal on 3L NC  Cardiovascular: RRR, no murmurs, rubs, or gallops  Gastrointestinal: Positive bowel sounds, soft, nontender, nondistended  Musculoskeletal: No bilateral ankle edema  Psychiatric: Appropriate affect, cooperative  Neurologic: Oriented x 3, strength symmetric in all extremities, Cranial Nerves grossly intact to confrontation, speech clear  Skin: No rashes    Results Reviewed:  LAB RESULTS:      Lab 23  0326 23  2104 23  1800 23  1322   WBC 14.89*  --   --  16.01*   HEMOGLOBIN 11.4*  --   --  13.5   HEMATOCRIT 36.8  --   --  41.7   PLATELETS 205  --   --  215   NEUTROS ABS  --   --   --  14.20*   IMMATURE GRANS (ABS)  --   --   --  0.06*   LYMPHS ABS  --   --   --  1.13   MONOS ABS  --   --   --  0.49   EOS ABS  --   --   --  0.05   .9*  --   --  101.0*   PROCALCITONIN  --   --   --  0.03   LACTATE  --  1.5 2.3* 2.3*         Lab 23  0326 23  1322   SODIUM 144 141   POTASSIUM 5.4* 3.4*   CHLORIDE 106 98   CO2 29.0 33.0*   ANION GAP 9.0 10.0   BUN 7* 8   CREATININE 0.41* 0.41*   EGFR 98.4 98.4   GLUCOSE 111* 146*   CALCIUM 8.9 9.3   MAGNESIUM 1.6  --     PHOSPHORUS 3.4  --          Lab 07/25/23  1322   TOTAL PROTEIN 7.0   ALBUMIN 4.0   GLOBULIN 3.0   ALT (SGPT) 12   AST (SGOT) 16   BILIRUBIN 0.4   ALK PHOS 65   LIPASE 26                     Brief Urine Lab Results  (Last result in the past 365 days)        Color   Clarity   Blood   Leuk Est   Nitrite   Protein   CREAT   Urine HCG        07/25/23 1357 [place]   Cloudy   Large (3+)   Small (1+)   Negative   100 mg/dL (2+)                   Microbiology Results Abnormal       None            CT Abdomen Pelvis Without Contrast    Result Date: 7/25/2023  CT ABDOMEN PELVIS WO CONTRAST Date of Exam: 7/25/2023 2:37 PM EDT Indication: Right flank pain, UTI/pyelo-, kidney stone?. Comparison: CT of the abdomen and pelvis dated 8/4/2020 Technique: Axial CT images were obtained of the abdomen and pelvis without the administration of contrast. Reconstructed coronal and sagittal images were also obtained. Automated exposure control and iterative construction methods were used. Findings: Liver: The liver is unremarkable in morphology. Evaluation for focal liver lesions is limited without IV contrast. There is mild fatty infiltration near the falciform ligament. No biliary dilation is seen. Gallbladder: Surgically absent. Pancreas: Unremarkable. Spleen: Unremarkable. Adrenal glands: Unremarkable. Genitourinary tract: There is a 7 x 5 mm obstructing calculus within the right distal ureter causing mild right hydroureteronephrosis. Additional 5 mm nonobstructing calculus within the right kidney. Right perinephric stranding/mild fluid is present. Small right renal hypodensity is incompletely characterized. 3 mm nonobstructing calculus at the lower pole of the left kidney. Left kidney is otherwise unremarkable. No left hydronephrosis. Visualized left ureter is unremarkable. The urinary bladder is decompressed. Patient is status post hysterectomy. Gastrointestinal tract: Mid colon is decompressed which limits its evaluation. There are  surgical changes of the right colon. Colonic diverticulosis is present. Moderate hiatal hernia is present. No findings to suggest bowel obstruction. Further evaluation of the hollow viscera is limited without IV contrast. Other findings: No free air or free fluid is identified. No pathologically enlarged lymph nodes are seen. Moderate vascular calcifications. Left common iliac artery stent. Right external iliac artery stent. Bones and soft tissues: No acute osseous lesion is identified. Bones are demineralized. There are degenerative changes within the spine. Bilateral hip arthroplasties are noted. Femoral stems are incompletely imaged. Superficial soft tissues demonstrate no acute abnormality. Lung bases: Scattered bibasilar atelectasis, scarring, or atypical infiltrates. Moderate hiatal hernia. Coronary artery calcifications.     Impression: Impression: 1.7 x 5 mm obstructing calculus within the right distal ureter causing mild right hydroureteronephrosis. 2.Additional bilateral nonobstructive nephrolithiasis. 3.Colonic diverticulosis. 4.Moderate hiatal hernia. 5.Additional findings as detailed above. Electronically Signed: Ramon Escobar  7/25/2023 2:56 PM EDT  Workstation ID: [id number]    XR Chest 1 View    Result Date: 7/25/2023  XR CHEST 1 VW Date of Exam: 7/25/2023 5:08 PM EDT Indication: hypoxia, ? aspiration Comparison: Chest x-ray dated 4/13/2023 Findings: Lungs demonstrate coarsened interstitial markings. Findings may be related to underlying interstitial lung disease. Concomitant mild edema cannot be excluded. Scattered bibasilar atelectasis is seen. No significant pleural effusion or pneumothorax. Cardiac silhouette is magnified. No acute osseous lesion is seen. There are senescent changes of the aorta and skeletal structures.     Impression: Impression: 1.Coarsened pulmonary interstitial markings may be related to underlying interstitial lung disease. Mild concomitant edema cannot be excluded.  2.Scattered bibasilar atelectasis. Electronically Signed: Ramon Escobar  7/25/2023 5:26 PM EDT  Workstation ID: [id number]     Results for orders placed in visit on 07/15/20    SCANNED - ECHOCARDIOGRAM      Current medications:  Scheduled Meds:acetaminophen, 500 mg, Oral, Q6H  aspirin, 81 mg, Oral, Daily  atenolol, 25 mg, Oral, BID  atorvastatin, 20 mg, Oral, Nightly  budesonide-formoterol, 2 puff, Inhalation, BID - RT   And  tiotropium bromide monohydrate, 2 puff, Inhalation, Daily - RT  cefTRIAXone, 2,000 mg, Intravenous, Q24H  cetirizine, 10 mg, Oral, Nightly  clopidogrel, 75 mg, Oral, Daily  docusate sodium, 200 mg, Oral, Daily  doxycycline, 100 mg, Intravenous, Q12H  ferrous sulfate, 325 mg, Oral, BID  gabapentin, 300 mg, Oral, Nightly  guaiFENesin, 1,200 mg, Oral, BID  isosorbide mononitrate, 30 mg, Oral, Daily  latanoprost, 1 drop, Both Eyes, Nightly  levothyroxine, 175 mcg, Oral, Q AM  pantoprazole, 40 mg, Oral, Q AM  roflumilast, 500 mcg, Oral, Nightly  sertraline, 100 mg, Oral, Nightly  sodium chloride, 10 mL, Intravenous, Q12H  tamsulosin, 0.4 mg, Oral, Daily  traMADol, 50 mg, Oral, Q6H      Continuous Infusions:sodium chloride, 100 mL/hr, Last Rate: 100 mL/hr (07/26/23 0409)      PRN Meds:.  calcium carbonate    Calcium Replacement - Follow Nurse / BPA Driven Protocol    famotidine    hydrALAZINE    ipratropium-albuterol    ketorolac    Magnesium Standard Dose Replacement - Follow Nurse / BPA Driven Protocol    Morphine    nitroglycerin    ondansetron **OR** ondansetron    Phosphorus Replacement - Follow Nurse / BPA Driven Protocol    Potassium Replacement - Follow Nurse / BPA Driven Protocol    Sodium Chloride (PF)    sodium chloride    sodium chloride    Assessment & Plan   Assessment & Plan     Active Hospital Problems    Diagnosis  POA    **Ureterolithiasis [N20.1]  Unknown    Chronic leukocytosis [D72.829]  Unknown    Chronic respiratory failure with hypoxia and hypercapnia [J96.11, J96.12]  Yes     Lower back pain [M54.50]  Yes    Chronic obstructive pulmonary disease [J44.9]  Yes    Hypertension [I10]  Yes    Hypothyroidism [E03.9]  Yes      Resolved Hospital Problems   No resolved problems to display.        Brief Hospital Course to date:  Avelina Ceron is a 82 y.o. female with PMH significant for HTN, HLD, PAD (s/p bilateral iliac artery stents), carotid artery disease (s/p bilateral ICA stents), chronic respiratory failure with hypoxia secondary to COPD (baseline 2L NC), GERD, chronic leukocytosis (baseline 13-15K), hypothyroidism and chronic back pain. She was treated for E. Coli UTI with Macrobid x 10 days in June 2023. Admitted to [facility] 7/25/23 for UTI with obstructing R distal ureteral stone.     This patient's problems and plans were partially entered by my partner and updated as appropriate by me 07/26/23.    All problems are new to me today.     Acute R obstructing ureterolithiasis  Leukocytosis  Lactic acidosis   Acute UTI  - CT abdomen/pelvis showed 7 x 5 mm obstructing calculus within the R distal ureter with mild R hydroureteronephrosis   - UA concerning for UTI. Follow culture   - Continue IV Ceftriaxone  - Urology plans to take patient to OR for cystoscopy today- lithotripsy vs stent placement     Hypoxia, concern for aspiration  Chronic respiratory failure with hypoxia/hypercapnia 2/2 COPD  - Baseline 2L NC - currently requiring 3L NC  - Daughter reports choking and worsening dyspnea during episode of vomiting this morning  - chest xray stable  - Add Doxy for pneumonia coverage--can likely de-escalate to just rocephin soon     Hypertension  - BP initially elevated in ED  - s/p Atenolol  - BP now improved     Hypothyroidism, continue Synthroid      Chronic back pain   - Continue routine Tramadol 50mg Q6H     Generalized weakness  - PT/OT consults     Expected Discharge Location and Transportation: home  Expected Discharge   Expected Discharge Date: 7/28/2023; Expected  Discharge Time:      DVT prophylaxis:  Mechanical DVT prophylaxis orders are present.     AM-PAC 6 Clicks Score (PT): 18 (07/26/23 0820)    CODE STATUS:   Code Status and Medical Interventions:   Ordered at: 07/25/23 1651     Level Of Support Discussed With:    Patient    Health Care Surrogate     Code Status (Patient has no pulse and is not breathing):    CPR (Attempt to Resuscitate)     Medical Interventions (Patient has pulse or is breathing):    Full Support     Release to patient:    Routine Release       Salima Caceres DO  07/26/23

## 2023-07-26 NOTE — PROGRESS NOTES
[facility]   Urology Progress Note    Patient Name: Avelina Ceron  : 1940  MRN: [id number]  Primary Care Physician:  Dre Moura MD  Date of admission: 2023    Subjective   Subjective     Chief Complaint: Right ureteral stone, UTI    HPI:  Patient Reports no significant issues overnight.  She has been somewhat hypotensive this morning.  Blood pressure stabilizing.  Requiring oxygen.  Urine culture on admission is positive for gram-negative bacilli, speciation culture pending.  Blood cultures pending.  She has been receiving IV antibiotics.  CT reviewed and she has a 6 mm distal right ureteral stone with hydronephrosis.    Renal function has remained stable, creatinine 0.4, leukocytosis improving, currently 14.8.    Review of Systems   All systems were reviewed and negative except for: None    Objective   Objective     Vitals:   Temp:  [97.1 °F (36.2 °C)-98.6 °F (37 °C)] 97.1 °F (36.2 °C)  Heart Rate:  [62-80] 64  Resp:  [16-20] 16  BP: ()/(39-94) 111/49  Flow (L/min):  [2-3] 3  Physical Exam    Constitutional: Awake in bed, alert   Eyes: PERRLA, sclerae anicteric, no conjunctival injection   HENT: Normocephalic, atraumatic, mucous membranes moist   Neck: Supple, trachea midline   Respiratory: Equal chest rise, non-labored respirations,, on room air via nasal cannula   Cardiovascular: RRR, palpable radial pulses bilaterally   Gastrointestinal: Soft, nontender, non-distended   Genitourinary: Normal female external genitalia, voiding to pure wick   Musculoskeletal: No lower extremity edema bilaterally, no clubbing or cyanosis to extremities   Psychiatric: Appropriate affect, cooperative   Neurologic: Oriented x 3,  Cranial Nerves grossly intact, speech clear   Skin: No rashes     Result Review    Result Review:  I have personally reviewed the results from the time of this admission to 2023 14:32 EDT and agree with these findings:  [x]  Laboratory  [x]  Microbiology  [x]   Radiology  []  EKG/Telemetry   []  Cardiology/Vascular   []  Pathology  []  Old records  []  Other:    Most notable findings include: 6 mm distal right ureteral stone, normal renal function, leukocytosis, urine culture positive    Assessment & Plan   Assessment / Plan     Brief Patient Summary:  Avelina Ceron is a 82 y.o. female who is admitted with a 6 mm distal right ureteral stone, in the setting of a urinary tract infection.  She has remained afebrile, vital signs are currently stable but she was a bit hypotensive this morning.  I discussed with the patient and her daughter at the bedside that we will proceed with cystoscopy and right ureteral stent placement for urinary decompression, we will avoid treatment of her right ureteral stone until clearance of infection, likely in a few weeks, as outpatient.  She will remain admitted for monitoring overnight while cultures finalize.  We will keep her on IV antibiotics.  I will set her up for her next surgery date for ureteroscopy and laser lithotripsy.    Risks of today's operations discussed including bleeding, infection, hematuria, flank pain, need for additional procedures, anesthesia related complications including DVT, PE, MI, CVA, death.    This is the patient's first experience with nephrolithiasis, has never previously seen a urologist.     Active Hospital Problems:  Active Hospital Problems    Diagnosis     **Ureterolithiasis     Chronic leukocytosis     Chronic respiratory failure with hypoxia and hypercapnia     Lower back pain     Chronic obstructive pulmonary disease     Hypertension     Hypothyroidism        Plan:   -Proceed to OR for cystoscopy, right retrograde pyelogram, right ureteral stent placement         DVT prophylaxis:  Mechanical DVT prophylaxis orders are present.    CODE STATUS:   Level Of Support Discussed With: Patient; Health Care Surrogate  Code Status (Patient has no pulse and is not breathing): CPR (Attempt to Resuscitate)  Medical  Interventions (Patient has pulse or is breathing): Full Support  Release to patient: Routine Release    Disposition:  I expect patient to remain admitted postop.    Electronically signed by Lee Kim MD, 07/26/23, 2:32 PM EDT.

## 2023-07-26 NOTE — ANESTHESIA PREPROCEDURE EVALUATION
Anesthesia Evaluation     Patient summary reviewed and Nursing notes reviewed   history of anesthetic complications (respiratory failure s/p hip sx a couple years ago required ICU/ventilation):   NPO Solid Status: > 8 hours  NPO Liquid Status: > 8 hours           Airway   Mallampati: II  TM distance: >3 FB  Neck ROM: full  No difficulty expected  Dental    (+) edentulous    Pulmonary    (+) a smoker Former, COPD,home oxygen, decreased breath sounds  Cardiovascular - normal exam    ECG reviewed    (+) hypertensionPVD, hyperlipidemia,  carotid artery disease (carotid stents)      Neuro/Psych  (+) psychiatric history Depression  (-) seizures, TIA, CVA  GI/Hepatic/Renal/Endo    (+) thyroid problem hypothyroidism  (-) GERD, liver disease, no renal disease, diabetes    Musculoskeletal     Abdominal   (+) obese   Substance History      OB/GYN          Other   arthritis,     ROS/Med Hx Other: K 5.4                Anesthesia Plan    ASA 4     general     (Risk for post op ventilation post op given history in past with COPD and home oxygen requirements)  intravenous induction     Anesthetic plan, risks, benefits, and alternatives have been provided, discussed and informed consent has been obtained with: patient.    Plan discussed with CRNA.    CODE STATUS:    Level Of Support Discussed With: Patient; Health Care Surrogate  Code Status (Patient has no pulse and is not breathing): CPR (Attempt to Resuscitate)  Medical Interventions (Patient has pulse or is breathing): Full Support  Release to patient: Routine Release

## 2023-07-26 NOTE — ANESTHESIA POSTPROCEDURE EVALUATION
Patient: Avelina Ceron    Procedure Summary       Date: 07/26/23 Room / Location:  [facility] /  [facility]    Anesthesia Start: 1538 Anesthesia Stop: 1618    Procedure: CYSTOSCOPY, RIGHT RETROGRADE PYELOGRAM; WITH STENT INSERTION (Right) Diagnosis:     Surgeons: Lee Kim MD Provider: Pete Allan MD    Anesthesia Type: general ASA Status: 4            Anesthesia Type: general    Vitals  Vitals Value Taken Time   /58 07/26/23 1615   Temp     Pulse 80 07/26/23 1618   Resp     SpO2 93 % 07/26/23 1618   Vitals shown include unvalidated device data.        Post Anesthesia Care and Evaluation    Patient location during evaluation: PACU  Patient participation: complete - patient participated  Level of consciousness: awake and alert  Pain score: 0  Pain management: adequate    Airway patency: patent  Anesthetic complications: No anesthetic complications  PONV Status: none  Cardiovascular status: acceptable  Respiratory status: acceptable, nasal cannula and nonlabored ventilation  Hydration status: acceptable

## 2023-07-26 NOTE — PLAN OF CARE
Goal Outcome Evaluation:  Plan of Care Reviewed With: patient        Progress: no change  Outcome Evaluation: Pt presents below her functional baseline with deficits including generalized weakness, decreased functional endurance, impaired balance, and SOA with activity warranting skilled OT services. Pt required assist x 2 for bed mobility and OOB transfers. Rec IPR at dc for best functional outcomes.      Anticipated Discharge Disposition (OT): inpatient rehabilitation facility

## 2023-07-26 NOTE — ANESTHESIA PROCEDURE NOTES
Airway  Urgency: elective    Date/Time: 7/26/2023 3:42 PM  Difficult airway    General Information and Staff    Patient location during procedure: OR  SRNA: Patrick, Zhen, PADMINI  Indications and Patient Condition  Indications for airway management: airway protection    Preoxygenated: yes  MILS maintained throughout  Mask difficulty assessment: 1 - vent by mask    Final Airway Details  Final airway type: endotracheal airway      Successful airway: ETT  Cuffed: yes   Successful intubation technique: direct laryngoscopy  Blade: Yosef  Blade size: 3  ETT size (mm): 7.0  Cormack-Lehane Classification: grade I - full view of glottis  Placement verified by: chest auscultation, capnometry and palpation of cuff   Inital cuff pressure (cm H2O): 20  Cuff volume (mL): 7  Measured from: lips  ETT/EBT  to lips (cm): 22  Number of attempts at approach: 1  Assessment: lips, teeth, and gum same as pre-op and atraumatic intubation           PAST MEDICAL HISTORY:  Coronary artery aneurysm      Name band;

## 2023-07-26 NOTE — THERAPY EVALUATION
"Patient Name: Avelina Ceron  : 1940    MRN: [id number]                              Today's Date: 2023       Admit Date: 2023    Visit Dx:     ICD-10-CM ICD-9-CM   1. Right ureteral stone  N20.1 592.1   2. Urinary tract infection with hematuria, site unspecified  N39.0 599.0    R31.9 599.70     Patient Active Problem List   Diagnosis    Coronary arteriosclerosis in native artery    Chronic obstructive pulmonary disease    Depression    Gastroesophageal reflux disease with esophagitis    Hyperlipidemia    Hypertension    Hypothyroidism    Osteoarthritis    Peripheral arterial occlusive disease    Solitary pulmonary nodule    Vitamin D deficiency    Lower back pain    Carotid bruit    Hypoxemia    Chronic respiratory failure with hypoxia and hypercapnia    Allergic rhinitis    Chronic leukocytosis    Ureterolithiasis     Past Medical History:   Diagnosis Date    Broken ribs     COPD (chronic obstructive pulmonary disease)     Degenerative lumbar disc     Disease of thyroid gland     \"low thyroid\"    History of colonoscopy     Osteoarthritis      Past Surgical History:   Procedure Laterality Date    EYE SURGERY  10/17/2022    yag surgery    GALLBLADDER SURGERY  2012    HYSTERECTOMY      TOTAL HIP ARTHROPLASTY Bilateral       General Information       Row Name 23 1125          OT Time and Intention    Document Type evaluation  -AN     Mode of Treatment occupational therapy  -AN       Row Name 23 1125          General Information    Patient Profile Reviewed yes  -AN     Prior Level of Function independent:;all household mobility;gait;ADL's  prior to admission pt reports using a 3 wheeled walker and cane within the home, rollator for community distances. pt and daughter report recent falls  -AN     Existing Precautions/Restrictions fall;oxygen therapy device and L/min  -AN     Barriers to Rehab medically complex;previous functional deficit  -AN       Row Name 23 1125          Living " Environment    People in Home child(gabriela), adult  -AN       Row Name 07/26/23 1125          Home Main Entrance    Number of Stairs, Main Entrance two;other (see comments)  2 steps to enter garage  -AN     Stair Railings, Main Entrance other (see comments)  b/l grab bars  -AN       Row Name 07/26/23 1125          Stairs Within Home, Primary    Number of Stairs, Within Home, Primary one  -AN     Stair Railings, Within Home, Primary other (see comments)  grab bar on R  -AN     Stairs Comment, Within Home, Primary walk in shower with built in seat and grab bars  -AN       Row Name 07/26/23 1125          Cognition    Orientation Status (Cognition) oriented x 4  -AN       Row Name 07/26/23 1125          Safety Issues, Functional Mobility    Safety Issues Affecting Function (Mobility) safety precaution awareness;safety precautions follow-through/compliance;sequencing abilities;awareness of need for assistance  -AN     Impairments Affecting Function (Mobility) balance;endurance/activity tolerance;pain;strength;shortness of breath  -AN     Comment, Safety Issues/Impairments (Mobility) activity limited by pain and SOA  -AN               User Key  (r) = Recorded By, (t) = Taken By, (c) = Cosigned By      Initials Name Provider Type    Dorinda Lujan OT Occupational Therapist                     Mobility/ADL's       Row Name 07/26/23 1128          Bed Mobility    Bed Mobility rolling right;supine-sit  -AN     Rolling Right [place] (Bed Mobility) supervision;verbal cues  -AN     Supine-Sit [place] (Bed Mobility) verbal cues;nonverbal cues (demo/gesture);moderate assist (50% patient effort);2 person assist  -AN     Bed Mobility, Safety Issues decreased use of arms for pushing/pulling;decreased use of legs for bridging/pushing;impaired trunk control for bed mobility  -AN     Assistive Device (Bed Mobility) draw sheet  -AN     Comment, (Bed Mobility) pt educated on log rolling technique due to lower back pain with  cues for sequencing of steps and assist provided at trunk and BLE  -AN       [facility] Name 07/26/23 1128          Transfers    Transfers sit-stand transfer;stand-sit transfer  -AN     Comment, (Transfers) cues for safe transfer technique, pt declined use of RW at this time despite encouragement  -AN       Row Name 07/26/23 1128          Sit-Stand Transfer    Sit-Stand [place] (Transfers) verbal cues;nonverbal cues (demo/gesture);minimum assist (75% patient effort);2 person assist  -AN     Assistive Device (Sit-Stand Transfers) other (see comments)  UE support  -AN       [facility] Name 07/26/23 1128          Stand-Sit Transfer    Stand-Sit [place] (Transfers) verbal cues;nonverbal cues (demo/gesture);minimum assist (75% patient effort);2 person assist  -AN     Assistive Device (Stand-Sit Transfers) other (see comments)  UE support  -AN       Row Name 07/26/23 1128          Functional Mobility    Functional Mobility- Ind. Level not tested  -AN     Functional Mobility- Comment unable due to fatigue and pain  -AN       Row Name 07/26/23 1128          Activities of Daily Living    BADL Assessment/Intervention lower body dressing  -AN       Row Name 07/26/23 1128          Lower Body Dressing Assessment/Training    [place] Level (Lower Body Dressing) don;socks;dependent (less than 25% patient effort)  -AN     Position (Lower Body Dressing) sitting up in bed  -AN               User Key  (r) = Recorded By, (t) = Taken By, (c) = Cosigned By      Initials Name Provider Type    AN Dorinda Nettles OT Occupational Therapist                   Obj/Interventions       Row Name 07/26/23 1131          Sensory Assessment (Somatosensory)    Sensory Assessment (Somatosensory) UE sensation intact  -AN       Row Name 07/26/23 1131          Vision Assessment/Intervention    Visual Impairment/Limitations WFL  -AN       Row Name 07/26/23 1131          Range of Motion Comprehensive    General Range of Motion bilateral upper extremity  ROM WFL  -AN       Row Name 07/26/23 1131          Strength Comprehensive (MMT)    General Manual Muscle Testing (MMT) Assessment upper extremity strength deficits identified  -AN     Comment, General Manual Muscle Testing (MMT) Assessment BUE grossly 4/5 observed functionally  -AN       Row Name 07/26/23 1131          Motor Skills    Motor Skills functional endurance  -AN     Functional Endurance SpO2 dropped to 86% with standing tasks, cues for PLB  -AN       Row Name 07/26/23 1131          Balance    Balance Assessment sitting static balance;sitting dynamic balance;sit to stand dynamic balance;standing static balance  -AN     Static Sitting Balance standby assist  -AN     Dynamic Sitting Balance contact guard  -AN     Position, Sitting Balance sitting edge of bed  -AN     Sit to Stand Dynamic Balance verbal cues;non-verbal cues (demo/gesture);minimal assist;2-person assist  -AN     Static Standing Balance verbal cues;minimal assist;2-person assist  -AN     Position/Device Used, Standing Balance supported  -AN     Balance Interventions standing;sit to stand;supported;static;moderate challenge  -AN               User Key  (r) = Recorded By, (t) = Taken By, (c) = Cosigned By      Initials Name Provider Type    AN Dorinda Nettles OT Occupational Therapist                   Goals/Plan       Row Name 07/26/23 1134          Bed Mobility Goal 1 (OT)    Activity/Assistive Device (Bed Mobility Goal 1, OT) sit to supine/supine to sit  -AN     [place] Level/Cues Needed (Bed Mobility Goal 1, OT) minimum assist (75% or more patient effort)  -AN     Time Frame (Bed Mobility Goal 1, OT) long term goal (LTG);10 days  -AN       Row Name 07/26/23 1134          Transfer Goal 1 (OT)    Activity/Assistive Device (Transfer Goal 1, OT) sit-to-stand/stand-to-sit;bed-to-chair/chair-to-bed;toilet  -AN     [place] Level/Cues Needed (Transfer Goal 1, OT) minimum assist (75% or more patient effort)  -AN     Time Frame (Transfer  Goal 1, OT) long term goal (LTG);10 days  -AN       Row Name 07/26/23 1134          Dressing Goal 1 (OT)    Activity/Device (Dressing Goal 1, OT) lower body dressing;sock-aid;reacher;long-handled shoe horn  -AN     [place]/Cues Needed (Dressing Goal 1, OT) minimum assist (75% or more patient effort)  -AN     Time Frame (Dressing Goal 1, OT) long term goal (LTG);10 days  -AN       Row Name 07/26/23 1134          Toileting Goal 1 (OT)    Activity/Device (Toileting Goal 1, OT) adjust/manage clothing;perform perineal hygiene;commode  -AN     [place] Level/Cues Needed (Toileting Goal 1, OT) minimum assist (75% or more patient effort)  -AN     Time Frame (Toileting Goal 1, OT) long term goal (LTG);10 days  -AN       Row Name 07/26/23 1134          Therapy Assessment/Plan (OT)    Planned Therapy Interventions (OT) activity tolerance training;adaptive equipment training;BADL retraining;functional balance retraining;occupation/activity based interventions;patient/caregiver education/training;strengthening exercise;transfer/mobility retraining  -AN               User Key  (r) = Recorded By, (t) = Taken By, (c) = Cosigned By      Initials Name Provider Type    Dorinda Lujan OT Occupational Therapist                   Clinical Impression       Row Name 07/26/23 1132          Pain Assessment    Additional Documentation Pain Scale: FACES Pre/Post-Treatment (Group)  -AN       Row Name 07/26/23 1132          Pain Scale: FACES Pre/Post-Treatment    Pain: FACES Scale, Pretreatment 4-->hurts little more  -AN     Posttreatment Pain Rating 4-->hurts little more  -AN     Pain Location lower  -AN     Pain Location - back  -AN     Pre/Posttreatment Pain Comment tolerated, RN aware  -AN       Row Name 07/26/23 1132          Plan of Care Review    Plan of Care Reviewed With patient  -AN     Progress no change  -AN     Outcome Evaluation Pt presents below her functional baseline with deficits including generalized weakness,  decreased functional endurance, impaired balance, and SOA with activity warranting skilled OT services. Pt required assist x 2 for bed mobility and OOB transfers. Rec IPR at dc for best functional outcomes.  -AN       Row Name 07/26/23 1132          Therapy Assessment/Plan (OT)    Patient/Family Therapy Goal Statement (OT) Return to PLOF  -AN     Rehab Potential (OT) good, to achieve stated therapy goals  -AN     Criteria for Skilled Therapeutic Interventions Met (OT) yes;skilled treatment is necessary  -AN     Therapy Frequency (OT) daily  -AN       Row Name 07/26/23 1132          Therapy Plan Review/Discharge Plan (OT)    Anticipated Discharge Disposition (OT) inpatient rehabilitation facility  -AN       Row Name 07/26/23 1132          Vital Signs    Pre Systolic BP Rehab --  VSS  -AN     Pre SpO2 (%) 92  -AN     O2 Delivery Pre Treatment nasal cannula  -AN     Intra SpO2 (%) 86  -AN     O2 Delivery Intra Treatment nasal cannula  -AN     Post SpO2 (%) 92  -AN     O2 Delivery Post Treatment nasal cannula  -AN     Pre Patient Position Supine  -AN     Intra Patient Position Standing  -AN     Post Patient Position Sitting  -AN       Row Name 07/26/23 1132          Positioning and Restraints    Pre-Treatment Position in bed  -AN     Post Treatment Position bed  -AN     In Bed notified nsg;sitting EOB;with PT  -AN               User Key  (r) = Recorded By, (t) = Taken By, (c) = Cosigned By      Initials Name Provider Type    Dorinda Lujan, VERONIQUE Occupational Therapist                   Outcome Measures       Row Name 07/26/23 1135          How much help from another is currently needed...    Putting on and taking off regular lower body clothing? 1  -AN     Bathing (including washing, rinsing, and drying) 2  -AN     Toileting (which includes using toilet bed pan or urinal) 2  -AN     Putting on and taking off regular upper body clothing 3  -AN     Taking care of personal grooming (such as brushing teeth) 3  -AN      Eating meals 3  -AN     AM-PAC 6 Clicks Score (OT) 14  -AN       Row Name 07/26/23 0820          How much help from another person do you currently need...    Turning from your back to your side while in flat bed without using bedrails? 3  -CG     Moving from lying on back to sitting on the side of a flat bed without bedrails? 3  -CG     Moving to and from a bed to a chair (including a wheelchair)? 3  -CG     Standing up from a chair using your arms (e.g., wheelchair, bedside chair)? 3  -CG     Climbing 3-5 steps with a railing? 3  -CG     To walk in hospital room? 3  -CG     AM-PAC 6 Clicks Score (PT) 18  -CG       Row Name 07/26/23 1135          Functional Assessment    Outcome Measure Options AM-PAC 6 Clicks Daily Activity (OT)  -AN               User Key  (r) = Recorded By, (t) = Taken By, (c) = Cosigned By      Initials Name Provider Type    CG Luis Whitney, RN Registered Nurse    Dorinda Lujan OT Occupational Therapist                    Occupational Therapy Education       Title: PT OT SLP Therapies (In Progress)       Topic: Occupational Therapy (In Progress)       Point: ADL training (Done)       Description:   Instruct learner(s) on proper safety adaptation and remediation techniques during self care or transfers.   Instruct in proper use of assistive devices.                  Learning Progress Summary             Patient Acceptance, E, VU by AN at 7/26/2023 1135   Family Acceptance, E, VU by AN at 7/26/2023 1135                         Point: Home exercise program (Not Started)       Description:   Instruct learner(s) on appropriate technique for monitoring, assisting and/or progressing therapeutic exercises/activities.                  Learner Progress:  Not documented in this visit.              Point: Precautions (Done)       Description:   Instruct learner(s) on prescribed precautions during self-care and functional transfers.                  Learning Progress Summary             Patient  Acceptance, E, VU by AN at 7/26/2023 1135   Family Acceptance, E, VU by AN at 7/26/2023 1135                         Point: Body mechanics (Done)       Description:   Instruct learner(s) on proper positioning and spine alignment during self-care, functional mobility activities and/or exercises.                  Learning Progress Summary             Patient Acceptance, E, VU by AN at 7/26/2023 1135   Family Acceptance, E, VU by AN at 7/26/2023 1135                                         User Key       Initials Effective Dates Name Provider Type Discipline    AN 09/21/21 -  Dorinda Nettles OT Occupational Therapist OT                  OT Recommendation and Plan  Planned Therapy Interventions (OT): activity tolerance training, adaptive equipment training, BADL retraining, functional balance retraining, occupation/activity based interventions, patient/caregiver education/training, strengthening exercise, transfer/mobility retraining  Therapy Frequency (OT): daily  Plan of Care Review  Plan of Care Reviewed With: patient  Progress: no change  Outcome Evaluation: Pt presents below her functional baseline with deficits including generalized weakness, decreased functional endurance, impaired balance, and SOA with activity warranting skilled OT services. Pt required assist x 2 for bed mobility and OOB transfers. Rec IPR at dc for best functional outcomes.     Time Calculation:   Evaluation Complexity (OT)  Review Occupational Profile/Medical/Therapy History Complexity: expanded/moderate complexity  Assessment, Occupational Performance/Identification of Deficit Complexity: 3-5 performance deficits  Clinical Decision Making Complexity (OT): detailed assessment/moderate complexity  Overall Complexity of Evaluation (OT): moderate complexity     Time Calculation- OT       Row Name 07/26/23 1135             Time Calculation- OT    OT Start Time 1030  -AN      OT Received On 07/26/23  -AN      OT Goal Re-Cert Due Date 08/05/23   -AN         Untimed Charges    OT Eval/Re-eval Minutes 46  -AN         Total Minutes    Untimed Charges Total Minutes 46  -AN       Total Minutes 46  -AN                User Key  (r) = Recorded By, (t) = Taken By, (c) = Cosigned By      Initials Name Provider Type    Dorinda Lujan OT Occupational Therapist                  Therapy Charges for Today       Code Description Service Date Service Provider Modifiers Qty    [id number] HC OT EVAL MOD COMPLEXITY 4 7/26/2023 Dorinda Nettles OT GO 1                 Dorinda Nettles OT  7/26/2023

## 2023-07-26 NOTE — PLAN OF CARE
Goal Outcome Evaluation:           Progress: no change  Outcome Evaluation: VSS on 2LNC. No complaints of pain or nausea. Potassium replaced per protocol. Purewick in place with adequate tea colored UOP. Plan for Cystoscopy with Dr Kim today. Will continue with plan of care.

## 2023-07-26 NOTE — PLAN OF CARE
Goal Outcome Evaluation:  Plan of Care Reviewed With: patient, daughter        Progress: no change  Outcome Evaluation: PT initial eval completed. Pt presents below her functional baseline with generalized weakness, increased pain, impaired balance, and SOA. Pt ambulated 10' with min Ax2 and BUE support. Further IPPT is warrented for addressing deficits. PT rec d/c to [facility] for best functional outcomes.      Anticipated Discharge Disposition (PT): inpatient rehabilitation facility

## 2023-07-26 NOTE — BRIEF OP NOTE
URETEROSCOPY LASER LITHOTRIPSY WITH STENT INSERTION  Progress Note    Avelina Ceron  7/26/2023    Pre-op Diagnosis:   Right ureteral stone  UTI       Post-Op Diagnosis Codes:  Right ureteral stone  UTI         Procedure(s):  CYSTOSCOPY, RIGHT RETROGRADE PYELOGRAM  RIGHT URETERAL STENT INSERTION              Surgeon(s):  Lee Kim MD    Anesthesia: General    Staff:   Circulator: Dora Selby RN  Scrub Person: Taye Montalvo         Estimated Blood Loss: minimal    Urine Voided: * No values recorded between 7/26/2023  3:37 PM and 7/26/2023  4:13 PM *    Specimens:                None          Drains:   Ureteral Drain/Stent Right ureter 6 Fr. (Active)       External Urinary Catheter (Active)   Site Assessment Clean;Skin intact 07/26/23 0820   Application/Removal external catheter applied 07/25/23 1705   Collection Container Wall suction 07/26/23 0820   Wall suction (mmHG) 80 mmHG 07/26/23 0820   Securement Method Securing device 07/26/23 0820   Output (mL) 600 mL 07/26/23 0808       Findings: Right hydroureteronephrosis to mid ureter, right stent placed. 6 fr x 24 cm. Debris returned after stent placement.         Complications: none           Lee Kim MD     Date: 7/26/2023  Time: 16:15 EDT

## 2023-07-26 NOTE — PLAN OF CARE
Goal Outcome Evaluation:  Plan of Care Reviewed With: patient        Progress: improving  Outcome Evaluation: VSS on 3LNC. Pain controlled with PRN meds. No nausea. Procedure completed. Voiding and tolerating diet. PT/OT worked with patient. Discussed plan of care with patient who verbalizes understanding.

## 2023-07-27 ENCOUNTER — PREP FOR SURGERY (OUTPATIENT)
Dept: OTHER | Facility: HOSPITAL | Age: 83
End: 2023-07-27
Payer: MEDICARE

## 2023-07-27 ENCOUNTER — READMISSION MANAGEMENT (OUTPATIENT)
Dept: CALL CENTER | Facility: HOSPITAL | Age: 83
End: 2023-07-27
Payer: MEDICARE

## 2023-07-27 VITALS
HEART RATE: 75 BPM | DIASTOLIC BLOOD PRESSURE: 77 MMHG | RESPIRATION RATE: 18 BRPM | HEIGHT: 63 IN | BODY MASS INDEX: 35.08 KG/M2 | TEMPERATURE: 97.8 F | SYSTOLIC BLOOD PRESSURE: 169 MMHG | OXYGEN SATURATION: 91 % | WEIGHT: 198 LBS

## 2023-07-27 DIAGNOSIS — N20.1 RIGHT URETERAL STONE: Primary | ICD-10-CM

## 2023-07-27 LAB
ALBUMIN SERPL-MCNC: 3.6 G/DL (ref 3.5–5.2)
ALBUMIN/GLOB SERPL: 1.5 G/DL
ALP SERPL-CCNC: 59 U/L (ref 39–117)
ALT SERPL W P-5'-P-CCNC: 10 U/L (ref 1–33)
ANION GAP SERPL CALCULATED.3IONS-SCNC: 11 MMOL/L (ref 5–15)
AST SERPL-CCNC: 15 U/L (ref 1–32)
BACTERIA SPEC AEROBE CULT: ABNORMAL
BILIRUB SERPL-MCNC: 0.4 MG/DL (ref 0–1.2)
BUN SERPL-MCNC: 6 MG/DL (ref 8–23)
BUN/CREAT SERPL: 17.1 (ref 7–25)
CALCIUM SPEC-SCNC: 8.8 MG/DL (ref 8.6–10.5)
CHLORIDE SERPL-SCNC: 104 MMOL/L (ref 98–107)
CO2 SERPL-SCNC: 27 MMOL/L (ref 22–29)
CREAT SERPL-MCNC: 0.35 MG/DL (ref 0.57–1)
DEPRECATED RDW RBC AUTO: 47.6 FL (ref 37–54)
EGFRCR SERPLBLD CKD-EPI 2021: 102.2 ML/MIN/1.73
ERYTHROCYTE [DISTWIDTH] IN BLOOD BY AUTOMATED COUNT: 13 % (ref 12.3–15.4)
GLOBULIN UR ELPH-MCNC: 2.4 GM/DL
GLUCOSE SERPL-MCNC: 128 MG/DL (ref 65–99)
HCT VFR BLD AUTO: 34.8 % (ref 34–46.6)
HGB BLD-MCNC: 11.4 G/DL (ref 12–15.9)
MCH RBC QN AUTO: 32.6 PG (ref 26.6–33)
MCHC RBC AUTO-ENTMCNC: 32.8 G/DL (ref 31.5–35.7)
MCV RBC AUTO: 99.4 FL (ref 79–97)
PLATELET # BLD AUTO: 204 10*3/MM3 (ref 140–450)
PMV BLD AUTO: 9.5 FL (ref 6–12)
POTASSIUM SERPL-SCNC: 4.2 MMOL/L (ref 3.5–5.2)
PROT SERPL-MCNC: 6 G/DL (ref 6–8.5)
RBC # BLD AUTO: 3.5 10*6/MM3 (ref 3.77–5.28)
SODIUM SERPL-SCNC: 142 MMOL/L (ref 136–145)
WBC NRBC COR # BLD: 11.89 10*3/MM3 (ref 3.4–10.8)

## 2023-07-27 PROCEDURE — 80053 COMPREHEN METABOLIC PANEL: CPT | Performed by: STUDENT IN AN ORGANIZED HEALTH CARE EDUCATION/TRAINING PROGRAM

## 2023-07-27 PROCEDURE — 85027 COMPLETE CBC AUTOMATED: CPT | Performed by: STUDENT IN AN ORGANIZED HEALTH CARE EDUCATION/TRAINING PROGRAM

## 2023-07-27 PROCEDURE — 94664 DEMO&/EVAL PT USE INHALER: CPT

## 2023-07-27 PROCEDURE — 25010000002 MORPHINE PER 10 MG: Performed by: STUDENT IN AN ORGANIZED HEALTH CARE EDUCATION/TRAINING PROGRAM

## 2023-07-27 PROCEDURE — 94761 N-INVAS EAR/PLS OXIMETRY MLT: CPT

## 2023-07-27 PROCEDURE — 94799 UNLISTED PULMONARY SVC/PX: CPT

## 2023-07-27 PROCEDURE — 25010000002 KETOROLAC TROMETHAMINE PER 15 MG: Performed by: STUDENT IN AN ORGANIZED HEALTH CARE EDUCATION/TRAINING PROGRAM

## 2023-07-27 PROCEDURE — 99232 SBSQ HOSP IP/OBS MODERATE 35: CPT | Performed by: NURSE PRACTITIONER

## 2023-07-27 RX ORDER — TAMSULOSIN HYDROCHLORIDE 0.4 MG/1
0.4 CAPSULE ORAL DAILY
Qty: 30 CAPSULE | Refills: 0 | Status: SHIPPED | OUTPATIENT
Start: 2023-07-28

## 2023-07-27 RX ORDER — KETOROLAC TROMETHAMINE 10 MG/1
10 TABLET, FILM COATED ORAL EVERY 6 HOURS PRN
Qty: 20 TABLET | Refills: 0 | Status: SHIPPED | OUTPATIENT
Start: 2023-07-27 | End: 2023-08-02

## 2023-07-27 RX ORDER — CEFAZOLIN SODIUM 2 G/100ML
2 INJECTION, SOLUTION INTRAVENOUS ONCE
OUTPATIENT
Start: 2023-07-27 | End: 2023-07-27

## 2023-07-27 RX ORDER — PHENAZOPYRIDINE HYDROCHLORIDE 200 MG/1
200 TABLET, FILM COATED ORAL 3 TIMES DAILY PRN
Qty: 15 TABLET | Refills: 0 | Status: SHIPPED | OUTPATIENT
Start: 2023-07-27

## 2023-07-27 RX ORDER — CEFDINIR 300 MG/1
300 CAPSULE ORAL 2 TIMES DAILY
Qty: 10 CAPSULE | Refills: 0 | Status: SHIPPED | OUTPATIENT
Start: 2023-07-27 | End: 2023-08-02

## 2023-07-27 RX ORDER — OXYBUTYNIN CHLORIDE 10 MG/1
10 TABLET, EXTENDED RELEASE ORAL DAILY
Qty: 30 TABLET | Refills: 0 | Status: SHIPPED | OUTPATIENT
Start: 2023-07-28

## 2023-07-27 RX ORDER — DOXYCYCLINE HYCLATE 100 MG/1
100 CAPSULE ORAL 2 TIMES DAILY
Qty: 10 CAPSULE | Refills: 0 | Status: SHIPPED | OUTPATIENT
Start: 2023-07-27 | End: 2023-08-02

## 2023-07-27 RX ORDER — ONDANSETRON 4 MG/1
4 TABLET, FILM COATED ORAL EVERY 6 HOURS PRN
Qty: 15 TABLET | Refills: 0 | Status: SHIPPED | OUTPATIENT
Start: 2023-07-27

## 2023-07-27 RX ADMIN — LEVOTHYROXINE SODIUM 175 MCG: 175 TABLET ORAL at 06:03

## 2023-07-27 RX ADMIN — CLOPIDOGREL BISULFATE 75 MG: 75 TABLET ORAL at 08:14

## 2023-07-27 RX ADMIN — ATENOLOL 25 MG: 25 TABLET ORAL at 08:14

## 2023-07-27 RX ADMIN — DOXYCYCLINE 100 MG: 100 INJECTION, POWDER, LYOPHILIZED, FOR SOLUTION INTRAVENOUS at 08:14

## 2023-07-27 RX ADMIN — TAMSULOSIN HYDROCHLORIDE 0.4 MG: 0.4 CAPSULE ORAL at 08:14

## 2023-07-27 RX ADMIN — ACETAMINOPHEN 500 MG: 500 TABLET ORAL at 06:03

## 2023-07-27 RX ADMIN — PANTOPRAZOLE SODIUM 40 MG: 40 TABLET, DELAYED RELEASE ORAL at 06:03

## 2023-07-27 RX ADMIN — OXYBUTYNIN CHLORIDE 10 MG: 5 TABLET, EXTENDED RELEASE ORAL at 08:14

## 2023-07-27 RX ADMIN — FERROUS SULFATE TAB 325 MG (65 MG ELEMENTAL FE) 325 MG: 325 (65 FE) TAB at 08:14

## 2023-07-27 RX ADMIN — KETOROLAC TROMETHAMINE 15 MG: 30 INJECTION, SOLUTION INTRAMUSCULAR; INTRAVENOUS at 10:35

## 2023-07-27 RX ADMIN — BUDESONIDE AND FORMOTEROL FUMARATE DIHYDRATE 2 PUFF: 160; 4.5 AEROSOL RESPIRATORY (INHALATION) at 08:03

## 2023-07-27 RX ADMIN — TRAMADOL HYDROCHLORIDE 50 MG: 50 TABLET ORAL at 06:03

## 2023-07-27 RX ADMIN — TRAMADOL HYDROCHLORIDE 50 MG: 50 TABLET ORAL at 00:26

## 2023-07-27 RX ADMIN — TIOTROPIUM BROMIDE INHALATION SPRAY 2 PUFF: 3.12 SPRAY, METERED RESPIRATORY (INHALATION) at 08:04

## 2023-07-27 RX ADMIN — ACETAMINOPHEN 500 MG: 500 TABLET ORAL at 00:26

## 2023-07-27 RX ADMIN — ASPIRIN 81 MG CHEWABLE TABLET 81 MG: 81 TABLET CHEWABLE at 08:14

## 2023-07-27 RX ADMIN — ISOSORBIDE MONONITRATE 30 MG: 30 TABLET, EXTENDED RELEASE ORAL at 08:14

## 2023-07-27 RX ADMIN — GUAIFENESIN 1200 MG: 600 TABLET, EXTENDED RELEASE ORAL at 08:14

## 2023-07-27 RX ADMIN — TRAMADOL HYDROCHLORIDE 50 MG: 50 TABLET ORAL at 11:52

## 2023-07-27 NOTE — CASE MANAGEMENT/SOCIAL WORK
Case Management Discharge Note      Final Note: Patient has discharge orders in place.  Plan is home with daughter.    Provided Post Acute Provider List?: N/A  N/A Provider List Comment: Does not anticipate any discharge needs at this time  Provided Post Acute Provider Quality & Resource List?: N/A                    Final Discharge Disposition Code: 01 - home or self-care

## 2023-07-27 NOTE — DISCHARGE SUMMARY
[facility] Medicine Services  DISCHARGE SUMMARY    Patient Name: Avelina Ceron  : 1940  MRN: [id number]    Date of Admission: 2023  Date of Discharge:  2023  Primary Care Physician: Dre Moura MD    Consults       Date and Time Order Name Status Description    2023  5:01 PM Inpatient Urology Consult              Hospital Course     Presenting Problem:   Nephrolithiasis [N20.0]    Active Hospital Problems    Diagnosis  POA    **Ureterolithiasis [N20.1]  Unknown    Chronic leukocytosis [D72.829]  Unknown    Chronic respiratory failure with hypoxia and hypercapnia [J96.11, J96.12]  Yes    Lower back pain [M54.50]  Yes    Chronic obstructive pulmonary disease [J44.9]  Yes    Hypertension [I10]  Yes    Hypothyroidism [E03.9]  Yes      Resolved Hospital Problems   No resolved problems to display.      Hospital Course:  Avelina Ceron is a 82 y.o. female PMH HTN, HLD, PAD (s/p bilateral iliac artery stents), CAD (s/p bilateral ICA stents), chronic respiratory failure with hypoxia secondary to COPD (baseline 2 L per nasal cannula), GERD, chronic leukocytosis (baseline 13-15 K), hypothyroidism and chronic back pain presenting with acute right lower back pain that radiates to her right groin, fever, chills found with right obstructing ureterolithiasis, UTI.  CT abdomen/pelvis showed a 7 x 5 mm obstructing calculus in the right distal ureter with mild right hydroureteronephrosis.  Urine culture positive for Klebsiella pneumoniae SSP pneumoniae susceptible to cefepime.  Hospitalization, patient had an episode of vomiting and developed concern for hypoxia related to aspiration.  Chest x-ray showed stability.  Doxycycline was added for pneumonia coverage.  Urology was consulted and patient is now s/p cystoscopy, right retrograde pyelogram, right ureteral stent placement.  Urology office will plan for outpatient stone management.      Discharge Follow Up Recommendations  for labs/diagnostics:  Follow-up with PCP in 1 week  Follow-up with Dr. Kim-  Urology office will call with date and time  Day of Discharge     HPI:   Patient resting in bed, reporting feels much better.  Patient rates right CVA pain 6/10.    Review of Systems  Gen- No fevers, chills  CV- No chest pain, palpitations  Resp- No cough, dyspnea  GI- No N/V/D, abd pain  MS- (+) right flank pain     Otherwise ROS is negative except as mentioned in the HPI.    Vital Signs:   Temp:  [97.1 °F (36.2 °C)-98.9 °F (37.2 °C)] 97.8 °F (36.6 °C)  Heart Rate:  [64-89] 88  Resp:  [14-20] 18  BP: ()/(39-84) 169/77     Physical Exam:  Constitutional: Awake, alert, NAD  HENT: NCAT, mucous membranes moist  Respiratory: Clear/dim in bases, nonlabored respirations   Cardiovascular: RRR, no murmurs, rubs, or gallop  Gastrointestinal: Positive bowel sounds, soft, nontender, nondistended, right CVA tenderness  Musculoskeletal: No bilateral ankle edema  Psychiatric: Appropriate affect, cooperative  Neurologic: Oriented x 3, strength symmetric in all extremities, Cranial Nerves grossly intact to confrontation, speech clear  Skin: No rashes    Plan:  Acute R obstruction ureterolithiasis  Acidosis  Lactic acidosis  Acute UTI  - CT abdomen pelvis showed 7 x 5 mm obstructing calculus within the right ureter with mild right hydronephrosis.  UA culture positive for Klebsiella pneumoniae SSP pneumoniae susceptible to cefepime.  Urology consulted patient is s/p cystoscopy, right retrograde pyelogram, right ureteral stent placement.  Urology office will plan for outpatient stone management.    Approximate concern for aspiration  Chronic respiratory failure with hypoxia/hypercapnia 2/2 COPD  - Baseline oxygen 2 L per nasal cannula currently requiring 2-3L.  Patient had an episode of vomiting resulting in worsening dyspnea.  Doxycycline was added for pneumonia coverage.  Chest x-ray stable.    Hypertension  - BP elevated likely due to pain.   Atenolol    Hypothyroidism-stable    Chronic back pain-stable    Generalized weakness  -Received PT/OT      Pertinent  and/or Most Recent Results     Results from last 7 days   Lab Units 07/27/23  0227 07/26/23  0326 07/25/23  1322   WBC 10*3/mm3 11.89* 14.89* 16.01*   HEMOGLOBIN g/dL 11.4* 11.4* 13.5   HEMATOCRIT % 34.8 36.8 41.7   PLATELETS 10*3/mm3 204 205 215   SODIUM mmol/L 142 144 141   POTASSIUM mmol/L 4.2 5.4* 3.4*   CHLORIDE mmol/L 104 106 98   CO2 mmol/L 27.0 29.0 33.0*   BUN mg/dL 6* 7* 8   CREATININE mg/dL 0.35* 0.41* 0.41*   GLUCOSE mg/dL 128* 111* 146*   CALCIUM mg/dL 8.8 8.9 9.3     Results from last 7 days   Lab Units 07/27/23  0227 07/25/23  1322   BILIRUBIN mg/dL 0.4 0.4   ALK PHOS U/L 59 65   ALT (SGPT) U/L 10 12   AST (SGOT) U/L 15 16           Invalid input(s): TG, LDLCALC, LDLREALC  Results from last 7 days   Lab Units 07/25/23  2104 07/25/23  1800 07/25/23  1322   PROCALCITONIN ng/mL  --   --  0.03   LACTATE mmol/L 1.5 2.3* 2.3*       Brief Urine Lab Results  (Last result in the past 365 days)        Color   Clarity   Blood   Leuk Est   Nitrite   Protein   CREAT   Urine HCG        07/25/23 1357 [place]   Cloudy   Large (3+)   Small (1+)   Negative   100 mg/dL (2+)                   Microbiology Results Abnormal       Procedure Component Value - Date/Time    Blood Culture - Blood, Hand, Right [[id number]]  (Normal) Collected: 07/25/23 1506    Lab Status: Preliminary result Specimen: Blood from Hand, Right Updated: 07/26/23 1531     Blood Culture No growth at 24 hours    Blood Culture - Blood, Arm, Left [[id number]]  (Normal) Collected: 07/25/23 1440    Lab Status: Preliminary result Specimen: Blood from Arm, Left Updated: 07/26/23 1500     Blood Culture No growth at 24 hours            Imaging Results (All)       Procedure Component Value Units Date/Time    FL C Arm During Surgery [[id number]] Resulted: 07/26/23 1615     Updated: 07/26/23 1615    Narrative:      This procedure was auto-finalized  with no dictation required.    XR Chest 1 View [[id number]] Collected: 07/25/23 1724     Updated: 07/25/23 1729    Narrative:      XR CHEST 1 VW    Date of Exam: 7/25/2023 5:08 PM EDT    Indication: hypoxia, ? aspiration    Comparison: Chest x-ray dated 4/13/2023    Findings:  Lungs demonstrate coarsened interstitial markings. Findings may be related to underlying interstitial lung disease. Concomitant mild edema cannot be excluded. Scattered bibasilar atelectasis is seen. No significant pleural effusion or pneumothorax.   Cardiac silhouette is magnified. No acute osseous lesion is seen. There are senescent changes of the aorta and skeletal structures.      Impression:      Impression:  1.Coarsened pulmonary interstitial markings may be related to underlying interstitial lung disease. Mild concomitant edema cannot be excluded.  2.Scattered bibasilar atelectasis.    Electronically Signed: Ramon Escobar    7/25/2023 5:26 PM EDT    Workstation ID: [id number]    CT Abdomen Pelvis Without Contrast [[id number]] Collected: 07/25/23 1450     Updated: 07/25/23 1459    Narrative:      CT ABDOMEN PELVIS WO CONTRAST    Date of Exam: 7/25/2023 2:37 PM EDT    Indication: Right flank pain, UTI/pyelo-, kidney stone?.    Comparison: CT of the abdomen and pelvis dated 8/4/2020    Technique: Axial CT images were obtained of the abdomen and pelvis without the administration of contrast. Reconstructed coronal and sagittal images were also obtained. Automated exposure control and iterative construction methods were used.    Findings:    Liver: The liver is unremarkable in morphology. Evaluation for focal liver lesions is limited without IV contrast. There is mild fatty infiltration near the falciform ligament. No biliary dilation is seen.    Gallbladder: Surgically absent.    Pancreas: Unremarkable.    Spleen: Unremarkable.    Adrenal glands: Unremarkable.    Genitourinary tract: There is a 7 x 5 mm obstructing calculus within the right  distal ureter causing mild right hydroureteronephrosis. Additional 5 mm nonobstructing calculus within the right kidney. Right perinephric stranding/mild fluid is present.   Small right renal hypodensity is incompletely characterized. 3 mm nonobstructing calculus at the lower pole of the left kidney. Left kidney is otherwise unremarkable. No left hydronephrosis. Visualized left ureter is unremarkable. The urinary bladder is   decompressed. Patient is status post hysterectomy.    Gastrointestinal tract: Mid colon is decompressed which limits its evaluation. There are surgical changes of the right colon. Colonic diverticulosis is present. Moderate hiatal hernia is present. No findings to suggest bowel obstruction. Further   evaluation of the hollow viscera is limited without IV contrast.    Other findings: No free air or free fluid is identified. No pathologically enlarged lymph nodes are seen. Moderate vascular calcifications. Left common iliac artery stent. Right external iliac artery stent.    Bones and soft tissues: No acute osseous lesion is identified. Bones are demineralized. There are degenerative changes within the spine. Bilateral hip arthroplasties are noted. Femoral stems are incompletely imaged. Superficial soft tissues demonstrate   no acute abnormality.    Lung bases: Scattered bibasilar atelectasis, scarring, or atypical infiltrates. Moderate hiatal hernia. Coronary artery calcifications.      Impression:      Impression:  1.7 x 5 mm obstructing calculus within the right distal ureter causing mild right hydroureteronephrosis.  2.Additional bilateral nonobstructive nephrolithiasis.  3.Colonic diverticulosis.  4.Moderate hiatal hernia.  5.Additional findings as detailed above.      Electronically Signed: Ramon Escobar    7/25/2023 2:56 PM EDT    Workstation ID: [id number]            Results for orders placed in visit on 11/01/18    SCANNED VASCULAR STUDIES      Results for orders placed in visit on  11/01/18    SCANNED VASCULAR STUDIES      Results for orders placed in visit on 07/15/20    SCANNED - ECHOCARDIOGRAM      Pending Labs       Order Current Status    Blood Culture - Blood, Arm, Left Preliminary result    Blood Culture - Blood, Hand, Right Preliminary result          Discharge Details        Discharge Medications        New Medications        Instructions Start Date   cefdinir 300 MG capsule  Commonly known as: OMNICEF   300 mg, Oral, 2 Times Daily      doxycycline 100 MG capsule  Commonly known as: VIBRAMYCIN   100 mg, Oral, 2 Times Daily      hyoscyamine 0.125 MG SL tablet  Commonly known as: LEVSIN   125 mcg, Sublingual, Every 4 Hours PRN      ketorolac 10 MG tablet  Commonly known as: TORADOL   10 mg, Oral, Every 6 Hours PRN      ondansetron 4 MG tablet  Commonly known as: ZOFRAN   4 mg, Oral, Every 6 Hours PRN      oxybutynin XL 10 MG 24 hr tablet  Commonly known as: DITROPAN-XL   10 mg, Oral, Daily   Start Date: July 28, 2023     phenazopyridine 200 MG tablet  Commonly known as: PYRIDIUM   200 mg, Oral, 3 Times Daily PRN      tamsulosin 0.4 MG capsule 24 hr capsule  Commonly known as: FLOMAX   0.4 mg, Oral, Daily   Start Date: July 28, 2023            Continue These Medications        Instructions Start Date   acetaminophen 500 MG tablet  Commonly known as: TYLENOL   500 mg, Oral, Every 6 Hours      albuterol sulfate  (90 Base) MCG/ACT inhaler  Commonly known as: PROVENTIL HFA;VENTOLIN HFA;PROAIR HFA   2 puffs, Inhalation, Every 4 Hours PRN      aspirin 81 MG chewable tablet   1 tablet, Oral, Daily      atenolol 50 MG tablet  Commonly known as: TENORMIN   25 mg, Oral, 2 Times Daily      calcium-vitamin D 500-200 MG-UNIT per tablet   1 tablet, Oral, Daily      cetirizine 10 MG tablet  Commonly known as: zyrTEC   10 mg, Oral, Daily      clopidogrel 75 MG tablet  Commonly known as: PLAVIX   75 mg, Oral, Daily      Docusate Sodium 100 MG capsule   100 mg, Oral, 2 Times Daily      FeroSul 325  (65 FE) MG tablet  Generic drug: ferrous sulfate   TAKE 1 TABLET BY MOUTH TWICE DAILY      FIBER SELECT GUMMIES PO   2 tablets, Oral, Daily      gabapentin 300 MG capsule  Commonly known as: NEURONTIN   TAKE 1 CAPSULE BY MOUTH EVERY DAY AT NIGHT      guaiFENesin 600 MG 12 hr tablet  Commonly known as: MUCINEX   1,200 mg, Oral, 2 Times Daily      isosorbide mononitrate 30 MG 24 hr tablet  Commonly known as: IMDUR   TAKE 1 TABLET DAILY      latanoprost 0.005 % ophthalmic solution  Commonly known as: XALATAN   INSTILL 1 DROP IN BOTH EYES EVERY EVENING      levothyroxine 175 MCG tablet  Commonly known as: SYNTHROID, LEVOTHROID   175 mcg, Oral, Daily      nitroglycerin 0.4 MG SL tablet  Commonly known as: NITROSTAT   PLACE 1 TABLET UNDER THE TONGUE AS NEEDED EVERY 5 MINUTES FOR CHEST PAIN. CALL 911 IF NO RELIEF AFTER 2ND DOSE      pantoprazole 40 MG EC tablet  Commonly known as: PROTONIX   TAKE 1 TABLET DAILY      PreserVision AREDS capsule   1 tablet, Oral, 2 Times Daily      roflumilast 500 MCG tablet tablet  Commonly known as: Daliresp   500 mcg, Oral, Daily      sertraline 100 MG tablet  Commonly known as: ZOLOFT   100 mg, Oral, Daily      simvastatin 40 MG tablet  Commonly known as: ZOCOR   40 mg, Oral, Nightly      traMADol 50 MG tablet  Commonly known as: ULTRAM   TAKE 1 TABLET BY MOUTH EVERY 6 HOURS AS NEEDED FOR MODERATE PAIN      Trelegy Ellipta 100-62.5-25 MCG/ACT inhaler  Generic drug: Fluticasone-Umeclidin-Vilant   INHALE 1 PUFF DAILY      Vitamin C 500 MG capsule   Oral, Daily      Vitamin D3 25 MCG (1000 UT) capsule   Oral, Daily             Stop These Medications      solifenacin 5 MG tablet  Commonly known as: VESICARE              Allergies   Allergen Reactions    Codeine Mental Status Change    Penicillins Hives and Swelling    Dopamine Other (See Comments) and Palpitations     Other reaction(s): Hypertension         Discharge Disposition:  Home or Self Care    Discharge Diet:  Diet Order   Procedures     Diet: Regular/House Diet; Texture: Regular Texture (IDDSI 7); Fluid Consistency: Thin (IDDSI 0)         Discharge Activity:   Activity Instructions       Activity as Tolerated      Up WIth Assist                CODE STATUS:    Code Status and Medical Interventions:   Ordered at: 07/25/23 1651     Level Of Support Discussed With:    Patient    Health Care Surrogate     Code Status (Patient has no pulse and is not breathing):    CPR (Attempt to Resuscitate)     Medical Interventions (Patient has pulse or is breathing):    Full Support     Release to patient:    Routine Release         Future Appointments   Date Time Provider Department Center   10/9/2023 12:00 PM Dre Moura MD [facility]   11/1/2023 11:00 AM Florence Landrum DO [facility]       Additional Instructions for the Follow-ups that You Need to Schedule       Call MD With Problems / Concerns   As directed      Instructions: Call provider for temperature greater than 100.4, nausea, vomiting, diarrhea, chest pain, heart palpitations, shortness of breath    Order Comments: Instructions: Call provider for temperature greater than 100.4, nausea, vomiting, diarrhea, chest pain, heart palpitations, shortness of breath         Discharge Follow-up with PCP   As directed       Currently Documented PCP:    Dre Moura MD    PCP Phone Number:    [phone]     Follow Up Details: Follow-up with PCP in 1 week        Discharge Follow-up with Specified Provider: Dr Kim   As directed      To: Dr Kim   Follow Up Details: Urology office will call with date and time                Time Spent on Discharge:  45 minutes    Electronically signed by AMADOR Euceda, 07/27/23, 11:17 AM EDT.

## 2023-07-27 NOTE — PLAN OF CARE
Problem: Adult Inpatient Plan of Care  Goal: Plan of Care Review  Outcome: Met  Flowsheets (Taken 7/27/2023 1120)  Outcome Evaluation: VSS. 3LNC. PRN's given for pain control. Pt ready for d/c at this time. Urine red, UOP adequate. No other concerns at this time. Will continue with the plan of care.   Goal Outcome Evaluation:              Outcome Evaluation: VSS. 3LNC. PRN's given for pain control. Pt ready for d/c at this time. Urine red, UOP adequate. No other concerns at this time. Will continue with the plan of care.

## 2023-07-27 NOTE — PLAN OF CARE
Goal Outcome Evaluation:              Outcome Evaluation: VSS on 2-3 L NC. NSR on monitor. PRN meds given to control pain with relief upon reassessment. Voiding without difficulty. Urine red/pink in color. Will continue to monitor and provide care as appropriate per provider orders.

## 2023-07-27 NOTE — OUTREACH NOTE
Prep Survey      Flowsheet Row Responses   [facility] patient discharged from? [facility]   Is LACE score < 7 ? No   Eligibility [facility]   Date of Admission 07/25/23   Date of Discharge 07/27/23   Discharge Disposition Home or Self Care   Discharge diagnosis Ureterolithiasis   Does the patient have one of the following disease processes/diagnoses(primary or secondary)? Other   Does the patient have Home health ordered? No   Is there a DME ordered? No   Prep survey completed? Yes            Maryann ORR - Registered Nurse

## 2023-07-27 NOTE — PROGRESS NOTES
[facility]   Urology Progress Note    Patient Name: Avelina Ceron  : 1940  MRN: [id number]  Primary Care Physician:  Dre Moura MD  Date of admission: 2023    Subjective   Subjective     Chief Complaint: Right flank pain, RLQ abdominal pain    HPI:  Patient resting in bed. Reports back pain. Abdominal pain is improving. Voiding with hematuria that is resolving. Currently denies dysuria.     Cr stable 0.35.   Urine culture 100,000 Klebsiella.     Review of Systems   All systems were reviewed and negative except for: hematuria, back pain    Objective   Objective     Vitals:   Temp:  [97.1 °F (36.2 °C)-98.9 °F (37.2 °C)] 97.8 °F (36.6 °C)  Heart Rate:  [64-89] 85  Resp:  [14-20] 18  BP: ()/(39-84) 169/77  Flow (L/min):  [2-3] 3  Physical Exam    Constitutional: Awake in bed, alert   Eyes: PERRLA, sclerae anicteric, no conjunctival injection   HENT: Normocephalic, atraumatic, mucous membranes moist   Neck: Supple, trachea midline   Respiratory: Equal chest rise, 3 L o2.    Cardiovascular: RRR   Gastrointestinal: Soft   Genitourinary: mild hematuria to purewick.    Musculoskeletal: No lower extremity edema bilaterally, no clubbing or cyanosis to extremities   Psychiatric: Appropriate affect, cooperative   Neurologic: Oriented x 3,  Cranial Nerves grossly intact, speech clear   Skin: No rashes     Result Review    Result Review:  I have personally reviewed the results from the time of this admission to 2023 10:20 EDT and agree with these findings:  [x]  Laboratory  []  Microbiology  []  Radiology  []  EKG/Telemetry   []  Cardiology/Vascular   []  Pathology  []  Old records  [x]  Other: Vital signs, operative report    Most notable findings include: Cr 0.35. Urine culture with Klebsiella.     Assessment & Plan   Assessment / Plan     Brief Patient Summary:  Avelina Ceron is a 82 y.o. female who presented to [facility] for worsening right flank pain/RLQ abdominal pain found to have 7 x 5 mm  right ureteral calculus and UTI. She is now POD 1 s/p Cystoscopy, right ureteral stent placement with Dr. Kim. Per OR note; Debris returned after stent placement.     Active Hospital Problems:  Active Hospital Problems    Diagnosis     **Ureterolithiasis     Chronic leukocytosis     Chronic respiratory failure with hypoxia and hypercapnia     Lower back pain     Chronic obstructive pulmonary disease     Hypertension     Hypothyroidism        Plan:   -Continue abx.   -Flomax, Oxybutynin and antibiotics at discharge.   -Plan for outpatient definitive stone management. Our office will call to coordinate timing of procedure.   D/w Dr. Kim.        DVT prophylaxis:  Mechanical DVT prophylaxis orders are present.    CODE STATUS:   Level Of Support Discussed With: Patient; Health Care Surrogate  Code Status (Patient has no pulse and is not breathing): CPR (Attempt to Resuscitate)  Medical Interventions (Patient has pulse or is breathing): Full Support  Release to patient: Routine Release    Disposition:  I expect patient to remain inpatient.    Electronically signed by AMADOR Casas, 07/27/23, 10:20 AM EDT.

## 2023-07-27 NOTE — OP NOTE
URETEROSCOPY LASER LITHOTRIPSY WITH STENT INSERTION  Procedure Report    Patient Name:  Avelina Ceron  YOB: 1940    Date of Surgery:  7/26/2023     Indications: 82-year-old female admitted with right flank pain, nausea, vomiting and urinary tract infection.  CT demonstrates she is passing a distal 7 x 5 mm right ureteral stone in addition to nonobstructing right-sided kidney stones.  Given UTI, hypotension and persistent symptoms I have recommended she proceed to the operating room for urgent right ureteral stent placement.    Pre-op Diagnosis:   Right ureteral stone  Right nephrolithiasis  Urinary tract infection       Post-Op Diagnosis Codes:  Right ureteral stone  Right nephrolithiasis  Urinary tract infection    Procedure(s):  CYSTOSCOPY  RIGHT RETROGRADE PYELOGRAM  RIGHT URETERAL STENT INSERTION    Staff:  Surgeon(s):  Lee Kim MD         Anesthesia: General    Estimated Blood Loss: none    Implants:    Implant Name Type Inv. Item Serial No.  Lot No. LRB No. Used Action   STNT PERCUFLX NO GW [id number] - [id number] Stent STNT PERCUFLX NO GW [id number]  [employer] [id number] Right 1 Implanted       Specimen:          None        Findings: Severe right-sided hydroureteronephrosis to the level of the distal/mid ureter, uncomplicated placement of a 6 Hungarian by 24 cm double-J stent with proteinaceous debris return.    Complications: None    Description of Procedure:   The patient was identified in the preoperative holding area where informed consent was reviewed and signed. The patient was transported the operating room per anesthesia and placed supine on the operating table. Smooth endotracheal intubation was performed without issue after administration of general anesthesia. The patient was then placed in the dorsal lithotomy position where genitals were prepped and draped in the usual sterile fashion. A brief timeout was performed identifying the correct patient procedure  and laterality. Perioperative antibiotics were administered. All pressure points were padded.     Procedure began by inserting a 22 Maldivian cystoscope atraumatically per the patient's urethra, entering into the bladder were pan cystoscopy was performed identifying bilateral orthotopic ureteral orifices and no evidence of bladder masses or other lesions.     Right retrograde pyelogram interpretation   attention was then turned to the right ureteral orifice. A 5 Fr open ended ureteral catheter was inserted into the distal ureter and contrast dye was injected up the ureter, a retrograde pyelogram was performed identifying severe right-sided hydroureteronephrosis and a filling defect in the right mid/distal ureter consistent with location of stone on CT, and the ureter and renal pelvis locations were identified under fluoroscopy.     A Sensor wire was inserted through the working channel of the scope and advanced up the right ureteral orifice to the level of the renal pelvis on fluoroscopy.  Upon placement of the sensor wire, proteinaceous debris began draining from the right kidney.  A 6 x 24 Maldivian double J ureteral stent was then advanced over the wire to the renal pelvis confirmed on fluoroscopy. The Sensor wire was then removed and a good proximal stent curl was visualized within the collecting system on fluoroscopy, and a distal stent curl was observed within the bladder on direct visualization. The patient's bladder was emptied. The cystoscope was then removed.    The patient was awoken from general anesthesia and transported to the PACU in stable condition.    PLAN  -Return to operating room in 2 weeks after infection treatment for definitive ureteroscopy and lithotripsy for stone clearance.            Lee Kim MD     Date: 7/27/2023  Time: 16:48 EDT

## 2023-07-28 ENCOUNTER — TELEPHONE (OUTPATIENT)
Dept: INTERNAL MEDICINE | Facility: CLINIC | Age: 83
End: 2023-07-28
Payer: MEDICARE

## 2023-07-28 ENCOUNTER — TRANSITIONAL CARE MANAGEMENT TELEPHONE ENCOUNTER (OUTPATIENT)
Dept: CALL CENTER | Facility: HOSPITAL | Age: 83
End: 2023-07-28
Payer: MEDICARE

## 2023-07-28 NOTE — OUTREACH NOTE
Call Center TCM Note      Flowsheet Row Responses   [facility] patient discharged from? [place]   Does the patient have one of the following disease processes/diagnoses(primary or secondary)? Other   TCM attempt successful? No   Unsuccessful attempts Attempt 2   Comments HOSP DC FU appt 8/2/23 11 am   Does the patient have an appointment with their PCP within 7-14 days of discharge? Yes            Dinora Carter RN    7/28/2023, 14:05 EDT

## 2023-07-28 NOTE — OUTREACH NOTE
Call Center TCM Note      Flowsheet Row Responses   [facility] patient discharged from? [place]   Does the patient have one of the following disease processes/diagnoses(primary or secondary)? Other   TCM attempt successful? No   Unsuccessful attempts Attempt 1   Comments HOSP DC FU appt 8/2/23 11 am   Does the patient have an appointment with their PCP within 7-14 days of discharge? Yes            Dinora Carter RN    7/28/2023, 08:31 EDT

## 2023-07-29 ENCOUNTER — TRANSITIONAL CARE MANAGEMENT TELEPHONE ENCOUNTER (OUTPATIENT)
Dept: CALL CENTER | Facility: HOSPITAL | Age: 83
End: 2023-07-29
Payer: MEDICARE

## 2023-07-29 NOTE — OUTREACH NOTE
Call Center TCM Note      Flowsheet Row Responses   [facility] patient discharged from? [place]   Does the patient have one of the following disease processes/diagnoses(primary or secondary)? Other   TCM attempt successful? No   Unsuccessful attempts Attempt 3            Vera Caraballo RN    7/29/2023, 11:22 EDT

## 2023-07-30 LAB
BACTERIA SPEC AEROBE CULT: NORMAL
BACTERIA SPEC AEROBE CULT: NORMAL

## 2023-07-30 RX ORDER — ISOSORBIDE MONONITRATE 30 MG/1
TABLET, EXTENDED RELEASE ORAL
Qty: 90 TABLET | Refills: 1 | Status: SHIPPED | OUTPATIENT
Start: 2023-07-30

## 2023-07-30 RX ORDER — SIMVASTATIN 40 MG
TABLET ORAL
Qty: 90 TABLET | Refills: 1 | Status: SHIPPED | OUTPATIENT
Start: 2023-07-30

## 2023-08-02 ENCOUNTER — OFFICE VISIT (OUTPATIENT)
Dept: INTERNAL MEDICINE | Facility: CLINIC | Age: 83
End: 2023-08-02
Payer: MEDICARE

## 2023-08-02 VITALS
WEIGHT: 180.4 LBS | SYSTOLIC BLOOD PRESSURE: 116 MMHG | BODY MASS INDEX: 31.96 KG/M2 | OXYGEN SATURATION: 93 % | HEIGHT: 63 IN | TEMPERATURE: 96.3 F | DIASTOLIC BLOOD PRESSURE: 68 MMHG | HEART RATE: 68 BPM

## 2023-08-02 DIAGNOSIS — Z09 HOSPITAL DISCHARGE FOLLOW-UP: Primary | ICD-10-CM

## 2023-08-02 DIAGNOSIS — N20.1 URETEROLITHIASIS: ICD-10-CM

## 2023-08-02 DIAGNOSIS — R01.1 MURMUR, CARDIAC: ICD-10-CM

## 2023-08-02 RX ORDER — ROFLUMILAST 500 UG/1
500 TABLET ORAL DAILY
COMMUNITY
Start: 2023-04-06 | End: 2023-08-04

## 2023-08-04 ENCOUNTER — PRE-ADMISSION TESTING (OUTPATIENT)
Dept: PREADMISSION TESTING | Facility: HOSPITAL | Age: 83
End: 2023-08-04
Payer: MEDICARE

## 2023-08-04 ENCOUNTER — HOSPITAL ENCOUNTER (OUTPATIENT)
Dept: GENERAL RADIOLOGY | Facility: HOSPITAL | Age: 83
Discharge: HOME | End: 2023-08-04
Payer: MEDICARE

## 2023-08-04 VITALS — WEIGHT: 180.4 LBS | BODY MASS INDEX: 31.96 KG/M2 | HEIGHT: 63 IN

## 2023-08-04 DIAGNOSIS — N20.1 RIGHT URETERAL STONE: ICD-10-CM

## 2023-08-04 LAB
ANION GAP SERPL CALCULATED.3IONS-SCNC: 10 MMOL/L (ref 5–15)
BUN SERPL-MCNC: 9 MG/DL (ref 8–23)
BUN/CREAT SERPL: 24.3 (ref 7–25)
CALCIUM SPEC-SCNC: 9.1 MG/DL (ref 8.6–10.5)
CHLORIDE SERPL-SCNC: 97 MMOL/L (ref 98–107)
CO2 SERPL-SCNC: 36 MMOL/L (ref 22–29)
CREAT SERPL-MCNC: 0.37 MG/DL (ref 0.57–1)
DEPRECATED RDW RBC AUTO: 50.7 FL (ref 37–54)
EGFRCR SERPLBLD CKD-EPI 2021: 100.8 ML/MIN/1.73
ERYTHROCYTE [DISTWIDTH] IN BLOOD BY AUTOMATED COUNT: 13.2 % (ref 12.3–15.4)
GLUCOSE SERPL-MCNC: 120 MG/DL (ref 65–99)
HBA1C MFR BLD: 5.4 % (ref 4.8–5.6)
HCT VFR BLD AUTO: 33.3 % (ref 34–46.6)
HGB BLD-MCNC: 10.3 G/DL (ref 12–15.9)
INR PPP: 0.98 (ref 0.89–1.12)
MCH RBC QN AUTO: 32.2 PG (ref 26.6–33)
MCHC RBC AUTO-ENTMCNC: 30.9 G/DL (ref 31.5–35.7)
MCV RBC AUTO: 104.1 FL (ref 79–97)
PLATELET # BLD AUTO: 316 10*3/MM3 (ref 140–450)
PMV BLD AUTO: 8.9 FL (ref 6–12)
POTASSIUM SERPL-SCNC: 3.7 MMOL/L (ref 3.5–5.2)
PROTHROMBIN TIME: 13 SECONDS (ref 12.2–14.5)
RBC # BLD AUTO: 3.2 10*6/MM3 (ref 3.77–5.28)
SODIUM SERPL-SCNC: 143 MMOL/L (ref 136–145)
WBC NRBC COR # BLD: 12.34 10*3/MM3 (ref 3.4–10.8)

## 2023-08-04 PROCEDURE — 83036 HEMOGLOBIN GLYCOSYLATED A1C: CPT

## 2023-08-04 PROCEDURE — 71046 X-RAY EXAM CHEST 2 VIEWS: CPT

## 2023-08-04 PROCEDURE — 36415 COLL VENOUS BLD VENIPUNCTURE: CPT

## 2023-08-04 PROCEDURE — 85610 PROTHROMBIN TIME: CPT

## 2023-08-04 PROCEDURE — 85027 COMPLETE CBC AUTOMATED: CPT

## 2023-08-04 PROCEDURE — 80048 BASIC METABOLIC PNL TOTAL CA: CPT

## 2023-08-04 NOTE — PAT
Patient instructed to drink 20 ounces of Gatorade and it needs to be completed 1 hour (for Main OR patients) or 2 hours (scheduled  section & [facility] patients) before given arrival time for procedure (NO RED Gatorade)    Patient verbalized understanding.     Patient directed to Radiology Department for CXR after Pre Admission Testing Appointment.      EKG in [facility] 23    Wipes not given to patient due to no outward incision.      Patient denies any current skin issues.      Patient viewed general PAT education video as instructed in their preoperative information received from their surgeon.  Patient stated the general PAT education video was viewed in its entirety and survey completed.  Copies of PAT general education handouts (Incentive Spirometry, Meds to Beds Program, Patient Belongings, Pre-op skin preparation instructions, Blood Glucose testing, Visitor policy, Surgery FAQ, Code H) distributed to patient if not printed. Education related to the PAT pass and skin preparation for surgery (if applicable) completed in PAT as a reinforcement to PAT education video. Patient instructed to return PAT pass provided today as well as completed skin preparation sheet (if applicable) on the day of procedure.     Additionally if patient had not viewed video yet but intended to view it at home or in our waiting area, then referred them to the handout with QR code/link provided during PAT visit.  Instructed patient to complete survey after viewing the video in its entirety.  Encouraged patient/family to read PAT general education handouts thoroughly and notify PAT staff with any questions or concerns. Patient verbalized understanding of all information and priority content.

## 2023-08-09 ENCOUNTER — ANESTHESIA EVENT (OUTPATIENT)
Dept: PERIOP | Facility: HOSPITAL | Age: 83
End: 2023-08-09
Payer: MEDICARE

## 2023-08-09 RX ORDER — FAMOTIDINE 20 MG/1
20 TABLET, FILM COATED ORAL ONCE
Status: CANCELLED | OUTPATIENT
Start: 2023-08-09 | End: 2023-08-09

## 2023-08-10 ENCOUNTER — APPOINTMENT (OUTPATIENT)
Dept: GENERAL RADIOLOGY | Facility: HOSPITAL | Age: 83
End: 2023-08-10
Payer: MEDICARE

## 2023-08-10 ENCOUNTER — ANESTHESIA (OUTPATIENT)
Dept: PERIOP | Facility: HOSPITAL | Age: 83
End: 2023-08-10
Payer: MEDICARE

## 2023-08-10 ENCOUNTER — HOSPITAL ENCOUNTER (OUTPATIENT)
Facility: HOSPITAL | Age: 83
Setting detail: HOSPITAL OUTPATIENT SURGERY
Discharge: HOME | End: 2023-08-10
Attending: STUDENT IN AN ORGANIZED HEALTH CARE EDUCATION/TRAINING PROGRAM | Admitting: STUDENT IN AN ORGANIZED HEALTH CARE EDUCATION/TRAINING PROGRAM
Payer: MEDICARE

## 2023-08-10 VITALS
WEIGHT: 180 LBS | BODY MASS INDEX: 31.89 KG/M2 | OXYGEN SATURATION: 96 % | HEIGHT: 63 IN | TEMPERATURE: 97.2 F | SYSTOLIC BLOOD PRESSURE: 130 MMHG | RESPIRATION RATE: 16 BRPM | HEART RATE: 76 BPM | DIASTOLIC BLOOD PRESSURE: 46 MMHG

## 2023-08-10 DIAGNOSIS — N20.1 RIGHT URETERAL STONE: ICD-10-CM

## 2023-08-10 DIAGNOSIS — M54.50 CHRONIC MIDLINE LOW BACK PAIN WITHOUT SCIATICA: ICD-10-CM

## 2023-08-10 DIAGNOSIS — G89.29 CHRONIC MIDLINE LOW BACK PAIN WITHOUT SCIATICA: ICD-10-CM

## 2023-08-10 PROCEDURE — 76000 FLUOROSCOPY <1 HR PHYS/QHP: CPT

## 2023-08-10 PROCEDURE — 74420 UROGRAPHY RTRGR +-KUB: CPT | Performed by: STUDENT IN AN ORGANIZED HEALTH CARE EDUCATION/TRAINING PROGRAM

## 2023-08-10 PROCEDURE — 25010000002 PROPOFOL 10 MG/ML EMULSION

## 2023-08-10 PROCEDURE — C1769 GUIDE WIRE: HCPCS | Performed by: STUDENT IN AN ORGANIZED HEALTH CARE EDUCATION/TRAINING PROGRAM

## 2023-08-10 PROCEDURE — 52356 CYSTO/URETERO W/LITHOTRIPSY: CPT | Performed by: STUDENT IN AN ORGANIZED HEALTH CARE EDUCATION/TRAINING PROGRAM

## 2023-08-10 PROCEDURE — 25510000001 IOPAMIDOL 61 % SOLUTION: Performed by: STUDENT IN AN ORGANIZED HEALTH CARE EDUCATION/TRAINING PROGRAM

## 2023-08-10 PROCEDURE — 25010000002 ONDANSETRON PER 1 MG: Performed by: ANESTHESIOLOGY

## 2023-08-10 PROCEDURE — 99024 POSTOP FOLLOW-UP VISIT: CPT | Performed by: STUDENT IN AN ORGANIZED HEALTH CARE EDUCATION/TRAINING PROGRAM

## 2023-08-10 PROCEDURE — 82365 CALCULUS SPECTROSCOPY: CPT | Performed by: STUDENT IN AN ORGANIZED HEALTH CARE EDUCATION/TRAINING PROGRAM

## 2023-08-10 PROCEDURE — 25010000002 CEFAZOLIN IN DEXTROSE 2000 MG/ 100 ML SOLUTION: Performed by: STUDENT IN AN ORGANIZED HEALTH CARE EDUCATION/TRAINING PROGRAM

## 2023-08-10 PROCEDURE — 25010000002 DEXAMETHASONE PER 1 MG: Performed by: ANESTHESIOLOGY

## 2023-08-10 PROCEDURE — 25010000002 FENTANYL CITRATE (PF) 100 MCG/2ML SOLUTION: Performed by: ANESTHESIOLOGY

## 2023-08-10 PROCEDURE — C1894 INTRO/SHEATH, NON-LASER: HCPCS | Performed by: STUDENT IN AN ORGANIZED HEALTH CARE EDUCATION/TRAINING PROGRAM

## 2023-08-10 PROCEDURE — C2617 STENT, NON-COR, TEM W/O DEL: HCPCS | Performed by: STUDENT IN AN ORGANIZED HEALTH CARE EDUCATION/TRAINING PROGRAM

## 2023-08-10 PROCEDURE — C1758 CATHETER, URETERAL: HCPCS | Performed by: STUDENT IN AN ORGANIZED HEALTH CARE EDUCATION/TRAINING PROGRAM

## 2023-08-10 PROCEDURE — 25010000002 SUGAMMADEX 200 MG/2ML SOLUTION: Performed by: ANESTHESIOLOGY

## 2023-08-10 DEVICE — URETERAL STENT
Type: IMPLANTABLE DEVICE | Site: URETER | Status: FUNCTIONAL
Brand: PERCUFLEX™ PLUS

## 2023-08-10 RX ORDER — CEFAZOLIN SODIUM 2 G/100ML
2000 INJECTION, SOLUTION INTRAVENOUS ONCE
Status: COMPLETED | OUTPATIENT
Start: 2023-08-10 | End: 2023-08-10

## 2023-08-10 RX ORDER — FENTANYL CITRATE 50 UG/ML
INJECTION, SOLUTION INTRAMUSCULAR; INTRAVENOUS AS NEEDED
Status: DISCONTINUED | OUTPATIENT
Start: 2023-08-10 | End: 2023-08-10 | Stop reason: SURG

## 2023-08-10 RX ORDER — MAGNESIUM HYDROXIDE 1200 MG/15ML
LIQUID ORAL AS NEEDED
Status: DISCONTINUED | OUTPATIENT
Start: 2023-08-10 | End: 2023-08-10 | Stop reason: HOSPADM

## 2023-08-10 RX ORDER — NITROFURANTOIN 25; 75 MG/1; MG/1
100 CAPSULE ORAL 2 TIMES DAILY
Qty: 8 CAPSULE | Refills: 0 | Status: SHIPPED | OUTPATIENT
Start: 2023-08-10 | End: 2023-08-14

## 2023-08-10 RX ORDER — SODIUM CHLORIDE 9 MG/ML
40 INJECTION, SOLUTION INTRAVENOUS AS NEEDED
Status: DISCONTINUED | OUTPATIENT
Start: 2023-08-10 | End: 2023-08-10 | Stop reason: HOSPADM

## 2023-08-10 RX ORDER — SODIUM CHLORIDE 0.9 % (FLUSH) 0.9 %
10 SYRINGE (ML) INJECTION AS NEEDED
Status: DISCONTINUED | OUTPATIENT
Start: 2023-08-10 | End: 2023-08-10 | Stop reason: HOSPADM

## 2023-08-10 RX ORDER — PHENAZOPYRIDINE HYDROCHLORIDE 200 MG/1
200 TABLET, FILM COATED ORAL 3 TIMES DAILY PRN
Qty: 15 TABLET | Refills: 0 | Status: SHIPPED | OUTPATIENT
Start: 2023-08-10

## 2023-08-10 RX ORDER — FAMOTIDINE 10 MG/ML
20 INJECTION, SOLUTION INTRAVENOUS ONCE
Status: DISCONTINUED | OUTPATIENT
Start: 2023-08-10 | End: 2023-08-10 | Stop reason: HOSPADM

## 2023-08-10 RX ORDER — ROCURONIUM BROMIDE 10 MG/ML
INJECTION, SOLUTION INTRAVENOUS AS NEEDED
Status: DISCONTINUED | OUTPATIENT
Start: 2023-08-10 | End: 2023-08-10 | Stop reason: SURG

## 2023-08-10 RX ORDER — ONDANSETRON 2 MG/ML
INJECTION INTRAMUSCULAR; INTRAVENOUS AS NEEDED
Status: DISCONTINUED | OUTPATIENT
Start: 2023-08-10 | End: 2023-08-10 | Stop reason: SURG

## 2023-08-10 RX ORDER — SODIUM CHLORIDE, SODIUM LACTATE, POTASSIUM CHLORIDE, CALCIUM CHLORIDE 600; 310; 30; 20 MG/100ML; MG/100ML; MG/100ML; MG/100ML
9 INJECTION, SOLUTION INTRAVENOUS CONTINUOUS
Status: DISCONTINUED | OUTPATIENT
Start: 2023-08-10 | End: 2023-08-11 | Stop reason: HOSPADM

## 2023-08-10 RX ORDER — MIDAZOLAM HYDROCHLORIDE 1 MG/ML
0.5 INJECTION INTRAMUSCULAR; INTRAVENOUS
Status: DISCONTINUED | OUTPATIENT
Start: 2023-08-10 | End: 2023-08-10 | Stop reason: HOSPADM

## 2023-08-10 RX ORDER — LIDOCAINE HYDROCHLORIDE 10 MG/ML
0.5 INJECTION, SOLUTION EPIDURAL; INFILTRATION; INTRACAUDAL; PERINEURAL ONCE AS NEEDED
Status: DISCONTINUED | OUTPATIENT
Start: 2023-08-10 | End: 2023-08-10 | Stop reason: HOSPADM

## 2023-08-10 RX ORDER — LIDOCAINE HYDROCHLORIDE 10 MG/ML
INJECTION, SOLUTION EPIDURAL; INFILTRATION; INTRACAUDAL; PERINEURAL AS NEEDED
Status: DISCONTINUED | OUTPATIENT
Start: 2023-08-10 | End: 2023-08-10 | Stop reason: SURG

## 2023-08-10 RX ORDER — DEXAMETHASONE SODIUM PHOSPHATE 4 MG/ML
INJECTION, SOLUTION INTRA-ARTICULAR; INTRALESIONAL; INTRAMUSCULAR; INTRAVENOUS; SOFT TISSUE AS NEEDED
Status: DISCONTINUED | OUTPATIENT
Start: 2023-08-10 | End: 2023-08-10 | Stop reason: SURG

## 2023-08-10 RX ORDER — FENTANYL CITRATE 50 UG/ML
50 INJECTION, SOLUTION INTRAMUSCULAR; INTRAVENOUS
Status: DISCONTINUED | OUTPATIENT
Start: 2023-08-10 | End: 2023-08-11 | Stop reason: HOSPADM

## 2023-08-10 RX ORDER — PROPOFOL 10 MG/ML
VIAL (ML) INTRAVENOUS AS NEEDED
Status: DISCONTINUED | OUTPATIENT
Start: 2023-08-10 | End: 2023-08-10 | Stop reason: SURG

## 2023-08-10 RX ORDER — SODIUM CHLORIDE 0.9 % (FLUSH) 0.9 %
10 SYRINGE (ML) INJECTION EVERY 12 HOURS SCHEDULED
Status: DISCONTINUED | OUTPATIENT
Start: 2023-08-10 | End: 2023-08-10 | Stop reason: HOSPADM

## 2023-08-10 RX ADMIN — FENTANYL CITRATE 50 MCG: 50 INJECTION, SOLUTION INTRAMUSCULAR; INTRAVENOUS at 13:47

## 2023-08-10 RX ADMIN — ROCURONIUM BROMIDE 50 MG: 10 SOLUTION INTRAVENOUS at 13:21

## 2023-08-10 RX ADMIN — PROPOFOL 50 MG: 10 INJECTION, EMULSION INTRAVENOUS at 13:16

## 2023-08-10 RX ADMIN — DEXAMETHASONE SODIUM PHOSPHATE 4 MG: 4 INJECTION, SOLUTION INTRAMUSCULAR; INTRAVENOUS at 13:30

## 2023-08-10 RX ADMIN — LIDOCAINE HYDROCHLORIDE 50 MG: 10 INJECTION, SOLUTION EPIDURAL; INFILTRATION; INTRACAUDAL; PERINEURAL at 13:13

## 2023-08-10 RX ADMIN — PROPOFOL 50 MG: 10 INJECTION, EMULSION INTRAVENOUS at 13:19

## 2023-08-10 RX ADMIN — PROPOFOL 150 MG: 10 INJECTION, EMULSION INTRAVENOUS at 13:13

## 2023-08-10 RX ADMIN — SODIUM CHLORIDE, POTASSIUM CHLORIDE, SODIUM LACTATE AND CALCIUM CHLORIDE: 600; 310; 30; 20 INJECTION, SOLUTION INTRAVENOUS at 13:09

## 2023-08-10 RX ADMIN — ONDANSETRON 4 MG: 2 INJECTION INTRAMUSCULAR; INTRAVENOUS at 13:30

## 2023-08-10 RX ADMIN — SUGAMMADEX 200 MG: 100 INJECTION, SOLUTION INTRAVENOUS at 13:56

## 2023-08-10 RX ADMIN — CEFAZOLIN SODIUM 2000 MG: 2 INJECTION, SOLUTION INTRAVENOUS at 13:19

## 2023-08-10 NOTE — ANESTHESIA POSTPROCEDURE EVALUATION
"Patient: Avelina Ceron    Procedure Summary       Date: 08/10/23 Room / Location:  [facility] /  [facility]    Anesthesia Start: 1309 Anesthesia Stop: 1411    Procedure: URETEROSCOPY, LASER LITHOTRIPSY, STENT EXCHANGE (Right) Diagnosis:       Right ureteral stone      (Right ureteral stone [N20.1])    Surgeons: Lee Kim MD Provider: Chico Choudhury Jr., MD    Anesthesia Type: general ASA Status: 4            Anesthesia Type: general    Vitals  No vitals data found for the desired time range.          Post Anesthesia Care and Evaluation    Patient location during evaluation: PACU  Patient participation: complete - patient participated  Level of consciousness: awake and responsive to verbal stimuli  Pain score: 2  Pain management: adequate    Airway patency: patent  Anesthetic complications: No anesthetic complications    Cardiovascular status: acceptable  Respiratory status: acceptable  Hydration status: acceptable    Comments: Pt awake and responsive. SV. VSS. Report to RN. Patient Vitals in the past 24 hrs:  08/10/23 1226, BP:125/43, Temp:96.8 °F (36 °C), Temp src:Temporal, Pulse:70, Resp:18, SpO2:96 %, Height:160 cm (63\"), Weight:81.6 kg (180 lb)  133/78. p 72. r 16. t 98.1                "

## 2023-08-10 NOTE — ANESTHESIA PREPROCEDURE EVALUATION
Anesthesia Evaluation     Patient summary reviewed and Nursing notes reviewed   history of anesthetic complications (respiratory failure s/p hip sx a couple years ago required ICU/ventilation):   NPO Solid Status: > 8 hours  NPO Liquid Status: > 8 hours           Airway   Mallampati: II  TM distance: >3 FB  Neck ROM: full  No difficulty expected  Dental    (+) edentulous    Pulmonary    (+) a smoker Former, COPD,home oxygen, decreased breath sounds  Cardiovascular - normal exam    ECG reviewed    (+) hypertensionPVD, hyperlipidemia,  carotid artery disease (carotid stents)      Neuro/Psych  (+) psychiatric history Depression  (-) seizures, TIA, CVA  GI/Hepatic/Renal/Endo    (+) thyroid problem hypothyroidism  (-) GERD, liver disease, no renal disease, diabetes    Musculoskeletal     Abdominal   (+) obese   Substance History      OB/GYN          Other   arthritis,                   Anesthesia Plan    ASA 4     general     (Risk for post op ventilation post op given history in past with COPD and home oxygen requirements)  intravenous induction     Anesthetic plan, risks, benefits, and alternatives have been provided, discussed and informed consent has been obtained with: patient.    Plan discussed with CRNA.    CODE STATUS:

## 2023-08-10 NOTE — ANESTHESIA PROCEDURE NOTES
Airway  Urgency: elective    Date/Time: 8/10/2023 1:18 PM  End Time:8/10/2023 1:22 PM  Airway not difficult    General Information and Staff    Patient location during procedure: OR  Anesthesiologist: Chico Choudhury Jr., MD    Indications and Patient Condition  Indications for airway management: airway protection    Preoxygenated: yes  MILS not maintained throughout  Mask difficulty assessment: 2 - vent by mask + OA or adjuvant +/- NMBA    Final Airway Details  Final airway type: endotracheal airway      Successful airway: ETT  Cuffed: yes   Successful intubation technique: direct laryngoscopy  Endotracheal tube insertion site: oral  Blade: Gtz  Blade size: 3  ETT size (mm): 7.0  Cormack-Lehane Classification: grade I - full view of glottis  Placement verified by: chest auscultation and capnometry   Measured from: lips  ETT/EBT  to lips (cm): 20  Number of attempts at approach: 1  Assessment: lips, teeth, and gum same as pre-op and atraumatic intubation    Additional Comments  LMA 4 placed initially with ease, able to ventilate and oxygenate. Patient still moving when positioning despite 1.6MAC cate and multiple doses of propofol. Decision made to paralyze and intubate given need for relaxation for proper surgical conditions.     Negative epigastric sounds, Breath sound equal bilaterally with symmetric chest rise and fall

## 2023-08-10 NOTE — DISCHARGE INSTRUCTIONS
"Ureteroscopy + Laser Lithotripsy Post-Operative Care/Expectations    Follow these guidelines after your procedure in order to assist with your recovery.    Anesthesia Precautions and Expectations  - Rest for 24 hours after receiving general anesthesia, make sure you have someone at home with you that can monitor you  - Do not operate a vehicle, drink alcohol, or make 'important decisions'/sign legal documentation during the immediate recovery period if you received sedation for your procedure  - You may experience a sore throat, jaw discomfort, or muscle aches related to anesthesia, these symptoms may last a few days    Activity  - You may resume your normal home activities immediately post-operatively; however, light activity is encouraged for 24 hours to prevent urinary bleeding  - Do not operate a vehicle or drink alcohol if you were prescribed narcotic pain medications     Bathing/Showering  - You may resume normal bathing and showering post-procedure    Pain/Urinary Symptoms  - You may experience burning urinary pain for a few days, and/or increased urinary urgency/frequency post-procedure for a few weeks which is expected (sometimes longer if a ureteral stent was left in place)   - A medication to prevent burning urinary pain (Phenazopyridine) may be prescribed by your doctor, take as directed  - A medication to prevent urinary urgency/frequency (sometimes referred to as "bladder spasms") (Hyoscyamine, Oxybutynin, Mirabegron, Solifenacin, etc.) may be prescribed by your doctor for up to 1 month, take as directed     Urinary Bleeding (Hematuria)   - Some degree of light urinary bleeding (hematuria) is expected for up to 1-2 weeks (this may be as light as pink lemonade or somewhat darker like clear/pale red Gatorade); a good rule of thumb is that your urine should remain see-through    - If you experience heavy urinary bleeding (like the color and consistency of tomato juice, or red wine), large blood clots, or " "you are unable to urinate for more than 8 hours you should contact your doctor and present to the nearest Emergency Department  - Drink plenty of water at home and stay hydrated, as this will help naturally flush out your bladder and urethra    Antibiotics  - Complete the antibiotic course (if) prescribed as directed to prevent urinary infection     When to call your doctor:   - Pain that is not controlled with oral medications  - Signs of significant infection: Fever 101F, shaking chills, profuse sweating, persistent nausea or vomiting, unable to tolerate food or drink   - Severe urinary bleeding or large blood clots in urine  - Inability to urinate for more than 8 hours post-surgery         STENT REMOVAL INSTRUCTIONS    A ureteral stent was left in place after your procedure, the ureteral stent is a flexible plastic tube roughly 9 to 10 inches in length which allows urine to drain from the kidney to the bladder while the inflammation in the ureter is settling down after surgery.  Without the stent in place, the ureter could be blocked due to this ureteral inflammation which could result in severe flank pain.    The ureteral stent is attached to black strings which are externalized at your urethra (\"pee tube\") and are taped to your inner thigh or suprapubic region.    To remove the ureteral stent, remove the overlying tape, grasp the black strings, pull gently but also firmly until the entire stent has been removed.  This should slide out easily.  The stent is roughly 9 to 10 inches in length and usually blue in color.    If you go home and you notice that your stent has accidentally been pulled out (either partially or completely) this is okay. If the stent is partially removed, go ahead and remove the stent entirely.  Contact your urologist's office to notify your urologist regarding the incidental stent removal.    You may notice some urinary bleeding and some residual flank pain after the stent is removed, this " is entirely normal and can last for a few days.  Drink plenty of fluid to flush out any bleeding, and take Tylenol or ibuprofen for residual flank pain.      Your physician has asked you to remove your stent on: Sunday 8/13/23

## 2023-08-10 NOTE — BRIEF OP NOTE
URETEROSCOPY LASER LITHOTRIPSY WITH STENT INSERTION  Progress Note    Avelina Ceron  8/10/2023    Pre-op Diagnosis:   Right ureteral stone [N20.1]       Post-Op Diagnosis Codes:     * Right ureteral stone [N20.1]         Procedure(s):  CYSTOSCOPY, RIGHT URETEROSCOPY, LASER LITHOTRIPSY, STENT EXCHANGE      Surgeon(s):  Lee Kim MD    Anesthesia: General    Staff:   Circulator: Dora Perez RN; Eliz Gleason RN  Laser Staff: Marti Avitia RN  Radiology Technologist: Kahlil Kolb  Scrub Person: Ke Brown Martha  Nursing Assistant: Asif Tarango PCT         Estimated Blood Loss: none    Urine Voided: * No values recorded between 8/10/2023  1:07 PM and 8/10/2023  1:56 PM *    Specimens:                Specimens       ID Source Type Tests Collected By Collected At Frozen?    1 Ureter, Right Calculus STONE ANALYSIS   Lee Kim MD 8/10/23 1340     Description: RIGHT URETERAL STONE FOR ANALYSIS    This specimen was not marked as sent.                  Drains:   Ureteral Drain/Stent Right ureter 6 Fr. (Active)   Site Assessment MITCHEL 07/27/23 0800       [REMOVED] External Urinary Catheter (Removed)   Site Assessment Clean;Skin intact 07/27/23 0800   Application/Removal external catheter removed;external catheter applied;external catheter changed;skin care provided 07/27/23 0800   Collection Container Wall suction 07/27/23 0800   Wall suction (mmHG) 80 mmHG 07/27/23 0800   Securement Method Securing device 07/27/23 0800   Catheter care complete Yes 07/27/23 0800   Output (mL) 525 mL 07/27/23 0800       Findings: 8 MM DISTAL RIGHT URETERAL STONE, LASERED, REMOVED. ADDITIONAL STONE IN LOWER POLE RIGHT KIDNEY, LASERED REMOVED. 4.8 FR X 24 CM STENT ON STRINGS.         Complications: NONE          Lee Kim MD     Date: 8/10/2023  Time: 14:00 EDT

## 2023-08-10 NOTE — INTERVAL H&P NOTE
"[facility] Pre-op    Full history and physical note from office is attached.    /43 (BP Location: Right arm, Patient Position: Lying)   Pulse 70   Temp 96.8 øF (36 øC) (Temporal)   Resp 18   Ht 160 cm (63\")   Wt 81.6 kg (180 lb)   LMP  (LMP Unknown)   SpO2 96%   BMI 31.89 kg/mý     LAB Results:  Lab Results   Component Value Date    WBC 12.34 (H) 08/04/2023    HGB 10.3 (L) 08/04/2023    HCT 33.3 (L) 08/04/2023    .1 (H) 08/04/2023     08/04/2023    NEUTROABS 14.20 (H) 07/25/2023    GLUCOSE 120 (H) 08/04/2023    BUN 9 08/04/2023    CREATININE 0.37 (L) 08/04/2023    EGFRIFNONA 119 11/24/2021     08/04/2023    K 3.7 08/04/2023    CL 97 (L) 08/04/2023    CO2 36.0 (H) 08/04/2023    MG 1.6 07/26/2023    PHOS 3.4 07/26/2023    CALCIUM 9.1 08/04/2023    ALBUMIN 3.6 07/27/2023    AST 15 07/27/2023    ALT 10 07/27/2023    BILITOT 0.4 07/27/2023    PTT 26.7 06/30/2020    INR 0.98 08/04/2023 7/25/23 CT abd:  Findings:     Liver: The liver is unremarkable in morphology. Evaluation for focal liver lesions is limited without IV contrast. There is mild fatty infiltration near the falciform ligament. No biliary dilation is seen.     Gallbladder: Surgically absent.     Pancreas: Unremarkable.     Spleen: Unremarkable.     Adrenal glands: Unremarkable.     Genitourinary tract: There is a 7 x 5 mm obstructing calculus within the right distal ureter causing mild right hydroureteronephrosis. Additional 5 mm nonobstructing calculus within the right kidney. Right perinephric stranding/mild fluid is present.   Small right renal hypodensity is incompletely characterized. 3 mm nonobstructing calculus at the lower pole of the left kidney. Left kidney is otherwise unremarkable. No left hydronephrosis. Visualized left ureter is unremarkable. The urinary bladder is   decompressed. Patient is status post hysterectomy.     Gastrointestinal tract: Mid colon is decompressed which limits its " evaluation. There are surgical changes of the right colon. Colonic diverticulosis is present. Moderate hiatal hernia is present. No findings to suggest bowel obstruction. Further   evaluation of the hollow viscera is limited without IV contrast.     Other findings: No free air or free fluid is identified. No pathologically enlarged lymph nodes are seen. Moderate vascular calcifications. Left common iliac artery stent. Right external iliac artery stent.     Bones and soft tissues: No acute osseous lesion is identified. Bones are demineralized. There are degenerative changes within the spine. Bilateral hip arthroplasties are noted. Femoral stems are incompletely imaged. Superficial soft tissues demonstrate   no acute abnormality.     Lung bases: Scattered bibasilar atelectasis, scarring, or atypical infiltrates. Moderate hiatal hernia. Coronary artery calcifications.    Impression:  1.7 x 5 mm obstructing calculus within the right distal ureter causing mild right hydroureteronephrosis.  2.Additional bilateral nonobstructive nephrolithiasis.  3.Colonic diverticulosis.  4.Moderate hiatal hernia.  5.Additional findings as detailed above.    Cancer Staging (if applicable)  Cancer Patient: __ yes __no __unknown__N/A; If yes, clinical stage T:__ N:__M:__, stage group or __N/A      Impression: ureterolithiasis       Plan: URETEROSCOPY LASER LITHOTRIPSY WITH STENT INSERTION       AMADOR Rollins   8/10/2023   12:42 EDT

## 2023-08-12 NOTE — OP NOTE
URETEROSCOPY LASER LITHOTRIPSY WITH STENT INSERTION  Procedure Report    Patient Name:  Avelina Ceron  YOB: 1940    Date of Surgery:  8/10/2023     Indications: 83 yo female with right ureteral stone and right kidney stone stone who presents for definitive stone removal via ureteroscopy and lithotripsy.  She was found to have urinary tract infection and obstructing stone in July, she was taken to the OR on 7/26/2023 for right stent placement.  She has been treated with outpatient antibiotic course.  Risks discussed, informed consent obtained.     Pre-op Diagnosis:   Right ureteral stone [N20.1]       Post-Op Diagnosis Codes:     * Right ureteral stone [N20.1]      Procedure(s):  CYSTOSCOPY  RIGHT RETROGRADE PYELOGRAM  RIGHT URETEROSCOPY, LASER LITHOTRIPSY  RIGHT STENT EXCHANGE    Staff:  Surgeon(s):  Lee Kim MD         Anesthesia: General    Estimated Blood Loss: none    Implants:    Implant Name Type Inv. Item Serial No.  Lot No. LRB No. Used Action   STNT PERCUFLX NO GW 4.8X24 - [id number] Stent STNT PERCUFLX NO GW 4.8X24  [employer] [id number] Right 1 Implanted       Specimen:          Specimens       ID Source Type Tests Collected By Collected At Frozen?    1 Ureter, Right Calculus STONE ANALYSIS   Lee Kim MD 8/10/23 1340     Description: RIGHT URETERAL STONE FOR ANALYSIS                Findings: Obstructing mid ureteral stone, 7 to 8 mm lasered and removed sent for analysis.  Additional stone in the right lower pole lasered, removed via wire basket.  4.8 Lithuanian by 24 cm stent on strings.    Complications: None    Description of Procedure:     After informed consent, the patient was brought back to the operating suite and moved over to the operating table. General anesthesia was smoothly induced, IV antibiotics were administered, and the patient was placed in the dorsal lithotomy position with careful attention focused on padding all pressure  points. The patient was prepped and draped in standard fashion. A timeout was performed to ensure the correct patient and procedure    A 22Fr cystoscope was used to cannulate the urethra. The urethra was of normal course and caliber.  Upon entering the bladder, pan-cystoscopy revealed no bladder abnormalities.  The bilateral ureteral orifices were visualized in their orthotopic positions.  Right stent was emanating from the ureteral orifice.  This was grasped and pulled back to the meatus.  A sensor wire was advanced through the stent into the collecting system.    Right retrograde Pyelogram Interpretation  Attention was then turned to the right ureteral orifice which was cannulated with a 5 Fr open ended ureteral catheter. A retrograde pyelogram was performed by injecting isovue contrast, demonstrating normal course and caliber of the ureter, outlining the location of the renal pelvis and collecting system, with a filling defect in the distal ureter consistent with location of stone, no hydronephrosis is noted.     The semi-rigid ureteroscope was then advanced into the bladder.  The patient's legs had limited mobility and I was unable to advance the ureteroscope into the ureteral orifice secondary to difficult angulation.  A second wire into the collecting system through the ureteroscope and backed out the ureteroscope.    I then advanced a digital flexible ureteroscope over the wire into the ureter.  The wire was removed.  I was able to identify a 7 to 8 mm stone with mild impaction in the distal/mid ureter.  A 200 æm thulium laser fiber was advanced and the stone was fragmented carefully into very small pieces which were then able to irrigate out through the ureter into the bladder.  The stones were retrieved through the cystoscope and sent for analysis.  I advanced the ureteroscope to the proximal ureter and no additional stone fragments were identified.    For a second wire, A 11 F x 13 x 38 cm ureteral access  sheath was then advanced over the working wire under fluoroscopy. This was advanced up to the level of the proximal ureter without resistance . The working wire and inner obturator were then removed. A digital flexible ureteroscope was advanced through the access sheath into the renal pelvis. Pan-pyeloscopy was then performed which revealed some debris in the collecting system and a 4 mm stone in the right lower pole. A 200 micron Thulium laser fiber was then advanced through the ureteroscope. The stones were fragmented into tiny stone dust particles. The larger remaining fragments that required direct removal were then subsequently grasped with a nitinol wire basket and removed via the access sheath. Pan pyeloscopy was then performed which confirmed no significant stone fragments larger than 1 mm. The ureter was then carefully inspected during removal of the access sheath under direct visualization and found to be free of any injuries or stone.    Using fluoroscopic guidance, a ureteral stent was then placed. A 4.8 F x 24 cm double J ureteral stent was advanced up the right ureter over our safety wire. Fluoroscopy confirmed good curl in both the kidney and the bladder. The bladder was drained, and this concluded our procedure.    Stent strings were then externalized and taped to the patient's right labia with mastisol and tegaderm.     The patient was brought back to the PACU in stable condition. All scopes and instruments were in good working order at the end of the case. There were no complications.      Disposition:  Remove stent on strings on Sunday, 8/13/2023, follow-up in 6 to 8 weeks with repeat renal ultrasound to ensure no hydronephrosis or obstruction.      Lee Kim MD     Date: 8/12/2023  Time: 10:47 EDT

## 2023-08-15 RX ORDER — GABAPENTIN 300 MG/1
300 CAPSULE ORAL NIGHTLY
Qty: 90 CAPSULE | Refills: 0 | Status: SHIPPED | OUTPATIENT
Start: 2023-08-15

## 2023-08-18 LAB
CALCIUM OXALATE DIHYDRATE MFR STONE IR: 20 %
COLOR STONE: NORMAL
COM MFR STONE: 80 %
COMPN STONE: NORMAL
LABORATORY COMMENT REPORT: NORMAL
LABORATORY COMMENT REPORT: NORMAL
Lab: NORMAL
Lab: NORMAL
PHOTO: NORMAL
SIZE STONE: <1 MM
SPEC SOURCE SUBJ: NORMAL
WT STONE: <1 MG

## 2023-08-18 RX ORDER — PANTOPRAZOLE SODIUM 40 MG/1
TABLET, DELAYED RELEASE ORAL
Qty: 90 TABLET | Refills: 1 | Status: SHIPPED | OUTPATIENT
Start: 2023-08-18

## 2023-09-19 ENCOUNTER — HOSPITAL ENCOUNTER (OUTPATIENT)
Dept: ULTRASOUND IMAGING | Facility: HOSPITAL | Age: 83
Discharge: HOME | End: 2023-09-19
Admitting: STUDENT IN AN ORGANIZED HEALTH CARE EDUCATION/TRAINING PROGRAM
Payer: MEDICARE

## 2023-09-19 DIAGNOSIS — N20.1 RIGHT URETERAL STONE: ICD-10-CM

## 2023-09-19 PROCEDURE — 76775 US EXAM ABDO BACK WALL LIM: CPT

## 2023-09-22 DIAGNOSIS — G89.29 CHRONIC MIDLINE LOW BACK PAIN WITHOUT SCIATICA: ICD-10-CM

## 2023-09-22 DIAGNOSIS — M54.50 CHRONIC MIDLINE LOW BACK PAIN WITHOUT SCIATICA: ICD-10-CM

## 2023-09-24 RX ORDER — TRAMADOL HYDROCHLORIDE 50 MG/1
50 TABLET ORAL EVERY 6 HOURS PRN
Qty: 120 TABLET | Refills: 2 | Status: SHIPPED | OUTPATIENT
Start: 2023-09-24

## 2023-10-09 ENCOUNTER — OFFICE VISIT (OUTPATIENT)
Dept: INTERNAL MEDICINE | Facility: CLINIC | Age: 83
End: 2023-10-09
Payer: MEDICARE

## 2023-10-09 VITALS
BODY MASS INDEX: 30.47 KG/M2 | SYSTOLIC BLOOD PRESSURE: 138 MMHG | RESPIRATION RATE: 22 BRPM | HEART RATE: 64 BPM | DIASTOLIC BLOOD PRESSURE: 62 MMHG | WEIGHT: 172 LBS | TEMPERATURE: 97.7 F

## 2023-10-09 DIAGNOSIS — E03.9 HYPOTHYROIDISM, UNSPECIFIED TYPE: ICD-10-CM

## 2023-10-09 DIAGNOSIS — Z00.00 MEDICARE ANNUAL WELLNESS VISIT, SUBSEQUENT: Primary | ICD-10-CM

## 2023-10-09 DIAGNOSIS — Z23 IMMUNIZATION DUE: ICD-10-CM

## 2023-10-09 DIAGNOSIS — R82.998 LEUKOCYTES IN URINE: ICD-10-CM

## 2023-10-09 DIAGNOSIS — I10 ESSENTIAL HYPERTENSION: ICD-10-CM

## 2023-10-09 DIAGNOSIS — J44.9 CHRONIC OBSTRUCTIVE PULMONARY DISEASE, UNSPECIFIED COPD TYPE: ICD-10-CM

## 2023-10-09 DIAGNOSIS — N39.3 STRESS INCONTINENCE OF URINE: ICD-10-CM

## 2023-10-09 DIAGNOSIS — E78.5 HYPERLIPIDEMIA, UNSPECIFIED HYPERLIPIDEMIA TYPE: ICD-10-CM

## 2023-10-09 DIAGNOSIS — K21.00 GASTROESOPHAGEAL REFLUX DISEASE WITH ESOPHAGITIS WITHOUT HEMORRHAGE: ICD-10-CM

## 2023-10-09 LAB
ALBUMIN SERPL-MCNC: 4.2 G/DL (ref 3.5–5.2)
ALBUMIN/GLOB SERPL: 1.6 G/DL
ALP SERPL-CCNC: 60 U/L (ref 39–117)
ALT SERPL W P-5'-P-CCNC: 8 U/L (ref 1–33)
ANION GAP SERPL CALCULATED.3IONS-SCNC: 11.5 MMOL/L (ref 5–15)
AST SERPL-CCNC: 16 U/L (ref 1–32)
BASOPHILS # BLD AUTO: 0.1 10*3/MM3 (ref 0–0.2)
BASOPHILS NFR BLD AUTO: 0.8 % (ref 0–1.5)
BILIRUB BLD-MCNC: NEGATIVE MG/DL
BILIRUB SERPL-MCNC: 0.4 MG/DL (ref 0–1.2)
BUN SERPL-MCNC: 10 MG/DL (ref 8–23)
BUN/CREAT SERPL: 18.9 (ref 7–25)
CALCIUM SPEC-SCNC: 10 MG/DL (ref 8.6–10.5)
CHLORIDE SERPL-SCNC: 97 MMOL/L (ref 98–107)
CHOLEST SERPL-MCNC: 163 MG/DL (ref 0–200)
CLARITY, POC: ABNORMAL
CO2 SERPL-SCNC: 34.5 MMOL/L (ref 22–29)
COLOR UR: YELLOW
CREAT SERPL-MCNC: 0.53 MG/DL (ref 0.57–1)
DEPRECATED RDW RBC AUTO: 40.1 FL (ref 37–54)
EGFRCR SERPLBLD CKD-EPI 2021: 92.5 ML/MIN/1.73
EOSINOPHIL # BLD AUTO: 0.21 10*3/MM3 (ref 0–0.4)
EOSINOPHIL NFR BLD AUTO: 1.6 % (ref 0.3–6.2)
ERYTHROCYTE [DISTWIDTH] IN BLOOD BY AUTOMATED COUNT: 11.9 % (ref 12.3–15.4)
EXPIRATION DATE: ABNORMAL
GLOBULIN UR ELPH-MCNC: 2.7 GM/DL
GLUCOSE SERPL-MCNC: 106 MG/DL (ref 65–99)
GLUCOSE UR STRIP-MCNC: NEGATIVE MG/DL
HCT VFR BLD AUTO: 38.5 % (ref 34–46.6)
HDLC SERPL-MCNC: 46 MG/DL (ref 40–60)
HGB BLD-MCNC: 13 G/DL (ref 12–15.9)
IMM GRANULOCYTES # BLD AUTO: 0.05 10*3/MM3 (ref 0–0.05)
IMM GRANULOCYTES NFR BLD AUTO: 0.4 % (ref 0–0.5)
KETONES UR QL: NEGATIVE
LDLC SERPL CALC-MCNC: 85 MG/DL (ref 0–100)
LDLC/HDLC SERPL: 1.71 {RATIO}
LEUKOCYTE EST, POC: ABNORMAL
LYMPHOCYTES # BLD AUTO: 2.8 10*3/MM3 (ref 0.7–3.1)
LYMPHOCYTES NFR BLD AUTO: 21.7 % (ref 19.6–45.3)
Lab: ABNORMAL
MCH RBC QN AUTO: 32.3 PG (ref 26.6–33)
MCHC RBC AUTO-ENTMCNC: 33.8 G/DL (ref 31.5–35.7)
MCV RBC AUTO: 95.5 FL (ref 79–97)
MONOCYTES # BLD AUTO: 0.83 10*3/MM3 (ref 0.1–0.9)
MONOCYTES NFR BLD AUTO: 6.4 % (ref 5–12)
NEUTROPHILS NFR BLD AUTO: 69.1 % (ref 42.7–76)
NEUTROPHILS NFR BLD AUTO: 8.94 10*3/MM3 (ref 1.7–7)
NITRITE UR-MCNC: POSITIVE MG/ML
NRBC BLD AUTO-RTO: 0 /100 WBC (ref 0–0.2)
PH UR: 7 [PH] (ref 5–8)
PLATELET # BLD AUTO: 239 10*3/MM3 (ref 140–450)
PMV BLD AUTO: 9.7 FL (ref 6–12)
POTASSIUM SERPL-SCNC: 5.2 MMOL/L (ref 3.5–5.2)
PROT SERPL-MCNC: 6.9 G/DL (ref 6–8.5)
PROT UR STRIP-MCNC: ABNORMAL MG/DL
RBC # BLD AUTO: 4.03 10*6/MM3 (ref 3.77–5.28)
RBC # UR STRIP: ABNORMAL /UL
SODIUM SERPL-SCNC: 143 MMOL/L (ref 136–145)
SP GR UR: 1.01 (ref 1–1.03)
T4 FREE SERPL-MCNC: 1.06 NG/DL (ref 0.93–1.7)
TRIGL SERPL-MCNC: 191 MG/DL (ref 0–150)
TSH SERPL DL<=0.05 MIU/L-ACNC: 11.2 UIU/ML (ref 0.27–4.2)
UROBILINOGEN UR QL: NORMAL
VLDLC SERPL-MCNC: 32 MG/DL (ref 5–40)
WBC NRBC COR # BLD: 12.93 10*3/MM3 (ref 3.4–10.8)

## 2023-10-09 PROCEDURE — 84443 ASSAY THYROID STIM HORMONE: CPT | Performed by: INTERNAL MEDICINE

## 2023-10-09 PROCEDURE — 85025 COMPLETE CBC W/AUTO DIFF WBC: CPT | Performed by: INTERNAL MEDICINE

## 2023-10-09 PROCEDURE — 80061 LIPID PANEL: CPT | Performed by: INTERNAL MEDICINE

## 2023-10-09 PROCEDURE — 87077 CULTURE AEROBIC IDENTIFY: CPT | Performed by: INTERNAL MEDICINE

## 2023-10-09 PROCEDURE — 87086 URINE CULTURE/COLONY COUNT: CPT | Performed by: INTERNAL MEDICINE

## 2023-10-09 PROCEDURE — 87186 SC STD MICRODIL/AGAR DIL: CPT | Performed by: INTERNAL MEDICINE

## 2023-10-09 PROCEDURE — 80053 COMPREHEN METABOLIC PANEL: CPT | Performed by: INTERNAL MEDICINE

## 2023-10-09 PROCEDURE — 84439 ASSAY OF FREE THYROXINE: CPT | Performed by: INTERNAL MEDICINE

## 2023-10-09 RX ORDER — SOLIFENACIN SUCCINATE 5 MG/1
5 TABLET, FILM COATED ORAL DAILY
Qty: 30 TABLET | Refills: 5 | Status: SHIPPED | OUTPATIENT
Start: 2023-10-09

## 2023-10-09 RX ORDER — SULFAMETHOXAZOLE AND TRIMETHOPRIM 800; 160 MG/1; MG/1
1 TABLET ORAL 2 TIMES DAILY
Qty: 20 TABLET | Refills: 0 | Status: SHIPPED | OUTPATIENT
Start: 2023-10-09 | End: 2023-10-19

## 2023-10-09 NOTE — PROGRESS NOTES
Chief Complaint   Patient presents with    Medicare Wellness-subsequent       History of Present Illness      The patient presents for a follow-up related to hyperlipidemia. She is following a low fat diet. She reports that she is exercising. She is not taking medication for hyperlipidemia. She denies chest pain, shortness of breath, orthopnea, paroxysmal nocturnal dyspnea, dyspnea on exertion, edema, palpitations or syncope.    The patient presents for a follow-up related to hypertension. The patient reports that she has had no headaches or blurred vision. She states that she is taking her medication as prescribed. She is not having medication side effects.    The patient presents for a follow-up related to GERD. The patient is on pantoprazole for her gastroesophageal reflux. The medication is taken on a regular basis and gives complete relief of the symptoms. She reports no abdominal pain, belching, diarrhea, dysphagia, early satiety, heartburn, hoarseness, nausea, odynophagia, rectal bleeding, vomiting or weight loss. The GERD has no known aggravating factors.    The patient presents for a follow-up related to hypothyroidism. She reports a good energy level. She reports no hair loss, heat intolerance, cold intolerance, constipation or sweats. She is taking her medication as prescribed.    The patient presents for a follow-up related to chronic obstructive pulmonary disease. Rescue inhaler usage is reported to be four times daily. The patient reports that she has no known triggers. Since the last office visit, the patient has not sought emergency care. She currently reports no wheezing, cough or sputum production. The patient is using an inhaled steroid. She does rinse her mouth after using her steroid inhaler. She denies hemoptysis. She is not a smoker.    The patient present for follow-up of urinary incontinence. She is not currently on medications for her urinary incontinence.  She states that the symptoms have  worsened. The patient complains of urinary incontinence. The urine stream is normal. She denies increased nocturia. She states that she dribbles. There is no sensation of incomplete emptying of the bladder. She denies a history of a history of back injuries or back pain.    Medications      Current Outpatient Medications:     acetaminophen (TYLENOL) 500 MG tablet, Take 1 tablet by mouth Every 6 (Six) Hours., Disp: , Rfl:     albuterol sulfate  (90 Base) MCG/ACT inhaler, Inhale 2 puffs Every 4 (Four) Hours As Needed., Disp: , Rfl:     Ascorbic Acid (Vitamin C) 500 MG capsule, Take 500 mg by mouth Daily., Disp: , Rfl:     aspirin 81 MG chewable tablet, Chew 1 tablet Daily., Disp: , Rfl:     atenolol (TENORMIN) 50 MG tablet, Take 0.5 tablets by mouth 2 (Two) Times a Day. (Patient taking differently: Take 0.5 tablets by mouth 2 (Two) Times a Day. 1/2 in morning, 1/2 in the evening), Disp: 90 tablet, Rfl: 1    Calcium Carbonate-Vitamin D (CALCIUM-VITAMIN D) 500-200 MG-UNIT per tablet, Take 1 tablet by mouth Daily., Disp: , Rfl:     cetirizine (zyrTEC) 10 MG tablet, Take 1 tablet by mouth Daily., Disp: , Rfl:     Cholecalciferol (Vitamin D3) 25 MCG (1000 UT) capsule, Take 1 capsule by mouth Daily., Disp: , Rfl:     clopidogrel (PLAVIX) 75 MG tablet, Take 1 tablet by mouth Daily., Disp: 90 tablet, Rfl: 1    FeroSul 325 (65 Fe) MG tablet, TAKE 1 TABLET BY MOUTH TWICE DAILY (Patient taking differently: Take 1 tablet by mouth 2 (Two) Times a Day.), Disp: 60 tablet, Rfl: 3    FIBER SELECT GUMMIES PO, Take 2 tablets by mouth Daily., Disp: , Rfl:     gabapentin (NEURONTIN) 300 MG capsule, Take 1 capsule by mouth Every Night., Disp: 90 capsule, Rfl: 0    guaiFENesin (MUCINEX) 600 MG 12 hr tablet, Take 1 tablet by mouth 2 (Two) Times a Day., Disp: , Rfl:     isosorbide mononitrate (IMDUR) 30 MG 24 hr tablet, TAKE 1 TABLET DAILY (Patient taking differently: Take 1 tablet by mouth Daily.), Disp: 90 tablet, Rfl: 1     latanoprost (XALATAN) 0.005 % ophthalmic solution, Administer 1 drop to both eyes Every Night., Disp: , Rfl:     levothyroxine (SYNTHROID, LEVOTHROID) 175 MCG tablet, Take 1 tablet by mouth Daily., Disp: 90 tablet, Rfl: 1    Multiple Vitamins-Minerals (PRESERVISION AREDS 2 PO), Take 1 tablet by mouth 2 (Two) Times a Day., Disp: , Rfl:     ondansetron (ZOFRAN) 4 MG tablet, Take 1 tablet by mouth Every 6 (Six) Hours As Needed for Nausea or Vomiting., Disp: 15 tablet, Rfl: 0    pantoprazole (PROTONIX) 40 MG EC tablet, TAKE 1 TABLET DAILY, Disp: 90 tablet, Rfl: 1    Polyethylene Glycol 3350 (MIRALAX PO), Take 1 dose by mouth As Needed (constipation)., Disp: , Rfl:     roflumilast (Daliresp) 500 MCG tablet tablet, Take 1 tablet by mouth Daily., Disp: 90 tablet, Rfl: 1    sertraline (ZOLOFT) 100 MG tablet, Take 1 tablet by mouth Daily., Disp: 90 tablet, Rfl: 1    simvastatin (ZOCOR) 40 MG tablet, TAKE 1 TABLET NIGHTLY (Patient taking differently: Take 1 tablet by mouth Every Night.), Disp: 90 tablet, Rfl: 1    traMADol (ULTRAM) 50 MG tablet, Take 1 tablet by mouth Every 6 (Six) Hours As Needed for Moderate Pain., Disp: 120 tablet, Rfl: 2    Trelegy Ellipta 100-62.5-25 MCG/ACT inhaler, INHALE 1 PUFF DAILY (Patient taking differently: Inhale 1 puff Daily.), Disp: 180 each, Rfl: 3    nitroglycerin (NITROSTAT) 0.4 MG SL tablet, PLACE 1 TABLET UNDER THE TONGUE AS NEEDED EVERY 5 MINUTES FOR CHEST PAIN. CALL 911 IF NO RELIEF AFTER 2ND DOSE (Patient not taking: Reported on 10/9/2023), Disp: 25 tablet, Rfl: 0    phenazopyridine (PYRIDIUM) 200 MG tablet, Take 1 tablet by mouth 3 (Three) Times a Day As Needed (painful urinaton). (Patient not taking: Reported on 10/9/2023), Disp: 15 tablet, Rfl: 0    solifenacin (VESIcare) 5 MG tablet, Take 1 tablet by mouth Daily., Disp: 30 tablet, Rfl: 5     Allergies    Allergies   Allergen Reactions    Penicillins Hives, Swelling and Mental Status Change    Codeine Mental Status Change     Dopamine Palpitations and Other (See Comments)     Other reaction(s): Hypertension       Problem List    Patient Active Problem List   Diagnosis    Coronary arteriosclerosis in native artery    Chronic obstructive pulmonary disease    Depression    Gastroesophageal reflux disease with esophagitis    Hyperlipidemia    Hypertension    Hypothyroidism    Osteoarthritis    Peripheral arterial occlusive disease    Solitary pulmonary nodule    Vitamin D deficiency    Lower back pain    Carotid bruit    Hypoxemia    Chronic respiratory failure with hypoxia and hypercapnia    Allergic rhinitis    Chronic leukocytosis    Ureterolithiasis    Murmur, cardiac    Hospital discharge follow-up       Medications, Allergies, Problems List and Past History were reviewed and updated.    Physical Examination    /62 (BP Location: Right arm, Patient Position: Sitting, Cuff Size: Adult)   Pulse 64   Temp 97.7 øF (36.5 øC) (Infrared)   Resp 22   Wt 78 kg (172 lb)   LMP  (LMP Unknown)   BMI 30.47 kg/mý       HEENT: Head- Normocephalic Atraumatic. Facies- Within normal limits. Pinnas- Normal texture and shape bilaterally. Canals- Normal bilaterally. TMs- Normal bilaterally. Nares- Patent bilaterally. Nasal Septum- is normal. There is no tenderness to palpation over the frontal or maxillary sinuses. Lids- Normal bilaterally. Conjunctiva- Clear bilaterally. Sclera- Anicteric bilaterally. Oropharynx- Moist with no lesions. Tonsils- No enlargement, erythema or exudate.    Neck: Thyroid- non enlarged, symmetric and has no nodules. No bruits are detected. ROM- Normal Range of Motion with no rigidity.    Lungs: Auscultation- Clear to auscultation bilaterally. There are no retractions, clubbing or cyanosis. The Expiratory to Inspiratory ratio is equal.    Cardiovascular: There are no carotid bruits. Heart- Normal Rate with Regular rhythm and no murmurs. There are no gallops. There are no rubs. In the lower extremities there is no edema.  The upper extremities do not have edema.    Abdomen: Soft, benign, non-tender with no masses, hernias, organomegaly or scars.    Impression and Assessment    Encounter for Immunization Administration.    Hyperlipidemia.    Essential Hypertension.    Gastroesophageal Reflux Disease.    Hypothyroidism.    Chronic Obstructive Pulmonary Disease.    Urinary Incontinence.    Plan    Gastroesophageal Reflux Disease Plan: The patient was instructed to continue the current medications.    Hyperlipidemia Plan: The patient was instructed to exercise daily, eat a low fat diet and continue her medications.    Essential Hypertension Plan: The patient was instructed to continue the current medications.    Hypothyroidism Plan: The patient was instructed to continue the current medications.    Chronic Obstructive Pulmonary Disease Plan: Continue the current medication regimen.    Urinary Incontinence Plan: Medication will be added as noted. Further plans will be made after testing.    Counseled regarding immunizations and applicable VIS given.    Immunizations Ordered and Administered: Influenza.    Diagnoses and all orders for this visit:    1. Medicare annual wellness visit, subsequent (Primary)    2. Hyperlipidemia, unspecified hyperlipidemia type  -     Comprehensive Metabolic Panel; Future  -     Lipid Panel; Future    3. Essential hypertension  -     CBC & Differential; Future  -     Comprehensive Metabolic Panel; Future  -     POC Urinalysis Dipstick, Automated; Future    4. Gastroesophageal reflux disease with esophagitis without hemorrhage    5. Hypothyroidism, unspecified type  -     TSH; Future  -     T4, Free; Future    6. Chronic obstructive pulmonary disease, unspecified COPD type    7. Stress incontinence of urine  -     POC Urinalysis Dipstick, Automated; Future  -     solifenacin (VESIcare) 5 MG tablet; Take 1 tablet by mouth Daily.  Dispense: 30 tablet; Refill: 5    8. Immunization due  -     Fluzone High-Dose  65+yrs          Return to Office    The patient was instructed to return for follow-up in 3 months. The patient was instructed to return sooner if the condition changes, worsens, or does not resolve.

## 2023-10-09 NOTE — PATIENT INSTRUCTIONS
Medicare Wellness  Personal Prevention Plan of Service     Date of Office Visit:    Encounter Provider:  Dre Moura MD  Place of Service:  [facility]  Patient Name: Avelina Ceron  :  1940    As part of the Medicare Wellness portion of your visit today, we are providing you with this personalized preventive plan of services (PPPS). This plan is based upon recommendations of the [place] Preventive Services Task Force (USPSTF) and the Advisory Committee on Immunization Practices (ACIP).    This lists the preventive care services that should be considered, and provides dates of when you are due. Items listed as completed are up-to-date and do not require any further intervention.    Health Maintenance   Topic Date Due    DXA SCAN  Never done    ZOSTER VACCINE (1 of 2) Never done    TDAP/TD VACCINES (2 - Tdap) 2016    INFLUENZA VACCINE  2023    COVID-19 Vaccine ( - -24 season) 2023    BMI FOLLOWUP  2023    LIPID PANEL  2024    ANNUAL WELLNESS VISIT  10/09/2024    COLORECTAL CANCER SCREENING  10/15/2024    Pneumococcal Vaccine 65+  Completed       Orders Placed This Encounter   Procedures    Fluzone High-Dose 65+yrs    Comprehensive Metabolic Panel     Standing Status:   Future     Standing Expiration Date:   10/9/2024     Order Specific Question:   Release to patient     Answer:   Routine Release [[id number]]    TSH     Standing Status:   Future     Standing Expiration Date:   10/9/2024     Order Specific Question:   Release to patient     Answer:   Routine Release [[id number]]    T4, Free     Standing Status:   Future     Standing Expiration Date:   10/9/2024     Order Specific Question:   Release to patient     Answer:   Routine Release [[id number]]    Lipid Panel     Standing Status:   Future     Standing Expiration Date:   10/9/2024     Order Specific Question:   Release to patient     Answer:   Routine  Release [[id number]]    POC Urinalysis Dipstick, Automated     Standing Status:   Future     Order Specific Question:   Release to patient     Answer:   Routine Release [[id number]]    CBC & Differential     Standing Status:   Future     Standing Expiration Date:   10/9/2024     Order Specific Question:   Manual Differential     Answer:   No     Order Specific Question:   Release to patient     Answer:   Routine Release [[id number]]       Return in about 3 months (around 1/9/2024).

## 2023-10-09 NOTE — PROGRESS NOTES
The ABCs of the Annual Wellness Visit  Subsequent Medicare Wellness Visit    Subjective      Avelina Ceron is a 82 y.o. female who presents for a Subsequent Medicare Wellness Visit.    The following portions of the patient's history were reviewed and   updated as appropriate: allergies, current medications, past family history, past medical history, past social history, past surgical history, and problem list.    Compared to one year ago, the patient feels her physical   health is the same.    Compared to one year ago, the patient feels her mental   health is the same.    Recent Hospitalizations:  This patient has had a [facility] admission record on file within the last 365 days.    Current Medical Providers:  Patient Care Team:  Dre Moura MD as PCP - General  Dre Moura MD as PCP - Family Medicine  Rainer Dowd MD as Consulting Physician (Cardiology)  Ascencion Abdullahi MD as Consulting Physician (Ophthalmology)    Outpatient Medications Prior to Visit   Medication Sig Dispense Refill    acetaminophen (TYLENOL) 500 MG tablet Take 1 tablet by mouth Every 6 (Six) Hours.      albuterol sulfate  (90 Base) MCG/ACT inhaler Inhale 2 puffs Every 4 (Four) Hours As Needed.      Ascorbic Acid (Vitamin C) 500 MG capsule Take 500 mg by mouth Daily.      aspirin 81 MG chewable tablet Chew 1 tablet Daily.      atenolol (TENORMIN) 50 MG tablet Take 0.5 tablets by mouth 2 (Two) Times a Day. (Patient taking differently: Take 0.5 tablets by mouth 2 (Two) Times a Day. 1/2 in morning, 1/2 in the evening) 90 tablet 1    Calcium Carbonate-Vitamin D (CALCIUM-VITAMIN D) 500-200 MG-UNIT per tablet Take 1 tablet by mouth Daily.      cetirizine (zyrTEC) 10 MG tablet Take 1 tablet by mouth Daily.      Cholecalciferol (Vitamin D3) 25 MCG (1000 UT) capsule Take 1 capsule by mouth Daily.      clopidogrel (PLAVIX) 75 MG tablet Take 1 tablet by mouth Daily. 90 tablet 1    FeroSul 325 (65 Fe) MG tablet  TAKE 1 TABLET BY MOUTH TWICE DAILY (Patient taking differently: Take 1 tablet by mouth 2 (Two) Times a Day.) 60 tablet 3    FIBER SELECT GUMMIES PO Take 2 tablets by mouth Daily.      gabapentin (NEURONTIN) 300 MG capsule Take 1 capsule by mouth Every Night. 90 capsule 0    guaiFENesin (MUCINEX) 600 MG 12 hr tablet Take 1 tablet by mouth 2 (Two) Times a Day.      isosorbide mononitrate (IMDUR) 30 MG 24 hr tablet TAKE 1 TABLET DAILY (Patient taking differently: Take 1 tablet by mouth Daily.) 90 tablet 1    latanoprost (XALATAN) 0.005 % ophthalmic solution Administer 1 drop to both eyes Every Night.      levothyroxine (SYNTHROID, LEVOTHROID) 175 MCG tablet Take 1 tablet by mouth Daily. 90 tablet 1    Multiple Vitamins-Minerals (PRESERVISION AREDS 2 PO) Take 1 tablet by mouth 2 (Two) Times a Day.      ondansetron (ZOFRAN) 4 MG tablet Take 1 tablet by mouth Every 6 (Six) Hours As Needed for Nausea or Vomiting. 15 tablet 0    oxybutynin XL (DITROPAN-XL) 10 MG 24 hr tablet Take 1 tablet by mouth Daily. 30 tablet 0    pantoprazole (PROTONIX) 40 MG EC tablet TAKE 1 TABLET DAILY 90 tablet 1    Polyethylene Glycol 3350 (MIRALAX PO) Take 1 dose by mouth As Needed (constipation).      roflumilast (Daliresp) 500 MCG tablet tablet Take 1 tablet by mouth Daily. 90 tablet 1    sertraline (ZOLOFT) 100 MG tablet Take 1 tablet by mouth Daily. 90 tablet 1    simvastatin (ZOCOR) 40 MG tablet TAKE 1 TABLET NIGHTLY (Patient taking differently: Take 1 tablet by mouth Every Night.) 90 tablet 1    traMADol (ULTRAM) 50 MG tablet Take 1 tablet by mouth Every 6 (Six) Hours As Needed for Moderate Pain. 120 tablet 2    Trelegy Ellipta 100-62.5-25 MCG/ACT inhaler INHALE 1 PUFF DAILY (Patient taking differently: Inhale 1 puff Daily.) 180 each 3    nitroglycerin (NITROSTAT) 0.4 MG SL tablet PLACE 1 TABLET UNDER THE TONGUE AS NEEDED EVERY 5 MINUTES FOR CHEST PAIN. CALL 911 IF NO RELIEF AFTER 2ND DOSE (Patient not taking: Reported on 10/9/2023) 25  tablet 0    phenazopyridine (PYRIDIUM) 200 MG tablet Take 1 tablet by mouth 3 (Three) Times a Day As Needed (painful urinaton). (Patient not taking: Reported on 10/9/2023) 15 tablet 0     No facility-administered medications prior to visit.       Opioid medication/s are on active medication list.  and I have evaluated her active treatment plan and pain score trends (see table).  Vitals:    10/09/23 1211   PainSc:   8   PainLoc: Back     I have reviewed the chart for potential of high risk medication and harmful drug interactions in the elderly.          Aspirin is on active medication list. Aspirin use is indicated based on review of current medical condition/s. Pros and cons of this therapy have been discussed today. Benefits of this medication outweigh potential harm.  Patient has been encouraged to continue taking this medication.  .      Patient Active Problem List   Diagnosis    Coronary arteriosclerosis in native artery    Chronic obstructive pulmonary disease    Depression    Gastroesophageal reflux disease with esophagitis    Hyperlipidemia    Hypertension    Hypothyroidism    Osteoarthritis    Peripheral arterial occlusive disease    Solitary pulmonary nodule    Vitamin D deficiency    Lower back pain    Carotid bruit    Hypoxemia    Chronic respiratory failure with hypoxia and hypercapnia    Allergic rhinitis    Chronic leukocytosis    Ureterolithiasis    Murmur, cardiac    Hospital discharge follow-up     Advance Care Planning   Advance Care Planning     Advance Directive is on file.  ACP discussion was held with the patient during this visit. Patient has an advance directive in EMR which is still valid.      Objective    Vitals:    10/09/23 1211   BP: 138/62   BP Location: Right arm   Patient Position: Sitting   Cuff Size: Adult   Pulse: 64   Resp: 22   Temp: 97.7 øF (36.5 øC)   TempSrc: Infrared   Weight: 78 kg (172 lb)   Height: Comment: unable to remove shoes and stand striaght   PainSc:   8  "  PainLoc: Back     Estimated body mass index is 30.47 kg/mý as calculated from the following:    Height as of 8/10/23: 160 cm (63\").    Weight as of this encounter: 78 kg (172 lb).    Finger Rub Hearing{Test (right ear):passed  Finger Rub Hearing{Test (left ear):passed        Does the patient have evidence of cognitive impairment?   No    Lab Results   Component Value Date    HGBA1C 5.40 2023          HEALTH RISK ASSESSMENT    Smoking Status:  Social History     Tobacco Use   Smoking Status Former    Packs/day: 1.00    Years: 50.00    Additional pack years: 0.00    Total pack years: 50.00    Types: Cigarettes    Quit date:     Years since quittin.7   Smokeless Tobacco Never     Alcohol Consumption:  Social History     Substance and Sexual Activity   Alcohol Use Not Currently    Comment: no etoh for past 10 years     Fall Risk Screen:    TONY Fall Risk Assessment was completed, and patient is at MODERATE risk for falls. Assessment completed on:10/9/2023    Depression Screening:      10/9/2023    12:24 PM   PHQ-2/PHQ-9 Depression Screening   Little Interest or Pleasure in Doing Things 1-->several days   Feeling Down, Depressed or Hopeless 1-->several days   Trouble Falling or Staying Asleep, or Sleeping Too Much 3-->nearly every day   Feeling Tired or Having Little Energy 1-->several days   Poor Appetite or Overeating 1-->several days   Feeling Bad about Yourself - or that You are a Failure or Have Let Yourself or Your Family Down 3-->nearly every day   Trouble Concentrating on Things, Such as Reading the Newspaper or Watching Television 0-->not at all   Moving or Speaking So Slowly that Other People Could Have Noticed? Or the Opposite - Being So Fidgety 0-->not at all   Thoughts that You Would be Better Off Dead or of Hurting Yourself in Some Way 0-->not at all   PHQ-9: Brief Depression Severity Measure Score 10   If You Checked Off Any Problems, How Difficult Have These Problems Made It For You to " Do Your Work, Take Care of Things at Home, or Get Along with Other People? somewhat difficult       Health Habits and Functional and Cognitive Screening:      10/9/2023    12:23 PM   Functional & Cognitive Status   Do you have difficulty preparing food and eating? Yes   Do you have difficulty bathing yourself, getting dressed or grooming yourself? No   Do you have difficulty using the toilet? No   Do you have difficulty moving around from place to place? Yes   Do you have trouble with steps or getting out of a bed or a chair? Yes   Current Diet Well Balanced Diet   Dental Exam Up to date   Eye Exam Not up to date   Exercise (times per week) 0 times per week   Current Exercises Include No Regular Exercise   Do you need help using the phone?  No   Are you deaf or do you have serious difficulty hearing?  No   Do you need help to go to places out of walking distance? Yes   Do you need help shopping? Yes   Do you need help preparing meals?  Yes   Do you need help with housework?  Yes   Do you need help with laundry? Yes   Do you need help taking your medications? Yes   Do you need help managing money? No   Do you ever drive or ride in a car without wearing a seat belt? No   Have you felt unusual stress, anger or loneliness in the last month? Yes   Who do you live with? Child   If you need help, do you have trouble finding someone available to you? No   Have you been bothered in the last four weeks by sexual problems? No   Do you have difficulty concentrating, remembering or making decisions? Yes       Age-appropriate Screening Schedule:  Refer to the list below for future screening recommendations based on patient's age, sex and/or medical conditions. Orders for these recommended tests are listed in the plan section. The patient has been provided with a written plan.    Health Maintenance   Topic Date Due    DXA SCAN  Never done    ZOSTER VACCINE (1 of 2) Never done    TDAP/TD VACCINES (2 - Tdap) 05/22/2016    ANNUAL  WELLNESS VISIT  11/24/2022    INFLUENZA VACCINE  08/01/2023    COVID-19 Vaccine (6 - 2023-24 season) 09/01/2023    BMI FOLLOWUP  12/06/2023    LIPID PANEL  06/06/2024    COLORECTAL CANCER SCREENING  10/15/2024    Pneumococcal Vaccine 65+  Completed                  CMS Preventative Services Quick Reference  Risk Factors Identified During Encounter:    Immunizations Discussed/Encouraged: Influenza and COVID19    The above risks/problems have been discussed with the patient.  Pertinent information has been shared with the patient in the After Visit Summary.    Diagnoses and all orders for this visit:    1. Medicare annual wellness visit, subsequent (Primary)    2. Hyperlipidemia, unspecified hyperlipidemia type  -     Comprehensive Metabolic Panel; Future  -     Lipid Panel; Future    3. Essential hypertension  -     CBC & Differential; Future  -     Comprehensive Metabolic Panel; Future  -     POC Urinalysis Dipstick, Automated; Future    4. Gastroesophageal reflux disease with esophagitis without hemorrhage    5. Hypothyroidism, unspecified type  -     TSH; Future  -     T4, Free; Future    6. Chronic obstructive pulmonary disease, unspecified COPD type    7. Stress incontinence of urine  -     POC Urinalysis Dipstick, Automated; Future  -     solifenacin (VESIcare) 5 MG tablet; Take 1 tablet by mouth Daily.  Dispense: 30 tablet; Refill: 5    8. Immunization due  -     Fluzone High-Dose 65+yrs          Follow Up:   Next Medicare Wellness visit to be scheduled in 1 year.      An After Visit Summary and PPPS were made available to the patient.

## 2023-10-10 DIAGNOSIS — E03.9 HYPOTHYROIDISM, UNSPECIFIED TYPE: Primary | ICD-10-CM

## 2023-10-10 RX ORDER — LEVOTHYROXINE SODIUM 0.2 MG/1
200 TABLET ORAL DAILY
Qty: 90 TABLET | Refills: 0 | Status: SHIPPED | OUTPATIENT
Start: 2023-10-10

## 2023-10-11 LAB
BACTERIA SPEC AEROBE CULT: ABNORMAL
BACTERIA SPEC AEROBE CULT: ABNORMAL

## 2023-10-18 ENCOUNTER — APPOINTMENT (OUTPATIENT)
Dept: GENERAL RADIOLOGY | Facility: HOSPITAL | Age: 83
End: 2023-10-18
Payer: MEDICARE

## 2023-10-18 ENCOUNTER — APPOINTMENT (OUTPATIENT)
Dept: CT IMAGING | Facility: HOSPITAL | Age: 83
End: 2023-10-18
Payer: MEDICARE

## 2023-10-18 ENCOUNTER — HOSPITAL ENCOUNTER (EMERGENCY)
Facility: HOSPITAL | Age: 83
Discharge: HOME | End: 2023-10-18
Attending: EMERGENCY MEDICINE | Admitting: EMERGENCY MEDICINE
Payer: MEDICARE

## 2023-10-18 VITALS
HEIGHT: 63 IN | RESPIRATION RATE: 16 BRPM | DIASTOLIC BLOOD PRESSURE: 57 MMHG | BODY MASS INDEX: 30.48 KG/M2 | TEMPERATURE: 98.3 F | HEART RATE: 64 BPM | SYSTOLIC BLOOD PRESSURE: 131 MMHG | WEIGHT: 172 LBS | OXYGEN SATURATION: 94 %

## 2023-10-18 DIAGNOSIS — S09.90XA CLOSED HEAD INJURY, INITIAL ENCOUNTER: ICD-10-CM

## 2023-10-18 DIAGNOSIS — S73.101A HIP SPRAIN, RIGHT, INITIAL ENCOUNTER: ICD-10-CM
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
## 2023-10-18 PROCEDURE — 99284 EMERGENCY DEPT VISIT MOD MDM: CPT

## 2023-10-18 PROCEDURE — 73502 X-RAY EXAM HIP UNI 2-3 VIEWS: CPT

## 2023-10-18 PROCEDURE — 70450 CT HEAD/BRAIN W/O DYE: CPT

## 2023-10-18 RX ORDER — METHOCARBAMOL 750 MG/1
750 TABLET, FILM COATED ORAL 3 TIMES DAILY
Qty: 21 TABLET | Refills: 0 | Status: SHIPPED | OUTPATIENT
Start: 2023-10-18 | End: 2023-10-20 | Stop reason: SDUPTHER

## 2023-10-18 RX ORDER — HYDROCODONE BITARTRATE AND ACETAMINOPHEN 5; 325 MG/1; MG/1
1 TABLET ORAL ONCE
Status: COMPLETED | OUTPATIENT
Start: 2023-10-18 | End: 2023-10-18

## 2023-10-18 RX ADMIN — HYDROCODONE BITARTRATE AND ACETAMINOPHEN 1 TABLET: 5; 325 TABLET ORAL at 03:10

## 2023-10-18 NOTE — ED PROVIDER NOTES
"Subjective   History of Present Illness  Is a 82-year-old female presented to the emergency department after an axonal fall.  The patient states that she was walking up a stair and she tripped and fell forward.  She landed on her right side.  Patient states that she did hit her head very mildly.  There is no loss of consciousness or syncope.  Patient is mainly complaining of some right hip pain.  Dull in nature.  Nonradiating.  Patient's daughter was there and helped her get back to her feet.  She is not having any headache or change in vision.  No focal weakness.  No chest pain or shortness of breath.  No abdominal pain or vomiting.    History provided by:  Patient   used: No        Review of Systems   Constitutional:  Negative for chills and fever.   HENT:  Negative for congestion, ear pain and sore throat.    Eyes:  Negative for visual disturbance.   Respiratory:  Negative for shortness of breath.    Cardiovascular:  Negative for chest pain.   Gastrointestinal:  Negative for abdominal pain.   Genitourinary:  Negative for difficulty urinating.   Musculoskeletal:  Positive for arthralgias.   Skin:  Negative for rash.   Neurological:  Negative for dizziness, weakness and numbness.   Psychiatric/Behavioral:  Negative for agitation.        Past Medical History:   Diagnosis Date    Anesthesia complication     Pt went into respiratory failure during total hip replacement in 2020    Broken ribs     COPD (chronic obstructive pulmonary disease)     Degenerative lumbar disc     Disease of thyroid gland     \"low thyroid\"    History of colonoscopy     Hyperlipidemia     Hypertension     On home O2     3L    Osteoarthritis        Allergies   Allergen Reactions    Penicillins Hives, Swelling and Mental Status Change    Codeine Mental Status Change    Dopamine Palpitations and Other (See Comments)     Other reaction(s): Hypertension       Past Surgical History:   Procedure Laterality Date    CAROTID STENT " Bilateral     COLONOSCOPY      EYE SURGERY  10/17/2022    yag surgery    GALLBLADDER SURGERY  2012    HYSTERECTOMY      ILIAC ARTERY STENT Bilateral     TOTAL HIP ARTHROPLASTY Bilateral     URETEROSCOPY LASER LITHOTRIPSY WITH STENT INSERTION Right 2023    Procedure: CYSTOSCOPY, RIGHT RETROGRADE PYELOGRAM; WITH STENT INSERTION;  Surgeon: Lee Kim MD;  Location:  [facility];  Service: Urology;  Laterality: Right;    URETEROSCOPY LASER LITHOTRIPSY WITH STENT INSERTION Right 8/10/2023    Procedure: URETEROSCOPY, LASER LITHOTRIPSY, STENT EXCHANGE;  Surgeon: Lee Kim MD;  Location:  [facility];  Service: Urology;  Laterality: Right;       Family History   Problem Relation Age of Onset    Cancer Other         BREAST, COLON, OVARIAN    Depression Other     Hyperlipidemia Other     Hypertension Other     Heart disease Mother     Hypertension Mother     Arthritis Daughter     Cancer Daughter     Hyperlipidemia Daughter     Liver disease Daughter     Thyroid disease Daughter     Cancer Maternal Aunt     Diabetes Maternal Uncle     Alzheimer's disease Sister        Social History     Socioeconomic History    Marital status:    Tobacco Use    Smoking status: Former     Packs/day: 1.00     Years: 50.00     Additional pack years: 0.00     Total pack years: 50.00     Types: Cigarettes     Quit date:      Years since quittin.8    Smokeless tobacco: Never   Vaping Use    Vaping Use: Never used   Substance and Sexual Activity    Alcohol use: Not Currently     Comment: no etoh for past 10 years    Drug use: No    Sexual activity: Defer           Objective   Physical Exam  Vitals and nursing note reviewed.   Constitutional:       General: She is not in acute distress.     Appearance: She is not ill-appearing or toxic-appearing.   HENT:      Mouth/Throat:      Pharynx: No posterior oropharyngeal erythema.   Eyes:      Conjunctiva/sclera: Conjunctivae normal.      Pupils: Pupils are equal, round,  and reactive to light.   Cardiovascular:      Rate and Rhythm: Normal rate and regular rhythm.   Pulmonary:      Effort: Pulmonary effort is normal. No respiratory distress.   Abdominal:      General: Abdomen is flat. There is no distension.      Palpations: There is no mass.      Tenderness: There is no abdominal tenderness. There is no guarding or rebound.   Musculoskeletal:         General: No deformity. Normal range of motion.      Comments: Patient is some minor tenderness to palpation of the right hip.  Pelvis appears stable.  Compartments are soft.  Neurovascular intact.  Patient is some minor bruising along the medial aspect of the right hand.  Good opposition.   Skin:     General: Skin is warm.      Findings: No rash.   Neurological:      General: No focal deficit present.      Mental Status: She is alert and oriented to person, place, and time.      Motor: No weakness.         Procedures           ED Course  ED Course as of 10/18/23 0335   Wed Oct 18, 2023   0333 BP: 145/62 [JK]   0333 Temp: 98.3 °F (36.8 °C) [JK]   0333 Temp src: Oral [JK]   0333 Heart Rate: 62 [JK]   0333 Resp: 16 [JK]   0333 SpO2: 94 %  Patient's repeat vitals, telemetry tracing, and pulse oximetry tracing were directly viewed and interpreted by myself.  Patient hemodynamically stable [JK]   0333 CT Head Without Contrast [JK]   0333 XR Hip With or Without Pelvis 2 - 3 View Right  Imaging was directly visualized by myself, per my interpretations, CT of the head was unremarkable.  X-ray of the right hip did not show any acute fracture. [JK]   0334 Patient was declining x-ray of the hand at this time [JK]   0334 On reevaluation patient is feeling better.  Findings consistent with a contusion of the right hip as well as closed head injury.  Patient be discharged on short course of analgesics.  Needs follow-up with PCP in 48 hours.  Given strict return precautions.  Verbalized understanding. [JK]   0334 I had a discussion with the  patient/family regarding diagnosis, diagnostic results, treatment plan, and medications. The patient/family indicated understanding of these instructions. I spent adequate time at the bedside prior to discharge necessary to discuss the aftercare instructions, giving patient education, providing explanations of the results of our evaluations/findings, and my decision making to assure that the patient/family understand the plan of care. Time was allotted to answer questions at that time and throughout the ED course. Patient is required to maintain timely follow up, as discussed. I also discussed the potential for the development of an acute emergent condition requiring further evaluation, return to the ER, admission, or even surgical intervention.  I encouraged the patient to return to the emergency department immediately for any concerns, worsening symptoms, new complaints, or if symptoms persist and they are unable to seek follow-up in a timely fashion. The patient/family expressed understanding and agreement with this plan    Shared decision making:   After full review of the patient's clinical presentation, review of any work-up including but not limited to laboratory studies and radiology obtained, I had a discussion with the patient.  Treatment options were discussed as well as the risks, benefits and consequences.  I discussed all findings with the patient and family members if available.  During the discussion, treatment goals were understood by all as well as any misconceptions which were addressed with the patient.  Ample time was given for any questions they may have had.  They are in agreement with the treatment plan as well as final disposition. [JK]      ED Course User Index  [JK] Darin Darby MD                                           Medical Decision Making  This is a 82-year-old female presenting to the emergency department after an accidental fall.  The patient tripped over a step and  landed on her right side.  She will have some right hip pain.  Patient does take Plavix and has a minor head trauma.  She does meet imaging criteria for the head based on [facility] guidelines.  Patient provided analgesics.  Work-up initiated.      Differential diagnosis: Accidental fall, right hip contusion, fracture, strain, dislocation, closed head injury      Amount and/or Complexity of Data Reviewed  External Data Reviewed: labs, radiology and notes.     Details: External laboratories, imaging as well as notes were reviewed personally by myself.  All relevant studies were used to guide decision making.     Date of previous record: 8/10/2023    Source of note: Admission record    Summary: Patient was admitted secondary to obstructing kidney stone.  I did review laboratory studies as well as CT imaging on file.    Radiology: ordered and independent interpretation performed. Decision-making details documented in ED Course.    Risk  Prescription drug management.        Final diagnoses:   Fall, initial encounter   Closed head injury, initial encounter   Hip sprain, right, initial encounter       ED Disposition  ED Disposition       ED Disposition   Discharge    Condition   Stable    Comment   --               Dre Moura MD  [address] [place], [place] [address] [phone]    Call in 1 day           Medication List        New Prescriptions      methocarbamol 750 MG tablet  Commonly known as: ROBAXIN  Take 1 tablet by mouth 3 (Three) Times a Day for 7 days.            Changed      atenolol 50 MG tablet  Commonly known as: TENORMIN  Take 0.5 tablets by mouth 2 (Two) Times a Day.  What changed: additional instructions     FeroSul 325 (65 Fe) MG tablet  Generic drug: ferrous sulfate  TAKE 1 TABLET BY MOUTH TWICE DAILY  What changed: how much to take     Trelegy Ellipta 100-62.5-25 MCG/ACT inhaler  Generic drug: Fluticasone-Umeclidin-Vilant  INHALE 1 PUFF DAILY  What changed:   how to take this  when  to take this               Where to Get Your Medications        These medications were sent to [id number] - [place], [place] - [address] [facility] - [phone]  - [phone]   [address] [address]      Phone: [phone]   methocarbamol 750 MG tablet            Darin Darby MD  10/18/23 0337

## 2023-10-20 ENCOUNTER — TELEMEDICINE (OUTPATIENT)
Dept: INTERNAL MEDICINE | Facility: CLINIC | Age: 83
End: 2023-10-20
Payer: MEDICARE

## 2023-10-20 ENCOUNTER — REFERRAL TRIAGE (OUTPATIENT)
Dept: CASE MANAGEMENT | Facility: OTHER | Age: 83
End: 2023-10-20
Payer: MEDICARE

## 2023-10-20 VITALS
SYSTOLIC BLOOD PRESSURE: 159 MMHG | HEART RATE: 77 BPM | BODY MASS INDEX: 30.48 KG/M2 | DIASTOLIC BLOOD PRESSURE: 71 MMHG | TEMPERATURE: 97.4 F | HEIGHT: 63 IN | WEIGHT: 172 LBS

## 2023-10-20 DIAGNOSIS — S79.911A HIP INJURY, RIGHT, INITIAL ENCOUNTER: ICD-10-CM

## 2023-10-20 DIAGNOSIS — Z91.81 HISTORY OF RECENT FALL: Primary | ICD-10-CM

## 2023-10-20 DIAGNOSIS — S60.00XA CONTUSION OF MULTIPLE SITES OF RIGHT HAND AND FINGERS, INITIAL ENCOUNTER: ICD-10-CM

## 2023-10-20 DIAGNOSIS — R53.1 WEAKNESS: ICD-10-CM

## 2023-10-20 DIAGNOSIS — R26.9 GAIT DIFFICULTY: ICD-10-CM

## 2023-10-20 DIAGNOSIS — S40.021A CONTUSION OF MULTIPLE SITES OF RIGHT UPPER ARM, INITIAL ENCOUNTER: ICD-10-CM

## 2023-10-20 DIAGNOSIS — S60.221A CONTUSION OF MULTIPLE SITES OF RIGHT HAND AND FINGERS, INITIAL ENCOUNTER: ICD-10-CM

## 2023-10-20 RX ORDER — METHOCARBAMOL 750 MG/1
750 TABLET, FILM COATED ORAL 3 TIMES DAILY
Qty: 21 TABLET | Refills: 0 | Status: SHIPPED | OUTPATIENT
Start: 2023-10-20 | End: 2023-10-27

## 2023-10-20 NOTE — PROGRESS NOTES
Office Note     Name: Avelina Ceron    : 1940     MRN: [id number]     Chief Complaint  Fall (Last Tuesday Morning, went to ER/Follow up from ER)    Subjective     History of Present Illness:  Avelina Ceron is a 82 y.o. female who presents today for ER follow up  You have chosen to receive care through a telephone visit. Do you consent to use a telephone visit for your medical care today? Yes    This was an audio and video enabled telemedicine encounter.      Patient reports with daughter via [employer] for a virtual visit.  Patient was unable to come in for a in person visit due to difficulties with transportation.  Daughter reports due to patient's body habitus it is very difficult for her to get her in and out of her vehicle in and out of bed or to the bathroom it is causing severe pain for her daughters back at this time they report that they have now gait belt for transfer seats at this time.    Patient reports that on  she was attempting to go up a few stairs in her home when she fell and hit her head and the right side of her body.  She was transported by squad to the emergency department at that time she was only experiencing some right-sided hip pain.  Not have a headache however she hit her head and is on anticoagulant therapy therefore a CT of her head was ordered and was normal at that time a hip x-ray was ordered which showed some postsurgical changes but no hardware failure or fracture.    Since that time patient has been using Robaxin and tramadol for pain relief which has helped some but daughter reports that patient tends to talk in during her sleep and moan during her sleep so she is worried she may be in more pain.    Their biggest concern was that her right upper arm is painful and when they initially were trying to get her up on the  they felt a pop in her arm.  They did report this to the ER however x-rays were not performed at the ER.  They have also noticed that her  "hand is sore and bruised on her right side the ER said that if she was able to wiggle her fingers they probably did not need to do an x-ray at that time.    Patient is needing refills on her muscle relaxer, home health for her physical therapy and possible evaluation for lifts or transfer training.    Review of Systems:   Review of Systems    Past Medical History:   Past Medical History:   Diagnosis Date    Anesthesia complication     Pt went into respiratory failure during total hip replacement in 2020    Broken ribs     COPD (chronic obstructive pulmonary disease)     Degenerative lumbar disc     Disease of thyroid gland     \"low thyroid\"    History of colonoscopy     Hyperlipidemia     Hypertension     On home O2     3L    Osteoarthritis        Past Surgical History:   Past Surgical History:   Procedure Laterality Date    CAROTID STENT Bilateral     COLONOSCOPY      EYE SURGERY  10/17/2022    yag surgery    GALLBLADDER SURGERY  2012    HYSTERECTOMY      ILIAC ARTERY STENT Bilateral     TOTAL HIP ARTHROPLASTY Bilateral     URETEROSCOPY LASER LITHOTRIPSY WITH STENT INSERTION Right 07/26/2023    Procedure: CYSTOSCOPY, RIGHT RETROGRADE PYELOGRAM; WITH STENT INSERTION;  Surgeon: Lee Kim MD;  Location:  [facility];  Service: Urology;  Laterality: Right;    URETEROSCOPY LASER LITHOTRIPSY WITH STENT INSERTION Right 8/10/2023    Procedure: URETEROSCOPY, LASER LITHOTRIPSY, STENT EXCHANGE;  Surgeon: Lee Kim MD;  Location:  [facility];  Service: Urology;  Laterality: Right;       Immunizations:   Immunization History   Administered Date(s) Administered    COVID-19 (MODERNA) 1st,2nd,3rd Dose Monovalent 02/04/2021, 03/04/2021, 11/04/2021    COVID-19 (MODERNA) BIVALENT 12+YRS 10/24/2022    COVID-19 (MODERNA) Monovalent Original Booster 04/19/2022    Fluad Quad 65+ 10/01/2020, 10/24/2022    Fluzone High Dose =>65 Years ([employer] ONLY) 10/05/2016, 01/11/2018, 11/14/2018, 10/29/2019    Fluzone High-Dose 65+yrs " 10/19/2021, 10/09/2023    Influenza Quad Vaccine (Inpatient) 10/14/2010    Pneumococcal Conjugate 13-Valent (PCV13) 09/21/2017    Pneumococcal Polysaccharide (PPSV23) 10/05/2006    Td (TDVAX) 05/22/2006        Medications:     Current Outpatient Medications:     acetaminophen (TYLENOL) 500 MG tablet, Take 1 tablet by mouth Every 6 (Six) Hours., Disp: , Rfl:     albuterol sulfate  (90 Base) MCG/ACT inhaler, Inhale 2 puffs Every 4 (Four) Hours As Needed., Disp: , Rfl:     Ascorbic Acid (Vitamin C) 500 MG capsule, Take 500 mg by mouth Daily., Disp: , Rfl:     aspirin 81 MG chewable tablet, Chew 1 tablet Daily., Disp: , Rfl:     atenolol (TENORMIN) 50 MG tablet, Take 0.5 tablets by mouth 2 (Two) Times a Day. (Patient taking differently: Take 0.5 tablets by mouth 2 (Two) Times a Day. 1/2 in morning, 1/2 in the evening), Disp: 90 tablet, Rfl: 1    Calcium Carbonate-Vitamin D (CALCIUM-VITAMIN D) 500-200 MG-UNIT per tablet, Take 1 tablet by mouth Daily., Disp: , Rfl:     cetirizine (zyrTEC) 10 MG tablet, Take 1 tablet by mouth Daily., Disp: , Rfl:     Cholecalciferol (Vitamin D3) 25 MCG (1000 UT) capsule, Take 1 capsule by mouth Daily., Disp: , Rfl:     clopidogrel (PLAVIX) 75 MG tablet, Take 1 tablet by mouth Daily., Disp: 90 tablet, Rfl: 1    FeroSul 325 (65 Fe) MG tablet, TAKE 1 TABLET BY MOUTH TWICE DAILY (Patient taking differently: Take 1 tablet by mouth 2 (Two) Times a Day.), Disp: 60 tablet, Rfl: 3    FIBER SELECT GUMMIES PO, Take 2 tablets by mouth Daily., Disp: , Rfl:     gabapentin (NEURONTIN) 300 MG capsule, Take 1 capsule by mouth Every Night., Disp: 90 capsule, Rfl: 0    guaiFENesin (MUCINEX) 600 MG 12 hr tablet, Take 1 tablet by mouth 2 (Two) Times a Day., Disp: , Rfl:     isosorbide mononitrate (IMDUR) 30 MG 24 hr tablet, TAKE 1 TABLET DAILY (Patient taking differently: Take 1 tablet by mouth Daily.), Disp: 90 tablet, Rfl: 1    latanoprost (XALATAN) 0.005 % ophthalmic solution, Administer 1 drop to  both eyes Every Night., Disp: , Rfl:     levothyroxine (Synthroid) 200 MCG tablet, Take 1 tablet by mouth Daily., Disp: 90 tablet, Rfl: 0    methocarbamol (ROBAXIN) 750 MG tablet, Take 1 tablet by mouth 3 (Three) Times a Day for 7 days., Disp: 21 tablet, Rfl: 0    Multiple Vitamins-Minerals (PRESERVISION AREDS 2 PO), Take 1 tablet by mouth 2 (Two) Times a Day., Disp: , Rfl:     nitroglycerin (NITROSTAT) 0.4 MG SL tablet, PLACE 1 TABLET UNDER THE TONGUE AS NEEDED EVERY 5 MINUTES FOR CHEST PAIN. CALL 911 IF NO RELIEF AFTER 2ND DOSE, Disp: 25 tablet, Rfl: 0    pantoprazole (PROTONIX) 40 MG EC tablet, TAKE 1 TABLET DAILY, Disp: 90 tablet, Rfl: 1    phenazopyridine (PYRIDIUM) 200 MG tablet, Take 1 tablet by mouth 3 (Three) Times a Day As Needed (painful urinaton)., Disp: 15 tablet, Rfl: 0    Polyethylene Glycol 3350 (MIRALAX PO), Take 1 dose by mouth As Needed (constipation)., Disp: , Rfl:     roflumilast (Daliresp) 500 MCG tablet tablet, Take 1 tablet by mouth Daily., Disp: 90 tablet, Rfl: 1    sertraline (ZOLOFT) 100 MG tablet, Take 1 tablet by mouth Daily., Disp: 90 tablet, Rfl: 1    simvastatin (ZOCOR) 40 MG tablet, TAKE 1 TABLET NIGHTLY (Patient taking differently: Take 1 tablet by mouth Every Night.), Disp: 90 tablet, Rfl: 1    solifenacin (VESIcare) 5 MG tablet, Take 1 tablet by mouth Daily., Disp: 30 tablet, Rfl: 5    traMADol (ULTRAM) 50 MG tablet, Take 1 tablet by mouth Every 6 (Six) Hours As Needed for Moderate Pain., Disp: 120 tablet, Rfl: 2    Trelegy Ellipta 100-62.5-25 MCG/ACT inhaler, INHALE 1 PUFF DAILY (Patient taking differently: Inhale 1 puff Daily.), Disp: 180 each, Rfl: 3    Allergies:   Allergies   Allergen Reactions    Penicillins Hives, Swelling and Mental Status Change    Codeine Mental Status Change    Dopamine Palpitations and Other (See Comments)     Other reaction(s): Hypertension       Family History:   Family History   Problem Relation Age of Onset    Cancer Other         BREAST, COLON,  "OVARIAN    Depression Other     Hyperlipidemia Other     Hypertension Other     Heart disease Mother     Hypertension Mother     Arthritis Daughter     Cancer Daughter     Hyperlipidemia Daughter     Liver disease Daughter     Thyroid disease Daughter     Cancer Maternal Aunt     Diabetes Maternal Uncle     Alzheimer's disease Sister        Social History:   Social History     Socioeconomic History    Marital status:    Tobacco Use    Smoking status: Former     Packs/day: 1.00     Years: 50.00     Additional pack years: 0.00     Total pack years: 50.00     Types: Cigarettes     Quit date:      Years since quittin.8    Smokeless tobacco: Never   Vaping Use    Vaping Use: Never used   Substance and Sexual Activity    Alcohol use: Not Currently     Comment: no etoh for past 10 years    Drug use: No    Sexual activity: Defer         Objective     Vital Signs  /71 (BP Location: Left arm, Patient Position: Sitting, Cuff Size: Adult)   Pulse 77   Temp 97.4 °F (36.3 °C) (Temporal)   Ht 160 cm (63\")   Wt 78 kg (172 lb)   BMI 30.47 kg/m²   Estimated body mass index is 30.47 kg/m² as calculated from the following:    Height as of this encounter: 160 cm (63\").    Weight as of this encounter: 78 kg (172 lb).            Physical Exam  Constitutional:       Appearance: Normal appearance. She is obese.   Musculoskeletal:        Arms:       Comments: Patient report she is unable to move right arm due to pain in right upper arm          Assessment and Plan     1. History of recent fall    - Ambulatory Referral to Home Health  - Ambulatory Referral to Case Management Barriers to Care, Caregiving/Support, High Risk for ED/Readmission  - XR Humerus Right; Future  - XR Hand 2 View Right; Future  - methocarbamol (ROBAXIN) 750 MG tablet; Take 1 tablet by mouth 3 (Three) Times a Day for 7 days.  Dispense: 21 tablet; Refill: 0    2. Hip injury, right, initial encounter    - Ambulatory Referral to Home Health  - " Ambulatory Referral to Case Management Barriers to Care, Caregiving/Support, High Risk for ED/Readmission  - XR Humerus Right; Future  - XR Hand 2 View Right; Future  - methocarbamol (ROBAXIN) 750 MG tablet; Take 1 tablet by mouth 3 (Three) Times a Day for 7 days.  Dispense: 21 tablet; Refill: 0    3. Contusion of multiple sites of right upper arm, initial encounter    - Ambulatory Referral to Home Health  - Ambulatory Referral to Case Management Barriers to Care, Caregiving/Support, High Risk for ED/Readmission  - XR Humerus Right; Future  - XR Hand 2 View Right; Future  - methocarbamol (ROBAXIN) 750 MG tablet; Take 1 tablet by mouth 3 (Three) Times a Day for 7 days.  Dispense: 21 tablet; Refill: 0    4. Contusion of multiple sites of right hand and fingers, initial encounter    - Ambulatory Referral to Home Health  - Ambulatory Referral to Case Management Barriers to Care, Caregiving/Support, High Risk for ED/Readmission  - XR Humerus Right; Future  - XR Hand 2 View Right; Future  - methocarbamol (ROBAXIN) 750 MG tablet; Take 1 tablet by mouth 3 (Three) Times a Day for 7 days.  Dispense: 21 tablet; Refill: 0    5. Weakness    - Ambulatory Referral to Home Health  - Ambulatory Referral to Case Management Barriers to Care, Caregiving/Support, High Risk for ED/Readmission  - XR Humerus Right; Future  - XR Hand 2 View Right; Future    6. Gait difficulty    - Ambulatory Referral to Home Health  - Ambulatory Referral to Case Management Barriers to Care, Caregiving/Support, High Risk for ED/Readmission  - XR Humerus Right; Future  - XR Hand 2 View Right; Future     Patient verbalized good understanding of diagnosis and treatment plan.  All questions answered to their satisfaction.      Red flag symptoms and indications to report to ER discussed with patient who verbalized good understanding.     Patient counseled on the side effects of prescribed medication and verbalized good understanding.  Recommended taking  probiotics while taking antibiotics.  Patient is to call office for any new or worsening symptoms.  Patient verbalized understanding of indications to report to the ER.    I spent approximately 30 minutes providing clinical care for this patient; including review of patient's chart and provider documentation, face to face time spent with patient in examination room (obtaining history, performing physical exam, discussing diagnosis and management options), placing orders, and completing patient documentation          Follow Up  No follow-ups on file.    CHARLES Singh [facility]  [address] [place] [id number] [phone]

## 2023-10-24 ENCOUNTER — TELEPHONE (OUTPATIENT)
Dept: INTERNAL MEDICINE | Facility: CLINIC | Age: 83
End: 2023-10-24

## 2023-10-24 ENCOUNTER — PATIENT OUTREACH (OUTPATIENT)
Dept: CASE MANAGEMENT | Facility: OTHER | Age: 83
End: 2023-10-24
Payer: MEDICARE

## 2023-10-24 ENCOUNTER — HOSPITAL ENCOUNTER (OUTPATIENT)
Dept: GENERAL RADIOLOGY | Facility: HOSPITAL | Age: 83
Discharge: HOME | End: 2023-10-24
Admitting: PHYSICIAN ASSISTANT
Payer: MEDICARE

## 2023-10-24 DIAGNOSIS — R53.1 WEAKNESS: ICD-10-CM

## 2023-10-24 DIAGNOSIS — S40.021A CONTUSION OF MULTIPLE SITES OF RIGHT UPPER ARM, INITIAL ENCOUNTER: ICD-10-CM

## 2023-10-24 DIAGNOSIS — Z91.81 HISTORY OF RECENT FALL: ICD-10-CM

## 2023-10-24 DIAGNOSIS — M54.50 CHRONIC MIDLINE LOW BACK PAIN WITHOUT SCIATICA: ICD-10-CM

## 2023-10-24 DIAGNOSIS — S79.911A HIP INJURY, RIGHT, INITIAL ENCOUNTER: ICD-10-CM

## 2023-10-24 DIAGNOSIS — S60.221A CONTUSION OF MULTIPLE SITES OF RIGHT HAND AND FINGERS, INITIAL ENCOUNTER: ICD-10-CM

## 2023-10-24 DIAGNOSIS — S60.00XA CONTUSION OF MULTIPLE SITES OF RIGHT HAND AND FINGERS, INITIAL ENCOUNTER: ICD-10-CM

## 2023-10-24 DIAGNOSIS — Z91.81 HISTORY OF RECENT FALL: Primary | ICD-10-CM

## 2023-10-24 DIAGNOSIS — G89.29 CHRONIC MIDLINE LOW BACK PAIN WITHOUT SCIATICA: ICD-10-CM

## 2023-10-24 DIAGNOSIS — R26.9 GAIT DIFFICULTY: ICD-10-CM

## 2023-10-24 PROCEDURE — 73120 X-RAY EXAM OF HAND: CPT

## 2023-10-24 PROCEDURE — 73060 X-RAY EXAM OF HUMERUS: CPT

## 2023-10-24 RX ORDER — CYCLOBENZAPRINE HCL 5 MG
10 TABLET ORAL 2 TIMES DAILY PRN
Qty: 60 TABLET | Refills: 0 | Status: SHIPPED | OUTPATIENT
Start: 2023-10-24

## 2023-10-24 RX ORDER — TRAMADOL HYDROCHLORIDE 50 MG/1
50 TABLET ORAL EVERY 6 HOURS PRN
Qty: 120 TABLET | Refills: 2 | Status: CANCELLED | OUTPATIENT
Start: 2023-10-24

## 2023-10-24 NOTE — TELEPHONE ENCOUNTER
Name: CECIILO DAVIDSON      Relationship: Emergency Contact      Best Callback Number: [phone]       HUB PROVIDED THE RELAY MESSAGE FROM THE OFFICE      PATIENT: VOICED UNDERSTANDING AND HAS NO FURTHER QUESTIONS AT THIS TIME    ADDITIONAL INFORMATION: METHYLCARBOMOL 750 MG WAS THE MEDICATION THAT THE PATIENT HAD BEEN PRESCRIBED BEFORE.   5

## 2023-10-24 NOTE — TELEPHONE ENCOUNTER
Caller: [facility]    Relationship:     Best call back number: [phone]    What is the best time to reach you: ANY    Who are you requesting to speak with (clinical staff, provider,  specific staff member): CLINICAL    What was the call regarding:     THE DIAGNOSIS CODES OFFICE LISTED WILL NOT WORK.  MEDICARE DOESN'T RECOGNIZE FOR HOME HEALTH    Is it okay if the provider responds through MyChart: NO

## 2023-10-24 NOTE — TELEPHONE ENCOUNTER
Please ask patient if she would liek me to send flexeril her tramadol script will need sent to Dr centeno as a refill request

## 2023-10-24 NOTE — TELEPHONE ENCOUNTER
Clary states that she has never tried any other medication besides the one that was prescribed and denied.   She is also needing her tramodol prescription refilled soon.

## 2023-10-24 NOTE — TELEPHONE ENCOUNTER
Muscle relaxer sent please remind them this may cause drowsiness and to use caution while ambulating     You may have to send onto av I signed noted of Friday afternoon and reached our the [facility] onto [address] for controlled  Onto av for referral

## 2023-10-24 NOTE — TELEPHONE ENCOUNTER
Spoke to Clary she stated that she is agreeable to the flexerill. Clary also wanted me to ask you about the home health referral. Also adding Falguni to message for tramadol refill.

## 2023-10-24 NOTE — TELEPHONE ENCOUNTER
Heide  intake  [phone]  Talked to Reji-told her Mima has responded-they have been working on it but sending it to coders to see if these are codes that would be covered

## 2023-10-24 NOTE — OUTREACH NOTE
AMBULATORY CASE MANAGEMENT NOTE    Name and Relationship of Patient/Support Person: STUARTCLARY - Emergency Contact    Patient Outreach    Received Ambulatory Case Management Referral from JT Singh in the PCP office, due to recent fall, Weakness, Gait Difficulty; Daughter has difficulty helping her with Transfers and getting her in to doctor appts.    RN-ACM called patient, spoke with daughterClary. Explained role of Ambulatory  and contact information given.  Daughter said patient is not currently walking since the fall she had; she can stand briefly with assistance and transfer to a W/C; she cannot use her right arm since the fall, and is Right hand dominant. Daughter said she cannot leave patient alone.  She said currently, her nephew from out of state is there helping her but he leaves in a couple days.  She said they did put rails up on her bed for safety. Daughter said Home Health services for nursing, PT and OT were ordered, but she has not heard anything about it getting started yet.     Discussed non-emergent medical transportation in the community that is available, an out-of-pocket expense; the daughter can ride along at no extra cost.  Provided names and phone numbers of 4 community agencies that help with Non-emergent medical transportation, for help getting to medical appointments.  Discussed questions that are good to ask them to compare the agencies.  She said she would call them.      Discussed in-home caregiver services through community agencies, an out-of-pocket expense, to provide the daughter with assistance with patient's care needs.  Provided daughter with names and phone numbers of 4 community agencies that provide caregiver services in the home.  Discussed questions that are good to ask each of them to compare and decide which she wants to use.     Daughter voiced appreciation for the information and said she would call and follow up with each of these.      This note  will be routed to the [facility] Case Mgt ordering Provider in the PCP office, JT Singh.     Adult Patient Profile  Questions/Answers      Flowsheet Row Most Recent Value   Symptoms/Conditions Managed at Home musculoskeletal   Barriers to Managing Health none   Musculoskeletal Symptoms/Conditions mobility limited, unsteady gait   Musculoskeletal Management Strategies exercise, routine screening  [Home Health services are ordered.]   Barriers to Taking Medication as Prescribed none   Source of Information family, health record  [Talked with daughter, Clary.]   Equipment Currently Used at Home walker, standard, oxygen, wheelchair   Primary Source of Support/Comfort child(gabriela)   Name of Support/Comfort Primary Source Daughter, CLARY   People in Home child(gabriela), adult   Name(s) of People in Home Daughter, Clary   Current Living Arrangements home   Resource/Environmental Concerns none        Social Work Assessment  Questions/Answers      Flowsheet Row Most Recent Value   People in Home child(gabriela), adult   Name(s) of People in Home DaughterClary   Current Living Arrangements home   Functional Status Comments Daughter said patient cannot walk at this time. Can stand with assistance and pivot to a W/C.   Equipment Currently Used at Home walker, standard, oxygen, wheelchair        Send Education  Questions/Answers      Flowsheet Row Most Recent Value   Annual Wellness Visit:  Patient Has Completed  [AWV was completed 10-9-23.]   Other Patient Education/Resources  Transportation, Home Healthcare, Personal Caregiver   Home Healthcare Education Method Verbal   Personal Caregiver Education Method Verbal   Transportation Education Method Verbal   Advanced Directives: Patient Has            Stefani NGUYEN  Ambulatory Case Management    10/24/2023, 16:18 EDT

## 2023-10-24 NOTE — TELEPHONE ENCOUNTER
I have called and left a message with return phone number    Relay:  Patient's insurance is not covering her prescribed muscle relaxer. Mima stated that she needs to know which muscle relaxers she has tried in the past to prescribe her another medication

## 2023-10-25 ENCOUNTER — HOME HEALTH ADMISSION (OUTPATIENT)
Dept: HOME HEALTH SERVICES | Facility: HOME HEALTHCARE | Age: 83
End: 2023-10-25
Payer: MEDICARE

## 2023-10-25 ENCOUNTER — TELEPHONE (OUTPATIENT)
Dept: INTERNAL MEDICINE | Facility: CLINIC | Age: 83
End: 2023-10-25
Payer: MEDICARE

## 2023-10-25 NOTE — TELEPHONE ENCOUNTER
Caller: NOHEMY    Relationship:     Best call back number: [phone]      What was the call regarding: NEEDING TO RELAY MORE INFO TO FRONT PERSON BRIONNA RODRIGUEZ    Is it okay if the provider responds through MyChart:

## 2023-10-25 NOTE — TELEPHONE ENCOUNTER
They are trying to justify using the xray result as a diag code for home health.  They need the last office visit note to reflect that xray needs to be resulted and reviewed before determining the joint pain

## 2023-10-25 NOTE — TELEPHONE ENCOUNTER
"Reached Reji at [phone]   They got information back from , they did see that she had an xray done so they are trying to use that finding of \"There are moderate degenerative changes in the shoulder\"  they can use that but the office visit note must be amended to reflect or support that.  For instance, \"the physician can not determine the joint pain until xrays are reveiwed\".    "

## 2023-10-26 ENCOUNTER — HOME CARE VISIT (OUTPATIENT)
Dept: HOME HEALTH SERVICES | Facility: HOME HEALTHCARE | Age: 83
End: 2023-10-26
Payer: MEDICARE

## 2023-10-26 ENCOUNTER — PATIENT OUTREACH (OUTPATIENT)
Dept: CASE MANAGEMENT | Facility: OTHER | Age: 83
End: 2023-10-26
Payer: MEDICARE

## 2023-10-26 PROCEDURE — G0151 HHCP-SERV OF PT,EA 15 MIN: HCPCS

## 2023-10-26 NOTE — TELEPHONE ENCOUNTER
They will need the last office note amended to reflect that the xray would need to be resulted and reviewed before determining the joint pain

## 2023-10-26 NOTE — TELEPHONE ENCOUNTER
I left a voice mail for Reji at [phone] that Mima has addended her note so hopefully all is good to go for referral, left her my call back number if she needs it

## 2023-10-26 NOTE — OUTREACH NOTE
AMBULATORY CASE MANAGEMENT NOTE    Name and Relationship of Patient/Support Person: CLARY DAVIDSON - Emergency Contact    Care Coordination    Received message from patient's daughter, Clary, on afternoon of 10/25/23, that no Home Health agency had contacted them yet.    RN-ALICIA called [facility] this morning, spoke with John.  Was informed that they want to do the Admission visit today with patient in her home, but have called the home this morning and have had to leave voicemail, asking for a call back.    RN-ACM called patient's home and had to leave voicemail with the daughter, Clary.      Stefani NGUYEN  Ambulatory Case Management    10/26/2023, 09:29 EDT

## 2023-10-27 NOTE — HOME HEALTH
"SOC Note:    Patient fell down 2 steps into her living room.  She states she was not using any type of AD at the time of her fall.  She was attempting to ascend her 2 steps, reached the top, reached for her RW to go to the back of the house to her room anmd then fell \"I don't know what happened---my feet got stuck or something\". She has a history of mulitple falls \"just not one this bad\".  She is on continuous oxygen for several years via [place] x 2LPM.  0/10 pain in her R shoulder at rest, \"25+ when I move it\".  She is R hand dominant. She states her grandson is currently                                                                                                                                           Home Health ordered for: disciplines PT, OT (order obtained during evaluation for focus on R shoulder mobility, pain mgmt, ADL performance, safety)    Reason for Hosp/Primary Dx/Co-morbidities: Patient s/p fall while attempting to negotiate the 2 steps from the living room to the kitchen using the HR, she became disoriented and fell resulting in a contusion to her R shoulder.     Focus of Care: PT to focus on bilateral LE strengthening, standing balance, transfers, safety awareness and HEP secondary to deficits resulting from a fall with contusion to her R shoulder.     Patient's goal(s): \"I want to be able to get up from the toilet and my chair myself\".    Current Functional status/mobility/DME: manual wheelchair, lift chair, RW, st cane    HB status/Living Arrangements: Patient lives with daughter in 1 story home with 2 steps up from sunken living room to kitchen with 1 HR.  She does have a manual wheelchair, RW, st cane.     Skin Integrity/wound status: brusing/ecchymosis noted on R shoulder    Code Status: Full Code    Fall Risk/Safety concerns: Ongoing history of falls risk    Plan for next visit: HEP progression, bilateral LE strengthening, transfers, safety, gait"

## 2023-10-29 VITALS
OXYGEN SATURATION: 97 % | TEMPERATURE: 97.8 F | DIASTOLIC BLOOD PRESSURE: 78 MMHG | SYSTOLIC BLOOD PRESSURE: 132 MMHG | RESPIRATION RATE: 16 BRPM | HEART RATE: 77 BPM

## 2023-10-29 NOTE — CASE COMMUNICATION
"SOC Note:    Patient fell down 2 steps into her living room.  She states she was not using any type of AD at the time of her fall.  She was attempting to ascend her 2 steps, reached the top, reached for her RW to go to the back of the house to her room anmd then fell \"I don't know what happened---my feet got stuck or something\". She has a history of mulitple falls \"just not one this bad\".  She is on continuous oxygen for several years v ia [place] x 2LPM.  0/10 pain in her R shoulder at rest, \"25+ when I move it\".  She is R hand dominant. She states her grandson is currently                                                                                                                                           Home Health ordered for: disciplines PT, OT (order obtained during evaluation for focus on R shoulder mobility, pain mgmt, ADL performance, safety)    Reason for Hosp /Primary Dx/Co-morbidities: Patient s/p fall while attempting to negotiate the 2 steps from the living room to the kitchen using the HR, she became disoriented and fell resulting in a contusion to her R shoulder.     Focus of Care: PT to focus on bilateral LE strengthening, standing balance, transfers, safety awareness and HEP secondary to deficits resulting from a fall with contusion to her R shoulder.     Patient's goal(s): \"I want to  be able to get up from the toilet and my chair myself\".    Current Functional status/mobility/DME: manual wheelchair, lift chair, RW, st cane    HB status/Living Arrangements: Patient lives with daughter in 1 story home with 2 steps up from sunken living room to kitchen with 1 HR.  She does have a manual wheelchair, RW, st cane.     Skin Integrity/wound status: brusing/ecchymosis noted on R shoulder    Code Status: Full Code    Fall Ri sk/Safety concerns: Ongoing history of falls risk    Plan for next visit: HEP progression, bilateral LE strengthening, transfers, safety, gait"

## 2023-10-31 ENCOUNTER — HOME CARE VISIT (OUTPATIENT)
Dept: HOME HEALTH SERVICES | Facility: HOME HEALTHCARE | Age: 83
End: 2023-10-31
Payer: MEDICARE

## 2023-10-31 PROCEDURE — G0152 HHCP-SERV OF OT,EA 15 MIN: HCPCS

## 2023-11-01 ENCOUNTER — HOME CARE VISIT (OUTPATIENT)
Dept: HOME HEALTH SERVICES | Facility: HOME HEALTHCARE | Age: 83
End: 2023-11-01
Payer: MEDICARE

## 2023-11-01 ENCOUNTER — PATIENT OUTREACH (OUTPATIENT)
Dept: CASE MANAGEMENT | Facility: OTHER | Age: 83
End: 2023-11-01
Payer: MEDICARE

## 2023-11-01 VITALS
SYSTOLIC BLOOD PRESSURE: 127 MMHG | DIASTOLIC BLOOD PRESSURE: 66 MMHG | TEMPERATURE: 98.1 F | RESPIRATION RATE: 18 BRPM | OXYGEN SATURATION: 98 % | HEART RATE: 75 BPM

## 2023-11-01 NOTE — HOME HEALTH
Patient is an 82 year old female s/p recent fall with hip contusion and shoulder contusion. Xrays showed no fractures, but degenerative changes of right shoulder. PMH includes but is not limited to: respiratory failure, COPD, HTN, HLD, macular degeneration. Patient seen today for an OT evaluation and presents with decreased strength/ROM of RUE, balance, and endurance affecting her independence and safety with ADL skills, and functional mobility/transfers. Pt has barely any AROM of R shoulder, but OT able to guide pt to about 110 degrees PROM. Pt is requiring increased assist with all ADL's, and transfers at this time. OT to see an additional 1w4 and then will reassess functional status at that time. Pt/family in agreement with POC.

## 2023-11-01 NOTE — OUTREACH NOTE
AMBULATORY CASE MANAGEMENT NOTE    Name and Relationship of Patient/Support Person: CLARY DAVIDSON - Emergency Contact    Patient Outreach    RN-GAILM called patient's daughter, Clary, to follow up on HRCM needs.  Clary said that Home Health OT has gotten started and they will have PT come next.  She said she is able to assist patient to the bathroom, necessary movement at home.  She stated she has not needed to call the community caregiver agencies yet; she has not needed to call the Transportation assistance resources given yet.  Discussed with daughter, she could talk with the PT/ OT that come to the home, about patient's mobility in whether she could get into a car safely for appts, and get their input, guidance and instruction. She voiced understanding and agreement. Daughter voiced appreciation for the resources given.  Confirmed she has RN-ACM contact number if needed.    Stefani NGUYEN  Ambulatory Case Management    11/1/2023, 11:40 EDT

## 2023-11-02 ENCOUNTER — HOME CARE VISIT (OUTPATIENT)
Dept: HOME HEALTH SERVICES | Facility: HOME HEALTHCARE | Age: 83
End: 2023-11-02
Payer: MEDICARE

## 2023-11-03 ENCOUNTER — HOME CARE VISIT (OUTPATIENT)
Dept: HOME HEALTH SERVICES | Facility: HOME HEALTHCARE | Age: 83
End: 2023-11-03
Payer: MEDICARE

## 2023-11-03 PROCEDURE — G0151 HHCP-SERV OF PT,EA 15 MIN: HCPCS

## 2023-11-03 NOTE — CASE COMMUNICATION
Patient missed a PT home visit from [facility] on 11/2/2023.      For your records only.   As per home health protocol, MD must be notified of missed/cancelled visits; therefore the prescribed frequency was not met.

## 2023-11-04 VITALS
SYSTOLIC BLOOD PRESSURE: 132 MMHG | HEART RATE: 71 BPM | RESPIRATION RATE: 16 BRPM | DIASTOLIC BLOOD PRESSURE: 69 MMHG | TEMPERATURE: 98.1 F | OXYGEN SATURATION: 95 %

## 2023-11-04 NOTE — HOME HEALTH
Routine Visit Note:    Skill/education provided: Patient and caregiver education focused on stair negotiation, transfer technique, ambulation.    Patient/caregiver response: patient and caregiver agreeable with participation with PT session this date.     Plan for next visit: car transfers, bilateral LE strengthening

## 2023-11-06 ENCOUNTER — HOME CARE VISIT (OUTPATIENT)
Dept: HOME HEALTH SERVICES | Facility: HOME HEALTHCARE | Age: 83
End: 2023-11-06
Payer: MEDICARE

## 2023-11-06 VITALS
RESPIRATION RATE: 20 BRPM | SYSTOLIC BLOOD PRESSURE: 130 MMHG | HEART RATE: 76 BPM | DIASTOLIC BLOOD PRESSURE: 75 MMHG | OXYGEN SATURATION: 94 % | TEMPERATURE: 97.8 F

## 2023-11-06 DIAGNOSIS — M25.511 ACUTE PAIN OF RIGHT SHOULDER: ICD-10-CM

## 2023-11-06 DIAGNOSIS — S40.021A CONTUSION OF MULTIPLE SITES OF RIGHT UPPER ARM, INITIAL ENCOUNTER: Primary | ICD-10-CM

## 2023-11-06 PROCEDURE — G0152 HHCP-SERV OF OT,EA 15 MIN: HCPCS

## 2023-11-06 NOTE — Clinical Note
Patient with 9-10/10 pain with passive range of motion of R shoulder. At top of range of motion, there is a pop that occurs in shoulder, and then patient has intense pain with extending arm down back to a resting position. There was a humerus xray, but is there anyway we could possibly order an xray of the right shoulder? It just seems there could be something more with the amount of pain patient is having. Thanks for your help!

## 2023-11-07 ENCOUNTER — HOME CARE VISIT (OUTPATIENT)
Dept: HOME HEALTH SERVICES | Facility: HOME HEALTHCARE | Age: 83
End: 2023-11-07
Payer: MEDICARE

## 2023-11-07 ENCOUNTER — PATIENT MESSAGE (OUTPATIENT)
Dept: INTERNAL MEDICINE | Facility: CLINIC | Age: 83
End: 2023-11-07
Payer: MEDICARE

## 2023-11-07 VITALS
SYSTOLIC BLOOD PRESSURE: 122 MMHG | DIASTOLIC BLOOD PRESSURE: 63 MMHG | HEART RATE: 67 BPM | RESPIRATION RATE: 16 BRPM | OXYGEN SATURATION: 95 %

## 2023-11-07 PROCEDURE — G0157 HHC PT ASSISTANT EA 15: HCPCS

## 2023-11-07 PROCEDURE — G0151 HHCP-SERV OF PT,EA 15 MIN: HCPCS

## 2023-11-07 NOTE — TELEPHONE ENCOUNTER
From: Avelina Ceron  To: Mima Wiggins  Sent: 11/7/2023 3:23 PM EST  Subject: Shoulder Xray    Occupational Therapist, Carly, suggested a shoulder xray might be beneficial and was going to notify you. I'm just following up on status - this is Nan Looker (daughter) writing this on behalf of Avelina Ceron by the way. Thanks

## 2023-11-07 NOTE — HOME HEALTH
Routine Visit Note:    Skill/education provided: ADL retraining, car transfers, review and completion of HEP    Patient/caregiver response: Patient tolerated session well, but is still having increased pain in RUE with PROM and any AROM. Pop heard at top range of shoulder flex/ext, and then pt has very painful extension back to resting position. Recommended shoulder xray, and case communication sent to PCP.    Plan for next visit: Update on shoulder pain, ADL's, HEP    Other pertinent info:

## 2023-11-10 ENCOUNTER — HOME CARE VISIT (OUTPATIENT)
Dept: HOME HEALTH SERVICES | Facility: HOME HEALTHCARE | Age: 83
End: 2023-11-10
Payer: MEDICARE

## 2023-11-10 VITALS
SYSTOLIC BLOOD PRESSURE: 110 MMHG | OXYGEN SATURATION: 95 % | HEART RATE: 70 BPM | DIASTOLIC BLOOD PRESSURE: 84 MMHG | RESPIRATION RATE: 16 BRPM

## 2023-11-10 PROCEDURE — G0157 HHC PT ASSISTANT EA 15: HCPCS

## 2023-11-13 ENCOUNTER — OFFICE VISIT (OUTPATIENT)
Dept: UROLOGY | Facility: CLINIC | Age: 83
End: 2023-11-13
Payer: MEDICARE

## 2023-11-13 ENCOUNTER — HOME CARE VISIT (OUTPATIENT)
Dept: HOME HEALTH SERVICES | Facility: HOME HEALTHCARE | Age: 83
End: 2023-11-13
Payer: MEDICARE

## 2023-11-13 ENCOUNTER — HOSPITAL ENCOUNTER (OUTPATIENT)
Dept: GENERAL RADIOLOGY | Facility: HOSPITAL | Age: 83
Discharge: HOME | End: 2023-11-13
Admitting: PHYSICIAN ASSISTANT
Payer: MEDICARE

## 2023-11-13 VITALS — HEART RATE: 71 BPM | OXYGEN SATURATION: 96 % | BODY MASS INDEX: 30.48 KG/M2 | HEIGHT: 63 IN | WEIGHT: 172 LBS

## 2023-11-13 VITALS
OXYGEN SATURATION: 94 % | HEART RATE: 70 BPM | TEMPERATURE: 98.1 F | SYSTOLIC BLOOD PRESSURE: 118 MMHG | DIASTOLIC BLOOD PRESSURE: 66 MMHG

## 2023-11-13 DIAGNOSIS — N32.81 OAB (OVERACTIVE BLADDER): Primary | ICD-10-CM

## 2023-11-13 DIAGNOSIS — S42.291A OTHER CLOSED DISPLACED FRACTURE OF PROXIMAL END OF RIGHT HUMERUS, INITIAL ENCOUNTER: Primary | ICD-10-CM

## 2023-11-13 DIAGNOSIS — M25.511 ACUTE PAIN OF RIGHT SHOULDER: ICD-10-CM

## 2023-11-13 DIAGNOSIS — N39.41 URGENCY INCONTINENCE: ICD-10-CM

## 2023-11-13 DIAGNOSIS — S40.021A CONTUSION OF MULTIPLE SITES OF RIGHT UPPER ARM, INITIAL ENCOUNTER: ICD-10-CM

## 2023-11-13 DIAGNOSIS — N20.0 NEPHROLITHIASIS: ICD-10-CM

## 2023-11-13 PROCEDURE — 73030 X-RAY EXAM OF SHOULDER: CPT

## 2023-11-13 PROCEDURE — G0152 HHCP-SERV OF OT,EA 15 MIN: HCPCS

## 2023-11-13 PROCEDURE — 1160F RVW MEDS BY RX/DR IN RCRD: CPT | Performed by: STUDENT IN AN ORGANIZED HEALTH CARE EDUCATION/TRAINING PROGRAM

## 2023-11-13 PROCEDURE — 1159F MED LIST DOCD IN RCRD: CPT | Performed by: STUDENT IN AN ORGANIZED HEALTH CARE EDUCATION/TRAINING PROGRAM

## 2023-11-13 PROCEDURE — 99214 OFFICE O/P EST MOD 30 MIN: CPT | Performed by: STUDENT IN AN ORGANIZED HEALTH CARE EDUCATION/TRAINING PROGRAM

## 2023-11-13 NOTE — PROGRESS NOTES
Follow Up Office Visit      Patient Name: Avelina Ceron  : 1940   MRN: [id number]     Chief Complaint:    Chief Complaint   Patient presents with    Ureterolithiasis       Referring Provider: No ref. provider found    History of Present Illness: Avelina Ceron is a 82 y.o. female who presents today for follow up of OAB and nephrolithiasis.  Patient seen originally in July for obstructing right ureteral stone status post right ureteral stent and eventually underwent right ureteroscopy and laser lithotripsy in 2023.  Stone analysis calcium oxalate.  She has had no issues since this procedure.  A postoperative renal ultrasound performed September demonstrates no hydronephrosis or nephrolithiasis.  I reviewed her original CT scan which demonstrates a small nonobstructing left-sided lower pole kidney stone.  Her primary complaint today is urgency incontinence.  She has OAB.  She has been managed with Vesicare.  Recently Solifenacin was prescribed by her PCP.  Patient reports dry eye as well as some ongoing dizziness, had a recent fall.  Patient is wheelchair-bound.  She has oxygen via nasal cannula.    In regards to kidney stone risk.  Patient states she only drinks a few glasses of water daily.  She has a poor fluid intake.  She eats quite a bit of chicken and peanuts.    Subjective      Review of System: Review of Systems   Genitourinary:  Positive for enuresis and frequency.      I have reviewed the ROS documented by my clinical staff, I have updated appropriately and I agree. Lee Kim MD    I have reviewed and the following portions of the patient's history were updated as appropriate: past family history, past medical history, past social history, past surgical history and problem list.    Medications:     Current Outpatient Medications:     acetaminophen (TYLENOL) 500 MG tablet, Take 1 tablet by mouth Every 6 (Six) Hours., Disp: , Rfl:     albuterol sulfate  (90 Base) MCG/ACT  inhaler, Inhale 2 puffs Every 4 (Four) Hours As Needed., Disp: , Rfl:     Ascorbic Acid (Vitamin C) 500 MG capsule, Take 500 mg by mouth Daily., Disp: , Rfl:     aspirin 81 MG chewable tablet, Chew 1 tablet Daily., Disp: , Rfl:     atenolol (TENORMIN) 50 MG tablet, Take 0.5 tablets by mouth 2 (Two) Times a Day. (Patient taking differently: Take 0.5 tablets by mouth 2 (Two) Times a Day. 1/2 in morning, 1/2 in the evening), Disp: 90 tablet, Rfl: 1    Calcium Carbonate-Vitamin D (CALCIUM-VITAMIN D) 500-200 MG-UNIT per tablet, Take 1 tablet by mouth Daily., Disp: , Rfl:     cetirizine (zyrTEC) 10 MG tablet, Take 1 tablet by mouth Daily., Disp: , Rfl:     Cholecalciferol (Vitamin D3) 25 MCG (1000 UT) capsule, Take 1 capsule by mouth Daily., Disp: , Rfl:     clopidogrel (PLAVIX) 75 MG tablet, Take 1 tablet by mouth Daily., Disp: 90 tablet, Rfl: 1    cyclobenzaprine (FLEXERIL) 5 MG tablet, Take 2 tablets by mouth 2 (Two) Times a Day As Needed for Muscle Spasms., Disp: 60 tablet, Rfl: 0    FeroSul 325 (65 Fe) MG tablet, TAKE 1 TABLET BY MOUTH TWICE DAILY (Patient taking differently: Take 1 tablet by mouth 2 (Two) Times a Day.), Disp: 60 tablet, Rfl: 3    FIBER SELECT GUMMIES PO, Take 2 tablets by mouth Daily., Disp: , Rfl:     gabapentin (NEURONTIN) 300 MG capsule, Take 1 capsule by mouth Every Night., Disp: 90 capsule, Rfl: 0    guaiFENesin (MUCINEX) 600 MG 12 hr tablet, Take 1 tablet by mouth 2 (Two) Times a Day., Disp: , Rfl:     isosorbide mononitrate (IMDUR) 30 MG 24 hr tablet, TAKE 1 TABLET DAILY (Patient taking differently: Take 1 tablet by mouth Daily.), Disp: 90 tablet, Rfl: 1    latanoprost (XALATAN) 0.005 % ophthalmic solution, Administer 1 drop to both eyes Every Night., Disp: , Rfl:     levothyroxine (Synthroid) 200 MCG tablet, Take 1 tablet by mouth Daily., Disp: 90 tablet, Rfl: 0    Multiple Vitamins-Minerals (PRESERVISION AREDS 2 PO), Take 1 tablet by mouth 2 (Two) Times a Day., Disp: , Rfl:      nitroglycerin (NITROSTAT) 0.4 MG SL tablet, PLACE 1 TABLET UNDER THE TONGUE AS NEEDED EVERY 5 MINUTES FOR CHEST PAIN. CALL 911 IF NO RELIEF AFTER 2ND DOSE, Disp: 25 tablet, Rfl: 0    O2 (OXYGEN), Inhale 2 L/min Continuous., Disp: , Rfl:     pantoprazole (PROTONIX) 40 MG EC tablet, TAKE 1 TABLET DAILY, Disp: 90 tablet, Rfl: 1    Polyethylene Glycol 3350 (MIRALAX PO), Take 1 dose by mouth As Needed (constipation)., Disp: , Rfl:     roflumilast (Daliresp) 500 MCG tablet tablet, Take 1 tablet by mouth Daily., Disp: 90 tablet, Rfl: 1    sertraline (ZOLOFT) 100 MG tablet, Take 1 tablet by mouth Daily., Disp: 90 tablet, Rfl: 1    simvastatin (ZOCOR) 40 MG tablet, TAKE 1 TABLET NIGHTLY (Patient taking differently: Take 1 tablet by mouth Every Night.), Disp: 90 tablet, Rfl: 1    traMADol (ULTRAM) 50 MG tablet, Take 1 tablet by mouth Every 6 (Six) Hours As Needed for Moderate Pain., Disp: 120 tablet, Rfl: 2    Trelegy Ellipta 100-62.5-25 MCG/ACT inhaler, INHALE 1 PUFF DAILY (Patient taking differently: Inhale 1 puff Daily.), Disp: 180 each, Rfl: 3    Vibegron 75 MG tablet, Take 1 tablet by mouth Daily., Disp: 28 tablet, Rfl: 0    Allergies:   Allergies   Allergen Reactions    Penicillins Hives, Swelling and Mental Status Change    Codeine Mental Status Change    Dopamine Palpitations and Other (See Comments)     Other reaction(s): Hypertension     Bladder & Bowel Symptom Questionnaire    How often do you usually urinate during the day ?   2 - About every 2-3 hours    2.   How many timed do you urinate at night?   0 - No more often than once in 4 hours    3.   What is the reason that you usually urinate?   3 - About every 1-2 hours   4.   Once you get the urge to go, how long can you     comfortably delay?   4 - At least once an hour    5.   How often do you get a sudden urge that makes you rush to the bathroom?   3 - About every 1-2 hours   6.   How often does a sudden urge to urinate result in you leaking urine or wetting  "pads?   3 - About every 1-2 hours   7.  In your opinion, how good is your bladder control?   2 - About every 2-3 hours    8.  Do you have accidental bowel leakage?   no   9.  Do you have difficulty fully emptying your bladder?   yes   10.  Do you experience accidental leakage when performing some physical activity such as coughing, sneezing, laughing or exercise?   yes   11. Have you tried medications to help improve your symptoms?   yes   12. Would you be interested in learning about a long-lasting option that may help you with your symptoms?   no                                                                             Total Score   17     0-7 (Mild) 8-16 (Moderate) 17-28 (Severe)        Objective     Physical Exam:   Vital Signs:   Vitals:    11/13/23 1055   Pulse: 71   SpO2: 96%   Weight: 78 kg (172 lb)   Height: 160 cm (63\")     Body mass index is 30.47 kg/m².     Physical Exam  Constitutional:       Appearance: Normal appearance.   HENT:      Head: Normocephalic and atraumatic.      Nose: Nose normal.      Mouth/Throat:      Mouth: Mucous membranes are moist.      Pharynx: Oropharynx is clear.   Eyes:      Extraocular Movements: Extraocular movements intact.      Pupils: Pupils are equal, round, and reactive to light.   Pulmonary:      Effort: Pulmonary effort is normal. No respiratory distress.   Musculoskeletal:         General: No swelling or deformity. Normal range of motion.      Cervical back: Normal range of motion and neck supple.   Skin:     General: Skin is warm and dry.   Neurological:      General: No focal deficit present.      Mental Status: She is alert and oriented to person, place, and time. Mental status is at baseline.   Psychiatric:         Mood and Affect: Mood normal.         Behavior: Behavior normal.         Labs:   Brief Urine Lab Results  (Last result in the past 365 days)        Color   Clarity   Blood   Leuk Est   Nitrite   Protein   CREAT   Urine HCG        10/09/23 1451 Yellow   " Cloudy   50 Holden/ul   500 Yen/ul   Positive   30 mg/dL                   Urine Culture          6/6/2023    14:04 7/25/2023    13:57 10/9/2023    14:51   Urine Culture   Urine Culture >100,000 CFU/mL Escherichia coli  >100,000 CFU/mL Klebsiella pneumoniae ssp pneumoniae  >100,000 CFU/mL Escherichia coli     25,000 CFU/mL Normal Urogenital Mirian         Lab Results   Component Value Date    GLUCOSE 106 (H) 10/09/2023    CALCIUM 10.0 10/09/2023     10/09/2023    K 5.2 10/09/2023    CO2 34.5 (H) 10/09/2023    CL 97 (L) 10/09/2023    BUN 10 10/09/2023    CREATININE 0.53 (L) 10/09/2023    EGFRIFNONA 119 11/24/2021    BCR 18.9 10/09/2023    ANIONGAP 11.5 10/09/2023       Lab Results   Component Value Date    WBC 12.93 (H) 10/09/2023    HGB 13.0 10/09/2023    HCT 38.5 10/09/2023    MCV 95.5 10/09/2023     10/09/2023       Images:   XR Humerus Right    Result Date: 10/24/2023  Chronic findings as detailed. No acute process. Electronically Signed: Mary Wheat MD  10/24/2023 3:34 PM EDT  Workstation ID: [id number]    XR Hand 2 View Right    Result Date: 10/24/2023  Chronic findings as detailed. No acute process. Electronically Signed: Mary Wheat MD  10/24/2023 3:34 PM EDT  Workstation ID: [id number]    CT Head Without Contrast    Result Date: 10/18/2023  Impression: No acute intracranial process identified. Electronically Signed: Peg James MD  10/18/2023 3:19 AM EDT  Workstation ID: [id number]    XR Hip With or Without Pelvis 2 - 3 View Right    Result Date: 10/18/2023  Impression: No acute osseous abnormality. Postsurgical changes are seen from total bilateral hip arthroplasties with no evidence of acute hardware failure. Electronically Signed: Peg James MD  10/18/2023 2:24 AM EDT  Workstation ID: [id number]    US Renal Bilateral    Result Date: 9/20/2023  Impression: Unremarkable renal ultrasound Electronically Signed: Luis Ross MD  9/20/2023 8:28 AM EDT  Workstation ID: [id number]    Chest X-Ray PA &  Lateral    Result Date: 8/4/2023  Impression: 1. Small bilateral pleural effusions. 2. No acute lung consolidations. 3. Moderate esophageal hiatal hernia. Electronically Signed: Sole Stacy  8/4/2023 4:29 PM EDT  Workstation ID: [id number]    XR Chest 1 View    Result Date: 7/25/2023  Impression: 1.Coarsened pulmonary interstitial markings may be related to underlying interstitial lung disease. Mild concomitant edema cannot be excluded. 2.Scattered bibasilar atelectasis. Electronically Signed: Ramon Escobar  7/25/2023 5:26 PM EDT  Workstation ID: [id number]    CT Abdomen Pelvis Without Contrast    Result Date: 7/25/2023  Impression: 1.7 x 5 mm obstructing calculus within the right distal ureter causing mild right hydroureteronephrosis. 2.Additional bilateral nonobstructive nephrolithiasis. 3.Colonic diverticulosis. 4.Moderate hiatal hernia. 5.Additional findings as detailed above. Electronically Signed: Ramon Escobar  7/25/2023 2:56 PM EDT  Workstation ID: [id number]      Measures:   Tobacco:   Avelina Ceron  reports that she quit smoking about 17 years ago. Her smoking use included cigarettes. She has a 50.00 pack-year smoking history. She has never used smokeless tobacco.    Assessment / Plan      Assessment/Plan:   82 y.o. female who presented today for follow up of urgency incontinence/OAB, nephrolithiasis.  Status post right ureteroscopy and lithotripsy.  Small nonobstructing residual stone on original CT scan, not identified on ultrasound.  This is too small to warrant treatment and given her age and comorbidities I do not recommend elective treatment for the small left-sided kidney stone.  We will continue monitoring.  I will see her back in 1 year with KUB prior.  She has ongoing urgency incontinence issues, she is on an anticholinergic which I avoid in patients over the age of 75.  She has dry eye and dry mouth as well.  Given the FDA warning about anticholinergics in the elderly I will stop the Vesicare.  We  will transition to Gemtesa 75 mg daily sample.  I will have her see my nurse practitioner back in 1 month for symptom review.    Kidney stone prevention packet provided.  Needs to increase water greater than 2 L, reduce sodium, reduce animal protein intake, increase fruits and vegetables, consider lemon juice and water for citrate supplementation.  She will try this.    Diagnoses and all orders for this visit:    1. OAB (overactive bladder) (Primary)  -     Vibegron 75 MG tablet; Take 1 tablet by mouth Daily.  Dispense: 28 tablet; Refill: 0    2. Urgency incontinence  -     Vibegron 75 MG tablet; Take 1 tablet by mouth Daily.  Dispense: 28 tablet; Refill: 0    3. Nephrolithiasis  -     XR Abdomen KUB; Future           Follow Up:   Return in about 4 weeks (around 12/11/2023) for Follow up with Yvette Leonard NP.    I spent approximately 30 minutes providing clinical care for this patient; including review of patient's chart and provider documentation, face to face time spent with patient in examination room (obtaining history, performing physical exam, discussing diagnosis and management options), placing orders, and completing patient documentation.     Lee Kim MD  [facility] [place] [facility] [place]

## 2023-11-14 ENCOUNTER — OFFICE VISIT (OUTPATIENT)
Dept: ORTHOPEDIC SURGERY | Facility: CLINIC | Age: 83
End: 2023-11-14
Payer: MEDICARE

## 2023-11-14 ENCOUNTER — HOME CARE VISIT (OUTPATIENT)
Dept: HOME HEALTH SERVICES | Facility: HOME HEALTHCARE | Age: 83
End: 2023-11-14
Payer: MEDICARE

## 2023-11-14 VITALS
RESPIRATION RATE: 16 BRPM | SYSTOLIC BLOOD PRESSURE: 138 MMHG | HEART RATE: 67 BPM | OXYGEN SATURATION: 95 % | DIASTOLIC BLOOD PRESSURE: 79 MMHG

## 2023-11-14 VITALS — WEIGHT: 172 LBS | BODY MASS INDEX: 30.48 KG/M2 | HEIGHT: 63 IN

## 2023-11-14 DIAGNOSIS — S42.291A OTHER CLOSED DISPLACED FRACTURE OF PROXIMAL END OF RIGHT HUMERUS, INITIAL ENCOUNTER: Primary | ICD-10-CM

## 2023-11-14 DIAGNOSIS — M25.511 RIGHT SHOULDER PAIN, UNSPECIFIED CHRONICITY: ICD-10-CM

## 2023-11-14 PROBLEM — S42.201A CLOSED FRACTURE OF RIGHT PROXIMAL HUMERUS: Status: ACTIVE | Noted: 2023-11-14

## 2023-11-14 PROCEDURE — G0157 HHC PT ASSISTANT EA 15: HCPCS

## 2023-11-14 RX ORDER — GABAPENTIN 300 MG/1
300 CAPSULE ORAL NIGHTLY
Qty: 90 CAPSULE | Refills: 0 | Status: SHIPPED | OUTPATIENT
Start: 2023-11-14

## 2023-11-14 NOTE — PROGRESS NOTES
[facility] [place] Orthopaedic Surgery Office Visit - Tc Armando MD    Office Visit       Patient Name: Avelina Ceron    Chief Complaint:   Chief Complaint   Patient presents with    Right Shoulder - Pain       Referring Physician: Mima Wiggins P*-I appreciate the referral    History of Present Illness:   Avelina Ceron is a 82 y.o. female who presents with right body part: shoulder Reason: fracture.  Onset:Onset: mechanical fall. The issue has been ongoing for 4 week(s). Pain is a 7/10 on the pain scale. Pain is described as Pain Characterization: stabbing. Associated symptoms include Symptoms: pain, popping, and numbness/tingling . The pain is worse with walking, standing, sitting, climbing stairs, sleeping, working, leisure, lying on affected side, rising from seated position, and any movement of the joint; resting improve the pain. Previous treatments have included: bracing.  I have reviewed the patient's history of present illness as noted/entered above.    I have reviewed the patient's past medical history, surgical history, social history, family history, medications, and allergies as noted in the electronic medical record and as noted/entered.  I have reviewed the patient's review of systems as noted/enter and updated as noted in the patient's HPI.    Right proximal metadiaphyseal fracture now subacute  Supportive daughter present  Date of injury 10/18/2023    [facility] ER visit from 10/18/2023 reviewed, suffered a fall  Subsequent images from 10/24/2023 reviewed-subtle but present metadiaphyseal fracture nondisplaced noted that time  Follow-up x-rays do show some baseline displacement and callus formation.    She has an excellent therapy team.  She is working with Carly Brasher OT and they have been very pleased with their care.    Counseled the patient and family that this would be a nonsurgical treatment even if noted at time of  "injury.  She does have interval callus formation noted indicative of healing.  We will continue to follow list nonunion and malunion are still possible and we will continue to follow along.    O2 by nasal cannula, emphysema/COPD history  Very supportive daughter present.  Excellent questions asked      82 y.o. female  Body mass index is 30.47 kg/m².    Subjective   Subjective      Review of Systems   Constitutional: Negative.  Negative for chills, fatigue and fever.   HENT: Negative.  Negative for congestion and dental problem.    Eyes: Negative.  Negative for blurred vision.   Respiratory: Negative.  Negative for shortness of breath.    Cardiovascular: Negative.  Negative for leg swelling.   Gastrointestinal: Negative.  Negative for abdominal pain.   Endocrine: Negative.  Negative for polyuria.   Genitourinary: Negative.  Negative for difficulty urinating.   Musculoskeletal:  Positive for arthralgias.   Skin: Negative.    Allergic/Immunologic: Negative.    Neurological: Negative.    Hematological: Negative.  Negative for adenopathy.   Psychiatric/Behavioral: Negative.  Negative for behavioral problems.         Past Medical History:   Past Medical History:   Diagnosis Date    Anesthesia complication     Pt went into respiratory failure during total hip replacement in 2020    Broken ribs     COPD (chronic obstructive pulmonary disease)     Degenerative lumbar disc     Disease of thyroid gland     \"low thyroid\"    History of colonoscopy     Hyperlipidemia     Hypertension     On home O2     3L    Osteoarthritis        Past Surgical History:   Past Surgical History:   Procedure Laterality Date    CAROTID STENT Bilateral     COLONOSCOPY      EYE SURGERY  10/17/2022    yag surgery    GALLBLADDER SURGERY  2012    HYSTERECTOMY      ILIAC ARTERY STENT Bilateral     TOTAL HIP ARTHROPLASTY Bilateral     URETEROSCOPY LASER LITHOTRIPSY WITH STENT INSERTION Right 07/26/2023    Procedure: CYSTOSCOPY, RIGHT RETROGRADE PYELOGRAM; " WITH STENT INSERTION;  Surgeon: Lee Kim MD;  Location:  [facility];  Service: Urology;  Laterality: Right;    URETEROSCOPY LASER LITHOTRIPSY WITH STENT INSERTION Right 8/10/2023    Procedure: URETEROSCOPY, LASER LITHOTRIPSY, STENT EXCHANGE;  Surgeon: Lee Kim MD;  Location:  [facility];  Service: Urology;  Laterality: Right;       Family History:   Family History   Problem Relation Age of Onset    Cancer Other         BREAST, COLON, OVARIAN    Depression Other     Hyperlipidemia Other     Hypertension Other     Heart disease Mother     Hypertension Mother     Arthritis Daughter     Cancer Daughter     Hyperlipidemia Daughter     Liver disease Daughter     Thyroid disease Daughter     Cancer Maternal Aunt     Diabetes Maternal Uncle     Alzheimer's disease Sister        Social History:   Social History     Socioeconomic History    Marital status:    Tobacco Use    Smoking status: Former     Packs/day: 1.00     Years: 50.00     Additional pack years: 0.00     Total pack years: 50.00     Types: Cigarettes     Quit date:      Years since quittin.8    Smokeless tobacco: Never   Vaping Use    Vaping Use: Never used   Substance and Sexual Activity    Alcohol use: Not Currently     Comment: no etoh for past 10 years    Drug use: No    Sexual activity: Defer       Medications:   Current Outpatient Medications:     acetaminophen (TYLENOL) 500 MG tablet, Take 1 tablet by mouth Every 6 (Six) Hours., Disp: , Rfl:     albuterol sulfate  (90 Base) MCG/ACT inhaler, Inhale 2 puffs Every 4 (Four) Hours As Needed., Disp: , Rfl:     Ascorbic Acid (Vitamin C) 500 MG capsule, Take 500 mg by mouth Daily., Disp: , Rfl:     aspirin 81 MG chewable tablet, Chew 1 tablet Daily., Disp: , Rfl:     atenolol (TENORMIN) 50 MG tablet, Take 0.5 tablets by mouth 2 (Two) Times a Day. (Patient taking differently: Take 0.5 tablets by mouth 2 (Two) Times a Day. 1/2 in morning, 1/2 in the evening), Disp: 90  tablet, Rfl: 1    Calcium Carbonate-Vitamin D (CALCIUM-VITAMIN D) 500-200 MG-UNIT per tablet, Take 1 tablet by mouth Daily., Disp: , Rfl:     cetirizine (zyrTEC) 10 MG tablet, Take 1 tablet by mouth Daily., Disp: , Rfl:     Cholecalciferol (Vitamin D3) 25 MCG (1000 UT) capsule, Take 1 capsule by mouth Daily., Disp: , Rfl:     clopidogrel (PLAVIX) 75 MG tablet, Take 1 tablet by mouth Daily., Disp: 90 tablet, Rfl: 1    cyclobenzaprine (FLEXERIL) 5 MG tablet, Take 2 tablets by mouth 2 (Two) Times a Day As Needed for Muscle Spasms., Disp: 60 tablet, Rfl: 0    FeroSul 325 (65 Fe) MG tablet, TAKE 1 TABLET BY MOUTH TWICE DAILY (Patient taking differently: Take 1 tablet by mouth 2 (Two) Times a Day.), Disp: 60 tablet, Rfl: 3    FIBER SELECT GUMMIES PO, Take 2 tablets by mouth Daily., Disp: , Rfl:     gabapentin (NEURONTIN) 300 MG capsule, TAKE 1 CAPSULE BY MOUTH EVERY DAY AT NIGHT, Disp: 90 capsule, Rfl: 0    guaiFENesin (MUCINEX) 600 MG 12 hr tablet, Take 1 tablet by mouth 2 (Two) Times a Day., Disp: , Rfl:     isosorbide mononitrate (IMDUR) 30 MG 24 hr tablet, TAKE 1 TABLET DAILY (Patient taking differently: Take 1 tablet by mouth Daily.), Disp: 90 tablet, Rfl: 1    latanoprost (XALATAN) 0.005 % ophthalmic solution, Administer 1 drop to both eyes Every Night., Disp: , Rfl:     levothyroxine (Synthroid) 200 MCG tablet, Take 1 tablet by mouth Daily., Disp: 90 tablet, Rfl: 0    Multiple Vitamins-Minerals (PRESERVISION AREDS 2 PO), Take 1 tablet by mouth 2 (Two) Times a Day., Disp: , Rfl:     nitroglycerin (NITROSTAT) 0.4 MG SL tablet, PLACE 1 TABLET UNDER THE TONGUE AS NEEDED EVERY 5 MINUTES FOR CHEST PAIN. CALL 911 IF NO RELIEF AFTER 2ND DOSE, Disp: 25 tablet, Rfl: 0    O2 (OXYGEN), Inhale 2 L/min Continuous., Disp: , Rfl:     pantoprazole (PROTONIX) 40 MG EC tablet, TAKE 1 TABLET DAILY, Disp: 90 tablet, Rfl: 1    Polyethylene Glycol 3350 (MIRALAX PO), Take 1 dose by mouth As Needed (constipation)., Disp: , Rfl:      "roflumilast (Daliresp) 500 MCG tablet tablet, Take 1 tablet by mouth Daily., Disp: 90 tablet, Rfl: 1    sertraline (ZOLOFT) 100 MG tablet, Take 1 tablet by mouth Daily., Disp: 90 tablet, Rfl: 1    simvastatin (ZOCOR) 40 MG tablet, TAKE 1 TABLET NIGHTLY (Patient taking differently: Take 1 tablet by mouth Every Night.), Disp: 90 tablet, Rfl: 1    traMADol (ULTRAM) 50 MG tablet, Take 1 tablet by mouth Every 6 (Six) Hours As Needed for Moderate Pain., Disp: 120 tablet, Rfl: 2    Trelegy Ellipta 100-62.5-25 MCG/ACT inhaler, INHALE 1 PUFF DAILY (Patient taking differently: Inhale 1 puff Daily.), Disp: 180 each, Rfl: 3    Vibegron 75 MG tablet, Take 1 tablet by mouth Daily., Disp: 28 tablet, Rfl: 0    Allergies:   Allergies   Allergen Reactions    Penicillins Hives, Swelling and Mental Status Change    Codeine Mental Status Change    Dopamine Palpitations and Other (See Comments)     Other reaction(s): Hypertension       The following portions of the patient's history were reviewed and updated as appropriate: allergies, current medications, past family history, past medical history, past social history, past surgical history and problem list.        Objective    Objective      Vital Signs:   Vitals:    11/14/23 1424   Weight: 78 kg (172 lb)  Comment: pt reported   Height: 160 cm (63\")       Ortho Exam:  Right proximal humerus does have some baseline pain as anticipated elbow wrist hand range of motion noted passively.    Results Review:   Imaging Results (Last 24 Hours)       Procedure Component Value Units Date/Time    XR Shoulder 2+ View Right [[id number]] Resulted: 11/14/23 1558     Updated: 11/14/23 1559    Narrative:      Imaging: shoulder x-rays 3 views - AP, axillary, and scapular-Y x-ray   views    Side: RIGHT SHOULDER    Indication for shoulder x-ray 3 views: shoulder pain    Comparison: Serial post injury comparison views available    Findings: Right proximal metadiaphyseal fracture with baseline   displacement " but interval callus formation noted.    I personally reviewed the above x-rays.          XR Shoulder 2+ View Right    Result Date: 11/13/2023  Impression: Displaced and mildly angulated oblique acute fracture of the proximal right humerus as above. Electronically Signed: Ramy Davison MD  11/13/2023 1:02 PM EST  Workstation ID: [id number]    XR Humerus Right    Result Date: 10/24/2023  Chronic findings as detailed. No acute process. Electronically Signed: Mary Wehat MD  10/24/2023 3:34 PM EDT  Workstation ID: [id number]      I reviewed the images above    Procedures             Assessment / Plan      Assessment/Plan:   Problem List Items Addressed This Visit          Musculoskeletal and Injuries    Closed fracture of right proximal humerus - Primary    Relevant Orders    Ambulatory Referral to Occupational Therapy    Right shoulder pain    Relevant Orders    XR Shoulder 2+ View Right (Completed)    Ambulatory Referral to Occupational Therapy     Right proximal metadiaphyseal fracture now subacute.  Interval callus formation noted.  Counseled patient and daughter on nonoperative treatment.  Counseled on elbow wrist hand range of motion and starting some shoulder range of motion.  Cuff and collar provided.    We are are hopeful to achieve healing of the fracture interval callus formation is noted.  They take vitamin D and calcium.    Follow Up: 3 weeks with right shoulder 3 views        Tc Armando MD, FAAOS  Orthopedic Surgeon  Fellowship Trained Shoulder and Elbow Surgeon  [facility]  [address] [place] [address], [address]  [place] [address]    11/14/23  16:05 EST

## 2023-11-14 NOTE — HOME HEALTH
Routine Visit Note:    Skill/education provided: ADL retraining, shoulder stabilization exercises, car transfer, functional mobility/transfers     Patient/caregiver response: Pt tolerated session well, still having increased pain in RUE and getting shoulder xray today    Plan for next visit: Results after shoulder xray, ADL's    Other pertinent info:

## 2023-11-16 ENCOUNTER — HOME CARE VISIT (OUTPATIENT)
Dept: HOME HEALTH SERVICES | Facility: HOME HEALTHCARE | Age: 83
End: 2023-11-16
Payer: MEDICARE

## 2023-11-16 PROCEDURE — G0157 HHC PT ASSISTANT EA 15: HCPCS

## 2023-11-17 VITALS
DIASTOLIC BLOOD PRESSURE: 78 MMHG | RESPIRATION RATE: 16 BRPM | OXYGEN SATURATION: 95 % | HEART RATE: 67 BPM | SYSTOLIC BLOOD PRESSURE: 120 MMHG

## 2023-11-19 ENCOUNTER — HOME CARE VISIT (OUTPATIENT)
Dept: HOME HEALTH SERVICES | Facility: HOME HEALTHCARE | Age: 83
End: 2023-11-19
Payer: MEDICARE

## 2023-11-19 VITALS
HEART RATE: 75 BPM | OXYGEN SATURATION: 97 % | RESPIRATION RATE: 16 BRPM | DIASTOLIC BLOOD PRESSURE: 70 MMHG | SYSTOLIC BLOOD PRESSURE: 138 MMHG

## 2023-11-19 PROCEDURE — G0157 HHC PT ASSISTANT EA 15: HCPCS

## 2023-11-20 ENCOUNTER — HOME CARE VISIT (OUTPATIENT)
Dept: HOME HEALTH SERVICES | Facility: HOME HEALTHCARE | Age: 83
End: 2023-11-20
Payer: MEDICARE

## 2023-11-20 PROCEDURE — G0151 HHCP-SERV OF PT,EA 15 MIN: HCPCS

## 2023-11-20 PROCEDURE — G0152 HHCP-SERV OF OT,EA 15 MIN: HCPCS

## 2023-11-21 VITALS
SYSTOLIC BLOOD PRESSURE: 134 MMHG | TEMPERATURE: 98.1 F | HEART RATE: 72 BPM | OXYGEN SATURATION: 97 % | RESPIRATION RATE: 16 BRPM | DIASTOLIC BLOOD PRESSURE: 78 MMHG

## 2023-11-21 VITALS
DIASTOLIC BLOOD PRESSURE: 70 MMHG | OXYGEN SATURATION: 99 % | HEART RATE: 54 BPM | TEMPERATURE: 98.1 F | SYSTOLIC BLOOD PRESSURE: 134 MMHG

## 2023-11-21 NOTE — HOME HEALTH
OT 30 day reassessment completed. Patient was diagnosed with right proximal metadiaphyseal fracture after second xray completed per OT recommendation. Patient's initial injury was on 10/18/23, but per ER records, no fracture, and pt was diagnosed with right shoulder and hip contusions. At this time, patient is able to complete PROM/AAROM of shoulder, elbow, hand. She is wearing a sling at all times, and requires assist with all tasks and functional mobility. OT to see pt 2w4 and then reassess functional status at that time. Pt/family in agreement with POC.

## 2023-11-27 ENCOUNTER — HOME CARE VISIT (OUTPATIENT)
Dept: HOME HEALTH SERVICES | Facility: HOME HEALTHCARE | Age: 83
End: 2023-11-27
Payer: MEDICARE

## 2023-11-27 VITALS
OXYGEN SATURATION: 96 % | HEART RATE: 65 BPM | RESPIRATION RATE: 18 BRPM | SYSTOLIC BLOOD PRESSURE: 148 MMHG | DIASTOLIC BLOOD PRESSURE: 82 MMHG | TEMPERATURE: 98 F

## 2023-11-27 PROCEDURE — G0152 HHCP-SERV OF OT,EA 15 MIN: HCPCS

## 2023-11-28 NOTE — HOME HEALTH
Routine Visit Note:    Skill/education provided: Upgrade and complete HEP, ADL retraining    Patient/caregiver response: Patient tolerated session well, is reporting decreased pain and able to complete more AROM of R shoulder at this time    Plan for next visit: HEP, ADL's    Other pertinent info:

## 2023-11-29 ENCOUNTER — PATIENT OUTREACH (OUTPATIENT)
Dept: CASE MANAGEMENT | Facility: OTHER | Age: 83
End: 2023-11-29
Payer: MEDICARE

## 2023-11-29 NOTE — OUTREACH NOTE
"AMBULATORY CASE MANAGEMENT NOTE    Name and Relationship of Patient/Support Person: CLARY DAVIDSON - Emergency Contact  Emergency Contact    Patient Outreach    RN-ACM called patient, spoke with daughter, Clary, regarding patient's HRCM needs. She said patient is \"doing much better\" over the last week.  Stated the Home Health OT is coming 2x/week, and the nurse will be back before Ban.  Daughter said they have not needed to utilize the Non-Emergent medical transportation #'s given, that the OT showed them how to get patient in/out of the car for the last appointment.  She said they have not needed to utilize the in-home caregivers from community agencies, but she still has the phone numbers if ever needed. Daughter said patient is doing better with mobilization; she has an Ortho. Appt next week, Tuesday, 12/5/23.  No other questions or concerns voiced at this time.      Stefani NGUYEN  Ambulatory Case Management    11/29/2023, 13:59 EST  "

## 2023-11-30 ENCOUNTER — HOME CARE VISIT (OUTPATIENT)
Dept: HOME HEALTH SERVICES | Facility: HOME HEALTHCARE | Age: 83
End: 2023-11-30
Payer: MEDICARE

## 2023-11-30 VITALS
OXYGEN SATURATION: 97 % | RESPIRATION RATE: 17 BRPM | DIASTOLIC BLOOD PRESSURE: 76 MMHG | SYSTOLIC BLOOD PRESSURE: 134 MMHG | TEMPERATURE: 98.2 F | HEART RATE: 67 BPM

## 2023-11-30 VITALS
RESPIRATION RATE: 16 BRPM | SYSTOLIC BLOOD PRESSURE: 132 MMHG | OXYGEN SATURATION: 97 % | HEART RATE: 67 BPM | DIASTOLIC BLOOD PRESSURE: 76 MMHG | TEMPERATURE: 97.8 F

## 2023-11-30 PROCEDURE — G0152 HHCP-SERV OF OT,EA 15 MIN: HCPCS

## 2023-11-30 PROCEDURE — G0151 HHCP-SERV OF PT,EA 15 MIN: HCPCS

## 2023-12-01 NOTE — HOME HEALTH
Routine Visit Note:    Skill/education provided: Review and completion of HEP, ADL retraining, functional mobility/transfers     Patient/caregiver response: Pt continues to improve independence with ADL's, and functional mobility in the home. She has progressed back to straight cane while working with PT. She remains compliant with [facility] HEP and is improving ROM as expected     Plan for next visit: HEP, ADL's    Other pertinent info:

## 2023-12-04 ENCOUNTER — HOME CARE VISIT (OUTPATIENT)
Dept: HOME HEALTH SERVICES | Facility: HOME HEALTHCARE | Age: 83
End: 2023-12-04
Payer: MEDICARE

## 2023-12-04 VITALS
OXYGEN SATURATION: 97 % | HEART RATE: 73 BPM | SYSTOLIC BLOOD PRESSURE: 144 MMHG | DIASTOLIC BLOOD PRESSURE: 72 MMHG | RESPIRATION RATE: 16 BRPM

## 2023-12-04 DIAGNOSIS — N39.41 URGENCY INCONTINENCE: ICD-10-CM

## 2023-12-04 DIAGNOSIS — N32.81 OAB (OVERACTIVE BLADDER): ICD-10-CM

## 2023-12-04 PROCEDURE — G0152 HHCP-SERV OF OT,EA 15 MIN: HCPCS

## 2023-12-04 NOTE — TELEPHONE ENCOUNTER
Rx Refill Note  Requested Prescriptions     Pending Prescriptions Disp Refills    Vibegron 75 MG tablet 28 tablet 0     Sig: Take 1 tablet by mouth Daily.      Last office visit with prescribing clinician: 11/13/2023   Next office visit with prescribing clinician: 11/14/2024     Kesha Barajas MA  12/04/23, 09:13 EST

## 2023-12-05 ENCOUNTER — OFFICE VISIT (OUTPATIENT)
Dept: ORTHOPEDIC SURGERY | Facility: CLINIC | Age: 83
End: 2023-12-05
Payer: MEDICARE

## 2023-12-05 VITALS
BODY MASS INDEX: 28.84 KG/M2 | WEIGHT: 162.8 LBS | DIASTOLIC BLOOD PRESSURE: 68 MMHG | HEIGHT: 63 IN | SYSTOLIC BLOOD PRESSURE: 98 MMHG

## 2023-12-05 DIAGNOSIS — S42.291A OTHER CLOSED DISPLACED FRACTURE OF PROXIMAL END OF RIGHT HUMERUS, INITIAL ENCOUNTER: Primary | ICD-10-CM

## 2023-12-05 DIAGNOSIS — Z09 FRACTURE FOLLOW-UP: ICD-10-CM

## 2023-12-05 NOTE — PROGRESS NOTES
[facility] Office Follow Up       Office Follow Up Visit       Patient Name: Avelina Ceron    Chief Complaint:   Chief Complaint   Patient presents with    Follow-up     3 Week Follow Up -- Closed fracture of right proximal humerus       Referring Physician: No ref. provider found    History of Present Illness:   It has been 3  week(s) since Avelina Ceron's last visit. Avelina Ceron returns to clinic today for F/U: follow-up of rightBody Part: humerusReason: fracture. The issue has been ongoing for 2 month(s). Avelina Ceron rates HIS/HER: herpain at 2/10 on the pain scale. Previous/current treatments: bracing and physical therapy. Current symptoms:Symptoms: pain, popping, and stiffness. The pain is worse with lying on affected side; resting improves the pain. Overall, he/she: sheis doing better.  I have reviewed the patient's history of present illness as noted/entered above.    I have reviewed the patient's past medical history, surgical history, social history, family history, medications, and allergies as noted in the electronic medical record and as noted/entered.  I have reviewed the patient's review of systems as noted/enter and updated as noted in the patient's HPI.      Right proximal metadiaphyseal fracture now subacute  Supportive daughter present  Date of injury 10/18/2023     [facility] ER visit from 10/18/2023 reviewed, suffered a fall  Subsequent images from 10/24/2023 reviewed-subtle but present metadiaphyseal fracture nondisplaced noted that time  Follow-up x-rays do show some baseline displacement and callus formation.     She has an excellent therapy team.  She is working with Carly Brasher OT and they have been very pleased with their care.     Counseled the patient and family that this would be a nonsurgical treatment even if noted at time of injury.  She does have interval callus formation noted indicative of  "healing.  We will continue to follow list nonunion and malunion are still possible and we will continue to follow along.     O2 by nasal cannula, emphysema/COPD history  Very supportive daughter present.  Excellent questions asked        82 y.o. female  Body mass index is 30.47 kg/m².      12/5/2023:  RIGHT SHOULDER  Date of injury 10/18/2023  Continues with physical therapy at home and has seen improvements in ADLs.  No obvious motion at the fracture site.      Subjective   Subjective      Review of Systems   Constitutional: Negative.  Negative for chills, fatigue and fever.   HENT: Negative.  Negative for congestion and dental problem.    Eyes: Negative.  Negative for blurred vision.   Respiratory: Negative.  Negative for shortness of breath.    Cardiovascular: Negative.  Negative for leg swelling.   Gastrointestinal: Negative.  Negative for abdominal pain.   Endocrine: Negative.  Negative for polyuria.   Genitourinary: Negative.  Negative for difficulty urinating.   Musculoskeletal:  Positive for arthralgias.   Skin: Negative.    Allergic/Immunologic: Negative.    Neurological: Negative.    Hematological: Negative.  Negative for adenopathy.   Psychiatric/Behavioral: Negative.  Negative for behavioral problems.         Past Medical History:   Past Medical History:   Diagnosis Date    Anesthesia complication     Pt went into respiratory failure during total hip replacement in 2020    Arthritis of back     Arthritis of neck     Broken ribs     COPD (chronic obstructive pulmonary disease)     Degenerative lumbar disc     Disease of thyroid gland     \"low thyroid\"    Fracture, fibula 1997?    Fracture, humerus 12/13/23    Fracture, tibia and fibula 1997?    Hip arthrosis     History of colonoscopy     Hyperlipidemia     Hypertension     Knee swelling     Lumbosacral disc disease     On home O2     3L    Osteoarthritis     Periarthritis of shoulder        Past Surgical History:   Past Surgical History:   Procedure " Laterality Date    CAROTID STENT Bilateral     COLONOSCOPY      EYE SURGERY  10/17/2022    yag surgery    GALLBLADDER SURGERY  2012    HYSTERECTOMY      ILIAC ARTERY STENT Bilateral     JOINT REPLACEMENT  ;     TOTAL HIP ARTHROPLASTY Bilateral     URETEROSCOPY LASER LITHOTRIPSY WITH STENT INSERTION Right 2023    Procedure: CYSTOSCOPY, RIGHT RETROGRADE PYELOGRAM; WITH STENT INSERTION;  Surgeon: Lee Kim MD;  Location:  [facility];  Service: Urology;  Laterality: Right;    URETEROSCOPY LASER LITHOTRIPSY WITH STENT INSERTION Right 08/10/2023    Procedure: URETEROSCOPY, LASER LITHOTRIPSY, STENT EXCHANGE;  Surgeon: Lee Kim MD;  Location:  [facility];  Service: Urology;  Laterality: Right;       Family History:   Family History   Problem Relation Age of Onset    Cancer Other         BREAST, COLON, OVARIAN    Depression Other     Hyperlipidemia Other     Hypertension Other     Heart disease Mother     Hypertension Mother     Arthritis Daughter     Cancer Daughter     Hyperlipidemia Daughter     Liver disease Daughter     Thyroid disease Daughter     Cancer Maternal Aunt     Diabetes Maternal Uncle     Alzheimer's disease Sister        Social History:   Social History     Socioeconomic History    Marital status:    Tobacco Use    Smoking status: Former     Packs/day: 1.00     Years: 50.00     Additional pack years: 0.00     Total pack years: 50.00     Types: Cigarettes     Quit date: 2006     Years since quittin.9     Passive exposure: Past    Smokeless tobacco: Never   Vaping Use    Vaping Use: Never used   Substance and Sexual Activity    Alcohol use: Not Currently     Comment: no etoh for past 10 years    Drug use: No    Sexual activity: Not Currently       Medications:   Current Outpatient Medications:     acetaminophen (TYLENOL) 500 MG tablet, Take 1 tablet by mouth Every 6 (Six) Hours., Disp: , Rfl:     albuterol sulfate  (90 Base) MCG/ACT inhaler, Inhale 2  puffs Every 4 (Four) Hours As Needed., Disp: , Rfl:     Ascorbic Acid (Vitamin C) 500 MG capsule, Take 500 mg by mouth Daily., Disp: , Rfl:     aspirin 81 MG chewable tablet, Chew 1 tablet Daily., Disp: , Rfl:     atenolol (TENORMIN) 50 MG tablet, Take 0.5 tablets by mouth 2 (Two) Times a Day. (Patient taking differently: Take 0.5 tablets by mouth 2 (Two) Times a Day. 1/2 in morning, 1/2 in the evening), Disp: 90 tablet, Rfl: 1    Calcium Carbonate-Vitamin D (CALCIUM-VITAMIN D) 500-200 MG-UNIT per tablet, Take 1 tablet by mouth Daily., Disp: , Rfl:     cetirizine (zyrTEC) 10 MG tablet, Take 1 tablet by mouth Daily., Disp: , Rfl:     Cholecalciferol (Vitamin D3) 25 MCG (1000 UT) capsule, Take 1 capsule by mouth Daily., Disp: , Rfl:     clopidogrel (PLAVIX) 75 MG tablet, Take 1 tablet by mouth Daily., Disp: 90 tablet, Rfl: 1    cyclobenzaprine (FLEXERIL) 5 MG tablet, Take 2 tablets by mouth 2 (Two) Times a Day As Needed for Muscle Spasms., Disp: 60 tablet, Rfl: 0    FeroSul 325 (65 Fe) MG tablet, TAKE 1 TABLET BY MOUTH TWICE DAILY (Patient taking differently: Take 1 tablet by mouth 2 (Two) Times a Day.), Disp: 60 tablet, Rfl: 3    FIBER SELECT GUMMIES PO, Take 2 tablets by mouth Daily., Disp: , Rfl:     gabapentin (NEURONTIN) 300 MG capsule, TAKE 1 CAPSULE BY MOUTH EVERY DAY AT NIGHT, Disp: 90 capsule, Rfl: 0    guaiFENesin (MUCINEX) 600 MG 12 hr tablet, Take 1 tablet by mouth 2 (Two) Times a Day., Disp: , Rfl:     isosorbide mononitrate (IMDUR) 30 MG 24 hr tablet, TAKE 1 TABLET DAILY (Patient taking differently: Take 1 tablet by mouth Daily.), Disp: 90 tablet, Rfl: 1    latanoprost (XALATAN) 0.005 % ophthalmic solution, Administer 1 drop to both eyes Every Night., Disp: , Rfl:     levothyroxine (Synthroid) 200 MCG tablet, Take 1 tablet by mouth Daily., Disp: 90 tablet, Rfl: 0    Multiple Vitamins-Minerals (PRESERVISION AREDS 2 PO), Take 1 tablet by mouth 2 (Two) Times a Day., Disp: , Rfl:     nitroglycerin  "(NITROSTAT) 0.4 MG SL tablet, PLACE 1 TABLET UNDER THE TONGUE AS NEEDED EVERY 5 MINUTES FOR CHEST PAIN. CALL 911 IF NO RELIEF AFTER 2ND DOSE, Disp: 25 tablet, Rfl: 0    O2 (OXYGEN), Inhale 2 L/min Continuous., Disp: , Rfl:     pantoprazole (PROTONIX) 40 MG EC tablet, TAKE 1 TABLET DAILY, Disp: 90 tablet, Rfl: 1    Polyethylene Glycol 3350 (MIRALAX PO), Take 1 dose by mouth As Needed (constipation)., Disp: , Rfl:     roflumilast (Daliresp) 500 MCG tablet tablet, Take 1 tablet by mouth Daily., Disp: 90 tablet, Rfl: 1    sertraline (ZOLOFT) 100 MG tablet, Take 1 tablet by mouth Daily., Disp: 90 tablet, Rfl: 1    simvastatin (ZOCOR) 40 MG tablet, TAKE 1 TABLET NIGHTLY (Patient taking differently: Take 1 tablet by mouth Every Night.), Disp: 90 tablet, Rfl: 1    traMADol (ULTRAM) 50 MG tablet, Take 1 tablet by mouth Every 6 (Six) Hours As Needed for Moderate Pain., Disp: 120 tablet, Rfl: 2    Trelegy Ellipta 100-62.5-25 MCG/ACT inhaler, INHALE 1 PUFF DAILY (Patient taking differently: Inhale 1 puff Daily.), Disp: 180 each, Rfl: 3    Vibegron 75 MG tablet, Take 1 tablet by mouth Daily., Disp: 28 tablet, Rfl: 0    Allergies:   Allergies   Allergen Reactions    Penicillins Hives, Swelling and Mental Status Change    Codeine Mental Status Change    Dopamine Palpitations and Other (See Comments)     Other reaction(s): Hypertension       The following portions of the patient's history were reviewed and updated as appropriate: allergies, current medications, past family history, past medical history, past social history, past surgical history and problem list.        Objective    Objective      Vital Signs:   Vitals:    12/05/23 1308   BP: 98/68   BP Location: Left arm   Patient Position: Sitting   Cuff Size: Adult   Weight: 73.8 kg (162 lb 12.8 oz)   Height: 160 cm (62.99\")       Ortho Exam:  Clinically doing much better.  No motion at the fracture site is noted significant improvements with regards to pain and active and " passive motion at this point which is fairly impressive given the significance of her injury    Results Review:  Imaging Results (Last 24 Hours)       Procedure Component Value Units Date/Time    XR Shoulder 2+ View Right [[id number]] Resulted: 12/05/23 1340     Updated: 12/05/23 1341    Narrative:      Imaging: shoulder x-rays 3 views - AP, axillary, and scapular-Y x-ray   views    Side: RIGHT SHOULDER    Indication for shoulder x-ray 3 views: shoulder pain    Comparison: prior clinic comparison views available    Findings: Right shoulder proximal metadiaphyseal fracture show stable   findings compared to prior with interval callus consolidation.  Acceptable   alignment is maintained stable compared to prior.    I personally reviewed the above x-rays.                Procedures            Assessment / Plan      Assessment/Plan:   Problem List Items Addressed This Visit          Musculoskeletal and Injuries    Closed fracture of right proximal humerus - Primary     Other Visit Diagnoses       Fracture follow-up        Relevant Orders    XR Shoulder 2+ View Right (Completed)            Right proximal metadiaphyseal fracture with interval callus formation.  Does appear to be headed toward malunion as opposed to nonunion which was discussed as a possibility initially.  Fortunately she has maintained acceptable alignment compared to prior with interval callus formation.  She will continue with home exercise program and therapy.      Follow Up: 4 weeks with right shoulder 3 views      Tc Armando MD, FAAOS  Orthopedic Surgeon  Fellowship Trained Shoulder and Elbow Surgeon  [facility]  Orthopedics and Sports Medicine  [address] [place] [address], [address]  [place] [address]    12/05/23  13:43 EST

## 2023-12-05 NOTE — HOME HEALTH
Routine Visit Note:    Skill/education provided: ADL retraining with review of abby technique for UBD task, and review/completion of HEP, functional mobility/transfers navigating stairs up from den into kitchen     Patient/caregiver response: Pt tolerated session well, reports soreness in RUE but increased use with functional tasks    Plan for next visit: ADL's, upgrade HEP    Other pertinent info: Follow up with ortho tomorrow 12/5/23

## 2023-12-07 ENCOUNTER — HOME CARE VISIT (OUTPATIENT)
Dept: HOME HEALTH SERVICES | Facility: HOME HEALTHCARE | Age: 83
End: 2023-12-07
Payer: MEDICARE

## 2023-12-07 VITALS
TEMPERATURE: 97.6 F | SYSTOLIC BLOOD PRESSURE: 132 MMHG | HEART RATE: 72 BPM | RESPIRATION RATE: 18 BRPM | OXYGEN SATURATION: 94 % | DIASTOLIC BLOOD PRESSURE: 66 MMHG

## 2023-12-07 DIAGNOSIS — N39.41 URGENCY INCONTINENCE: ICD-10-CM

## 2023-12-07 DIAGNOSIS — N32.81 OAB (OVERACTIVE BLADDER): ICD-10-CM

## 2023-12-07 PROCEDURE — G0152 HHCP-SERV OF OT,EA 15 MIN: HCPCS

## 2023-12-07 NOTE — HOME HEALTH
Routine Visit Note:    Skill/education provided: ADL retraining, functional mobility, review and completion of HEP    Patient/caregiver response: Pt continues to improve in all set goal areas and is becoming more independent with all ADL's and functional mobility in the home. She is at a supervision level. She also has improved AROM of RUE with all tasks     Plan for next visit: ADL's, HEP    Other pertinent info:

## 2023-12-11 ENCOUNTER — HOME CARE VISIT (OUTPATIENT)
Dept: HOME HEALTH SERVICES | Facility: HOME HEALTHCARE | Age: 83
End: 2023-12-11
Payer: MEDICARE

## 2023-12-11 VITALS
HEART RATE: 67 BPM | SYSTOLIC BLOOD PRESSURE: 156 MMHG | RESPIRATION RATE: 18 BRPM | TEMPERATURE: 98.1 F | DIASTOLIC BLOOD PRESSURE: 82 MMHG | OXYGEN SATURATION: 98 %

## 2023-12-11 PROCEDURE — G0152 HHCP-SERV OF OT,EA 15 MIN: HCPCS

## 2023-12-12 NOTE — HOME HEALTH
Routine Visit Note:    Skill/education provided: Upgrade and complete HEP, IADL's    Patient/caregiver response: Patient tolerated session well, continues to improve daily with RUE use, and ADL independence     Plan for next visit: HEP, IADL's    Other pertinent info:

## 2023-12-14 ENCOUNTER — HOME CARE VISIT (OUTPATIENT)
Dept: HOME HEALTH SERVICES | Facility: HOME HEALTHCARE | Age: 83
End: 2023-12-14
Payer: MEDICARE

## 2023-12-14 VITALS
HEART RATE: 78 BPM | RESPIRATION RATE: 16 BRPM | TEMPERATURE: 98.1 F | OXYGEN SATURATION: 95 % | SYSTOLIC BLOOD PRESSURE: 156 MMHG | DIASTOLIC BLOOD PRESSURE: 78 MMHG

## 2023-12-14 PROCEDURE — G0152 HHCP-SERV OF OT,EA 15 MIN: HCPCS

## 2023-12-15 ENCOUNTER — OFFICE VISIT (OUTPATIENT)
Dept: UROLOGY | Facility: CLINIC | Age: 83
End: 2023-12-15
Payer: MEDICARE

## 2023-12-15 VITALS — WEIGHT: 162 LBS | OXYGEN SATURATION: 96 % | HEIGHT: 63 IN | BODY MASS INDEX: 28.7 KG/M2 | HEART RATE: 64 BPM

## 2023-12-15 DIAGNOSIS — N39.41 URGENCY INCONTINENCE: ICD-10-CM

## 2023-12-15 DIAGNOSIS — N32.81 OAB (OVERACTIVE BLADDER): ICD-10-CM

## 2023-12-15 DIAGNOSIS — Z87.440 HISTORY OF RECURRENT UTI (URINARY TRACT INFECTION): ICD-10-CM

## 2023-12-15 DIAGNOSIS — N39.0 URINARY TRACT INFECTION WITHOUT HEMATURIA, SITE UNSPECIFIED: Primary | ICD-10-CM

## 2023-12-15 PROCEDURE — 87086 URINE CULTURE/COLONY COUNT: CPT | Performed by: NURSE PRACTITIONER

## 2023-12-15 RX ORDER — ESTRADIOL 0.1 MG/G
2 CREAM VAGINAL 3 TIMES WEEKLY
Qty: 42.5 G | Refills: 12 | Status: SHIPPED | OUTPATIENT
Start: 2023-12-15

## 2023-12-15 RX ORDER — NITROFURANTOIN 25; 75 MG/1; MG/1
100 CAPSULE ORAL 2 TIMES DAILY
Qty: 10 CAPSULE | Refills: 0 | Status: SHIPPED | OUTPATIENT
Start: 2023-12-15 | End: 2023-12-20

## 2023-12-15 NOTE — PROGRESS NOTES
Follow Up Office Visit      Patient Name: Avelina Ceron  : 1940   MRN: [id number]     Chief Complaint:    Chief Complaint   Patient presents with    overactive bladder    Urinary Incontinence       Referring Provider: No ref. provider found    History of Present Illness: Avelina Ceron is a 82 y.o. female who presents today for follow up of overactive bladder and urinary urge incontinence. She was last seen by Dr. Kim one month ago. She was transitioned off Vesicare due to dry mouth, dizziness and recent fall. She was given samples of Gemtesa 75mg once daily. She continues to report urinary urge incontinence. She drinks Coke zero throughout the day, drinks minimal water. She feels she has a UTI today. She has had recurrent UTIs; we discussed this is likely worsening her urinary urge incontinence.     Urine Culture   23; >100,000 E.Coli  23; >100,000 Klebsiella pneumoniae  10/09/23; >100,000 E.Coli  Subjective      Review of System: Review of Systems   Genitourinary:  Positive for dysuria and urgency. Negative for flank pain, frequency and hematuria.      I have reviewed the ROS documented by my clinical staff, I have updated appropriately and I agree. AMADOR Casas    I have reviewed and the following portions of the patient's history were updated as appropriate: past family history, past medical history, past social history, past surgical history and problem list.    Medications:     Current Outpatient Medications:     acetaminophen (TYLENOL) 500 MG tablet, Take 1 tablet by mouth Every 6 (Six) Hours., Disp: , Rfl:     albuterol sulfate  (90 Base) MCG/ACT inhaler, Inhale 2 puffs Every 4 (Four) Hours As Needed., Disp: , Rfl:     Ascorbic Acid (Vitamin C) 500 MG capsule, Take 500 mg by mouth Daily., Disp: , Rfl:     aspirin 81 MG chewable tablet, Chew 1 tablet Daily., Disp: , Rfl:     atenolol (TENORMIN) 50 MG tablet, Take 0.5 tablets by mouth 2 (Two) Times a Day. (Patient  taking differently: Take 0.5 tablets by mouth 2 (Two) Times a Day. 1/2 in morning, 1/2 in the evening), Disp: 90 tablet, Rfl: 1    Calcium Carbonate-Vitamin D (CALCIUM-VITAMIN D) 500-200 MG-UNIT per tablet, Take 1 tablet by mouth Daily., Disp: , Rfl:     cetirizine (zyrTEC) 10 MG tablet, Take 1 tablet by mouth Daily., Disp: , Rfl:     Cholecalciferol (Vitamin D3) 25 MCG (1000 UT) capsule, Take 1 capsule by mouth Daily., Disp: , Rfl:     clopidogrel (PLAVIX) 75 MG tablet, Take 1 tablet by mouth Daily., Disp: 90 tablet, Rfl: 1    cyclobenzaprine (FLEXERIL) 5 MG tablet, Take 2 tablets by mouth 2 (Two) Times a Day As Needed for Muscle Spasms., Disp: 60 tablet, Rfl: 0    FeroSul 325 (65 Fe) MG tablet, TAKE 1 TABLET BY MOUTH TWICE DAILY (Patient taking differently: Take 1 tablet by mouth 2 (Two) Times a Day.), Disp: 60 tablet, Rfl: 3    FIBER SELECT GUMMIES PO, Take 2 tablets by mouth Daily., Disp: , Rfl:     gabapentin (NEURONTIN) 300 MG capsule, TAKE 1 CAPSULE BY MOUTH EVERY DAY AT NIGHT, Disp: 90 capsule, Rfl: 0    guaiFENesin (MUCINEX) 600 MG 12 hr tablet, Take 1 tablet by mouth 2 (Two) Times a Day., Disp: , Rfl:     isosorbide mononitrate (IMDUR) 30 MG 24 hr tablet, TAKE 1 TABLET DAILY (Patient taking differently: Take 1 tablet by mouth Daily.), Disp: 90 tablet, Rfl: 1    latanoprost (XALATAN) 0.005 % ophthalmic solution, Administer 1 drop to both eyes Every Night., Disp: , Rfl:     levothyroxine (Synthroid) 200 MCG tablet, Take 1 tablet by mouth Daily., Disp: 90 tablet, Rfl: 0    Multiple Vitamins-Minerals (PRESERVISION AREDS 2 PO), Take 1 tablet by mouth 2 (Two) Times a Day., Disp: , Rfl:     nitroglycerin (NITROSTAT) 0.4 MG SL tablet, PLACE 1 TABLET UNDER THE TONGUE AS NEEDED EVERY 5 MINUTES FOR CHEST PAIN. CALL 911 IF NO RELIEF AFTER 2ND DOSE, Disp: 25 tablet, Rfl: 0    O2 (OXYGEN), Inhale 2 L/min Continuous., Disp: , Rfl:     pantoprazole (PROTONIX) 40 MG EC tablet, TAKE 1 TABLET DAILY, Disp: 90 tablet, Rfl: 1     Polyethylene Glycol 3350 (MIRALAX PO), Take 1 dose by mouth As Needed (constipation)., Disp: , Rfl:     roflumilast (Daliresp) 500 MCG tablet tablet, Take 1 tablet by mouth Daily., Disp: 90 tablet, Rfl: 1    sertraline (ZOLOFT) 100 MG tablet, Take 1 tablet by mouth Daily., Disp: 90 tablet, Rfl: 1    simvastatin (ZOCOR) 40 MG tablet, TAKE 1 TABLET NIGHTLY (Patient taking differently: Take 1 tablet by mouth Every Night.), Disp: 90 tablet, Rfl: 1    traMADol (ULTRAM) 50 MG tablet, Take 1 tablet by mouth Every 6 (Six) Hours As Needed for Moderate Pain., Disp: 120 tablet, Rfl: 2    Trelegy Ellipta 100-62.5-25 MCG/ACT inhaler, INHALE 1 PUFF DAILY (Patient taking differently: Inhale 1 puff Daily.), Disp: 180 each, Rfl: 3    Vibegron 75 MG tablet, Take 1 tablet by mouth Daily., Disp: 30 tablet, Rfl: 6    estradiol (ESTRACE) 0.1 MG/GM vaginal cream, Insert 2 applicators into the vagina 3 (Three) Times a Week. Insert 2 grams into the vagina three times weekly. Unsure why the prescription is changing it to 2 applicators.  But it needs to be 2 grams three times weekly., Disp: 42.5 g, Rfl: 12    nitrofurantoin, macrocrystal-monohydrate, (Macrobid) 100 MG capsule, Take 1 capsule by mouth 2 (Two) Times a Day for 5 days., Disp: 10 capsule, Rfl: 0    Allergies:   Allergies   Allergen Reactions    Penicillins Hives, Swelling and Mental Status Change    Codeine Mental Status Change    Dopamine Palpitations and Other (See Comments)     Other reaction(s): Hypertension         Bladder & Bowel Symptom Questionnaire    How often do you usually urinate during the day ?   2 - About every 2-3 hours    2.   How many timed do you urinate at night?   0 - No more often than once in 4 hours    3.   What is the reason that you usually urinate?   3 - About every 1-2 hours   4.   Once you get the urge to go, how long can you     comfortably delay?   4 - At least once an hour    5.   How often do you get a sudden urge that makes you rush to the  "bathroom?   4 - At least once an hour    6.   How often does a sudden urge to urinate result in you leaking urine or wetting pads?   3 - About every 1-2 hours   7.  In your opinion, how good is your bladder control?   3 - About every 1-2 hours   8.  Do you have accidental bowel leakage?   no   9.  Do you have difficulty fully emptying your bladder?   yes   10.  Do you experience accidental leakage when performing some physical activity such as coughing, sneezing, laughing or exercise?   yes   11. Have you tried medications to help improve your symptoms?   yes   12. Would you be interested in learning about a long-lasting option that may help you with your symptoms?   yes                                                                             Total Score   19     0-7 (Mild) 8-16 (Moderate) 17-28 (Severe)      Objective     Physical Exam:   Vital Signs:   Vitals:    12/15/23 1042   Pulse: 64   SpO2: 96%   Weight: 73.5 kg (162 lb)   Height: 160 cm (62.99\")   PainSc: 0-No pain     Body mass index is 28.71 kg/m².     Physical Exam  Vitals and nursing note reviewed.   Constitutional:       Appearance: Normal appearance.   HENT:      Head: Normocephalic and atraumatic.      Nose: Nose normal.      Mouth/Throat:      Mouth: Mucous membranes are moist.   Eyes:      Pupils: Pupils are equal, round, and reactive to light.   Pulmonary:      Effort: Pulmonary effort is normal.      Comments: O2 nasal cannula  Abdominal:      General: Abdomen is flat.      Palpations: Abdomen is soft.   Musculoskeletal:         General: Normal range of motion.      Cervical back: Normal range of motion.      Comments: Wheelchair to ambulate   Skin:     General: Skin is warm and dry.      Capillary Refill: Capillary refill takes less than 2 seconds.   Neurological:      General: No focal deficit present.      Mental Status: She is alert.   Psychiatric:         Mood and Affect: Mood normal.       Labs:   Brief Urine Lab Results  (Last result in " the past 365 days)        Color   Clarity   Blood   Leuk Est   Nitrite   Protein   CREAT   Urine HCG        10/09/23 1451 Yellow   Cloudy   50 Holden/ul   500 Yen/ul   Positive   30 mg/dL                   Urine Culture          6/6/2023    14:04 7/25/2023    13:57 10/9/2023    14:51   Urine Culture   Urine Culture >100,000 CFU/mL Escherichia coli  >100,000 CFU/mL Klebsiella pneumoniae ssp pneumoniae  >100,000 CFU/mL Escherichia coli     25,000 CFU/mL Normal Urogenital Mirian         Lab Results   Component Value Date    GLUCOSE 106 (H) 10/09/2023    CALCIUM 10.0 10/09/2023     10/09/2023    K 5.2 10/09/2023    CO2 34.5 (H) 10/09/2023    CL 97 (L) 10/09/2023    BUN 10 10/09/2023    CREATININE 0.53 (L) 10/09/2023    EGFRIFNONA 119 11/24/2021    BCR 18.9 10/09/2023    ANIONGAP 11.5 10/09/2023       Lab Results   Component Value Date    WBC 12.93 (H) 10/09/2023    HGB 13.0 10/09/2023    HCT 38.5 10/09/2023    MCV 95.5 10/09/2023     10/09/2023       Images:   XR Shoulder 2+ View Right    Result Date: 11/13/2023  Impression: Displaced and mildly angulated oblique acute fracture of the proximal right humerus as above. Electronically Signed: Ramy Davison MD  11/13/2023 1:02 PM EST  Workstation ID: [id number]    XR Humerus Right    Result Date: 10/24/2023  Chronic findings as detailed. No acute process. Electronically Signed: Mary Wheat MD  10/24/2023 3:34 PM EDT  Workstation ID: [id number]    XR Hand 2 View Right    Result Date: 10/24/2023  Chronic findings as detailed. No acute process. Electronically Signed: Mary Wheat MD  10/24/2023 3:34 PM EDT  Workstation ID: [id number]    CT Head Without Contrast    Result Date: 10/18/2023  Impression: No acute intracranial process identified. Electronically Signed: Peg James MD  10/18/2023 3:19 AM EDT  Workstation ID: [id number]    XR Hip With or Without Pelvis 2 - 3 View Right    Result Date: 10/18/2023  Impression: No acute osseous abnormality. 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 Electronically Signed: Peg James MD  10/18/2023 2:24 AM EDT  Workstation ID: [id number]    US Renal Bilateral    Result Date: 9/20/2023  Impression: Unremarkable renal ultrasound Electronically Signed: Luis Ross MD  9/20/2023 8:28 AM EDT  Workstation ID: [id number]      Measures:   Tobacco:   Avelina Ceron  reports that she quit smoking about 17 years ago. Her smoking use included cigarettes. She started smoking about 68 years ago. She has a 50.00 pack-year smoking history. She has been exposed to tobacco smoke. She has never used smokeless tobacco..          Urine Incontinence: Patient reports that she is currently experiencing symptoms of urinary incontinence.     Assessment / Plan      Assessment/Plan:   82 y.o. female who presented today for follow up of overactive bladder and recurrent UTIs.    We discussed management of her recurrent UTIs. She will begin Vaginal Estrace Cream three times weekly for UTI prophylaxis. She will begin Macrobid x 5 days. We will send urine for culture and notify with results. She will continue Gemtesa for now. She will follow up in 3 months for symptom check and urinalysis.     We discussed to decrease caffeine intake and increase water intake.     Diagnoses and all orders for this visit:    1. Urinary tract infection without hematuria, site unspecified (Primary)  -     Urine Culture - Urine, Urine, Clean Catch; Future  -     nitrofurantoin, macrocrystal-monohydrate, (Macrobid) 100 MG capsule; Take 1 capsule by mouth 2 (Two) Times a Day for 5 days.  Dispense: 10 capsule; Refill: 0    2. Urgency incontinence    3. OAB (overactive bladder)    4. History of recurrent UTI (urinary tract infection)  -     estradiol (ESTRACE) 0.1 MG/GM vaginal cream; Insert 2 applicators into the vagina 3 (Three) Times a Week. Insert 2 grams into the vagina three times weekly. Unsure why the  prescription is changing it to 2 applicators.  But it needs to be 2 grams three times weekly.  Dispense: 42.5 g; Refill: 12       Follow Up:   Return in about 3 months (around 3/15/2024).    I spent approximately 30 minutes providing clinical care for this patient; including review of patient's chart and provider documentation, face to face time spent with patient in examination room (obtaining history, performing physical exam, discussing diagnosis and management options), placing orders, and completing patient documentation.     AMADOR Casas  [facility] [place]

## 2023-12-15 NOTE — HOME HEALTH
Routine Visit Note:    Skill/education provided: Review and completion of HEP, ADL retraining, functional transfers/mobility     Patient/caregiver response: Patient tolerated session well, and reports increasing independence with all ADL's and functional tasks in the home. She is nearing her baseline and continues to improve mobility of RUE    Plan for next visit: ADL's, HEP    Other pertinent info:

## 2023-12-16 LAB — BACTERIA SPEC AEROBE CULT: NO GROWTH

## 2023-12-18 ENCOUNTER — HOME CARE VISIT (OUTPATIENT)
Dept: HOME HEALTH SERVICES | Facility: HOME HEALTHCARE | Age: 83
End: 2023-12-18
Payer: MEDICARE

## 2023-12-18 VITALS
TEMPERATURE: 97.8 F | DIASTOLIC BLOOD PRESSURE: 76 MMHG | SYSTOLIC BLOOD PRESSURE: 130 MMHG | RESPIRATION RATE: 18 BRPM | HEART RATE: 67 BPM | OXYGEN SATURATION: 97 %

## 2023-12-18 PROCEDURE — G0152 HHCP-SERV OF OT,EA 15 MIN: HCPCS

## 2023-12-19 NOTE — HOME HEALTH
Routine Visit Note:    Skill/education provided: ADL retraining, review and completion of HEP    Patient/caregiver response: Patient tolerated session well, is demonstrating improved independence with all ADL's, and has begun assisting some with light meal prep tasks    Plan for next visit: [place] d/c    Other pertinent info:

## 2023-12-20 ENCOUNTER — PATIENT OUTREACH (OUTPATIENT)
Dept: CASE MANAGEMENT | Facility: OTHER | Age: 83
End: 2023-12-20
Payer: MEDICARE

## 2023-12-20 NOTE — OUTREACH NOTE
AMBULATORY CASE MANAGEMENT NOTE    Name and Relationship of Patient/Support Person: CLARY DAVIDSON - Self  Emergency Contact    Patient Outreach    RN-GAILM called patient, spoke with daughter, Clary, to follow up on HRCM needs.  Daughter stated that her mother is doing great; that [facility] PT and OT has been excellent; that HH last day visit is tomorrow.  She said patient is now independent with walking with her walker; independent with ADL's; patient has made great progress.  No falls reported.  Daughter voiced appreciation for the care of [facility].  She denied needs or concerns for RN-GAILM to address further.     Stefani NGUYEN  Ambulatory Case Management    12/20/2023, 09:22 EST

## 2023-12-21 ENCOUNTER — HOME CARE VISIT (OUTPATIENT)
Dept: HOME HEALTH SERVICES | Facility: HOME HEALTHCARE | Age: 83
End: 2023-12-21
Payer: MEDICARE

## 2023-12-21 VITALS
TEMPERATURE: 97.1 F | HEART RATE: 76 BPM | DIASTOLIC BLOOD PRESSURE: 74 MMHG | SYSTOLIC BLOOD PRESSURE: 148 MMHG | OXYGEN SATURATION: 95 % | RESPIRATION RATE: 16 BRPM

## 2023-12-21 DIAGNOSIS — G89.29 CHRONIC MIDLINE LOW BACK PAIN WITHOUT SCIATICA: ICD-10-CM

## 2023-12-21 DIAGNOSIS — E03.9 HYPOTHYROIDISM, UNSPECIFIED TYPE: ICD-10-CM

## 2023-12-21 DIAGNOSIS — M54.50 CHRONIC MIDLINE LOW BACK PAIN WITHOUT SCIATICA: ICD-10-CM

## 2023-12-21 PROCEDURE — G0152 HHCP-SERV OF OT,EA 15 MIN: HCPCS

## 2023-12-21 RX ORDER — LEVOTHYROXINE SODIUM 0.2 MG/1
200 TABLET ORAL DAILY
Qty: 90 TABLET | Refills: 0 | Status: SHIPPED | OUTPATIENT
Start: 2023-12-21

## 2023-12-21 RX ORDER — TRAMADOL HYDROCHLORIDE 50 MG/1
50 TABLET ORAL EVERY 6 HOURS PRN
Qty: 120 TABLET | Refills: 2 | Status: SHIPPED | OUTPATIENT
Start: 2023-12-21

## 2023-12-29 RX ORDER — ISOSORBIDE MONONITRATE 30 MG/1
TABLET, EXTENDED RELEASE ORAL
Qty: 90 TABLET | Refills: 1 | Status: SHIPPED | OUTPATIENT
Start: 2023-12-29

## 2023-12-29 RX ORDER — ROFLUMILAST 500 UG/1
500 TABLET ORAL DAILY
Qty: 90 TABLET | Refills: 1 | Status: SHIPPED | OUTPATIENT
Start: 2023-12-29

## 2023-12-29 RX ORDER — CLOPIDOGREL BISULFATE 75 MG/1
75 TABLET ORAL DAILY
Qty: 90 TABLET | Refills: 1 | Status: SHIPPED | OUTPATIENT
Start: 2023-12-29

## 2023-12-29 RX ORDER — ATENOLOL 50 MG/1
25 TABLET ORAL 2 TIMES DAILY
Qty: 90 TABLET | Refills: 1 | Status: SHIPPED | OUTPATIENT
Start: 2023-12-29

## 2024-01-02 ENCOUNTER — OFFICE VISIT (OUTPATIENT)
Dept: ORTHOPEDIC SURGERY | Facility: CLINIC | Age: 84
End: 2024-01-02
Payer: MEDICARE

## 2024-01-02 VITALS
WEIGHT: 162 LBS | HEIGHT: 63 IN | DIASTOLIC BLOOD PRESSURE: 72 MMHG | SYSTOLIC BLOOD PRESSURE: 132 MMHG | BODY MASS INDEX: 28.7 KG/M2

## 2024-01-02 DIAGNOSIS — S42.291A OTHER CLOSED DISPLACED FRACTURE OF PROXIMAL END OF RIGHT HUMERUS, INITIAL ENCOUNTER: Primary | ICD-10-CM

## 2024-01-02 PROCEDURE — 3075F SYST BP GE 130 - 139MM HG: CPT | Performed by: ORTHOPAEDIC SURGERY

## 2024-01-02 PROCEDURE — 99213 OFFICE O/P EST LOW 20 MIN: CPT | Performed by: ORTHOPAEDIC SURGERY

## 2024-01-02 PROCEDURE — 3078F DIAST BP <80 MM HG: CPT | Performed by: ORTHOPAEDIC SURGERY

## 2024-01-02 NOTE — PROGRESS NOTES
[facility] Follow Up       Office Follow Up Visit       Patient Name: Avelina Ceron    Chief Complaint:   Chief Complaint   Patient presents with    Follow-up     4 week follow up -- Closed fracture of right proximal humerus       Referring Physician: No ref. provider found    History of Present Illness:   It has been 4  week(s) since Avelina Ceron's last visit. Avelina Ceron returns to clinic today for F/U: follow-up of rightBody Part: shoulderReason: fracture. The issue has been ongoing for 3 month(s). Avelina Ceron rates HIS/HER: herpain at 1/10 on the pain scale. Previous/current treatments: bracing, NSAIDS, and physical therapy. Current symptoms:Symptoms: same as prior visit. The pain is worse with  reaching ; resting improves the pain. Overall, he/she: sheis doing better. I have reviewed the patient's history of present illness as noted/entered above.    I have reviewed the patient's past medical history, surgical history, social history, family history, medications, and allergies as noted in the electronic medical record and as noted/entered.  I have reviewed the patient's review of systems as noted/enter and updated as noted in the patient's HPI.      Right proximal metadiaphyseal fracture now subacute  Supportive daughter present  Date of injury 10/18/2023     [facility] ER visit from 10/18/2023 reviewed, suffered a fall  Subsequent images from 10/24/2023 reviewed-subtle but present metadiaphyseal fracture nondisplaced noted that time  Follow-up x-rays do show some baseline displacement and callus formation.     She has an excellent therapy team.  She is working with Carly Brasher OT and they have been very pleased with their care.     Counseled the patient and family that this would be a nonsurgical treatment even if noted at time of injury.  She does have interval callus formation noted indicative of healing.  We  "will continue to follow list nonunion and malunion are still possible and we will continue to follow along.     O2 by nasal cannula, emphysema/COPD history  Very supportive daughter present.  Excellent questions asked        82 y.o. female  Body mass index is 30.47 kg/m².        12/5/2023:  RIGHT SHOULDER  Date of injury 10/18/2023  Continues with physical therapy at home and has seen improvements in ADLs.  No obvious motion at the fracture site.      1/2/2024:  RIGHT Shoulder  Date of injury 10/18/2023  Better  She was recently released from physical therapy due to exceptional improvements.  Overall she is doing well supportive daughter present.      Subjective   Subjective      Review of Systems   Constitutional: Negative.  Negative for chills, fatigue and fever.   HENT: Negative.  Negative for congestion and dental problem.    Eyes: Negative.  Negative for blurred vision.   Respiratory: Negative.  Negative for shortness of breath.    Cardiovascular: Negative.  Negative for leg swelling.   Gastrointestinal: Negative.  Negative for abdominal pain.   Endocrine: Negative.  Negative for polyuria.   Genitourinary: Negative.  Negative for difficulty urinating.   Musculoskeletal:  Positive for arthralgias.   Skin: Negative.    Allergic/Immunologic: Negative.    Neurological: Negative.    Hematological: Negative.  Negative for adenopathy.   Psychiatric/Behavioral: Negative.  Negative for behavioral problems.         Past Medical History:   Past Medical History:   Diagnosis Date    Anesthesia complication     Pt went into respiratory failure during total hip replacement in 2020    Arthritis of back     Arthritis of neck     Broken ribs     COPD (chronic obstructive pulmonary disease)     Coronary artery disease     Degenerative lumbar disc     Disease of thyroid gland     \"low thyroid\"    Fracture, fibula 1997?    Fracture, humerus 12/13/23    Fracture, tibia and fibula 1997?    Hip arthrosis     History of colonoscopy     " Hyperlipidemia     Hypertension     Kidney stone 2023    Knee swelling     Lumbosacral disc disease     On home O2     3L    Osteoarthritis     Periarthritis of shoulder     Urinary incontinence     Urinary tract infection        Past Surgical History:   Past Surgical History:   Procedure Laterality Date    CAROTID STENT Bilateral     COLONOSCOPY      EYE SURGERY  10/17/2022    yag surgery    GALLBLADDER SURGERY  2012    HYSTERECTOMY      ILIAC ARTERY STENT Bilateral     JOINT REPLACEMENT  ;     KIDNEY STONE SURGERY      OOPHORECTOMY  1976    1 ovary removed    TOTAL HIP ARTHROPLASTY Bilateral     URETEROSCOPY LASER LITHOTRIPSY WITH STENT INSERTION Right 2023    Procedure: CYSTOSCOPY, RIGHT RETROGRADE PYELOGRAM; WITH STENT INSERTION;  Surgeon: Lee Kim MD;  Location:  [facility] OR;  Service: Urology;  Laterality: Right;    URETEROSCOPY LASER LITHOTRIPSY WITH STENT INSERTION Right 08/10/2023    Procedure: URETEROSCOPY, LASER LITHOTRIPSY, STENT EXCHANGE;  Surgeon: Lee Kim MD;  Location:  [facility] OR;  Service: Urology;  Laterality: Right;       Family History:   Family History   Problem Relation Age of Onset    Cancer Other         BREAST, COLON, OVARIAN    Depression Other     Hyperlipidemia Other     Hypertension Other     Heart disease Mother     Hypertension Mother     Arthritis Daughter     Cancer Daughter     Hyperlipidemia Daughter     Liver disease Daughter     Thyroid disease Daughter     Cancer Maternal Aunt     Diabetes Maternal Uncle     Alzheimer's disease Sister        Social History:   Social History     Socioeconomic History    Marital status:    Tobacco Use    Smoking status: Former     Packs/day: 1.00     Years: 50.00     Additional pack years: 0.00     Total pack years: 50.00     Types: Cigarettes     Start date: 1956     Quit date: 2006     Years since quittin.0     Passive exposure: Past    Smokeless tobacco: Never   Vaping  Use    Vaping Use: Never used   Substance and Sexual Activity    Alcohol use: Not Currently     Comment: no etoh for past 10 years    Drug use: No    Sexual activity: Not Currently       Medications:   Current Outpatient Medications:     acetaminophen (TYLENOL) 500 MG tablet, Take 1 tablet by mouth Every 6 (Six) Hours., Disp: , Rfl:     albuterol sulfate  (90 Base) MCG/ACT inhaler, Inhale 2 puffs Every 4 (Four) Hours As Needed., Disp: , Rfl:     Ascorbic Acid (Vitamin C) 500 MG capsule, Take 500 mg by mouth Daily., Disp: , Rfl:     aspirin 81 MG chewable tablet, Chew 1 tablet Daily., Disp: , Rfl:     atenolol (TENORMIN) 50 MG tablet, TAKE 1/2 TABLET TWICE A DAY, Disp: 90 tablet, Rfl: 1    Calcium Carbonate-Vitamin D (CALCIUM-VITAMIN D) 500-200 MG-UNIT per tablet, Take 1 tablet by mouth Daily., Disp: , Rfl:     cetirizine (zyrTEC) 10 MG tablet, Take 1 tablet by mouth Daily., Disp: , Rfl:     Cholecalciferol (Vitamin D3) 25 MCG (1000 UT) capsule, Take 1 capsule by mouth Daily., Disp: , Rfl:     clopidogrel (PLAVIX) 75 MG tablet, TAKE 1 TABLET DAILY, Disp: 90 tablet, Rfl: 1    cyclobenzaprine (FLEXERIL) 5 MG tablet, Take 2 tablets by mouth 2 (Two) Times a Day As Needed for Muscle Spasms., Disp: 60 tablet, Rfl: 0    estradiol (ESTRACE) 0.1 MG/GM vaginal cream, Insert 2 applicators into the vagina 3 (Three) Times a Week. Insert 2 grams into the vagina three times weekly. Unsure why the prescription is changing it to 2 applicators.  But it needs to be 2 grams three times weekly., Disp: 42.5 g, Rfl: 12    FeroSul 325 (65 Fe) MG tablet, TAKE 1 TABLET BY MOUTH TWICE DAILY (Patient taking differently: Take 1 tablet by mouth 2 (Two) Times a Day.), Disp: 60 tablet, Rfl: 3    FIBER SELECT GUMMIES PO, Take 2 tablets by mouth Daily., Disp: , Rfl:     gabapentin (NEURONTIN) 300 MG capsule, TAKE 1 CAPSULE BY MOUTH EVERY DAY AT NIGHT, Disp: 90 capsule, Rfl: 0    guaiFENesin (MUCINEX) 600 MG 12 hr tablet, Take 1 tablet by mouth  2 (Two) Times a Day., Disp: , Rfl:     isosorbide mononitrate (IMDUR) 30 MG 24 hr tablet, TAKE 1 TABLET DAILY, Disp: 90 tablet, Rfl: 1    latanoprost (XALATAN) 0.005 % ophthalmic solution, Administer 1 drop to both eyes Every Night., Disp: , Rfl:     levothyroxine (SYNTHROID, LEVOTHROID) 200 MCG tablet, TAKE 1 TABLET BY MOUTH EVERY DAY, Disp: 90 tablet, Rfl: 0    Multiple Vitamins-Minerals (PRESERVISION AREDS 2 PO), Take 1 tablet by mouth 2 (Two) Times a Day., Disp: , Rfl:     nitroglycerin (NITROSTAT) 0.4 MG SL tablet, PLACE 1 TABLET UNDER THE TONGUE AS NEEDED EVERY 5 MINUTES FOR CHEST PAIN. CALL 911 IF NO RELIEF AFTER 2ND DOSE, Disp: 25 tablet, Rfl: 0    O2 (OXYGEN), Inhale 2 L/min Continuous., Disp: , Rfl:     pantoprazole (PROTONIX) 40 MG EC tablet, TAKE 1 TABLET DAILY, Disp: 90 tablet, Rfl: 1    Polyethylene Glycol 3350 (MIRALAX PO), Take 1 dose by mouth As Needed (constipation)., Disp: , Rfl:     roflumilast (DALIRESP) 500 MCG tablet tablet, TAKE 1 TABLET DAILY, Disp: 90 tablet, Rfl: 1    sertraline (ZOLOFT) 100 MG tablet, Take 1 tablet by mouth Daily., Disp: 90 tablet, Rfl: 1    simvastatin (ZOCOR) 40 MG tablet, TAKE 1 TABLET NIGHTLY (Patient taking differently: Take 1 tablet by mouth Every Night.), Disp: 90 tablet, Rfl: 1    traMADol (ULTRAM) 50 MG tablet, TAKE 1 TABLET BY MOUTH EVERY 6 HOURS AS NEEDED FOR MODERATE PAIN, Disp: 120 tablet, Rfl: 2    Trelegy Ellipta 100-62.5-25 MCG/ACT inhaler, INHALE 1 PUFF DAILY (Patient taking differently: Inhale 1 puff Daily.), Disp: 180 each, Rfl: 3    Vibegron 75 MG tablet, Take 1 tablet by mouth Daily., Disp: 30 tablet, Rfl: 6    Allergies:   Allergies   Allergen Reactions    Penicillins Hives, Swelling and Mental Status Change    Codeine Mental Status Change    Dopamine Palpitations and Other (See Comments)     Other reaction(s): Hypertension       The following portions of the patient's history were reviewed and updated as appropriate: allergies, current medications,  "past family history, past medical history, past social history, past surgical history and problem list.        Objective    Objective      Vital Signs:   Vitals:    01/02/24 1113   BP: 132/72   Weight: 73.5 kg (162 lb)   Height: 160 cm (63\")       Ortho Exam:  Right shoulder has improved active and passive range of motion, no pain at the fracture site, no motion at the fracture site with varus valgus stress or internal/external rotation    Results Review:  Imaging Results (Last 24 Hours)       Procedure Component Value Units Date/Time    XR Shoulder 2+ View Right [[id number]] Resulted: 01/02/24 1147     Updated: 01/02/24 1148    Narrative:      Imaging: shoulder x-rays 3 views - AP, axillary, and scapular-Y x-ray   views     Side: RIGHT SHOULDER     Indication for shoulder x-ray 3 views: shoulder pain     Comparison: prior serial clinic comparison views available     Findings: Right shoulder complex proximal metadiaphyseal fracture show   stable findings compared to prior with increased interval callus   consolidation.  Acceptable alignment is maintained stable compared to   prior and appearance of impending malunion.     I personally reviewed the above x-rays.                    Procedures            Assessment / Plan      Assessment/Plan:   Problem List Items Addressed This Visit          Musculoskeletal and Injuries    Closed fracture of right proximal humerus - Primary    Relevant Orders    XR Shoulder 2+ View Right (Completed)       Right complex metadiaphyseal humeral fracture with interval fracture healing.  Counseled on anticipated malunion.  It may take some time even up to 9 to 12 months for complete fracture consolidation radiographically but clinically continued healing is noted.  I will see her back in 6 weeks for additional evaluation.  She will continue with home exercise program    Follow Up: 6 weeks with right shoulder 3 views      Tc Armando MD, FAAOS  Orthopedic Surgeon  Fellowship Trained " Shoulder and Elbow Surgeon  [facility] [place]  [facility]  [address], [address]  [place] [address]    01/02/24  11:55 EST

## 2024-01-23 ENCOUNTER — OFFICE VISIT (OUTPATIENT)
Dept: INTERNAL MEDICINE | Facility: CLINIC | Age: 84
End: 2024-01-23
Payer: MEDICARE

## 2024-01-23 VITALS
WEIGHT: 162.8 LBS | RESPIRATION RATE: 14 BRPM | BODY MASS INDEX: 28.84 KG/M2 | HEART RATE: 64 BPM | DIASTOLIC BLOOD PRESSURE: 70 MMHG | SYSTOLIC BLOOD PRESSURE: 142 MMHG | TEMPERATURE: 97.7 F

## 2024-01-23 DIAGNOSIS — K21.00 GASTROESOPHAGEAL REFLUX DISEASE WITH ESOPHAGITIS WITHOUT HEMORRHAGE: ICD-10-CM

## 2024-01-23 DIAGNOSIS — E03.9 HYPOTHYROIDISM, UNSPECIFIED TYPE: Primary | ICD-10-CM

## 2024-01-23 DIAGNOSIS — E78.5 HYPERLIPIDEMIA, UNSPECIFIED HYPERLIPIDEMIA TYPE: ICD-10-CM

## 2024-01-23 DIAGNOSIS — I10 ESSENTIAL HYPERTENSION: ICD-10-CM

## 2024-01-23 DIAGNOSIS — Z51.81 ENCOUNTER FOR MEDICATION MONITORING: ICD-10-CM

## 2024-01-23 DIAGNOSIS — J44.9 CHRONIC OBSTRUCTIVE PULMONARY DISEASE, UNSPECIFIED COPD TYPE: ICD-10-CM

## 2024-01-23 LAB
BASOPHILS # BLD AUTO: 0.1 10*3/MM3 (ref 0–0.2)
BASOPHILS NFR BLD AUTO: 0.7 % (ref 0–1.5)
DEPRECATED RDW RBC AUTO: 41.7 FL (ref 37–54)
EOSINOPHIL # BLD AUTO: 0.18 10*3/MM3 (ref 0–0.4)
EOSINOPHIL NFR BLD AUTO: 1.3 % (ref 0.3–6.2)
ERYTHROCYTE [DISTWIDTH] IN BLOOD BY AUTOMATED COUNT: 12.4 % (ref 12.3–15.4)
HCT VFR BLD AUTO: 41.7 % (ref 34–46.6)
HGB BLD-MCNC: 13.5 G/DL (ref 12–15.9)
IMM GRANULOCYTES # BLD AUTO: 0.06 10*3/MM3 (ref 0–0.05)
IMM GRANULOCYTES NFR BLD AUTO: 0.4 % (ref 0–0.5)
LYMPHOCYTES # BLD AUTO: 3.25 10*3/MM3 (ref 0.7–3.1)
LYMPHOCYTES NFR BLD AUTO: 22.8 % (ref 19.6–45.3)
MCH RBC QN AUTO: 30.5 PG (ref 26.6–33)
MCHC RBC AUTO-ENTMCNC: 32.4 G/DL (ref 31.5–35.7)
MCV RBC AUTO: 94.1 FL (ref 79–97)
MONOCYTES # BLD AUTO: 0.98 10*3/MM3 (ref 0.1–0.9)
MONOCYTES NFR BLD AUTO: 6.9 % (ref 5–12)
NEUTROPHILS NFR BLD AUTO: 67.9 % (ref 42.7–76)
NEUTROPHILS NFR BLD AUTO: 9.68 10*3/MM3 (ref 1.7–7)
NRBC BLD AUTO-RTO: 0 /100 WBC (ref 0–0.2)
PLATELET # BLD AUTO: 306 10*3/MM3 (ref 140–450)
PMV BLD AUTO: 9.4 FL (ref 6–12)
RBC # BLD AUTO: 4.43 10*6/MM3 (ref 3.77–5.28)
WBC NRBC COR # BLD AUTO: 14.25 10*3/MM3 (ref 3.4–10.8)

## 2024-01-23 PROCEDURE — 3077F SYST BP >= 140 MM HG: CPT | Performed by: INTERNAL MEDICINE

## 2024-01-23 PROCEDURE — 84439 ASSAY OF FREE THYROXINE: CPT | Performed by: INTERNAL MEDICINE

## 2024-01-23 PROCEDURE — 84443 ASSAY THYROID STIM HORMONE: CPT | Performed by: INTERNAL MEDICINE

## 2024-01-23 PROCEDURE — 3078F DIAST BP <80 MM HG: CPT | Performed by: INTERNAL MEDICINE

## 2024-01-23 PROCEDURE — 80061 LIPID PANEL: CPT | Performed by: INTERNAL MEDICINE

## 2024-01-23 PROCEDURE — 36415 COLL VENOUS BLD VENIPUNCTURE: CPT | Performed by: INTERNAL MEDICINE

## 2024-01-23 PROCEDURE — 85025 COMPLETE CBC W/AUTO DIFF WBC: CPT | Performed by: INTERNAL MEDICINE

## 2024-01-23 PROCEDURE — 80053 COMPREHEN METABOLIC PANEL: CPT | Performed by: INTERNAL MEDICINE

## 2024-01-23 PROCEDURE — 99214 OFFICE O/P EST MOD 30 MIN: CPT | Performed by: INTERNAL MEDICINE

## 2024-01-23 NOTE — PROGRESS NOTES
Chief Complaint   Patient presents with    Follow-up     3 month follow up for chronic medical conditions       History of Present Illness      The patient presents for a follow-up related to hyperlipidemia. She is following a low fat diet. She reports that she is exercising. She is taking simvastatin. The patient is taking her medication as prescribed. She reports no medication side effects, including muscle cramps, abdominal pain, headaches or weakness. She denies chest pain, shortness of breath, orthopnea, paroxysmal nocturnal dyspnea, dyspnea on exertion, edema, palpitations or syncope.    The patient presents for a follow-up related to GERD. The patient is on pantoprazole for her gastroesophageal reflux. The medication is taken on a regular basis and gives complete relief of the symptoms. She reports no abdominal pain, belching, diarrhea, dysphagia, early satiety, heartburn, hoarseness, nausea, odynophagia, rectal bleeding, vomiting or weight loss. The GERD has no known aggravating factors.    The patient presents for a follow-up related to hypertension. The patient reports that she has had no headaches or blurred vision. She states that she is taking her medication as prescribed. She is not having medication side effects.    The patient presents for a follow-up related to hypothyroidism. She reports a good energy level. She reports no hair loss, heat intolerance, cold intolerance, constipation or sweats. She is taking her medication as prescribed.    The patient presents for a follow-up related to chronic obstructive pulmonary disease. Since the last office visit, the patient has not sought emergency care. She currently reports no wheezing, cough or sputum production. The patient is using an inhaled steroid. She does rinse her mouth after using her steroid inhaler. She denies hemoptysis. She is not a smoker.    Medications      Current Outpatient Medications:     acetaminophen (TYLENOL) 500 MG tablet, Take 1  tablet by mouth Every 6 (Six) Hours., Disp: , Rfl:     albuterol sulfate  (90 Base) MCG/ACT inhaler, Inhale 2 puffs Every 4 (Four) Hours As Needed., Disp: , Rfl:     Ascorbic Acid (Vitamin C) 500 MG capsule, Take 500 mg by mouth Daily., Disp: , Rfl:     aspirin 81 MG chewable tablet, Chew 1 tablet Daily., Disp: , Rfl:     atenolol (TENORMIN) 50 MG tablet, TAKE 1/2 TABLET TWICE A DAY, Disp: 90 tablet, Rfl: 1    Calcium Carbonate-Vitamin D (CALCIUM-VITAMIN D) 500-200 MG-UNIT per tablet, Take 1 tablet by mouth Daily., Disp: , Rfl:     cetirizine (zyrTEC) 10 MG tablet, Take 1 tablet by mouth Daily., Disp: , Rfl:     Cholecalciferol (Vitamin D3) 25 MCG (1000 UT) capsule, Take 1 capsule by mouth Daily., Disp: , Rfl:     clopidogrel (PLAVIX) 75 MG tablet, TAKE 1 TABLET DAILY, Disp: 90 tablet, Rfl: 1    cyclobenzaprine (FLEXERIL) 5 MG tablet, Take 2 tablets by mouth 2 (Two) Times a Day As Needed for Muscle Spasms., Disp: 60 tablet, Rfl: 0    FeroSul 325 (65 Fe) MG tablet, TAKE 1 TABLET BY MOUTH TWICE DAILY (Patient taking differently: Take 1 tablet by mouth 2 (Two) Times a Day.), Disp: 60 tablet, Rfl: 3    FIBER SELECT GUMMIES PO, Take 2 tablets by mouth Daily., Disp: , Rfl:     gabapentin (NEURONTIN) 300 MG capsule, TAKE 1 CAPSULE BY MOUTH EVERY DAY AT NIGHT, Disp: 90 capsule, Rfl: 0    guaiFENesin (MUCINEX) 600 MG 12 hr tablet, Take 1 tablet by mouth 2 (Two) Times a Day., Disp: , Rfl:     isosorbide mononitrate (IMDUR) 30 MG 24 hr tablet, TAKE 1 TABLET DAILY, Disp: 90 tablet, Rfl: 1    latanoprost (XALATAN) 0.005 % ophthalmic solution, Administer 1 drop to both eyes Every Night., Disp: , Rfl:     levothyroxine (SYNTHROID, LEVOTHROID) 200 MCG tablet, TAKE 1 TABLET BY MOUTH EVERY DAY, Disp: 90 tablet, Rfl: 0    Multiple Vitamins-Minerals (PRESERVISION AREDS 2 PO), Take 1 tablet by mouth 2 (Two) Times a Day., Disp: , Rfl:     nitroglycerin (NITROSTAT) 0.4 MG SL tablet, PLACE 1 TABLET UNDER THE TONGUE AS NEEDED EVERY 5  MINUTES FOR CHEST PAIN. CALL 911 IF NO RELIEF AFTER 2ND DOSE, Disp: 25 tablet, Rfl: 0    O2 (OXYGEN), Inhale 2 L/min Continuous., Disp: , Rfl:     pantoprazole (PROTONIX) 40 MG EC tablet, TAKE 1 TABLET DAILY, Disp: 90 tablet, Rfl: 1    Polyethylene Glycol 3350 (MIRALAX PO), Take 1 dose by mouth As Needed (constipation)., Disp: , Rfl:     roflumilast (DALIRESP) 500 MCG tablet tablet, TAKE 1 TABLET DAILY, Disp: 90 tablet, Rfl: 1    sertraline (ZOLOFT) 100 MG tablet, Take 1 tablet by mouth Daily., Disp: 90 tablet, Rfl: 1    simvastatin (ZOCOR) 40 MG tablet, TAKE 1 TABLET NIGHTLY (Patient taking differently: Take 1 tablet by mouth Every Night.), Disp: 90 tablet, Rfl: 1    traMADol (ULTRAM) 50 MG tablet, TAKE 1 TABLET BY MOUTH EVERY 6 HOURS AS NEEDED FOR MODERATE PAIN (Patient taking differently: Take 1 tablet by mouth Every 6 (Six) Hours.), Disp: 120 tablet, Rfl: 2    Trelegy Ellipta 100-62.5-25 MCG/ACT inhaler, INHALE 1 PUFF DAILY (Patient taking differently: Inhale 1 puff Daily.), Disp: 180 each, Rfl: 3    Vibegron 75 MG tablet, Take 1 tablet by mouth Daily., Disp: 30 tablet, Rfl: 6     Allergies    Allergies   Allergen Reactions    Penicillins Hives, Swelling and Mental Status Change    Codeine Mental Status Change    Dopamine Palpitations and Other (See Comments)     Other reaction(s): Hypertension       Problem List    Patient Active Problem List   Diagnosis    Coronary arteriosclerosis in native artery    Chronic obstructive pulmonary disease    Depression    Gastroesophageal reflux disease with esophagitis    Hyperlipidemia    Hypertension    Hypothyroidism    Osteoarthritis    Peripheral arterial occlusive disease    Solitary pulmonary nodule    Vitamin D deficiency    Lower back pain    Carotid bruit    Hypoxemia    Chronic respiratory failure with hypoxia and hypercapnia    Allergic rhinitis    Chronic leukocytosis    Ureterolithiasis    Murmur, cardiac    Hospital discharge follow-up    Closed fracture of  right proximal humerus    Right shoulder pain       Medications, Allergies, Problems List and Past History were reviewed and updated.    Physical Examination    /70 (BP Location: Left arm, Patient Position: Sitting, Cuff Size: Adult)   Pulse 64   Temp 97.7 °F (36.5 °C) (Infrared)   Resp 14   Wt 73.8 kg (162 lb 12.8 oz)   LMP  (LMP Unknown)   BMI 28.84 kg/m²       HEENT: Head- Normocephalic Atraumatic. Facies- Within normal limits. Pinnas- Normal texture and shape bilaterally. Canals- Normal bilaterally. TMs- Normal bilaterally. Nares- Patent bilaterally. Nasal Septum- is normal. There is no tenderness to palpation over the frontal or maxillary sinuses. Lids- Normal bilaterally. Conjunctiva- Clear bilaterally. Sclera- Anicteric bilaterally. Oropharynx- Moist with no lesions. Tonsils- No enlargement, erythema or exudate.    Neck: Thyroid- non enlarged, symmetric and has no nodules. No bruits are detected. ROM- Normal Range of Motion with no rigidity.    Lungs: Auscultation- Clear to auscultation bilaterally. There are no retractions, clubbing or cyanosis. The Expiratory to Inspiratory ratio is equal.    Cardiovascular: There are no carotid bruits. Heart- Normal Rate with Regular rhythm and no murmurs. There are no gallops. There are no rubs. In the lower extremities there is no edema. The upper extremities do not have edema.    Abdomen: Soft, benign, non-tender with no masses, hernias, organomegaly or scars.    Impression and Assessment    Hyperlipidemia.    Gastroesophageal Reflux Disease.    Essential Hypertension.    Hypothyroidism.    Chronic Obstructive Pulmonary Disease.    Plan    Gastroesophageal Reflux Disease Plan: The patient was instructed to continue the current medications.    Hyperlipidemia Plan: The patient was instructed to exercise daily, eat a low fat diet and continue her medications.    Essential Hypertension Plan: The patient was instructed to continue the current  medications.    Hypothyroidism Plan: The patient was instructed to continue the current medications.    Chronic Obstructive Pulmonary Disease Plan: Continue the current medication regimen.    Diagnoses and all orders for this visit:    1. Hypothyroidism, unspecified type (Primary)  -     T4, Free; Future  -     TSH; Future    2. Hyperlipidemia, unspecified hyperlipidemia type  -     Comprehensive Metabolic Panel; Future  -     Lipid Panel; Future    3. Essential hypertension  -     CBC & Differential; Future  -     Comprehensive Metabolic Panel; Future    4. Gastroesophageal reflux disease with esophagitis without hemorrhage    5. Chronic obstructive pulmonary disease, unspecified COPD type        Return to Office    The patient was instructed to return for follow-up in 6 months. The patient was instructed to return sooner if the condition changes, worsens, or does not resolve.

## 2024-01-24 LAB
ALBUMIN SERPL-MCNC: 4.5 G/DL (ref 3.5–5.2)
ALBUMIN/GLOB SERPL: 1.8 G/DL
ALP SERPL-CCNC: 105 U/L (ref 39–117)
ALT SERPL W P-5'-P-CCNC: 9 U/L (ref 1–33)
ANION GAP SERPL CALCULATED.3IONS-SCNC: 12 MMOL/L (ref 5–15)
AST SERPL-CCNC: 12 U/L (ref 1–32)
BILIRUB SERPL-MCNC: 0.3 MG/DL (ref 0–1.2)
BUN SERPL-MCNC: 12 MG/DL (ref 8–23)
BUN/CREAT SERPL: 21.1 (ref 7–25)
CALCIUM SPEC-SCNC: 9.8 MG/DL (ref 8.6–10.5)
CHLORIDE SERPL-SCNC: 99 MMOL/L (ref 98–107)
CHOLEST SERPL-MCNC: 158 MG/DL (ref 0–200)
CO2 SERPL-SCNC: 29 MMOL/L (ref 22–29)
CREAT SERPL-MCNC: 0.57 MG/DL (ref 0.57–1)
EGFRCR SERPLBLD CKD-EPI 2021: 90.3 ML/MIN/1.73
GLOBULIN UR ELPH-MCNC: 2.5 GM/DL
GLUCOSE SERPL-MCNC: 99 MG/DL (ref 65–99)
HDLC SERPL-MCNC: 44 MG/DL (ref 40–60)
LDLC SERPL CALC-MCNC: 83 MG/DL (ref 0–100)
LDLC/HDLC SERPL: 1.78 {RATIO}
POTASSIUM SERPL-SCNC: 4.3 MMOL/L (ref 3.5–5.2)
PROT SERPL-MCNC: 7 G/DL (ref 6–8.5)
SODIUM SERPL-SCNC: 140 MMOL/L (ref 136–145)
T4 FREE SERPL-MCNC: 1.2 NG/DL (ref 0.93–1.7)
TRIGL SERPL-MCNC: 179 MG/DL (ref 0–150)
TSH SERPL DL<=0.05 MIU/L-ACNC: 3.31 UIU/ML (ref 0.27–4.2)
VLDLC SERPL-MCNC: 31 MG/DL (ref 5–40)

## 2024-01-27 LAB
1OH-MIDAZOLAM UR QL SCN: NOT DETECTED NG/MG CREAT
6MAM UR QL SCN: NEGATIVE NG/ML
7AMINOCLONAZEPAM/CREAT UR: NOT DETECTED NG/MG CREAT
A-OH ALPRAZ/CREAT UR: NOT DETECTED NG/MG CREAT
A-OH-TRIAZOLAM/CREAT UR CFM: NOT DETECTED NG/MG CREAT
ACP UR QL CFM: NOT DETECTED
ALPRAZ/CREAT UR CFM: NOT DETECTED NG/MG CREAT
AMPHETAMINES UR QL SCN: NEGATIVE NG/ML
APAP UR QL SCN: NORMAL UG/ML
APAP UR QL: NORMAL
APAP UR-MCNC: PRESENT UG/ML
BARBITURATES UR QL SCN: NEGATIVE NG/ML
BENZODIAZ SCN METH UR: NEGATIVE
BUPRENORPHINE UR QL SCN: NEGATIVE
BUPRENORPHINE/CREAT UR: NOT DETECTED NG/MG CREAT
CANNABINOIDS UR QL SCN: NEGATIVE NG/ML
CARISOPRODOL UR QL: NEGATIVE NG/ML
CLONAZEPAM/CREAT UR CFM: NOT DETECTED NG/MG CREAT
COCAINE+BZE UR QL SCN: NEGATIVE NG/ML
CREAT UR-MCNC: 144 MG/DL
D-METHORPHAN UR-MCNC: PRESENT NG/ML
D-METHORPHAN+LEVORPHANOL UR QL: PRESENT
DESALKYLFLURAZ/CREAT UR: NOT DETECTED NG/MG CREAT
DIAZEPAM/CREAT UR: NOT DETECTED NG/MG CREAT
ETHANOL UR QL SCN: NEGATIVE G/DL
ETHANOL UR QL SCN: NEGATIVE NG/ML
FENTANYL CTO UR SCN-MCNC: NEGATIVE NG/ML
FENTANYL/CREAT UR: NOT DETECTED NG/MG CREAT
FLUNITRAZEPAM UR QL SCN: NOT DETECTED NG/MG CREAT
GABAPENTIN UR CFM-MCNC: PRESENT NG/ML
GABAPENTIN UR QL CFM: NORMAL
GABAPENTIN UR-MCNC: NORMAL UG/ML
HYPNOTICS UR QL SCN: NEGATIVE
KETAMINE UR QL: NOT DETECTED
LORAZEPAM/CREAT UR: NOT DETECTED NG/MG CREAT
MEPERIDINE UR QL SCN: NEGATIVE NG/ML
METHADONE UR QL SCN: NEGATIVE NG/ML
METHADONE+METAB UR QL SCN: NEGATIVE NG/ML
MIDAZOLAM/CREAT UR CFM: NOT DETECTED NG/MG CREAT
MISCELLANEOUS, UR: NORMAL
N-NORTRAMADOL/CREAT UR CFM: 2327 NG/MG CREAT
NORBUPRENORPHINE/CREAT UR: NOT DETECTED NG/MG CREAT
NORDIAZEPAM/CREAT UR: NOT DETECTED NG/MG CREAT
NORFENTANYL/CREAT UR: NOT DETECTED NG/MG CREAT
NORFLUNITRAZEPAM UR-MCNC: NOT DETECTED NG/MG CREAT
NORKETAMINE UR-MCNC: NOT DETECTED UG/ML
O-NORTRAMADOL UR CFM-MCNC: >3472 NG/MG CREAT
OPIATES UR SCN-MCNC: NEGATIVE NG/ML
OTHER HALLUCINOGENS UR: NEGATIVE
OXAZEPAM/CREAT UR: NOT DETECTED NG/MG CREAT
OXYCODONE CTO UR SCN-MCNC: NEGATIVE NG/ML
PCP UR QL SCN: NEGATIVE NG/ML
PRESCRIBED MEDICATIONS: NORMAL
PROPOXYPH UR QL SCN: NEGATIVE NG/ML
TAPENTADOL CONFIRMATION: NEGATIVE
TAPENTADOL CTO UR SCN-MCNC: NORMAL NG/ML
TAPENTADOL/CREAT UR: NOT DETECTED NG/MG CREAT
TEMAZEPAM/CREAT UR: NOT DETECTED NG/MG CREAT
TRAMADOL UR CFM-MCNC: >3472 NG/MG CREAT
TRAMADOL UR QL CFM: NORMAL
TRAMADOL UR QL SCN: NORMAL NG/ML
ZALEPLON UR-MCNC: NOT DETECTED NG/ML
ZOLPIDEM PHENYL-4-CARB UR QL SCN: NOT DETECTED
ZOLPIDEM UR QL SCN: NOT DETECTED
ZOPICLONE-N-OXIDE UR-MCNC: NOT DETECTED NG/ML

## 2024-02-05 RX ORDER — GABAPENTIN 300 MG/1
300 CAPSULE ORAL NIGHTLY
Qty: 90 CAPSULE | Refills: 0 | Status: SHIPPED | OUTPATIENT
Start: 2024-02-05

## 2024-02-07 RX ORDER — GABAPENTIN 300 MG/1
300 CAPSULE ORAL NIGHTLY
Qty: 90 CAPSULE | Refills: 0 | OUTPATIENT
Start: 2024-02-07

## 2024-02-09 RX ORDER — ATENOLOL 50 MG/1
25 TABLET ORAL 2 TIMES DAILY
Qty: 90 TABLET | Refills: 1 | Status: SHIPPED | OUTPATIENT
Start: 2024-02-09

## 2024-02-13 ENCOUNTER — OFFICE VISIT (OUTPATIENT)
Dept: ORTHOPEDIC SURGERY | Facility: CLINIC | Age: 84
End: 2024-02-13
Payer: MEDICARE

## 2024-02-13 VITALS — SYSTOLIC BLOOD PRESSURE: 138 MMHG | DIASTOLIC BLOOD PRESSURE: 72 MMHG | BODY MASS INDEX: 28.48 KG/M2 | WEIGHT: 160.8 LBS

## 2024-02-13 DIAGNOSIS — M25.511 RIGHT SHOULDER PAIN, UNSPECIFIED CHRONICITY: Primary | ICD-10-CM

## 2024-02-13 PROCEDURE — 3078F DIAST BP <80 MM HG: CPT | Performed by: ORTHOPAEDIC SURGERY

## 2024-02-13 PROCEDURE — 99212 OFFICE O/P EST SF 10 MIN: CPT | Performed by: ORTHOPAEDIC SURGERY

## 2024-02-13 PROCEDURE — 3075F SYST BP GE 130 - 139MM HG: CPT | Performed by: ORTHOPAEDIC SURGERY

## 2024-02-14 ENCOUNTER — TELEPHONE (OUTPATIENT)
Dept: INTERNAL MEDICINE | Facility: CLINIC | Age: 84
End: 2024-02-14
Payer: MEDICARE

## 2024-02-14 RX ORDER — GABAPENTIN 300 MG/1
300 CAPSULE ORAL NIGHTLY
Qty: 90 CAPSULE | Refills: 0 | Status: SHIPPED | OUTPATIENT
Start: 2024-02-14

## 2024-02-20 DIAGNOSIS — N32.81 OAB (OVERACTIVE BLADDER): ICD-10-CM

## 2024-02-20 DIAGNOSIS — N39.41 URGENCY INCONTINENCE: ICD-10-CM

## 2024-02-20 NOTE — TELEPHONE ENCOUNTER
Patient requesting a 90 day supply of Gemtesa, sent to [facility].    Please advise.  Thank you    LOV:  12/15/23  Last refill:  12/07/23  ELLEN CHRISTENSEN  6

## 2024-02-23 DIAGNOSIS — N39.41 URGENCY INCONTINENCE: ICD-10-CM

## 2024-02-23 DIAGNOSIS — N32.81 OAB (OVERACTIVE BLADDER): ICD-10-CM

## 2024-02-23 NOTE — TELEPHONE ENCOUNTER
Received fax from [facility], requesting refill for     Gemtesa Tabs 75 mg      LOV:  12/15/23  Last refill:  02/20/24 - sent to [facility] pharmacy  Q.  30  R. 6     Please advise, Dr. Kim.  Thank you

## 2024-02-25 ENCOUNTER — PATIENT MESSAGE (OUTPATIENT)
Dept: INTERNAL MEDICINE | Facility: CLINIC | Age: 84
End: 2024-02-25
Payer: MEDICARE

## 2024-02-25 DIAGNOSIS — E03.9 HYPOTHYROIDISM, UNSPECIFIED TYPE: ICD-10-CM

## 2024-02-26 RX ORDER — ROFLUMILAST 500 UG/1
500 TABLET ORAL DAILY
Qty: 90 TABLET | Refills: 1 | Status: SHIPPED | OUTPATIENT
Start: 2024-02-26

## 2024-02-26 RX ORDER — ISOSORBIDE MONONITRATE 30 MG/1
30 TABLET, EXTENDED RELEASE ORAL DAILY
Qty: 90 TABLET | Refills: 1 | Status: SHIPPED | OUTPATIENT
Start: 2024-02-26

## 2024-02-26 RX ORDER — LEVOTHYROXINE SODIUM 0.2 MG/1
200 TABLET ORAL DAILY
Qty: 90 TABLET | Refills: 0 | Status: SHIPPED | OUTPATIENT
Start: 2024-02-26

## 2024-02-26 RX ORDER — CLOPIDOGREL BISULFATE 75 MG/1
75 TABLET ORAL DAILY
Qty: 90 TABLET | Refills: 1 | Status: SHIPPED | OUTPATIENT
Start: 2024-02-26

## 2024-02-26 RX ORDER — SIMVASTATIN 40 MG
40 TABLET ORAL NIGHTLY
Qty: 90 TABLET | Refills: 1 | Status: SHIPPED | OUTPATIENT
Start: 2024-02-26

## 2024-02-26 RX ORDER — SERTRALINE HYDROCHLORIDE 100 MG/1
100 TABLET, FILM COATED ORAL DAILY
Qty: 90 TABLET | Refills: 1 | Status: SHIPPED | OUTPATIENT
Start: 2024-02-26

## 2024-02-26 NOTE — TELEPHONE ENCOUNTER
From: Avelina Ceron  To: Dre Moura  Sent: 2/25/2024 11:55 AM EST  Subject: Simvastatin Refill    Need to refill Simvastatin 90-day from [facility] please - thanks!

## 2024-03-14 ENCOUNTER — OFFICE VISIT (OUTPATIENT)
Dept: PULMONOLOGY | Facility: CLINIC | Age: 84
End: 2024-03-14
Payer: MEDICARE

## 2024-03-14 VITALS
HEIGHT: 63 IN | OXYGEN SATURATION: 95 % | WEIGHT: 164 LBS | SYSTOLIC BLOOD PRESSURE: 132 MMHG | HEART RATE: 63 BPM | BODY MASS INDEX: 29.06 KG/M2 | TEMPERATURE: 98 F | DIASTOLIC BLOOD PRESSURE: 66 MMHG

## 2024-03-14 DIAGNOSIS — J96.11 CHRONIC RESPIRATORY FAILURE WITH HYPOXIA AND HYPERCAPNIA: ICD-10-CM

## 2024-03-14 DIAGNOSIS — J96.12 CHRONIC RESPIRATORY FAILURE WITH HYPOXIA AND HYPERCAPNIA: ICD-10-CM

## 2024-03-14 DIAGNOSIS — J44.9 CHRONIC OBSTRUCTIVE PULMONARY DISEASE, UNSPECIFIED COPD TYPE: Primary | ICD-10-CM

## 2024-03-14 NOTE — PROGRESS NOTES
"Pulmonary Office Follow Up      Subjective   Chief Complaint: Shortness of Breath    Avelina Ceron is a 83 y.o. female is being seen in follow up for COPD    History of Present Illness    Ms. Ceron is an 82yo F who is followed for COPD and Chronic Respiratory Failure.     She was last seen in clinic on 4/25/23 by AMADOR Lucio.     Since her last visit, she has not had any exacerbations.     The following portions of the patient's history were reviewed and updated as appropriate: allergies, current medications, past family history, past medical history, past social history, past surgical history and problem list.    Review of Systems   Constitutional: Negative.    HENT: Negative.    Eyes: Negative.    Respiratory: Positive for shortness of breath.    Cardiovascular: Negative.    Gastrointestinal: Negative.    Endocrine: Negative.    Genitourinary: Negative.    Musculoskeletal: Negative.    Skin: Negative.    Allergic/Immunologic: Negative.    Neurological: Negative.    Hematological: Negative.    Psychiatric/Behavioral: Negative.          Objective   Blood pressure 132/66, pulse 63, temperature 98 °F (36.7 °C), height 160 cm (63\"), weight 74.4 kg (164 lb), SpO2 95%, peak flow (!) 2 L/min, not currently breastfeeding.  Physical Exam  Vitals and nursing note reviewed.   Constitutional:       General: She is not in acute distress.     Appearance: She is well-developed.   HENT:      Head: Normocephalic and atraumatic.   Eyes:      General: No scleral icterus.     Conjunctiva/sclera: Conjunctivae normal.      Pupils: Pupils are equal, round, and reactive to light.   Neck:      Thyroid: No thyromegaly.      Trachea: No tracheal deviation.   Cardiovascular:      Rate and Rhythm: Normal rate and regular rhythm.      Heart sounds: Normal heart sounds.   Pulmonary:      Effort: Pulmonary effort is normal. No respiratory distress.      Breath sounds: Decreased breath sounds present. No wheezing.   Abdominal:      General: " Bowel sounds are normal.      Palpations: Abdomen is soft.      Tenderness: There is no abdominal tenderness.   Musculoskeletal:         General: Normal range of motion.      Cervical back: Normal range of motion and neck supple.   Lymphadenopathy:      Cervical: No cervical adenopathy.   Skin:     General: Skin is warm and dry.      Findings: No erythema or rash.   Neurological:      Mental Status: She is alert and oriented to person, place, and time.      Motor: No abnormal muscle tone.      Coordination: Coordination normal.   Psychiatric:         Speech: Speech normal.         Behavior: Behavior normal.         Judgment: Judgment normal.         PFTs:  Performed in clinic and personally reviewed.  There is moderate obstruction.   There is no restriction. Air trapping is present.   The DLCO is reduced.     Imaging:  Chest xray performed today. This is a PA/Lateral film. There is mild cardiomegaly. The lungs are grossly clear bilaterally. There is a moderate size hiatal hernia. There is no pneumothorax.     Impression: No acute cardiopulmonary abnormalities.     Assessment & Plan   Diagnoses and all orders for this visit:    1. Chronic obstructive pulmonary disease, unspecified COPD type (Primary)  -     XR Chest PA & Lateral  -     Spirometry with Diffusion Capacity & Lung Volumes    2. Chronic respiratory failure with hypoxia and hypercapnia        Discussion:  Ms. Ceron is a 84yo F who presents for follow up of COPD.      1. Moderate COPD  - Continue Trelegy 100 inhaler.    - Continue Daliresp 500mcg daily.   - Combivent/Albuterol as needed. She hasn't needed to use these.   - Mucinex as needed.   - No exacerbations since her last visit.      2. Chronic Hypoxic and Hypercapnic Respiratory Failure  - Continue supplemental O2. Currently on 2L.   - Previously used Trilogy machine but unable to tolerate secondary to recurrent sinus infections.     Follow up in 6 months.        Florence Landrum, DO  Pulmonary and  Critical Care Medicine  Note Electronically Signed

## 2024-03-20 ENCOUNTER — OFFICE VISIT (OUTPATIENT)
Dept: UROLOGY | Facility: CLINIC | Age: 84
End: 2024-03-20
Payer: MEDICARE

## 2024-03-20 VITALS
HEIGHT: 63 IN | DIASTOLIC BLOOD PRESSURE: 58 MMHG | HEART RATE: 62 BPM | BODY MASS INDEX: 29.95 KG/M2 | OXYGEN SATURATION: 96 % | WEIGHT: 169 LBS | SYSTOLIC BLOOD PRESSURE: 130 MMHG

## 2024-03-20 DIAGNOSIS — N39.0 URINARY TRACT INFECTION WITHOUT HEMATURIA, SITE UNSPECIFIED: ICD-10-CM

## 2024-03-20 DIAGNOSIS — N32.81 OAB (OVERACTIVE BLADDER): Primary | ICD-10-CM

## 2024-03-20 DIAGNOSIS — N20.0 NEPHROLITHIASIS: ICD-10-CM

## 2024-03-20 DIAGNOSIS — N39.41 URGENCY INCONTINENCE: ICD-10-CM

## 2024-03-20 LAB
BILIRUB BLD-MCNC: ABNORMAL MG/DL
CLARITY, POC: CLEAR
COLOR UR: YELLOW
EXPIRATION DATE: ABNORMAL
GLUCOSE UR STRIP-MCNC: NEGATIVE MG/DL
KETONES UR QL: NEGATIVE
LEUKOCYTE EST, POC: ABNORMAL
Lab: ABNORMAL
NITRITE UR-MCNC: NEGATIVE MG/ML
PH UR: 5.5 [PH] (ref 5–8)
PROT UR STRIP-MCNC: ABNORMAL MG/DL
RBC # UR STRIP: ABNORMAL /UL
SP GR UR: 1.03 (ref 1–1.03)
UROBILINOGEN UR QL: NORMAL

## 2024-03-20 PROCEDURE — 87077 CULTURE AEROBIC IDENTIFY: CPT | Performed by: PHYSICIAN ASSISTANT

## 2024-03-20 PROCEDURE — 87086 URINE CULTURE/COLONY COUNT: CPT | Performed by: PHYSICIAN ASSISTANT

## 2024-03-20 PROCEDURE — 87186 SC STD MICRODIL/AGAR DIL: CPT | Performed by: PHYSICIAN ASSISTANT

## 2024-03-20 NOTE — PROGRESS NOTES
Follow Up Office Visit      Patient Name: Avelina Ceron  : 1940   MRN: [id number]     Chief Complaint:    Chief Complaint   Patient presents with    OAB    recurrnet UTI       Referring Provider: No ref. provider found    History of Present Illness: Avelina Ceron is a 83 y.o. female who presents today for follow up of overactive bladder and recurrent UTIs.  Last seen in clinic on 12/15/2023 with AMADOR Crawford.  Urologic history significant for nephrolithiasis status post prior laser lithotripsy with Dr. Kim in Aug 2023.  Reports sustained improvement in OAB symptoms on Gemtesa, as well as some dietary changes.  Bladder and bowel symptom questionnaire 16.  She has not been taking the estrogen cream, however denies any recurrent UTI since we last saw her.  Denies dysuria, suprapubic pain, flank pain, gross hematuria.    Urinalysis: Trace leukocytes, trace protein, trace bilirubin, 3+ blood  PVR: 0 mL    Ucx hx:  12/15/23:  No growth  10/9/23:  >100K E. Coli, pansensitive  23:  >100K Klebsiella, resistant to ampicillin  23:  >100K E. Coli, pansensitive    Subjective      Review of System: Review of Systems   Genitourinary: Negative.  Negative for decreased urine volume, difficulty urinating, dysuria, enuresis, flank pain, frequency, hematuria and urgency.      I have reviewed the ROS documented by my clinical staff, I have updated appropriately and I agree. Tanvir Becker PA-C    I have reviewed and the following portions of the patient's history were updated as appropriate: past family history, past medical history, past social history, past surgical history and problem list.    Medications:     Current Outpatient Medications:     acetaminophen (TYLENOL) 500 MG tablet, Take 1 tablet by mouth Every 6 (Six) Hours., Disp: , Rfl:     albuterol sulfate  (90 Base) MCG/ACT inhaler, Inhale 2 puffs Every 4 (Four) Hours As Needed., Disp: , Rfl:     Ascorbic Acid (Vitamin C) 500 MG capsule,  Take 500 mg by mouth Daily., Disp: , Rfl:     aspirin 81 MG chewable tablet, Chew 1 tablet Daily., Disp: , Rfl:     atenolol (TENORMIN) 50 MG tablet, Take 0.5 tablets by mouth 2 (Two) Times a Day., Disp: 90 tablet, Rfl: 1    Calcium Carbonate-Vitamin D (CALCIUM-VITAMIN D) 500-200 MG-UNIT per tablet, Take 1 tablet by mouth Daily., Disp: , Rfl:     cetirizine (zyrTEC) 10 MG tablet, Take 1 tablet by mouth Daily., Disp: , Rfl:     Cholecalciferol (Vitamin D3) 25 MCG (1000 UT) capsule, Take 1 capsule by mouth Daily., Disp: , Rfl:     clopidogrel (PLAVIX) 75 MG tablet, Take 1 tablet by mouth Daily., Disp: 90 tablet, Rfl: 1    cyclobenzaprine (FLEXERIL) 5 MG tablet, Take 2 tablets by mouth 2 (Two) Times a Day As Needed for Muscle Spasms., Disp: 60 tablet, Rfl: 0    FeroSul 325 (65 Fe) MG tablet, TAKE 1 TABLET BY MOUTH TWICE DAILY (Patient taking differently: Take 1 tablet by mouth 2 (Two) Times a Day.), Disp: 60 tablet, Rfl: 3    FIBER SELECT GUMMIES PO, Take 2 tablets by mouth Daily., Disp: , Rfl:     gabapentin (NEURONTIN) 300 MG capsule, Take 1 capsule by mouth Every Night., Disp: 90 capsule, Rfl: 0    guaiFENesin (MUCINEX) 600 MG 12 hr tablet, Take 1 tablet by mouth 2 (Two) Times a Day., Disp: , Rfl:     isosorbide mononitrate (IMDUR) 30 MG 24 hr tablet, Take 1 tablet by mouth Daily., Disp: 90 tablet, Rfl: 1    latanoprost (XALATAN) 0.005 % ophthalmic solution, Administer 1 drop to both eyes Every Night., Disp: , Rfl:     levothyroxine (SYNTHROID, LEVOTHROID) 200 MCG tablet, Take 1 tablet by mouth Daily., Disp: 90 tablet, Rfl: 0    Multiple Vitamins-Minerals (PRESERVISION AREDS 2 PO), Take 1 tablet by mouth 2 (Two) Times a Day., Disp: , Rfl:     nitroglycerin (NITROSTAT) 0.4 MG SL tablet, PLACE 1 TABLET UNDER THE TONGUE AS NEEDED EVERY 5 MINUTES FOR CHEST PAIN. CALL 911 IF NO RELIEF AFTER 2ND DOSE, Disp: 25 tablet, Rfl: 0    O2 (OXYGEN), Inhale 2 L/min Continuous., Disp: , Rfl:     pantoprazole (PROTONIX) 40 MG EC  tablet, TAKE 1 TABLET DAILY, Disp: 90 tablet, Rfl: 1    Polyethylene Glycol 3350 (MIRALAX PO), Take 1 dose by mouth As Needed (constipation)., Disp: , Rfl:     roflumilast (DALIRESP) 500 MCG tablet tablet, Take 1 tablet by mouth Daily., Disp: 90 tablet, Rfl: 1    sertraline (ZOLOFT) 100 MG tablet, Take 1 tablet by mouth Daily., Disp: 90 tablet, Rfl: 1    simvastatin (ZOCOR) 40 MG tablet, Take 1 tablet by mouth Every Night., Disp: 90 tablet, Rfl: 1    traMADol (ULTRAM) 50 MG tablet, TAKE 1 TABLET BY MOUTH EVERY 6 HOURS AS NEEDED FOR MODERATE PAIN (Patient taking differently: Take 1 tablet by mouth Every 6 (Six) Hours.), Disp: 120 tablet, Rfl: 2    Trelegy Ellipta 100-62.5-25 MCG/ACT inhaler, INHALE 1 PUFF DAILY (Patient taking differently: Inhale 1 puff Daily.), Disp: 180 each, Rfl: 3    Vibegron 75 MG tablet, Take 1 tablet by mouth Daily., Disp: 30 tablet, Rfl: 6    Allergies:   Allergies   Allergen Reactions    Penicillins Hives, Swelling and Mental Status Change    Codeine Mental Status Change    Dopamine Palpitations and Other (See Comments)     Other reaction(s): Hypertension     Bladder & Bowel Symptom Questionnaire    How often do you usually urinate during the day ?   1 - About every 3-4 hours   2.   How many timed do you urinate at night?   0 - 0-1 time at night   3.   What is the reason that you usually urinate?   3 - Severe urge (can delay less than 10 min)   4.   Once you get the urge to go, how long can you     comfortably delay?   4 - Must go immediately    5.   How often do you get a sudden urge that makes you rush to the bathroom?   3 - A few times a week   6.   How often does a sudden urge to urinate result in you leaking urine or wetting pads?   2 - A few times a month   7.  In your opinion, how good is your bladder control?   3 - Poor   8.  Do you have accidental bowel leakage?   no   9.  Do you have difficulty fully emptying your bladder?   yes   10.  Do you experience accidental leakage when  "performing some physical activity such as coughing, sneezing, laughing or exercise?   yes   11. Have you tried medications to help improve your symptoms?   yes   12. Would you be interested in learning about a long-lasting option that may help you with your symptoms?   no                                                                             Total Score   16     0-7 (Mild) 8-16 (Moderate) 17-28 (Severe)       Post void residual bladder scan:   0 ml    Objective     Physical Exam:   Vital Signs:   Vitals:    03/20/24 1058   BP: 130/58   Pulse: 62   SpO2: 96%  Comment: 2L o2   Weight: 76.7 kg (169 lb)   Height: 160 cm (63\")     Body mass index is 29.94 kg/m².     Physical Exam  Constitutional:       Appearance: Normal appearance.   HENT:      Head: Normocephalic and atraumatic.      Nose: Nose normal.      Mouth/Throat:      Mouth: Mucous membranes are moist.      Pharynx: Oropharynx is clear.   Eyes:      Extraocular Movements: Extraocular movements intact.      Pupils: Pupils are equal, round, and reactive to light.   Pulmonary:      Effort: Pulmonary effort is normal. No respiratory distress.   Musculoskeletal:         General: No swelling or deformity. Normal range of motion.      Cervical back: Normal range of motion and neck supple.   Skin:     General: Skin is warm and dry.   Neurological:      General: No focal deficit present.      Mental Status: She is alert and oriented to person, place, and time. Mental status is at baseline.   Psychiatric:         Mood and Affect: Mood normal.         Behavior: Behavior normal.         Labs:   Brief Urine Lab Results  (Last result in the past 365 days)        Color   Clarity   Blood   Leuk Est   Nitrite   Protein   CREAT   Urine HCG        03/20/24 1110 Yellow   Clear   3+   Trace   Negative   Trace                   Urine Culture          7/25/2023    13:57 10/9/2023    14:51 12/15/2023    13:23   Urine Culture   Urine Culture >100,000 CFU/mL Klebsiella pneumoniae " ssp pneumoniae  >100,000 CFU/mL Escherichia coli     25,000 CFU/mL Normal Urogenital Mirian  No growth         Lab Results   Component Value Date    GLUCOSE 99 01/23/2024    CALCIUM 9.8 01/23/2024     01/23/2024    K 4.3 01/23/2024    CO2 29.0 01/23/2024    CL 99 01/23/2024    BUN 12 01/23/2024    CREATININE 0.57 01/23/2024    EGFRIFNONA 119 11/24/2021    BCR 21.1 01/23/2024    ANIONGAP 12.0 01/23/2024       Lab Results   Component Value Date    WBC 14.25 (H) 01/23/2024    HGB 13.5 01/23/2024    HCT 41.7 01/23/2024    MCV 94.1 01/23/2024     01/23/2024       Images:   No Images in the past 120 days found..    Measures:   Tobacco:   Avelina Ceron  reports that she quit smoking about 18 years ago. Her smoking use included cigarettes. She started smoking about 68 years ago. She has a 50 pack-year smoking history. She has been exposed to tobacco smoke. She has never used smokeless tobacco.     Urine Incontinence: ( NOUI)  Evaluated during visit    Assessment / Plan      Assessment/Plan:   83 y.o. female who presented today for follow up of OAB, recurrent UTIs, history of nephrolithiasis.  Stable OAB symptoms on Gemtesa.  No further episodes of recurrent UTIs per patient and daughter.  Will send urine for culture.  Will call with results and treat if indicated.  Do recommend the vaginal estrogen to help with UTI prophylaxis.  Discussed with patient and daughter.  Will plan for surveillance KUB at next visit given history of nephrolithiasis.  Will follow-up in 6 months for symptom check.  Knows to call the office the meantime with questions or concerns.  They are happy with the plan.    Diagnoses and all orders for this visit:    1. OAB (overactive bladder) (Primary)  -     POC Urinalysis Dipstick, Automated    2. Urinary tract infection without hematuria, site unspecified  -     POC Urinalysis Dipstick, Automated    3. Urgency incontinence  -     POC Urinalysis Dipstick, Automated    4. Nephrolithiasis  -      XR Abdomen KUB; Future         Follow Up:   Return in about 6 months (around 9/20/2024) for Recheck, f/u with brian.    I spent approximately 30 minutes providing clinical care for this patient; including review of patient's chart and provider documentation, face to face time spent with patient in examination room (obtaining history, performing physical exam, discussing diagnosis and management options), placing orders, and completing patient documentation.     Tanvir Becker PA-C  [facility]

## 2024-03-22 ENCOUNTER — TELEPHONE (OUTPATIENT)
Dept: UROLOGY | Facility: CLINIC | Age: 84
End: 2024-03-22
Payer: MEDICARE

## 2024-03-22 DIAGNOSIS — N30.00 ACUTE CYSTITIS WITHOUT HEMATURIA: Primary | ICD-10-CM

## 2024-03-22 LAB — BACTERIA SPEC AEROBE CULT: ABNORMAL

## 2024-03-22 RX ORDER — NITROFURANTOIN 25; 75 MG/1; MG/1
100 CAPSULE ORAL 2 TIMES DAILY
Qty: 14 CAPSULE | Refills: 0 | Status: SHIPPED | OUTPATIENT
Start: 2024-03-22

## 2024-03-22 NOTE — TELEPHONE ENCOUNTER
Discussed via MyChart.  50,000 CFU's of E. coli, pansensitive.    Edy Quan,    Your urine culture did grow out some bacteria.  Recommend treating.  Will send 7-day course of Macrobid to your pharmacy.  If there is any questions or concerns, please let us know.    Thanks,  Tanvir

## 2024-03-25 DIAGNOSIS — G89.29 CHRONIC MIDLINE LOW BACK PAIN WITHOUT SCIATICA: ICD-10-CM

## 2024-03-25 DIAGNOSIS — M54.50 CHRONIC MIDLINE LOW BACK PAIN WITHOUT SCIATICA: ICD-10-CM

## 2024-03-25 RX ORDER — TRAMADOL HYDROCHLORIDE 50 MG/1
50 TABLET ORAL EVERY 6 HOURS PRN
Qty: 120 TABLET | Refills: 2 | Status: CANCELLED | OUTPATIENT
Start: 2024-03-25

## 2024-03-25 NOTE — TELEPHONE ENCOUNTER
Caller: [facility] [address] [place] [address] [address] - [phone]  - [phone]    Relationship: Pharmacy    Best call back number: [phone]     Requested Prescriptions:   Requested Prescriptions     Pending Prescriptions Disp Refills    traMADol (ULTRAM) 50 MG tablet 120 tablet 2     Sig: Take 1 tablet by mouth Every 6 (Six) Hours As Needed for Moderate Pain.        Pharmacy where request should be sent: [facility] [address] [place] [address] [address] - [phone]  - [phone] FX     Last office visit with prescribing clinician: 1/23/2024   Last telemedicine visit with prescribing clinician: 10/20/2023   Next office visit with prescribing clinician: 7/25/2024     Additional details provided by patient: PATIENT ADVISED PHARMACY THEY WOULD BE BACK ON WEDNESDAY TO GET REFILLED.     Does the patient have less than a 3 day supply:  [x] Yes  [] No    Would you like a call back once the refill request has been completed: [x] Yes [] No    If the office needs to give you a call back, can they leave a voicemail: [x] Yes [] No    Adrian Silva Rep   03/25/24 10:24 EDT

## 2024-03-27 DIAGNOSIS — G89.29 CHRONIC MIDLINE LOW BACK PAIN WITHOUT SCIATICA: ICD-10-CM

## 2024-03-27 DIAGNOSIS — M54.50 CHRONIC MIDLINE LOW BACK PAIN WITHOUT SCIATICA: ICD-10-CM

## 2024-03-27 RX ORDER — TRAMADOL HYDROCHLORIDE 50 MG/1
50 TABLET ORAL EVERY 6 HOURS PRN
Qty: 120 TABLET | Refills: 2 | Status: SHIPPED | OUTPATIENT
Start: 2024-03-27

## 2024-04-03 DIAGNOSIS — J44.9 CHRONIC OBSTRUCTIVE PULMONARY DISEASE, UNSPECIFIED COPD TYPE: ICD-10-CM

## 2024-04-03 RX ORDER — FLUTICASONE FUROATE, UMECLIDINIUM BROMIDE AND VILANTEROL TRIFENATATE 100; 62.5; 25 UG/1; UG/1; UG/1
POWDER RESPIRATORY (INHALATION)
Qty: 180 EACH | Refills: 3 | Status: SHIPPED | OUTPATIENT
Start: 2024-04-03

## 2024-04-08 RX ORDER — PANTOPRAZOLE SODIUM 40 MG/1
40 TABLET, DELAYED RELEASE ORAL DAILY
Qty: 90 TABLET | Refills: 1 | Status: SHIPPED | OUTPATIENT
Start: 2024-04-08

## 2024-05-06 RX ORDER — GABAPENTIN 300 MG/1
300 CAPSULE ORAL NIGHTLY
Qty: 90 CAPSULE | Refills: 0 | Status: SHIPPED | OUTPATIENT
Start: 2024-05-06

## 2024-05-14 DIAGNOSIS — E03.9 HYPOTHYROIDISM, UNSPECIFIED TYPE: ICD-10-CM

## 2024-05-14 RX ORDER — LEVOTHYROXINE SODIUM 0.2 MG/1
200 TABLET ORAL DAILY
Qty: 90 TABLET | Refills: 1 | Status: SHIPPED | OUTPATIENT
Start: 2024-05-14

## 2024-05-16 DIAGNOSIS — J44.9 CHRONIC OBSTRUCTIVE PULMONARY DISEASE, UNSPECIFIED COPD TYPE: ICD-10-CM

## 2024-05-16 RX ORDER — FLUTICASONE FUROATE, UMECLIDINIUM BROMIDE AND VILANTEROL TRIFENATATE 100; 62.5; 25 UG/1; UG/1; UG/1
1 POWDER RESPIRATORY (INHALATION) DAILY
Qty: 180 EACH | Refills: 3 | OUTPATIENT
Start: 2024-05-16

## 2024-05-16 RX ORDER — GABAPENTIN 300 MG/1
300 CAPSULE ORAL NIGHTLY
Qty: 90 CAPSULE | Refills: 0 | Status: SHIPPED | OUTPATIENT
Start: 2024-05-16

## 2024-05-19 RX ORDER — LATANOPROST 50 UG/ML
1 SOLUTION/ DROPS OPHTHALMIC NIGHTLY
OUTPATIENT
Start: 2024-05-19

## 2024-05-21 DIAGNOSIS — J44.9 CHRONIC OBSTRUCTIVE PULMONARY DISEASE, UNSPECIFIED COPD TYPE: ICD-10-CM

## 2024-05-21 RX ORDER — FLUTICASONE FUROATE, UMECLIDINIUM BROMIDE AND VILANTEROL TRIFENATATE 100; 62.5; 25 UG/1; UG/1; UG/1
1 POWDER RESPIRATORY (INHALATION) DAILY
Qty: 180 EACH | Refills: 3 | Status: SHIPPED | OUTPATIENT
Start: 2024-05-21

## 2024-06-12 ENCOUNTER — TELEPHONE (OUTPATIENT)
Age: 84
End: 2024-06-12
Payer: MEDICARE

## 2024-06-12 NOTE — TELEPHONE ENCOUNTER
"Relay     \"Your appointment with Yvette Leonard on 09/20/24 has been canceled as Yvettetavon Leonard will be out of the office that day. We have been unable to contact you to get this rescheduled. Please call [phone] and we will get your appointment rescheduled at your convenience. Thank you and have a great day.  \"                 "

## 2024-06-13 ENCOUNTER — TELEPHONE (OUTPATIENT)
Dept: UROLOGY | Facility: CLINIC | Age: 84
End: 2024-06-13
Payer: MEDICARE

## 2024-06-13 NOTE — TELEPHONE ENCOUNTER
PATIENT RETURNING CALL TO KAYLYNN.. RELAYED MESSAGE FROM KAYLYNN TO PATIENTS DAUGHTER THAT SEPT APPT WAS CANCELED AND NEEDED TO BE R/S...   CONFIRMED NEW DATE AND TIME WITH DAUGHTER

## 2024-06-20 DIAGNOSIS — G89.29 CHRONIC MIDLINE LOW BACK PAIN WITHOUT SCIATICA: ICD-10-CM

## 2024-06-20 DIAGNOSIS — M54.50 CHRONIC MIDLINE LOW BACK PAIN WITHOUT SCIATICA: ICD-10-CM

## 2024-06-20 RX ORDER — TRAMADOL HYDROCHLORIDE 50 MG/1
50 TABLET ORAL EVERY 6 HOURS PRN
Qty: 120 TABLET | Refills: 0 | Status: SHIPPED | OUTPATIENT
Start: 2024-06-20

## 2024-07-18 DIAGNOSIS — M54.50 CHRONIC MIDLINE LOW BACK PAIN WITHOUT SCIATICA: ICD-10-CM

## 2024-07-18 DIAGNOSIS — G89.29 CHRONIC MIDLINE LOW BACK PAIN WITHOUT SCIATICA: ICD-10-CM

## 2024-07-18 RX ORDER — ROFLUMILAST 500 UG/1
500 TABLET ORAL DAILY
Qty: 90 TABLET | Refills: 1 | Status: SHIPPED | OUTPATIENT
Start: 2024-07-18

## 2024-07-18 RX ORDER — CLOPIDOGREL BISULFATE 75 MG/1
75 TABLET ORAL DAILY
Qty: 90 TABLET | Refills: 1 | Status: SHIPPED | OUTPATIENT
Start: 2024-07-18

## 2024-07-18 RX ORDER — ATENOLOL 50 MG/1
25 TABLET ORAL 2 TIMES DAILY
Qty: 90 TABLET | Refills: 1 | Status: SHIPPED | OUTPATIENT
Start: 2024-07-18

## 2024-07-18 RX ORDER — SERTRALINE HYDROCHLORIDE 100 MG/1
100 TABLET, FILM COATED ORAL DAILY
Qty: 90 TABLET | Refills: 1 | Status: SHIPPED | OUTPATIENT
Start: 2024-07-18

## 2024-07-18 RX ORDER — ISOSORBIDE MONONITRATE 30 MG/1
30 TABLET, EXTENDED RELEASE ORAL DAILY
Qty: 90 TABLET | Refills: 1 | Status: SHIPPED | OUTPATIENT
Start: 2024-07-18

## 2024-07-18 RX ORDER — SIMVASTATIN 40 MG
40 TABLET ORAL NIGHTLY
Qty: 90 TABLET | Refills: 1 | Status: SHIPPED | OUTPATIENT
Start: 2024-07-18

## 2024-07-18 NOTE — TELEPHONE ENCOUNTER
Last office visit (LOV) for chronic conditions 01/23/24  Next office visit (NOV) 07/25/24  Urine drug screen (UDS) 01/23/24  Controlled substance agreement (CSA)

## 2024-07-20 RX ORDER — TRAMADOL HYDROCHLORIDE 50 MG/1
50 TABLET ORAL EVERY 6 HOURS PRN
Qty: 120 TABLET | Refills: 0 | Status: SHIPPED | OUTPATIENT
Start: 2024-07-20

## 2024-07-25 ENCOUNTER — OFFICE VISIT (OUTPATIENT)
Dept: INTERNAL MEDICINE | Facility: CLINIC | Age: 84
End: 2024-07-25
Payer: MEDICARE

## 2024-07-25 VITALS
BODY MASS INDEX: 28.52 KG/M2 | DIASTOLIC BLOOD PRESSURE: 72 MMHG | HEART RATE: 76 BPM | RESPIRATION RATE: 20 BRPM | SYSTOLIC BLOOD PRESSURE: 140 MMHG | WEIGHT: 161 LBS | TEMPERATURE: 97.6 F

## 2024-07-25 DIAGNOSIS — K21.00 GASTROESOPHAGEAL REFLUX DISEASE WITH ESOPHAGITIS WITHOUT HEMORRHAGE: ICD-10-CM

## 2024-07-25 DIAGNOSIS — R11.0 NAUSEA: ICD-10-CM

## 2024-07-25 DIAGNOSIS — J44.9 CHRONIC OBSTRUCTIVE PULMONARY DISEASE, UNSPECIFIED COPD TYPE: ICD-10-CM

## 2024-07-25 DIAGNOSIS — R82.998 LEUKOCYTES IN URINE: ICD-10-CM

## 2024-07-25 DIAGNOSIS — B37.2 CANDIDAL SKIN INFECTION: ICD-10-CM

## 2024-07-25 DIAGNOSIS — E03.9 HYPOTHYROIDISM, UNSPECIFIED TYPE: Primary | ICD-10-CM

## 2024-07-25 DIAGNOSIS — R30.0 DYSURIA: ICD-10-CM

## 2024-07-25 DIAGNOSIS — I10 ESSENTIAL HYPERTENSION: ICD-10-CM

## 2024-07-25 DIAGNOSIS — E78.5 HYPERLIPIDEMIA, UNSPECIFIED HYPERLIPIDEMIA TYPE: ICD-10-CM

## 2024-07-25 LAB
ALBUMIN SERPL-MCNC: 4.3 G/DL (ref 3.5–5.2)
ALBUMIN/GLOB SERPL: 1.5 G/DL
ALP SERPL-CCNC: 65 U/L (ref 39–117)
ALT SERPL W P-5'-P-CCNC: 24 U/L (ref 1–33)
AMYLASE SERPL-CCNC: 31 U/L (ref 28–100)
ANION GAP SERPL CALCULATED.3IONS-SCNC: 14.1 MMOL/L (ref 5–15)
AST SERPL-CCNC: 23 U/L (ref 1–32)
BASOPHILS # BLD AUTO: 0.08 10*3/MM3 (ref 0–0.2)
BASOPHILS NFR BLD AUTO: 0.6 % (ref 0–1.5)
BILIRUB BLD-MCNC: NEGATIVE MG/DL
BILIRUB SERPL-MCNC: 0.4 MG/DL (ref 0–1.2)
BUN SERPL-MCNC: 11 MG/DL (ref 8–23)
BUN/CREAT SERPL: 20.4 (ref 7–25)
CALCIUM SPEC-SCNC: 9.8 MG/DL (ref 8.6–10.5)
CHLORIDE SERPL-SCNC: 93 MMOL/L (ref 98–107)
CHOLEST SERPL-MCNC: 133 MG/DL (ref 0–200)
CLARITY, POC: CLEAR
CO2 SERPL-SCNC: 33.9 MMOL/L (ref 22–29)
COLOR UR: YELLOW
CREAT SERPL-MCNC: 0.54 MG/DL (ref 0.57–1)
DEPRECATED RDW RBC AUTO: 41.7 FL (ref 37–54)
EGFRCR SERPLBLD CKD-EPI 2021: 91.5 ML/MIN/1.73
EOSINOPHIL # BLD AUTO: 0.17 10*3/MM3 (ref 0–0.4)
EOSINOPHIL NFR BLD AUTO: 1.4 % (ref 0.3–6.2)
ERYTHROCYTE [DISTWIDTH] IN BLOOD BY AUTOMATED COUNT: 11.8 % (ref 12.3–15.4)
EXPIRATION DATE: ABNORMAL
GLOBULIN UR ELPH-MCNC: 2.9 GM/DL
GLUCOSE SERPL-MCNC: 107 MG/DL (ref 65–99)
GLUCOSE UR STRIP-MCNC: NEGATIVE MG/DL
HCT VFR BLD AUTO: 37.1 % (ref 34–46.6)
HDLC SERPL-MCNC: 48 MG/DL (ref 40–60)
HGB BLD-MCNC: 12.4 G/DL (ref 12–15.9)
IMM GRANULOCYTES # BLD AUTO: 0.05 10*3/MM3 (ref 0–0.05)
IMM GRANULOCYTES NFR BLD AUTO: 0.4 % (ref 0–0.5)
KETONES UR QL: NEGATIVE
LDLC SERPL CALC-MCNC: 59 MG/DL (ref 0–100)
LDLC/HDLC SERPL: 1.14 {RATIO}
LEUKOCYTE EST, POC: ABNORMAL
LIPASE SERPL-CCNC: 43 U/L (ref 13–60)
LYMPHOCYTES # BLD AUTO: 2.72 10*3/MM3 (ref 0.7–3.1)
LYMPHOCYTES NFR BLD AUTO: 21.7 % (ref 19.6–45.3)
Lab: ABNORMAL
MCH RBC QN AUTO: 32.5 PG (ref 26.6–33)
MCHC RBC AUTO-ENTMCNC: 33.4 G/DL (ref 31.5–35.7)
MCV RBC AUTO: 97.4 FL (ref 79–97)
MONOCYTES # BLD AUTO: 0.87 10*3/MM3 (ref 0.1–0.9)
MONOCYTES NFR BLD AUTO: 6.9 % (ref 5–12)
NEUTROPHILS NFR BLD AUTO: 69 % (ref 42.7–76)
NEUTROPHILS NFR BLD AUTO: 8.64 10*3/MM3 (ref 1.7–7)
NITRITE UR-MCNC: NEGATIVE MG/ML
NRBC BLD AUTO-RTO: 0 /100 WBC (ref 0–0.2)
PH UR: 5 [PH] (ref 5–8)
PLATELET # BLD AUTO: 261 10*3/MM3 (ref 140–450)
PMV BLD AUTO: 9.8 FL (ref 6–12)
POTASSIUM SERPL-SCNC: 3.9 MMOL/L (ref 3.5–5.2)
PROT SERPL-MCNC: 7.2 G/DL (ref 6–8.5)
PROT UR STRIP-MCNC: ABNORMAL MG/DL
RBC # BLD AUTO: 3.81 10*6/MM3 (ref 3.77–5.28)
RBC # UR STRIP: NEGATIVE /UL
SODIUM SERPL-SCNC: 141 MMOL/L (ref 136–145)
SP GR UR: 1.02 (ref 1–1.03)
TRIGL SERPL-MCNC: 151 MG/DL (ref 0–150)
UROBILINOGEN UR QL: ABNORMAL
VLDLC SERPL-MCNC: 26 MG/DL (ref 5–40)
WBC NRBC COR # BLD AUTO: 12.53 10*3/MM3 (ref 3.4–10.8)

## 2024-07-25 PROCEDURE — 84443 ASSAY THYROID STIM HORMONE: CPT | Performed by: INTERNAL MEDICINE

## 2024-07-25 PROCEDURE — 85025 COMPLETE CBC W/AUTO DIFF WBC: CPT | Performed by: INTERNAL MEDICINE

## 2024-07-25 PROCEDURE — 3078F DIAST BP <80 MM HG: CPT | Performed by: INTERNAL MEDICINE

## 2024-07-25 PROCEDURE — 84439 ASSAY OF FREE THYROXINE: CPT | Performed by: INTERNAL MEDICINE

## 2024-07-25 PROCEDURE — 80053 COMPREHEN METABOLIC PANEL: CPT | Performed by: INTERNAL MEDICINE

## 2024-07-25 PROCEDURE — 81003 URINALYSIS AUTO W/O SCOPE: CPT | Performed by: INTERNAL MEDICINE

## 2024-07-25 PROCEDURE — 87086 URINE CULTURE/COLONY COUNT: CPT | Performed by: INTERNAL MEDICINE

## 2024-07-25 PROCEDURE — 80061 LIPID PANEL: CPT | Performed by: INTERNAL MEDICINE

## 2024-07-25 PROCEDURE — 36415 COLL VENOUS BLD VENIPUNCTURE: CPT | Performed by: INTERNAL MEDICINE

## 2024-07-25 PROCEDURE — 3077F SYST BP >= 140 MM HG: CPT | Performed by: INTERNAL MEDICINE

## 2024-07-25 PROCEDURE — 83690 ASSAY OF LIPASE: CPT | Performed by: INTERNAL MEDICINE

## 2024-07-25 PROCEDURE — 1125F AMNT PAIN NOTED PAIN PRSNT: CPT | Performed by: INTERNAL MEDICINE

## 2024-07-25 PROCEDURE — G2211 COMPLEX E/M VISIT ADD ON: HCPCS | Performed by: INTERNAL MEDICINE

## 2024-07-25 PROCEDURE — 99214 OFFICE O/P EST MOD 30 MIN: CPT | Performed by: INTERNAL MEDICINE

## 2024-07-25 PROCEDURE — 82150 ASSAY OF AMYLASE: CPT | Performed by: INTERNAL MEDICINE

## 2024-07-25 RX ORDER — ONDANSETRON 4 MG/1
4 TABLET, FILM COATED ORAL EVERY 8 HOURS PRN
Qty: 30 TABLET | Refills: 1 | Status: SHIPPED | OUTPATIENT
Start: 2024-07-25

## 2024-07-25 RX ORDER — NYSTATIN 100000 U/G
1 CREAM TOPICAL 4 TIMES DAILY
Qty: 30 G | Refills: 1 | Status: SHIPPED | OUTPATIENT
Start: 2024-07-25

## 2024-07-25 NOTE — PROGRESS NOTES
Chief Complaint   Patient presents with    Follow-up     6 month follow up chronic medical problems    pain in left lower abdominal pain    gums sore on lowerleft side and bruised tongue       History of Present Illness    The patient presents for a follow-up related to hypothyroidism. She reports a good energy level. She reports no hair loss, heat intolerance, cold intolerance, diarrhea, constipation, sweats or palpitations. She is taking her medication as prescribed.    She presents for an initial evaluation with a rash on the intertriginous areas. This has been present for one month. The condition is painful, scaling and itchy. There are no exposures to people with similar lesions. There is no history of new cosmetic or detergent usage on the affected area. No prior treatment has been administered. The patient denies a dry cough, a wet cough, wheezing, facial pain, a headache, eye drainage, ear pain, ear drainage or nasal discharge.    The patient presents for a follow-up related to hypertension. The patient reports that she has had no chest pain, dyspnea, edema, syncope or blurred vision. She states that she is taking her medication as prescribed. She is not having medication side effects.    The patient presents for a follow-up related to GERD. The patient is on pantoprazole for her gastroesophageal reflux. The medication is taken on a regular basis and gives complete relief of the symptoms. She reports nausea but she denies abdominal pain, belching, dysphagia, early satiety, heartburn, hoarseness, odynophagia, rectal bleeding or vomiting. The GERD has no known aggravating factors.    The patient presents for a follow-up related to hyperlipidemia. She is following a low fat diet. She reports that she is not exercising. She is taking simvastatin. The patient is taking her medication as prescribed. She reports no medication side effects, including muscle cramps, abdominal pain, headaches or weakness. She denies  orthopnea, paroxysmal nocturnal dyspnea or dyspnea on exertion.    The patient presents for a follow-up related to chronic obstructive pulmonary disease. Rescue inhaler usage is reported to be four times daily. The patient reports that she has no known triggers. Since the last office visit, the patient has not sought emergency care. She currently reports that she is experiencing cough and sputum production. The patient is using an inhaled steroid. She does rinse her mouth after using her steroid inhaler. She thrush symptoms or a sore mouth. She denies hemoptysis. She is not a smoker.    The patient presents for an acute visit for a several month history of symptoms of nausea but she has not had vomiting or diarrhea. Bowel Movements are normal in color and consistency. There is no blood in the bowel movement. She denies recent travel, the possibility of eating undercooked poultry, the possibility of eating undercooked hamburger, the possibility of eating spoiled seafood or recently eating at a restaurant. The symptoms are not associated with abdominal pain. The symptoms are not associated with fever. She does not have other symptoms, including myalgias, a sore throat, decreased appetite, chills, lightheadedness, dizziness, a dry mouth or a rash. The patient has not tried anything for treatment of this illness.    Medications      Current Outpatient Medications:     acetaminophen (TYLENOL) 500 MG tablet, Take 1 tablet by mouth Every 6 (Six) Hours., Disp: , Rfl:     albuterol sulfate  (90 Base) MCG/ACT inhaler, Inhale 2 puffs Every 4 (Four) Hours As Needed., Disp: , Rfl:     Ascorbic Acid (Vitamin C) 500 MG capsule, Take 500 mg by mouth Daily., Disp: , Rfl:     aspirin 81 MG chewable tablet, Chew 1 tablet Daily., Disp: , Rfl:     atenolol (TENORMIN) 50 MG tablet, Take 0.5 tablets by mouth 2 (Two) Times a Day., Disp: 90 tablet, Rfl: 1    Calcium Carbonate-Vitamin D (CALCIUM-VITAMIN D) 500-200 MG-UNIT per tablet,  Take 1 tablet by mouth Daily., Disp: , Rfl:     cetirizine (zyrTEC) 10 MG tablet, Take 1 tablet by mouth Daily., Disp: , Rfl:     Cholecalciferol (Vitamin D3) 25 MCG (1000 UT) capsule, Take 1 capsule by mouth Daily., Disp: , Rfl:     clopidogrel (PLAVIX) 75 MG tablet, Take 1 tablet by mouth Daily., Disp: 90 tablet, Rfl: 1    cyclobenzaprine (FLEXERIL) 5 MG tablet, Take 2 tablets by mouth 2 (Two) Times a Day As Needed for Muscle Spasms., Disp: 60 tablet, Rfl: 0    FeroSul 325 (65 Fe) MG tablet, TAKE 1 TABLET BY MOUTH TWICE DAILY (Patient taking differently: Take 1 tablet by mouth 2 (Two) Times a Day.), Disp: 60 tablet, Rfl: 3    FIBER SELECT GUMMIES PO, Take 2 tablets by mouth Daily., Disp: , Rfl:     Fluticasone-Umeclidin-Vilant (Trelegy Ellipta) 100-62.5-25 MCG/ACT inhaler, Inhale 1 puff Daily., Disp: 180 each, Rfl: 3    gabapentin (NEURONTIN) 300 MG capsule, Take 1 capsule by mouth Every Night., Disp: 90 capsule, Rfl: 0    guaiFENesin (MUCINEX) 600 MG 12 hr tablet, Take 1 tablet by mouth 2 (Two) Times a Day., Disp: , Rfl:     isosorbide mononitrate (IMDUR) 30 MG 24 hr tablet, Take 1 tablet by mouth Daily., Disp: 90 tablet, Rfl: 1    latanoprost (XALATAN) 0.005 % ophthalmic solution, Administer 1 drop to both eyes Every Night., Disp: , Rfl:     levothyroxine (SYNTHROID, LEVOTHROID) 200 MCG tablet, TAKE 1 TABLET DAILY, Disp: 90 tablet, Rfl: 1    Multiple Vitamins-Minerals (PRESERVISION AREDS 2 PO), Take 1 tablet by mouth 2 (Two) Times a Day., Disp: , Rfl:     nitroglycerin (NITROSTAT) 0.4 MG SL tablet, PLACE 1 TABLET UNDER THE TONGUE AS NEEDED EVERY 5 MINUTES FOR CHEST PAIN. CALL 911 IF NO RELIEF AFTER 2ND DOSE, Disp: 25 tablet, Rfl: 0    O2 (OXYGEN), Inhale 2 L/min Continuous., Disp: , Rfl:     pantoprazole (PROTONIX) 40 MG EC tablet, Take 1 tablet by mouth Daily., Disp: 90 tablet, Rfl: 1    Polyethylene Glycol 3350 (MIRALAX PO), Take 1 dose by mouth As Needed (constipation)., Disp: , Rfl:     roflumilast  (DALIRESP) 500 MCG tablet tablet, Take 1 tablet by mouth Daily., Disp: 90 tablet, Rfl: 1    sertraline (ZOLOFT) 100 MG tablet, Take 1 tablet by mouth Daily., Disp: 90 tablet, Rfl: 1    simvastatin (ZOCOR) 40 MG tablet, Take 1 tablet by mouth Every Night., Disp: 90 tablet, Rfl: 1    traMADol (ULTRAM) 50 MG tablet, Take 1 tablet by mouth Every 6 (Six) Hours As Needed for Moderate Pain., Disp: 120 tablet, Rfl: 0    Vibegron 75 MG tablet, Take 1 tablet by mouth Daily., Disp: 30 tablet, Rfl: 6     Allergies    Allergies   Allergen Reactions    Penicillins Hives, Swelling and Mental Status Change    Codeine Mental Status Change    Dopamine Palpitations and Other (See Comments)     Other reaction(s): Hypertension       Problem List    Patient Active Problem List   Diagnosis    Coronary arteriosclerosis in native artery    Chronic obstructive pulmonary disease    Depression    Gastroesophageal reflux disease with esophagitis    Hyperlipidemia    Hypertension    Hypothyroidism    Osteoarthritis    Peripheral arterial occlusive disease    Solitary pulmonary nodule    Vitamin D deficiency    Lower back pain    Carotid bruit    Hypoxemia    Chronic respiratory failure with hypoxia and hypercapnia    Allergic rhinitis    Chronic leukocytosis    Ureterolithiasis    Murmur, cardiac    Hospital discharge follow-up    Closed fracture of right proximal humerus    Right shoulder pain       Medications, Allergies, Problems List and Past History were reviewed and updated.    Physical Examination    /72 (BP Location: Left arm, Patient Position: Sitting, Cuff Size: Adult)   Pulse 76   Temp 97.6 °F (36.4 °C) (Infrared)   Resp 20   Wt 73 kg (161 lb)   LMP  (LMP Unknown)   BMI 28.52 kg/m²       HEENT: Head- Normocephalic Atraumatic. Facies- Within normal limits. Pinnas- Normal texture and shape bilaterally. Canals- Normal bilaterally. TMs- Normal bilaterally. Nares- Patent bilaterally. Nasal Septum- is normal. There is no  tenderness to palpation over the frontal or maxillary sinuses. Lids- Normal bilaterally. Conjunctiva- Clear bilaterally. Sclera- Anicteric bilaterally. Oropharynx- Moist with no lesions. Tonsils- No enlargement, erythema or exudate.    Neck: Thyroid- non enlarged, symmetric and has no nodules. No bruits are detected. ROM- Normal Range of Motion with no rigidity.    Lungs: Auscultation- Clear to auscultation bilaterally. There are no retractions, clubbing or cyanosis. The Expiratory to Inspiratory ratio is equal.    Cardiovascular: There are no carotid bruits. Heart- Normal Rate with Regular rhythm and no murmurs. There are no gallops. There are no rubs. In the lower extremities there is no edema. The upper extremities do not have edema.    Abdomen: Soft, benign, non-tender with no masses, hernias, organomegaly or scars.    Dermatologic: The patient has a rash on the intertriginous areas. The rash is pink. The affected skin is crusted and excoriated and the condition is noted to be well demarcated.    Impression and Assessment    Candidiasis.    Hypothyroidism.    Essential Hypertension.    Gastroesophageal Reflux Disease.    Hyperlipidemia.    Chronic Obstructive Pulmonary Disease.    Nausea.    Plan    Gastroesophageal Reflux Disease Plan: The patient was instructed to continue the current medications.    Nausea Plan: Further plans will be made after testing. Discontinued multiple vitamins.    Essential Hypertension Plan: The patient was instructed to continue the current medications.    Hyperlipidemia Plan: The patient was instructed to exercise daily, eat a low fat diet and continue her medications.    Hypothyroidism Plan: The patient was instructed to continue the current medications.    Chronic Obstructive Pulmonary Disease Plan: Continue the current medication regimen.    Candidiasis Plan: A medication will be added as noted below.      Diagnoses and all orders for this visit:    1. Hypothyroidism, unspecified  type (Primary)  -     TSH; Future  -     T4, Free; Future  -     TSH  -     T4, Free    2. Hyperlipidemia, unspecified hyperlipidemia type  -     Comprehensive Metabolic Panel; Future  -     Lipid Panel; Future  -     Comprehensive Metabolic Panel  -     Lipid Panel    3. Essential hypertension  -     CBC & Differential; Future  -     Comprehensive Metabolic Panel; Future  -     POC Urinalysis Dipstick, Automated; Future  -     CBC & Differential  -     Comprehensive Metabolic Panel  -     POC Urinalysis Dipstick, Automated    4. Gastroesophageal reflux disease with esophagitis without hemorrhage    5. Chronic obstructive pulmonary disease, unspecified COPD type    6. Dysuria  -     Urine Culture - Urine, Urine, Clean Catch; Future  -     Cancel: POC Urinalysis Dipstick, Automated; Future  -     Urine Culture - Urine, Urine, Clean Catch    7. Candidal skin infection  -     nystatin (MYCOSTATIN) 825542 UNIT/GM cream; Apply 1 Application topically to the appropriate area as directed 4 (Four) Times a Day.  Dispense: 30 g; Refill: 1    8. Nausea  -     ondansetron (Zofran) 4 MG tablet; Take 1 tablet by mouth Every 8 (Eight) Hours As Needed for Nausea or Vomiting.  Dispense: 30 tablet; Refill: 1  -     CBC & Differential; Future  -     Comprehensive Metabolic Panel; Future  -     Amylase; Future  -     Lipase; Future  -     CBC & Differential  -     Comprehensive Metabolic Panel  -     Amylase  -     Lipase    9. Leukocytes in urine    Medications Discontinued During This Encounter   Medication Reason    nitrofurantoin, macrocrystal-monohydrate, (Macrobid) 100 MG capsule *Therapy completed    Ascorbic Acid (Vitamin C) 500 MG capsule     Calcium Carbonate-Vitamin D (CALCIUM-VITAMIN D) 500-200 MG-UNIT per tablet     Cholecalciferol (Vitamin D3) 25 MCG (1000 UT) capsule     FeroSul 325 (65 Fe) MG tablet     guaiFENesin (MUCINEX) 600 MG 12 hr tablet        Return to Office    The patient was instructed to return for  follow-up in 1 month. The patient was instructed to return sooner if the condition changes, worsens, or does not resolve.

## 2024-07-26 LAB
T4 FREE SERPL-MCNC: 1.41 NG/DL (ref 0.92–1.68)
TSH SERPL DL<=0.05 MIU/L-ACNC: 4.53 UIU/ML (ref 0.27–4.2)

## 2024-07-27 LAB — BACTERIA SPEC AEROBE CULT: NORMAL

## 2024-08-12 RX ORDER — GABAPENTIN 300 MG/1
300 CAPSULE ORAL NIGHTLY
Qty: 90 CAPSULE | Refills: 0 | Status: SHIPPED | OUTPATIENT
Start: 2024-08-12

## 2024-08-19 DIAGNOSIS — M54.50 CHRONIC MIDLINE LOW BACK PAIN WITHOUT SCIATICA: ICD-10-CM

## 2024-08-19 DIAGNOSIS — G89.29 CHRONIC MIDLINE LOW BACK PAIN WITHOUT SCIATICA: ICD-10-CM

## 2024-08-19 RX ORDER — TRAMADOL HYDROCHLORIDE 50 MG/1
50 TABLET ORAL EVERY 6 HOURS PRN
Qty: 120 TABLET | Refills: 0 | Status: ON HOLD | OUTPATIENT
Start: 2024-08-19

## 2024-08-26 ENCOUNTER — HOSPITAL ENCOUNTER (INPATIENT)
Facility: HOSPITAL | Age: 84
LOS: 9 days | Discharge: HOME | End: 2024-09-04
Attending: EMERGENCY MEDICINE | Admitting: INTERNAL MEDICINE
Payer: MEDICARE

## 2024-08-26 ENCOUNTER — OFFICE VISIT (OUTPATIENT)
Dept: INTERNAL MEDICINE | Facility: CLINIC | Age: 84
End: 2024-08-26
Payer: MEDICARE

## 2024-08-26 ENCOUNTER — APPOINTMENT (OUTPATIENT)
Dept: GENERAL RADIOLOGY | Facility: HOSPITAL | Age: 84
End: 2024-08-26
Payer: MEDICARE

## 2024-08-26 VITALS
TEMPERATURE: 96 F | DIASTOLIC BLOOD PRESSURE: 72 MMHG | OXYGEN SATURATION: 87 % | SYSTOLIC BLOOD PRESSURE: 140 MMHG | RESPIRATION RATE: 24 BRPM | HEART RATE: 92 BPM

## 2024-08-26 DIAGNOSIS — J96.21 ACUTE ON CHRONIC RESPIRATORY FAILURE WITH HYPOXIA: ICD-10-CM

## 2024-08-26 DIAGNOSIS — A41.9 ACUTE SEPSIS: Primary | ICD-10-CM

## 2024-08-26 DIAGNOSIS — J18.9 PNEUMONIA OF LEFT LOWER LOBE DUE TO INFECTIOUS ORGANISM: ICD-10-CM

## 2024-08-26 DIAGNOSIS — R09.81 NASAL CONGESTION: ICD-10-CM

## 2024-08-26 DIAGNOSIS — J44.1 ACUTE EXACERBATION OF CHRONIC OBSTRUCTIVE PULMONARY DISEASE (COPD): ICD-10-CM

## 2024-08-26 DIAGNOSIS — Z86.79 HISTORY OF CORONARY ARTERY DISEASE: ICD-10-CM

## 2024-08-26 DIAGNOSIS — J44.0 COPD WITH LOWER RESPIRATORY INFECTION: Primary | ICD-10-CM

## 2024-08-26 PROBLEM — J96.01 ACUTE HYPOXEMIC RESPIRATORY FAILURE: Status: ACTIVE | Noted: 2024-08-26

## 2024-08-26 LAB
ALBUMIN SERPL-MCNC: 3.2 G/DL (ref 3.5–5.2)
ALBUMIN/GLOB SERPL: 0.8 G/DL
ALP SERPL-CCNC: 106 U/L (ref 39–117)
ALT SERPL W P-5'-P-CCNC: 20 U/L (ref 1–33)
ANION GAP SERPL CALCULATED.3IONS-SCNC: 11 MMOL/L (ref 5–15)
AST SERPL-CCNC: 27 U/L (ref 1–32)
BASOPHILS # BLD AUTO: 0.08 10*3/MM3 (ref 0–0.2)
BASOPHILS NFR BLD AUTO: 0.3 % (ref 0–1.5)
BILIRUB SERPL-MCNC: 0.4 MG/DL (ref 0–1.2)
BUN SERPL-MCNC: 12 MG/DL (ref 8–23)
BUN/CREAT SERPL: 24 (ref 7–25)
CALCIUM SPEC-SCNC: 9.4 MG/DL (ref 8.6–10.5)
CHLORIDE SERPL-SCNC: 92 MMOL/L (ref 98–107)
CO2 SERPL-SCNC: 36 MMOL/L (ref 22–29)
CREAT SERPL-MCNC: 0.5 MG/DL (ref 0.57–1)
D-LACTATE SERPL-SCNC: 1.5 MMOL/L (ref 0.5–2)
DEPRECATED RDW RBC AUTO: 46.2 FL (ref 37–54)
EGFRCR SERPLBLD CKD-EPI 2021: 93.2 ML/MIN/1.73
EOSINOPHIL # BLD AUTO: 0 10*3/MM3 (ref 0–0.4)
EOSINOPHIL NFR BLD AUTO: 0 % (ref 0.3–6.2)
ERYTHROCYTE [DISTWIDTH] IN BLOOD BY AUTOMATED COUNT: 12.3 % (ref 12.3–15.4)
EXPIRATION DATE: NORMAL
FLUAV AG UPPER RESP QL IA.RAPID: NOT DETECTED
FLUAV SUBTYP SPEC NAA+PROBE: NOT DETECTED
FLUBV AG UPPER RESP QL IA.RAPID: NOT DETECTED
FLUBV RNA ISLT QL NAA+PROBE: NOT DETECTED
GLOBULIN UR ELPH-MCNC: 3.8 GM/DL
GLUCOSE SERPL-MCNC: 136 MG/DL (ref 65–99)
HCT VFR BLD AUTO: 35.2 % (ref 34–46.6)
HGB BLD-MCNC: 11 G/DL (ref 12–15.9)
HOLD SPECIMEN: NORMAL
IMM GRANULOCYTES # BLD AUTO: 0.3 10*3/MM3 (ref 0–0.05)
IMM GRANULOCYTES NFR BLD AUTO: 1.1 % (ref 0–0.5)
INTERNAL CONTROL: NORMAL
LYMPHOCYTES # BLD AUTO: 1.12 10*3/MM3 (ref 0.7–3.1)
LYMPHOCYTES NFR BLD AUTO: 4.1 % (ref 19.6–45.3)
Lab: NORMAL
MCH RBC QN AUTO: 31.6 PG (ref 26.6–33)
MCHC RBC AUTO-ENTMCNC: 31.3 G/DL (ref 31.5–35.7)
MCV RBC AUTO: 101.1 FL (ref 79–97)
MONOCYTES # BLD AUTO: 1.92 10*3/MM3 (ref 0.1–0.9)
MONOCYTES NFR BLD AUTO: 7 % (ref 5–12)
NEUTROPHILS NFR BLD AUTO: 23.93 10*3/MM3 (ref 1.7–7)
NEUTROPHILS NFR BLD AUTO: 87.5 % (ref 42.7–76)
NRBC BLD AUTO-RTO: 0 /100 WBC (ref 0–0.2)
NT-PROBNP SERPL-MCNC: 3091 PG/ML (ref 0–1800)
PLATELET # BLD AUTO: 365 10*3/MM3 (ref 140–450)
PMV BLD AUTO: 9 FL (ref 6–12)
POTASSIUM SERPL-SCNC: 3.5 MMOL/L (ref 3.5–5.2)
PROCALCITONIN SERPL-MCNC: 0.14 NG/ML (ref 0–0.25)
PROT SERPL-MCNC: 7 G/DL (ref 6–8.5)
QT INTERVAL: 0 MS
QT INTERVAL: 378 MS
QTC INTERVAL: 0 MS
QTC INTERVAL: 449 MS
RBC # BLD AUTO: 3.48 10*6/MM3 (ref 3.77–5.28)
SARS-COV-2 AG UPPER RESP QL IA.RAPID: NOT DETECTED
SARS-COV-2 RNA RESP QL NAA+PROBE: NOT DETECTED
SODIUM SERPL-SCNC: 139 MMOL/L (ref 136–145)
TROPONIN T SERPL HS-MCNC: 31 NG/L
WBC NRBC COR # BLD AUTO: 27.35 10*3/MM3 (ref 3.4–10.8)
WHOLE BLOOD HOLD COAG: NORMAL
WHOLE BLOOD HOLD SPECIMEN: NORMAL

## 2024-08-26 PROCEDURE — 94640 AIRWAY INHALATION TREATMENT: CPT

## 2024-08-26 PROCEDURE — 93005 ELECTROCARDIOGRAM TRACING: CPT | Performed by: INTERNAL MEDICINE

## 2024-08-26 PROCEDURE — 87428 SARSCOV & INF VIR A&B AG IA: CPT | Performed by: INTERNAL MEDICINE

## 2024-08-26 PROCEDURE — 83880 ASSAY OF NATRIURETIC PEPTIDE: CPT | Performed by: EMERGENCY MEDICINE

## 2024-08-26 PROCEDURE — 83605 ASSAY OF LACTIC ACID: CPT | Performed by: EMERGENCY MEDICINE

## 2024-08-26 PROCEDURE — 87040 BLOOD CULTURE FOR BACTERIA: CPT | Performed by: EMERGENCY MEDICINE

## 2024-08-26 PROCEDURE — 94799 UNLISTED PULMONARY SVC/PX: CPT

## 2024-08-26 PROCEDURE — 99223 1ST HOSP IP/OBS HIGH 75: CPT | Performed by: INTERNAL MEDICINE

## 2024-08-26 PROCEDURE — 3078F DIAST BP <80 MM HG: CPT | Performed by: INTERNAL MEDICINE

## 2024-08-26 PROCEDURE — 93005 ELECTROCARDIOGRAM TRACING: CPT | Performed by: EMERGENCY MEDICINE

## 2024-08-26 PROCEDURE — 1125F AMNT PAIN NOTED PAIN PRSNT: CPT | Performed by: INTERNAL MEDICINE

## 2024-08-26 PROCEDURE — 36415 COLL VENOUS BLD VENIPUNCTURE: CPT

## 2024-08-26 PROCEDURE — 84484 ASSAY OF TROPONIN QUANT: CPT | Performed by: EMERGENCY MEDICINE

## 2024-08-26 PROCEDURE — 71045 X-RAY EXAM CHEST 1 VIEW: CPT

## 2024-08-26 PROCEDURE — 3077F SYST BP >= 140 MM HG: CPT | Performed by: INTERNAL MEDICINE

## 2024-08-26 PROCEDURE — 80053 COMPREHEN METABOLIC PANEL: CPT | Performed by: EMERGENCY MEDICINE

## 2024-08-26 PROCEDURE — 94761 N-INVAS EAR/PLS OXIMETRY MLT: CPT

## 2024-08-26 PROCEDURE — 85025 COMPLETE CBC W/AUTO DIFF WBC: CPT | Performed by: EMERGENCY MEDICINE

## 2024-08-26 PROCEDURE — 99285 EMERGENCY DEPT VISIT HI MDM: CPT

## 2024-08-26 PROCEDURE — 63710000001 PREDNISONE PER 1 MG: Performed by: EMERGENCY MEDICINE

## 2024-08-26 PROCEDURE — 84145 PROCALCITONIN (PCT): CPT | Performed by: EMERGENCY MEDICINE

## 2024-08-26 PROCEDURE — 25010000002 CEFTRIAXONE PER 250 MG: Performed by: EMERGENCY MEDICINE

## 2024-08-26 PROCEDURE — 99214 OFFICE O/P EST MOD 30 MIN: CPT | Performed by: INTERNAL MEDICINE

## 2024-08-26 PROCEDURE — 87636 SARSCOV2 & INF A&B AMP PRB: CPT | Performed by: EMERGENCY MEDICINE

## 2024-08-26 RX ORDER — POLYETHYLENE GLYCOL 3350 17 G/17G
17 POWDER, FOR SOLUTION ORAL DAILY PRN
Status: DISCONTINUED | OUTPATIENT
Start: 2024-08-26 | End: 2024-09-04 | Stop reason: HOSPADM

## 2024-08-26 RX ORDER — ISOSORBIDE MONONITRATE 30 MG/1
30 TABLET, EXTENDED RELEASE ORAL DAILY
Status: DISCONTINUED | OUTPATIENT
Start: 2024-08-27 | End: 2024-09-04 | Stop reason: HOSPADM

## 2024-08-26 RX ORDER — NYSTATIN 100000 U/G
1 CREAM TOPICAL EVERY 12 HOURS SCHEDULED
Status: DISCONTINUED | OUTPATIENT
Start: 2024-08-27 | End: 2024-09-04 | Stop reason: HOSPADM

## 2024-08-26 RX ORDER — PREDNISONE 20 MG/1
60 TABLET ORAL ONCE
Status: COMPLETED | OUTPATIENT
Start: 2024-08-26 | End: 2024-08-26

## 2024-08-26 RX ORDER — AMOXICILLIN 250 MG
2 CAPSULE ORAL 2 TIMES DAILY PRN
Status: DISCONTINUED | OUTPATIENT
Start: 2024-08-26 | End: 2024-09-04 | Stop reason: HOSPADM

## 2024-08-26 RX ORDER — SODIUM CHLORIDE 0.9 % (FLUSH) 0.9 %
10 SYRINGE (ML) INJECTION EVERY 12 HOURS SCHEDULED
Status: DISCONTINUED | OUTPATIENT
Start: 2024-08-26 | End: 2024-09-04 | Stop reason: HOSPADM

## 2024-08-26 RX ORDER — GABAPENTIN 300 MG/1
300 CAPSULE ORAL NIGHTLY
Status: DISCONTINUED | OUTPATIENT
Start: 2024-08-26 | End: 2024-09-04 | Stop reason: HOSPADM

## 2024-08-26 RX ORDER — DOXYCYCLINE 100 MG/1
100 CAPSULE ORAL ONCE
Status: COMPLETED | OUTPATIENT
Start: 2024-08-26 | End: 2024-08-26

## 2024-08-26 RX ORDER — POTASSIUM CHLORIDE 1500 MG/1
40 TABLET, EXTENDED RELEASE ORAL EVERY 4 HOURS
Status: COMPLETED | OUTPATIENT
Start: 2024-08-27 | End: 2024-08-27

## 2024-08-26 RX ORDER — SODIUM CHLORIDE 9 MG/ML
40 INJECTION, SOLUTION INTRAVENOUS AS NEEDED
Status: DISCONTINUED | OUTPATIENT
Start: 2024-08-26 | End: 2024-09-04 | Stop reason: HOSPADM

## 2024-08-26 RX ORDER — ATORVASTATIN CALCIUM 20 MG/1
20 TABLET, FILM COATED ORAL NIGHTLY
Status: DISCONTINUED | OUTPATIENT
Start: 2024-08-26 | End: 2024-09-04 | Stop reason: HOSPADM

## 2024-08-26 RX ORDER — PREDNISONE 20 MG/1
40 TABLET ORAL
Status: COMPLETED | OUTPATIENT
Start: 2024-08-27 | End: 2024-08-30

## 2024-08-26 RX ORDER — CLOPIDOGREL BISULFATE 75 MG/1
75 TABLET ORAL DAILY
Status: DISCONTINUED | OUTPATIENT
Start: 2024-08-27 | End: 2024-09-04 | Stop reason: HOSPADM

## 2024-08-26 RX ORDER — SODIUM CHLORIDE 0.9 % (FLUSH) 0.9 %
10 SYRINGE (ML) INJECTION AS NEEDED
Status: DISCONTINUED | OUTPATIENT
Start: 2024-08-26 | End: 2024-09-04 | Stop reason: HOSPADM

## 2024-08-26 RX ORDER — SODIUM CHLORIDE 9 MG/ML
75 INJECTION, SOLUTION INTRAVENOUS CONTINUOUS
Status: ACTIVE | OUTPATIENT
Start: 2024-08-27 | End: 2024-08-27

## 2024-08-26 RX ORDER — IPRATROPIUM BROMIDE AND ALBUTEROL SULFATE 2.5; .5 MG/3ML; MG/3ML
3 SOLUTION RESPIRATORY (INHALATION)
Status: DISCONTINUED | OUTPATIENT
Start: 2024-08-27 | End: 2024-09-04 | Stop reason: HOSPADM

## 2024-08-26 RX ORDER — ASPIRIN 81 MG/1
81 TABLET, CHEWABLE ORAL DAILY
Status: DISCONTINUED | OUTPATIENT
Start: 2024-08-27 | End: 2024-08-29

## 2024-08-26 RX ORDER — BISACODYL 5 MG/1
5 TABLET, DELAYED RELEASE ORAL DAILY PRN
Status: DISCONTINUED | OUTPATIENT
Start: 2024-08-26 | End: 2024-09-04 | Stop reason: HOSPADM

## 2024-08-26 RX ORDER — BISACODYL 10 MG
10 SUPPOSITORY, RECTAL RECTAL DAILY PRN
Status: DISCONTINUED | OUTPATIENT
Start: 2024-08-26 | End: 2024-09-04 | Stop reason: HOSPADM

## 2024-08-26 RX ORDER — DOXYCYCLINE 100 MG/1
100 CAPSULE ORAL EVERY 12 HOURS SCHEDULED
Status: COMPLETED | OUTPATIENT
Start: 2024-08-27 | End: 2024-08-31

## 2024-08-26 RX ORDER — SERTRALINE HYDROCHLORIDE 100 MG/1
100 TABLET, FILM COATED ORAL DAILY
Status: DISCONTINUED | OUTPATIENT
Start: 2024-08-27 | End: 2024-09-04 | Stop reason: HOSPADM

## 2024-08-26 RX ORDER — LEVOTHYROXINE SODIUM 100 UG/1
200 TABLET ORAL DAILY
Status: DISCONTINUED | OUTPATIENT
Start: 2024-08-27 | End: 2024-09-04 | Stop reason: HOSPADM

## 2024-08-26 RX ORDER — IPRATROPIUM BROMIDE AND ALBUTEROL SULFATE 2.5; .5 MG/3ML; MG/3ML
3 SOLUTION RESPIRATORY (INHALATION) EVERY 4 HOURS PRN
Status: DISCONTINUED | OUTPATIENT
Start: 2024-08-26 | End: 2024-09-04 | Stop reason: HOSPADM

## 2024-08-26 RX ORDER — PANTOPRAZOLE SODIUM 40 MG/1
40 TABLET, DELAYED RELEASE ORAL DAILY
Status: DISCONTINUED | OUTPATIENT
Start: 2024-08-27 | End: 2024-09-04 | Stop reason: HOSPADM

## 2024-08-26 RX ORDER — TRAMADOL HYDROCHLORIDE 50 MG/1
50 TABLET ORAL EVERY 6 HOURS PRN
Status: DISCONTINUED | OUTPATIENT
Start: 2024-08-26 | End: 2024-08-28

## 2024-08-26 RX ORDER — ONDANSETRON 2 MG/ML
4 INJECTION INTRAMUSCULAR; INTRAVENOUS EVERY 6 HOURS PRN
Status: DISCONTINUED | OUTPATIENT
Start: 2024-08-26 | End: 2024-09-04 | Stop reason: HOSPADM

## 2024-08-26 RX ORDER — IPRATROPIUM BROMIDE AND ALBUTEROL SULFATE 2.5; .5 MG/3ML; MG/3ML
3 SOLUTION RESPIRATORY (INHALATION) ONCE
Status: COMPLETED | OUTPATIENT
Start: 2024-08-26 | End: 2024-08-26

## 2024-08-26 RX ORDER — ROFLUMILAST 500 UG/1
500 TABLET ORAL DAILY
Status: DISCONTINUED | OUTPATIENT
Start: 2024-08-27 | End: 2024-09-04 | Stop reason: HOSPADM

## 2024-08-26 RX ADMIN — DOXYCYCLINE 100 MG: 100 CAPSULE ORAL at 16:55

## 2024-08-26 RX ADMIN — PREDNISONE 60 MG: 20 TABLET ORAL at 16:55

## 2024-08-26 RX ADMIN — IPRATROPIUM BROMIDE AND ALBUTEROL SULFATE 3 ML: 2.5; .5 SOLUTION RESPIRATORY (INHALATION) at 15:25

## 2024-08-26 RX ADMIN — TRAMADOL HYDROCHLORIDE 50 MG: 50 TABLET, COATED ORAL at 20:35

## 2024-08-26 RX ADMIN — SODIUM CHLORIDE 1000 MG: 900 INJECTION INTRAVENOUS at 16:56

## 2024-08-26 NOTE — ED NOTES
" Avelina Ceron    Nursing Report ED to Floor:  Mental status: A&O X 4  Ambulatory status: WITH ASSISTANCE  Oxygen Therapy:  4L NC  Cardiac Rhythm: NSR  Admitted from: HOME  Safety Concerns:  FALL RISK  Social Issues: N/A  ED Room #:  18    ED Nurse Phone Extension - [phone] or may call [phone].      HPI:   Chief Complaint   Patient presents with    Shortness of Breath       Past Medical History:  Past Medical History:   Diagnosis Date    Anesthesia complication     Pt went into respiratory failure during total hip replacement in 2020    Arthritis of back     Arthritis of neck     Broken ribs     COPD (chronic obstructive pulmonary disease)     Coronary artery disease     Degenerative lumbar disc     Disease of thyroid gland     \"low thyroid\"    Fracture, fibula 1997?    Fracture, humerus 12/13/23    Fracture, tibia and fibula 1997?    Hip arthrosis     History of colonoscopy     Hyperlipidemia     Hypertension     Kidney stone July 2023    Knee swelling     Lumbosacral disc disease     On home O2     3L    Osteoarthritis     Periarthritis of shoulder     Urinary incontinence     Urinary tract infection         Past Surgical History:  Past Surgical History:   Procedure Laterality Date    CAROTID STENT Bilateral     COLONOSCOPY      EYE SURGERY  10/17/2022    yag surgery    GALLBLADDER SURGERY  2012    HYSTERECTOMY      ILIAC ARTERY STENT Bilateral     JOINT REPLACEMENT  1995; 2020    KIDNEY STONE SURGERY  July August 2023    OOPHORECTOMY  1976    1 ovary removed    TOTAL HIP ARTHROPLASTY Bilateral     URETEROSCOPY LASER LITHOTRIPSY WITH STENT INSERTION Right 07/26/2023    Procedure: CYSTOSCOPY, RIGHT RETROGRADE PYELOGRAM; WITH STENT INSERTION;  Surgeon: Lee Kim MD;  Location: [facility];  Service: Urology;  Laterality: Right;    URETEROSCOPY LASER LITHOTRIPSY WITH STENT INSERTION Right 08/10/2023    Procedure: URETEROSCOPY, LASER LITHOTRIPSY, STENT EXCHANGE;  Surgeon: Lee Kim MD;  Location: [facility]" OR;  Service: Urology;  Laterality: Right;        Admitting Doctor:   Yara Daly MD    Consulting Provider(s):  Consults       No orders found from 7/28/2024 to 8/27/2024.             Admitting Diagnosis:   The primary encounter diagnosis was Acute sepsis. Diagnoses of Pneumonia of left lower lobe due to infectious organism, Acute exacerbation of chronic obstructive pulmonary disease (COPD), History of coronary artery disease, and Acute on chronic respiratory failure with hypoxia were also pertinent to this visit.    Most Recent Vitals:   Vitals:    08/26/24 1500 08/26/24 1525 08/26/24 1530 08/26/24 1600   BP: 137/59  146/83 153/79   BP Location:       Patient Position:       Pulse: 89 84 87 88   Resp:  22     Temp:       TempSrc:       SpO2: 93% 92% 95% 90%   Weight:       Height:           Active LDAs/IV Access:   Lines, Drains & Airways       Active LDAs       Name Placement date Placement time Site Days    Peripheral IV 08/26/24 1438 Anterior;Right Forearm 08/26/24  1438  Forearm  less than 1                    Labs (abnormal labs have a star):   Labs Reviewed   COMPREHENSIVE METABOLIC PANEL - Abnormal; Notable for the following components:       Result Value    Glucose 136 (*)     Creatinine 0.50 (*)     Chloride 92 (*)     CO2 36.0 (*)     Albumin 3.2 (*)     All other components within normal limits    Narrative:     GFR Normal >60  Chronic Kidney Disease <60  Kidney Failure <15    The GFR formula is only valid for adults with stable renal function between ages 18 and 70.   BNP (IN-HOUSE) - Abnormal; Notable for the following components:    proBNP 3,091.0 (*)     All other components within normal limits    Narrative:     This assay is used as an aid in the diagnosis of individuals suspected of having heart failure. It can be used as an aid in the diagnosis of acute decompensated heart failure (ADHF) in patients presenting with signs and symptoms of ADHF to the emergency department (ED). In addition,  NT-proBNP of <300 pg/mL indicates ADHF is not likely.    Age Range Result Interpretation  NT-proBNP Concentration (pg/mL:      <50             Positive            >450                   Gray                 300-450                    Negative             <300    50-75           Positive            >900                  Gray                300-900                  Negative            <300      >75             Positive            >1800                  Gray                300-1800                  Negative            <300   SINGLE HS TROPONIN T - Abnormal; Notable for the following components:    HS Troponin T 31 (*)     All other components within normal limits    Narrative:     High Sensitive Troponin T Reference Range:  <14.0 ng/L- Negative Female for AMI  <22.0 ng/L- Negative Male for AMI  >=14 - Abnormal Female indicating possible myocardial injury.  >=22 - Abnormal Male indicating possible myocardial injury.   Clinicians would have to utilize clinical acumen, EKG, Troponin, and serial changes to determine if it is an Acute Myocardial Infarction or myocardial injury due to an underlying chronic condition.        CBC WITH AUTO DIFFERENTIAL - Abnormal; Notable for the following components:    WBC 27.35 (*)     RBC 3.48 (*)     Hemoglobin 11.0 (*)     .1 (*)     MCHC 31.3 (*)     Neutrophil % 87.5 (*)     Lymphocyte % 4.1 (*)     Eosinophil % 0.0 (*)     Immature Grans % 1.1 (*)     Neutrophils, Absolute 23.93 (*)     Monocytes, Absolute 1.92 (*)     Immature Grans, Absolute 0.30 (*)     All other components within normal limits   COVID-19 AND FLU A/B, NP SWAB IN TRANSPORT MEDIA 1 HR TAT - Normal    Narrative:     Fact sheet for providers: https://www.fda.gov/media/368284/download    Fact sheet for patients: https://www.fda.gov/media/492372/download    Test performed by PCR.   LACTIC ACID, PLASMA - Normal   PROCALCITONIN - Normal    Narrative:     As a Marker for Sepsis (Non-Neonates):    1. <0.5 ng/mL  "represents a low risk of severe sepsis and/or septic shock.  2. >2 ng/mL represents a high risk of severe sepsis and/or septic shock.    As a Marker for Lower Respiratory Tract Infections that require antibiotic therapy:    PCT on Admission    Antibiotic Therapy       6-12 Hrs later    >0.5                Strongly Recommended  >0.25 - <0.5        Recommended   0.1 - 0.25          Discouraged              Remeasure/reassess PCT  <0.1                Strongly Discouraged     Remeasure/reassess PCT    As 28 day mortality risk marker: \"Change in Procalcitonin Result\" (>80% or <=80%) if Day 0 (or Day 1) and Day 4 values are available. Refer to http://www.frentsMer[facility] [place] – [place] City[place]erraEchospct[place]calculator.com    Change in PCT <=80%  A decrease of PCT levels below or equal to 80% defines a positive change in PCT test result representing a higher risk for 28-day all-cause mortality of patients diagnosed with severe sepsis for septic shock.    Change in PCT >80%  A decrease of PCT levels of more than 80% defines a negative change in PCT result representing a lower risk for 28-day all-cause mortality of patients diagnosed with severe sepsis or septic shock.      COVID PRE-OP / PRE-PROCEDURE SCREENING ORDER (NO ISOLATION)    Narrative:     The following orders were created for panel order COVID PRE-OP / PRE-PROCEDURE SCREENING ORDER (NO ISOLATION) - Swab, Nasopharynx.  Procedure                               Abnormality         Status                     ---------                               -----------         ------                     COVID-19 and FLU A/B PCR...[[id number]]  Normal              Final result                 Please view results for these tests on the individual orders.   BLOOD CULTURE   BLOOD CULTURE   RAINBOW DRAW    Narrative:     The following orders were created for panel order [place] Draw.  Procedure                               Abnormality         Status                     ---------                               -----------      "    ------                     Green Top (Gel)[[id number]]                                  Final result               Lavender Top[[id number]]                                     Final result               Gold Top - SST[[id number]]                                   Final result               Mccord Top[[id number]]                                         Final result               Light Blue Top[[id number]]                                   Final result                 Please view results for these tests on the individual orders.   CBC AND DIFFERENTIAL    Narrative:     The following orders were created for panel order CBC & Differential.  Procedure                               Abnormality         Status                     ---------                               -----------         ------                     CBC Auto Differential[[id number]]        Abnormal            Final result                 Please view results for these tests on the individual orders.   GREEN TOP   LAVENDER TOP   GOLD TOP - SST   GRAY TOP   LIGHT BLUE TOP       Meds Given in ED:   Medications   sodium chloride 0.9 % flush 10 mL (has no administration in time range)   cefTRIAXone (ROCEPHIN) 1,000 mg in sodium chloride 0.9 % 100 mL MBP (1,000 mg Intravenous New Bag 8/26/24 1656)   ipratropium-albuterol (DUO-NEB) nebulizer solution 3 mL (3 mL Nebulization Given 8/26/24 1525)   doxycycline (MONODOX) capsule 100 mg (100 mg Oral Given 8/26/24 1655)   predniSONE (DELTASONE) tablet 60 mg (60 mg Oral Given 8/26/24 1655)           Last NIH score:                                                          Dysphagia screening results:  Patient Factors Component (Dysphagia:Stroke or Rule-out)  Best Eye Response: 4-->(E4) spontaneous (08/26/24 1454)  Best Motor Response: 6-->(M6) obeys commands (08/26/24 1454)  Best Verbal Response: 5-->(V5) oriented (08/26/24 1454)  [place] Coma Scale Score: 15 (08/26/24 1454)     [place] Coma Scale:  No data recorded     CIWA:         Restraint Type:            Isolation Status:  No active isolations

## 2024-08-26 NOTE — PROGRESS NOTES
No chief complaint on file.      History of Present Illness      The patient presents for an acute visit for a one week history of a hacking cough. There are other symptoms including wheezing, fever and decreased appetite but the patient does not report facial pain, a headache, eye drainage, ear pain, ear drainage, nasal congestion, rhinorrhea, nausea, vomiting, diarrhea, chills, abdominal pain, sore throat, myalgias or a rash. The patient has not had hemoptysis. The fever is low grade. The patient reports shortness of breath associated with the wheezing. The patient is not exposed to cigarette smoke. The patient has not tried anything for treatment of this illness. O2 sats are mid 80s on 4 LPM NC. Usually on 2 LPM with sats in mid 90s.    Medications      Current Outpatient Medications:     acetaminophen (TYLENOL) 500 MG tablet, Take 1 tablet by mouth Every 6 (Six) Hours., Disp: , Rfl:     albuterol sulfate  (90 Base) MCG/ACT inhaler, Inhale 2 puffs Every 4 (Four) Hours As Needed., Disp: , Rfl:     aspirin 81 MG chewable tablet, Chew 1 tablet Daily., Disp: , Rfl:     atenolol (TENORMIN) 50 MG tablet, Take 0.5 tablets by mouth 2 (Two) Times a Day., Disp: 90 tablet, Rfl: 1    cetirizine (zyrTEC) 10 MG tablet, Take 1 tablet by mouth Daily., Disp: , Rfl:     clopidogrel (PLAVIX) 75 MG tablet, Take 1 tablet by mouth Daily., Disp: 90 tablet, Rfl: 1    cyclobenzaprine (FLEXERIL) 5 MG tablet, Take 2 tablets by mouth 2 (Two) Times a Day As Needed for Muscle Spasms., Disp: 60 tablet, Rfl: 0    FIBER SELECT GUMMIES PO, Take 2 tablets by mouth Daily., Disp: , Rfl:     Fluticasone-Umeclidin-Vilant (Trelegy Ellipta) 100-62.5-25 MCG/ACT inhaler, Inhale 1 puff Daily., Disp: 180 each, Rfl: 3    gabapentin (NEURONTIN) 300 MG capsule, Take 1 capsule by mouth Every Night., Disp: 90 capsule, Rfl: 0    isosorbide mononitrate (IMDUR) 30 MG 24 hr tablet, Take 1 tablet by mouth Daily., Disp: 90 tablet, Rfl: 1    latanoprost  (XALATAN) 0.005 % ophthalmic solution, Administer 1 drop to both eyes Every Night., Disp: , Rfl:     levothyroxine (SYNTHROID, LEVOTHROID) 200 MCG tablet, TAKE 1 TABLET DAILY, Disp: 90 tablet, Rfl: 1    Multiple Vitamins-Minerals (PRESERVISION AREDS 2 PO), Take 1 tablet by mouth 2 (Two) Times a Day., Disp: , Rfl:     nitroglycerin (NITROSTAT) 0.4 MG SL tablet, PLACE 1 TABLET UNDER THE TONGUE AS NEEDED EVERY 5 MINUTES FOR CHEST PAIN. CALL 911 IF NO RELIEF AFTER 2ND DOSE, Disp: 25 tablet, Rfl: 0    nystatin (MYCOSTATIN) 847514 UNIT/GM cream, Apply 1 Application topically to the appropriate area as directed 4 (Four) Times a Day., Disp: 30 g, Rfl: 1    O2 (OXYGEN), Inhale 2 L/min Continuous., Disp: , Rfl:     ondansetron (Zofran) 4 MG tablet, Take 1 tablet by mouth Every 8 (Eight) Hours As Needed for Nausea or Vomiting., Disp: 30 tablet, Rfl: 1    pantoprazole (PROTONIX) 40 MG EC tablet, Take 1 tablet by mouth Daily., Disp: 90 tablet, Rfl: 1    Polyethylene Glycol 3350 (MIRALAX PO), Take 1 dose by mouth As Needed (constipation)., Disp: , Rfl:     roflumilast (DALIRESP) 500 MCG tablet tablet, Take 1 tablet by mouth Daily., Disp: 90 tablet, Rfl: 1    sertraline (ZOLOFT) 100 MG tablet, Take 1 tablet by mouth Daily., Disp: 90 tablet, Rfl: 1    simvastatin (ZOCOR) 40 MG tablet, Take 1 tablet by mouth Every Night., Disp: 90 tablet, Rfl: 1    traMADol (ULTRAM) 50 MG tablet, TAKE 1 TABLET BY MOUTH EVERY 6 HOURS AS NEEDED FOR MODERATE PAIN, Disp: 120 tablet, Rfl: 0    Vibegron 75 MG tablet, Take 1 tablet by mouth Daily., Disp: 30 tablet, Rfl: 6     Allergies    Allergies   Allergen Reactions    Penicillins Hives, Swelling and Mental Status Change    Codeine Mental Status Change    Dopamine Palpitations and Other (See Comments)     Other reaction(s): Hypertension       Problem List    Patient Active Problem List   Diagnosis    Coronary arteriosclerosis in native artery    Chronic obstructive pulmonary disease    Depression     Gastroesophageal reflux disease with esophagitis    Hyperlipidemia    Hypertension    Hypothyroidism    Osteoarthritis    Peripheral arterial occlusive disease    Solitary pulmonary nodule    Vitamin D deficiency    Lower back pain    Carotid bruit    Hypoxemia    Chronic respiratory failure with hypoxia and hypercapnia    Allergic rhinitis    Chronic leukocytosis    Ureterolithiasis    Murmur, cardiac    Hospital discharge follow-up    Closed fracture of right proximal humerus    Right shoulder pain       Medications, Allergies, Problems List and Past History were reviewed and updated.    Physical Examination    /72   Pulse 92   Temp 96 °F (35.6 °C)   Resp 24   LMP  (LMP Unknown)   SpO2 (!) 87%       HEENT: Head- Normocephalic Atraumatic. Facies- Within normal limits. Pinnas- Normal texture and shape bilaterally. Canals- Normal bilaterally. TMs- Normal bilaterally. Nares- Patent bilaterally. Nasal Septum- is normal. There is pain to palpation over the right maxillary sinus and left maxillary sinus but not over the right frontal sinus or left frontal sinus. Lids- Normal bilaterally. Conjunctiva- Clear bilaterally. Sclera- Anicteric bilaterally. Oropharynx- Moist with no lesions. Tonsils- No enlargement, erythema or exudate.    Neck: Thyroid- non enlarged, symmetric and has no nodules. No bruits are detected. ROM- Normal Range of Motion with no rigidity.    Lungs: Auscultation- Both lungs are have abnormal findings. The right lung has diffuse inspiratory and expiratory wheezes and coarse ronchi. The left lung has diffuse inspiratory and expiratory wheezes and coarse ronchi. There are no retractions, clubbing or cyanosis. The Expiratory to Inspiratory ratio is equal.    Lymph Nodes: Cervical- no enlarged lymph nodes noted.    Cardiovascular: There are no carotid bruits. Heart- Normal Rate with Regular rhythm and no murmurs. There are no gallops. There are no rubs. In the lower extremities there is no edema.  The upper extremities do not have edema.    Diagnoses and all orders for this visit:    1. COPD with lower respiratory infection (Primary)          Acute Bronchitis Plan: She was referred to the ER for further evaluation. Transported via squad. ER MD informed.        Return to Office    The patient was instructed to return for follow-up at the next scheduled visit. The patient was instructed to return sooner if the condition changes, worsens, or does not resolve.

## 2024-08-26 NOTE — ED PROVIDER NOTES
"[facility]    EMERGENCY DEPARTMENT ENCOUNTER      Pt Name: Avelina Ceron  MRN: [id number]  YOB: 1940  Date of evaluation: 8/26/2024  Provider: Kendall Smyth MD    CHIEF COMPLAINT       Chief Complaint   Patient presents with    Shortness of Breath         HISTORY OF PRESENT ILLNESS   Avelina Ceron is a 83 y.o. female who presents to the emergency department with progressively worsening cough and shortness of breath over the course of the past week without any associated chest pain, fever, or chills.  She has history of COPD and wears 2 L of oxygen at baseline.  Went to PCPs office prior to transport to the ED and was found to be ill-appearing, in respiratory distress, and hypoxic on her baseline oxygen.  She was sent to the ED by EMS.  EMS provided DuoNeb during transport.  Patient was noted to be in atrial fibrillation but spontaneously converted.      Nursing notes were reviewed.    REVIEW OF SYSTEMS     ROS:  A chief complaint appropriate review of systems was completed and is negative except as noted in the HPI.      PAST MEDICAL HISTORY     Past Medical History:   Diagnosis Date    Anesthesia complication     Pt went into respiratory failure during total hip replacement in 2020    Arthritis of back     Arthritis of neck     Broken ribs     COPD (chronic obstructive pulmonary disease)     Coronary artery disease     Degenerative lumbar disc     Disease of thyroid gland     \"low thyroid\"    Fracture, fibula 1997?    Fracture, humerus 12/13/23    Fracture, tibia and fibula 1997?    Hip arthrosis     History of colonoscopy     Hyperlipidemia     Hypertension     Kidney stone July 2023    Knee swelling     Lumbosacral disc disease     On home O2     3L    Osteoarthritis     Periarthritis of shoulder     Urinary incontinence     Urinary tract infection          SURGICAL HISTORY       Past Surgical History:   Procedure Laterality Date    CAROTID STENT Bilateral     COLONOSCOPY      EYE SURGERY  " 10/17/2022    yag surgery    GALLBLADDER SURGERY  2012    HYSTERECTOMY      ILIAC ARTERY STENT Bilateral     JOINT REPLACEMENT  1995; 2020    KIDNEY STONE SURGERY  July August 2023    OOPHORECTOMY  1976    1 ovary removed    TOTAL HIP ARTHROPLASTY Bilateral     URETEROSCOPY LASER LITHOTRIPSY WITH STENT INSERTION Right 07/26/2023    Procedure: CYSTOSCOPY, RIGHT RETROGRADE PYELOGRAM; WITH STENT INSERTION;  Surgeon: Lee Kim MD;  Location:  [facility];  Service: Urology;  Laterality: Right;    URETEROSCOPY LASER LITHOTRIPSY WITH STENT INSERTION Right 08/10/2023    Procedure: URETEROSCOPY, LASER LITHOTRIPSY, STENT EXCHANGE;  Surgeon: Lee Kim MD;  Location:  [facility];  Service: Urology;  Laterality: Right;         CURRENT MEDICATIONS       Current Facility-Administered Medications:     cefTRIAXone (ROCEPHIN) 1,000 mg in sodium chloride 0.9 % 100 mL MBP, 1,000 mg, Intravenous, Once, Kendall Smyth MD    doxycycline (MONODOX) capsule 100 mg, 100 mg, Oral, Once, Kendall Smyth MD    predniSONE (DELTASONE) tablet 60 mg, 60 mg, Oral, Once, Kendall Smyth MD    sodium chloride 0.9 % flush 10 mL, 10 mL, Intravenous, PRN, Kendall Smyth MD    Current Outpatient Medications:     acetaminophen (TYLENOL) 500 MG tablet, Take 1 tablet by mouth Every 6 (Six) Hours., Disp: , Rfl:     albuterol sulfate  (90 Base) MCG/ACT inhaler, Inhale 2 puffs Every 4 (Four) Hours As Needed., Disp: , Rfl:     aspirin 81 MG chewable tablet, Chew 1 tablet Daily., Disp: , Rfl:     atenolol (TENORMIN) 50 MG tablet, Take 0.5 tablets by mouth 2 (Two) Times a Day., Disp: 90 tablet, Rfl: 1    cetirizine (zyrTEC) 10 MG tablet, Take 1 tablet by mouth Daily., Disp: , Rfl:     clopidogrel (PLAVIX) 75 MG tablet, Take 1 tablet by mouth Daily., Disp: 90 tablet, Rfl: 1    cyclobenzaprine (FLEXERIL) 5 MG tablet, Take 2 tablets by mouth 2 (Two) Times a Day As Needed for Muscle Spasms., Disp: 60 tablet, Rfl: 0    FIBER SELECT  GUMMIES PO, Take 2 tablets by mouth Daily., Disp: , Rfl:     Fluticasone-Umeclidin-Vilant (Trelegy Ellipta) 100-62.5-25 MCG/ACT inhaler, Inhale 1 puff Daily., Disp: 180 each, Rfl: 3    gabapentin (NEURONTIN) 300 MG capsule, Take 1 capsule by mouth Every Night., Disp: 90 capsule, Rfl: 0    isosorbide mononitrate (IMDUR) 30 MG 24 hr tablet, Take 1 tablet by mouth Daily., Disp: 90 tablet, Rfl: 1    latanoprost (XALATAN) 0.005 % ophthalmic solution, Administer 1 drop to both eyes Every Night., Disp: , Rfl:     levothyroxine (SYNTHROID, LEVOTHROID) 200 MCG tablet, TAKE 1 TABLET DAILY, Disp: 90 tablet, Rfl: 1    Multiple Vitamins-Minerals (PRESERVISION AREDS 2 PO), Take 1 tablet by mouth 2 (Two) Times a Day., Disp: , Rfl:     nitroglycerin (NITROSTAT) 0.4 MG SL tablet, PLACE 1 TABLET UNDER THE TONGUE AS NEEDED EVERY 5 MINUTES FOR CHEST PAIN. CALL 911 IF NO RELIEF AFTER 2ND DOSE, Disp: 25 tablet, Rfl: 0    nystatin (MYCOSTATIN) 438139 UNIT/GM cream, Apply 1 Application topically to the appropriate area as directed 4 (Four) Times a Day., Disp: 30 g, Rfl: 1    O2 (OXYGEN), Inhale 2 L/min Continuous., Disp: , Rfl:     ondansetron (Zofran) 4 MG tablet, Take 1 tablet by mouth Every 8 (Eight) Hours As Needed for Nausea or Vomiting., Disp: 30 tablet, Rfl: 1    pantoprazole (PROTONIX) 40 MG EC tablet, Take 1 tablet by mouth Daily., Disp: 90 tablet, Rfl: 1    Polyethylene Glycol 3350 (MIRALAX PO), Take 1 dose by mouth As Needed (constipation)., Disp: , Rfl:     roflumilast (DALIRESP) 500 MCG tablet tablet, Take 1 tablet by mouth Daily., Disp: 90 tablet, Rfl: 1    sertraline (ZOLOFT) 100 MG tablet, Take 1 tablet by mouth Daily., Disp: 90 tablet, Rfl: 1    simvastatin (ZOCOR) 40 MG tablet, Take 1 tablet by mouth Every Night., Disp: 90 tablet, Rfl: 1    traMADol (ULTRAM) 50 MG tablet, TAKE 1 TABLET BY MOUTH EVERY 6 HOURS AS NEEDED FOR MODERATE PAIN, Disp: 120 tablet, Rfl: 0    Vibegron 75 MG tablet, Take 1 tablet by mouth Daily.,  Disp: 30 tablet, Rfl: 6    ALLERGIES     Penicillins, Codeine, and Dopamine    FAMILY HISTORY       Family History   Problem Relation Age of Onset    Cancer Other         BREAST, COLON, OVARIAN    Depression Other     Hyperlipidemia Other     Hypertension Other     Heart disease Mother     Hypertension Mother     Arthritis Daughter     Cancer Daughter     Hyperlipidemia Daughter     Liver disease Daughter     Thyroid disease Daughter     Cancer Maternal Aunt     Diabetes Maternal Uncle     Alzheimer's disease Sister           SOCIAL HISTORY       Social History     Socioeconomic History    Marital status:    Tobacco Use    Smoking status: Former     Current packs/day: 0.00     Average packs/day: 1 pack/day for 50.0 years (50.0 ttl pk-yrs)     Types: Cigarettes     Start date: 1956     Quit date: 2006     Years since quittin.6     Passive exposure: Past    Smokeless tobacco: Never   Vaping Use    Vaping status: Never Used   Substance and Sexual Activity    Alcohol use: Not Currently     Comment: no etoh for past 10 years    Drug use: No    Sexual activity: Not Currently         PHYSICAL EXAM    (up to 7 for level 4, 8 or more for level 5)     Vitals:    24 1500 24 1525 24 1530 24 1600   BP: 137/59  146/83 153/79   BP Location:       Patient Position:       Pulse: 89 84 87 88   Resp:  22     Temp:       TempSrc:       SpO2: 93% 92% 95% 90%   Weight:       Height:           General: Awake, alert, no acute distress.  HEENT: Conjunctivae normal.  Neck: Trachea midline.  Cardiac: Heart regular rate, rhythm, no murmurs, rubs, or gallops  Lungs: Mild tachypnea.  Diffuse expiratory wheezes.  Chest wall: There is no tenderness to palpation over the chest wall or over ribs  Abdomen: Abdomen is soft, nontender, nondistended. There are no firm or pulsatile masses, no rebound rigidity or guarding.   Musculoskeletal: No deformity.  Neuro: Alert and oriented x 4.  Dermatology: Skin is  warm and dry  Psych: Mentation is grossly normal, cognition is grossly normal. Affect is appropriate.        DIAGNOSTIC RESULTS     EKG: All EKGs are interpreted by the Emergency Department Physician who either signs or Co-signs this chart in the absence of a cardiologist.    ECG 12 Lead ED Triage Standing Order; SOA   Preliminary Result   Test Reason : ED Triage Standing Order~   Blood Pressure :   */*   mmHG   Vent. Rate :  85 BPM     Atrial Rate :  85 BPM      P-R Int : 152 ms          QRS Dur :  86 ms       QT Int : 378 ms       P-R-T Axes :  63  40  42 degrees      QTc Int : 449 ms      Normal sinus rhythm   Normal ECG   When compared with ECG of 04-APR-2023 08:29,   No significant change was found      Referred By: ed           Confirmed By:             RADIOLOGY:   [x] Radiologist's Report Reviewed:  XR Chest 1 View   Final Result   Impression:   Patchy airspace disease in the left lower lobe with small left pleural effusion, suspicious for pneumonia. Recommend imaging follow-up to resolution.         Electronically Signed: Bandar Joseph MD     8/26/2024 2:57 PM EDT     Workstation ID: [id number]          I ordered and independently reviewed the above noted radiographic studies.        LABS:    I have reviewed and interpreted all of the currently available lab results from this visit (if applicable):  Results for orders placed or performed during the hospital encounter of 08/26/24   COVID-19 and FLU A/B PCR, 1 HR TAT - Swab, Nasopharynx    Specimen: Nasopharynx; Swab   Result Value Ref Range    COVID19 Not Detected Not Detected - Ref. Range    Influenza A PCR Not Detected Not Detected    Influenza B PCR Not Detected Not Detected   Comprehensive Metabolic Panel    Specimen: Blood   Result Value Ref Range    Glucose 136 (H) 65 - 99 mg/dL    BUN 12 8 - 23 mg/dL    Creatinine 0.50 (L) 0.57 - 1.00 mg/dL    Sodium 139 136 - 145 mmol/L    Potassium 3.5 3.5 - 5.2 mmol/L    Chloride 92 (L) 98 - 107 mmol/L    CO2 36.0  (H) 22.0 - 29.0 mmol/L    Calcium 9.4 8.6 - 10.5 mg/dL    Total Protein 7.0 6.0 - 8.5 g/dL    Albumin 3.2 (L) 3.5 - 5.2 g/dL    ALT (SGPT) 20 1 - 33 U/L    AST (SGOT) 27 1 - 32 U/L    Alkaline Phosphatase 106 39 - 117 U/L    Total Bilirubin 0.4 0.0 - 1.2 mg/dL    Globulin 3.8 gm/dL    A/G Ratio 0.8 g/dL    BUN/Creatinine Ratio 24.0 7.0 - 25.0    Anion Gap 11.0 5.0 - 15.0 mmol/L    eGFR 93.2 >60.0 mL/min/1.73   BNP    Specimen: Blood   Result Value Ref Range    proBNP 3,091.0 (H) 0.0 - 1,800.0 pg/mL   Single High Sensitivity Troponin T    Specimen: Blood   Result Value Ref Range    HS Troponin T 31 (H) <14 ng/L   CBC Auto Differential    Specimen: Blood   Result Value Ref Range    WBC 27.35 (H) 3.40 - 10.80 10*3/mm3    RBC 3.48 (L) 3.77 - 5.28 10*6/mm3    Hemoglobin 11.0 (L) 12.0 - 15.9 g/dL    Hematocrit 35.2 34.0 - 46.6 %    .1 (H) 79.0 - 97.0 fL    MCH 31.6 26.6 - 33.0 pg    MCHC 31.3 (L) 31.5 - 35.7 g/dL    RDW 12.3 12.3 - 15.4 %    RDW-SD 46.2 37.0 - 54.0 fl    MPV 9.0 6.0 - 12.0 fL    Platelets 365 140 - 450 10*3/mm3    Neutrophil % 87.5 (H) 42.7 - 76.0 %    Lymphocyte % 4.1 (L) 19.6 - 45.3 %    Monocyte % 7.0 5.0 - 12.0 %    Eosinophil % 0.0 (L) 0.3 - 6.2 %    Basophil % 0.3 0.0 - 1.5 %    Immature Grans % 1.1 (H) 0.0 - 0.5 %    Neutrophils, Absolute 23.93 (H) 1.70 - 7.00 10*3/mm3    Lymphocytes, Absolute 1.12 0.70 - 3.10 10*3/mm3    Monocytes, Absolute 1.92 (H) 0.10 - 0.90 10*3/mm3    Eosinophils, Absolute 0.00 0.00 - 0.40 10*3/mm3    Basophils, Absolute 0.08 0.00 - 0.20 10*3/mm3    Immature Grans, Absolute 0.30 (H) 0.00 - 0.05 10*3/mm3    nRBC 0.0 0.0 - 0.2 /100 WBC   ECG 12 Lead ED Triage Standing Order; SOA   Result Value Ref Range    QT Interval 378 ms    QTC Interval 449 ms   Green Top (Gel)   Result Value Ref Range    Extra Tube Hold for add-ons.    Lavender Top   Result Value Ref Range    Extra Tube hold for add-on    Gold Top - SST   Result Value Ref Range    Extra Tube Hold for add-ons.     Mccord Top   Result Value Ref Range    Extra Tube Hold for add-ons.    Light Blue Top   Result Value Ref Range    Extra Tube Hold for add-ons.         If labs were ordered, I independently reviewed the results and considered them in treating the patient.      EMERGENCY DEPARTMENT COURSE and DIFFERENTIAL DIAGNOSIS/MDM:   Vitals:  AS OF 16:22 EDT    BP - 153/79  HR - 88  TEMP - 98.3 °F (36.8 °C) (Oral)  O2 SATS - 90%        Discussion below represents my analysis of pertinent findings related to patient's condition, differential diagnosis, treatment plan and final disposition.      Differential diagnosis:  The differential diagnosis associated with the patient's presentation includes: Pneumonia, COVID, flu, pneumothorax, CHF, COPD      Independent interpretations (ECG/rhythm strip/X-ray/US/CT scan): I independently interpreted the patient's chest x-ray and cardiac monitor.  Patient is in sinus rhythm and there is evidence of left lower lobe infiltrate.      Additional sources:  Discussed/obtained information from independent historians:   [] Spouse:   [] Parent:   [] Friend:   [x] EMS: Report was taken from EMS and is as noted in the HPI   [] Other:  External (non-ED) record review:   [] Inpatient record:   [] Office record:   [] Outpatient record:   [] Prior Outpatient labs:   [] Prior Outpatient radiology:   [] Primary Care record:   [] Outside ED record:   [] Other:       Patient's care impacted by:   [] Diabetes   [] Hypertension   [x] Coronary Artery Disease   [] Cancer   [x] Other: COPD    Care significantly affected by Social Determinants of Health (housing and economic circumstances, unemployment)    [] Yes     [x] No   If yes, Patient's care significantly limited by  Social Determinants of Health including:    [] Inadequate housing    [] Low income    [] Alcoholism and drug addiction in family    [] Problems related to primary support group    [] Unemployment    [] Problems related to employment    [] Other  Social Determinants of Health:       Consideration of admission/observation vs discharge: Patient presents with acute sepsis secondary to pneumonia and requires admission for further management      ED Course:    ED Course as of 08/26/24 1622   Mon Aug 26, 2024   1617 Patient presents with sepsis, pneumonia. She has history of COPD and wears 2 L of oxygen at baseline. Increasing shortness of breath and cough over the course of the past week. Went to her PCPs office today and was found to be in respiratory distress and was sent to the hospital by ambulance. She is currently requiring 5 L to maintain oxygen saturation of 90%. She was in A-fib with RVR during transport but spontaneously converted and is now in sinus rhythm. She has a history of A-fib. She has a white count of 27 and new infiltrate on chest x-ray. She has had DuoNeb and have ordered Rocephin, doxycycline, prednisone. [NS]   1617 I discussed case with hospitalist Dr. Vu.  Discussed history, presentation, workup.  Accept patient for admission. [NS]      ED Course User Index  [NS] Kendall Smyth MD           CRITICAL CARE TIME    Approximately 35 minutes of discontinuous critical care time was provided to this patient by myself absent of any time spent performing procedures.  Patient presents critically ill with acute sepsis secondary to pneumonia with associated hypoxia complicated by acute exacerbation of COPD placed in the cardiovascular, respiratory, neurologic, renal systems at risk requiring the following interventions supplemental oxygen, IV antibiotics, interpretation of lab/ECG/imaging, frequent reassessment, coordination of admission with the following response: Resolution of hypoxia.  Patient at high risk of deterioration and possibly death without these interventions.      FINAL IMPRESSION      1. Acute sepsis    2. Pneumonia of left lower lobe due to infectious organism    3. Acute exacerbation of chronic obstructive pulmonary disease  (COPD)    4. History of coronary artery disease    5. Acute on chronic respiratory failure with hypoxia          DISPOSITION/PLAN     ED Disposition       ED Disposition   Decision to Admit    Condition   --    Comment   Level of Care: Telemetry [5]   Diagnosis: Acute hypoxemic respiratory failure [[id number]]   Admitting Physician: SERGIO ROCA [[id number]]   Attending Physician: SERGIO ROCA [[id number]]   Certification: I Certify That Inpatient Hospital Services Are Medically Necessary For Greater Than 2 Midnights                   Comment: Please note this report has been produced using speech recognition software.      Kendall Smyth MD  Attending Emergency Physician             Kendall Smyth MD  08/26/24 [id number]

## 2024-08-26 NOTE — H&P
"    [facility] Medicine Services  HISTORY AND PHYSICAL    Patient Name: Avelina Ceron  : 1940  MRN: [id number]  Primary Care Physician: Dre Moura MD  Date of admission: 2024      Subjective   Subjective     Chief Complaint: SOA, cough, wheezing    HPI:  Avelina Ceron is a 83 y.o. female  with history of COPD on 2 L at baseline, CAD, hypertension, hypothyroidism, depression, hyperlipidemia, chronic back pain who presented to PCPs office this morning with 1 week of progressively worsening cough, wheezing and fever, decreased p.o. intake and congestion.  At PCPs office, patient had O2 sat in the mid 80s on 4 L NC, usually on 2 L NC, as such she was transferred to [facility] ED for further evaluation.  On arrival here patient was found to have WBC 27K, lactate 1.5, proBNP 3000, troponin 31.  Chest x-ray revealed left lower lobe airspace disease.  Patient was started on broad-spectrum antibiotics and hospital medicine asked to admit.  At the time of my evaluation patient did endorse some cough productive of white/brown sputum, denies any abdominal pain, does endorse some nausea.      Personal History     Past Medical History:   Diagnosis Date    Anesthesia complication     Pt went into respiratory failure during total hip replacement in     Arthritis of back     Arthritis of neck     Broken ribs     COPD (chronic obstructive pulmonary disease)     Coronary artery disease     Degenerative lumbar disc     Disease of thyroid gland     \"low thyroid\"    Fracture, fibula ?    Fracture, humerus 23    Fracture, tibia and fibula ?    Hip arthrosis     History of colonoscopy     Hyperlipidemia     Hypertension     Kidney stone 2023    Knee swelling     Lumbosacral disc disease     On home O2     3L    Osteoarthritis     Periarthritis of shoulder     Urinary incontinence     Urinary tract infection            Past Surgical History:   Procedure Laterality Date    " CAROTID STENT Bilateral     COLONOSCOPY      EYE SURGERY  10/17/2022    yag surgery    GALLBLADDER SURGERY  2012    HYSTERECTOMY      ILIAC ARTERY STENT Bilateral     JOINT REPLACEMENT  1995; 2020    KIDNEY STONE SURGERY  July August 2023    OOPHORECTOMY  1976    1 ovary removed    TOTAL HIP ARTHROPLASTY Bilateral     URETEROSCOPY LASER LITHOTRIPSY WITH STENT INSERTION Right 07/26/2023    Procedure: CYSTOSCOPY, RIGHT RETROGRADE PYELOGRAM; WITH STENT INSERTION;  Surgeon: Lee Kim MD;  Location: [facility] OR;  Service: Urology;  Laterality: Right;    URETEROSCOPY LASER LITHOTRIPSY WITH STENT INSERTION Right 08/10/2023    Procedure: URETEROSCOPY, LASER LITHOTRIPSY, STENT EXCHANGE;  Surgeon: Lee Kim MD;  Location: [facility] OR;  Service: Urology;  Laterality: Right;       Family History: family history includes Alzheimer's disease in her sister; Arthritis in her daughter; Cancer in her daughter, maternal aunt, and another family member; Depression in an other family member; Diabetes in her maternal uncle; Heart disease in her mother; Hyperlipidemia in her daughter and another family member; Hypertension in her mother and another family member; Liver disease in her daughter; Thyroid disease in her daughter.     Social History:  reports that she quit smoking about 18 years ago. Her smoking use included cigarettes. She started smoking about 68 years ago. She has a 50 pack-year smoking history. She has been exposed to tobacco smoke. She has never used smokeless tobacco. She reports that she does not currently use alcohol. She reports that she does not use drugs.  Social History     Social History Narrative    Not on file       Medications:  Available home medication information reviewed.  Fiber, Fluticasone-Umeclidin-Vilant, Multiple Vitamins-Minerals, O2, Polyethylene Glycol 3350, Vibegron, acetaminophen, albuterol sulfate HFA, aspirin, atenolol, cetirizine, clopidogrel, cyclobenzaprine, gabapentin,  isosorbide mononitrate, latanoprost, levothyroxine, nitroglycerin, nystatin, ondansetron, pantoprazole, roflumilast, sertraline, simvastatin, and traMADol    Allergies   Allergen Reactions    Penicillins Hives, Swelling and Mental Status Change    Codeine Mental Status Change    Dopamine Palpitations and Other (See Comments)     Other reaction(s): Hypertension       Objective   Objective     Vital Signs:   Temp:  [96 °F (35.6 °C)-98.3 °F (36.8 °C)] 98.3 °F (36.8 °C)  Heart Rate:  [84-92] 88  Resp:  [20-24] 22  BP: (128-153)/(59-83) 153/79  Flow (L/min):  [4] 4       Physical Exam   Constitutional: Elderly female, ill-appearing awake, alert  Eyes: PERRLA, sclerae anicteric, no conjunctival injection  HENT: NCAT, mucous membranes moist  Neck: Supple, no thyromegaly, no lymphadenopathy, trachea midline  Respiratory: Nonlabored respirations, diffuse coarse breath sounds, mild expiratory wheezes on 4 L NC  Cardiovascular: RRR, no murmurs, rubs, or gallops, palpable pedal pulses bilaterally  Gastrointestinal: Positive bowel sounds, soft, nontender, nondistended  Musculoskeletal: No bilateral ankle edema, no clubbing or cyanosis to extremities  Psychiatric: Appropriate affect, cooperative  Neurologic: Oriented x 3, strength symmetric in all extremities, Cranial Nerves grossly intact to confrontation, speech clear  Skin: No rashes     Result Review:  I have personally reviewed the results from the time of this admission to 8/26/2024 17:01 EDT and agree with these findings:  [x]  Laboratory list / accordion  []  Microbiology  [x]  Radiology  []  EKG/Telemetry   []  Cardiology/Vascular   []  Pathology  []  Old records  []  Other:  Most notable findings include:     LAB RESULTS:      Lab 08/26/24  1513   WBC 27.35*   HEMOGLOBIN 11.0*   HEMATOCRIT 35.2   PLATELETS 365   NEUTROS ABS 23.93*   IMMATURE GRANS (ABS) 0.30*   LYMPHS ABS 1.12   MONOS ABS 1.92*   EOS ABS 0.00   .1*   PROCALCITONIN 0.14   LACTATE 1.5         Lab  08/26/24  1513   SODIUM 139   POTASSIUM 3.5   CHLORIDE 92*   CO2 36.0*   ANION GAP 11.0   BUN 12   CREATININE 0.50*   EGFR 93.2   GLUCOSE 136*   CALCIUM 9.4         Lab 08/26/24  1513   TOTAL PROTEIN 7.0   ALBUMIN 3.2*   GLOBULIN 3.8   ALT (SGPT) 20   AST (SGOT) 27   BILIRUBIN 0.4   ALK PHOS 106         Lab 08/26/24  1513   PROBNP 3,091.0*   HSTROP T 31*                 UA          10/9/2023    14:51 3/20/2024    11:10 7/25/2024    14:49   Urinalysis   Ketones, UA Negative  Negative  Negative    Leukocytes,  Yen/ul  Trace  500 Yen/ul        Microbiology Results (last 10 days)       Procedure Component Value - Date/Time    COVID PRE-OP / PRE-PROCEDURE SCREENING ORDER (NO ISOLATION) - Swab, Nasopharynx [[id number]]  (Normal) Collected: 08/26/24 1441    Lab Status: Final result Specimen: Swab from Nasopharynx Updated: 08/26/24 1519    Narrative:      The following orders were created for panel order COVID PRE-OP / PRE-PROCEDURE SCREENING ORDER (NO ISOLATION) - Swab, Nasopharynx.  Procedure                               Abnormality         Status                     ---------                               -----------         ------                     COVID-19 and FLU A/B PCR...[[id number]]  Normal              Final result                 Please view results for these tests on the individual orders.    COVID-19 and FLU A/B PCR, 1 HR TAT - Swab, Nasopharynx [[id number]]  (Normal) Collected: 08/26/24 1441    Lab Status: Final result Specimen: Swab from Nasopharynx Updated: 08/26/24 1519     COVID19 Not Detected     Influenza A PCR Not Detected     Influenza B PCR Not Detected    Narrative:      Fact sheet for providers: https://www.fda.gov/media/911160/download    Fact sheet for patients: https://www.fda.gov/media/414314/download    Test performed by PCR.            XR Chest 1 View    Result Date: 8/26/2024  XR CHEST 1 VW Date of Exam: 8/26/2024 2:41 PM EDT Indication: SOA triage protocol Comparison: Chest radiograph  3/14/2024. Findings: Cardiomediastinal silhouette is unchanged. Patchy airspace disease in the left lower lobe with small left pleural effusion. Background of chronic/senescent changes of the lungs. No pneumothorax. Osseous structures are unchanged. Hiatal hernia.     Impression: Impression: Patchy airspace disease in the left lower lobe with small left pleural effusion, suspicious for pneumonia. Recommend imaging follow-up to resolution. Electronically Signed: Bandar Joseph MD  8/26/2024 2:57 PM EDT  Workstation ID: [id number]     Results for orders placed in visit on 07/15/20    SCANNED - ECHOCARDIOGRAM      Assessment & Plan   Assessment & Plan       Acute hypoxemic respiratory failure    Depression    Hyperlipidemia    Hypertension    Hypothyroidism    83-year-old female with history of chronic respiratory failure on 2 L NC, COPD, CAD, hypertension, hypothyroidism, hyperlipidemia who presented to PCP with 1 week of  worsening cough, wheezing and fever, decreased p.o. intake and congestion.  Found to have with acute on chronic respiratory failure, with COPD exacerbation and pneumonia.    Acute on chronic respiratory failure with hypoxia  Left lower lobe pneumonia/bronchitis  COPD, likely exacerbation  Leukocytosis  -Patient with O2 sats of mid 80s at PCP, requiring 4 L NC (2 L NC at baseline), continue to wean as able  -Patient presenting with leukocytosis,  -Respiratory PCR with COVID is negative, follow-up blood cultures, urinary antigens  -Continue broad-spectrum antibiotics with Rocephin and doxycycline, steroids    Hypothyroidism  -Continue Synthroid    Depression  -Continue Zoloft    CAD  PAD  Hyperlipidemia  -Continue aspirin, Plavix, atenolol, statin    Chronic back pain  -Continue tramadol    Groin yeast infection  -Continue statin    Note: Patient reports that she has a living will, she was previously DNR/DNI, however, she currently wishes to be full code, at least for this hospitalization.    VTE  Prophylaxis:  Mechanical VTE prophylaxis orders are signed & held.      CODE STATUS:    Code Status and Medical Interventions: CPR (Attempt to Resuscitate); Full Support   Ordered at: 08/26/24 1701     Level Of Support Discussed With:    Patient     Code Status (Patient has no pulse and is not breathing):    CPR (Attempt to Resuscitate)     Medical Interventions (Patient has pulse or is breathing):    Full Support       Expected Discharge   Expected Discharge Date: 8/29/2024; Expected Discharge Time:      Yara Daly MD  08/26/24

## 2024-08-27 LAB
ARTERIAL PATENCY WRIST A: POSITIVE
ATMOSPHERIC PRESS: ABNORMAL MM[HG]
BASE EXCESS BLDA CALC-SCNC: 11.7 MMOL/L (ref 0–2)
BDY SITE: ABNORMAL
BODY TEMPERATURE: 37
CO2 BLDA-SCNC: 39.9 MMOL/L (ref 22–33)
COHGB MFR BLD: 1.4 % (ref 0–2)
EPAP: 0
GLUCOSE BLDC GLUCOMTR-MCNC: 131 MG/DL (ref 70–130)
HCO3 BLDA-SCNC: 38.1 MMOL/L (ref 20–26)
HCT VFR BLD CALC: 33.3 % (ref 38–51)
HGB BLDA-MCNC: 10.9 G/DL (ref 14–18)
INHALED O2 CONCENTRATION: 40 %
IPAP: 0
METHGB BLD QL: 0 % (ref 0–1.5)
MODALITY: ABNORMAL
OXYHGB MFR BLDV: 93.3 % (ref 94–99)
PAW @ PEAK INSP FLOW SETTING VENT: 0 CMH2O
PCO2 BLDA: 59 MM HG (ref 35–45)
PCO2 TEMP ADJ BLD: 59 MM HG (ref 35–45)
PH BLDA: 7.42 PH UNITS (ref 7.35–7.45)
PH, TEMP CORRECTED: 7.42 PH UNITS
PO2 BLDA: 70.8 MM HG (ref 83–108)
PO2 TEMP ADJ BLD: 70.8 MM HG (ref 83–108)
POTASSIUM SERPL-SCNC: 3.2 MMOL/L (ref 3.5–5.2)
TOTAL RATE: 0 BREATHS/MINUTE

## 2024-08-27 PROCEDURE — 84132 ASSAY OF SERUM POTASSIUM: CPT | Performed by: INTERNAL MEDICINE

## 2024-08-27 PROCEDURE — 83050 HGB METHEMOGLOBIN QUAN: CPT

## 2024-08-27 PROCEDURE — 63710000001 PREDNISONE PER 1 MG: Performed by: INTERNAL MEDICINE

## 2024-08-27 PROCEDURE — 25810000003 SODIUM CHLORIDE 0.9 % SOLUTION: Performed by: INTERNAL MEDICINE

## 2024-08-27 PROCEDURE — 93005 ELECTROCARDIOGRAM TRACING: CPT | Performed by: INTERNAL MEDICINE

## 2024-08-27 PROCEDURE — 94799 UNLISTED PULMONARY SVC/PX: CPT

## 2024-08-27 PROCEDURE — 36600 WITHDRAWAL OF ARTERIAL BLOOD: CPT

## 2024-08-27 PROCEDURE — 25010000002 CEFTRIAXONE PER 250 MG: Performed by: INTERNAL MEDICINE

## 2024-08-27 PROCEDURE — 94664 DEMO&/EVAL PT USE INHALER: CPT

## 2024-08-27 PROCEDURE — 99233 SBSQ HOSP IP/OBS HIGH 50: CPT | Performed by: INTERNAL MEDICINE

## 2024-08-27 PROCEDURE — 93010 ELECTROCARDIOGRAM REPORT: CPT | Performed by: INTERNAL MEDICINE

## 2024-08-27 PROCEDURE — 82948 REAGENT STRIP/BLOOD GLUCOSE: CPT

## 2024-08-27 PROCEDURE — 82805 BLOOD GASES W/O2 SATURATION: CPT

## 2024-08-27 PROCEDURE — 82375 ASSAY CARBOXYHB QUANT: CPT

## 2024-08-27 RX ORDER — POTASSIUM CHLORIDE 1.5 G/1.58G
40 POWDER, FOR SOLUTION ORAL EVERY 4 HOURS
Status: COMPLETED | OUTPATIENT
Start: 2024-08-27 | End: 2024-08-27

## 2024-08-27 RX ORDER — DILTIAZEM HCL/D5W 125 MG/125
5-15 PLASTIC BAG, INJECTION (ML) INTRAVENOUS
Status: CANCELLED | OUTPATIENT
Start: 2024-08-27

## 2024-08-27 RX ORDER — DILTIAZEM HCL/D5W 125 MG/125
5-15 PLASTIC BAG, INJECTION (ML) INTRAVENOUS CONTINUOUS
Status: DISCONTINUED | OUTPATIENT
Start: 2024-08-27 | End: 2024-09-01

## 2024-08-27 RX ADMIN — PREDNISONE 40 MG: 20 TABLET ORAL at 09:25

## 2024-08-27 RX ADMIN — Medication 10 ML: at 09:41

## 2024-08-27 RX ADMIN — GABAPENTIN 300 MG: 300 CAPSULE ORAL at 20:23

## 2024-08-27 RX ADMIN — Medication 10 ML: at 00:09

## 2024-08-27 RX ADMIN — POTASSIUM CHLORIDE 40 MEQ: 1.5 POWDER, FOR SOLUTION ORAL at 20:22

## 2024-08-27 RX ADMIN — NYSTATIN 1 APPLICATION: 100000 CREAM TOPICAL at 11:06

## 2024-08-27 RX ADMIN — NYSTATIN 1 APPLICATION: 100000 CREAM TOPICAL at 00:08

## 2024-08-27 RX ADMIN — GABAPENTIN 300 MG: 300 CAPSULE ORAL at 00:07

## 2024-08-27 RX ADMIN — Medication 5 MG/HR: at 22:40

## 2024-08-27 RX ADMIN — SODIUM CHLORIDE 75 ML/HR: 9 INJECTION, SOLUTION INTRAVENOUS at 00:17

## 2024-08-27 RX ADMIN — DOXYCYCLINE 100 MG: 100 CAPSULE ORAL at 09:24

## 2024-08-27 RX ADMIN — CLOPIDOGREL BISULFATE 75 MG: 75 TABLET ORAL at 09:25

## 2024-08-27 RX ADMIN — ISOSORBIDE MONONITRATE 30 MG: 30 TABLET, EXTENDED RELEASE ORAL at 09:24

## 2024-08-27 RX ADMIN — Medication 5 MG/HR: at 17:08

## 2024-08-27 RX ADMIN — TRAMADOL HYDROCHLORIDE 50 MG: 50 TABLET, COATED ORAL at 18:53

## 2024-08-27 RX ADMIN — ATORVASTATIN CALCIUM 20 MG: 20 TABLET, FILM COATED ORAL at 00:07

## 2024-08-27 RX ADMIN — LEVOTHYROXINE SODIUM 200 MCG: 0.1 TABLET ORAL at 06:37

## 2024-08-27 RX ADMIN — NYSTATIN 1 APPLICATION: 100000 CREAM TOPICAL at 20:25

## 2024-08-27 RX ADMIN — SERTRALINE 100 MG: 100 TABLET, FILM COATED ORAL at 09:25

## 2024-08-27 RX ADMIN — SODIUM CHLORIDE 1000 MG: 900 INJECTION INTRAVENOUS at 12:42

## 2024-08-27 RX ADMIN — POTASSIUM CHLORIDE 40 MEQ: 1500 TABLET, EXTENDED RELEASE ORAL at 06:37

## 2024-08-27 RX ADMIN — TRAMADOL HYDROCHLORIDE 50 MG: 50 TABLET, COATED ORAL at 12:47

## 2024-08-27 RX ADMIN — IPRATROPIUM BROMIDE AND ALBUTEROL SULFATE 3 ML: 2.5; .5 SOLUTION RESPIRATORY (INHALATION) at 20:02

## 2024-08-27 RX ADMIN — ATORVASTATIN CALCIUM 20 MG: 20 TABLET, FILM COATED ORAL at 20:23

## 2024-08-27 RX ADMIN — ASPIRIN 81 MG 81 MG: 81 TABLET ORAL at 09:25

## 2024-08-27 RX ADMIN — POTASSIUM CHLORIDE 40 MEQ: 1500 TABLET, EXTENDED RELEASE ORAL at 00:07

## 2024-08-27 RX ADMIN — IPRATROPIUM BROMIDE AND ALBUTEROL SULFATE 3 ML: 2.5; .5 SOLUTION RESPIRATORY (INHALATION) at 09:37

## 2024-08-27 RX ADMIN — POTASSIUM CHLORIDE 40 MEQ: 1.5 POWDER, FOR SOLUTION ORAL at 16:47

## 2024-08-27 RX ADMIN — DOXYCYCLINE 100 MG: 100 CAPSULE ORAL at 00:07

## 2024-08-27 RX ADMIN — SENNOSIDES AND DOCUSATE SODIUM 2 TABLET: 50; 8.6 TABLET ORAL at 14:36

## 2024-08-27 RX ADMIN — PANTOPRAZOLE SODIUM 40 MG: 40 TABLET, DELAYED RELEASE ORAL at 09:25

## 2024-08-27 RX ADMIN — ROFLUMILAST 500 MCG: 500 TABLET ORAL at 09:24

## 2024-08-27 RX ADMIN — IPRATROPIUM BROMIDE AND ALBUTEROL SULFATE 3 ML: 2.5; .5 SOLUTION RESPIRATORY (INHALATION) at 13:17

## 2024-08-27 RX ADMIN — DOXYCYCLINE 100 MG: 100 CAPSULE ORAL at 20:22

## 2024-08-27 NOTE — PLAN OF CARE
Goal Outcome Evaluation:      Goals of care discussed with patient. Daughter at bedside

## 2024-08-27 NOTE — PLAN OF CARE
Goal Outcome Evaluation:      Irregular sinus rhythm having PVCs and PACs. Pt extremely restless. Has not rested throughout shift. Pt has been very diaphoretic.

## 2024-08-27 NOTE — CASE MANAGEMENT/SOCIAL WORK
Discharge Planning Assessment  [facility]     Patient Name: Avelina Ceron  MRN: [id number]  Today's Date: 8/27/2024    Admit Date: 8/26/2024    Plan: Home   Discharge Needs Assessment       Row Name 08/27/24 1152       Living Environment    People in Home child(gabriela), adult    Current Living Arrangements home    Primary Care Provided by self    Provides Primary Care For no one    Family Caregiver if Needed child(gabriela), adult    Quality of Family Relationships unable to assess    Able to Return to Prior Arrangements yes       Resource/Environmental Concerns    Resource/Environmental Concerns none       Transition Planning    Patient/Family Anticipates Transition to home with family    Patient/Family Anticipated Services at Transition     Transportation Anticipated family or friend will provide       Discharge Needs Assessment    Readmission Within the Last 30 Days no previous admission in last 30 days    Equipment Currently Used at Home oxygen;rollator    Concerns to be Addressed discharge planning    Anticipated Changes Related to Illness none    Equipment Needed After Discharge none                   Discharge Plan       Row Name 08/27/24 1153       Plan    Plan Home    Patient/Family in Agreement with Plan yes    Plan Comments Spoke with patient in room to initiate discharge planning.  She lives with her daughter in [facility].  She is independent with ADL's, using a rollator to assist with ambulation.  She also has continuous home O2@2L through [facility].  She is not current with home health.  Her PCP is Dre Moura.  She has an advanced directive in EPIC.  Verified that she has Medicare/[employer].  Her plan is to return home with her daughter at discharge.  No needs noted at this time.  CM will contiue to follow.    Final Discharge Disposition Code 01 - home or self-care                  Continued Care and Services - Admitted Since 8/26/2024    No active coordination exists for this encounter.        Expected Discharge Date and Time       Expected Discharge Date Expected Discharge Time    Aug 29, 2024            Demographic Summary       Row Name 08/27/24 1151       General Information    Admission Type inpatient    Arrived From emergency department    Referral Source admission list    Reason for Consult discharge planning    Preferred Language English                   Functional Status       Row Name 08/27/24 1152       Functional Status    Usual Activity Tolerance fair    Current Activity Tolerance fair       Functional Status, IADL    Medications assistive person    Meal Preparation completely dependent    Housekeeping completely dependent    Laundry completely dependent    Shopping completely dependent                   Psychosocial    No documentation.                  Abuse/Neglect    No documentation.                  Legal    No documentation.                  Substance Abuse    No documentation.                  Patient Forms    No documentation.                     Julieta Simpson RN

## 2024-08-27 NOTE — PROGRESS NOTES
[facility] Medicine Services  PROGRESS NOTE    Patient Name: Avelina Ceron  : 1940  MRN: [id number]    Date of Admission: 2024  Primary Care Physician: Dre Moura MD    Subjective   Subjective     CC:  Acute hypoxic resp failure, PNA    HPI:  Doing ok. Very sleepy and falling asleep on exam. On 6L . Reports didn't sleep well. No family present.     ROS   As above       Objective   Objective     Vital Signs:   Temp:  [96 °F (35.6 °C)-98.5 °F (36.9 °C)] 97.8 °F (36.6 °C)  Heart Rate:  [46-99] 92  Resp:  [18-24] 18  BP: (103-183)/() 145/80  Flow (L/min):  [4] 4     Physical Exam:  GEN: sitting up in chair, elderly, appears ill and falling asleep mid-exam  HEENT:, atraumatic, normocephalic  NECK: supple, no masses  RESP: on 6L no wheezing, diminished effort   CV: on tele, sinus rhythm  PSYCH: normal affect, appropriate  NEURO: awake, alert, no focal deficits noted  MSK: no edema noted  SKIN: no rashes noted       Results Reviewed:  LAB RESULTS:      Lab 24  1513   WBC 27.35*   HEMOGLOBIN 11.0*   HEMATOCRIT 35.2   PLATELETS 365   NEUTROS ABS 23.93*   IMMATURE GRANS (ABS) 0.30*   LYMPHS ABS 1.12   MONOS ABS 1.92*   EOS ABS 0.00   .1*   PROCALCITONIN 0.14   LACTATE 1.5         Lab 24  0916 24  1513   SODIUM  --  139   POTASSIUM 3.2* 3.5   CHLORIDE  --  92*   CO2  --  36.0*   ANION GAP  --  11.0   BUN  --  12   CREATININE  --  0.50*   EGFR  --  93.2   GLUCOSE  --  136*   CALCIUM  --  9.4         Lab 24  1513   TOTAL PROTEIN 7.0   ALBUMIN 3.2*   GLOBULIN 3.8   ALT (SGPT) 20   AST (SGOT) 27   BILIRUBIN 0.4   ALK PHOS 106         Lab 24  1513   PROBNP 3,091.0*   HSTROP T 31*                 Lab 24  0006   PH, ARTERIAL 7.419   PCO2, ARTERIAL 59.0*   PO2 ART 70.8*   FIO2 40   HCO3 ART 38.1*   BASE EXCESS ART 11.7*   CARBOXYHEMOGLOBIN 1.4     Brief Urine Lab Results  (Last result in the past 365 days)        Color   Clarity   Blood    Leuk Est   Nitrite   Protein   CREAT   Urine HCG        07/25/24 1449 Yellow   Clear   Negative   500 Yen/ul   Negative   30 mg/dL                   Microbiology Results Abnormal       Procedure Component Value - Date/Time    COVID PRE-OP / PRE-PROCEDURE SCREENING ORDER (NO ISOLATION) - Swab, Nasopharynx [[id number]]  (Normal) Collected: 08/26/24 1441    Lab Status: Final result Specimen: Swab from Nasopharynx Updated: 08/26/24 1519    Narrative:      The following orders were created for panel order COVID PRE-OP / PRE-PROCEDURE SCREENING ORDER (NO ISOLATION) - Swab, Nasopharynx.  Procedure                               Abnormality         Status                     ---------                               -----------         ------                     COVID-19 and FLU A/B PCR...[[id number]]  Normal              Final result                 Please view results for these tests on the individual orders.    COVID-19 and FLU A/B PCR, 1 HR TAT - Swab, Nasopharynx [[id number]]  (Normal) Collected: 08/26/24 1441    Lab Status: Final result Specimen: Swab from Nasopharynx Updated: 08/26/24 1519     COVID19 Not Detected     Influenza A PCR Not Detected     Influenza B PCR Not Detected    Narrative:      Fact sheet for providers: https://www.fda.gov/media/219418/download    Fact sheet for patients: https://www.fda.gov/media/401242/download    Test performed by PCR.            XR Chest 1 View    Result Date: 8/26/2024  XR CHEST 1 VW Date of Exam: 8/26/2024 2:41 PM EDT Indication: SOA triage protocol Comparison: Chest radiograph 3/14/2024. Findings: Cardiomediastinal silhouette is unchanged. Patchy airspace disease in the left lower lobe with small left pleural effusion. Background of chronic/senescent changes of the lungs. No pneumothorax. Osseous structures are unchanged. Hiatal hernia.     Impression: Impression: Patchy airspace disease in the left lower lobe with small left pleural effusion, suspicious for pneumonia.  Recommend imaging follow-up to resolution. Electronically Signed: Bandar Joseph MD  8/26/2024 2:57 PM EDT  Workstation ID: [id number]     Results for orders placed in visit on 07/15/20    SCANNED - ECHOCARDIOGRAM      Current medications:  Scheduled Meds:aspirin, 81 mg, Oral, Daily  atorvastatin, 20 mg, Oral, Nightly  cefTRIAXone, 1,000 mg, Intravenous, Q24H  clopidogrel, 75 mg, Oral, Daily  doxycycline, 100 mg, Oral, Q12H  gabapentin, 300 mg, Oral, Nightly  ipratropium-albuterol, 3 mL, Nebulization, 4x Daily - RT  isosorbide mononitrate, 30 mg, Oral, Daily  levothyroxine, 200 mcg, Oral, Daily  nystatin, 1 Application, Topical, Q12H  pantoprazole, 40 mg, Oral, Daily  predniSONE, 40 mg, Oral, Daily With Breakfast  roflumilast, 500 mcg, Oral, Daily  sertraline, 100 mg, Oral, Daily  sodium chloride, 10 mL, Intravenous, Q12H      Continuous Infusions:Pharmacy Consult,   sodium chloride, 75 mL/hr, Last Rate: 75 mL/hr (08/27/24 0017)      PRN Meds:.  senna-docusate sodium **AND** polyethylene glycol **AND** bisacodyl **AND** bisacodyl    Calcium Replacement - Follow Nurse / BPA Driven Protocol    ipratropium-albuterol    Magnesium Standard Dose Replacement - Follow Nurse / BPA Driven Protocol    ondansetron    Pharmacy Consult    Phosphorus Replacement - Follow Nurse / BPA Driven Protocol    Potassium Replacement - Follow Nurse / BPA Driven Protocol    sodium chloride    sodium chloride    sodium chloride    traMADol    Assessment & Plan   Assessment & Plan     Active Hospital Problems    Diagnosis  POA    **Acute hypoxemic respiratory failure [J96.01]  Yes    Depression [F32.A]  Yes    Hyperlipidemia [E78.5]  Yes    Hypertension [I10]  Yes    Hypothyroidism [E03.9]  Yes      Resolved Hospital Problems   No resolved problems to display.        Brief Hospital Course to date:  Avelina Ceron is a 83-year-old female with history of chronic respiratory failure on 2 L NC, COPD, CAD, hypertension, hypothyroidism, hyperlipidemia  who presented to PCP with 1 week of  worsening cough, wheezing and fever, decreased p.o. intake and congestion.  Found to have with acute on chronic respiratory failure, with COPD exacerbation and pneumonia.    This patient's problems and plans were partially entered by my partner and updated as appropriate by me 08/27/24.       Acute on chronic respiratory failure with hypoxia  Left lower lobe pneumonia/bronchitis  COPD, likely exacerbation  Leukocytosis  -Patient with O2 sats of mid 80s at PCP, requiring 6 L NC (2 L NC at baseline), continue to wean as able  -Patient presenting with leukocytosis,  -Respiratory PCR with COVID is negative, follow-up blood cultures, urinary antigens  -Continue broad-spectrum antibiotics with Rocephin and doxycycline, steroids     Hypothyroidism  -Continue Synthroid     Depression  -Continue Zoloft     CAD  PAD  Hyperlipidemia  -Continue aspirin, Plavix, atenolol, statin     Chronic back pain  -Continue tramadol     Groin yeast infection  -Continue statin    Expected Discharge Location and Transportation: home   Expected Discharge   Expected Discharge Date: 8/29/2024; Expected Discharge Time:      VTE Prophylaxis:  Mechanical VTE prophylaxis orders are present.         AM-PAC 6 Clicks Score (PT): 20 (08/26/24 8720)    CODE STATUS:   Code Status and Medical Interventions: CPR (Attempt to Resuscitate); Full Support   Ordered at: 08/26/24 1701     Level Of Support Discussed With:    Patient     Code Status (Patient has no pulse and is not breathing):    CPR (Attempt to Resuscitate)     Medical Interventions (Patient has pulse or is breathing):    Full Support       Beena Gray MD  08/27/24

## 2024-08-28 ENCOUNTER — APPOINTMENT (OUTPATIENT)
Dept: CT IMAGING | Facility: HOSPITAL | Age: 84
End: 2024-08-28
Payer: MEDICARE

## 2024-08-28 ENCOUNTER — APPOINTMENT (OUTPATIENT)
Dept: CARDIOLOGY | Facility: HOSPITAL | Age: 84
End: 2024-08-28
Payer: MEDICARE

## 2024-08-28 LAB
ALBUMIN SERPL-MCNC: 3.3 G/DL (ref 3.5–5.2)
ALBUMIN/GLOB SERPL: 1 G/DL
ALP SERPL-CCNC: 109 U/L (ref 39–117)
ALT SERPL W P-5'-P-CCNC: 55 U/L (ref 1–33)
ANION GAP SERPL CALCULATED.3IONS-SCNC: 11 MMOL/L (ref 5–15)
ANION GAP SERPL CALCULATED.3IONS-SCNC: 6 MMOL/L (ref 5–15)
AST SERPL-CCNC: 75 U/L (ref 1–32)
BASOPHILS # BLD MANUAL: 0 10*3/MM3 (ref 0–0.2)
BASOPHILS NFR BLD MANUAL: 0 % (ref 0–1.5)
BH CV ECHO MEAS - AO MAX PG: 13.6 MMHG
BH CV ECHO MEAS - AO MEAN PG: 7.5 MMHG
BH CV ECHO MEAS - AO ROOT DIAM: 2.8 CM
BH CV ECHO MEAS - AO V2 MAX: 184.5 CM/SEC
BH CV ECHO MEAS - AO V2 VTI: 41.3 CM
BH CV ECHO MEAS - AVA(I,D): 1.66 CM2
BH CV ECHO MEAS - EDV(CUBED): 117.6 ML
BH CV ECHO MEAS - EDV(MOD-SP2): 85.1 ML
BH CV ECHO MEAS - EDV(MOD-SP4): 77.5 ML
BH CV ECHO MEAS - EF(MOD-BP): 64 %
BH CV ECHO MEAS - EF(MOD-SP2): 68 %
BH CV ECHO MEAS - EF(MOD-SP4): 61.7 %
BH CV ECHO MEAS - ESV(CUBED): 24.4 ML
BH CV ECHO MEAS - ESV(MOD-SP2): 27.2 ML
BH CV ECHO MEAS - ESV(MOD-SP4): 29.7 ML
BH CV ECHO MEAS - FS: 40.8 %
BH CV ECHO MEAS - IVS/LVPW: 1 CM
BH CV ECHO MEAS - IVSD: 1.1 CM
BH CV ECHO MEAS - LV MASS(C)D: 200.5 GRAMS
BH CV ECHO MEAS - LV MAX PG: 3.8 MMHG
BH CV ECHO MEAS - LV MEAN PG: 2 MMHG
BH CV ECHO MEAS - LV V1 MAX: 97.4 CM/SEC
BH CV ECHO MEAS - LV V1 VTI: 21.8 CM
BH CV ECHO MEAS - LVIDD: 4.9 CM
BH CV ECHO MEAS - LVIDS: 2.9 CM
BH CV ECHO MEAS - LVOT AREA: 3.1 CM2
BH CV ECHO MEAS - LVOT DIAM: 2 CM
BH CV ECHO MEAS - LVPWD: 1.1 CM
BH CV ECHO MEAS - RAP SYSTOLE: 3 MMHG
BH CV ECHO MEAS - RVSP: 54 MMHG
BH CV ECHO MEAS - SV(LVOT): 68.5 ML
BH CV ECHO MEAS - SV(MOD-SP2): 57.9 ML
BH CV ECHO MEAS - SV(MOD-SP4): 47.8 ML
BH CV ECHO MEAS - TR MAX PG: 51.2 MMHG
BH CV ECHO MEAS - TR MAX VEL: 357.5 CM/SEC
BH CV VAS BP LEFT ARM: NORMAL MMHG
BILIRUB SERPL-MCNC: 0.4 MG/DL (ref 0–1.2)
BUN SERPL-MCNC: 7 MG/DL (ref 8–23)
BUN SERPL-MCNC: 8 MG/DL (ref 8–23)
BUN/CREAT SERPL: 21.2 (ref 7–25)
BUN/CREAT SERPL: 21.6 (ref 7–25)
CALCIUM SPEC-SCNC: 9.3 MG/DL (ref 8.6–10.5)
CALCIUM SPEC-SCNC: 9.7 MG/DL (ref 8.6–10.5)
CHLORIDE SERPL-SCNC: 92 MMOL/L (ref 98–107)
CHLORIDE SERPL-SCNC: 96 MMOL/L (ref 98–107)
CO2 SERPL-SCNC: 37 MMOL/L (ref 22–29)
CO2 SERPL-SCNC: 41 MMOL/L (ref 22–29)
CREAT SERPL-MCNC: 0.33 MG/DL (ref 0.57–1)
CREAT SERPL-MCNC: 0.37 MG/DL (ref 0.57–1)
DEPRECATED RDW RBC AUTO: 47.4 FL (ref 37–54)
EGFRCR SERPLBLD CKD-EPI 2021: 100.2 ML/MIN/1.73
EGFRCR SERPLBLD CKD-EPI 2021: 103 ML/MIN/1.73
EOSINOPHIL # BLD MANUAL: 0 10*3/MM3 (ref 0–0.4)
EOSINOPHIL NFR BLD MANUAL: 0 % (ref 0.3–6.2)
ERYTHROCYTE [DISTWIDTH] IN BLOOD BY AUTOMATED COUNT: 12.7 % (ref 12.3–15.4)
GEN 5 2HR TROPONIN T REFLEX: 37 NG/L
GLOBULIN UR ELPH-MCNC: 3.4 GM/DL
GLUCOSE SERPL-MCNC: 113 MG/DL (ref 65–99)
GLUCOSE SERPL-MCNC: 154 MG/DL (ref 65–99)
HCT VFR BLD AUTO: 35.2 % (ref 34–46.6)
HGB BLD-MCNC: 11 G/DL (ref 12–15.9)
LYMPHOCYTES # BLD MANUAL: 2.82 10*3/MM3 (ref 0.7–3.1)
LYMPHOCYTES NFR BLD MANUAL: 9 % (ref 5–12)
MAGNESIUM SERPL-MCNC: 1.7 MG/DL (ref 1.6–2.4)
MCH RBC QN AUTO: 31.6 PG (ref 26.6–33)
MCHC RBC AUTO-ENTMCNC: 31.3 G/DL (ref 31.5–35.7)
MCV RBC AUTO: 101.1 FL (ref 79–97)
METAMYELOCYTES NFR BLD MANUAL: 1 % (ref 0–0)
MONOCYTES # BLD: 2.82 10*3/MM3 (ref 0.1–0.9)
MYELOCYTES NFR BLD MANUAL: 3 % (ref 0–0)
NEUTROPHILS # BLD AUTO: 24.45 10*3/MM3 (ref 1.7–7)
NEUTROPHILS NFR BLD MANUAL: 72 % (ref 42.7–76)
NEUTS BAND NFR BLD MANUAL: 6 % (ref 0–5)
NRBC SPEC MANUAL: 0 /100 WBC (ref 0–0.2)
PLAT MORPH BLD: NORMAL
PLATELET # BLD AUTO: 472 10*3/MM3 (ref 140–450)
PMV BLD AUTO: 8.9 FL (ref 6–12)
POTASSIUM SERPL-SCNC: 3.4 MMOL/L (ref 3.5–5.2)
POTASSIUM SERPL-SCNC: 3.5 MMOL/L (ref 3.5–5.2)
POTASSIUM SERPL-SCNC: 3.9 MMOL/L (ref 3.5–5.2)
POTASSIUM SERPL-SCNC: 4.2 MMOL/L (ref 3.5–5.2)
PROT SERPL-MCNC: 6.7 G/DL (ref 6–8.5)
QT INTERVAL: 294 MS
QTC INTERVAL: 464 MS
RBC # BLD AUTO: 3.48 10*6/MM3 (ref 3.77–5.28)
RBC MORPH BLD: NORMAL
SODIUM SERPL-SCNC: 140 MMOL/L (ref 136–145)
SODIUM SERPL-SCNC: 143 MMOL/L (ref 136–145)
TROPONIN T DELTA: -8 NG/L
TROPONIN T SERPL HS-MCNC: 45 NG/L
TSH SERPL DL<=0.05 MIU/L-ACNC: 0.38 UIU/ML (ref 0.27–4.2)
VARIANT LYMPHS NFR BLD MANUAL: 9 % (ref 19.6–45.3)
WBC MORPH BLD: NORMAL
WBC NRBC COR # BLD AUTO: 31.34 10*3/MM3 (ref 3.4–10.8)

## 2024-08-28 PROCEDURE — 25510000001 IOPAMIDOL PER 1 ML: Performed by: INTERNAL MEDICINE

## 2024-08-28 PROCEDURE — 71275 CT ANGIOGRAPHY CHEST: CPT

## 2024-08-28 PROCEDURE — 93005 ELECTROCARDIOGRAM TRACING: CPT | Performed by: INTERNAL MEDICINE

## 2024-08-28 PROCEDURE — 63710000001 PREDNISONE PER 1 MG: Performed by: INTERNAL MEDICINE

## 2024-08-28 PROCEDURE — 94664 DEMO&/EVAL PT USE INHALER: CPT

## 2024-08-28 PROCEDURE — 84132 ASSAY OF SERUM POTASSIUM: CPT | Performed by: INTERNAL MEDICINE

## 2024-08-28 PROCEDURE — 25010000002 MAGNESIUM SULFATE 4 GM/100ML SOLUTION: Performed by: INTERNAL MEDICINE

## 2024-08-28 PROCEDURE — 93010 ELECTROCARDIOGRAM REPORT: CPT | Performed by: INTERNAL MEDICINE

## 2024-08-28 PROCEDURE — 93325 DOPPLER ECHO COLOR FLOW MAPG: CPT | Performed by: INTERNAL MEDICINE

## 2024-08-28 PROCEDURE — 85007 BL SMEAR W/DIFF WBC COUNT: CPT | Performed by: INTERNAL MEDICINE

## 2024-08-28 PROCEDURE — 93321 DOPPLER ECHO F-UP/LMTD STD: CPT

## 2024-08-28 PROCEDURE — 94761 N-INVAS EAR/PLS OXIMETRY MLT: CPT

## 2024-08-28 PROCEDURE — 99233 SBSQ HOSP IP/OBS HIGH 50: CPT | Performed by: INTERNAL MEDICINE

## 2024-08-28 PROCEDURE — 84443 ASSAY THYROID STIM HORMONE: CPT | Performed by: INTERNAL MEDICINE

## 2024-08-28 PROCEDURE — 99222 1ST HOSP IP/OBS MODERATE 55: CPT | Performed by: INTERNAL MEDICINE

## 2024-08-28 PROCEDURE — 83735 ASSAY OF MAGNESIUM: CPT | Performed by: INTERNAL MEDICINE

## 2024-08-28 PROCEDURE — 85025 COMPLETE CBC W/AUTO DIFF WBC: CPT | Performed by: INTERNAL MEDICINE

## 2024-08-28 PROCEDURE — 94799 UNLISTED PULMONARY SVC/PX: CPT

## 2024-08-28 PROCEDURE — 84484 ASSAY OF TROPONIN QUANT: CPT | Performed by: INTERNAL MEDICINE

## 2024-08-28 PROCEDURE — 93325 DOPPLER ECHO COLOR FLOW MAPG: CPT

## 2024-08-28 PROCEDURE — 80053 COMPREHEN METABOLIC PANEL: CPT | Performed by: INTERNAL MEDICINE

## 2024-08-28 PROCEDURE — 25010000002 CEFTRIAXONE PER 250 MG: Performed by: INTERNAL MEDICINE

## 2024-08-28 PROCEDURE — 93010 ELECTROCARDIOGRAM REPORT: CPT | Performed by: STUDENT IN AN ORGANIZED HEALTH CARE EDUCATION/TRAINING PROGRAM

## 2024-08-28 PROCEDURE — 93308 TTE F-UP OR LMTD: CPT | Performed by: INTERNAL MEDICINE

## 2024-08-28 PROCEDURE — 93321 DOPPLER ECHO F-UP/LMTD STD: CPT | Performed by: INTERNAL MEDICINE

## 2024-08-28 PROCEDURE — 93308 TTE F-UP OR LMTD: CPT

## 2024-08-28 RX ORDER — DOFETILIDE 0.5 MG/1
500 CAPSULE ORAL ONCE
Status: DISCONTINUED | OUTPATIENT
Start: 2024-08-29 | End: 2024-08-29

## 2024-08-28 RX ORDER — NITROGLYCERIN 0.4 MG/1
0.4 TABLET SUBLINGUAL
Status: DISCONTINUED | OUTPATIENT
Start: 2024-08-28 | End: 2024-09-04 | Stop reason: HOSPADM

## 2024-08-28 RX ORDER — DOFETILIDE 0.5 MG/1
500 CAPSULE ORAL EVERY 12 HOURS
Status: DISCONTINUED | OUTPATIENT
Start: 2024-08-28 | End: 2024-08-28

## 2024-08-28 RX ORDER — DOFETILIDE 0.5 MG/1
500 CAPSULE ORAL EVERY 12 HOURS
Status: DISCONTINUED | OUTPATIENT
Start: 2024-08-30 | End: 2024-08-29

## 2024-08-28 RX ORDER — DOFETILIDE 0.5 MG/1
500 CAPSULE ORAL ONCE
Status: COMPLETED | OUTPATIENT
Start: 2024-08-28 | End: 2024-08-28

## 2024-08-28 RX ORDER — IOPAMIDOL 755 MG/ML
80 INJECTION, SOLUTION INTRAVASCULAR
Status: COMPLETED | OUTPATIENT
Start: 2024-08-28 | End: 2024-08-28

## 2024-08-28 RX ORDER — MAGNESIUM SULFATE HEPTAHYDRATE 40 MG/ML
4 INJECTION, SOLUTION INTRAVENOUS ONCE
Status: COMPLETED | OUTPATIENT
Start: 2024-08-28 | End: 2024-08-28

## 2024-08-28 RX ORDER — TRAMADOL HYDROCHLORIDE 50 MG/1
50 TABLET ORAL EVERY 6 HOURS
Status: DISCONTINUED | OUTPATIENT
Start: 2024-08-28 | End: 2024-09-04 | Stop reason: HOSPADM

## 2024-08-28 RX ORDER — POTASSIUM CHLORIDE 1500 MG/1
40 TABLET, EXTENDED RELEASE ORAL EVERY 4 HOURS
Status: COMPLETED | OUTPATIENT
Start: 2024-08-28 | End: 2024-08-28

## 2024-08-28 RX ORDER — METOPROLOL TARTRATE 1 MG/ML
5 INJECTION, SOLUTION INTRAVENOUS ONCE
Status: COMPLETED | OUTPATIENT
Start: 2024-08-28 | End: 2024-08-28

## 2024-08-28 RX ADMIN — Medication 5 MG/HR: at 17:20

## 2024-08-28 RX ADMIN — TRAMADOL HYDROCHLORIDE 50 MG: 50 TABLET, COATED ORAL at 14:15

## 2024-08-28 RX ADMIN — PREDNISONE 40 MG: 20 TABLET ORAL at 10:47

## 2024-08-28 RX ADMIN — ATORVASTATIN CALCIUM 20 MG: 20 TABLET, FILM COATED ORAL at 21:38

## 2024-08-28 RX ADMIN — DOXYCYCLINE 100 MG: 100 CAPSULE ORAL at 10:48

## 2024-08-28 RX ADMIN — PANTOPRAZOLE SODIUM 40 MG: 40 TABLET, DELAYED RELEASE ORAL at 10:49

## 2024-08-28 RX ADMIN — DOXYCYCLINE 100 MG: 100 CAPSULE ORAL at 21:38

## 2024-08-28 RX ADMIN — IPRATROPIUM BROMIDE AND ALBUTEROL SULFATE 3 ML: 2.5; .5 SOLUTION RESPIRATORY (INHALATION) at 09:02

## 2024-08-28 RX ADMIN — TRAMADOL HYDROCHLORIDE 50 MG: 50 TABLET, COATED ORAL at 01:25

## 2024-08-28 RX ADMIN — IOPAMIDOL 80 ML: 755 INJECTION, SOLUTION INTRAVENOUS at 16:47

## 2024-08-28 RX ADMIN — NYSTATIN 1 APPLICATION: 100000 CREAM TOPICAL at 21:00

## 2024-08-28 RX ADMIN — ISOSORBIDE MONONITRATE 30 MG: 30 TABLET, EXTENDED RELEASE ORAL at 10:49

## 2024-08-28 RX ADMIN — DOFETILIDE 500 MCG: 0.5 CAPSULE ORAL at 18:21

## 2024-08-28 RX ADMIN — CLOPIDOGREL BISULFATE 75 MG: 75 TABLET ORAL at 10:47

## 2024-08-28 RX ADMIN — IPRATROPIUM BROMIDE AND ALBUTEROL SULFATE 3 ML: 2.5; .5 SOLUTION RESPIRATORY (INHALATION) at 12:59

## 2024-08-28 RX ADMIN — Medication 10 ML: at 10:51

## 2024-08-28 RX ADMIN — SERTRALINE 100 MG: 100 TABLET, FILM COATED ORAL at 10:49

## 2024-08-28 RX ADMIN — ROFLUMILAST 500 MCG: 500 TABLET ORAL at 10:48

## 2024-08-28 RX ADMIN — MAGNESIUM SULFATE HEPTAHYDRATE 4 G: 4 INJECTION, SOLUTION INTRAVENOUS at 18:07

## 2024-08-28 RX ADMIN — ASPIRIN 81 MG 81 MG: 81 TABLET ORAL at 10:49

## 2024-08-28 RX ADMIN — GABAPENTIN 300 MG: 300 CAPSULE ORAL at 21:38

## 2024-08-28 RX ADMIN — SODIUM CHLORIDE 1000 MG: 900 INJECTION INTRAVENOUS at 14:43

## 2024-08-28 RX ADMIN — METOPROLOL TARTRATE 5 MG: 5 INJECTION INTRAVENOUS at 04:38

## 2024-08-28 RX ADMIN — IPRATROPIUM BROMIDE AND ALBUTEROL SULFATE 3 ML: 2.5; .5 SOLUTION RESPIRATORY (INHALATION) at 15:46

## 2024-08-28 RX ADMIN — POTASSIUM CHLORIDE 40 MEQ: 1500 TABLET, EXTENDED RELEASE ORAL at 14:28

## 2024-08-28 RX ADMIN — Medication 15 MG/HR: at 04:38

## 2024-08-28 RX ADMIN — TRAMADOL HYDROCHLORIDE 50 MG: 50 TABLET ORAL at 21:38

## 2024-08-28 RX ADMIN — NITROGLYCERIN 1 INCH: 20 OINTMENT TOPICAL at 14:15

## 2024-08-28 RX ADMIN — POTASSIUM CHLORIDE 40 MEQ: 1500 TABLET, EXTENDED RELEASE ORAL at 18:08

## 2024-08-28 RX ADMIN — LEVOTHYROXINE SODIUM 200 MCG: 0.1 TABLET ORAL at 05:08

## 2024-08-28 RX ADMIN — Medication 5 MG/HR: at 01:04

## 2024-08-28 RX ADMIN — NYSTATIN 1 APPLICATION: 100000 CREAM TOPICAL at 13:38

## 2024-08-28 RX ADMIN — NITROGLYCERIN 1 INCH: 20 OINTMENT TOPICAL at 18:07

## 2024-08-28 RX ADMIN — TRAMADOL HYDROCHLORIDE 50 MG: 50 TABLET, COATED ORAL at 07:42

## 2024-08-28 NOTE — PROGRESS NOTES
"    [facility] Medicine Services  PROGRESS NOTE    Patient Name: Avelina Ceron  : 1940  MRN: [id number]    Date of Admission: 2024  Primary Care Physician: Dre Moura MD    Subjective   Subjective     CC:  Acute hypoxic resp failure, PNA    HPI:  Patient flipped into afib with RVR  afternoon. Patient denies history of this in the past. Placed on cardizem gtt. Patient remains on 6LNC, feels very sob and having a hard time \"getting breath\". No family present during visit.     ROS   As above       Objective   Objective     Vital Signs:   Temp:  [97.6 °F (36.4 °C)-98.5 °F (36.9 °C)] 97.6 °F (36.4 °C)  Heart Rate:  [] 87  Resp:  [18-20] 18  BP: (124-182)/(55-98) 146/91  Flow (L/min):  [4-6] 5     Physical Exam:  GEN:resting in dark room, looks acutely ill, on 6L   HEENT:, atraumatic, normocephalic  NECK: supple, no masses  RESP: on 6L dyspneic, diminished effort but no wheezing  CV: on tele, afib, rate around 100   PSYCH: normal affect, appropriate  NEURO: awake, alert, no focal deficits noted  MSK: no edema noted  SKIN: no rashes noted       Results Reviewed:  LAB RESULTS:      Lab 24  0728 24  1513   WBC 31.34* 27.35*   HEMOGLOBIN 11.0* 11.0*   HEMATOCRIT 35.2 35.2   PLATELETS 472* 365   NEUTROS ABS  --  23.93*   IMMATURE GRANS (ABS)  --  0.30*   LYMPHS ABS  --  1.12   MONOS ABS  --  1.92*   EOS ABS  --  0.00   .1* 101.1*   PROCALCITONIN  --  0.14   LACTATE  --  1.5         Lab 24  0803 24  2356 24  0916 24  1513   SODIUM 143  --   --  139   POTASSIUM 3.5 3.9 3.2* 3.5   CHLORIDE 96*  --   --  92*   CO2 41.0*  --   --  36.0*   ANION GAP 6.0  --   --  11.0   BUN 8  --   --  12   CREATININE 0.37*  --   --  0.50*   EGFR 100.2  --   --  93.2   GLUCOSE 113*  --   --  136*   CALCIUM 9.7  --   --  9.4         Lab 24  0803 24  1513   TOTAL PROTEIN 6.7 7.0   ALBUMIN 3.3* 3.2*   GLOBULIN 3.4 3.8   ALT (SGPT) 55* 20 "   AST (SGOT) 75* 27   BILIRUBIN 0.4 0.4   ALK PHOS 109 106         Lab 08/26/24  1513   PROBNP 3,091.0*   HSTROP T 31*                 Lab 08/27/24  0006   PH, ARTERIAL 7.419   PCO2, ARTERIAL 59.0*   PO2 ART 70.8*   FIO2 40   HCO3 ART 38.1*   BASE EXCESS ART 11.7*   CARBOXYHEMOGLOBIN 1.4     Brief Urine Lab Results  (Last result in the past 365 days)        Color   Clarity   Blood   Leuk Est   Nitrite   Protein   CREAT   Urine HCG        07/25/24 1449 Yellow   Clear   Negative   500 Yen/ul   Negative   30 mg/dL                   Microbiology Results Abnormal       Procedure Component Value - Date/Time    Blood Culture - Blood, Hand, Left [[id number]]  (Normal) Collected: 08/26/24 1530    Lab Status: Preliminary result Specimen: Blood from Hand, Left Updated: 08/27/24 1700     Blood Culture No growth at 24 hours    Blood Culture - Blood, Arm, Right [[id number]]  (Normal) Collected: 08/26/24 1630    Lab Status: Preliminary result Specimen: Blood from Arm, Right Updated: 08/27/24 1700     Blood Culture No growth at 24 hours    COVID PRE-OP / PRE-PROCEDURE SCREENING ORDER (NO ISOLATION) - Swab, Nasopharynx [[id number]]  (Normal) Collected: 08/26/24 1441    Lab Status: Final result Specimen: Swab from Nasopharynx Updated: 08/26/24 1519    Narrative:      The following orders were created for panel order COVID PRE-OP / PRE-PROCEDURE SCREENING ORDER (NO ISOLATION) - Swab, Nasopharynx.  Procedure                               Abnormality         Status                     ---------                               -----------         ------                     COVID-19 and FLU A/B PCR...[[id number]]  Normal              Final result                 Please view results for these tests on the individual orders.    COVID-19 and FLU A/B PCR, 1 HR TAT - Swab, Nasopharynx [[id number]]  (Normal) Collected: 08/26/24 1441    Lab Status: Final result Specimen: Swab from Nasopharynx Updated: 08/26/24 1519     COVID19 Not Detected      Influenza A PCR Not Detected     Influenza B PCR Not Detected    Narrative:      Fact sheet for providers: https://www.fda.gov/media/006702/download    Fact sheet for patients: https://www.fda.gov/media/445713/download    Test performed by PCR.            XR Chest 1 View    Result Date: 8/26/2024  XR CHEST 1 VW Date of Exam: 8/26/2024 2:41 PM EDT Indication: SOA triage protocol Comparison: Chest radiograph 3/14/2024. Findings: Cardiomediastinal silhouette is unchanged. Patchy airspace disease in the left lower lobe with small left pleural effusion. Background of chronic/senescent changes of the lungs. No pneumothorax. Osseous structures are unchanged. Hiatal hernia.     Impression: Impression: Patchy airspace disease in the left lower lobe with small left pleural effusion, suspicious for pneumonia. Recommend imaging follow-up to resolution. Electronically Signed: Bandar Joseph MD  8/26/2024 2:57 PM EDT  Workstation ID: [id number]     Results for orders placed in visit on 07/15/20    SCANNED - ECHOCARDIOGRAM      Current medications:  Scheduled Meds:aspirin, 81 mg, Oral, Daily  atorvastatin, 20 mg, Oral, Nightly  cefTRIAXone, 1,000 mg, Intravenous, Q24H  clopidogrel, 75 mg, Oral, Daily  doxycycline, 100 mg, Oral, Q12H  gabapentin, 300 mg, Oral, Nightly  ipratropium-albuterol, 3 mL, Nebulization, 4x Daily - RT  isosorbide mononitrate, 30 mg, Oral, Daily  levothyroxine, 200 mcg, Oral, Daily  nystatin, 1 Application, Topical, Q12H  pantoprazole, 40 mg, Oral, Daily  predniSONE, 40 mg, Oral, Daily With Breakfast  roflumilast, 500 mcg, Oral, Daily  sertraline, 100 mg, Oral, Daily  sodium chloride, 10 mL, Intravenous, Q12H      Continuous Infusions:dilTIAZem, 5-15 mg/hr, Last Rate: 7.5 mg/hr (08/28/24 0650)  Pharmacy Consult,       PRN Meds:.  senna-docusate sodium **AND** polyethylene glycol **AND** bisacodyl **AND** bisacodyl    Calcium Replacement - Follow Nurse / BPA Driven Protocol    ipratropium-albuterol    Magnesium  Standard Dose Replacement - Follow Nurse / BPA Driven Protocol    ondansetron    Pharmacy Consult    Phosphorus Replacement - Follow Nurse / BPA Driven Protocol    Potassium Replacement - Follow Nurse / BPA Driven Protocol    sodium chloride    sodium chloride    sodium chloride    traMADol    Assessment & Plan   Assessment & Plan     Active Hospital Problems    Diagnosis  POA    **Acute hypoxemic respiratory failure [J96.01]  Yes    Depression [F32.A]  Yes    Hyperlipidemia [E78.5]  Yes    Hypertension [I10]  Yes    Hypothyroidism [E03.9]  Yes      Resolved Hospital Problems   No resolved problems to display.        Brief Hospital Course to date:  Avelina Ceron is a 83-year-old female with history of chronic respiratory failure on 2 L NC, COPD, CAD, hypertension, hypothyroidism, hyperlipidemia who presented to PCP with 1 week of  worsening cough, wheezing and fever, decreased p.o. intake and congestion.  Found to have with acute on chronic respiratory failure, with COPD exacerbation and pneumonia.    This patient's problems and plans were partially entered by my partner and updated as appropriate by me 08/28/24.       Acute on chronic respiratory failure with hypoxia  Left lower lobe pneumonia/bronchitis  COPD, likely exacerbation  Leukocytosis  -Patient with O2 sats of mid 80s at PCP, requiring 6 L NC (2 L NC at baseline), continue to wean as able  -Patient presenting with leukocytosis, worsening noted up to 31k  -Respiratory PCR with COVID is negative, follow-up blood cultures, urinary antigens  -Continue broad-spectrum antibiotics with Rocephin and doxycycline, steroids   -given worsening respiratory status, incr leukocytosis will obtain CTA chest to r/o PE and to obtain better imaging of possible PNA    New onset afib RVR  -patient denies past history. Cardiology consulted given new onset  -currently on cardizem gtt, continue  -ECHO ordered    Hypothyroidism  -Continue Synthroid  -check TSH with labs today       Depression  -Continue Zoloft     CAD  PAD  Hyperlipidemia  -Continue aspirin, Plavix, atenolol, statin     Chronic back pain  -Continue tramadol     Groin yeast infection  -Continue statin    Expected Discharge Location and Transportation: home   Expected Discharge   Expected Discharge Date: 8/29/2024; Expected Discharge Time:      VTE Prophylaxis:  Mechanical VTE prophylaxis orders are present.         AM-PAC 6 Clicks Score (PT): 20 (08/27/24 2000)    CODE STATUS:   Code Status and Medical Interventions: CPR (Attempt to Resuscitate); Full Support   Ordered at: 08/26/24 1701     Level Of Support Discussed With:    Patient     Code Status (Patient has no pulse and is not breathing):    CPR (Attempt to Resuscitate)     Medical Interventions (Patient has pulse or is breathing):    Full Support       Beena Gray MD  08/28/24

## 2024-08-28 NOTE — CASE MANAGEMENT/SOCIAL WORK
Continued Stay Note  [facility]     Patient Name: Avelina Ceron  MRN: [id number]  Today's Date: 8/28/2024    Admit Date: 8/26/2024    Plan: Home   Discharge Plan       Row Name 08/28/24 1104       Plan    Plan Home    Patient/Family in Agreement with Plan yes    Plan Comments Discussed patient in MDR.  Patient on Cardizem gtt and O2@5L.  Would hold off on therapy evals for now.  Spoke with patient in room.  Daughter at bedside.  Patient's goal is still to return home at discharge.  CM will continue to follow.    Final Discharge Disposition Code 01 - home or self-care                   Discharge Codes    No documentation.                 Expected Discharge Date and Time       Expected Discharge Date Expected Discharge Time    Aug 29, 2024               Julieta Simpson RN

## 2024-08-28 NOTE — CONSULTS
"[facility]  Cardiovascular Consultation Note    Reason for consultation: Paroxysmal atrial fibrillation with RVR    History of Present Illness:  83-year-old female patient with O2 dependent COPD with chronic respiratory failure with CO2 retention, hypothyroidism, hyperlipidemia, depression, chronic low back pain, vascular disease with prior carotid stenting presents with cough shortness of breath and wheezing was found to have pneumonia and started on antibiotics.  The patient then went into A-fib RVR and we asked to see for this reason.  The patient did feel her heart racing when she had A-fib RVR.  She also had associated angina.  The patient has extreme dyspnea on exertion with minimal activity.  She states at home she did not have any tightness in her chest.  She also states she has never had palpitations at home before.  She does have edema when she lets her legs hanging down.  The patient is complain of a little discomfort in her chest now.  The patient gets dyspnea with even activity of daily living.    Cardiac risk factors: #1 former smoker #2 hypertension #3 hyperlipidemia #4 vascular disease #5 age greater than 65  [id number] score is 5 with 6.7% annual risk for stroke    Past medical and surgical history, social and family history reviewed in EMR.    REVIEW OF SYSTEMS:     Past Medical History:   Diagnosis Date    Anesthesia complication     Pt went into respiratory failure during total hip replacement in 2020    Arthritis of back     Arthritis of neck     Broken ribs     COPD (chronic obstructive pulmonary disease)     Coronary artery disease     Degenerative lumbar disc     Disease of thyroid gland     \"low thyroid\"    Fracture, fibula 1997?    Fracture, humerus 12/13/23    Fracture, tibia and fibula 1997?    Hip arthrosis     History of colonoscopy     Hyperlipidemia     Hypertension     Kidney stone July 2023    Knee swelling     Lumbosacral disc disease     On " home O2     3L    Osteoarthritis     Periarthritis of shoulder     Urinary incontinence     Urinary tract infection      Past Surgical History:   Procedure Laterality Date    CAROTID STENT Bilateral     COLONOSCOPY      EYE SURGERY  10/17/2022    yag surgery    GALLBLADDER SURGERY  2012    HYSTERECTOMY      ILIAC ARTERY STENT Bilateral     JOINT REPLACEMENT  ;     KIDNEY STONE SURGERY      OOPHORECTOMY  1976    1 ovary removed    TOTAL HIP ARTHROPLASTY Bilateral     URETEROSCOPY LASER LITHOTRIPSY WITH STENT INSERTION Right 2023    Procedure: CYSTOSCOPY, RIGHT RETROGRADE PYELOGRAM; WITH STENT INSERTION;  Surgeon: Lee Kim MD;  [facility]: [facility];  Service: Urology;  Laterality: Right;    URETEROSCOPY LASER LITHOTRIPSY WITH STENT INSERTION Right 08/10/2023    Procedure: URETEROSCOPY, LASER LITHOTRIPSY, STENT EXCHANGE;  Surgeon: Lee Kim MD;  Location:  [facility] OR;  Service: Urology;  Laterality: Right;     Social History     Socioeconomic History    Marital status:    Tobacco Use    Smoking status: Former     Current packs/day: 0.00     Average packs/day: 1 pack/day for 50.0 years (50.0 ttl pk-yrs)     Types: Cigarettes     Start date: 1956     Quit date: 2006     Years since quittin.6     Passive exposure: Past    Smokeless tobacco: Never   Vaping Use    Vaping status: Never Used   Substance and Sexual Activity    Alcohol use: Not Currently     Comment: no etoh for past 10 years    Drug use: No    Sexual activity: Not Currently     Family History   Problem Relation Age of Onset    Cancer Other         BREAST, COLON, OVARIAN    Depression Other     Hyperlipidemia Other     Hypertension Other     Heart disease Mother     Hypertension Mother     Arthritis Daughter     Cancer Daughter     Hyperlipidemia Daughter     Liver disease Daughter     Thyroid disease Daughter     Cancer Maternal Aunt     Diabetes Maternal Uncle     Alzheimer's disease  Sister        H&P ROS reviewed and pertinent CV ROS as noted in HPI.    Cardiac: Complains of palpitations during the A-fib.  Complain of chest discomfort.  She has had several episodes since yesterday but is atypical in the sense that last 2 to 3 hours at a time.  No history of angina at home.  No history of CAD.  Respiratory: Has O2 dependent COPD with documented long history of CO2 retention.  Complains of cough and wheezing  Lower Extremities: Gets edema when she lifts her legs laying down.  She has a history of catheter-based intervention of her leg vessels  GI: No bright red blood per rectum or melena  Neuro: Patient's had a prior carotid stent  Hematology: Has evidence of anemia  Renal: No history of kidney disease  Musculoskeletal: Has chronic low back pain  Endocrine: Has hypothyroidism  Constitutional: No weight loss  Psych: No depression or suicidal ideation      Physical Exam: General Pleasant elderly female in bed at a 60 degree angle with resting tachypnea       HEENT: No JVP.  There are scars over carotids.  Bilateral bruits are present.  No JVP       Respiratory: Equal bilateral symmetrical expansion reduced breath sounds bilaterally.  No wheezes or rhonchi.  Sparse crackles are noted       Cardiovascular: Regular rate and rhythm with frequent ectopics and a soft systolic murmur and no edema to palpation       GI: Obese and soft       Lower Extremities: No lesions       Neuro: Facial expressions are symmetrical moves all 4 extremities but appears very weak       Skin: Warm and dry       Psych: Pleasant affect    Results Review: EKG on admission showed sinus rhythm.  Patient denied A-fib RVR with diffuse ST abnormalities.  White blood cell count 31,000.  Potassium 3.5.  GFR is 100.  Bicarb level is 41.  BNP is 3000.  Review of telemetry shows the patient is having paroxysmal's of A-fib.  Also when in sinus rhythm having PVCs and PACs.  I personally reviewed the echocardiogram which showed normal  biventricular function and wall motion           Vital Sign Min/Max for last 24 hours  Temp  Min: 97.6 °F (36.4 °C)  Max: 98.5 °F (36.9 °C)   BP  Min: 124/94  Max: 182/86   Pulse  Min: 73  Max: 167   Resp  Min: 18  Max: 20   SpO2  Min: 92 %  Max: 98 %   No data recorded      Intake/Output Summary (Last 24 hours) at 8/28/2024 1110  Last data filed at 8/27/2024 2240  Gross per 24 hour   Intake 0 ml   Output 301 ml   Net -301 ml             Current Facility-Administered Medications:     aspirin chewable tablet 81 mg, 81 mg, Oral, Daily, Yara Daly MD, 81 mg at 08/28/24 1049    atorvastatin (LIPITOR) tablet 20 mg, 20 mg, Oral, Nightly, Yara Daly MD, 20 mg at 08/27/24 2023    sennosides-docusate (PERICOLACE) 8.6-50 MG per tablet 2 tablet, 2 tablet, Oral, BID PRN, 2 tablet at 08/27/24 1436 **AND** polyethylene glycol (MIRALAX) packet 17 g, 17 g, Oral, Daily PRN **AND** bisacodyl (DULCOLAX) EC tablet 5 mg, 5 mg, Oral, Daily PRN **AND** bisacodyl (DULCOLAX) suppository 10 mg, 10 mg, Rectal, Daily PRN, Yara Daly MD    Calcium Replacement - Follow Nurse / BPA Driven Protocol, , Does not apply, PRN, Yara Daly MD    cefTRIAXone (ROCEPHIN) 1,000 mg in sodium chloride 0.9 % 100 mL MBP, 1,000 mg, Intravenous, Q24H, Yara Daly MD, Last Rate: 200 mL/hr at 08/27/24 1242, 1,000 mg at 08/27/24 1242    clopidogrel (PLAVIX) tablet 75 mg, 75 mg, Oral, Daily, Yara Daly MD, 75 mg at 08/28/24 1047    dilTIAZem (CARDIZEM) 125 mg in 125 mL D5W infusion, 5-15 mg/hr, Intravenous, Continuous, Beena Gray MD, Last Rate: 7.5 mL/hr at 08/28/24 0650, 7.5 mg/hr at 08/28/24 0650    doxycycline (MONODOX) capsule 100 mg, 100 mg, Oral, Q12H, Box, Dre MENDOZA, [facility], 100 mg at 08/28/24 1048    gabapentin (NEURONTIN) capsule 300 mg, 300 mg, Oral, Nightly, Yara Daly MD, 300 mg at 08/27/24 2023    ipratropium-albuterol (DUO-NEB) nebulizer solution 3 mL, 3 mL, Nebulization, 4x Daily - RT, Yara Daly MD, 3 mL at  08/28/24 0902    ipratropium-albuterol (DUO-NEB) nebulizer solution 3 mL, 3 mL, Nebulization, Q4H PRN, Martina Chiang APRN    isosorbide mononitrate (IMDUR) 24 hr tablet 30 mg, 30 mg, Oral, Daily, Yara Daly MD, 30 mg at 08/28/24 1049    levothyroxine (SYNTHROID, LEVOTHROID) tablet 200 mcg, 200 mcg, Oral, Daily, Yara Daly MD, 200 mcg at 08/28/24 0508    Magnesium Standard Dose Replacement - Follow Nurse / BPA Driven Protocol, , Does not apply, PRN, Yara Daly MD    nystatin (MYCOSTATIN) 452912 UNIT/GM cream 1 Application, 1 Application, Topical, Q12H, Yara Daly MD, 1 Application at 08/27/24 2025    ondansetron (ZOFRAN) injection 4 mg, 4 mg, Intravenous, Q6H PRN, Yara Daly MD    pantoprazole (PROTONIX) EC tablet 40 mg, 40 mg, Oral, Daily, Yara Daly MD, 40 mg at 08/28/24 1049    Pharmacy Consult, , Does not apply, Continuous PRN, Yara Daly MD    Phosphorus Replacement - Follow Nurse / BPA Driven Protocol, , Does not apply, PRN, Yara Daly MD    Potassium Replacement - Follow Nurse / BPA Driven Protocol, , Does not apply, PRN, Yara Daly MD    predniSONE (DELTASONE) tablet 40 mg, 40 mg, Oral, Daily With Breakfast, Yara Daly MD, 40 mg at 08/28/24 1047    roflumilast (DALIRESP) tablet 500 mcg, 500 mcg, Oral, Daily, Yara Daly MD, 500 mcg at 08/28/24 1048    sertraline (ZOLOFT) tablet 100 mg, 100 mg, Oral, Daily, Yara Daly MD, 100 mg at 08/28/24 1049    sodium chloride 0.9 % flush 10 mL, 10 mL, Intravenous, PRN, Kendall Smyth MD    sodium chloride 0.9 % flush 10 mL, 10 mL, Intravenous, Q12H, Yara Daly MD, 10 mL at 08/28/24 1051    sodium chloride 0.9 % flush 10 mL, 10 mL, Intravenous, PRN, Yara Daly MD    sodium chloride 0.9 % infusion 40 mL, 40 mL, Intravenous, PRN, Yara Daly MD    traMADol (ULTRAM) tablet 50 mg, 50 mg, Oral, Q6H PRN, Yara Daly MD, 50 mg at 08/28/24 0742    Lab Review:   Results from last 7 days   Lab Units  08/28/24  0728 08/26/24  1513   WBC 10*3/mm3 31.34* 27.35*   HEMOGLOBIN g/dL 11.0* 11.0*   PLATELETS 10*3/mm3 472* 365     Results from last 7 days   Lab Units 08/28/24  0803 08/27/24  2356 08/27/24  0916 08/26/24  1513   SODIUM mmol/L 143  --   --  139   POTASSIUM mmol/L 3.5 3.9 3.2* 3.5   CO2 mmol/L 41.0*  --   --  36.0*   BUN mg/dL 8  --   --  12   CREATININE mg/dL 0.37*  --   --  0.50*   GLUCOSE mg/dL 113*  --   --  136*     Estimated Creatinine Clearance: 110 mL/min (A) (by C-G formula based on SCr of 0.37 mg/dL (L)).        .lipd  Lab Results   Component Value Date    TRIG 151 (H) 07/25/2024    HDL 48 07/25/2024    AST 75 (H) 08/28/2024    ALT 55 (H) 08/28/2024       Radiology Reports:  Imaging Results (Last 72 Hours)       Procedure Component Value Units Date/Time    XR Chest 1 View [[id number]] Collected: 08/26/24 1455     Updated: 08/26/24 1500    Narrative:      XR CHEST 1 VW    Date of Exam: 8/26/2024 2:41 PM EDT    Indication: SOA triage protocol    Comparison: Chest radiograph 3/14/2024.    Findings:  Cardiomediastinal silhouette is unchanged. Patchy airspace disease in the left lower lobe with small left pleural effusion. Background of chronic/senescent changes of the lungs. No pneumothorax. Osseous structures are unchanged. Hiatal hernia.      Impression:      Impression:  Patchy airspace disease in the left lower lobe with small left pleural effusion, suspicious for pneumonia. Recommend imaging follow-up to resolution.      Electronically Signed: Bandar Joseph MD    8/26/2024 2:57 PM EDT    Workstation ID: [id number]            Assessment/Plan: New onset A-fib RVR-patient is going in and out of sinus now.  Her CHADS2 Vascor is 5.  I will start Eliquis.  Unfortunately patient has significant lung disease which excludes medications like sotalol, propafenone, amiodarone.  I will start the patient on Tikosyn to try to maintain sinus rhythm.  2 patient has some resting dyspnea.  I will check a BNP and  diurese as needed.  3 patient is having anginal type pain.  Appears to correlate with A-fib RVR.  Will put Nitropaste on her and check another troponin.  She is quite a frail lady.      Que Herr MD  08/28/24  11:10 EDT

## 2024-08-29 LAB
ANION GAP SERPL CALCULATED.3IONS-SCNC: 7 MMOL/L (ref 5–15)
BASOPHILS # BLD MANUAL: 0 10*3/MM3 (ref 0–0.2)
BASOPHILS NFR BLD MANUAL: 0 % (ref 0–1.5)
BUN SERPL-MCNC: 8 MG/DL (ref 8–23)
BUN/CREAT SERPL: 22.9 (ref 7–25)
CALCIUM SPEC-SCNC: 9 MG/DL (ref 8.6–10.5)
CHLORIDE SERPL-SCNC: 95 MMOL/L (ref 98–107)
CO2 SERPL-SCNC: 39 MMOL/L (ref 22–29)
CREAT SERPL-MCNC: 0.35 MG/DL (ref 0.57–1)
DEPRECATED RDW RBC AUTO: 47.5 FL (ref 37–54)
EGFRCR SERPLBLD CKD-EPI 2021: 101.6 ML/MIN/1.73
EOSINOPHIL # BLD MANUAL: 0 10*3/MM3 (ref 0–0.4)
EOSINOPHIL NFR BLD MANUAL: 0 % (ref 0.3–6.2)
ERYTHROCYTE [DISTWIDTH] IN BLOOD BY AUTOMATED COUNT: 12.7 % (ref 12.3–15.4)
GLUCOSE SERPL-MCNC: 105 MG/DL (ref 65–99)
HCT VFR BLD AUTO: 34.6 % (ref 34–46.6)
HGB BLD-MCNC: 10.8 G/DL (ref 12–15.9)
LYMPHOCYTES # BLD MANUAL: 3.33 10*3/MM3 (ref 0.7–3.1)
LYMPHOCYTES NFR BLD MANUAL: 6 % (ref 5–12)
MCH RBC QN AUTO: 31.5 PG (ref 26.6–33)
MCHC RBC AUTO-ENTMCNC: 31.2 G/DL (ref 31.5–35.7)
MCV RBC AUTO: 100.9 FL (ref 79–97)
METAMYELOCYTES NFR BLD MANUAL: 2 % (ref 0–0)
MONOCYTES # BLD: 1.82 10*3/MM3 (ref 0.1–0.9)
NEUTROPHILS # BLD AUTO: 24.51 10*3/MM3 (ref 1.7–7)
NEUTROPHILS NFR BLD MANUAL: 80 % (ref 42.7–76)
NEUTS BAND NFR BLD MANUAL: 1 % (ref 0–5)
PLAT MORPH BLD: NORMAL
PLATELET # BLD AUTO: 437 10*3/MM3 (ref 140–450)
PMV BLD AUTO: 8.7 FL (ref 6–12)
POTASSIUM SERPL-SCNC: 4.3 MMOL/L (ref 3.5–5.2)
QT INTERVAL: 460 MS
QTC INTERVAL: 486 MS
RBC # BLD AUTO: 3.43 10*6/MM3 (ref 3.77–5.28)
RBC MORPH BLD: NORMAL
SODIUM SERPL-SCNC: 141 MMOL/L (ref 136–145)
VARIANT LYMPHS NFR BLD MANUAL: 11 % (ref 19.6–45.3)
WBC MORPH BLD: NORMAL
WBC NRBC COR # BLD AUTO: 30.26 10*3/MM3 (ref 3.4–10.8)

## 2024-08-29 PROCEDURE — 93010 ELECTROCARDIOGRAM REPORT: CPT | Performed by: INTERNAL MEDICINE

## 2024-08-29 PROCEDURE — 93005 ELECTROCARDIOGRAM TRACING: CPT | Performed by: INTERNAL MEDICINE

## 2024-08-29 PROCEDURE — 99232 SBSQ HOSP IP/OBS MODERATE 35: CPT | Performed by: INTERNAL MEDICINE

## 2024-08-29 PROCEDURE — 80048 BASIC METABOLIC PNL TOTAL CA: CPT | Performed by: INTERNAL MEDICINE

## 2024-08-29 PROCEDURE — 99233 SBSQ HOSP IP/OBS HIGH 50: CPT | Performed by: INTERNAL MEDICINE

## 2024-08-29 PROCEDURE — 25010000002 CEFTRIAXONE PER 250 MG: Performed by: INTERNAL MEDICINE

## 2024-08-29 PROCEDURE — 63710000001 PREDNISONE PER 1 MG: Performed by: INTERNAL MEDICINE

## 2024-08-29 PROCEDURE — 85025 COMPLETE CBC W/AUTO DIFF WBC: CPT | Performed by: INTERNAL MEDICINE

## 2024-08-29 PROCEDURE — 94799 UNLISTED PULMONARY SVC/PX: CPT

## 2024-08-29 PROCEDURE — 85007 BL SMEAR W/DIFF WBC COUNT: CPT | Performed by: INTERNAL MEDICINE

## 2024-08-29 RX ORDER — DOFETILIDE 0.5 MG/1
500 CAPSULE ORAL ONCE
Status: COMPLETED | OUTPATIENT
Start: 2024-08-29 | End: 2024-08-29

## 2024-08-29 RX ORDER — DOFETILIDE 0.5 MG/1
500 CAPSULE ORAL ONCE
Status: DISCONTINUED | OUTPATIENT
Start: 2024-08-30 | End: 2024-09-04 | Stop reason: HOSPADM

## 2024-08-29 RX ORDER — DOFETILIDE 0.5 MG/1
500 CAPSULE ORAL EVERY 12 HOURS
Status: DISCONTINUED | OUTPATIENT
Start: 2024-08-30 | End: 2024-08-30

## 2024-08-29 RX ADMIN — TRAMADOL HYDROCHLORIDE 50 MG: 50 TABLET ORAL at 20:46

## 2024-08-29 RX ADMIN — GABAPENTIN 300 MG: 300 CAPSULE ORAL at 20:46

## 2024-08-29 RX ADMIN — IPRATROPIUM BROMIDE AND ALBUTEROL SULFATE 3 ML: 2.5; .5 SOLUTION RESPIRATORY (INHALATION) at 21:17

## 2024-08-29 RX ADMIN — Medication 10 ML: at 21:01

## 2024-08-29 RX ADMIN — LEVOTHYROXINE SODIUM 200 MCG: 0.1 TABLET ORAL at 04:09

## 2024-08-29 RX ADMIN — APIXABAN 5 MG: 5 TABLET, FILM COATED ORAL at 20:47

## 2024-08-29 RX ADMIN — PREDNISONE 40 MG: 20 TABLET ORAL at 08:27

## 2024-08-29 RX ADMIN — CLOPIDOGREL BISULFATE 75 MG: 75 TABLET ORAL at 08:27

## 2024-08-29 RX ADMIN — ROFLUMILAST 500 MCG: 500 TABLET ORAL at 08:27

## 2024-08-29 RX ADMIN — DOXYCYCLINE 100 MG: 100 CAPSULE ORAL at 20:46

## 2024-08-29 RX ADMIN — DOXYCYCLINE 100 MG: 100 CAPSULE ORAL at 08:27

## 2024-08-29 RX ADMIN — NITROGLYCERIN 1 INCH: 20 OINTMENT TOPICAL at 20:54

## 2024-08-29 RX ADMIN — NITROGLYCERIN 1 INCH: 20 OINTMENT TOPICAL at 04:09

## 2024-08-29 RX ADMIN — SERTRALINE 100 MG: 100 TABLET, FILM COATED ORAL at 08:27

## 2024-08-29 RX ADMIN — NYSTATIN 1 APPLICATION: 100000 CREAM TOPICAL at 20:47

## 2024-08-29 RX ADMIN — DOFETILIDE 500 MCG: 0.5 CAPSULE ORAL at 04:08

## 2024-08-29 RX ADMIN — ISOSORBIDE MONONITRATE 30 MG: 30 TABLET, EXTENDED RELEASE ORAL at 08:27

## 2024-08-29 RX ADMIN — SODIUM CHLORIDE 1000 MG: 900 INJECTION INTRAVENOUS at 12:51

## 2024-08-29 RX ADMIN — TRAMADOL HYDROCHLORIDE 50 MG: 50 TABLET ORAL at 02:17

## 2024-08-29 RX ADMIN — PANTOPRAZOLE SODIUM 40 MG: 40 TABLET, DELAYED RELEASE ORAL at 08:27

## 2024-08-29 RX ADMIN — NITROGLYCERIN 1 INCH: 20 OINTMENT TOPICAL at 12:51

## 2024-08-29 RX ADMIN — ATORVASTATIN CALCIUM 20 MG: 20 TABLET, FILM COATED ORAL at 20:47

## 2024-08-29 RX ADMIN — APIXABAN 5 MG: 5 TABLET, FILM COATED ORAL at 08:28

## 2024-08-29 RX ADMIN — DOFETILIDE 500 MCG: 0.5 CAPSULE ORAL at 14:07

## 2024-08-29 RX ADMIN — TRAMADOL HYDROCHLORIDE 50 MG: 50 TABLET ORAL at 14:07

## 2024-08-29 RX ADMIN — TRAMADOL HYDROCHLORIDE 50 MG: 50 TABLET ORAL at 08:27

## 2024-08-29 NOTE — PROGRESS NOTES
[facility] [facility]  IP Progress Note      Chief Complaint/Reason for service: A-fib RVR    Subjective   Subjective: Patient states she feels better today.  Her breathing is improved.    Past medical, surgical, social and family history reviewed in the patient's electronic medical record.    Objective     Vital Sign Min/Max for last 24 hours  Temp  Min: 97.7 °F (36.5 °C)  Max: 98.5 °F (36.9 °C)   BP  Min: 91/75  Max: 175/79   Pulse  Min: 59  Max: 185   Resp  Min: 8  Max: 22   SpO2  Min: 90 %  Max: 95 %   Flow (L/min)  Min: 5  Max: 6      Intake/Output Summary (Last 24 hours) at 8/29/2024 0740  Last data filed at 8/29/2024 0300  Gross per 24 hour   Intake --   Output 530 ml   Net -530 ml             Current Facility-Administered Medications:     aspirin chewable tablet 81 mg, 81 mg, Oral, Daily, Yara Daly MD, 81 mg at 08/28/24 1049    atorvastatin (LIPITOR) tablet 20 mg, 20 mg, Oral, Nightly, Yara Daly MD, 20 mg at 08/28/24 2138    sennosides-docusate (PERICOLACE) 8.6-50 MG per tablet 2 tablet, 2 tablet, Oral, BID PRN, 2 tablet at 08/27/24 1436 **AND** polyethylene glycol (MIRALAX) packet 17 g, 17 g, Oral, Daily PRN **AND** bisacodyl (DULCOLAX) EC tablet 5 mg, 5 mg, Oral, Daily PRN **AND** bisacodyl (DULCOLAX) suppository 10 mg, 10 mg, Rectal, Daily PRN, Yara Daly MD    Calcium Replacement - Follow Nurse / BPA Driven Protocol, , Does not apply, PRN, Que Herr MD    cefTRIAXone (ROCEPHIN) 1,000 mg in sodium chloride 0.9 % 100 mL MBP, 1,000 mg, Intravenous, Q24H, Yara Daly MD, Last Rate: 200 mL/hr at 08/28/24 1443, 1,000 mg at 08/28/24 1443    clopidogrel (PLAVIX) tablet 75 mg, 75 mg, Oral, Daily, Yara Daly MD, 75 mg at 08/28/24 1047    dilTIAZem (CARDIZEM) 125 mg in 125 mL D5W infusion, 5-15 mg/hr, Intravenous, Continuous, Beena Gray MD, Stopped at 08/28/24 2200    [COMPLETED] dofetilide (TIKOSYN) capsule 500 mcg, 500 mcg, Oral, Once, 500 mcg  at 08/28/24 1821 **FOLLOWED BY** [COMPLETED] dofetilide (TIKOSYN) capsule 500 mcg, 500 mcg, Oral, Once, 500 mcg at 08/29/24 0408 **FOLLOWED BY** dofetilide (TIKOSYN) capsule 500 mcg, 500 mcg, Oral, Once **FOLLOWED BY** [START ON 8/30/2024] dofetilide (TIKOSYN) capsule 500 mcg, 500 mcg, Oral, Once **FOLLOWED BY** [START ON 8/30/2024] dofetilide (TIKOSYN) capsule 500 mcg, 500 mcg, Oral, Q12H, Dre Retana, [facility]    doxycycline (MONODOX) capsule 100 mg, 100 mg, Oral, Q12H, Dre Retana RP, 100 mg at 08/28/24 2138    gabapentin (NEURONTIN) capsule 300 mg, 300 mg, Oral, Nightly, Yara Daly MD, 300 mg at 08/28/24 2138    ipratropium-albuterol (DUO-NEB) nebulizer solution 3 mL, 3 mL, Nebulization, 4x Daily - RT, Yara Daly MD, 3 mL at 08/28/24 1546    ipratropium-albuterol (DUO-NEB) nebulizer solution 3 mL, 3 mL, Nebulization, Q4H PRN, Martina Chiang APRN    isosorbide mononitrate (IMDUR) 24 hr tablet 30 mg, 30 mg, Oral, Daily, Yara Daly MD, 30 mg at 08/28/24 1049    levothyroxine (SYNTHROID, LEVOTHROID) tablet 200 mcg, 200 mcg, Oral, Daily, Yara Daly MD, 200 mcg at 08/29/24 0409    Magnesium Standard Dose Replacement - Follow Nurse / BPA Driven Protocol, , Does not apply, PRN, Yara Daly MD    nitroglycerin (NITROSTAT) ointment 1 inch, 1 inch, Topical, Q6H, Que Herr MD, 1 inch at 08/29/24 0409    nitroglycerin (NITROSTAT) SL tablet 0.4 mg, 0.4 mg, Sublingual, Q5 Min PRN, Que Herr MD    nystatin (MYCOSTATIN) 494264 UNIT/GM cream 1 Application, 1 Application, Topical, Q12H, Yara Daly MD, 1 Application at 08/28/24 2100    ondansetron (ZOFRAN) injection 4 mg, 4 mg, Intravenous, Q6H PRN, Yara Daly MD    pantoprazole (PROTONIX) EC tablet 40 mg, 40 mg, Oral, Daily, Yara Daly MD, 40 mg at 08/28/24 1049    Pharmacy Consult, , Does not apply, Continuous PRN, Yara Daly MD    Pharmacy Consult, , Does not apply, Once PRN, Que Herr MD     Phosphorus Replacement - Follow Nurse / BPA Driven Protocol, , Does not apply, PRN, Que Herr MD    Potassium Replacement - Follow Nurse / BPA Driven Protocol, , Does not apply, Nemesio JEFF Robert Todd, MD    predniSONE (DELTASONE) tablet 40 mg, 40 mg, Oral, Daily With Breakfast, Yara Daly MD, 40 mg at 08/28/24 1047    roflumilast (DALIRESP) tablet 500 mcg, 500 mcg, Oral, Daily, Yara Daly MD, 500 mcg at 08/28/24 1048    sertraline (ZOLOFT) tablet 100 mg, 100 mg, Oral, Daily, Yara Daly MD, 100 mg at 08/28/24 1049    sodium chloride 0.9 % flush 10 mL, 10 mL, Intravenous, PRN, Kendall Smyth MD    sodium chloride 0.9 % flush 10 mL, 10 mL, Intravenous, Q12H, Yara Daly MD, 10 mL at 08/28/24 1051    sodium chloride 0.9 % flush 10 mL, 10 mL, Intravenous, PRN, Yara Daly MD    sodium chloride 0.9 % infusion 40 mL, 40 mL, Intravenous, PRN, Yara Daly MD    traMADol (ULTRAM) tablet 50 mg, 50 mg, Oral, Q6H, Beena Gray MD, 50 mg at 08/29/24 0217    Physical Exam: General Pleasant elderly female in bed lying on her right side at a 30 degree angle with no resting dyspnea or tachypnea        HEENT: No obvious JVP.  She is not wearing oxygen.  No icterus       Respiratory: Equal bilateral symmetrical expansion with reduced breath sounds and sparse crackles       Cardiovascular: Regular rate and rhythm with soft systolic ejection murmur and no edema       GI: Soft       Lower Extremities: No lesions       Neuro: Facial expressions are symmetrical       Skin: Warm and dry       Psych: Pleasant affect    Results Review: Output exceeds intake by 1.1 L.  Heart rate is 62-81.  Blood pressures 91-1 52.  HST was 31 and 45 and 37.    Radiology Results:  Imaging Results (Last 72 Hours)       Procedure Component Value Units Date/Time    CT Angiogram Chest [[id number]] Collected: 08/28/24 1742     Updated: 08/28/24 1752    Narrative:      CT ANGIOGRAM CHEST    Date of Exam: 8/28/2024  4:41 PM EDT    Indication: hypoxia, worsening leukocytosis.    Comparison: Chest CT dated 8/4/2020    Technique: CTA of the chest was performed after the uneventful intravenous administration of 80 mL Isovue-370. Reconstructed coronal and sagittal images were also obtained. In addition, a 3-D volume rendered image was created for interpretation.   Automated exposure control and iterative reconstruction methods were used.      FINDINGS:    Thoracic inlet: Thyroid gland is diminutive or absent. There are surgical clips within the left lower neck.    Pulmonary arteries: No filling defects are identified within the pulmonary arteries to suggest acute pulmonary embolism.    Great vessels: Atherosclerotic plaque is seen within the thoracic aorta and proximal arch vessels.    Mediastinum/Jenna: Scattered mildly enlarged mediastinal lymph nodes, possibly reactive. There is a large hiatal hernia. The esophagus is otherwise unremarkable.    Lung parenchyma/pleura: Pulmonary emphysema. Patchy/nodular airspace disease is present within both lungs, most confluent within the inferior lingula. Appearance is concerning for multifocal pneumonia. There are small bilateral pleural effusions, left   greater than right. There is bibasilar atelectasis. No pneumothorax is seen.    Trachea and airways: The trachea and central airways appear unremarkable.    Heart and pericardium: Coronary artery calcifications. Otherwise, the heart and pericardium appear unremarkable.    Chest wall: No acute or suspicious osseous or soft tissue lesion is identified. Bones are demineralized. There are degenerative changes within the spine. Apparent chronic remodeling of the right proximal humerus.    Upper abdomen: No acute abnormality is identified within the visualized upper abdomen. Large hiatal hernia. Status post cholecystectomy.      Impression:      1.No evidence of acute pulmonary embolism.  2.Bilateral multifocal pneumonia, most confluent within the  inferior lingula. Recommend short interval CT follow-up to confirm resolution.  3.Small bilateral pleural effusions, left greater than right, with bibasilar atelectasis.  4.Pulmonary emphysema. Please correlate with patient's smoking history/risk factors to determine whether the patient meets criteria for routine lung cancer screening with low dose chest CT.  5.Hiatal hernia.          Electronically Signed: Ramon Escobar MD    8/28/2024 5:49 PM EDT    Workstation ID: [id number]    XR Chest 1 View [[id number]] Collected: 08/26/24 1455     Updated: 08/26/24 1500    Narrative:      XR CHEST 1 VW    Date of Exam: 8/26/2024 2:41 PM EDT    Indication: SOA triage protocol    Comparison: Chest radiograph 3/14/2024.    Findings:  Cardiomediastinal silhouette is unchanged. Patchy airspace disease in the left lower lobe with small left pleural effusion. Background of chronic/senescent changes of the lungs. No pneumothorax. Osseous structures are unchanged. Hiatal hernia.      Impression:      Impression:  Patchy airspace disease in the left lower lobe with small left pleural effusion, suspicious for pneumonia. Recommend imaging follow-up to resolution.      Electronically Signed: Bnadar Joseph MD    8/26/2024 2:57 PM EDT    Workstation ID: [id number]            EKG: Last EKG yesterday showed sinus rhythm except for QTc blocked PAC    ECHO: Normal wall motion EF    Tele: Having some short burst of A-fib RVR and a rare episode of PAT    Assessment   Assessment/Plan: A-fib RVR-patient was started on Tikosyn therapy yesterday.  EKG today pending to monitor QTc.  Will start Eliquis 5 mg twice daily    Que Herr MD  08/29/24  07:40 EDT

## 2024-08-29 NOTE — PROGRESS NOTES
[facility] Medicine Services  PROGRESS NOTE    Patient Name: Avelina Ceron  : 1940  MRN: [id number]    Date of Admission: 2024  Primary Care Physician: Dre Moura MD    Subjective   Subjective     CC:  Acute hypoxic resp failure, PNA    HPI:  Patient moved rooms, feels much better today. Does remain on 6L but breathing feels much less labored and she looks much better today. HR around 100 while rounding, does remain on dilt but will try to wean today.     ROS   As above       Objective   Objective     Vital Signs:   Temp:  [97.7 °F (36.5 °C)-98.5 °F (36.9 °C)] 97.9 °F (36.6 °C)  Heart Rate:  [] 100  Resp:  [8-22] 18  BP: ()/() 167/74  Flow (L/min):  [5-6] 6     Physical Exam:  GEN:resting in dark room, looks much better, on 6L   HEENT:, atraumatic, normocephalic  NECK: supple, no masses  RESP: on 6L  but with normal effort, satting mid 90s  CV: on tele, afib, rate around 100   PSYCH: normal affect, appropriate  NEURO: awake, alert, no focal deficits noted  MSK: no edema noted  SKIN: no rashes noted       Results Reviewed:  LAB RESULTS:      Lab 24  0800 24  0728 24  1513   WBC 30.26* 31.34* 27.35*   HEMOGLOBIN 10.8* 11.0* 11.0*   HEMATOCRIT 34.6 35.2 35.2   PLATELETS 437 472* 365   NEUTROS ABS 24.51* 24.45* 23.93*   IMMATURE GRANS (ABS)  --   --  0.30*   LYMPHS ABS  --   --  1.12   MONOS ABS  --   --  1.92*   EOS ABS 0.00 0.00 0.00   .9* 101.1* 101.1*   PROCALCITONIN  --   --  0.14   LACTATE  --   --  1.5         Lab 24  0759 24  2317 24  1321 24  0803 24  2356 24  0916 24  1513   SODIUM 141  --  140 143  --   --  139   POTASSIUM 4.3 4.2 3.4* 3.5 3.9   < > 3.5   CHLORIDE 95*  --  92* 96*  --   --  92*   CO2 39.0*  --  37.0* 41.0*  --   --  36.0*   ANION GAP 7.0  --  11.0 6.0  --   --  11.0   BUN 8  --  7* 8  --   --  12   CREATININE 0.35*  --  0.33* 0.37*  --   --  0.50*   EGFR  101.6  --  103.0 100.2  --   --  93.2   GLUCOSE 105*  --  154* 113*  --   --  136*   CALCIUM 9.0  --  9.3 9.7  --   --  9.4   MAGNESIUM  --   --  1.7  --   --   --   --    TSH  --   --   --  0.382  --   --   --     < > = values in this interval not displayed.         Lab 08/28/24  0803 08/26/24  1513   TOTAL PROTEIN 6.7 7.0   ALBUMIN 3.3* 3.2*   GLOBULIN 3.4 3.8   ALT (SGPT) 55* 20   AST (SGOT) 75* 27   BILIRUBIN 0.4 0.4   ALK PHOS 109 106         Lab 08/28/24  1421 08/28/24  1321 08/26/24  1513   PROBNP  --   --  3,091.0*   HSTROP T 37* 45* 31*                 Lab 08/27/24  0006   PH, ARTERIAL 7.419   PCO2, ARTERIAL 59.0*   PO2 ART 70.8*   FIO2 40   HCO3 ART 38.1*   BASE EXCESS ART 11.7*   CARBOXYHEMOGLOBIN 1.4     Brief Urine Lab Results  (Last result in the past 365 days)        Color   Clarity   Blood   Leuk Est   Nitrite   Protein   CREAT   Urine HCG        07/25/24 1449 Yellow   Clear   Negative   500 Yen/ul   Negative   30 mg/dL                   Microbiology Results Abnormal       Procedure Component Value - Date/Time    Blood Culture - Blood, Hand, Left [[id number]]  (Normal) Collected: 08/26/24 1530    Lab Status: Preliminary result Specimen: Blood from Hand, Left Updated: 08/28/24 1700     Blood Culture No growth at 2 days    Blood Culture - Blood, Arm, Right [[id number]]  (Normal) Collected: 08/26/24 1630    Lab Status: Preliminary result Specimen: Blood from Arm, Right Updated: 08/28/24 1700     Blood Culture No growth at 2 days    COVID PRE-OP / PRE-PROCEDURE SCREENING ORDER (NO ISOLATION) - Swab, Nasopharynx [[id number]]  (Normal) Collected: 08/26/24 1441    Lab Status: Final result Specimen: Swab from Nasopharynx Updated: 08/26/24 1519    Narrative:      The following orders were created for panel order COVID PRE-OP / PRE-PROCEDURE SCREENING ORDER (NO ISOLATION) - Swab, Nasopharynx.  Procedure                               Abnormality         Status                     ---------                                -----------         ------                     COVID-19 and FLU A/B PCR...[[id number]]  Normal              Final result                 Please view results for these tests on the individual orders.    COVID-19 and FLU A/B PCR, 1 HR TAT - Swab, Nasopharynx [[id number]]  (Normal) Collected: 08/26/24 1441    Lab Status: Final result Specimen: Swab from Nasopharynx Updated: 08/26/24 1519     COVID19 Not Detected     Influenza A PCR Not Detected     Influenza B PCR Not Detected    Narrative:      Fact sheet for providers: https://www.fda.gov/media/524911/download    Fact sheet for patients: https://www.fda.gov/media/056259/download    Test performed by PCR.            CT Angiogram Chest    Result Date: 8/28/2024  CT ANGIOGRAM CHEST Date of Exam: 8/28/2024 4:41 PM EDT Indication: hypoxia, worsening leukocytosis. Comparison: Chest CT dated 8/4/2020 Technique: CTA of the chest was performed after the uneventful intravenous administration of 80 mL Isovue-370. Reconstructed coronal and sagittal images were also obtained. In addition, a 3-D volume rendered image was created for interpretation. Automated exposure control and iterative reconstruction methods were used. FINDINGS: Thoracic inlet: Thyroid gland is diminutive or absent. There are surgical clips within the left lower neck. Pulmonary arteries: No filling defects are identified within the pulmonary arteries to suggest acute pulmonary embolism. Great vessels: Atherosclerotic plaque is seen within the thoracic aorta and proximal arch vessels. Mediastinum/Jenna: Scattered mildly enlarged mediastinal lymph nodes, possibly reactive. There is a large hiatal hernia. The esophagus is otherwise unremarkable. Lung parenchyma/pleura: Pulmonary emphysema. Patchy/nodular airspace disease is present within both lungs, most confluent within the inferior lingula. Appearance is concerning for multifocal pneumonia. There are small bilateral pleural effusions, left greater than right.  There is bibasilar atelectasis. No pneumothorax is seen. Trachea and airways: The trachea and central airways appear unremarkable. Heart and pericardium: Coronary artery calcifications. Otherwise, the heart and pericardium appear unremarkable. Chest wall: No acute or suspicious osseous or soft tissue lesion is identified. Bones are demineralized. There are degenerative changes within the spine. Apparent chronic remodeling of the right proximal humerus. Upper abdomen: No acute abnormality is identified within the visualized upper abdomen. Large hiatal hernia. Status post cholecystectomy.     Impression: 1.No evidence of acute pulmonary embolism. 2.Bilateral multifocal pneumonia, most confluent within the inferior lingula. Recommend short interval CT follow-up to confirm resolution. 3.Small bilateral pleural effusions, left greater than right, with bibasilar atelectasis. 4.Pulmonary emphysema. Please correlate with patient's smoking history/risk factors to determine whether the patient meets criteria for routine lung cancer screening with low dose chest CT. 5.Hiatal hernia. Electronically Signed: Ramon Escobar MD  8/28/2024 5:49 PM EDT  Workstation ID: [id number]    Adult Transthoracic Echo Limited W/ Cont if Necessary Per Protocol    Result Date: 8/28/2024    Left ventricular systolic function is normal. Calculated left ventricular EF = 64%   Moderate tricuspid valve regurgitation is present.   Estimated right ventricular systolic pressure from tricuspid regurgitation is moderately elevated (45-55 mmHg).   Mild MR      Results for orders placed during the hospital encounter of 08/26/24    Adult Transthoracic Echo Limited W/ Cont if Necessary Per Protocol    Interpretation Summary    Left ventricular systolic function is normal. Calculated left ventricular EF = 64%    Moderate tricuspid valve regurgitation is present.    Estimated right ventricular systolic pressure from tricuspid regurgitation is moderately elevated  (45-55 mmHg).    Mild MR      Current medications:  Scheduled Meds:apixaban, 5 mg, Oral, Q12H  atorvastatin, 20 mg, Oral, Nightly  cefTRIAXone, 1,000 mg, Intravenous, Q24H  clopidogrel, 75 mg, Oral, Daily  dofetilide, 500 mcg, Oral, Once   Followed by  [START ON 8/30/2024] dofetilide, 500 mcg, Oral, Once   Followed by  [START ON 8/30/2024] dofetilide, 500 mcg, Oral, Q12H  doxycycline, 100 mg, Oral, Q12H  gabapentin, 300 mg, Oral, Nightly  ipratropium-albuterol, 3 mL, Nebulization, 4x Daily - RT  isosorbide mononitrate, 30 mg, Oral, Daily  levothyroxine, 200 mcg, Oral, Daily  nitroglycerin, 1 inch, Topical, Q6H  nystatin, 1 Application, Topical, Q12H  pantoprazole, 40 mg, Oral, Daily  predniSONE, 40 mg, Oral, Daily With Breakfast  roflumilast, 500 mcg, Oral, Daily  sertraline, 100 mg, Oral, Daily  sodium chloride, 10 mL, Intravenous, Q12H  traMADol, 50 mg, Oral, Q6H      Continuous Infusions:dilTIAZem, 5-15 mg/hr, Last Rate: Stopped (08/28/24 2200)  Pharmacy Consult,       PRN Meds:.  senna-docusate sodium **AND** polyethylene glycol **AND** bisacodyl **AND** bisacodyl    Calcium Replacement - Follow Nurse / BPA Driven Protocol    ipratropium-albuterol    Magnesium Standard Dose Replacement - Follow Nurse / BPA Driven Protocol    nitroglycerin    ondansetron    Pharmacy Consult    Pharmacy Consult    Phosphorus Replacement - Follow Nurse / BPA Driven Protocol    Potassium Replacement - Follow Nurse / BPA Driven Protocol    sodium chloride    sodium chloride    sodium chloride    Assessment & Plan   Assessment & Plan     Active Hospital Problems    Diagnosis  POA    **Acute hypoxemic respiratory failure [J96.01]  Yes    Depression [F32.A]  Yes    Hyperlipidemia [E78.5]  Yes    Hypertension [I10]  Yes    Hypothyroidism [E03.9]  Yes      Resolved Hospital Problems   No resolved problems to display.        Brief Hospital Course to date:  Avelina Ceron is a 83-year-old female with history of chronic respiratory failure on  2 L NC, COPD, CAD, hypertension, hypothyroidism, hyperlipidemia who presented to PCP with 1 week of  worsening cough, wheezing and fever, decreased p.o. intake and congestion.  Found to have with acute on chronic respiratory failure, with COPD exacerbation and pneumonia.    This patient's problems and plans were partially entered by my partner and updated as appropriate by me 08/29/24.       Acute on chronic respiratory failure with hypoxia  Multifocal PNA on CT imaging  COPD, likely exacerbation  Leukocytosis  -Patient with O2 sats of mid 80s at PCP, requiring 6 L NC (2 L NC at baseline), continue to wean as able  -Patient presenting with leukocytosis, noted around 30k- diff with left shift, suspect related to infection but will closely monitor   -Respiratory PCR with COVID is negative, follow-up blood cultures, urinary antigens  -CTA imaging obtained 8/28 due to worsening respiratory status and to r/o PE- noted with multifocal PNA with repeat CT recommended in short term to ensure resolution. No PE noted   -Continue broad-spectrum antibiotics with Rocephin and doxycycline, steroids  -if lack of improvement in WBC or + culture results would consider ID involvement but for now as cultures negative and patient seems to be clinically improving, will continue course of abx as above        New onset afib RVR  -patient denies past history. Cardiology consulted given new onset  -currently on cardizem gtt, continue but will try to wean as tikosyn has been initiated  -ECHO done this admission with normal EF, mild VHD    Hypothyroidism  -Continue Synthroid  -TSH nl      Depression  -Continue Zoloft     CAD  PAD  Hyperlipidemia  -Continue aspirin, Plavix, atenolol, statin     Chronic back pain  -Continue tramadol     Groin yeast infection  -Continue statin    Expected Discharge Location and Transportation: home   Expected Discharge   Expected Discharge Date: 9/2/2024; Expected Discharge Time:      VTE  Prophylaxis:  Pharmacologic & mechanical VTE prophylaxis orders are present.         AM-PAC 6 Clicks Score (PT): 20 (08/29/24 0500)    CODE STATUS:   Code Status and Medical Interventions: CPR (Attempt to Resuscitate); Full Support   Ordered at: 08/26/24 1701     Level Of Support Discussed With:    Patient     Code Status (Patient has no pulse and is not breathing):    CPR (Attempt to Resuscitate)     Medical Interventions (Patient has pulse or is breathing):    Full Support       Beena Gray MD  08/29/24

## 2024-08-29 NOTE — PLAN OF CARE
Goal Outcome Evaluation:         VSS. Pt converted to NSR/SB overnight with PAC, dilt gtt stopped. Pt remains on 6L.No complaints, rested well overnight.

## 2024-08-30 LAB
ALBUMIN SERPL-MCNC: 3.1 G/DL (ref 3.5–5.2)
ALBUMIN/GLOB SERPL: 0.9 G/DL
ALP SERPL-CCNC: 90 U/L (ref 39–117)
ALT SERPL W P-5'-P-CCNC: 36 U/L (ref 1–33)
ANION GAP SERPL CALCULATED.3IONS-SCNC: 6 MMOL/L (ref 5–15)
AST SERPL-CCNC: 24 U/L (ref 1–32)
BASOPHILS # BLD MANUAL: 0 10*3/MM3 (ref 0–0.2)
BASOPHILS NFR BLD MANUAL: 0 % (ref 0–1.5)
BILIRUB SERPL-MCNC: 0.4 MG/DL (ref 0–1.2)
BUN SERPL-MCNC: 9 MG/DL (ref 8–23)
BUN/CREAT SERPL: 26.5 (ref 7–25)
CALCIUM SPEC-SCNC: 9.3 MG/DL (ref 8.6–10.5)
CHLORIDE SERPL-SCNC: 93 MMOL/L (ref 98–107)
CO2 SERPL-SCNC: 40 MMOL/L (ref 22–29)
CREAT SERPL-MCNC: 0.34 MG/DL (ref 0.57–1)
DEPRECATED RDW RBC AUTO: 47 FL (ref 37–54)
EGFRCR SERPLBLD CKD-EPI 2021: 102.3 ML/MIN/1.73
EOSINOPHIL # BLD MANUAL: 0 10*3/MM3 (ref 0–0.4)
EOSINOPHIL NFR BLD MANUAL: 0 % (ref 0.3–6.2)
ERYTHROCYTE [DISTWIDTH] IN BLOOD BY AUTOMATED COUNT: 12.5 % (ref 12.3–15.4)
GLOBULIN UR ELPH-MCNC: 3.3 GM/DL
GLUCOSE SERPL-MCNC: 100 MG/DL (ref 65–99)
HCT VFR BLD AUTO: 34 % (ref 34–46.6)
HGB BLD-MCNC: 10.6 G/DL (ref 12–15.9)
LYMPHOCYTES # BLD MANUAL: 2.48 10*3/MM3 (ref 0.7–3.1)
LYMPHOCYTES NFR BLD MANUAL: 4 % (ref 5–12)
MACROCYTES BLD QL SMEAR: ABNORMAL
MCH RBC QN AUTO: 31.6 PG (ref 26.6–33)
MCHC RBC AUTO-ENTMCNC: 31.2 G/DL (ref 31.5–35.7)
MCV RBC AUTO: 101.5 FL (ref 79–97)
METAMYELOCYTES NFR BLD MANUAL: 4 % (ref 0–0)
MONOCYTES # BLD: 1.24 10*3/MM3 (ref 0.1–0.9)
NEUTROPHILS # BLD AUTO: 26 10*3/MM3 (ref 1.7–7)
NEUTROPHILS NFR BLD MANUAL: 82 % (ref 42.7–76)
NEUTS BAND NFR BLD MANUAL: 2 % (ref 0–5)
NRBC SPEC MANUAL: 1 /100 WBC (ref 0–0.2)
NT-PROBNP SERPL-MCNC: 2318 PG/ML (ref 0–1800)
PLAT MORPH BLD: NORMAL
PLATELET # BLD AUTO: 433 10*3/MM3 (ref 140–450)
PMV BLD AUTO: 8.8 FL (ref 6–12)
POTASSIUM SERPL-SCNC: 4.3 MMOL/L (ref 3.5–5.2)
PROT SERPL-MCNC: 6.4 G/DL (ref 6–8.5)
QT INTERVAL: 270 MS
QT INTERVAL: 416 MS
QT INTERVAL: 500 MS
QTC INTERVAL: 435 MS
QTC INTERVAL: 486 MS
QTC INTERVAL: 511 MS
RBC # BLD AUTO: 3.35 10*6/MM3 (ref 3.77–5.28)
SODIUM SERPL-SCNC: 139 MMOL/L (ref 136–145)
VARIANT LYMPHS NFR BLD MANUAL: 8 % (ref 19.6–45.3)
WBC MORPH BLD: NORMAL
WBC NRBC COR # BLD AUTO: 30.95 10*3/MM3 (ref 3.4–10.8)

## 2024-08-30 PROCEDURE — 25010000002 CEFTRIAXONE PER 250 MG: Performed by: STUDENT IN AN ORGANIZED HEALTH CARE EDUCATION/TRAINING PROGRAM

## 2024-08-30 PROCEDURE — 94799 UNLISTED PULMONARY SVC/PX: CPT

## 2024-08-30 PROCEDURE — 93010 ELECTROCARDIOGRAM REPORT: CPT | Performed by: INTERNAL MEDICINE

## 2024-08-30 PROCEDURE — 25010000002 ACETAZOLAMIDE PER 500 MG: Performed by: INTERNAL MEDICINE

## 2024-08-30 PROCEDURE — 63710000001 PREDNISONE PER 1 MG: Performed by: INTERNAL MEDICINE

## 2024-08-30 PROCEDURE — 94664 DEMO&/EVAL PT USE INHALER: CPT

## 2024-08-30 PROCEDURE — 99232 SBSQ HOSP IP/OBS MODERATE 35: CPT | Performed by: INTERNAL MEDICINE

## 2024-08-30 PROCEDURE — 83880 ASSAY OF NATRIURETIC PEPTIDE: CPT | Performed by: INTERNAL MEDICINE

## 2024-08-30 PROCEDURE — 80053 COMPREHEN METABOLIC PANEL: CPT | Performed by: INTERNAL MEDICINE

## 2024-08-30 PROCEDURE — 93005 ELECTROCARDIOGRAM TRACING: CPT | Performed by: INTERNAL MEDICINE

## 2024-08-30 PROCEDURE — 99233 SBSQ HOSP IP/OBS HIGH 50: CPT | Performed by: STUDENT IN AN ORGANIZED HEALTH CARE EDUCATION/TRAINING PROGRAM

## 2024-08-30 PROCEDURE — 85007 BL SMEAR W/DIFF WBC COUNT: CPT | Performed by: INTERNAL MEDICINE

## 2024-08-30 PROCEDURE — 85025 COMPLETE CBC W/AUTO DIFF WBC: CPT | Performed by: INTERNAL MEDICINE

## 2024-08-30 PROCEDURE — 93005 ELECTROCARDIOGRAM TRACING: CPT

## 2024-08-30 RX ORDER — LOSARTAN POTASSIUM 50 MG/1
50 TABLET ORAL
Status: DISCONTINUED | OUTPATIENT
Start: 2024-08-30 | End: 2024-09-04 | Stop reason: HOSPADM

## 2024-08-30 RX ORDER — ACETAZOLAMIDE SODIUM 500 MG/5ML
500 INJECTION, POWDER, LYOPHILIZED, FOR SOLUTION INTRAVENOUS ONCE
Status: COMPLETED | OUTPATIENT
Start: 2024-08-30 | End: 2024-08-30

## 2024-08-30 RX ORDER — DOFETILIDE 0.25 MG/1
250 CAPSULE ORAL EVERY 12 HOURS
Status: DISCONTINUED | OUTPATIENT
Start: 2024-08-30 | End: 2024-09-04 | Stop reason: HOSPADM

## 2024-08-30 RX ORDER — NEBIVOLOL 5 MG/1
2.5 TABLET ORAL
Status: DISCONTINUED | OUTPATIENT
Start: 2024-08-30 | End: 2024-09-04 | Stop reason: HOSPADM

## 2024-08-30 RX ADMIN — TRAMADOL HYDROCHLORIDE 50 MG: 50 TABLET ORAL at 14:42

## 2024-08-30 RX ADMIN — TRAMADOL HYDROCHLORIDE 50 MG: 50 TABLET ORAL at 08:10

## 2024-08-30 RX ADMIN — APIXABAN 5 MG: 5 TABLET, FILM COATED ORAL at 20:36

## 2024-08-30 RX ADMIN — NYSTATIN 1 APPLICATION: 100000 CREAM TOPICAL at 08:09

## 2024-08-30 RX ADMIN — SERTRALINE 100 MG: 100 TABLET, FILM COATED ORAL at 08:09

## 2024-08-30 RX ADMIN — NITROGLYCERIN 1 INCH: 20 OINTMENT TOPICAL at 18:33

## 2024-08-30 RX ADMIN — TRAMADOL HYDROCHLORIDE 50 MG: 50 TABLET ORAL at 20:36

## 2024-08-30 RX ADMIN — LEVOTHYROXINE SODIUM 200 MCG: 0.1 TABLET ORAL at 05:59

## 2024-08-30 RX ADMIN — NYSTATIN 1 APPLICATION: 100000 CREAM TOPICAL at 20:35

## 2024-08-30 RX ADMIN — PREDNISONE 40 MG: 20 TABLET ORAL at 08:09

## 2024-08-30 RX ADMIN — GABAPENTIN 300 MG: 300 CAPSULE ORAL at 20:36

## 2024-08-30 RX ADMIN — PANTOPRAZOLE SODIUM 40 MG: 40 TABLET, DELAYED RELEASE ORAL at 08:09

## 2024-08-30 RX ADMIN — SODIUM CHLORIDE 2000 MG: 900 INJECTION INTRAVENOUS at 12:34

## 2024-08-30 RX ADMIN — Medication 10 ML: at 20:37

## 2024-08-30 RX ADMIN — Medication 10 ML: at 08:11

## 2024-08-30 RX ADMIN — DOXYCYCLINE 100 MG: 100 CAPSULE ORAL at 08:09

## 2024-08-30 RX ADMIN — CLOPIDOGREL BISULFATE 75 MG: 75 TABLET ORAL at 08:09

## 2024-08-30 RX ADMIN — ATORVASTATIN CALCIUM 20 MG: 20 TABLET, FILM COATED ORAL at 20:36

## 2024-08-30 RX ADMIN — TRAMADOL HYDROCHLORIDE 50 MG: 50 TABLET ORAL at 02:30

## 2024-08-30 RX ADMIN — NITROGLYCERIN 1 INCH: 20 OINTMENT TOPICAL at 06:07

## 2024-08-30 RX ADMIN — DOFETILIDE 250 MCG: 0.25 CAPSULE ORAL at 08:14

## 2024-08-30 RX ADMIN — NITROGLYCERIN 1 INCH: 20 OINTMENT TOPICAL at 12:35

## 2024-08-30 RX ADMIN — ACETAZOLAMIDE 500 MG: 500 INJECTION, POWDER, LYOPHILIZED, FOR SOLUTION INTRAVENOUS at 10:36

## 2024-08-30 RX ADMIN — IPRATROPIUM BROMIDE AND ALBUTEROL SULFATE 3 ML: 2.5; .5 SOLUTION RESPIRATORY (INHALATION) at 15:24

## 2024-08-30 RX ADMIN — ISOSORBIDE MONONITRATE 30 MG: 30 TABLET, EXTENDED RELEASE ORAL at 08:09

## 2024-08-30 RX ADMIN — IPRATROPIUM BROMIDE AND ALBUTEROL SULFATE 3 ML: 2.5; .5 SOLUTION RESPIRATORY (INHALATION) at 20:07

## 2024-08-30 RX ADMIN — IPRATROPIUM BROMIDE AND ALBUTEROL SULFATE 3 ML: 2.5; .5 SOLUTION RESPIRATORY (INHALATION) at 11:34

## 2024-08-30 RX ADMIN — ROFLUMILAST 500 MCG: 500 TABLET ORAL at 11:25

## 2024-08-30 RX ADMIN — DOXYCYCLINE 100 MG: 100 CAPSULE ORAL at 20:36

## 2024-08-30 RX ADMIN — APIXABAN 5 MG: 5 TABLET, FILM COATED ORAL at 08:09

## 2024-08-30 RX ADMIN — NEBIVOLOL 2.5 MG: 5 TABLET ORAL at 08:10

## 2024-08-30 RX ADMIN — DOFETILIDE 250 MCG: 0.25 CAPSULE ORAL at 20:36

## 2024-08-30 RX ADMIN — LOSARTAN POTASSIUM 50 MG: 50 TABLET, FILM COATED ORAL at 08:09

## 2024-08-30 NOTE — PROGRESS NOTES
[facility] at [facility]  IP Progress Note      Chief Complaint/Reason for service: #1 PAF with RVR #2 hypertension    Subjective   Subjective: Patient states she is feeling better.  She wheezes rarely at home but has some mild wheezing today.  She states even on her best day she is short of breath at rest she denies further chest pain    Past medical, surgical, social and family history reviewed in the patient's electronic medical record.    Objective     Vital Sign Min/Max for last 24 hours  Temp  Min: 98 °F (36.7 °C)  Max: 98.3 °F (36.8 °C)   BP  Min: 149/50  Max: 179/72   Pulse  Min: 75  Max: 95   Resp  Min: 16  Max: 18   SpO2  Min: 90 %  Max: 97 %   Flow (L/min)  Min: 6  Max: 6      Intake/Output Summary (Last 24 hours) at 8/30/2024 0711  Last data filed at 8/30/2024 0620  Gross per 24 hour   Intake 300 ml   Output 1150 ml   Net -850 ml             Current Facility-Administered Medications:     acetaZOLAMIDE 500 mg injection, 500 mg, Intravenous, Once, Que Herr MD    apixaban (ELIQUIS) tablet 5 mg, 5 mg, Oral, Q12H, Que Herr MD, 5 mg at 08/29/24 2047    atorvastatin (LIPITOR) tablet 20 mg, 20 mg, Oral, Nightly, Yara Daly MD, 20 mg at 08/29/24 2047    sennosides-docusate (PERICOLACE) 8.6-50 MG per tablet 2 tablet, 2 tablet, Oral, BID PRN, 2 tablet at 08/27/24 1436 **AND** polyethylene glycol (MIRALAX) packet 17 g, 17 g, Oral, Daily PRN **AND** bisacodyl (DULCOLAX) EC tablet 5 mg, 5 mg, Oral, Daily PRN **AND** bisacodyl (DULCOLAX) suppository 10 mg, 10 mg, Rectal, Daily PRN, Yara Daly MD    Calcium Replacement - Follow Nurse / BPA Driven Protocol, , Does not apply, PRN, Que Herr MD    cefTRIAXone (ROCEPHIN) 1,000 mg in sodium chloride 0.9 % 100 mL MBP, 1,000 mg, Intravenous, Q24H, Yara Daly MD, Last Rate: 200 mL/hr at 08/29/24 1251, 1,000 mg at 08/29/24 1251    clopidogrel (PLAVIX) tablet 75 mg, 75 mg, Oral, Daily, Yara Daly,  MD, 75 mg at 08/29/24 0827    dilTIAZem (CARDIZEM) 125 mg in 125 mL D5W infusion, 5-15 mg/hr, Intravenous, Continuous, Beena Gray MD, Stopped at 08/28/24 2200    [COMPLETED] dofetilide (TIKOSYN) capsule 500 mcg, 500 mcg, Oral, Once, 500 mcg at 08/28/24 1821 **FOLLOWED BY** [COMPLETED] dofetilide (TIKOSYN) capsule 500 mcg, 500 mcg, Oral, Once, 500 mcg at 08/29/24 0408 **FOLLOWED BY** [COMPLETED] dofetilide (TIKOSYN) capsule 500 mcg, 500 mcg, Oral, Once, 500 mcg at 08/29/24 1407 **FOLLOWED BY** dofetilide (TIKOSYN) capsule 500 mcg, 500 mcg, Oral, Once **FOLLOWED BY** dofetilide (TIKOSYN) capsule 500 mcg, 500 mcg, Oral, Q12H, Dre Retana, [facility]    doxycycline (MONODOX) capsule 100 mg, 100 mg, Oral, Q12H, Dre Retana, [facility], 100 mg at 08/29/24 2046    gabapentin (NEURONTIN) capsule 300 mg, 300 mg, Oral, Nightly, Yara Daly MD, 300 mg at 08/29/24 2046    ipratropium-albuterol (DUO-NEB) nebulizer solution 3 mL, 3 mL, Nebulization, 4x Daily - RT, Yara Daly MD, 3 mL at 08/29/24 2117    ipratropium-albuterol (DUO-NEB) nebulizer solution 3 mL, 3 mL, Nebulization, Q4H PRN, Martina Chiang APRN    isosorbide mononitrate (IMDUR) 24 hr tablet 30 mg, 30 mg, Oral, Daily, Yara Daly MD, 30 mg at 08/29/24 0827    levothyroxine (SYNTHROID, LEVOTHROID) tablet 200 mcg, 200 mcg, Oral, Daily, Yara Daly MD, 200 mcg at 08/30/24 0559    losartan (COZAAR) tablet 50 mg, 50 mg, Oral, Q24H, Que Herr MD    Magnesium Standard Dose Replacement - Follow Nurse / BPA Driven Protocol, , Does not apply, PRN, Yara Daly MD    nebivolol (BYSTOLIC) tablet 2.5 mg, 2.5 mg, Oral, Q24H, Que Herr MD    nitroglycerin (NITROSTAT) ointment 1 inch, 1 inch, Topical, Q6H, Que Herr MD, 1 inch at 08/30/24 0607    nitroglycerin (NITROSTAT) SL tablet 0.4 mg, 0.4 mg, Sublingual, Q5 Min PRN, Que Herr MD    nystatin (MYCOSTATIN) 157600 UNIT/GM cream 1 Application, 1 Application, Topical,  Q12H, Yara Daly MD, 1 Application at 08/29/24 2047    ondansetron (ZOFRAN) injection 4 mg, 4 mg, Intravenous, Q6H PRN, Yara Daly MD    pantoprazole (PROTONIX) EC tablet 40 mg, 40 mg, Oral, Daily, Yara Daly MD, 40 mg at 08/29/24 0827    Pharmacy Consult, , Does not apply, Continuous PRN, Yara Daly MD    Pharmacy Consult, , Does not apply, Once PRN, Que Herr MD    Phosphorus Replacement - Follow Nurse / BPA Driven Protocol, , Does not apply, PRNemesio DARLING Robert Todd, MD    Potassium Replacement - Follow Nurse / BPA Driven Protocol, , Does not apply, Nemesio JEFF Robert Todd, MD    predniSONE (DELTASONE) tablet 40 mg, 40 mg, Oral, Daily With Breakfast, Yara Daly MD, 40 mg at 08/29/24 0827    roflumilast (DALIRESP) tablet 500 mcg, 500 mcg, Oral, Daily, Yara Daly MD, 500 mcg at 08/29/24 0827    sertraline (ZOLOFT) tablet 100 mg, 100 mg, Oral, Daily, Yara Daly MD, 100 mg at 08/29/24 0827    sodium chloride 0.9 % flush 10 mL, 10 mL, Intravenous, PRN, Kendall Smyth MD    sodium chloride 0.9 % flush 10 mL, 10 mL, Intravenous, Q12H, Yara Daly MD, 10 mL at 08/29/24 2101    sodium chloride 0.9 % flush 10 mL, 10 mL, Intravenous, PRN, Yara Daly MD    sodium chloride 0.9 % infusion 40 mL, 40 mL, Intravenous, PRN, Yara Daly MD    traMADol (ULTRAM) tablet 50 mg, 50 mg, Oral, Q6H, Beena Gray MD, 50 mg at 08/30/24 0230    Physical Exam: General Very pleasant elderly female in bed 45 degree angle with no resting dyspnea or tachypnea        HEENT: No JVP.  Nasal O2 present.       Respiratory: Equal bilateral symmetrical expansion with faint wheezing on the left lung.  No rhonchi and there is a rare crackle       Cardiovascular: Regular rate and rhythm with systolic ejection murmur no edema palpation       GI: Soft       Lower Extremities: No obvious lesions       Neuro: Facial expressions are symmetrical moves all 4 extremities       Skin: Warm and dry  no edema palpation       Psych: Pleasant affect    Results Review: BNP is down to 2318 from 3091.  Output exceeds intake by 1.9 L.  GFR is 102.3 bicarb is up to 40.  Heart rate 77-87.  Blood pressures 150 4179.    Radiology Results:  Imaging Results (Last 72 Hours)       Procedure Component Value Units Date/Time    CT Angiogram Chest [[id number]] Collected: 08/28/24 1742     Updated: 08/28/24 1752    Narrative:      CT ANGIOGRAM CHEST    Date of Exam: 8/28/2024 4:41 PM EDT    Indication: hypoxia, worsening leukocytosis.    Comparison: Chest CT dated 8/4/2020    Technique: CTA of the chest was performed after the uneventful intravenous administration of 80 mL Isovue-370. Reconstructed coronal and sagittal images were also obtained. In addition, a 3-D volume rendered image was created for interpretation.   Automated exposure control and iterative reconstruction methods were used.      FINDINGS:    Thoracic inlet: Thyroid gland is diminutive or absent. There are surgical clips within the left lower neck.    Pulmonary arteries: No filling defects are identified within the pulmonary arteries to suggest acute pulmonary embolism.    Great vessels: Atherosclerotic plaque is seen within the thoracic aorta and proximal arch vessels.    Mediastinum/Jenna: Scattered mildly enlarged mediastinal lymph nodes, possibly reactive. There is a large hiatal hernia. The esophagus is otherwise unremarkable.    Lung parenchyma/pleura: Pulmonary emphysema. Patchy/nodular airspace disease is present within both lungs, most confluent within the inferior lingula. Appearance is concerning for multifocal pneumonia. There are small bilateral pleural effusions, left   greater than right. There is bibasilar atelectasis. No pneumothorax is seen.    Trachea and airways: The trachea and central airways appear unremarkable.    Heart and pericardium: Coronary artery calcifications. Otherwise, the heart and pericardium appear unremarkable.    Chest wall:  No acute or suspicious osseous or soft tissue lesion is identified. Bones are demineralized. There are degenerative changes within the spine. Apparent chronic remodeling of the right proximal humerus.    Upper abdomen: No acute abnormality is identified within the visualized upper abdomen. Large hiatal hernia. Status post cholecystectomy.      Impression:      1.No evidence of acute pulmonary embolism.  2.Bilateral multifocal pneumonia, most confluent within the inferior lingula. Recommend short interval CT follow-up to confirm resolution.  3.Small bilateral pleural effusions, left greater than right, with bibasilar atelectasis.  4.Pulmonary emphysema. Please correlate with patient's smoking history/risk factors to determine whether the patient meets criteria for routine lung cancer screening with low dose chest CT.  5.Hiatal hernia.          Electronically Signed: Ramon Escobar MD    8/28/2024 5:49 PM EDT    Workstation ID: [id number]            EKG: QTc 486 with no ischemia    ECHO: Preserved wall motion EF    Tele: No significant pauses or bradycardia.  Occasional runs of A-fib RVR    Assessment   Assessment/Plan: PAF RVR-the rhythm is overall improved on Tikosyn.  Her heart rate runs 77-87 there was one 3 beat run of nonsustained VT.  In addition that her blood pressure is elevated.  I will start Bystolic 2.5 mg daily  2 hypertension-we will start losartan 50 mg and Bystolic 2.5  3 we will give a dose of Diamox since her serum bicarbonate is 40    Que Herr MD  08/30/24  07:11 EDT

## 2024-08-30 NOTE — PROGRESS NOTES
[facility] Medicine Services  PROGRESS NOTE    Patient Name: Avelina Ceron  : 1940  MRN: [id number]    Date of Admission: 2024  Primary Care Physician: Dre Moura MD    Subjective   Subjective     CC:  Acute hypoxic resp failure, PNA    HPI:  Reports shortness of breath is similar to prior.  Now with dry cough, no longer productive.  Had 2 bowel movements.  No chest pain.  No nausea or vomiting.      Objective   Objective     Vital Signs:   Temp:  [98 °F (36.7 °C)-98.9 °F (37.2 °C)] 98.9 °F (37.2 °C)  Heart Rate:  [75-95] 87  Resp:  [16-18] 18  BP: (149-185)/(50-76) 185/75  Flow (L/min):  [6] 6     Physical Exam:  Constitutional: No acute distress, awake, alert  HENT: NCAT, mucous membranes moist  Respiratory: Coarse throughout  Cardiovascular: RRR, no murmurs, rubs, or gallops  Gastrointestinal: Positive bowel sounds, soft, nontender, nondistended  Musculoskeletal: No bilateral ankle edema  Psychiatric: Appropriate affect, cooperative  Neurologic: Alert, oriented, symmetric facies, moves all extremities, speech clear  Skin: No rashes    Results Reviewed:  LAB RESULTS:      Lab 24  0124 24  0800 24  0728 24  1513   WBC 30.95* 30.26* 31.34* 27.35*   HEMOGLOBIN 10.6* 10.8* 11.0* 11.0*   HEMATOCRIT 34.0 34.6 35.2 35.2   PLATELETS 433 437 472* 365   NEUTROS ABS 26.00* 24.51* 24.45* 23.93*   IMMATURE GRANS (ABS)  --   --   --  0.30*   LYMPHS ABS  --   --   --  1.12   MONOS ABS  --   --   --  1.92*   EOS ABS 0.00 0.00 0.00 0.00   .5* 100.9* 101.1* 101.1*   PROCALCITONIN  --   --   --  0.14   LACTATE  --   --   --  1.5         Lab 24  0124 24  0759 24  2317 24  1321 24  0803 24  0916 24  1513   SODIUM 139 141  --  140 143  --  139   POTASSIUM 4.3 4.3 4.2 3.4* 3.5   < > 3.5   CHLORIDE 93* 95*  --  92* 96*  --  92*   CO2 40.0* 39.0*  --  37.0* 41.0*  --  36.0*   ANION GAP 6.0 7.0  --  11.0 6.0  --  11.0    BUN 9 8  --  7* 8  --  12   CREATININE 0.34* 0.35*  --  0.33* 0.37*  --  0.50*   EGFR 102.3 101.6  --  103.0 100.2  --  93.2   GLUCOSE 100* 105*  --  154* 113*  --  136*   CALCIUM 9.3 9.0  --  9.3 9.7  --  9.4   MAGNESIUM  --   --   --  1.7  --   --   --    TSH  --   --   --   --  0.382  --   --     < > = values in this interval not displayed.         Lab 08/30/24  0124 08/28/24  0803 08/26/24  1513   TOTAL PROTEIN 6.4 6.7 7.0   ALBUMIN 3.1* 3.3* 3.2*   GLOBULIN 3.3 3.4 3.8   ALT (SGPT) 36* 55* 20   AST (SGOT) 24 75* 27   BILIRUBIN 0.4 0.4 0.4   ALK PHOS 90 109 106         Lab 08/30/24  0124 08/28/24  1421 08/28/24  1321 08/26/24  1513   PROBNP 2,318.0*  --   --  3,091.0*   HSTROP T  --  37* 45* 31*                 Lab 08/27/24  0006   PH, ARTERIAL 7.419   PCO2, ARTERIAL 59.0*   PO2 ART 70.8*   FIO2 40   HCO3 ART 38.1*   BASE EXCESS ART 11.7*   CARBOXYHEMOGLOBIN 1.4     Brief Urine Lab Results  (Last result in the past 365 days)        Color   Clarity   Blood   Leuk Est   Nitrite   Protein   CREAT   Urine HCG        07/25/24 1449 Yellow   Clear   Negative   500 Yen/ul   Negative   30 mg/dL                   Microbiology Results Abnormal       Procedure Component Value - Date/Time    Blood Culture - Blood, Hand, Left [[id number]]  (Normal) Collected: 08/26/24 1530    Lab Status: Preliminary result Specimen: Blood from Hand, Left Updated: 08/29/24 1700     Blood Culture No growth at 3 days    Blood Culture - Blood, Arm, Right [[id number]]  (Normal) Collected: 08/26/24 1630    Lab Status: Preliminary result Specimen: Blood from Arm, Right Updated: 08/29/24 1700     Blood Culture No growth at 3 days    COVID PRE-OP / PRE-PROCEDURE SCREENING ORDER (NO ISOLATION) - Swab, Nasopharynx [[id number]]  (Normal) Collected: 08/26/24 1441    Lab Status: Final result Specimen: Swab from Nasopharynx Updated: 08/26/24 1510    Narrative:      The following orders were created for panel order COVID PRE-OP / PRE-PROCEDURE SCREENING ORDER  (NO ISOLATION) - Swab, Nasopharynx.  Procedure                               Abnormality         Status                     ---------                               -----------         ------                     COVID-19 and FLU A/B PCR...[[id number]]  Normal              Final result                 Please view results for these tests on the individual orders.    COVID-19 and FLU A/B PCR, 1 HR TAT - Swab, Nasopharynx [[id number]]  (Normal) Collected: 08/26/24 1441    Lab Status: Final result Specimen: Swab from Nasopharynx Updated: 08/26/24 1519     COVID19 Not Detected     Influenza A PCR Not Detected     Influenza B PCR Not Detected    Narrative:      Fact sheet for providers: https://www.fda.gov/media/643918/download    Fact sheet for patients: https://www.fda.gov/media/564910/download    Test performed by PCR.            CT Angiogram Chest    Result Date: 8/28/2024  CT ANGIOGRAM CHEST Date of Exam: 8/28/2024 4:41 PM EDT Indication: hypoxia, worsening leukocytosis. Comparison: Chest CT dated 8/4/2020 Technique: CTA of the chest was performed after the uneventful intravenous administration of 80 mL Isovue-370. Reconstructed coronal and sagittal images were also obtained. In addition, a 3-D volume rendered image was created for interpretation. Automated exposure control and iterative reconstruction methods were used. FINDINGS: Thoracic inlet: Thyroid gland is diminutive or absent. There are surgical clips within the left lower neck. Pulmonary arteries: No filling defects are identified within the pulmonary arteries to suggest acute pulmonary embolism. Great vessels: Atherosclerotic plaque is seen within the thoracic aorta and proximal arch vessels. Mediastinum/Jenna: Scattered mildly enlarged mediastinal lymph nodes, possibly reactive. There is a large hiatal hernia. The esophagus is otherwise unremarkable. Lung parenchyma/pleura: Pulmonary emphysema. Patchy/nodular airspace disease is present within both lungs, most  confluent within the inferior lingula. Appearance is concerning for multifocal pneumonia. There are small bilateral pleural effusions, left greater than right. There is bibasilar atelectasis. No pneumothorax is seen. Trachea and airways: The trachea and central airways appear unremarkable. Heart and pericardium: Coronary artery calcifications. Otherwise, the heart and pericardium appear unremarkable. Chest wall: No acute or suspicious osseous or soft tissue lesion is identified. Bones are demineralized. There are degenerative changes within the spine. Apparent chronic remodeling of the right proximal humerus. Upper abdomen: No acute abnormality is identified within the visualized upper abdomen. Large hiatal hernia. Status post cholecystectomy.     Impression: 1.No evidence of acute pulmonary embolism. 2.Bilateral multifocal pneumonia, most confluent within the inferior lingula. Recommend short interval CT follow-up to confirm resolution. 3.Small bilateral pleural effusions, left greater than right, with bibasilar atelectasis. 4.Pulmonary emphysema. Please correlate with patient's smoking history/risk factors to determine whether the patient meets criteria for routine lung cancer screening with low dose chest CT. 5.Hiatal hernia. Electronically Signed: Ramon Escobar MD  8/28/2024 5:49 PM EDT  Workstation ID: [id number]    Adult Transthoracic Echo Limited W/ Cont if Necessary Per Protocol    Result Date: 8/28/2024    Left ventricular systolic function is normal. Calculated left ventricular EF = 64%   Moderate tricuspid valve regurgitation is present.   Estimated right ventricular systolic pressure from tricuspid regurgitation is moderately elevated (45-55 mmHg).   Mild MR      Results for orders placed during the hospital encounter of 08/26/24    Adult Transthoracic Echo Limited W/ Cont if Necessary Per Protocol    Interpretation Summary    Left ventricular systolic function is normal. Calculated left ventricular EF =  64%    Moderate tricuspid valve regurgitation is present.    Estimated right ventricular systolic pressure from tricuspid regurgitation is moderately elevated (45-55 mmHg).    Mild MR      Current medications:  Scheduled Meds:acetaZOLAMIDE 500 mg injection, 500 mg, Intravenous, Once  apixaban, 5 mg, Oral, Q12H  atorvastatin, 20 mg, Oral, Nightly  cefTRIAXone, 1,000 mg, Intravenous, Q24H  clopidogrel, 75 mg, Oral, Daily  dofetilide, 500 mcg, Oral, Once   Followed by  dofetilide, 250 mcg, Oral, Q12H  doxycycline, 100 mg, Oral, Q12H  gabapentin, 300 mg, Oral, Nightly  ipratropium-albuterol, 3 mL, Nebulization, 4x Daily - RT  isosorbide mononitrate, 30 mg, Oral, Daily  levothyroxine, 200 mcg, Oral, Daily  losartan, 50 mg, Oral, Q24H  nebivolol, 2.5 mg, Oral, Q24H  nitroglycerin, 1 inch, Topical, Q6H  nystatin, 1 Application, Topical, Q12H  pantoprazole, 40 mg, Oral, Daily  predniSONE, 40 mg, Oral, Daily With Breakfast  roflumilast, 500 mcg, Oral, Daily  sertraline, 100 mg, Oral, Daily  sodium chloride, 10 mL, Intravenous, Q12H  traMADol, 50 mg, Oral, Q6H      Continuous Infusions:dilTIAZem, 5-15 mg/hr, Last Rate: Stopped (08/28/24 2200)  Pharmacy Consult,       PRN Meds:.  senna-docusate sodium **AND** polyethylene glycol **AND** bisacodyl **AND** bisacodyl    Calcium Replacement - Follow Nurse / BPA Driven Protocol    ipratropium-albuterol    Magnesium Standard Dose Replacement - Follow Nurse / BPA Driven Protocol    nitroglycerin    ondansetron    Pharmacy Consult    Pharmacy Consult    Phosphorus Replacement - Follow Nurse / BPA Driven Protocol    Potassium Replacement - Follow Nurse / BPA Driven Protocol    sodium chloride    sodium chloride    sodium chloride    Assessment & Plan   Assessment & Plan     Active Hospital Problems    Diagnosis  POA    **Acute hypoxemic respiratory failure [J96.01]  Yes    Depression [F32.A]  Yes    Hyperlipidemia [E78.5]  Yes    Hypertension [I10]  Yes    Hypothyroidism [E03.9]  Yes       Resolved Hospital Problems   No resolved problems to display.        Brief Hospital Course to date:  Avelina Ceron is a 83-year-old female with history of chronic respiratory failure on 2 L NC, COPD, CAD, HTN, hypothyroidism, hyperlipidemia who presented to PCP with 1 week of  worsening cough, wheezing and fever, decreased p.o. intake and congestion.  Found to have with acute on chronic respiratory failure, with COPD exacerbation and pneumonia.    This patient's problems and plans were partially entered by my partner and updated as appropriate by me 08/30/24.    Acute on chronic respiratory failure with hypoxia  Multifocal PNA on CT imaging  COPD, likely exacerbation  Leukocytosis  -Patient with O2 sats of mid 80s at PCP, requiring 6 L NC (2 L NC at baseline), continue to wean as able  -Patient presenting with leukocytosis, noted around 30k- diff with left shift, suspect related to infection but will closely monitor   -Respiratory PCR with COVID is negative, follow-up blood cultures, urinary antigens  -CTA imaging obtained 8/28 due to worsening respiratory status and to r/o PE- noted with multifocal PNA with repeat CT recommended in short term to ensure resolution. No PE noted   -Continue broad-spectrum antibiotics with Rocephin (dosing increased) and doxycycline, steroids  -if lack of improvement in WBC or + culture results would consider ID involvement    New onset afib RVR  -patient denies past history. Cardiology consulted given new onset  -Tikosyn initiated  -ECHO done this admission with normal EF, mild VHD    HTN  -Losartan, Bystolic; Diamox x 1 per cards    Hypothyroidism  -Continue Synthroid  -TSH nl      Depression  -Continue Zoloft     CAD  PAD  Hyperlipidemia  -Continue aspirin, Plavix, atenolol, statin     Chronic back pain  -Continue tramadol     Groin yeast infection  -Continue statin    Expected Discharge Location and Transportation: home   Expected Discharge   Expected Discharge Date: 9/2/2024;  Expected Discharge Time:      VTE Prophylaxis:  Pharmacologic & mechanical VTE prophylaxis orders are present.         AM-PAC 6 Clicks Score (PT): 18 (08/29/24 2000)    CODE STATUS:   Code Status and Medical Interventions: CPR (Attempt to Resuscitate); Full Support   Ordered at: 08/26/24 1701     Level Of Support Discussed With:    Patient     Code Status (Patient has no pulse and is not breathing):    CPR (Attempt to Resuscitate)     Medical Interventions (Patient has pulse or is breathing):    Full Support       Ela Lemos MD  08/30/24

## 2024-08-30 NOTE — CASE MANAGEMENT/SOCIAL WORK
Continued Stay Note  [facility]     Patient Name: Avelina Ceron  MRN: [id number]  Today's Date: 8/30/2024    Admit Date: 8/26/2024    Plan: Home   Discharge Plan       Row Name 08/30/24 1243       Plan    Plan Home    Patient/Family in Agreement with Plan yes    Plan Comments Discussed in MDR. Ms. Ceron is not being discharged today. Physician would like for the patient to be weaned down on her oxygen. Ms. Ceron plan is still to go home at discharge. CM will continue to follow.    Final Discharge Disposition Code 01 - home or self-care                   Discharge Codes    No documentation.                 Expected Discharge Date and Time       Expected Discharge Date Expected Discharge Time    Sep 2, 2024               Dinora Joseph RN

## 2024-08-31 ENCOUNTER — APPOINTMENT (OUTPATIENT)
Dept: GENERAL RADIOLOGY | Facility: HOSPITAL | Age: 84
End: 2024-08-31
Payer: MEDICARE

## 2024-08-31 LAB
ANION GAP SERPL CALCULATED.3IONS-SCNC: 10 MMOL/L (ref 5–15)
BACTERIA SPEC AEROBE CULT: NORMAL
BACTERIA SPEC AEROBE CULT: NORMAL
BASOPHILS # BLD AUTO: 0.04 10*3/MM3 (ref 0–0.2)
BASOPHILS NFR BLD AUTO: 0.1 % (ref 0–1.5)
BUN SERPL-MCNC: 13 MG/DL (ref 8–23)
BUN/CREAT SERPL: 29.5 (ref 7–25)
CALCIUM SPEC-SCNC: 9.4 MG/DL (ref 8.6–10.5)
CHLORIDE SERPL-SCNC: 98 MMOL/L (ref 98–107)
CO2 SERPL-SCNC: 31 MMOL/L (ref 22–29)
CREAT SERPL-MCNC: 0.44 MG/DL (ref 0.57–1)
DEPRECATED RDW RBC AUTO: 48 FL (ref 37–54)
EGFRCR SERPLBLD CKD-EPI 2021: 96.1 ML/MIN/1.73
EOSINOPHIL # BLD AUTO: 0.08 10*3/MM3 (ref 0–0.4)
EOSINOPHIL NFR BLD AUTO: 0.2 % (ref 0.3–6.2)
ERYTHROCYTE [DISTWIDTH] IN BLOOD BY AUTOMATED COUNT: 12.7 % (ref 12.3–15.4)
GLUCOSE SERPL-MCNC: 133 MG/DL (ref 65–99)
HCT VFR BLD AUTO: 36.2 % (ref 34–46.6)
HGB BLD-MCNC: 11.2 G/DL (ref 12–15.9)
IMM GRANULOCYTES # BLD AUTO: 3.29 10*3/MM3 (ref 0–0.05)
IMM GRANULOCYTES NFR BLD AUTO: 9.3 % (ref 0–0.5)
LYMPHOCYTES # BLD AUTO: 2.49 10*3/MM3 (ref 0.7–3.1)
LYMPHOCYTES NFR BLD AUTO: 7 % (ref 19.6–45.3)
MACROCYTES BLD QL SMEAR: NORMAL
MAGNESIUM SERPL-MCNC: 1.8 MG/DL (ref 1.6–2.4)
MCH RBC QN AUTO: 31.5 PG (ref 26.6–33)
MCHC RBC AUTO-ENTMCNC: 30.9 G/DL (ref 31.5–35.7)
MCV RBC AUTO: 101.7 FL (ref 79–97)
MONOCYTES # BLD AUTO: 2.17 10*3/MM3 (ref 0.1–0.9)
MONOCYTES NFR BLD AUTO: 6.1 % (ref 5–12)
NEUTROPHILS NFR BLD AUTO: 27.35 10*3/MM3 (ref 1.7–7)
NEUTROPHILS NFR BLD AUTO: 77.3 % (ref 42.7–76)
NRBC BLD AUTO-RTO: 0.1 /100 WBC (ref 0–0.2)
PLAT MORPH BLD: NORMAL
PLATELET # BLD AUTO: 405 10*3/MM3 (ref 140–450)
PMV BLD AUTO: 8.7 FL (ref 6–12)
POTASSIUM SERPL-SCNC: 3.6 MMOL/L (ref 3.5–5.2)
POTASSIUM SERPL-SCNC: 5.1 MMOL/L (ref 3.5–5.2)
RBC # BLD AUTO: 3.56 10*6/MM3 (ref 3.77–5.28)
SODIUM SERPL-SCNC: 139 MMOL/L (ref 136–145)
WBC MORPH BLD: NORMAL
WBC NRBC COR # BLD AUTO: 35.42 10*3/MM3 (ref 3.4–10.8)

## 2024-08-31 PROCEDURE — 93010 ELECTROCARDIOGRAM REPORT: CPT | Performed by: INTERNAL MEDICINE

## 2024-08-31 PROCEDURE — 85025 COMPLETE CBC W/AUTO DIFF WBC: CPT | Performed by: STUDENT IN AN ORGANIZED HEALTH CARE EDUCATION/TRAINING PROGRAM

## 2024-08-31 PROCEDURE — 99233 SBSQ HOSP IP/OBS HIGH 50: CPT | Performed by: STUDENT IN AN ORGANIZED HEALTH CARE EDUCATION/TRAINING PROGRAM

## 2024-08-31 PROCEDURE — 25010000002 CEFEPIME PER 500 MG: Performed by: STUDENT IN AN ORGANIZED HEALTH CARE EDUCATION/TRAINING PROGRAM

## 2024-08-31 PROCEDURE — 94799 UNLISTED PULMONARY SVC/PX: CPT

## 2024-08-31 PROCEDURE — 85007 BL SMEAR W/DIFF WBC COUNT: CPT | Performed by: STUDENT IN AN ORGANIZED HEALTH CARE EDUCATION/TRAINING PROGRAM

## 2024-08-31 PROCEDURE — 94664 DEMO&/EVAL PT USE INHALER: CPT

## 2024-08-31 PROCEDURE — 94761 N-INVAS EAR/PLS OXIMETRY MLT: CPT

## 2024-08-31 PROCEDURE — 80048 BASIC METABOLIC PNL TOTAL CA: CPT | Performed by: STUDENT IN AN ORGANIZED HEALTH CARE EDUCATION/TRAINING PROGRAM

## 2024-08-31 PROCEDURE — 99232 SBSQ HOSP IP/OBS MODERATE 35: CPT | Performed by: INTERNAL MEDICINE

## 2024-08-31 PROCEDURE — 83735 ASSAY OF MAGNESIUM: CPT

## 2024-08-31 PROCEDURE — 93005 ELECTROCARDIOGRAM TRACING: CPT | Performed by: STUDENT IN AN ORGANIZED HEALTH CARE EDUCATION/TRAINING PROGRAM

## 2024-08-31 PROCEDURE — 93005 ELECTROCARDIOGRAM TRACING: CPT | Performed by: INTERNAL MEDICINE

## 2024-08-31 PROCEDURE — 84132 ASSAY OF SERUM POTASSIUM: CPT | Performed by: STUDENT IN AN ORGANIZED HEALTH CARE EDUCATION/TRAINING PROGRAM

## 2024-08-31 PROCEDURE — 85060 BLOOD SMEAR INTERPRETATION: CPT | Performed by: STUDENT IN AN ORGANIZED HEALTH CARE EDUCATION/TRAINING PROGRAM

## 2024-08-31 PROCEDURE — 71045 X-RAY EXAM CHEST 1 VIEW: CPT

## 2024-08-31 RX ORDER — POTASSIUM CHLORIDE 1500 MG/1
40 TABLET, EXTENDED RELEASE ORAL EVERY 4 HOURS
Status: COMPLETED | OUTPATIENT
Start: 2024-08-31 | End: 2024-08-31

## 2024-08-31 RX ADMIN — TRAMADOL HYDROCHLORIDE 50 MG: 50 TABLET ORAL at 03:01

## 2024-08-31 RX ADMIN — LEVOTHYROXINE SODIUM 200 MCG: 0.1 TABLET ORAL at 05:23

## 2024-08-31 RX ADMIN — SERTRALINE 100 MG: 100 TABLET, FILM COATED ORAL at 08:58

## 2024-08-31 RX ADMIN — NYSTATIN 1 APPLICATION: 100000 CREAM TOPICAL at 21:31

## 2024-08-31 RX ADMIN — TRAMADOL HYDROCHLORIDE 50 MG: 50 TABLET ORAL at 21:30

## 2024-08-31 RX ADMIN — CEFEPIME 2000 MG: 2 INJECTION, POWDER, FOR SOLUTION INTRAVENOUS at 11:16

## 2024-08-31 RX ADMIN — SENNOSIDES AND DOCUSATE SODIUM 2 TABLET: 50; 8.6 TABLET ORAL at 18:05

## 2024-08-31 RX ADMIN — IPRATROPIUM BROMIDE AND ALBUTEROL SULFATE 3 ML: 2.5; .5 SOLUTION RESPIRATORY (INHALATION) at 17:12

## 2024-08-31 RX ADMIN — Medication 10 ML: at 08:59

## 2024-08-31 RX ADMIN — DOFETILIDE 250 MCG: 0.25 CAPSULE ORAL at 21:30

## 2024-08-31 RX ADMIN — CLOPIDOGREL BISULFATE 75 MG: 75 TABLET ORAL at 08:59

## 2024-08-31 RX ADMIN — CEFEPIME 2000 MG: 2 INJECTION, POWDER, FOR SOLUTION INTRAVENOUS at 21:29

## 2024-08-31 RX ADMIN — IPRATROPIUM BROMIDE AND ALBUTEROL SULFATE 3 ML: 2.5; .5 SOLUTION RESPIRATORY (INHALATION) at 08:13

## 2024-08-31 RX ADMIN — APIXABAN 5 MG: 5 TABLET, FILM COATED ORAL at 08:58

## 2024-08-31 RX ADMIN — ROFLUMILAST 500 MCG: 500 TABLET ORAL at 08:58

## 2024-08-31 RX ADMIN — TRAMADOL HYDROCHLORIDE 50 MG: 50 TABLET ORAL at 14:14

## 2024-08-31 RX ADMIN — IPRATROPIUM BROMIDE AND ALBUTEROL SULFATE 3 ML: 2.5; .5 SOLUTION RESPIRATORY (INHALATION) at 13:10

## 2024-08-31 RX ADMIN — ATORVASTATIN CALCIUM 20 MG: 20 TABLET, FILM COATED ORAL at 21:30

## 2024-08-31 RX ADMIN — ISOSORBIDE MONONITRATE 30 MG: 30 TABLET, EXTENDED RELEASE ORAL at 08:58

## 2024-08-31 RX ADMIN — Medication 10 ML: at 21:31

## 2024-08-31 RX ADMIN — GABAPENTIN 300 MG: 300 CAPSULE ORAL at 21:30

## 2024-08-31 RX ADMIN — PANTOPRAZOLE SODIUM 40 MG: 40 TABLET, DELAYED RELEASE ORAL at 08:58

## 2024-08-31 RX ADMIN — APIXABAN 5 MG: 5 TABLET, FILM COATED ORAL at 21:30

## 2024-08-31 RX ADMIN — NYSTATIN 1 APPLICATION: 100000 CREAM TOPICAL at 08:58

## 2024-08-31 RX ADMIN — DOXYCYCLINE 100 MG: 100 CAPSULE ORAL at 08:58

## 2024-08-31 RX ADMIN — IPRATROPIUM BROMIDE AND ALBUTEROL SULFATE 3 ML: 2.5; .5 SOLUTION RESPIRATORY (INHALATION) at 20:11

## 2024-08-31 RX ADMIN — LOSARTAN POTASSIUM 50 MG: 50 TABLET, FILM COATED ORAL at 08:58

## 2024-08-31 RX ADMIN — POTASSIUM CHLORIDE 40 MEQ: 1500 TABLET, EXTENDED RELEASE ORAL at 14:14

## 2024-08-31 RX ADMIN — POTASSIUM CHLORIDE 40 MEQ: 1500 TABLET, EXTENDED RELEASE ORAL at 18:04

## 2024-08-31 RX ADMIN — DOFETILIDE 250 MCG: 0.25 CAPSULE ORAL at 08:58

## 2024-08-31 RX ADMIN — TRAMADOL HYDROCHLORIDE 50 MG: 50 TABLET ORAL at 08:58

## 2024-08-31 NOTE — PROGRESS NOTES
[facility]  PROGRESS NOTE    Patient Name: Avelina Ceron  : 1940  MRN: [id number]    Date of Admission: 2024  Primary Care Physician: Dre Moura MD    Subjective   Subjective     CC:  Acute hypoxic resp failure, PNA    HPI:  No chest pain.  Reports cough is decreased.  Shortness of breath about the same.  No nausea or vomiting.  Tolerating diet.  No bowel movement.    Objective   Objective     Vital Signs:   Temp:  [96.6 °F (35.9 °C)-98 °F (36.7 °C)] 96.6 °F (35.9 °C)  Heart Rate:  [42-67] 55  Resp:  [18-24] 18  BP: (114-171)/(43-89) 154/56  Flow (L/min):  [6] 6     Physical Exam:  Constitutional: No acute distress, awake, alert, sitting up  HENT: NCAT, mucous membranes moist  Respiratory: Coarse throughout, similar to yesterday  Cardiovascular: RRR, no murmurs, rubs, or gallops  Gastrointestinal: Positive bowel sounds, soft, nontender, nondistended  Musculoskeletal: No bilateral ankle edema  Psychiatric: Appropriate affect, cooperative  Neurologic: Alert, oriented, symmetric facies, moves all extremities, speech clear  Skin: No rashes    Results Reviewed:  LAB RESULTS:      Lab 24  0124 24  0800 24  0728 24  1513   WBC 30.95* 30.26* 31.34* 27.35*   HEMOGLOBIN 10.6* 10.8* 11.0* 11.0*   HEMATOCRIT 34.0 34.6 35.2 35.2   PLATELETS 433 437 472* 365   NEUTROS ABS 26.00* 24.51* 24.45* 23.93*   IMMATURE GRANS (ABS)  --   --   --  0.30*   LYMPHS ABS  --   --   --  1.12   MONOS ABS  --   --   --  1.92*   EOS ABS 0.00 0.00 0.00 0.00   .5* 100.9* 101.1* 101.1*   PROCALCITONIN  --   --   --  0.14   LACTATE  --   --   --  1.5         Lab 24  0124 24  0759 24  2317 24  1321 24  0803 24  0916 24  1513   SODIUM 139 141  --  140 143  --  139   POTASSIUM 4.3 4.3 4.2 3.4* 3.5   < > 3.5   CHLORIDE 93* 95*  --  92* 96*  --  92*   CO2 40.0* 39.0*  --  37.0* 41.0*  --  36.0*   ANION GAP 6.0 7.0  --   11.0 6.0  --  11.0   BUN 9 8  --  7* 8  --  12   CREATININE 0.34* 0.35*  --  0.33* 0.37*  --  0.50*   EGFR 102.3 101.6  --  103.0 100.2  --  93.2   GLUCOSE 100* 105*  --  154* 113*  --  136*   CALCIUM 9.3 9.0  --  9.3 9.7  --  9.4   MAGNESIUM  --   --   --  1.7  --   --   --    TSH  --   --   --   --  0.382  --   --     < > = values in this interval not displayed.         Lab 08/30/24  0124 08/28/24  0803 08/26/24  1513   TOTAL PROTEIN 6.4 6.7 7.0   ALBUMIN 3.1* 3.3* 3.2*   GLOBULIN 3.3 3.4 3.8   ALT (SGPT) 36* 55* 20   AST (SGOT) 24 75* 27   BILIRUBIN 0.4 0.4 0.4   ALK PHOS 90 109 106         Lab 08/30/24  0124 08/28/24  1421 08/28/24  1321 08/26/24  1513   PROBNP 2,318.0*  --   --  3,091.0*   HSTROP T  --  37* 45* 31*                 Lab 08/27/24  0006   PH, ARTERIAL 7.419   PCO2, ARTERIAL 59.0*   PO2 ART 70.8*   FIO2 40   HCO3 ART 38.1*   BASE EXCESS ART 11.7*   CARBOXYHEMOGLOBIN 1.4     Brief Urine Lab Results  (Last result in the past 365 days)        Color   Clarity   Blood   Leuk Est   Nitrite   Protein   CREAT   Urine HCG        07/25/24 1449 Yellow   Clear   Negative   500 Yen/ul   Negative   30 mg/dL                   Microbiology Results Abnormal       Procedure Component Value - Date/Time    Blood Culture - Blood, Hand, Left [[id number]]  (Normal) Collected: 08/26/24 1530    Lab Status: Preliminary result Specimen: Blood from Hand, Left Updated: 08/30/24 1700     Blood Culture No growth at 4 days    Blood Culture - Blood, Arm, Right [[id number]]  (Normal) Collected: 08/26/24 1630    Lab Status: Preliminary result Specimen: Blood from Arm, Right Updated: 08/30/24 1700     Blood Culture No growth at 4 days    COVID PRE-OP / PRE-PROCEDURE SCREENING ORDER (NO ISOLATION) - Swab, Nasopharynx [[id number]]  (Normal) Collected: 08/26/24 1441    Lab Status: Final result Specimen: Swab from Nasopharynx Updated: 08/26/24 6530    Narrative:      The following orders were created for panel order COVID PRE-OP /  PRE-PROCEDURE SCREENING ORDER (NO ISOLATION) - Swab, Nasopharynx.  Procedure                               Abnormality         Status                     ---------                               -----------         ------                     COVID-19 and FLU A/B PCR...[[id number]]  Normal              Final result                 Please view results for these tests on the individual orders.    COVID-19 and FLU A/B PCR, 1 HR TAT - Swab, Nasopharynx [[id number]]  (Normal) Collected: 08/26/24 1441    Lab Status: Final result Specimen: Swab from Nasopharynx Updated: 08/26/24 1519     COVID19 Not Detected     Influenza A PCR Not Detected     Influenza B PCR Not Detected    Narrative:      Fact sheet for providers: https://www.fda.gov/media/084230/download    Fact sheet for patients: https://www.fda.gov/media/857139/download    Test performed by PCR.            No radiology results from the last 24 hrs    Results for orders placed during the hospital encounter of 08/26/24    Adult Transthoracic Echo Limited W/ Cont if Necessary Per Protocol    Interpretation Summary    Left ventricular systolic function is normal. Calculated left ventricular EF = 64%    Moderate tricuspid valve regurgitation is present.    Estimated right ventricular systolic pressure from tricuspid regurgitation is moderately elevated (45-55 mmHg).    Mild MR      Current medications:  Scheduled Meds:apixaban, 5 mg, Oral, Q12H  atorvastatin, 20 mg, Oral, Nightly  cefTRIAXone, 2,000 mg, Intravenous, Q24H  clopidogrel, 75 mg, Oral, Daily  dofetilide, 500 mcg, Oral, Once   Followed by  dofetilide, 250 mcg, Oral, Q12H  doxycycline, 100 mg, Oral, Q12H  gabapentin, 300 mg, Oral, Nightly  ipratropium-albuterol, 3 mL, Nebulization, 4x Daily - RT  isosorbide mononitrate, 30 mg, Oral, Daily  levothyroxine, 200 mcg, Oral, Daily  losartan, 50 mg, Oral, Q24H  [Held by provider] nebivolol, 2.5 mg, Oral, Q24H  nitroglycerin, 1 inch, Topical, Q6H  nystatin, 1 Application,  Topical, Q12H  pantoprazole, 40 mg, Oral, Daily  roflumilast, 500 mcg, Oral, Daily  sertraline, 100 mg, Oral, Daily  sodium chloride, 10 mL, Intravenous, Q12H  traMADol, 50 mg, Oral, Q6H      Continuous Infusions:dilTIAZem, 5-15 mg/hr, Last Rate: Stopped (08/28/24 2200)  Pharmacy Consult,       PRN Meds:.  senna-docusate sodium **AND** polyethylene glycol **AND** bisacodyl **AND** bisacodyl    Calcium Replacement - Follow Nurse / BPA Driven Protocol    ipratropium-albuterol    Magnesium Standard Dose Replacement - Follow Nurse / BPA Driven Protocol    nitroglycerin    ondansetron    Pharmacy Consult    Pharmacy Consult    Phosphorus Replacement - Follow Nurse / BPA Driven Protocol    Potassium Replacement - Follow Nurse / BPA Driven Protocol    sodium chloride    sodium chloride    sodium chloride    Assessment & Plan   Assessment & Plan     Active Hospital Problems    Diagnosis  POA    **Acute hypoxemic respiratory failure [J96.01]  Yes    Depression [F32.A]  Yes    Hyperlipidemia [E78.5]  Yes    Hypertension [I10]  Yes    Hypothyroidism [E03.9]  Yes      Resolved Hospital Problems   No resolved problems to display.        Brief Hospital Course to date:  Avelina Ceron is a 83-year-old female with history of chronic respiratory failure on 2 L NC, COPD, CAD, HTN, hypothyroidism, hyperlipidemia who presented to PCP with 1 week of  worsening cough, wheezing and fever, decreased p.o. intake and congestion.  Found to have with acute on chronic respiratory failure, with COPD exacerbation and pneumonia.    This patient's problems and plans were partially entered by my partner and updated as appropriate by me 08/31/24.    Acute on chronic respiratory failure with hypoxia  Multifocal PNA on CT imaging  COPD, likely exacerbation  Leukocytosis  -Patient with O2 sats of mid 80s at PCP, requiring 6 L NC (2 L NC at baseline), continue to wean as able  -Patient presented with leukocytosis, noted around 30k- diff with left shift,  suspect related to infection but will closely monitor   -Respiratory PCR with COVID is negative, follow-up blood cultures, urinary antigens  -CTA imaging obtained 8/28 due to worsening respiratory status and to r/o PE- noted with multifocal PNA with repeat CT recommended in short term to ensure resolution. No PE noted   -Continue broad-spectrum antibiotics with Rocephin (dosing increased) and doxycycline, steroids  -Chest x-ray in a.m. labs pending; based on this, will decide if need to adjust antibiotics    New onset afib RVR  -patient denies past history. Cardiology consulted given new onset  -Tikosyn initiated  -Bystolic held bc had blocked PACs and bradycardia after 1 dose  -ECHO done this admission with normal EF, mild VHD    HTN  -Losartan; s/p Diamox x 1 per cards    Hypothyroidism  -Continue Synthroid  -TSH nl      Depression  -Continue Zoloft     CAD  PAD  Hyperlipidemia  -Continue aspirin, Plavix, statin     Chronic back pain  -Continue tramadol     Groin yeast infection  -Continue statin    Addendum: WBC count increasing, peripheral smear added on, ceftriaxone changed to cefepime    Expected Discharge Location and Transportation: home   Expected Discharge   Expected Discharge Date: 9/2/2024; Expected Discharge Time:      VTE Prophylaxis:  Pharmacologic & mechanical VTE prophylaxis orders are present.         AM-PAC 6 Clicks Score (PT): 16 (08/30/24 2036)    CODE STATUS:   Code Status and Medical Interventions: CPR (Attempt to Resuscitate); Full Support   Ordered at: 08/26/24 1701     Level Of Support Discussed With:    Patient     Code Status (Patient has no pulse and is not breathing):    CPR (Attempt to Resuscitate)     Medical Interventions (Patient has pulse or is breathing):    Full Support       Ela Lemos MD  08/31/24

## 2024-08-31 NOTE — PROGRESS NOTES
[facility]  IP Progress Note      Chief Complaint/Reason for service: #1 PAF with RVR #2 hypertension    Subjective   Subjective:     Objective     Vital Sign Min/Max for last 24 hours  Temp  Min: 96.6 °F (35.9 °C)  Max: 98 °F (36.7 °C)   BP  Min: 114/43  Max: 154/56   Pulse  Min: 38  Max: 67   Resp  Min: 18  Max: 24   SpO2  Min: 91 %  Max: 97 %   Flow (L/min)  Min: 6  Max: 7      Intake/Output Summary (Last 24 hours) at 8/31/2024 0950  Last data filed at 8/31/2024 0500  Gross per 24 hour   Intake --   Output 2900 ml   Net -2900 ml             Current Facility-Administered Medications:     apixaban (ELIQUIS) tablet 5 mg, 5 mg, Oral, Q12H, Que Herr MD, 5 mg at 08/31/24 0858    atorvastatin (LIPITOR) tablet 20 mg, 20 mg, Oral, Nightly, Yara Daly MD, 20 mg at 08/30/24 2036    sennosides-docusate (PERICOLACE) 8.6-50 MG per tablet 2 tablet, 2 tablet, Oral, BID PRN, 2 tablet at 08/27/24 1436 **AND** polyethylene glycol (MIRALAX) packet 17 g, 17 g, Oral, Daily PRN **AND** bisacodyl (DULCOLAX) EC tablet 5 mg, 5 mg, Oral, Daily PRN **AND** bisacodyl (DULCOLAX) suppository 10 mg, 10 mg, Rectal, Daily PRN, Yara Daly MD    Calcium Replacement - Follow Nurse / BPA Driven Protocol, , Does not apply, PRN, Que Herr MD    cefTRIAXone (ROCEPHIN) 2,000 mg in sodium chloride 0.9 % 100 mL MBP, 2,000 mg, Intravenous, Q24H, Ela Lemos MD, Last Rate: 200 mL/hr at 08/30/24 1234, 2,000 mg at 08/30/24 1234    clopidogrel (PLAVIX) tablet 75 mg, 75 mg, Oral, Daily, Yraa Daly MD, 75 mg at 08/31/24 0859    dilTIAZem (CARDIZEM) 125 mg in 125 mL D5W infusion, 5-15 mg/hr, Intravenous, Continuous, Beena Gray MD, Stopped at 08/28/24 2200    [COMPLETED] dofetilide (TIKOSYN) capsule 500 mcg, 500 mcg, Oral, Once, 500 mcg at 08/28/24 1821 **FOLLOWED BY** [COMPLETED] dofetilide (TIKOSYN) capsule 500 mcg, 500 mcg, Oral, Once, 500 mcg at 08/29/24 0408 **FOLLOWED BY**  [COMPLETED] dofetilide (TIKOSYN) capsule 500 mcg, 500 mcg, Oral, Once, 500 mcg at 08/29/24 1407 **FOLLOWED BY** dofetilide (TIKOSYN) capsule 500 mcg, 500 mcg, Oral, Once **FOLLOWED BY** dofetilide (TIKOSYN) capsule 250 mcg, 250 mcg, Oral, Q12H, Gerald Edwards PA-C, 250 mcg at 08/31/24 0858    gabapentin (NEURONTIN) capsule 300 mg, 300 mg, Oral, Nightly, Yara Daly MD, 300 mg at 08/30/24 2036    ipratropium-albuterol (DUO-NEB) nebulizer solution 3 mL, 3 mL, Nebulization, 4x Daily - RT, Yara Daly MD, 3 mL at 08/31/24 0813    ipratropium-albuterol (DUO-NEB) nebulizer solution 3 mL, 3 mL, Nebulization, Q4H PRN, Martina Chiang APRN    isosorbide mononitrate (IMDUR) 24 hr tablet 30 mg, 30 mg, Oral, Daily, Yara Daly MD, 30 mg at 08/31/24 0858    levothyroxine (SYNTHROID, LEVOTHROID) tablet 200 mcg, 200 mcg, Oral, Daily, Yara Daly MD, 200 mcg at 08/31/24 0523    losartan (COZAAR) tablet 50 mg, 50 mg, Oral, Q24H, Que Herr MD, 50 mg at 08/31/24 0858    Magnesium Standard Dose Replacement - Follow Nurse / BPA Driven Protocol, , Does not apply, PRN, Yara Daly MD    [Held by provider] nebivolol (BYSTOLIC) tablet 2.5 mg, 2.5 mg, Oral, Q24H, Que Herr MD, 2.5 mg at 08/30/24 0810    nitroglycerin (NITROSTAT) ointment 1 inch, 1 inch, Topical, Q6H, Que Herr MD, 1 inch at 08/30/24 1833    nitroglycerin (NITROSTAT) SL tablet 0.4 mg, 0.4 mg, Sublingual, Q5 Min PRN, Que Herr MD    nystatin (MYCOSTATIN) [id number] UNIT/GM cream 1 Application, 1 Application, Topical, Q12H, Yara Daly MD, 1 Application at 08/31/24 0858    ondansetron (ZOFRAN) injection 4 mg, 4 mg, Intravenous, Q6H PRN, Yara Daly MD    pantoprazole (PROTONIX) EC tablet 40 mg, 40 mg, Oral, Daily, Yara Daly MD, 40 mg at 08/31/24 0858    Pharmacy Consult, , Does not apply, Continuous PRN, Yara Daly MD    Pharmacy Consult, , Does not apply, Once PRN, Que Herr MD     Phosphorus Replacement - Follow Nurse / BPA Driven Protocol, , Does not apply, Nemesio JEFF Robert Todd, MD    Potassium Replacement - Follow Nurse / BPA Driven Protocol, , Does not apply, Nemesio JEFF Robert Todd, MD    roflumilast (DALIRESP) tablet 500 mcg, 500 mcg, Oral, Daily, Yara Daly MD, 500 mcg at 08/31/24 0858    sertraline (ZOLOFT) tablet 100 mg, 100 mg, Oral, Daily, Yara Daly MD, 100 mg at 08/31/24 0858    sodium chloride 0.9 % flush 10 mL, 10 mL, Intravenous, PRN, Kendall Smyth MD    sodium chloride 0.9 % flush 10 mL, 10 mL, Intravenous, Q12H, Yara Daly MD, 10 mL at 08/31/24 0859    sodium chloride 0.9 % flush 10 mL, 10 mL, Intravenous, PRN, Yara Daly MD    sodium chloride 0.9 % infusion 40 mL, 40 mL, Intravenous, PRN, Yara Daly MD    traMADol (ULTRAM) tablet 50 mg, 50 mg, Oral, Q6H, Beena Gray MD, 50 mg at 08/31/24 0858    Physical Exam: General NAD        HEENT: No JVP.  Nasal O2 present.       Respiratory: Equal bilateral symmetrical expansion with faint wheezing on the left lung.  No rhonchi and there is a rare crackle       Cardiovascular: Regular rate and rhythm with systolic ejection murmur no edema palpation       GI: Soft       Lower Extremities: No obvious lesions       Neuro: Facial expressions are symmetrical moves all 4 extremities       Skin: Warm and dry no edema palpation       Psych: Pleasant affect    Results Review: BNP is down to 2318 from 3091.  Output exceeds intake by 1.9 L.  GFR is 102.3 bicarb is up to 40.  Heart rate 77-87.  Blood pressures 150 4179.    Radiology Results:  Imaging Results (Last 72 Hours)       Procedure Component Value Units Date/Time    CT Angiogram Chest [[id number]] Collected: 08/28/24 1742     Updated: 08/28/24 1752    Narrative:      CT ANGIOGRAM CHEST    Date of Exam: 8/28/2024 4:41 PM EDT    Indication: hypoxia, worsening leukocytosis.    Comparison: Chest CT dated 8/4/2020    Technique: CTA of the chest was  performed after the uneventful intravenous administration of 80 mL Isovue-370. Reconstructed coronal and sagittal images were also obtained. In addition, a 3-D volume rendered image was created for interpretation.   Automated exposure control and iterative reconstruction methods were used.      FINDINGS:    Thoracic inlet: Thyroid gland is diminutive or absent. There are surgical clips within the left lower neck.    Pulmonary arteries: No filling defects are identified within the pulmonary arteries to suggest acute pulmonary embolism.    Great vessels: Atherosclerotic plaque is seen within the thoracic aorta and proximal arch vessels.    Mediastinum/Jenna: Scattered mildly enlarged mediastinal lymph nodes, possibly reactive. There is a large hiatal hernia. The esophagus is otherwise unremarkable.    Lung parenchyma/pleura: Pulmonary emphysema. Patchy/nodular airspace disease is present within both lungs, most confluent within the inferior lingula. Appearance is concerning for multifocal pneumonia. There are small bilateral pleural effusions, left   greater than right. There is bibasilar atelectasis. No pneumothorax is seen.    Trachea and airways: The trachea and central airways appear unremarkable.    Heart and pericardium: Coronary artery calcifications. Otherwise, the heart and pericardium appear unremarkable.    Chest wall: No acute or suspicious osseous or soft tissue lesion is identified. Bones are demineralized. There are degenerative changes within the spine. Apparent chronic remodeling of the right proximal humerus.    Upper abdomen: No acute abnormality is identified within the visualized upper abdomen. Large hiatal hernia. Status post cholecystectomy.      Impression:      1.No evidence of acute pulmonary embolism.  2.Bilateral multifocal pneumonia, most confluent within the inferior lingula. Recommend short interval CT follow-up to confirm resolution.  3.Small bilateral pleural effusions, left greater  than right, with bibasilar atelectasis.  4.Pulmonary emphysema. Please correlate with patient's smoking history/risk factors to determine whether the patient meets criteria for routine lung cancer screening with low dose chest CT.  5.Hiatal hernia.          Electronically Signed: Ramon Escobar MD    8/28/2024 5:49 PM EDT    Workstation ID: [id number]            EKG: QTc 486     ECHO: Preserved wall motion EF    Tele: pauses / bradycardia.     Assessment   Assessment/Plan:    1 PAF RVR-the rhythm is overall improved on Tikosyn started 8/29 Echo EF >60% rvsp 50.  Blocked PACs and bradycardia after one dose of bystolic now held.   QTC acceptable  2 hypertension- losartan 50 mg   3 COPD w chronic CO2 retention (baseline 2L NC) admitted 8/26 w pneumonitis acute resp insuff    Michael Webber MD  08/31/24  09:50 EDT

## 2024-08-31 NOTE — PLAN OF CARE
Problem: Fluid Imbalance (Pneumonia)  Goal: Fluid Balance  Outcome: Ongoing, Progressing  Intervention: Monitor and Manage Fluid Balance  Recent Flowsheet Documentation  Taken 8/31/2024 0200 by Nelda Ruiz RN  Fluid/Electrolyte Management: fluids provided  Taken 8/31/2024 0000 by Nelda Ruiz RN  Fluid/Electrolyte Management: fluids provided  Taken 8/30/2024 2200 by Nelda Ruiz RN  Fluid/Electrolyte Management: fluids provided  Taken 8/30/2024 2036 by Nelda Ruiz RN  Fluid/Electrolyte Management: fluids provided     Problem: Infection (Pneumonia)  Goal: Resolution of Infection Signs and Symptoms  Outcome: Ongoing, Progressing     Problem: Respiratory Compromise (Pneumonia)  Goal: Effective Oxygenation and Ventilation  Outcome: Ongoing, Progressing  Intervention: Promote Airway Secretion Clearance  Recent Flowsheet Documentation  Taken 8/31/2024 0200 by Nelda Ruiz RN  Breathing Techniques/Airway Clearance: deep/controlled cough encouraged  Cough And Deep Breathing: done independently per patient  Taken 8/31/2024 0000 by Nelda Ruiz RN  Breathing Techniques/Airway Clearance: deep/controlled cough encouraged  Cough And Deep Breathing: done independently per patient  Taken 8/30/2024 2200 by Nelda Ruiz RN  Breathing Techniques/Airway Clearance: deep/controlled cough encouraged  Cough And Deep Breathing: done independently per patient  Taken 8/30/2024 2036 by Nelda Ruiz RN  Breathing Techniques/Airway Clearance: deep/controlled cough encouraged  Cough And Deep Breathing: done independently per patient  Intervention: Optimize Oxygenation and Ventilation  Recent Flowsheet Documentation  Taken 8/31/2024 0200 by Nelda Ruiz RN  Head of Bed (HOB) Positioning: HOB elevated  Airway/Ventilation Management: calming measures promoted  Taken 8/31/2024 0000 by Nelda Ruiz RN  Head of Bed (HOB) Positioning: HOB elevated  Airway/Ventilation Management: calming measures  promoted  Taken 8/30/2024 2200 by Nelda Ruiz RN  Head of Bed (HOB) Positioning: HOB elevated  Airway/Ventilation Management: calming measures promoted  Taken 8/30/2024 2036 by Nelda Ruiz RN  Head of Bed (HOB) Positioning: HOB elevated  Airway/Ventilation Management: calming measures promoted     Problem: Adult Inpatient Plan of Care  Goal: Plan of Care Review  Outcome: Ongoing, Progressing  Goal: Patient-Specific Goal (Individualized)  Outcome: Ongoing, Progressing  Goal: Absence of Hospital-Acquired Illness or Injury  Outcome: Ongoing, Progressing  Intervention: Identify and Manage Fall Risk  Recent Flowsheet Documentation  Taken 8/31/2024 0200 by Nelda Ruiz RN  Safety Promotion/Fall Prevention:   activity supervised   assistive device/personal items within reach   clutter free environment maintained   fall prevention program maintained   lighting adjusted   nonskid shoes/slippers when out of bed   room organization consistent   safety round/check completed  Taken 8/31/2024 0000 by Nelda Ruiz RN  Safety Promotion/Fall Prevention:   activity supervised   assistive device/personal items within reach   clutter free environment maintained   fall prevention program maintained   lighting adjusted   nonskid shoes/slippers when out of bed   room organization consistent   safety round/check completed  Taken 8/30/2024 2200 by Nelda Ruiz RN  Safety Promotion/Fall Prevention:   activity supervised   assistive device/personal items within reach   clutter free environment maintained   fall prevention program maintained   lighting adjusted   nonskid shoes/slippers when out of bed   room organization consistent   safety round/check completed  Taken 8/30/2024 2036 by Nelda Ruiz RN  Safety Promotion/Fall Prevention:   activity supervised   assistive device/personal items within reach   clutter free environment maintained   fall prevention program maintained   lighting adjusted   nonskid  shoes/slippers when out of bed   room organization consistent   safety round/check completed  Intervention: Prevent Skin Injury  Recent Flowsheet Documentation  Taken 8/31/2024 0200 by Nelda Ruiz RN  Body Position:   supine   neutral body alignment   neutral head position  Skin Protection:   adhesive use limited   tubing/devices free from skin contact   transparent dressing maintained  Taken 8/31/2024 0000 by Nelda Ruiz RN  Body Position:   turned   right   head facing, right  Skin Protection:   adhesive use limited   tubing/devices free from skin contact   transparent dressing maintained  Taken 8/30/2024 2200 by Nelda Ruiz RN  Body Position:   supine   neutral head position   neutral body alignment  Skin Protection:   adhesive use limited   tubing/devices free from skin contact   transparent dressing maintained  Taken 8/30/2024 2036 by Nelda Ruiz RN  Body Position: sitting up in bed  Skin Protection:   adhesive use limited   tubing/devices free from skin contact   transparent dressing maintained  Intervention: Prevent and Manage VTE (Venous Thromboembolism) Risk  Recent Flowsheet Documentation  Taken 8/31/2024 0200 by Nelda Ruiz RN  Activity Management: activity encouraged  Taken 8/31/2024 0000 by Nelda Ruiz RN  Activity Management: activity encouraged  Taken 8/30/2024 2200 by Nelda Ruiz RN  Activity Management: activity encouraged  Taken 8/30/2024 2036 by Nelda Ruiz RN  Activity Management: activity encouraged  VTE Prevention/Management:   bilateral   sequential compression devices off   patient refused intervention  Range of Motion: active ROM (range of motion) encouraged  Intervention: Prevent Infection  Recent Flowsheet Documentation  Taken 8/31/2024 0200 by Nelda Ruiz RN  Infection Prevention:   cohorting utilized   environmental surveillance performed   hand hygiene promoted   rest/sleep promoted  Taken 8/31/2024 0000 by Nelda Ruiz  RN  Infection Prevention:   cohorting utilized   environmental surveillance performed   hand hygiene promoted   rest/sleep promoted  Taken 8/30/2024 2200 by Nelda Ruiz RN  Infection Prevention:   cohorting utilized   environmental surveillance performed   hand hygiene promoted   rest/sleep promoted  Taken 8/30/2024 2036 by Nelda Ruiz RN  Infection Prevention:   cohorting utilized   environmental surveillance performed   hand hygiene promoted   rest/sleep promoted  Goal: Optimal Comfort and Wellbeing  Outcome: Ongoing, Progressing  Intervention: Provide Person-Centered Care  Recent Flowsheet Documentation  Taken 8/30/2024 2036 by Nelda Ruiz RN  Trust Relationship/Rapport:   care explained   choices provided   empathic listening provided   emotional support provided   questions answered   questions encouraged   reassurance provided   thoughts/feelings acknowledged  Goal: Readiness for Transition of Care  Outcome: Ongoing, Progressing     Problem: Skin Injury Risk Increased  Goal: Skin Health and Integrity  Outcome: Ongoing, Progressing  Intervention: Promote and Optimize Oral Intake  Recent Flowsheet Documentation  Taken 8/31/2024 0200 by Nelda Ruiz RN  Oral Nutrition Promotion: rest periods promoted  Taken 8/31/2024 0000 by Nelda Ruiz RN  Oral Nutrition Promotion: rest periods promoted  Taken 8/30/2024 2200 by Nelda Ruiz RN  Oral Nutrition Promotion: rest periods promoted  Taken 8/30/2024 2036 by Nelda Ruiz RN  Oral Nutrition Promotion: rest periods promoted  Intervention: Optimize Skin Protection  Recent Flowsheet Documentation  Taken 8/31/2024 0200 by Nelda Ruiz RN  Pressure Reduction Techniques:   frequent weight shift encouraged   weight shift assistance provided   pressure points protected  Head of Bed (HOB) Positioning: HOB elevated  Pressure Reduction Devices:   pressure-redistributing mattress utilized   positioning supports utilized  Skin Protection:    adhesive use limited   tubing/devices free from skin contact   transparent dressing maintained  Taken 8/31/2024 0000 by Nelda Ruiz RN  Pressure Reduction Techniques:   frequent weight shift encouraged   weight shift assistance provided   pressure points protected  Head of Bed (HOB) Positioning: HOB elevated  Pressure Reduction Devices:   pressure-redistributing mattress utilized   positioning supports utilized  Skin Protection:   adhesive use limited   tubing/devices free from skin contact   transparent dressing maintained  Taken 8/30/2024 2200 by Nelda Ruiz RN  Pressure Reduction Techniques:   frequent weight shift encouraged   weight shift assistance provided   pressure points protected  Head of Bed (HOB) Positioning: HOB elevated  Pressure Reduction Devices:   pressure-redistributing mattress utilized   positioning supports utilized  Skin Protection:   adhesive use limited   tubing/devices free from skin contact   transparent dressing maintained  Taken 8/30/2024 2036 by Nelda Ruiz RN  Pressure Reduction Techniques:   frequent weight shift encouraged   weight shift assistance provided   pressure points protected  Head of Bed (HOB) Positioning: HOB elevated  Pressure Reduction Devices:   pressure-redistributing mattress utilized   positioning supports utilized  Skin Protection:   adhesive use limited   tubing/devices free from skin contact   transparent dressing maintained     Problem: Adjustment to Illness (Sepsis/Septic Shock)  Goal: Optimal Coping  Outcome: Ongoing, Progressing  Intervention: Optimize Psychosocial Adjustment to Illness  Recent Flowsheet Documentation  Taken 8/31/2024 0200 by Nelda Ruiz RN  Supportive Measures:   active listening utilized   self-care encouraged  Taken 8/31/2024 0000 by Nelda Ruiz RN  Supportive Measures:   active listening utilized   self-care encouraged  Taken 8/30/2024 2200 by Nelda Ruiz RN  Supportive Measures:   active listening  utilized   self-care encouraged  Taken 8/30/2024 2036 by Nelda Ruiz RN  Supportive Measures:   active listening utilized   self-care encouraged  Family/Support System Care: self-care encouraged     Problem: Bleeding (Sepsis/Septic Shock)  Goal: Absence of Bleeding  Outcome: Ongoing, Progressing     Problem: Glycemic Control Impaired (Sepsis/Septic Shock)  Goal: Blood Glucose Level Within Desired Range  Outcome: Ongoing, Progressing  Intervention: Optimize Glycemic Control  Recent Flowsheet Documentation  Taken 8/31/2024 0200 by Nelda Ruiz RN  Glycemic Management: oral hydration promoted  Taken 8/31/2024 0000 by Nelda Ruiz RN  Glycemic Management: oral hydration promoted  Taken 8/30/2024 2200 by Nelda Ruiz RN  Glycemic Management: oral hydration promoted  Taken 8/30/2024 2036 by Nelda Ruiz RN  Glycemic Management: oral hydration promoted     Problem: Infection Progression (Sepsis/Septic Shock)  Goal: Absence of Infection Signs and Symptoms  Outcome: Ongoing, Progressing  Intervention: Initiate Sepsis Management  Recent Flowsheet Documentation  Taken 8/31/2024 0200 by Nelda Ruiz RN  Infection Prevention:   cohorting utilized   environmental surveillance performed   hand hygiene promoted   rest/sleep promoted  Taken 8/31/2024 0000 by Nelda Ruiz RN  Infection Prevention:   cohorting utilized   environmental surveillance performed   hand hygiene promoted   rest/sleep promoted  Taken 8/30/2024 2200 by Nelda Ruiz RN  Infection Prevention:   cohorting utilized   environmental surveillance performed   hand hygiene promoted   rest/sleep promoted  Taken 8/30/2024 2036 by Nelda Ruiz RN  Infection Prevention:   cohorting utilized   environmental surveillance performed   hand hygiene promoted   rest/sleep promoted  Intervention: Promote Recovery  Recent Flowsheet Documentation  Taken 8/31/2024 0200 by Nelda Ruiz RN  Activity Management: activity  encouraged  Airway/Ventilation Support: comfort measures provided  Sleep/Rest Enhancement:   awakenings minimized   consistent schedule promoted   noise level reduced   relaxation techniques promoted   room darkened   regular sleep/rest pattern promoted  Taken 8/31/2024 0000 by Nelda Ruiz RN  Activity Management: activity encouraged  Airway/Ventilation Support: comfort measures provided  Sleep/Rest Enhancement:   awakenings minimized   consistent schedule promoted   noise level reduced   relaxation techniques promoted   room darkened   regular sleep/rest pattern promoted  Taken 8/30/2024 2200 by Nelda Ruiz RN  Activity Management: activity encouraged  Airway/Ventilation Support: comfort measures provided  Sleep/Rest Enhancement:   awakenings minimized   consistent schedule promoted   noise level reduced   relaxation techniques promoted   room darkened   regular sleep/rest pattern promoted  Taken 8/30/2024 2036 by Nelda Ruiz RN  Activity Management: activity encouraged  Airway/Ventilation Support: comfort measures provided  Sleep/Rest Enhancement:   awakenings minimized   consistent schedule promoted   noise level reduced   relaxation techniques promoted   room darkened   regular sleep/rest pattern promoted  Intervention: Promote Stabilization  Recent Flowsheet Documentation  Taken 8/31/2024 0200 by Nelda Ruiz RN  Fluid/Electrolyte Management: fluids provided  Taken 8/31/2024 0000 by Nelda Ruiz RN  Fluid/Electrolyte Management: fluids provided  Taken 8/30/2024 2200 by Nelda Ruiz RN  Fluid/Electrolyte Management: fluids provided  Taken 8/30/2024 2036 by Nelda Ruiz RN  Fluid/Electrolyte Management: fluids provided     Problem: Nutrition Impaired (Sepsis/Septic Shock)  Goal: Optimal Nutrition Intake  Outcome: Ongoing, Progressing     Problem: Fall Injury Risk  Goal: Absence of Fall and Fall-Related Injury  Outcome: Ongoing, Progressing  Intervention: Identify and Manage  Contributors  Recent Flowsheet Documentation  Taken 8/31/2024 0200 by Nelda Ruiz RN  Medication Review/Management: medications reviewed  Self-Care Promotion: independence encouraged  Taken 8/31/2024 0000 by Nelda Ruiz RN  Medication Review/Management: medications reviewed  Self-Care Promotion: independence encouraged  Taken 8/30/2024 2200 by Nelda Ruiz RN  Self-Care Promotion: independence encouraged  Taken 8/30/2024 2036 by Nelda Ruiz RN  Medication Review/Management: medications reviewed  Self-Care Promotion: independence encouraged  Intervention: Promote Injury-Free Environment  Recent Flowsheet Documentation  Taken 8/31/2024 0200 by Nelda Ruiz RN  Safety Promotion/Fall Prevention:   activity supervised   assistive device/personal items within reach   clutter free environment maintained   fall prevention program maintained   lighting adjusted   nonskid shoes/slippers when out of bed   room organization consistent   safety round/check completed  Taken 8/31/2024 0000 by Nelda Ruiz RN  Safety Promotion/Fall Prevention:   activity supervised   assistive device/personal items within reach   clutter free environment maintained   fall prevention program maintained   lighting adjusted   nonskid shoes/slippers when out of bed   room organization consistent   safety round/check completed  Taken 8/30/2024 2200 by Nelda Ruiz RN  Safety Promotion/Fall Prevention:   activity supervised   assistive device/personal items within reach   clutter free environment maintained   fall prevention program maintained   lighting adjusted   nonskid shoes/slippers when out of bed   room organization consistent   safety round/check completed  Taken 8/30/2024 2036 by Nelda Ruiz RN  Safety Promotion/Fall Prevention:   activity supervised   assistive device/personal items within reach   clutter free environment maintained   fall prevention program maintained   lighting adjusted   nonskid  shoes/slippers when out of bed   room organization consistent   safety round/check completed   Goal Outcome Evaluation:              8/30/24- Patient has remained on 6L NC throughout the night. Cardiac rhythm has changed between afib (hr ) to afib (HR high 30s-40s). Patient has stated she was feeling no symptoms from this. Plan of care ongoing.

## 2024-09-01 LAB
ANION GAP SERPL CALCULATED.3IONS-SCNC: 9 MMOL/L (ref 5–15)
B PARAPERT DNA SPEC QL NAA+PROBE: NOT DETECTED
B PERT DNA SPEC QL NAA+PROBE: NOT DETECTED
BACTERIA UR QL AUTO: ABNORMAL /HPF
BASOPHILS # BLD MANUAL: 0 10*3/MM3 (ref 0–0.2)
BASOPHILS NFR BLD MANUAL: 0 % (ref 0–1.5)
BILIRUB UR QL STRIP: NEGATIVE
BUN SERPL-MCNC: 14 MG/DL (ref 8–23)
BUN/CREAT SERPL: 38.9 (ref 7–25)
C PNEUM DNA NPH QL NAA+NON-PROBE: NOT DETECTED
CALCIUM SPEC-SCNC: 9.4 MG/DL (ref 8.6–10.5)
CHLORIDE SERPL-SCNC: 97 MMOL/L (ref 98–107)
CLARITY UR: ABNORMAL
CO2 SERPL-SCNC: 33 MMOL/L (ref 22–29)
COLOR UR: YELLOW
CREAT SERPL-MCNC: 0.36 MG/DL (ref 0.57–1)
DEPRECATED RDW RBC AUTO: 49.9 FL (ref 37–54)
EGFRCR SERPLBLD CKD-EPI 2021: 100.9 ML/MIN/1.73
EOSINOPHIL # BLD MANUAL: 0.36 10*3/MM3 (ref 0–0.4)
EOSINOPHIL NFR BLD MANUAL: 1 % (ref 0.3–6.2)
ERYTHROCYTE [DISTWIDTH] IN BLOOD BY AUTOMATED COUNT: 13 % (ref 12.3–15.4)
FLUAV SUBTYP SPEC NAA+PROBE: NOT DETECTED
FLUBV RNA ISLT QL NAA+PROBE: NOT DETECTED
GLUCOSE SERPL-MCNC: 93 MG/DL (ref 65–99)
GLUCOSE UR STRIP-MCNC: NEGATIVE MG/DL
HADV DNA SPEC NAA+PROBE: NOT DETECTED
HCOV 229E RNA SPEC QL NAA+PROBE: NOT DETECTED
HCOV HKU1 RNA SPEC QL NAA+PROBE: NOT DETECTED
HCOV NL63 RNA SPEC QL NAA+PROBE: NOT DETECTED
HCOV OC43 RNA SPEC QL NAA+PROBE: NOT DETECTED
HCT VFR BLD AUTO: 37.4 % (ref 34–46.6)
HGB BLD-MCNC: 11.1 G/DL (ref 12–15.9)
HGB UR QL STRIP.AUTO: ABNORMAL
HMPV RNA NPH QL NAA+NON-PROBE: NOT DETECTED
HPIV1 RNA ISLT QL NAA+PROBE: NOT DETECTED
HPIV2 RNA SPEC QL NAA+PROBE: NOT DETECTED
HPIV3 RNA NPH QL NAA+PROBE: NOT DETECTED
HPIV4 P GENE NPH QL NAA+PROBE: NOT DETECTED
HYALINE CASTS UR QL AUTO: ABNORMAL /LPF
KETONES UR QL STRIP: ABNORMAL
L PNEUMO1 AG UR QL IA: NEGATIVE
LEUKOCYTE ESTERASE UR QL STRIP.AUTO: NEGATIVE
LYMPHOCYTES # BLD MANUAL: 4.33 10*3/MM3 (ref 0.7–3.1)
LYMPHOCYTES NFR BLD MANUAL: 2 % (ref 5–12)
M PNEUMO IGG SER IA-ACNC: NOT DETECTED
MACROCYTES BLD QL SMEAR: ABNORMAL
MCH RBC QN AUTO: 31.2 PG (ref 26.6–33)
MCHC RBC AUTO-ENTMCNC: 29.7 G/DL (ref 31.5–35.7)
MCV RBC AUTO: 105.1 FL (ref 79–97)
METAMYELOCYTES NFR BLD MANUAL: 3 % (ref 0–0)
MONOCYTES # BLD: 0.72 10*3/MM3 (ref 0.1–0.9)
MYELOCYTES NFR BLD MANUAL: 6 % (ref 0–0)
NEUTROPHILS # BLD AUTO: 27.44 10*3/MM3 (ref 1.7–7)
NEUTROPHILS NFR BLD MANUAL: 75 % (ref 42.7–76)
NEUTS BAND NFR BLD MANUAL: 1 % (ref 0–5)
NITRITE UR QL STRIP: NEGATIVE
PH UR STRIP.AUTO: 5.5 [PH] (ref 5–8)
PLAT MORPH BLD: NORMAL
PLATELET # BLD AUTO: 443 10*3/MM3 (ref 140–450)
PMV BLD AUTO: 8.7 FL (ref 6–12)
POTASSIUM SERPL-SCNC: 5.1 MMOL/L (ref 3.5–5.2)
PROT UR QL STRIP: ABNORMAL
RBC # BLD AUTO: 3.56 10*6/MM3 (ref 3.77–5.28)
RBC # UR STRIP: ABNORMAL /HPF
REF LAB TEST METHOD: ABNORMAL
RHINOVIRUS RNA SPEC NAA+PROBE: NOT DETECTED
RSV RNA NPH QL NAA+NON-PROBE: NOT DETECTED
S PNEUM AG SPEC QL LA: NEGATIVE
SARS-COV-2 RNA NPH QL NAA+NON-PROBE: NOT DETECTED
SODIUM SERPL-SCNC: 139 MMOL/L (ref 136–145)
SP GR UR STRIP: 1.02 (ref 1–1.03)
SQUAMOUS #/AREA URNS HPF: ABNORMAL /HPF
TRANS CELLS #/AREA URNS HPF: ABNORMAL /HPF
UROBILINOGEN UR QL STRIP: ABNORMAL
VARIANT LYMPHS NFR BLD MANUAL: 1 % (ref 0–5)
VARIANT LYMPHS NFR BLD MANUAL: 11 % (ref 19.6–45.3)
WBC # UR STRIP: ABNORMAL /HPF
WBC MORPH BLD: NORMAL
WBC NRBC COR # BLD AUTO: 36.11 10*3/MM3 (ref 3.4–10.8)
YEAST URNS QL MICRO: ABNORMAL /HPF

## 2024-09-01 PROCEDURE — 93010 ELECTROCARDIOGRAM REPORT: CPT | Performed by: INTERNAL MEDICINE

## 2024-09-01 PROCEDURE — 80048 BASIC METABOLIC PNL TOTAL CA: CPT | Performed by: STUDENT IN AN ORGANIZED HEALTH CARE EDUCATION/TRAINING PROGRAM

## 2024-09-01 PROCEDURE — 94664 DEMO&/EVAL PT USE INHALER: CPT

## 2024-09-01 PROCEDURE — 81001 URINALYSIS AUTO W/SCOPE: CPT | Performed by: NURSE PRACTITIONER

## 2024-09-01 PROCEDURE — 93005 ELECTROCARDIOGRAM TRACING: CPT | Performed by: INTERNAL MEDICINE

## 2024-09-01 PROCEDURE — 94799 UNLISTED PULMONARY SVC/PX: CPT

## 2024-09-01 PROCEDURE — 97535 SELF CARE MNGMENT TRAINING: CPT

## 2024-09-01 PROCEDURE — 97166 OT EVAL MOD COMPLEX 45 MIN: CPT

## 2024-09-01 PROCEDURE — 99232 SBSQ HOSP IP/OBS MODERATE 35: CPT | Performed by: STUDENT IN AN ORGANIZED HEALTH CARE EDUCATION/TRAINING PROGRAM

## 2024-09-01 PROCEDURE — 99232 SBSQ HOSP IP/OBS MODERATE 35: CPT | Performed by: PHYSICIAN ASSISTANT

## 2024-09-01 PROCEDURE — 85025 COMPLETE CBC W/AUTO DIFF WBC: CPT | Performed by: STUDENT IN AN ORGANIZED HEALTH CARE EDUCATION/TRAINING PROGRAM

## 2024-09-01 PROCEDURE — 97162 PT EVAL MOD COMPLEX 30 MIN: CPT

## 2024-09-01 PROCEDURE — 93005 ELECTROCARDIOGRAM TRACING: CPT | Performed by: STUDENT IN AN ORGANIZED HEALTH CARE EDUCATION/TRAINING PROGRAM

## 2024-09-01 PROCEDURE — 97110 THERAPEUTIC EXERCISES: CPT

## 2024-09-01 PROCEDURE — 87449 NOS EACH ORGANISM AG IA: CPT | Performed by: STUDENT IN AN ORGANIZED HEALTH CARE EDUCATION/TRAINING PROGRAM

## 2024-09-01 PROCEDURE — 25010000002 CEFEPIME PER 500 MG: Performed by: STUDENT IN AN ORGANIZED HEALTH CARE EDUCATION/TRAINING PROGRAM

## 2024-09-01 PROCEDURE — 85007 BL SMEAR W/DIFF WBC COUNT: CPT | Performed by: STUDENT IN AN ORGANIZED HEALTH CARE EDUCATION/TRAINING PROGRAM

## 2024-09-01 PROCEDURE — 0202U NFCT DS 22 TRGT SARS-COV-2: CPT | Performed by: NURSE PRACTITIONER

## 2024-09-01 PROCEDURE — 97530 THERAPEUTIC ACTIVITIES: CPT

## 2024-09-01 RX ORDER — DIGOXIN 0.25 MG/ML
250 INJECTION INTRAMUSCULAR; INTRAVENOUS ONCE
Status: DISCONTINUED | OUTPATIENT
Start: 2024-09-01 | End: 2024-09-04 | Stop reason: HOSPADM

## 2024-09-01 RX ORDER — DOXYCYCLINE 100 MG/1
100 CAPSULE ORAL EVERY 12 HOURS SCHEDULED
Status: DISCONTINUED | OUTPATIENT
Start: 2024-09-01 | End: 2024-09-01

## 2024-09-01 RX ADMIN — CEFEPIME 2000 MG: 2 INJECTION, POWDER, FOR SOLUTION INTRAVENOUS at 03:53

## 2024-09-01 RX ADMIN — APIXABAN 5 MG: 5 TABLET, FILM COATED ORAL at 09:07

## 2024-09-01 RX ADMIN — Medication 10 ML: at 09:08

## 2024-09-01 RX ADMIN — DOFETILIDE 250 MCG: 0.25 CAPSULE ORAL at 09:07

## 2024-09-01 RX ADMIN — NYSTATIN 1 APPLICATION: 100000 CREAM TOPICAL at 09:08

## 2024-09-01 RX ADMIN — IPRATROPIUM BROMIDE AND ALBUTEROL SULFATE 3 ML: 2.5; .5 SOLUTION RESPIRATORY (INHALATION) at 16:13

## 2024-09-01 RX ADMIN — CLOPIDOGREL BISULFATE 75 MG: 75 TABLET ORAL at 09:07

## 2024-09-01 RX ADMIN — IPRATROPIUM BROMIDE AND ALBUTEROL SULFATE 3 ML: 2.5; .5 SOLUTION RESPIRATORY (INHALATION) at 08:34

## 2024-09-01 RX ADMIN — ATORVASTATIN CALCIUM 20 MG: 20 TABLET, FILM COATED ORAL at 21:29

## 2024-09-01 RX ADMIN — TRAMADOL HYDROCHLORIDE 50 MG: 50 TABLET ORAL at 09:07

## 2024-09-01 RX ADMIN — GABAPENTIN 300 MG: 300 CAPSULE ORAL at 21:29

## 2024-09-01 RX ADMIN — APIXABAN 5 MG: 5 TABLET, FILM COATED ORAL at 21:27

## 2024-09-01 RX ADMIN — IPRATROPIUM BROMIDE AND ALBUTEROL SULFATE 3 ML: 2.5; .5 SOLUTION RESPIRATORY (INHALATION) at 11:30

## 2024-09-01 RX ADMIN — SERTRALINE 100 MG: 100 TABLET, FILM COATED ORAL at 09:07

## 2024-09-01 RX ADMIN — NITROGLYCERIN 1 INCH: 20 OINTMENT TOPICAL at 18:19

## 2024-09-01 RX ADMIN — PANTOPRAZOLE SODIUM 40 MG: 40 TABLET, DELAYED RELEASE ORAL at 09:07

## 2024-09-01 RX ADMIN — TRAMADOL HYDROCHLORIDE 50 MG: 50 TABLET ORAL at 14:13

## 2024-09-01 RX ADMIN — ROFLUMILAST 500 MCG: 500 TABLET ORAL at 09:17

## 2024-09-01 RX ADMIN — LEVOTHYROXINE SODIUM 200 MCG: 0.1 TABLET ORAL at 05:24

## 2024-09-01 RX ADMIN — DOFETILIDE 250 MCG: 0.25 CAPSULE ORAL at 21:25

## 2024-09-01 RX ADMIN — DOXYCYCLINE 100 MG: 100 CAPSULE ORAL at 12:05

## 2024-09-01 RX ADMIN — TRAMADOL HYDROCHLORIDE 50 MG: 50 TABLET ORAL at 21:29

## 2024-09-01 RX ADMIN — CEFEPIME 2000 MG: 2 INJECTION, POWDER, FOR SOLUTION INTRAVENOUS at 12:04

## 2024-09-01 RX ADMIN — ISOSORBIDE MONONITRATE 30 MG: 30 TABLET, EXTENDED RELEASE ORAL at 09:07

## 2024-09-01 RX ADMIN — IPRATROPIUM BROMIDE AND ALBUTEROL SULFATE 3 ML: 2.5; .5 SOLUTION RESPIRATORY (INHALATION) at 19:40

## 2024-09-01 RX ADMIN — TRAMADOL HYDROCHLORIDE 50 MG: 50 TABLET ORAL at 02:01

## 2024-09-01 NOTE — PROGRESS NOTES
[facility] at [facility]  Progress Note       LOS: 6 days   Patient Care Team:  Dre Moura MD as PCP - General  Dre Moura MD as PCP - Family Medicine  Rainer Dowd MD as Consulting Physician (Cardiology)  Ascencion Abdullahi MD as Consulting Physician (Ophthalmology)    Chief Complaint: follow up AFIB/tikosyn.    Subjective     Patient remains in normal sinus rhythm.  Tolerating Tikosyn well.  Got up with PT today, still very weak.        Review of Systems:   Pertinent positives in HPI, all others reviewed and negative.      Objective       Current Facility-Administered Medications:     apixaban (ELIQUIS) tablet 5 mg, 5 mg, Oral, Q12H, Que Herr MD, 5 mg at 09/01/24 0907    atorvastatin (LIPITOR) tablet 20 mg, 20 mg, Oral, Nightly, Yara Daly MD, 20 mg at 08/31/24 2130    sennosides-docusate (PERICOLACE) 8.6-50 MG per tablet 2 tablet, 2 tablet, Oral, BID PRN, 2 tablet at 08/31/24 1805 **AND** polyethylene glycol (MIRALAX) packet 17 g, 17 g, Oral, Daily PRN **AND** bisacodyl (DULCOLAX) EC tablet 5 mg, 5 mg, Oral, Daily PRN **AND** bisacodyl (DULCOLAX) suppository 10 mg, 10 mg, Rectal, Daily PRN, Yara Daly MD    Calcium Replacement - Follow Nurse / BPA Driven Protocol, , Does not apply, PRN, Que Herr MD    cefepime 2000 mg IVPB in 100 mL NS (MBP), 2,000 mg, Intravenous, Q8H, Ela Lemos MD, 2,000 mg at 09/01/24 0353    clopidogrel (PLAVIX) tablet 75 mg, 75 mg, Oral, Daily, Yara Daly MD, 75 mg at 09/01/24 0907    dilTIAZem (CARDIZEM) 125 mg in 125 mL D5W infusion, 5-15 mg/hr, Intravenous, Continuous, Beena Gray MD, Stopped at 08/28/24 2200    [COMPLETED] dofetilide (TIKOSYN) capsule 500 mcg, 500 mcg, Oral, Once, 500 mcg at 08/28/24 1821 **FOLLOWED BY** [COMPLETED] dofetilide (TIKOSYN) capsule 500 mcg, 500 mcg, Oral, Once, 500 mcg at 08/29/24 0408 **FOLLOWED BY** [COMPLETED] dofetilide (TIKOSYN) capsule 500 mcg, 500  mcg, Oral, Once, 500 mcg at 08/29/24 1407 **FOLLOWED BY** dofetilide (TIKOSYN) capsule 500 mcg, 500 mcg, Oral, Once **FOLLOWED BY** dofetilide (TIKOSYN) capsule 250 mcg, 250 mcg, Oral, Q12H, Gerald Edwards PA-C, 250 mcg at 09/01/24 0907    gabapentin (NEURONTIN) capsule 300 mg, 300 mg, Oral, Nightly, Yara Daly MD, 300 mg at 08/31/24 2130    ipratropium-albuterol (DUO-NEB) nebulizer solution 3 mL, 3 mL, Nebulization, 4x Daily - RT, Yara Daly MD, 3 mL at 09/01/24 0834    ipratropium-albuterol (DUO-NEB) nebulizer solution 3 mL, 3 mL, Nebulization, Q4H PRN, Martina Chiang APRN    isosorbide mononitrate (IMDUR) 24 hr tablet 30 mg, 30 mg, Oral, Daily, Yara Daly MD, 30 mg at 09/01/24 0907    levothyroxine (SYNTHROID, LEVOTHROID) tablet 200 mcg, 200 mcg, Oral, Daily, Yara Daly MD, 200 mcg at 09/01/24 0524    losartan (COZAAR) tablet 50 mg, 50 mg, Oral, Q24H, Que Herr MD, 50 mg at 08/31/24 0858    Magnesium Standard Dose Replacement - Follow Nurse / BPA Driven Protocol, , Does not apply, PRN, Yara Daly MD    [Held by provider] nebivolol (BYSTOLIC) tablet 2.5 mg, 2.5 mg, Oral, Q24H, Que Herr MD, 2.5 mg at 08/30/24 0810    nitroglycerin (NITROSTAT) ointment 1 inch, 1 inch, Topical, Q6H, Que Herr MD, 1 inch at 08/30/24 1833    nitroglycerin (NITROSTAT) SL tablet 0.4 mg, 0.4 mg, Sublingual, Q5 Min PRN, Que Herr MD    nystatin (MYCOSTATIN) 653556 UNIT/GM cream 1 Application, 1 Application, Topical, Q12H, Yara Daly MD, 1 Application at 09/01/24 0908    ondansetron (ZOFRAN) injection 4 mg, 4 mg, Intravenous, Q6H PRN, Yara Daly MD    pantoprazole (PROTONIX) EC tablet 40 mg, 40 mg, Oral, Daily, Yara Daly MD, 40 mg at 09/01/24 0907    Pharmacy Consult, , Does not apply, Once PRN, Que Herr MD    Phosphorus Replacement - Follow Nurse / BPA Driven Protocol, , Does not apply, PRN, Que Herr MD    Potassium  "Replacement - Follow Nurse / BPA Driven Protocol, , Does not apply, PRN, Que Herr MD    roflumilast (DALIRESP) tablet 500 mcg, 500 mcg, Oral, Daily, Yara Daly MD, 500 mcg at 09/01/24 0917    sertraline (ZOLOFT) tablet 100 mg, 100 mg, Oral, Daily, Yara Daly MD, 100 mg at 09/01/24 0907    sodium chloride 0.9 % flush 10 mL, 10 mL, Intravenous, PRN, Kendall Smyth MD    sodium chloride 0.9 % flush 10 mL, 10 mL, Intravenous, Q12H, Yara Daly MD, 10 mL at 09/01/24 0908    sodium chloride 0.9 % flush 10 mL, 10 mL, Intravenous, PRN, Yara Daly MD    sodium chloride 0.9 % infusion 40 mL, 40 mL, Intravenous, PRN, Yara Daly MD    traMADol (ULTRAM) tablet 50 mg, 50 mg, Oral, Q6H, Beena Gray MD, 50 mg at 09/01/24 0907    Vital Sign Min/Max for last 24 hours  Temp  Min: 97.6 °F (36.4 °C)  Max: 99 °F (37.2 °C)   BP  Min: 105/71  Max: 152/54   Pulse  Min: 39  Max: 81   Resp  Min: 14  Max: 16   SpO2  Min: 91 %  Max: 98 %   Flow (L/min)  Min: 4  Max: 6   No data recorded     Flowsheet Rows      Flowsheet Row First Filed Value   Admission Height 160 cm (63\") Documented at 08/26/2024 1438   Admission Weight 72.6 kg (160 lb) Documented at 08/26/2024 1438              Intake/Output Summary (Last 24 hours) at 9/1/2024 0953  Last data filed at 9/1/2024 0800  Gross per 24 hour   Intake 240 ml   Output 800 ml   Net -560 ml       Physical Exam:     General Appearance:    Alert, cooperative, in no acute distress   Lungs:     Clear to auscultation with diminished BS at bases,respirations regular, even and unlabored    Heart:    Regular rhythm and normal rate with occasional ectopic beat, normal S1 and S2,   no        murmur, no gallop, no rub, no click   Chest Wall:    No abnormalities observed   Abdomen:     Normal bowel sounds, no masses, no organomegaly, soft      nontender, nondistended, no guarding, no rebound                tenderness   Extremities:  Moves all extremities well, no edema, " "no cyanosis, no            redness              Pulses:   Pulses palpable and equal bilaterally   Skin:   No bleeding, bruising or rash        Results Review:   Results from last 7 days   Lab Units 09/01/24  0830 08/31/24  1038 08/30/24  0124   WBC 10*3/mm3 36.11* 35.42* 30.95*   HEMOGLOBIN g/dL 11.1* 11.2* 10.6*   HEMATOCRIT % 37.4 36.2 34.0   PLATELETS 10*3/mm3 443 405 433     Results from last 7 days   Lab Units 09/01/24  0830 08/31/24  2320 08/31/24  1038 08/30/24  0124   SODIUM mmol/L 139  --  139 139   POTASSIUM mmol/L 5.1 5.1 3.6 4.3   CHLORIDE mmol/L 97*  --  98 93*   CO2 mmol/L 33.0*  --  31.0* 40.0*   BUN mg/dL 14  --  13 9   CREATININE mg/dL 0.36*  --  0.44* 0.34*   GLUCOSE mg/dL 93  --  133* 100*              Results from last 7 days   Lab Units 08/28/24  0803   TSH uIU/mL 0.382     Results from last 7 days   Lab Units 08/30/24  0124   PROBNP pg/mL 2,318.0*         Results from last 7 days   Lab Units 08/28/24  1421 08/28/24  1321 08/26/24  1513   HSTROP T ng/L 37* 45* 31*       Intake/Output Summary (Last 24 hours) at 9/1/2024 0953  Last data filed at 9/1/2024 0800  Gross per 24 hour   Intake 240 ml   Output 800 ml   Net -560 ml       I personally viewed and interpreted the patient's EKG/Telemetry data    EKG: NSR with sinus arrhythmia, 64 bpm. QTc is 410 ms     Telemetry:NSR with PACs 39-81 bpm    Ejection Fraction  No results found for: \"EF\"    Echo EF Estimated  No results found for: \"ECHOEFEST\"      Present on Admission:   Acute hypoxemic respiratory failure   Depression   Hyperlipidemia   Hypertension   Hypothyroidism    Assessment & Plan   New onset AFIB with RVR:  - initiated on Tikosyn and Bystolic 8/30/2024. Has received 5th dose this AM. QTc acceptable. Needs EKG 3 hours post dose. Blocked PACs and bradycardia after one dose of Bystolic, now held.   - on Eliquis  - Echo: EF 60%, RVSP 50 mmHg    2. HTN:  -Losartan 50 mg daily    3.  COPD with chronic CO2 retention admitted with pneumonitis " acute respiratory failure, treatment per hospitalist    Plan for disposition:will continue to follow. EKG in AM. Expected d/c 9/2/2024 per hospitalist.       Electronically signed by JT Mancia, 09/01/24, 9:53 AM EDT.

## 2024-09-01 NOTE — PLAN OF CARE
Goal Outcome Evaluation:  Plan of Care Reviewed With: patient        Progress: no change (OT IE)  Outcome Evaluation: OT evaluation completed. Pt presents with decreased I in ADLs, related t/fs, mobility compared to PLOF limited by decreased activity tolerance, impaired balance, muscle weakness at BUEs, decreased distal reach w/o increased exertion, SOA and fatigue w/ more dynamic demands. Pt does endorse SOA at baseline. Pt req'd gross min A in bed mobility and fxl t/fs between bed> BSC> recliner and during brief ADL related mobility. Pt req'd min A BD, dep for LBD w/o sock aid present d/t perceived exertion and SOA when reaching toward feet and min A to dep for toileting needs. Pt would benefit from IPOT POC to address underlying impairments impacting function and recommend home w/ 24/7 A at least initially and HHOT pending progress w/ PT.      Anticipated Discharge Disposition (OT): home with 24/7 care, home with home health

## 2024-09-01 NOTE — THERAPY EVALUATION
"Patient Name: Avelina Ceron  : 1940    MRN: [id number]                              Today's Date: 2024       Admit Date: 2024    Visit Dx:     ICD-10-CM ICD-9-CM   1. Acute sepsis  A41.9 038.9     995.91   2. Pneumonia of left lower lobe due to infectious organism  J18.9 486   3. Acute exacerbation of chronic obstructive pulmonary disease (COPD)  J44.1 491.21   4. History of coronary artery disease  Z86.79 V12.59   5. Acute on chronic respiratory failure with hypoxia  J96.21 518.84     799.02     Patient Active Problem List   Diagnosis    Coronary arteriosclerosis in native artery    Chronic obstructive pulmonary disease    Depression    Gastroesophageal reflux disease with esophagitis    Hyperlipidemia    Hypertension    Hypothyroidism    Osteoarthritis    Peripheral arterial occlusive disease    Solitary pulmonary nodule    Vitamin D deficiency    Lower back pain    Carotid bruit    Hypoxemia    Chronic respiratory failure with hypoxia and hypercapnia    Allergic rhinitis    Chronic leukocytosis    Ureterolithiasis    Murmur, cardiac    Hospital discharge follow-up    Closed fracture of right proximal humerus    Right shoulder pain    Acute hypoxemic respiratory failure     Past Medical History:   Diagnosis Date    Anesthesia complication     Pt went into respiratory failure during total hip replacement in     Arthritis of back     Arthritis of neck     Broken ribs     COPD (chronic obstructive pulmonary disease)     Coronary artery disease     Degenerative lumbar disc     Disease of thyroid gland     \"low thyroid\"    Fracture, fibula ?    Fracture, humerus 23    Fracture, tibia and fibula ?    Hip arthrosis     History of colonoscopy     Hyperlipidemia     Hypertension     Kidney stone 2023    Knee swelling     Lumbosacral disc disease     On home O2     3L    Osteoarthritis     Periarthritis of shoulder     Urinary incontinence     Urinary tract infection      Past " Surgical History:   Procedure Laterality Date    CAROTID STENT Bilateral     COLONOSCOPY      EYE SURGERY  10/17/2022    yag surgery    GALLBLADDER SURGERY  2012    HYSTERECTOMY      ILIAC ARTERY STENT Bilateral     JOINT REPLACEMENT  1995; 2020    KIDNEY STONE SURGERY  July August 2023    OOPHORECTOMY  1976    1 ovary removed    TOTAL HIP ARTHROPLASTY Bilateral     URETEROSCOPY LASER LITHOTRIPSY WITH STENT INSERTION Right 07/26/2023    Procedure: CYSTOSCOPY, RIGHT RETROGRADE PYELOGRAM; WITH STENT INSERTION;  Surgeon: Lee Kim MD;  Location: [facility] OR;  Service: Urology;  Laterality: Right;    URETEROSCOPY LASER LITHOTRIPSY WITH STENT INSERTION Right 08/10/2023    Procedure: URETEROSCOPY, LASER LITHOTRIPSY, STENT EXCHANGE;  Surgeon: Lee Kim MD;  Location:  [facility];  Service: Urology;  Laterality: Right;      General Information       Row Name 09/01/24 1507          Physical Therapy Time and Intention    Document Type evaluation  -CD     Mode of Treatment physical therapy  -CD       Row Name 09/01/24 1504          General Information    Patient Profile Reviewed yes  -CD     Prior Level of Function independent:;all household mobility;community mobility;gait;bed mobility;ADL's;feeding;grooming;dressing;max assist:;bathing;dependent:;home management;cooking;cleaning  Pt receives A for house mgmt tasks, laundry, cleaning and uses Door Dash or family A for meal prep; uses 3WW for in home and community mobility, FWW on patio; denies any falls; uses 2L O2 at baseline  -CD     Existing Precautions/Restrictions fall;oxygen therapy device and L/min  -CD     Barriers to Rehab medically complex;previous functional deficit  -CD       Row Name 09/01/24 1502          Living Environment    People in Home child(gabriela), adult  Pt's daughter or other persons ensure pt is provided 24/7 availability for A as needed  -CD       Row Name 09/01/24 1507          Home Main Entrance    Number of Stairs, Main Entrance two   -CD     Stair Railings, Main Entrance railings on both sides of stairs;railings safe and in good condition  -CD       Row Name 09/01/24 1507          Stairs Within Home, Primary    Stairs, Within Home, Primary 1 step into family room.  -CD     Number of Stairs, Within Home, Primary one  -CD     Stair Railings, Within Home, Primary railing on left side (ascending)  -CD       Row Name 09/01/24 1507          Cognition    Orientation Status (Cognition) oriented x 4  -CD       Row Name 09/01/24 1507          Safety Issues, Functional Mobility    Safety Issues Affecting Function (Mobility) awareness of need for assistance;insight into deficits/self-awareness;safety precaution awareness;safety precautions follow-through/compliance  -CD     Impairments Affecting Function (Mobility) balance;endurance/activity tolerance;shortness of breath;strength  -CD     Comment, Safety Issues/Impairments (Mobility) PT IS ALERT AND FOLLOWS ALL COMMANDS. C/O FATIGUE BUT WANTS TO GET BETTER SO AGREEABLE TO P.T. EVAL.  -CD               User Key  (r) = Recorded By, (t) = Taken By, (c) = Cosigned By      Initials Name Provider Type    CD Magdalene Duke, PT Physical Therapist                   Mobility       Row Name 09/01/24 1513          Bed Mobility    Supine-Sit [place] (Bed Mobility) contact guard;verbal cues  -CD     Assistive Device (Bed Mobility) bed rails;head of bed elevated  -CD     Comment, (Bed Mobility) INCREASED TIME AND EFFORT. CUES FOR SEQUENCING TO REACH FOR BED RAIL TO ASSIST. DENIED DIZZINESS.  -CD       Row Name 09/01/24 1513          Transfers    Comment, (Transfers) CUES FOR HAND PLACEMENT. STS FROM EOB  AND PIVOT STEPS TO RECLINER. WORKED ON STANDING UPRIGHT WITH CUES FOR PLB AND MIP PRIOR TO SITTING DOWN.  -CD       Row Name 09/01/24 1513          Bed-Chair Transfer    Bed-Chair [place] (Transfers) contact guard;verbal cues  -CD     Assistive Device (Bed-Chair Transfers) walker, front-wheeled  -       Row  Name 09/01/24 1513          Sit-Stand Transfer    Sit-Stand Spencer (Transfers) contact guard  -CD     Assistive Device (Sit-Stand Transfers) walker, front-wheeled  -CD       Row Name 09/01/24 1513          Gait/Stairs (Locomotion)    Spencer Level (Gait) other (see comments)  -CD     Comment, (Gait/Stairs) DEFERRED DUE TO FATIGUE AND SOA. OPTED FOR MIP WITH R WALKER FOR SUPPORT AS TOLERATED ( APPROX 1 MINUTE)  -CD               User Key  (r) = Recorded By, (t) = Taken By, (c) = Cosigned By      Initials Name Provider Type    CD Magdalene Duke, PT Physical Therapist                   Obj/Interventions       Row Name 09/01/24 1515          Range of Motion Comprehensive    General Range of Motion bilateral lower extremity ROM WFL  -CD       Row Name 09/01/24 1515          Strength Comprehensive (MMT)    General Manual Muscle Testing (MMT) Assessment lower extremity strength deficits identified  -CD     Comment, General Manual Muscle Testing (MMT) Assessment B LE GROSSLY 4/5 AND SYMMETRICAL.  -CD       Row Name 09/01/24 1515          Motor Skills    Motor Skills functional endurance;coordination  -CD     Coordination gross motor deficit;bilateral;lower extremity;WFL  -CD     Functional Endurance PT FATIGUES EASILY. REQUIRING INCREASED SUPPLEMENTAL O2 SINCE HOSPITALIZED FROM 2-4L. NEEDS CUES FOR PLB AND FREQUENT REST BREAKS.  -CD       Row Name 09/01/24 1515          Balance    Static Sitting Balance supervision  -CD     Dynamic Sitting Balance standby assist  -CD     Position, Sitting Balance unsupported;sitting edge of bed  -CD     Static Standing Balance contact guard  -CD     Dynamic Standing Balance contact guard  -CD     Position/Device Used, Standing Balance walker, rolling  -CD     Comment, Balance NO OVERT LOB WITH USE OF R WALKER FOR SUPPORT WITH TRANSFERS AND MIP EXERCIS. LIMITED BY FATIGUE AND SOA.  -CD               User Key  (r) = Recorded By, (t) = Taken By, (c) = Cosigned By      Initials Name  Provider Type    CD Magdalene Duke, PT Physical Therapist                   Goals/Plan       Row Name 09/01/24 1522          Bed Mobility Goal 1 (PT)    Activity/Assistive Device (Bed Mobility Goal 1, PT) bed mobility activities, all  -CD     Sikes Level/Cues Needed (Bed Mobility Goal 1, PT) independent  -CD     Time Frame (Bed Mobility Goal 1, PT) short term goal (STG);1 week  -CD       Row Name 09/01/24 1522          Transfer Goal 1 (PT)    Activity/Assistive Device (Transfer Goal 1, PT) sit-to-stand/stand-to-sit;bed-to-chair/chair-to-bed  -CD     [place] Level/Cues Needed (Transfer Goal 1, PT) independent  -CD     Time Frame (Transfer Goal 1, PT) long term goal (LTG);2 weeks  -CD       Row Name 09/01/24 1522          Gait Training Goal 1 (PT)    Activity/Assistive Device (Gait Training Goal 1, PT) gait (walking locomotion);walker, rolling  -CD     [place] Level (Gait Training Goal 1, PT) independent  -CD     Distance (Gait Training Goal 1, PT) 300 FEET  -CD     Time Frame (Gait Training Goal 1, PT) long term goal (LTG);2 weeks  -CD       Row Name 09/01/24 1522          Stairs Goal 1 (PT)    Activity/Assistive Device (Stairs Goal 1, PT) ascending stairs;descending stairs;using handrail, left;using handrail, right  -CD     [place] Level/Cues Needed (Stairs Goal 1, PT) contact guard required  -CD     Number of Stairs (Stairs Goal 1, PT) 2  -CD     Time Frame (Stairs Goal 1, PT) long term goal (LTG);2 weeks  -CD       Row Name 09/01/24 1522          Therapy Assessment/Plan (PT)    Planned Therapy Interventions (PT) balance training;bed mobility training;gait training;home exercise program;strengthening;transfer training;patient/family education  -CD               User Key  (r) = Recorded By, (t) = Taken By, (c) = Cosigned By      Initials Name Provider Type    CD Magdalene Duke, PT Physical Therapist                   Clinical Impression       Row Name 09/01/24 1517          Pain    Pretreatment  Pain Rating 0/10 - no pain  -CD     Posttreatment Pain Rating 0/10 - no pain  -CD       Row Name 09/01/24 1517          Plan of Care Review    Outcome Evaluation PT PRESENTS WITH EVOLVING SYMPTOMS TO INCLUDE SOA WITH ACTIVITY, DECREASED ENDURANCE, IMPAIRED BALANCE, GENERALIZED WEAKNESS AND DECLINE IN FUNCTIONAL MOBILITY FROM BASELINE. PT CURRENTLY ON 4L O2. NEEDS CUES FOR PLB AND FREQUENT REST BREAKS. RECOMMEND HOME WITH HHPT AND 24/7 ASSIST AT D/C.  -CD       Row Name 09/01/24 1517          Therapy Assessment/Plan (PT)    Patient/Family Therapy Goals Statement (PT) TO RETURN TO PLOF AND HOME.  -CD     Rehab Potential (PT) good, to achieve stated therapy goals  -CD     Criteria for Skilled Interventions Met (PT) yes;meets criteria;skilled treatment is necessary  -CD     Therapy Frequency (PT) daily  -CD     Predicted Duration of Therapy Intervention (PT) 2 WEEKS  -CD       Row Name 09/01/24 1517          Vital Signs    Pre Systolic BP Rehab 100  -CD     Pre Treatment Diastolic BP 32  -CD     Post Systolic BP Rehab 112  -CD     Post Treatment Diastolic BP 42  -CD     Posttreatment Heart Rate (beats/min) 77  -CD     Pre SpO2 (%) 93  -CD     O2 Delivery Pre Treatment supplemental O2  -CD     Intra SpO2 (%) 88  -CD     O2 Delivery Intra Treatment supplemental O2  -CD     Post SpO2 (%) 93  -CD     O2 Delivery Post Treatment supplemental O2  -CD     Pre Patient Position Supine  -CD     Intra Patient Position Standing  -CD     Post Patient Position Sitting  -CD       Row Name 09/01/24 1517          Positioning and Restraints    Pre-Treatment Position in bed  -CD     Post Treatment Position chair  -CD     In Chair reclined;call light within reach;encouraged to call for assist;exit alarm on;notified nsg;RUE elevated;LUE elevated;legs elevated  -CD               User Key  (r) = Recorded By, (t) = Taken By, (c) = Cosigned By      Initials Name Provider Type    Magdalene Munoz, PT Physical Therapist                   Outcome  Measures       Row Name 09/01/24 1524 09/01/24 0745       How much help from another person do you currently need...    Turning from your back to your side while in flat bed without using bedrails? 3  -CD 3  -DC    Moving from lying on back to sitting on the side of a flat bed without bedrails? 3  -CD 3  -DC    Moving to and from a bed to a chair (including a wheelchair)? 3  -CD 3  -DC    Standing up from a chair using your arms (e.g., wheelchair, bedside chair)? 3  -CD 3  -DC    Climbing 3-5 steps with a railing? 2  -CD 2  -DC    To walk in hospital room? 2  -CD 2  -DC    AM-PAC 6 Clicks Score (PT) 16  -CD 16  -DC    Highest Level of Mobility Goal 5 --> Static standing  -CD 5 --> Static standing  -DC      Row Name 09/01/24 1524 09/01/24 1107       Functional Assessment    Outcome Measure Options AM-PAC 6 Clicks Basic Mobility (PT)  -CD AM-PAC 6 Clicks Daily Activity (OT)  -FLAQUITA              User Key  (r) = Recorded By, (t) = Taken By, (c) = Cosigned By      Initials Name Provider Type    CD Magdalene Duke, PT Physical Therapist    Dee Toribio OT Occupational Therapist    Gloria Maradiaga, RN Registered Nurse                                 Physical Therapy Education       Title: PT OT SLP Therapies (In Progress)       Topic: Physical Therapy (Done)       Point: Mobility training (Done)       Learning Progress Summary             Patient Acceptance, E, VU,NR by CD at 9/1/2024 1524    Comment: BENEFITS OF OOB ACTIVITY, SAFETY WITH MOBILITY, PROGRESSION OF POC, D/C PLANNING,                         Point: Home exercise program (Done)       Learning Progress Summary             Patient Acceptance, E, VU,NR by CD at 9/1/2024 1524    Comment: BENEFITS OF OOB ACTIVITY, SAFETY WITH MOBILITY, PROGRESSION OF POC, D/C PLANNING,                         Point: Body mechanics (Done)       Learning Progress Summary             Patient Acceptance, E, VU,NR by CD at 9/1/2024 1524    Comment: BENEFITS OF OOB ACTIVITY, SAFETY  WITH MOBILITY, PROGRESSION OF POC, D/C PLANNING,                         Point: Precautions (Done)       Learning Progress Summary             Patient Acceptance, E, VU,NR by CD at 9/1/2024 1524    Comment: BENEFITS OF OOB ACTIVITY, SAFETY WITH MOBILITY, PROGRESSION OF POC, D/C PLANNING,                                         User Key       Initials Effective Dates Name Provider Type Discipline    CD 02/03/23 -  Magdalene Duke, PT Physical Therapist PT                  PT Recommendation and Plan  Planned Therapy Interventions (PT): balance training, bed mobility training, gait training, home exercise program, strengthening, transfer training, patient/family education  Outcome Evaluation: PT PRESENTS WITH EVOLVING SYMPTOMS TO INCLUDE SOA WITH ACTIVITY, DECREASED ENDURANCE, IMPAIRED BALANCE, GENERALIZED WEAKNESS AND DECLINE IN FUNCTIONAL MOBILITY FROM BASELINE. PT CURRENTLY ON 4L O2. NEEDS CUES FOR PLB AND FREQUENT REST BREAKS. RECOMMEND HOME WITH HHPT AND 24/7 ASSIST AT D/C.     Time Calculation:   PT Evaluation Complexity  History, PT Evaluation Complexity: 3 or more personal factors and/or comorbidities  Examination of Body Systems (PT Eval Complexity): total of 3 or more elements  Clinical Presentation (PT Evaluation Complexity): evolving  Clinical Decision Making (PT Evaluation Complexity): moderate complexity  Overall Complexity (PT Evaluation Complexity): moderate complexity     PT Charges       Row Name 09/01/24 1525             Time Calculation    Start Time 1416  -CD      PT Received On 09/01/24  -CD      PT Goal Re-Cert Due Date 09/11/24  -CD         Time Calculation- PT    Total Timed Code Minutes- PT 8 minute(s)  -CD         Timed Charges    34992 - PT Therapeutic Exercise Minutes 8  -CD         Untimed Charges    PT Eval/Re-eval Minutes 57  -CD         Total Minutes    Timed Charges Total Minutes 8  -CD      Untimed Charges Total Minutes 57  -CD       Total Minutes 65  -CD                User Key  (r) =  Recorded By, (t) = Taken By, (c) = Cosigned By      Initials Name Provider Type    CD Magdalene Duke, PT Physical Therapist                  Therapy Charges for Today       Code Description Service Date Service Provider Modifiers Qty    94896662949 HC PT THER PROC EA 15 MIN 9/1/2024 Magdalene Duke, PT GP 1    39321235486 HC PT EVAL MOD COMPLEXITY 4 9/1/2024 Magdalene Duke, PT GP 1            PT G-Codes  Outcome Measure Options: AM-PAC 6 Clicks Basic Mobility (PT)  AM-PAC 6 Clicks Score (PT): 16  AM-PAC 6 Clicks Score (OT): 16  PT Discharge Summary  Anticipated Discharge Disposition (PT): home with 24/7 care, home with home health    Magdalene Duke, PT  9/1/2024

## 2024-09-01 NOTE — PLAN OF CARE
Problem: Fluid Imbalance (Pneumonia)  Goal: Fluid Balance  Outcome: Ongoing, Progressing  Intervention: Monitor and Manage Fluid Balance  Recent Flowsheet Documentation  Taken 9/1/2024 0200 by Nelda Ruiz RN  Fluid/Electrolyte Management: fluids provided  Taken 9/1/2024 0000 by Nelda Ruiz RN  Fluid/Electrolyte Management: fluids provided  Taken 8/31/2024 2200 by Nelda Ruiz RN  Fluid/Electrolyte Management: fluids provided  Taken 8/31/2024 2129 by Nelda Ruiz RN  Fluid/Electrolyte Management: fluids provided     Problem: Infection (Pneumonia)  Goal: Resolution of Infection Signs and Symptoms  Outcome: Ongoing, Progressing     Problem: Respiratory Compromise (Pneumonia)  Goal: Effective Oxygenation and Ventilation  Outcome: Ongoing, Progressing  Intervention: Promote Airway Secretion Clearance  Recent Flowsheet Documentation  Taken 9/1/2024 0200 by Nelda Ruiz RN  Breathing Techniques/Airway Clearance: deep/controlled cough encouraged  Cough And Deep Breathing: done independently per patient  Taken 9/1/2024 0000 by Nelda Ruiz RN  Breathing Techniques/Airway Clearance: deep/controlled cough encouraged  Cough And Deep Breathing: done independently per patient  Taken 8/31/2024 2200 by Nelda Ruiz RN  Breathing Techniques/Airway Clearance: deep/controlled cough encouraged  Cough And Deep Breathing: done independently per patient  Taken 8/31/2024 2129 by Nelda Ruiz RN  Breathing Techniques/Airway Clearance: deep/controlled cough encouraged  Cough And Deep Breathing: done independently per patient  Intervention: Optimize Oxygenation and Ventilation  Recent Flowsheet Documentation  Taken 9/1/2024 0200 by Nelda Ruiz RN  Head of Bed (HOB) Positioning:   HOB elevated   HOB at 60-90 degrees  Airway/Ventilation Management: calming measures promoted  Taken 9/1/2024 0000 by Nelda Ruiz RN  Head of Bed (HOB) Positioning: HOB elevated  Airway/Ventilation Management:  calming measures promoted  Taken 8/31/2024 2200 by Nelda Ruiz RN  Head of Bed (HOB) Positioning: HOB elevated  Airway/Ventilation Management: calming measures promoted  Taken 8/31/2024 2129 by Nelda Ruiz RN  Head of Bed (HOB) Positioning: HOB elevated  Airway/Ventilation Management: calming measures promoted     Problem: Adult Inpatient Plan of Care  Goal: Plan of Care Review  Outcome: Ongoing, Progressing  Goal: Patient-Specific Goal (Individualized)  Outcome: Ongoing, Progressing  Goal: Absence of Hospital-Acquired Illness or Injury  Outcome: Ongoing, Progressing  Intervention: Identify and Manage Fall Risk  Recent Flowsheet Documentation  Taken 9/1/2024 0200 by Nelda Ruiz RN  Safety Promotion/Fall Prevention:   activity supervised   assistive device/personal items within reach   clutter free environment maintained   fall prevention program maintained   lighting adjusted   nonskid shoes/slippers when out of bed   room organization consistent   safety round/check completed  Taken 9/1/2024 0000 by Nelda Ruiz RN  Safety Promotion/Fall Prevention:   activity supervised   assistive device/personal items within reach   clutter free environment maintained   fall prevention program maintained   lighting adjusted   nonskid shoes/slippers when out of bed   room organization consistent   safety round/check completed  Taken 8/31/2024 2200 by Nelda Ruiz RN  Safety Promotion/Fall Prevention:   activity supervised   assistive device/personal items within reach   clutter free environment maintained   fall prevention program maintained   lighting adjusted   nonskid shoes/slippers when out of bed   room organization consistent   safety round/check completed  Taken 8/31/2024 2129 by Nelda Ruiz RN  Safety Promotion/Fall Prevention:   activity supervised   assistive device/personal items within reach   clutter free environment maintained   fall prevention program maintained   lighting  adjusted   nonskid shoes/slippers when out of bed   room organization consistent   safety round/check completed  Intervention: Prevent Skin Injury  Recent Flowsheet Documentation  Taken 9/1/2024 0200 by Nelda Ruiz RN  Body Position:   weight shifting   sitting up in bed  Skin Protection:   adhesive use limited   tubing/devices free from skin contact   transparent dressing maintained  Taken 9/1/2024 0000 by Nelda Ruiz RN  Body Position:   neutral body alignment   neutral head position   supine  Skin Protection:   adhesive use limited   tubing/devices free from skin contact   transparent dressing maintained  Taken 8/31/2024 2200 by Nelda Ruiz RN  Body Position: sitting up in bed  Skin Protection:   adhesive use limited   tubing/devices free from skin contact   transparent dressing maintained  Taken 8/31/2024 2129 by Nelda Ruiz RN  Body Position: sitting up in bed  Skin Protection:   adhesive use limited   tubing/devices free from skin contact   transparent dressing maintained  Intervention: Prevent and Manage VTE (Venous Thromboembolism) Risk  Recent Flowsheet Documentation  Taken 9/1/2024 0200 by Nelda Ruiz RN  Activity Management: activity encouraged  Taken 9/1/2024 0000 by Nelda Ruiz RN  Activity Management: activity encouraged  Taken 8/31/2024 2200 by Nelda Ruiz RN  Activity Management: activity encouraged  Taken 8/31/2024 2129 by Nelda Ruiz RN  Activity Management: activity encouraged  VTE Prevention/Management: (pt takes eliquis)   bilateral   sequential compression devices off   patient refused intervention   other (see comments)  Range of Motion: active ROM (range of motion) encouraged  Intervention: Prevent Infection  Recent Flowsheet Documentation  Taken 9/1/2024 0200 by Nelda Ruiz RN  Infection Prevention:   cohorting utilized   environmental surveillance performed   hand hygiene promoted   rest/sleep promoted  Taken 9/1/2024 0000 by Joseph  GABRIELA Lutz  Infection Prevention:   cohorting utilized   environmental surveillance performed   hand hygiene promoted   rest/sleep promoted  Taken 8/31/2024 2200 by Nelda Ruiz RN  Infection Prevention:   cohorting utilized   environmental surveillance performed   hand hygiene promoted   rest/sleep promoted  Taken 8/31/2024 2129 by Nelda Ruiz RN  Infection Prevention:   cohorting utilized   environmental surveillance performed   hand hygiene promoted   rest/sleep promoted  Goal: Optimal Comfort and Wellbeing  Outcome: Ongoing, Progressing  Intervention: Provide Person-Centered Care  Recent Flowsheet Documentation  Taken 8/31/2024 2129 by Nelda Ruiz RN  Trust Relationship/Rapport:   care explained   choices provided   emotional support provided   empathic listening provided   questions encouraged   questions answered   reassurance provided   thoughts/feelings acknowledged  Goal: Readiness for Transition of Care  Outcome: Ongoing, Progressing     Problem: Skin Injury Risk Increased  Goal: Skin Health and Integrity  Outcome: Ongoing, Progressing  Intervention: Optimize Skin Protection  Recent Flowsheet Documentation  Taken 9/1/2024 0200 by Nelda Ruiz RN  Pressure Reduction Techniques:   frequent weight shift encouraged   weight shift assistance provided   pressure points protected  Head of Bed (HOB) Positioning:   HOB elevated   HOB at 60-90 degrees  Pressure Reduction Devices:   pressure-redistributing mattress utilized   positioning supports utilized   foam padding utilized  Skin Protection:   adhesive use limited   tubing/devices free from skin contact   transparent dressing maintained  Taken 9/1/2024 0000 by Nelda Ruiz RN  Pressure Reduction Techniques:   frequent weight shift encouraged   weight shift assistance provided   pressure points protected  Head of Bed (HOB) Positioning: HOB elevated  Pressure Reduction Devices:   pressure-redistributing mattress utilized   positioning  supports utilized   foam padding utilized  Skin Protection:   adhesive use limited   tubing/devices free from skin contact   transparent dressing maintained  Taken 8/31/2024 2200 by Nelda Ruiz RN  Pressure Reduction Techniques:   frequent weight shift encouraged   weight shift assistance provided   pressure points protected  Head of Bed (HOB) Positioning: HOB elevated  Pressure Reduction Devices:   pressure-redistributing mattress utilized   positioning supports utilized   foam padding utilized  Skin Protection:   adhesive use limited   tubing/devices free from skin contact   transparent dressing maintained  Taken 8/31/2024 2129 by Nelda Ruiz RN  Pressure Reduction Techniques:   frequent weight shift encouraged   weight shift assistance provided   pressure points protected  Head of Bed (HOB) Positioning: HOB elevated  Pressure Reduction Devices:   pressure-redistributing mattress utilized   positioning supports utilized   foam padding utilized  Skin Protection:   adhesive use limited   tubing/devices free from skin contact   transparent dressing maintained     Problem: Adjustment to Illness (Sepsis/Septic Shock)  Goal: Optimal Coping  Outcome: Ongoing, Progressing  Intervention: Optimize Psychosocial Adjustment to Illness  Recent Flowsheet Documentation  Taken 9/1/2024 0200 by Nelda Ruiz RN  Supportive Measures:   active listening utilized   self-care encouraged  Taken 9/1/2024 0000 by Nelda Ruiz RN  Supportive Measures:   active listening utilized   self-care encouraged  Taken 8/31/2024 2200 by Nelda Ruiz RN  Supportive Measures:   active listening utilized   self-care encouraged  Taken 8/31/2024 2129 by Nelda Ruiz RN  Supportive Measures:   active listening utilized   self-care encouraged  Family/Support System Care: self-care encouraged     Problem: Bleeding (Sepsis/Septic Shock)  Goal: Absence of Bleeding  Outcome: Ongoing, Progressing     Problem: Glycemic Control  Impaired (Sepsis/Septic Shock)  Goal: Blood Glucose Level Within Desired Range  Outcome: Ongoing, Progressing  Intervention: Optimize Glycemic Control  Recent Flowsheet Documentation  Taken 9/1/2024 0200 by Nelda Ruiz RN  Glycemic Management: oral hydration promoted  Taken 9/1/2024 0000 by Nelda Ruiz RN  Glycemic Management: oral hydration promoted  Taken 8/31/2024 2200 by Nelda Ruiz RN  Glycemic Management: oral hydration promoted  Taken 8/31/2024 2129 by Nelda Ruiz RN  Glycemic Management: oral hydration promoted     Problem: Infection Progression (Sepsis/Septic Shock)  Goal: Absence of Infection Signs and Symptoms  Outcome: Ongoing, Progressing  Intervention: Initiate Sepsis Management  Recent Flowsheet Documentation  Taken 9/1/2024 0200 by Nelda Ruiz RN  Infection Prevention:   cohorting utilized   environmental surveillance performed   hand hygiene promoted   rest/sleep promoted  Taken 9/1/2024 0000 by Nelda Ruiz RN  Infection Prevention:   cohorting utilized   environmental surveillance performed   hand hygiene promoted   rest/sleep promoted  Taken 8/31/2024 2200 by Nelda Ruiz RN  Infection Prevention:   cohorting utilized   environmental surveillance performed   hand hygiene promoted   rest/sleep promoted  Taken 8/31/2024 2129 by Nelda Ruiz RN  Infection Prevention:   cohorting utilized   environmental surveillance performed   hand hygiene promoted   rest/sleep promoted  Intervention: Promote Recovery  Recent Flowsheet Documentation  Taken 9/1/2024 0200 by Nelda Ruiz RN  Activity Management: activity encouraged  Airway/Ventilation Support: comfort measures provided  Sleep/Rest Enhancement:   awakenings minimized   consistent schedule promoted   noise level reduced   regular sleep/rest pattern promoted   room darkened   relaxation techniques promoted   therapeutic touch utilized  Taken 9/1/2024 0000 by Nelda Ruiz RN  Activity Management:  activity encouraged  Airway/Ventilation Support: comfort measures provided  Sleep/Rest Enhancement:   awakenings minimized   consistent schedule promoted   noise level reduced   regular sleep/rest pattern promoted   room darkened   relaxation techniques promoted   therapeutic touch utilized  Taken 8/31/2024 2200 by Nelda Ruiz RN  Activity Management: activity encouraged  Airway/Ventilation Support: comfort measures provided  Sleep/Rest Enhancement:   awakenings minimized   consistent schedule promoted   noise level reduced   regular sleep/rest pattern promoted   room darkened   relaxation techniques promoted   therapeutic touch utilized  Taken 8/31/2024 2129 by Nelda Ruiz RN  Activity Management: activity encouraged  Airway/Ventilation Support: comfort measures provided  Sleep/Rest Enhancement:   awakenings minimized   consistent schedule promoted   noise level reduced   regular sleep/rest pattern promoted   room darkened   relaxation techniques promoted   therapeutic touch utilized  Intervention: Promote Stabilization  Recent Flowsheet Documentation  Taken 9/1/2024 0200 by Nelda Ruiz RN  Lung Protection Measures: fluid excess minimized  Fluid/Electrolyte Management: fluids provided  Taken 9/1/2024 0000 by Nelda Ruiz RN  Lung Protection Measures: fluid excess minimized  Fluid/Electrolyte Management: fluids provided  Taken 8/31/2024 2200 by Nelda Ruiz RN  Lung Protection Measures: fluid excess minimized  Fluid/Electrolyte Management: fluids provided  Taken 8/31/2024 2129 by Nelda Ruiz RN  Lung Protection Measures: fluid excess minimized  Fluid/Electrolyte Management: fluids provided     Problem: Nutrition Impaired (Sepsis/Septic Shock)  Goal: Optimal Nutrition Intake  Outcome: Ongoing, Progressing     Problem: Fall Injury Risk  Goal: Absence of Fall and Fall-Related Injury  Outcome: Ongoing, Progressing  Intervention: Identify and Manage Contributors  Recent Flowsheet  Documentation  Taken 9/1/2024 0200 by Nelda Ruiz RN  Medication Review/Management: medications reviewed  Self-Care Promotion: independence encouraged  Taken 9/1/2024 0000 by Nelda Ruiz RN  Medication Review/Management: medications reviewed  Self-Care Promotion: independence encouraged  Taken 8/31/2024 2200 by Nelda Ruiz RN  Medication Review/Management: medications reviewed  Self-Care Promotion: independence encouraged  Taken 8/31/2024 2129 by Nelda Ruiz RN  Medication Review/Management: medications reviewed  Self-Care Promotion: independence encouraged  Intervention: Promote Injury-Free Environment  Recent Flowsheet Documentation  Taken 9/1/2024 0200 by Nelda Ruiz RN  Safety Promotion/Fall Prevention:   activity supervised   assistive device/personal items within reach   clutter free environment maintained   fall prevention program maintained   lighting adjusted   nonskid shoes/slippers when out of bed   room organization consistent   safety round/check completed  Taken 9/1/2024 0000 by Nelda Ruiz RN  Safety Promotion/Fall Prevention:   activity supervised   assistive device/personal items within reach   clutter free environment maintained   fall prevention program maintained   lighting adjusted   nonskid shoes/slippers when out of bed   room organization consistent   safety round/check completed  Taken 8/31/2024 2200 by Nelda Ruiz RN  Safety Promotion/Fall Prevention:   activity supervised   assistive device/personal items within reach   clutter free environment maintained   fall prevention program maintained   lighting adjusted   nonskid shoes/slippers when out of bed   room organization consistent   safety round/check completed  Taken 8/31/2024 2129 by Nelda Ruiz RN  Safety Promotion/Fall Prevention:   activity supervised   assistive device/personal items within reach   clutter free environment maintained   fall prevention program maintained   lighting  "adjusted   nonskid shoes/slippers when out of bed   room organization consistent   safety round/check completed   Goal Outcome Evaluation:               8/31/24- Patient has remained AOX4. HR has ranged between low 50s-70s. Pt remained on 4L NC throughout shift. Patient stated she felt \"stuffy\" so I placed her in high kate's. Pt stated this helped. Educated pt the importance of using the spirometer throughout the shift to help with breathing and she demonstrated the use of spirometer many times when I rounded. Plan of care ongoing.                               "

## 2024-09-01 NOTE — PLAN OF CARE
Problem: Fluid Imbalance (Pneumonia)  Goal: Fluid Balance  Outcome: Ongoing, Progressing  Intervention: Monitor and Manage Fluid Balance  Recent Flowsheet Documentation  Taken 9/1/2024 0745 by Gloria Kelly RN  Fluid/Electrolyte Management: fluids provided     Problem: Infection (Pneumonia)  Goal: Resolution of Infection Signs and Symptoms  Outcome: Ongoing, Progressing     Problem: Respiratory Compromise (Pneumonia)  Goal: Effective Oxygenation and Ventilation  Outcome: Ongoing, Progressing  Intervention: Promote Airway Secretion Clearance  Recent Flowsheet Documentation  Taken 9/1/2024 0745 by Gloria Kelly RN  Cough And Deep Breathing: done independently per patient  Intervention: Optimize Oxygenation and Ventilation  Recent Flowsheet Documentation  Taken 9/1/2024 1600 by Gloria Kelly RN  Head of Bed (HOB) Positioning: HOB elevated  Taken 9/1/2024 1400 by Gloria Kelly RN  Head of Bed (HOB) Positioning: HOB elevated  Taken 9/1/2024 1200 by Gloria Kelly RN  Head of Bed (HOB) Positioning: HOB elevated  Taken 9/1/2024 1000 by Gloria Kelly RN  Head of Bed (HOB) Positioning: (in chair) other (see comments)  Taken 9/1/2024 0745 by Gloria Kelly RN  Head of Bed (HOB) Positioning: HOB elevated     Problem: Adult Inpatient Plan of Care  Goal: Plan of Care Review  Outcome: Ongoing, Progressing  Flowsheets  Taken 9/1/2024 1629 by Gloria Kelly RN  Progress: improving  Taken 9/1/2024 1051 by Dee Humphrey OT  Plan of Care Reviewed With: patient  Goal: Patient-Specific Goal (Individualized)  Outcome: Ongoing, Progressing  Goal: Absence of Hospital-Acquired Illness or Injury  Outcome: Ongoing, Progressing  Intervention: Identify and Manage Fall Risk  Recent Flowsheet Documentation  Taken 9/1/2024 1400 by Gloria Kelly RN  Safety Promotion/Fall Prevention:   fall prevention program maintained   room organization consistent   safety round/check completed  Taken 9/1/2024 0745 by Gloria Kelly RN  Safety  Promotion/Fall Prevention:   room organization consistent   safety round/check completed  Intervention: Prevent Skin Injury  Recent Flowsheet Documentation  Taken 9/1/2024 1600 by Gloria Kelly RN  Body Position: supine, legs elevated  Skin Protection: adhesive use limited  Taken 9/1/2024 1400 by Gloria Kelly RN  Body Position: left  Skin Protection: adhesive use limited  Taken 9/1/2024 1200 by Gloria Kelly RN  Body Position: supine, legs elevated  Skin Protection: adhesive use limited  Taken 9/1/2024 1000 by Gloria Kelly RN  Body Position: supine, legs elevated  Skin Protection: adhesive use limited  Taken 9/1/2024 0745 by Gloria Kelly RN  Body Position: left  Intervention: Prevent and Manage VTE (Venous Thromboembolism) Risk  Recent Flowsheet Documentation  Taken 9/1/2024 0745 by Gloria Kelly RN  VTE Prevention/Management:   sequential compression devices on   bilateral  Range of Motion: active ROM (range of motion) encouraged  Intervention: Prevent Infection  Recent Flowsheet Documentation  Taken 9/1/2024 1600 by Gloria Kelly RN  Infection Prevention: environmental surveillance performed  Taken 9/1/2024 1400 by Gloria Kelly RN  Infection Prevention: environmental surveillance performed  Taken 9/1/2024 1200 by Gloria Kelly RN  Infection Prevention: environmental surveillance performed  Taken 9/1/2024 1000 by Gloria Kelly RN  Infection Prevention: environmental surveillance performed  Taken 9/1/2024 0745 by Gloria Kelly RN  Infection Prevention: environmental surveillance performed  Goal: Optimal Comfort and Wellbeing  Outcome: Ongoing, Progressing  Intervention: Provide Person-Centered Care  Recent Flowsheet Documentation  Taken 9/1/2024 0745 by Gloria Kelly RN  Trust Relationship/Rapport: care explained  Goal: Readiness for Transition of Care  Outcome: Ongoing, Progressing     Problem: Skin Injury Risk Increased  Goal: Skin Health and Integrity  Outcome: Ongoing,  Progressing  Intervention: Optimize Skin Protection  Recent Flowsheet Documentation  Taken 9/1/2024 1600 by Gloria Kelly RN  Pressure Reduction Techniques:   frequent weight shift encouraged   heels elevated off bed   weight shift assistance provided  Head of Bed (HOB) Positioning: HOB elevated  Pressure Reduction Devices:   pressure-redistributing mattress utilized   positioning supports utilized  Skin Protection: adhesive use limited  Taken 9/1/2024 1400 by Gloria Kelly RN  Pressure Reduction Techniques:   frequent weight shift encouraged   heels elevated off bed   weight shift assistance provided  Head of Bed (HOB) Positioning: HOB elevated  Pressure Reduction Devices:   pressure-redistributing mattress utilized   positioning supports utilized  Skin Protection: adhesive use limited  Taken 9/1/2024 1200 by Gloria Kelly RN  Pressure Reduction Techniques:   frequent weight shift encouraged   heels elevated off bed   weight shift assistance provided  Head of Bed (HOB) Positioning: HOB elevated  Pressure Reduction Devices:   pressure-redistributing mattress utilized   positioning supports utilized  Skin Protection: adhesive use limited  Taken 9/1/2024 1000 by Gloria Kelly RN  Pressure Reduction Techniques:   frequent weight shift encouraged   heels elevated off bed   weight shift assistance provided  Head of Bed (HOB) Positioning: (in chair) other (see comments)  Pressure Reduction Devices:   positioning supports utilized   pressure-redistributing mattress utilized  Skin Protection: adhesive use limited  Taken 9/1/2024 0745 by Gloria Kelly RN  Head of Bed (HOB) Positioning: HOB elevated     Problem: Adjustment to Illness (Sepsis/Septic Shock)  Goal: Optimal Coping  Outcome: Ongoing, Progressing  Intervention: Optimize Psychosocial Adjustment to Illness  Recent Flowsheet Documentation  Taken 9/1/2024 0745 by Gloria Kelly RN  Supportive Measures: active listening utilized     Problem: Bleeding  (Sepsis/Septic Shock)  Goal: Absence of Bleeding  Outcome: Ongoing, Progressing     Problem: Glycemic Control Impaired (Sepsis/Septic Shock)  Goal: Blood Glucose Level Within Desired Range  Outcome: Ongoing, Progressing  Intervention: Optimize Glycemic Control  Recent Flowsheet Documentation  Taken 9/1/2024 0745 by Gloria Kelly RN  Glycemic Management: oral hydration promoted     Problem: Infection Progression (Sepsis/Septic Shock)  Goal: Absence of Infection Signs and Symptoms  Outcome: Ongoing, Progressing  Intervention: Initiate Sepsis Management  Recent Flowsheet Documentation  Taken 9/1/2024 1600 by Gloria Kelly RN  Infection Prevention: environmental surveillance performed  Taken 9/1/2024 1400 by Gloria Kelly RN  Infection Prevention: environmental surveillance performed  Taken 9/1/2024 1200 by Gloria Kelly RN  Infection Prevention: environmental surveillance performed  Taken 9/1/2024 1000 by Gloria Kelly RN  Infection Prevention: environmental surveillance performed  Taken 9/1/2024 0745 by Gloria Kelly RN  Infection Prevention: environmental surveillance performed  Intervention: Promote Stabilization  Recent Flowsheet Documentation  Taken 9/1/2024 0745 by Gloria Kelly RN  Fluid/Electrolyte Management: fluids provided     Problem: Nutrition Impaired (Sepsis/Septic Shock)  Goal: Optimal Nutrition Intake  Outcome: Ongoing, Progressing     Problem: Fall Injury Risk  Goal: Absence of Fall and Fall-Related Injury  Outcome: Ongoing, Progressing  Intervention: Identify and Manage Contributors  Recent Flowsheet Documentation  Taken 9/1/2024 1400 by Gloria Kelly RN  Medication Review/Management: medications reviewed  Taken 9/1/2024 0745 by Gloria Kelly RN  Medication Review/Management: medications reviewed  Self-Care Promotion: independence encouraged  Intervention: Promote Injury-Free Environment  Recent Flowsheet Documentation  Taken 9/1/2024 1400 by Gloria Kelly RN  Safety  Promotion/Fall Prevention:   fall prevention program maintained   room organization consistent   safety round/check completed  Taken 9/1/2024 0745 by Gloria Kelly, RN  Safety Promotion/Fall Prevention:   room organization consistent   safety round/check completed   Goal Outcome Evaluation:           Progress: improving

## 2024-09-01 NOTE — THERAPY EVALUATION
"Patient Name: Avelina Ceron  : 1940    MRN: [id number]                              Today's Date: 2024       Admit Date: 2024    Visit Dx:     ICD-10-CM ICD-9-CM   1. Acute sepsis  A41.9 038.9     995.91   2. Pneumonia of left lower lobe due to infectious organism  J18.9 486   3. Acute exacerbation of chronic obstructive pulmonary disease (COPD)  J44.1 491.21   4. History of coronary artery disease  Z86.79 V12.59   5. Acute on chronic respiratory failure with hypoxia  J96.21 518.84     799.02     Patient Active Problem List   Diagnosis    Coronary arteriosclerosis in native artery    Chronic obstructive pulmonary disease    Depression    Gastroesophageal reflux disease with esophagitis    Hyperlipidemia    Hypertension    Hypothyroidism    Osteoarthritis    Peripheral arterial occlusive disease    Solitary pulmonary nodule    Vitamin D deficiency    Lower back pain    Carotid bruit    Hypoxemia    Chronic respiratory failure with hypoxia and hypercapnia    Allergic rhinitis    Chronic leukocytosis    Ureterolithiasis    Murmur, cardiac    Hospital discharge follow-up    Closed fracture of right proximal humerus    Right shoulder pain    Acute hypoxemic respiratory failure     Past Medical History:   Diagnosis Date    Anesthesia complication     Pt went into respiratory failure during total hip replacement in     Arthritis of back     Arthritis of neck     Broken ribs     COPD (chronic obstructive pulmonary disease)     Coronary artery disease     Degenerative lumbar disc     Disease of thyroid gland     \"low thyroid\"    Fracture, fibula ?    Fracture, humerus 23    Fracture, tibia and fibula ?    Hip arthrosis     History of colonoscopy     Hyperlipidemia     Hypertension     Kidney stone 2023    Knee swelling     Lumbosacral disc disease     On home O2     3L    Osteoarthritis     Periarthritis of shoulder     Urinary incontinence     Urinary tract infection      Past " Surgical History:   Procedure Laterality Date    CAROTID STENT Bilateral     COLONOSCOPY      EYE SURGERY  10/17/2022    yag surgery    GALLBLADDER SURGERY  2012    HYSTERECTOMY      ILIAC ARTERY STENT Bilateral     JOINT REPLACEMENT  1995; 2020    KIDNEY STONE SURGERY  July August 2023    OOPHORECTOMY  1976    1 ovary removed    TOTAL HIP ARTHROPLASTY Bilateral     URETEROSCOPY LASER LITHOTRIPSY WITH STENT INSERTION Right 07/26/2023    Procedure: CYSTOSCOPY, RIGHT RETROGRADE PYELOGRAM; WITH STENT INSERTION;  Surgeon: Lee Kim MD;  Location:  [facility];  Service: Urology;  Laterality: Right;    URETEROSCOPY LASER LITHOTRIPSY WITH STENT INSERTION Right 08/10/2023    Procedure: URETEROSCOPY, LASER LITHOTRIPSY, STENT EXCHANGE;  Surgeon: Lee Kim MD;  Location:  [facility];  Service: Urology;  Laterality: Right;      General Information       Row Name 09/01/24 1012          OT Time and Intention    Document Type evaluation  -JY     Mode of Treatment occupational therapy;individual therapy  -JY       Row Name 09/01/24 1012          General Information    Patient Profile Reviewed yes  -JY     Prior Level of Function independent:;all household mobility;gait;transfer;bed mobility;feeding;grooming;dressing;bathing;max assist:;dependent:;home management;cooking;cleaning  pt receives A for house mgmt tasks, laundry, cleaning and uses [employer] Dash or family A for meal prep; uses 3WW for in home and community mobility, FWW on patio; denies any falls; uses 2L O2 at baseline  -JY     Barriers to Rehab medically complex;previous functional deficit  -JY       Row Name 09/01/24 1012          Occupational Profile    Environmental Supports and Barriers (Occupational Profile) walk in shower w/ seat, comfort height toilet, DME: has 3WW, FWW used as indicated in PLOF, also has LH DME of which pt uses as needed at baseline for LBD  -JY     Patient Goals (Occupational Profile) to return to PLOF  -JY       Row Name 09/01/24  1012          Living Environment    People in Home child(gabriela), adult;other (see comments)  pt's daughter or other persons ensure pt is provided 24/7 availability for A as needed  -FLAQUITA       Row Name 09/01/24 1012          Home Main Entrance    Number of Stairs, Main Entrance two  -JY     Stair Railings, Main Entrance railings on both sides of stairs  -JY       Row Name 09/01/24 1012          Stairs Within Home, Primary    Stairs, Within Home, Primary 1 step into family room  -JY     Number of Stairs, Within Home, Primary one  -JY     Stair Railings, Within Home, Primary railing on left side (ascending)  -JRODOLFO       Row Name 09/01/24 1012          Cognition    Orientation Status (Cognition) oriented x 4  -FLAQUITA       Row Name 09/01/24 1012          Safety Issues, Functional Mobility    Safety Issues Affecting Function (Mobility) awareness of need for assistance;insight into deficits/self-awareness;safety precaution awareness;safety precautions follow-through/compliance;positioning of assistive device  -JRODOLFO     Impairments Affecting Function (Mobility) balance;endurance/activity tolerance;shortness of breath;strength  -JY     Comment, Safety Issues/Impairments (Mobility) pt alert and able to follow commands; intermittent cues for optimal use of FWW to maintain in close proximity radha during turning, pivoting as pt used to using 3 wheel wx more routinely; pt with increased exertion/SOA with more dynamic demands  -JY               User Key  (r) = Recorded By, (t) = Taken By, (c) = Cosigned By      Initials Name Provider Type    Dee Toribio OT Occupational Therapist                     Mobility/ADL's       Row Name 09/01/24 1031          Bed Mobility    Bed Mobility supine-sit;scooting/bridging  -JY     Scooting/Bridging [place] (Bed Mobility) minimum assist (75% patient effort);verbal cues  -JRODOLFO     Supine-Sit [place] (Bed Mobility) minimum assist (75% patient effort);verbal cues  -JRODOLFO     Assistive Device (Bed  Mobility) bed rails;head of bed elevated  -JY     Comment, (Bed Mobility) skilled cues for optimal hand placement and seq to advance LEs to EOB, reach across midline to use bedrails to pull/push self into sitting at EOB, pt req'd gross min A for uprighting trunk into sitting and min A for improving symmetry between hips for stability; denied any dizziness or feeling LH at EOB  -J       Row Name 09/01/24 1031          Transfers    Transfers stand-sit transfer;sit-stand transfer;toilet transfer  -JY     Comment, (Transfers) skilled cues for optimal hand placement for controlled ascend, descend specifically to push up from seated surface and reach back prior to sitting once aligned and in close proximity to seated surfaces; pt with urgent need to complete toileting with graded set up at [facility] vs bedpan thus FWW not used but rather UE support bed > recliner, otherwise FWW used; emphasized not using FWW to pull up on in effort to  order to decrease fall risk  -JY       Row Name 09/01/24 1031          Sit-Stand Transfer    Sit-Stand [place] (Transfers) minimum assist (75% patient effort);verbal cues  -JY     Assistive Device (Sit-Stand Transfers) other (see comments);walker, front-wheeled  initially UE support, later used FWW in sit > stand at [facility] before t/f to recliner  -       Row Name 09/01/24 1031          Stand-Sit Transfer    Stand-Sit [place] (Transfers) minimum assist (75% patient effort);verbal cues  -JY     Assistive Device (Stand-Sit Transfers) walker, front-wheeled;other (see comments)  initially UE support, later used FWW in stand>sit at [facility] before t/f to recliner  -       Row Name 09/01/24 1031          Toilet Transfer    Type (Toilet Transfer) sit-stand;stand-sit  -JY     [place] Level (Toilet Transfer) minimum assist (75% patient effort);verbal cues  -JY     Assistive Device (Toilet Transfer) commode, bedside without drop arms;walker, front-wheeled  -JY     Comment, (Toilet  Transfer) emphasized ensuring security of BSC when t/fing to/from to decrease fall risks and to reach back prior to sitting and to push up from arm rests in standing; educated pt on ability to at least t/f to BSC and pending respiratory support, potentially using resources in bathroom for graded toileting; pt rated increased PRE after toileting w/ 6/10 rating  -FLAQUITA       [facility] Name 09/01/24 1031          Functional Mobility    Functional Mobility- Ind. Level minimum assist (75% patient effort);verbal cues required  -     Functional Mobility- Device walker, front-wheeled  -     Functional Mobility-Distance (Feet) --  8; in room ADL related mobility  -     Functional Mobility- Comment defer to PT for specifics however during brief in room ADL related mobility pt req'd gross min A with FWW use, intermittent cues for maintaining AD in close proximity throughout and close monitoring of nasal cannula tubing while turning  -     Patient was able to Ambulate yes  -[facility] Name 09/01/24 1031          Activities of Daily Living    BADL Assessment/Intervention upper body dressing;lower body dressing;grooming;toileting  -       Row Name 09/01/24 1031          Upper Body Dressing Assessment/Training    [place] Level (Upper Body Dressing) doff;don;pajama/robe;minimum assist (75% patient effort);verbal cues  -JY     Position (Upper Body Dressing) edge of bed sitting  -JY     Comment, (Upper Body Dressing) min A for posterior and proximal mgmt of gown  -       Row Name 09/01/24 1031          Lower Body Dressing Assessment/Training    [place] Level (Lower Body Dressing) doff;don;socks;dependent (less than 25% patient effort)  -JY     Position (Lower Body Dressing) edge of bed sitting  -JY     Comment, (Lower Body Dressing) pt declined to complete d/d socks this date d/t increased perceived exertion/SOA with task, pt endorses SOA with task at home thus uses LH sock aid at baseline, did not have available this  date  -[facility] Name 09/01/24 1031          Grooming Assessment/Training    [place] Level (Grooming) wash face, hands;set up  -     Position (Grooming) supported sitting  -[facility] Name 09/01/24 1031          Toileting Assessment/Training    [place] Level (Toileting) adjust/manage clothing;minimum assist (75% patient effort);perform perineal hygiene;dependent (less than 25% patient effort)  -     Assistive Devices (Toileting) commode, bedside without drop arms  -     Position (Toileting) unsupported sitting;supported standing  -     Comment, (Toileting) min A for posterior mgmt of gown pre and post toileting, pt otherwise able to manage anterior; pt req'd hygiene A in standing for quality care; pt with skill set to complete sitting if needed  -               User Key  (r) = Recorded By, (t) = Taken By, (c) = Cosigned By      Initials Name Provider Type    Dee Toribio OT Occupational Therapist                   Obj/Interventions       [facility] Name 09/01/24 1045          Sensory Assessment (Somatosensory)    Sensory Assessment (Somatosensory) bilateral UE;sensation intact  -     Bilateral UE Sensory Assessment general sensation;light touch awareness;intact  -     Sensory Assessment denies any numbness or tingling and able to recognize LT stimuli as intact and symmetrical at BUEs  -[facility] Name 09/01/24 1045          Vision Assessment/Intervention    Visual Impairment/Limitations corrective lenses for reading  -     Vision Assessment Comment denies any acute changes to vision  -[facility] Name 09/01/24 1045          Range of Motion Comprehensive    General Range of Motion bilateral upper extremity ROM WFL  -[facility] Name 09/01/24 1045          Strength Comprehensive (MMT)    General Manual Muscle Testing (MMT) Assessment upper extremity strength deficits identified  -     Comment, General Manual Muscle Testing (MMT) Assessment L shoulder MMS 4/5, otherwise L elbow,  wrist, hand and gross RUE MMS 4+/5 per MMT  -JY       Row Name 09/01/24 1045          Motor Skills    Motor Skills functional endurance;coordination  -JY     Coordination bilateral;upper extremity;finger to nose;other (see comments);WFL  finger thumb opposition  -JY     Functional Endurance decreased activity tolerance toward more dynamic demands; easily exerted and with increased perceived rating of SOA with fxl tasks including 6/ 10 with toileting and 6/10 with MMT and other seated assessments with UE integrated  -JY       Row Name 09/01/24 1045          Balance    Balance Assessment sitting static balance;sitting dynamic balance;standing static balance;standing dynamic balance  -JY     Static Sitting Balance supervision  -JY     Dynamic Sitting Balance standby assist  -JY     Position, Sitting Balance unsupported;sitting edge of bed  -JY     Static Standing Balance minimal assist  -JY     Dynamic Standing Balance minimal assist;verbal cues  -JY     Position/Device Used, Standing Balance supported;walker, front-wheeled  -JY     Balance Interventions sitting;standing;static;dynamic;sit to stand;supported;occupation based/functional task  -JY     Comment, Balance no overt LOB during seated or standing tasks; cues for improved posture provided when standing for stability; initially provided UE support for urgent toileting and later provided FWW support with improved balance noted  -JY               User Key  (r) = Recorded By, (t) = Taken By, (c) = Cosigned By      Initials Name Provider Type    Dee Toribio OT Occupational Therapist                   Goals/Plan       Row Name 09/01/24 1102          Transfer Goal 1 (OT)    Activity/Assistive Device (Transfer Goal 1, OT) sit-to-stand/stand-to-sit;bed-to-chair/chair-to-bed;toilet;walker, rolling  -JRODOLFO     [place] Level/Cues Needed (Transfer Goal 1, OT) contact guard required;verbal cues required  -JY     Time Frame (Transfer Goal 1, OT) long term goal  (LTG);by discharge  -JY     Progress/Outcome (Transfer Goal 1, OT) new goal  -       Row Name 09/01/24 1102          Dressing Goal 1 (OT)    Activity/Device (Dressing Goal 1, OT) upper body dressing;lower body dressing;other (see comments)  d/d TB garments with LH AE PRN  -JY     [place]/Cues Needed (Dressing Goal 1, OT) contact guard required;minimum assist (75% or more patient effort);verbal cues required  -JY     Time Frame (Dressing Goal 1, OT) long term goal (LTG);by discharge  -JY     Strategies/Barriers (Dressing Goal 1, OT) w/ improved demonstration and competency of ECWS techs/strategies  -JY     Progress/Outcome (Dressing Goal 1, OT) new goal  -       Row Name 09/01/24 1102          Toileting Goal 1 (OT)    Activity/Device (Toileting Goal 1, OT) adjust/manage clothing;perform perineal hygiene;commode;commode, bedside without drop arms;grab bar/safety frame;leg   -JY     [place] Level/Cues Needed (Toileting Goal 1, OT) contact guard required;verbal cues required  -JY     Time Frame (Toileting Goal 1, OT) long term goal (LTG);by discharge  -JY     Progress/Outcome (Toileting Goal 1, OT) new goal  -       Row Name 09/01/24 1102          Strength Goal 1 (OT)    Strength Goal 1 (OT) Pt to complete seated HEP encompassing BUEs targeting strength and endurance with progressive sets/reps/resistance in order to improve integration in ADLs, related t/fs, mobility  -JY     Time Frame (Strength Goal 1, OT) long term goal (LTG);by discharge  -JY     Progress/Outcome (Strength Goal 1, OT) new goal  -       Row Name 09/01/24 1102          Therapy Assessment/Plan (OT)    Planned Therapy Interventions (OT) activity tolerance training;adaptive equipment training;BADL retraining;functional balance retraining;neuromuscular control/coordination retraining;occupation/activity based interventions;patient/caregiver education/training;ROM/therapeutic exercise;strengthening exercise;transfer/mobility  retraining  -JY               User Key  (r) = Recorded By, (t) = Taken By, (c) = Cosigned By      Initials Name Provider Type    Dee Toribio, VERONIQUE Occupational Therapist                   Clinical Impression       Row Name 09/01/24 1051          Pain Assessment    Pretreatment Pain Rating 0/10 - no pain  -JY     Posttreatment Pain Rating 0/10 - no pain  -JY     Pre/Posttreatment Pain Comment denies any pain and tolerated all OT interventions  -JY     Pain Intervention(s) Repositioned;Ambulation/increased activity;Rest;Nursing Notified  -FLAQUITA       Row Name 09/01/24 1051          Plan of Care Review    Plan of Care Reviewed With patient  -JY     Progress no change  OT IE  -JY     Outcome Evaluation OT evaluation completed. Pt presents with decreased I in ADLs, related t/fs, mobility compared to PLOF limited by decreased activity tolerance, impaired balance, muscle weakness at BUEs, decreased distal reach w/o increased exertion, SOA and fatigue w/ more dynamic demands. Pt does endorse SOA at baseline. Pt req'd gross min A in bed mobility and fxl t/fs between bed> BSC> recliner and during brief ADL related mobility. Pt req'd min A BD, dep for LBD w/o sock aid present d/t perceived exertion and SOA when reaching toward feet and min A to dep for toileting needs. Pt would benefit from IPOT POC to address underlying impairments impacting function and recommend home w/ 24/7 A at least initially and HHOT pending progress w/ PT.  -JRODOLFO       Row Name 09/01/24 1051          Therapy Assessment/Plan (OT)    Patient/Family Therapy Goal Statement (OT) to return to PLOF  -JY     Rehab Potential (OT) good, to achieve stated therapy goals  -JY     Criteria for Skilled Therapeutic Interventions Met (OT) yes;skilled treatment is necessary  -JY     Therapy Frequency (OT) daily  -JY     Predicted Duration of Therapy Intervention (OT) 4 days  -JRODOLFO       Row Name 09/01/24 1051          Therapy Plan Review/Discharge Plan (OT)    Equipment  Needs Upon Discharge (OT) dressing equipment;bathing equipment  -JY     Anticipated Discharge Disposition (OT) home with 24/7 care;home with home health  -JY       Row Name 09/01/24 1051          Vital Signs    Pre Systolic BP Rehab 150  -JY     Pre Treatment Diastolic BP 49  -JY     Post Systolic BP Rehab 113  -JY     Post Treatment Diastolic BP 40  -JY     Pre SpO2 (%) 94  -JY     O2 Delivery Pre Treatment supplemental O2  -JY     Intra SpO2 (%) 90  -JY     O2 Delivery Intra Treatment supplemental O2  -JY     Post SpO2 (%) 90  -JY     O2 Delivery Post Treatment supplemental O2  -JY     Pre Patient Position Supine  -JY     Intra Patient Position Sitting  -JY     Post Patient Position Sitting  -JY       Row Name 09/01/24 1051          Positioning and Restraints    Pre-Treatment Position in bed  -JY     Post Treatment Position chair  -JY     In Chair notified nsg;reclined;call light within reach;encouraged to call for assist;exit alarm on;with nsg;waffle cushion;legs elevated;LUE elevated  LUE with IV elevated and supported, RN w/ med mgmt in room  -JY               User Key  (r) = Recorded By, (t) = Taken By, (c) = Cosigned By      Initials Name Provider Type    Dee Toribio, VERONIQUE Occupational Therapist                   Outcome Measures       Row Name 09/01/24 1107          How much help from another is currently needed...    Putting on and taking off regular lower body clothing? 1  -JY     Bathing (including washing, rinsing, and drying) 2  -JY     Toileting (which includes using toilet bed pan or urinal) 3  -JY     Putting on and taking off regular upper body clothing 3  -JY     Taking care of personal grooming (such as brushing teeth) 3  -JY     Eating meals 4  -JY     AM-PAC 6 Clicks Score (OT) 16  -JY       Row Name 09/01/24 0745          How much help from another person do you currently need...    Turning from your back to your side while in flat bed without using bedrails? 3  -DC     Moving from lying on  back to sitting on the side of a flat bed without bedrails? 3  -DC     Moving to and from a bed to a chair (including a wheelchair)? 3  -DC     Standing up from a chair using your arms (e.g., wheelchair, bedside chair)? 3  -DC     Climbing 3-5 steps with a railing? 2  -DC     To walk in hospital room? 2  -DC     AM-PAC 6 Clicks Score (PT) 16  -DC     Highest Level of Mobility Goal 5 --> Static standing  -DC       Row Name 09/01/24 1107          Functional Assessment    Outcome Measure Options AM-PAC 6 Clicks Daily Activity (OT)  -FLAQUITA               User Key  (r) = Recorded By, (t) = Taken By, (c) = Cosigned By      Initials Name Provider Type    Dee Toribio OT Occupational Therapist    Gloria Maradiaga, RN Registered Nurse                    Occupational Therapy Education       Title: PT OT SLP Therapies (In Progress)       Topic: Occupational Therapy (In Progress)       Point: ADL training (In Progress)       Description:   Instruct learner(s) on proper safety adaptation and remediation techniques during self care or transfers.   Instruct in proper use of assistive devices.                  Learning Progress Summary             Patient Acceptance, E,D, NR by FLAQUITA at 9/1/2024 0829                         Point: Home exercise program (Not Started)       Description:   Instruct learner(s) on appropriate technique for monitoring, assisting and/or progressing therapeutic exercises/activities.                  Learner Progress:  Not documented in this visit.              Point: Precautions (In Progress)       Description:   Instruct learner(s) on prescribed precautions during self-care and functional transfers.                  Learning Progress Summary             Patient Acceptance, E,D, NR by FLAQUITA at 9/1/2024 0829                         Point: Body mechanics (In Progress)       Description:   Instruct learner(s) on proper positioning and spine alignment during self-care, functional mobility activities and/or  exercises.                  Learning Progress Summary             Patient Acceptance, E,D, NR by FLAQUITA at 9/1/2024 0829                                         User Key       Initials Effective Dates Name Provider Type Discipline    FLAQUITA 06/16/21 -  Dee Humphrey, VERONIQUE Occupational Therapist OT                  OT Recommendation and Plan  Planned Therapy Interventions (OT): activity tolerance training, adaptive equipment training, BADL retraining, functional balance retraining, neuromuscular control/coordination retraining, occupation/activity based interventions, patient/caregiver education/training, ROM/therapeutic exercise, strengthening exercise, transfer/mobility retraining  Therapy Frequency (OT): daily  Plan of Care Review  Plan of Care Reviewed With: patient  Progress: no change (OT IE)  Outcome Evaluation: OT evaluation completed. Pt presents with decreased I in ADLs, related t/fs, mobility compared to PLOF limited by decreased activity tolerance, impaired balance, muscle weakness at BUEs, decreased distal reach w/o increased exertion, SOA and fatigue w/ more dynamic demands. Pt does endorse SOA at baseline. Pt req'd gross min A in bed mobility and fxl t/fs between bed> BSC> recliner and during brief ADL related mobility. Pt req'd min A BD, dep for LBD w/o sock aid present d/t perceived exertion and SOA when reaching toward feet and min A to dep for toileting needs. Pt would benefit from IPOT POC to address underlying impairments impacting function and recommend home w/ 24/7 A at least initially and HHOT pending progress w/ PT.     Time Calculation:   Evaluation Complexity (OT)  Review Occupational Profile/Medical/Therapy History Complexity: expanded/moderate complexity  Assessment, Occupational Performance/Identification of Deficit Complexity: 3-5 performance deficits  Clinical Decision Making Complexity (OT): detailed assessment/moderate complexity  Overall Complexity of Evaluation (OT): moderate complexity      Time Calculation- OT       Row Name 09/01/24 1109             Time Calculation- OT    OT Start Time 0829  -JY      OT Received On 09/01/24  -JY      OT Goal Re-Cert Due Date 09/11/24  -JY         Timed Charges    40441 - OT Therapeutic Activity Minutes 10  -JY      50342 - OT Self Care/Mgmt Minutes 16  -JY         Untimed Charges    OT Eval/Re-eval Minutes 50  -JY         Total Minutes    Timed Charges Total Minutes 26  -JY      Untimed Charges Total Minutes 50  -JY       Total Minutes 76  -JY                User Key  (r) = Recorded By, (t) = Taken By, (c) = Cosigned By      Initials Name Provider Type    Dee Toribio OT Occupational Therapist                  Therapy Charges for Today       Code Description Service Date Service Provider Modifiers Qty    24517993865 HC OT THERAPEUTIC ACT EA 15 MIN 9/1/2024 Dee Humphrey OT GO 1    28956741534 HC OT SELF CARE/MGMT/TRAIN EA 15 MIN 9/1/2024 Dee Humphrey OT GO 1    29234312209 HC OT EVAL MOD COMPLEXITY 4 9/1/2024 Dee Humphrey OT GO 1                 Dee Humphrey OT  9/1/2024

## 2024-09-01 NOTE — PLAN OF CARE
Goal Outcome Evaluation:              Outcome Evaluation: PT PRESENTS WITH EVOLVING SYMPTOMS TO INCLUDE SOA WITH ACTIVITY, DECREASED ENDURANCE, IMPAIRED BALANCE, GENERALIZED WEAKNESS AND DECLINE IN FUNCTIONAL MOBILITY FROM BASELINE. PT CURRENTLY ON 4L O2. NEEDS CUES FOR PLB AND FREQUENT REST BREAKS. RECOMMEND HOME WITH HHPT AND 24/7 ASSIST AT D/C.      Anticipated Discharge Disposition (PT): home with 24/7 care, home with home health

## 2024-09-01 NOTE — PROGRESS NOTES
[facility] Medicine Services  PROGRESS NOTE    Patient Name: Avelina Ceron  : 1940  MRN: [id number]    Date of Admission: 2024  Primary Care Physician: Dre Moura MD    Subjective   Subjective     CC:  Acute hypoxic resp failure, PNA    HPI:  Was satting 86% on 4L this AM - increased to 5L. Still w dry cough. No chest pain or SOA. No GI or  symptoms.     Objective   Objective     Vital Signs:   Temp:  [97.6 °F (36.4 °C)-99 °F (37.2 °C)] 97.6 °F (36.4 °C)  Heart Rate:  [39-81] 81  Resp:  [14-16] 16  BP: (105-152)/(48-71) 150/49  Flow (L/min):  [4-6] 4     Physical Exam:  Constitutional: No acute distress, awake, alert, sitting up in bedside chair  HENT: NCAT, mucous membranes moist  Respiratory: Coarse throughout, similar to yesterday  Cardiovascular: RRR, no murmurs  Gastrointestinal: Positive bowel sounds, soft, nontender, nondistended  Musculoskeletal: No bilateral ankle edema  Psychiatric: Appropriate affect, cooperative  Neurologic: Alert, oriented, symmetric facies, moves all extremities, speech clear  Skin: No rashes on exposed skin    Results Reviewed:  LAB RESULTS:      Lab 24  1038 24  0124 24  0800 24  0728 24  1513   WBC 35.42* 30.95* 30.26* 31.34* 27.35*   HEMOGLOBIN 11.2* 10.6* 10.8* 11.0* 11.0*   HEMATOCRIT 36.2 34.0 34.6 35.2 35.2   PLATELETS 405 433 437 472* 365   NEUTROS ABS 27.35* 26.00* 24.51* 24.45* 23.93*   IMMATURE GRANS (ABS) 3.29*  --   --   --  0.30*   LYMPHS ABS 2.49  --   --   --  1.12   MONOS ABS 2.17*  --   --   --  1.92*   EOS ABS 0.08 0.00 0.00 0.00 0.00   .7* 101.5* 100.9* 101.1* 101.1*   PROCALCITONIN  --   --   --   --  0.14   LACTATE  --   --   --   --  1.5         Lab 24  2320 24  1038 24  0124 24  0759 24  2317 24  1321 24  0803   SODIUM  --  139 139 141  --  140 143   POTASSIUM 5.1 3.6 4.3 4.3 4.2 3.4* 3.5   CHLORIDE  --  98 93* 95*  --  92* 96*    CO2  --  31.0* 40.0* 39.0*  --  37.0* 41.0*   ANION GAP  --  10.0 6.0 7.0  --  11.0 6.0   BUN  --  13 9 8  --  7* 8   CREATININE  --  0.44* 0.34* 0.35*  --  0.33* 0.37*   EGFR  --  96.1 102.3 101.6  --  103.0 100.2   GLUCOSE  --  133* 100* 105*  --  154* 113*   CALCIUM  --  9.4 9.3 9.0  --  9.3 9.7   MAGNESIUM  --  1.8  --   --   --  1.7  --    TSH  --   --   --   --   --   --  0.382         Lab 08/30/24  0124 08/28/24  0803 08/26/24  1513   TOTAL PROTEIN 6.4 6.7 7.0   ALBUMIN 3.1* 3.3* 3.2*   GLOBULIN 3.3 3.4 3.8   ALT (SGPT) 36* 55* 20   AST (SGOT) 24 75* 27   BILIRUBIN 0.4 0.4 0.4   ALK PHOS 90 109 106         Lab 08/30/24  0124 08/28/24  1421 08/28/24  1321 08/26/24  1513   PROBNP 2,318.0*  --   --  3,091.0*   HSTROP T  --  37* 45* 31*                 Lab 08/27/24  0006   PH, ARTERIAL 7.419   PCO2, ARTERIAL 59.0*   PO2 ART 70.8*   FIO2 40   HCO3 ART 38.1*   BASE EXCESS ART 11.7*   CARBOXYHEMOGLOBIN 1.4     Brief Urine Lab Results  (Last result in the past 365 days)        Color   Clarity   Blood   Leuk Est   Nitrite   Protein   CREAT   Urine HCG        07/25/24 1449 Yellow   Clear   Negative   500 Yen/ul   Negative   30 mg/dL                   Microbiology Results Abnormal       Procedure Component Value - Date/Time    Blood Culture - Blood, Hand, Left [[id number]]  (Normal) Collected: 08/26/24 1530    Lab Status: Final result Specimen: Blood from Hand, Left Updated: 08/31/24 1700     Blood Culture No growth at 5 days    Blood Culture - Blood, Arm, Right [[id number]]  (Normal) Collected: 08/26/24 1630    Lab Status: Final result Specimen: Blood from Arm, Right Updated: 08/31/24 1700     Blood Culture No growth at 5 days    COVID PRE-OP / PRE-PROCEDURE SCREENING ORDER (NO ISOLATION) - Swab, Nasopharynx [[id number]]  (Normal) Collected: 08/26/24 1441    Lab Status: Final result Specimen: Swab from Nasopharynx Updated: 08/26/24 1519    Narrative:      The following orders were created for panel order COVID PRE-OP /  PRE-PROCEDURE SCREENING ORDER (NO ISOLATION) - Swab, Nasopharynx.  Procedure                               Abnormality         Status                     ---------                               -----------         ------                     COVID-19 and FLU A/B PCR...[[id number]]  Normal              Final result                 Please view results for these tests on the individual orders.    COVID-19 and FLU A/B PCR, 1 HR TAT - Swab, Nasopharynx [[id number]]  (Normal) Collected: 08/26/24 1441    Lab Status: Final result Specimen: Swab from Nasopharynx Updated: 08/26/24 1519     COVID19 Not Detected     Influenza A PCR Not Detected     Influenza B PCR Not Detected    Narrative:      Fact sheet for providers: https://www.fda.gov/media/442621/download    Fact sheet for patients: https://www.fda.gov/media/935537/download    Test performed by PCR.            XR Chest 1 View    Result Date: 8/31/2024  XR CHEST 1 VW Date of Exam: 8/31/2024 10:07 AM EDT Indication: hypoxia Comparison: CT 8/28/2024. Findings: Left-sided pleural effusion with adjacent volume loss appears similar in addition to some multifocal areas of consolidation also noted on CT, for example in the right upper lobe. No definite new focal opacity is present. There is no distinct pneumothorax. Unchanged heart and mediastinal contours.     Impression: Impression: Left-sided pleural effusion with adjacent volume loss appears similar in addition to some multifocal areas of consolidation also noted on CT, for example in the right upper lobe. No definite new focal opacity is present. There is no distinct pneumothorax. Unchanged heart and mediastinal contours. Electronically Signed: Ramy Davison MD  8/31/2024 10:44 AM EDT  Workstation ID: [id number]     Results for orders placed during the hospital encounter of 08/26/24    Adult Transthoracic Echo Limited W/ Cont if Necessary Per Protocol    Interpretation Summary    Left ventricular systolic function is normal.  Calculated left ventricular EF = 64%    Moderate tricuspid valve regurgitation is present.    Estimated right ventricular systolic pressure from tricuspid regurgitation is moderately elevated (45-55 mmHg).    Mild MR      Current medications:  Scheduled Meds:apixaban, 5 mg, Oral, Q12H  atorvastatin, 20 mg, Oral, Nightly  cefepime, 2,000 mg, Intravenous, Q8H  clopidogrel, 75 mg, Oral, Daily  dofetilide, 500 mcg, Oral, Once   Followed by  dofetilide, 250 mcg, Oral, Q12H  gabapentin, 300 mg, Oral, Nightly  ipratropium-albuterol, 3 mL, Nebulization, 4x Daily - RT  isosorbide mononitrate, 30 mg, Oral, Daily  levothyroxine, 200 mcg, Oral, Daily  losartan, 50 mg, Oral, Q24H  [Held by provider] nebivolol, 2.5 mg, Oral, Q24H  nitroglycerin, 1 inch, Topical, Q6H  nystatin, 1 Application, Topical, Q12H  pantoprazole, 40 mg, Oral, Daily  roflumilast, 500 mcg, Oral, Daily  sertraline, 100 mg, Oral, Daily  sodium chloride, 10 mL, Intravenous, Q12H  traMADol, 50 mg, Oral, Q6H      Continuous Infusions:dilTIAZem, 5-15 mg/hr, Last Rate: Stopped (08/28/24 2200)      PRN Meds:.  senna-docusate sodium **AND** polyethylene glycol **AND** bisacodyl **AND** bisacodyl    Calcium Replacement - Follow Nurse / BPA Driven Protocol    ipratropium-albuterol    Magnesium Standard Dose Replacement - Follow Nurse / BPA Driven Protocol    nitroglycerin    ondansetron    Pharmacy Consult    Phosphorus Replacement - Follow Nurse / BPA Driven Protocol    Potassium Replacement - Follow Nurse / BPA Driven Protocol    sodium chloride    sodium chloride    sodium chloride    Assessment & Plan   Assessment & Plan     Active Hospital Problems    Diagnosis  POA    **Acute hypoxemic respiratory failure [J96.01]  Yes    Depression [F32.A]  Yes    Hyperlipidemia [E78.5]  Yes    Hypertension [I10]  Yes    Hypothyroidism [E03.9]  Yes      Resolved Hospital Problems   No resolved problems to display.        Brief Hospital Course to date:  Avelina Ceron is a  83-year-old female with history of chronic respiratory failure on 2 L NC, COPD, CAD, HTN, hypothyroidism, hyperlipidemia who presented to PCP with 1 week of  worsening cough, wheezing and fever, decreased p.o. intake and congestion.  Found to have with acute on chronic respiratory failure, with COPD exacerbation and pneumonia.    This patient's problems and plans were partially entered by my partner and updated as appropriate by me 09/01/24.    Acute on chronic respiratory failure with hypoxia  Multifocal PNA on CT imaging  COPD, likely exacerbation  Leukocytosis  -Patient with O2 sats of mid 80s at PCP, requiring 6 L NC (2 L NC at baseline), continue to wean as able  -Respiratory PCR with COVID is negative, follow-up blood cultures (NGTD)  -Follow-up urine Ag, sputum cx (unlikely to produce sample given dry cough)  -CTA imaging obtained 8/28 due to worsening respiratory status and to r/o PE- noted with multifocal PNA with repeat CT recommended in short term to ensure resolution. No PE noted   -Leukocytosis worsening despite being off steroids; could in part be related to that, however, adjusted abx to cefepime, doxy  -Peripheral smear result pending  -ID consulted     New onset afib RVR  -patient denies past history. Cardiology consulted given new onset  -Tikosyn initiated, continue eliquis   -Bystolic held bc had blocked PACs and bradycardia after 1 dose  -ECHO done this admission with normal EF, mild VHD    HTN-Losartan; s/p Diamox x 1 per cards  Hypothyroidism-Continue Synthroid; TSH nl   Depression-Continue Zoloft     CAD  PAD  Hyperlipidemia  -Continue aspirin, Plavix, statin     Chronic back pain-Continue tramadol  Groin yeast infection-Continue statin      Expected Discharge Location and Transportation: home   Expected Discharge   Expected Discharge Date: 9/2/2024; Expected Discharge Time:      VTE Prophylaxis:  Pharmacologic & mechanical VTE prophylaxis orders are present.         AM-PAC 6 Clicks Score  (PT): 16 (09/01/24 0745)    CODE STATUS:   Code Status and Medical Interventions: CPR (Attempt to Resuscitate); Full Support   Ordered at: 08/26/24 9826     Level Of Support Discussed With:    Patient     Code Status (Patient has no pulse and is not breathing):    CPR (Attempt to Resuscitate)     Medical Interventions (Patient has pulse or is breathing):    Full Support       Ela Lemos MD  09/01/24

## 2024-09-01 NOTE — CONSULTS
"    INFECTIOUS DISEASE CONSULT/INITIAL HOSPITAL VISIT    Avelina Ceron  1940  [id number]    Date of Consult: 9/1/2024    Admission Date: 8/26/2024      Requesting Provider: DAKOTA Lemos MD  Evaluating Physician: AMADOR Goncalves MD      Reason for Consultation: worsening leukocytosis, pneumonia     History of present illness:    Patient is a 83 y.o. female, with PMH COPD, recurrent UTIs, afib, nephrolithiasis/lithotripsy 2023, seen today for leukocytosis. She was seen by her PCP 8/26 with a cough, wheezing, fever, and decreased appetite diagnosed with acute bronchitis, and referred to the ED for admission. She has been afebrile. Admitting labs with WBC 27.35, plt 365, PCT 0.14, lactate, 1.5, proBNP 3091, Scr 0.5, ALT 20 AST 27, blood cultures negative. WBC today 36.11, and we were consulted for evaluation and treatment. CXR with LLL patchy airspace disease, suspicious for pneumonia. CTA chest without evidence of PTE, bilateral multifocal pneumonia, pulmonary emphysema. Now on 5LNC.  She denies nausea, vomiting, abdominal pain, dysuria, diarrhea.  She received 60mg Prednisone 8/26, and 40mg Prednisone daily through 8/30.  She followed Dr. Fuentes for several years in the past for persistent leukocytosis (data deficit) without clear etiology and was released.      Past Medical History:   Diagnosis Date    Anesthesia complication     Pt went into respiratory failure during total hip replacement in 2020    Arthritis of back     Arthritis of neck     Broken ribs     COPD (chronic obstructive pulmonary disease)     Coronary artery disease     Degenerative lumbar disc     Disease of thyroid gland     \"low thyroid\"    Fracture, fibula 1997?    Fracture, humerus 12/13/23    Fracture, tibia and fibula 1997?    Hip arthrosis     History of colonoscopy     Hyperlipidemia     Hypertension     Kidney stone July 2023    Knee swelling     Lumbosacral disc disease     On home O2     3L    Osteoarthritis     " Periarthritis of shoulder     Urinary incontinence     Urinary tract infection        Past Surgical History:   Procedure Laterality Date    CAROTID STENT Bilateral     COLONOSCOPY      EYE SURGERY  10/17/2022    yag surgery    GALLBLADDER SURGERY  2012    HYSTERECTOMY      ILIAC ARTERY STENT Bilateral     JOINT REPLACEMENT  ;     KIDNEY STONE SURGERY      OOPHORECTOMY  1976    1 ovary removed    TOTAL HIP ARTHROPLASTY Bilateral     URETEROSCOPY LASER LITHOTRIPSY WITH STENT INSERTION Right 2023    Procedure: CYSTOSCOPY, RIGHT RETROGRADE PYELOGRAM; WITH STENT INSERTION;  Surgeon: Lee Kim MD;  Location: [facility] OR;  Service: Urology;  Laterality: Right;    URETEROSCOPY LASER LITHOTRIPSY WITH STENT INSERTION Right 08/10/2023    Procedure: URETEROSCOPY, LASER LITHOTRIPSY, STENT EXCHANGE;  Surgeon: Lee Kim MD;  Location:  [facility];  Service: Urology;  Laterality: Right;       Family History   Problem Relation Age of Onset    Cancer Other         BREAST, COLON, OVARIAN    Depression Other     Hyperlipidemia Other     Hypertension Other     Heart disease Mother     Hypertension Mother     Arthritis Daughter     Cancer Daughter     Hyperlipidemia Daughter     Liver disease Daughter     Thyroid disease Daughter     Cancer Maternal Aunt     Diabetes Maternal Uncle     Alzheimer's disease Sister        Social History     Socioeconomic History    Marital status:    Tobacco Use    Smoking status: Former     Current packs/day: 0.00     Average packs/day: 1 pack/day for 50.0 years (50.0 ttl pk-yrs)     Types: Cigarettes     Start date: 1956     Quit date: 2006     Years since quittin.6     Passive exposure: Past    Smokeless tobacco: Never   Vaping Use    Vaping status: Never Used   Substance and Sexual Activity    Alcohol use: Not Currently     Comment: no etoh for past 10 years    Drug use: No    Sexual activity: Not Currently       Allergies   Allergen  Reactions    Penicillins Hives, Swelling and Mental Status Change    Codeine Mental Status Change    Dopamine Palpitations and Other (See Comments)     Other reaction(s): Hypertension         Medication:    Current Facility-Administered Medications:     apixaban (ELIQUIS) tablet 5 mg, 5 mg, Oral, Q12H, Que Herr MD, 5 mg at 09/01/24 0907    atorvastatin (LIPITOR) tablet 20 mg, 20 mg, Oral, Nightly, Yara Daly MD, 20 mg at 08/31/24 2130    sennosides-docusate (PERICOLACE) 8.6-50 MG per tablet 2 tablet, 2 tablet, Oral, BID PRN, 2 tablet at 08/31/24 1805 **AND** polyethylene glycol (MIRALAX) packet 17 g, 17 g, Oral, Daily PRN **AND** bisacodyl (DULCOLAX) EC tablet 5 mg, 5 mg, Oral, Daily PRN **AND** bisacodyl (DULCOLAX) suppository 10 mg, 10 mg, Rectal, Daily PRN, Yara Daly MD    Calcium Replacement - Follow Nurse / BPA Driven Protocol, , Does not apply, PRN, Que Herr MD    cefepime 2000 mg IVPB in 100 mL NS (MBP), 2,000 mg, Intravenous, Q8H, Ela Lemos MD, 2,000 mg at 09/01/24 0353    clopidogrel (PLAVIX) tablet 75 mg, 75 mg, Oral, Daily, Yara Daly MD, 75 mg at 09/01/24 0907    [COMPLETED] dofetilide (TIKOSYN) capsule 500 mcg, 500 mcg, Oral, Once, 500 mcg at 08/28/24 1821 **FOLLOWED BY** [COMPLETED] dofetilide (TIKOSYN) capsule 500 mcg, 500 mcg, Oral, Once, 500 mcg at 08/29/24 0408 **FOLLOWED BY** [COMPLETED] dofetilide (TIKOSYN) capsule 500 mcg, 500 mcg, Oral, Once, 500 mcg at 08/29/24 1407 **FOLLOWED BY** dofetilide (TIKOSYN) capsule 500 mcg, 500 mcg, Oral, Once **FOLLOWED BY** dofetilide (TIKOSYN) capsule 250 mcg, 250 mcg, Oral, Q12H, Gerald Edwards PA-C, 250 mcg at 09/01/24 0907    doxycycline (MONODOX) capsule 100 mg, 100 mg, Oral, Q12H, Ela Lemos MD    gabapentin (NEURONTIN) capsule 300 mg, 300 mg, Oral, Nightly, Yara Daly MD, 300 mg at 08/31/24 2130    ipratropium-albuterol (DUO-NEB) nebulizer solution 3 mL, 3 mL, Nebulization, 4x Daily - RT, Malika,  MD Yara, 3 mL at 09/01/24 0834    ipratropium-albuterol (DUO-NEB) nebulizer solution 3 mL, 3 mL, Nebulization, Q4H PRN, Martina Chiang APRN    isosorbide mononitrate (IMDUR) 24 hr tablet 30 mg, 30 mg, Oral, Daily, Yara Daly MD, 30 mg at 09/01/24 0907    levothyroxine (SYNTHROID, LEVOTHROID) tablet 200 mcg, 200 mcg, Oral, Daily, Yara Daly MD, 200 mcg at 09/01/24 0524    losartan (COZAAR) tablet 50 mg, 50 mg, Oral, Q24H, Que Herr MD, 50 mg at 08/31/24 0858    Magnesium Standard Dose Replacement - Follow Nurse / BPA Driven Protocol, , Does not apply, Malika JEFF Wycliffe, MD    [Held by provider] nebivolol (BYSTOLIC) tablet 2.5 mg, 2.5 mg, Oral, Q24H, Que Herr MD, 2.5 mg at 08/30/24 0810    nitroglycerin (NITROSTAT) ointment 1 inch, 1 inch, Topical, Q6H, Que Herr MD, 1 inch at 08/30/24 1833    nitroglycerin (NITROSTAT) SL tablet 0.4 mg, 0.4 mg, Sublingual, Q5 Min PRN, Que Herr MD    nystatin (MYCOSTATIN) 983800 UNIT/GM cream 1 Application, 1 Application, Topical, Q12H, Yara Daly MD, 1 Application at 09/01/24 0908    ondansetron (ZOFRAN) injection 4 mg, 4 mg, Intravenous, Q6H PRN, Yara Daly MD    pantoprazole (PROTONIX) EC tablet 40 mg, 40 mg, Oral, Daily, Yara Daly MD, 40 mg at 09/01/24 0907    Pharmacy Consult, , Does not apply, Once PRNNemesio Robert Todd, MD    Phosphorus Replacement - Follow Nurse / BPA Driven Protocol, , Does not apply, PRNemesio DARLING Robert Todd, MD    Potassium Replacement - Follow Nurse / BPA Driven Protocol, , Does not apply, PRN, Que Herr MD    roflumilast (DALIRESP) tablet 500 mcg, 500 mcg, Oral, Daily, Yara Daly MD, 500 mcg at 09/01/24 0917    sertraline (ZOLOFT) tablet 100 mg, 100 mg, Oral, Daily, Yara Daly MD, 100 mg at 09/01/24 0907    sodium chloride 0.9 % flush 10 mL, 10 mL, Intravenous, PRN, Kendall Smyth MD    sodium chloride 0.9 % flush 10 mL, 10 mL, Intravenous,  Q12H, Yara Daly MD, 10 mL at 09/01/24 0908    sodium chloride 0.9 % flush 10 mL, 10 mL, Intravenous, PRN, Yara Daly MD    sodium chloride 0.9 % infusion 40 mL, 40 mL, Intravenous, PRN, Yara Daly MD    traMADol (ULTRAM) tablet 50 mg, 50 mg, Oral, Q6H, Beena Gray MD, 50 mg at 09/01/24 0907    Antibiotics:  Anti-Infectives (From admission, onward)      Ordered     Dose/Rate Route Frequency Start Stop    09/01/24 1018  doxycycline (MONODOX) capsule 100 mg        Ordering Provider: Ela Lemos MD    100 mg Oral Every 12 Hours Scheduled 09/01/24 1115 09/04/24 0859    08/31/24 1105  cefepime 2000 mg IVPB in 100 mL NS (MBP)        Ordering Provider: Ela Lemos MD    2,000 mg  over 4 Hours Intravenous Every 8 Hours 08/31/24 2000 09/05/24 1959    08/31/24 1105  cefepime 2000 mg IVPB in 100 mL NS (MBP)        Ordering Provider: Ela Lemos MD    2,000 mg  over 30 Minutes Intravenous Once 08/31/24 1200 08/31/24 1146    08/26/24 2316  doxycycline (MONODOX) capsule 100 mg        Ordering Provider: Dre Retana [facility]    100 mg Oral Every 12 Hours Scheduled 08/27/24 0015 08/31/24 0858    08/26/24 1615  cefTRIAXone (ROCEPHIN) 1,000 mg in sodium chloride 0.9 % 100 mL MBP        Ordering Provider: Kendall Smyth MD    1,000 mg  200 mL/hr over 30 Minutes Intravenous Once 08/26/24 1631 08/26/24 1748    08/26/24 1615  doxycycline (MONODOX) capsule 100 mg        Ordering Provider: Kendall Smyth MD    100 mg Oral Once 08/26/24 1631 08/26/24 1655              Review of Systems:  Constitutional-- no fever, chills or sweats.  Appetite good, and no malaise. +fatigue   HEENT-- No new vision, hearing or throat complaints.  No epistaxis or oral sores.  Denies odynophagia or dysphagia. No headache, photophobia or neck stiffness.  CV-- No chest pain, palpitation or syncope  Resp-+SOB/cough wheezing  - improved  GI- No nausea, vomiting, or diarrhea.  No hematochezia, melena, or hematemesis. Denies jaundice or  chronic liver disease.  -- No dysuria, hematuria, or flank pain.  Denies hesitancy, urgency or flank pain. + constipation   Heme- No active bruising or bleeding; no Hx of DVT or PE.  MS-- no swelling or pain in the bones or joints of arms/legs.  No new back pain.  Neuro-- No acute focal weakness or numbness in the arms or legs.  No seizures.  Skin--No rashes or lesions      Physical Exam:   Vital Signs  Temp (24hrs), Av.3 °F (36.8 °C), Min:97.6 °F (36.4 °C), Max:99 °F (37.2 °C)    Temp  Min: 97.6 °F (36.4 °C)  Max: 99 °F (37.2 °C)  BP  Min: 105/71  Max: 152/54  Pulse  Min: 39  Max: 81  Resp  Min: 14  Max: 16  SpO2  Min: 91 %  Max: 98 %    GENERAL: Awake and alert, in no acute distress.  Up in chair   HEENT: Normocephalic, atraumatic.  EOMI. No conjunctival injection.  NECK: Supple   HEART: RRR; No murmur, rubs, gallops.   LUNGS: Decreased BS bilaterally without wheezing, rales, rhonchi. Normal respiratory effort. Nonlabored. Now on 5LNC   ABDOMEN: Soft, nontender, nondistended. Positive bowel sounds. No rebound or guarding.   EXT:  No edema.   SKIN: Warm and dry   NEURO: Oriented to PPT.   PSYCHIATRIC: Normal insight and judgment. Cooperative with PE    Laboratory Data    Results from last 7 days   Lab Units 24  0830 24  1038 24  0124   WBC 10*3/mm3 36.11* 35.42* 30.95*   HEMOGLOBIN g/dL 11.1* 11.2* 10.6*   HEMATOCRIT % 37.4 36.2 34.0   PLATELETS 10*3/mm3 443 405 433     Results from last 7 days   Lab Units 24  0830   SODIUM mmol/L 139   POTASSIUM mmol/L 5.1   CHLORIDE mmol/L 97*   CO2 mmol/L 33.0*   BUN mg/dL 14   CREATININE mg/dL 0.36*   GLUCOSE mg/dL 93   CALCIUM mg/dL 9.4     Results from last 7 days   Lab Units 24  0124   ALK PHOS U/L 90   BILIRUBIN mg/dL 0.4   ALT (SGPT) U/L 36*   AST (SGOT) U/L 24             Results from last 7 days   Lab Units 24  1513   LACTATE mmol/L 1.5             Estimated Creatinine Clearance: 113.1 mL/min (A) (by C-G formula based on SCr of  0.36 mg/dL (L)).      Microbiology:  Microbiology Results (last 10 days)       Procedure Component Value - Date/Time    Respiratory Panel PCR w/COVID-19(SARS-CoV-2) [facility] In-House, NP Swab in UTM/VTM, 2 HR TAT - Swab, Nasopharynx [[id number]]  (Normal) Collected: 09/01/24 1227    Lab Status: Final result Specimen: Swab from Nasopharynx Updated: 09/01/24 1350     ADENOVIRUS, PCR Not Detected     Coronavirus 229E Not Detected     Coronavirus HKU1 Not Detected     Coronavirus NL63 Not Detected     Coronavirus OC43 Not Detected     COVID19 Not Detected     Human Metapneumovirus Not Detected     Human Rhinovirus/Enterovirus Not Detected     Influenza A PCR Not Detected     Influenza B PCR Not Detected     Parainfluenza Virus 1 Not Detected     Parainfluenza Virus 2 Not Detected     Parainfluenza Virus 3 Not Detected     Parainfluenza Virus 4 Not Detected     RSV, PCR Not Detected     Bordetella pertussis pcr Not Detected     Bordetella parapertussis PCR Not Detected     Chlamydophila pneumoniae PCR Not Detected     Mycoplasma pneumo by PCR Not Detected    Narrative:      In the setting of a positive respiratory panel with a viral infection PLUS a negative procalcitonin without other underlying concern for bacterial infection, consider observing off antibiotics or discontinuation of antibiotics and continue supportive care. If the respiratory panel is positive for atypical bacterial infection (Bordetella pertussis, Chlamydophila pneumoniae, or Mycoplasma pneumoniae), consider antibiotic de-escalation to target atypical bacterial infection.    Blood Culture - Blood, Arm, Right [[id number]]  (Normal) Collected: 08/26/24 1630    Lab Status: Final result Specimen: Blood from Arm, Right Updated: 08/31/24 1700     Blood Culture No growth at 5 days    Blood Culture - Blood, Hand, Left [[id number]]  (Normal) Collected: 08/26/24 1530    Lab Status: Final result Specimen: Blood from Hand, Left Updated: 08/31/24 1700      Blood Culture No growth at 5 days    COVID PRE-OP / PRE-PROCEDURE SCREENING ORDER (NO ISOLATION) - Swab, Nasopharynx [[id number]]  (Normal) Collected: 08/26/24 1441    Lab Status: Final result Specimen: Swab from Nasopharynx Updated: 08/26/24 1519    Narrative:      The following orders were created for panel order COVID PRE-OP / PRE-PROCEDURE SCREENING ORDER (NO ISOLATION) - Swab, Nasopharynx.  Procedure                               Abnormality         Status                     ---------                               -----------         ------                     COVID-19 and FLU A/B PCR...[[id number]]  Normal              Final result                 Please view results for these tests on the individual orders.    COVID-19 and FLU A/B PCR, 1 HR TAT - Swab, Nasopharynx [[id number]]  (Normal) Collected: 08/26/24 1441    Lab Status: Final result Specimen: Swab from Nasopharynx Updated: 08/26/24 1519     COVID19 Not Detected     Influenza A PCR Not Detected     Influenza B PCR Not Detected    Narrative:      Fact sheet for providers: https://www.fda.gov/media/226377/download    Fact sheet for patients: https://www.fda.gov/media/570710/download    Test performed by PCR.            Radiology:  Imaging Results (Last 72 Hours)       Procedure Component Value Units Date/Time    XR Chest 1 View [[id number]] Collected: 08/31/24 1041     Updated: 08/31/24 1047    Narrative:      XR CHEST 1 VW    Date of Exam: 8/31/2024 10:07 AM EDT    Indication: hypoxia    Comparison: CT 8/28/2024.    Findings:  Left-sided pleural effusion with adjacent volume loss appears similar in addition to some multifocal areas of consolidation also noted on CT, for example in the right upper lobe. No definite new focal opacity is present. There is no distinct   pneumothorax. Unchanged heart and mediastinal contours.      Impression:      Impression:    Left-sided pleural effusion with adjacent volume loss appears similar in addition to some  multifocal areas of consolidation also noted on CT, for example in the right upper lobe. No definite new focal opacity is present. There is no distinct   pneumothorax. Unchanged heart and mediastinal contours.        Electronically Signed: Ramy Davison MD    8/31/2024 10:44 AM EDT    Workstation ID: [id number]          8/26/24:    Adult Transthoracic Echo Limited W/ Cont if Necessary Per Protocol     Interpretation Summary    Left ventricular systolic function is normal. Calculated left ventricular EF = 64%    Moderate tricuspid valve regurgitation is present.    Estimated right ventricular systolic pressure from tricuspid regurgitation is moderately elevated (45-55 mmHg).    Mild MR      Discussion:  83 year old with COPD, chronic respiratory failure on home oxygen, CAD, developed a hacky cough, wheezing, low grade fever, seen by PCP and referred for admission.  CTA with multifocal pneumonia, has been on antibiotics with worsening leukocytosis, and we were asked to see for antibiotic management.     Impression:   - Persistent leukocytosis.  Without fever.  Clinically improving otherwise.  Low PCT.  Differential fairly normal.  Prior known history of unexplained leukocytosis, previously seen by hematology without clear explanation.  Recent steroid use likely exacerbating.  No splenectomy.  No diarrhea to suggest C. difficile.  - Multifocal pneumonia, has been on 7 days of appropriate antimicrobial therapy  - Afib/RVR- new and now on Tikosyn  - COPD, exacerbation  - h/o recurrent UTIs      PLAN/RECOMMENDATIONS:   Thank you for asking us to see Avelina Ceron, I recommend the following:    -- Would favor discontinuing antibiotics.  Stopped cefepime and doxycycline.  She had received 7 days of appropriate commune acquired pneumonia coverage.  -- Follow WBC now that she is off of steroids    I suspect her leukocytosis is reactive due to acute stress and steroids in the setting of known prior chronic leukocytosis.   Consider reevaluation by hematology as an outpatient.       Dr. Eugene has obtained the history, performed the PE, formulated the above treatment plan     AMADOR Goncalves  9/1/2024  10:53 EDT    Blade Eugene MD  9/1/2024

## 2024-09-02 LAB
ANION GAP SERPL CALCULATED.3IONS-SCNC: 6 MMOL/L (ref 5–15)
BASOPHILS # BLD MANUAL: 0 10*3/MM3 (ref 0–0.2)
BASOPHILS NFR BLD MANUAL: 0 % (ref 0–1.5)
BUN SERPL-MCNC: 9 MG/DL (ref 8–23)
BUN/CREAT SERPL: 25.7 (ref 7–25)
CALCIUM SPEC-SCNC: 9.2 MG/DL (ref 8.6–10.5)
CHLORIDE SERPL-SCNC: 98 MMOL/L (ref 98–107)
CO2 SERPL-SCNC: 35 MMOL/L (ref 22–29)
CREAT SERPL-MCNC: 0.35 MG/DL (ref 0.57–1)
DEPRECATED RDW RBC AUTO: 49.2 FL (ref 37–54)
EGFRCR SERPLBLD CKD-EPI 2021: 101.6 ML/MIN/1.73
EOSINOPHIL # BLD MANUAL: 0.34 10*3/MM3 (ref 0–0.4)
EOSINOPHIL NFR BLD MANUAL: 1 % (ref 0.3–6.2)
ERYTHROCYTE [DISTWIDTH] IN BLOOD BY AUTOMATED COUNT: 13.3 % (ref 12.3–15.4)
GLUCOSE SERPL-MCNC: 111 MG/DL (ref 65–99)
HCT VFR BLD AUTO: 32.8 % (ref 34–46.6)
HGB BLD-MCNC: 10.3 G/DL (ref 12–15.9)
LYMPHOCYTES # BLD MANUAL: 4.03 10*3/MM3 (ref 0.7–3.1)
LYMPHOCYTES NFR BLD MANUAL: 5 % (ref 5–12)
MACROCYTES BLD QL SMEAR: ABNORMAL
MAGNESIUM SERPL-MCNC: 1.9 MG/DL (ref 1.6–2.4)
MCH RBC QN AUTO: 31.7 PG (ref 26.6–33)
MCHC RBC AUTO-ENTMCNC: 31.4 G/DL (ref 31.5–35.7)
MCV RBC AUTO: 100.9 FL (ref 79–97)
METAMYELOCYTES NFR BLD MANUAL: 4 % (ref 0–0)
MONOCYTES # BLD: 1.68 10*3/MM3 (ref 0.1–0.9)
MYELOCYTES NFR BLD MANUAL: 4 % (ref 0–0)
NEUTROPHILS # BLD AUTO: 24.85 10*3/MM3 (ref 1.7–7)
NEUTROPHILS NFR BLD MANUAL: 72 % (ref 42.7–76)
NEUTS BAND NFR BLD MANUAL: 2 % (ref 0–5)
PLAT MORPH BLD: NORMAL
PLATELET # BLD AUTO: 379 10*3/MM3 (ref 140–450)
PMV BLD AUTO: 8.8 FL (ref 6–12)
POTASSIUM SERPL-SCNC: 4.4 MMOL/L (ref 3.5–5.2)
QT INTERVAL: 258 MS
QT INTERVAL: 310 MS
QT INTERVAL: 388 MS
QT INTERVAL: 412 MS
QT INTERVAL: 416 MS
QT INTERVAL: 418 MS
QT INTERVAL: 456 MS
QT INTERVAL: 456 MS
QT INTERVAL: 462 MS
QT INTERVAL: 482 MS
QT INTERVAL: 532 MS
QTC INTERVAL: 406 MS
QTC INTERVAL: 422 MS
QTC INTERVAL: 425 MS
QTC INTERVAL: 429 MS
QTC INTERVAL: 444 MS
QTC INTERVAL: 457 MS
QTC INTERVAL: 461 MS
QTC INTERVAL: 495 MS
QTC INTERVAL: 498 MS
QTC INTERVAL: 505 MS
QTC INTERVAL: 513 MS
RBC # BLD AUTO: 3.25 10*6/MM3 (ref 3.77–5.28)
SODIUM SERPL-SCNC: 139 MMOL/L (ref 136–145)
VARIANT LYMPHS NFR BLD MANUAL: 10 % (ref 19.6–45.3)
VARIANT LYMPHS NFR BLD MANUAL: 2 % (ref 0–5)
WBC MORPH BLD: NORMAL
WBC NRBC COR # BLD AUTO: 33.58 10*3/MM3 (ref 3.4–10.8)

## 2024-09-02 PROCEDURE — 99232 SBSQ HOSP IP/OBS MODERATE 35: CPT | Performed by: PHYSICIAN ASSISTANT

## 2024-09-02 PROCEDURE — 93005 ELECTROCARDIOGRAM TRACING: CPT | Performed by: STUDENT IN AN ORGANIZED HEALTH CARE EDUCATION/TRAINING PROGRAM

## 2024-09-02 PROCEDURE — 93005 ELECTROCARDIOGRAM TRACING: CPT | Performed by: PHYSICIAN ASSISTANT

## 2024-09-02 PROCEDURE — 93010 ELECTROCARDIOGRAM REPORT: CPT | Performed by: INTERNAL MEDICINE

## 2024-09-02 PROCEDURE — 94799 UNLISTED PULMONARY SVC/PX: CPT

## 2024-09-02 PROCEDURE — 83735 ASSAY OF MAGNESIUM: CPT | Performed by: STUDENT IN AN ORGANIZED HEALTH CARE EDUCATION/TRAINING PROGRAM

## 2024-09-02 PROCEDURE — 99232 SBSQ HOSP IP/OBS MODERATE 35: CPT | Performed by: INTERNAL MEDICINE

## 2024-09-02 PROCEDURE — 85025 COMPLETE CBC W/AUTO DIFF WBC: CPT | Performed by: STUDENT IN AN ORGANIZED HEALTH CARE EDUCATION/TRAINING PROGRAM

## 2024-09-02 PROCEDURE — 80048 BASIC METABOLIC PNL TOTAL CA: CPT | Performed by: STUDENT IN AN ORGANIZED HEALTH CARE EDUCATION/TRAINING PROGRAM

## 2024-09-02 PROCEDURE — 85007 BL SMEAR W/DIFF WBC COUNT: CPT | Performed by: STUDENT IN AN ORGANIZED HEALTH CARE EDUCATION/TRAINING PROGRAM

## 2024-09-02 PROCEDURE — 94664 DEMO&/EVAL PT USE INHALER: CPT

## 2024-09-02 PROCEDURE — 93005 ELECTROCARDIOGRAM TRACING: CPT | Performed by: INTERNAL MEDICINE

## 2024-09-02 RX ORDER — GUAIFENESIN 600 MG/1
1200 TABLET, EXTENDED RELEASE ORAL EVERY 12 HOURS SCHEDULED
Status: DISCONTINUED | OUTPATIENT
Start: 2024-09-02 | End: 2024-09-04 | Stop reason: HOSPADM

## 2024-09-02 RX ORDER — SODIUM CHLORIDE FOR INHALATION 7 %
4 VIAL, NEBULIZER (ML) INHALATION ONCE
Status: COMPLETED | OUTPATIENT
Start: 2024-09-02 | End: 2024-09-02

## 2024-09-02 RX ADMIN — ROFLUMILAST 500 MCG: 500 TABLET ORAL at 08:40

## 2024-09-02 RX ADMIN — TRAMADOL HYDROCHLORIDE 50 MG: 50 TABLET ORAL at 14:05

## 2024-09-02 RX ADMIN — CLOPIDOGREL BISULFATE 75 MG: 75 TABLET ORAL at 08:40

## 2024-09-02 RX ADMIN — NITROGLYCERIN 1 INCH: 20 OINTMENT TOPICAL at 17:28

## 2024-09-02 RX ADMIN — Medication 10 ML: at 20:19

## 2024-09-02 RX ADMIN — GABAPENTIN 300 MG: 300 CAPSULE ORAL at 20:18

## 2024-09-02 RX ADMIN — NITROGLYCERIN 1 INCH: 20 OINTMENT TOPICAL at 01:56

## 2024-09-02 RX ADMIN — IPRATROPIUM BROMIDE AND ALBUTEROL SULFATE 3 ML: 2.5; .5 SOLUTION RESPIRATORY (INHALATION) at 08:50

## 2024-09-02 RX ADMIN — APIXABAN 5 MG: 5 TABLET, FILM COATED ORAL at 08:36

## 2024-09-02 RX ADMIN — TRAMADOL HYDROCHLORIDE 50 MG: 50 TABLET ORAL at 01:57

## 2024-09-02 RX ADMIN — NITROGLYCERIN 1 INCH: 20 OINTMENT TOPICAL at 13:01

## 2024-09-02 RX ADMIN — IPRATROPIUM BROMIDE AND ALBUTEROL SULFATE 3 ML: 2.5; .5 SOLUTION RESPIRATORY (INHALATION) at 16:50

## 2024-09-02 RX ADMIN — DOFETILIDE 250 MCG: 0.25 CAPSULE ORAL at 20:18

## 2024-09-02 RX ADMIN — GUAIFENESIN 1200 MG: 600 TABLET, EXTENDED RELEASE ORAL at 20:18

## 2024-09-02 RX ADMIN — LEVOTHYROXINE SODIUM 200 MCG: 0.1 TABLET ORAL at 06:24

## 2024-09-02 RX ADMIN — PANTOPRAZOLE SODIUM 40 MG: 40 TABLET, DELAYED RELEASE ORAL at 08:39

## 2024-09-02 RX ADMIN — TRAMADOL HYDROCHLORIDE 50 MG: 50 TABLET ORAL at 08:40

## 2024-09-02 RX ADMIN — SODIUM CHLORIDE SOLN NEBU 7% 4 ML: 7 NEBU SOLN at 11:32

## 2024-09-02 RX ADMIN — IPRATROPIUM BROMIDE AND ALBUTEROL SULFATE 3 ML: 2.5; .5 SOLUTION RESPIRATORY (INHALATION) at 11:32

## 2024-09-02 RX ADMIN — Medication 10 ML: at 08:43

## 2024-09-02 RX ADMIN — SERTRALINE 100 MG: 100 TABLET, FILM COATED ORAL at 08:39

## 2024-09-02 RX ADMIN — IPRATROPIUM BROMIDE AND ALBUTEROL SULFATE 3 ML: 2.5; .5 SOLUTION RESPIRATORY (INHALATION) at 20:55

## 2024-09-02 RX ADMIN — TRAMADOL HYDROCHLORIDE 50 MG: 50 TABLET ORAL at 20:18

## 2024-09-02 RX ADMIN — NYSTATIN 1 APPLICATION: 100000 CREAM TOPICAL at 20:18

## 2024-09-02 RX ADMIN — NYSTATIN 1 APPLICATION: 100000 CREAM TOPICAL at 08:41

## 2024-09-02 RX ADMIN — GUAIFENESIN 1200 MG: 600 TABLET, EXTENDED RELEASE ORAL at 10:01

## 2024-09-02 RX ADMIN — ISOSORBIDE MONONITRATE 30 MG: 30 TABLET, EXTENDED RELEASE ORAL at 08:40

## 2024-09-02 RX ADMIN — ATORVASTATIN CALCIUM 20 MG: 20 TABLET, FILM COATED ORAL at 20:18

## 2024-09-02 RX ADMIN — APIXABAN 5 MG: 5 TABLET, FILM COATED ORAL at 20:18

## 2024-09-02 RX ADMIN — DOFETILIDE 250 MCG: 0.25 CAPSULE ORAL at 08:39

## 2024-09-02 NOTE — PROGRESS NOTES
"  INFECTIOUS DISEASES  INPATIENT PROGRESS NOTE  2024      PATIENT NAME: Avelina Ceron  :  1940  MRN:  [id number]  Date of Admission:  2024      Antimicrobials:  OFF    S/p 6 days doxycycline  S/p 5 days ceftriaxone  S/p 1+ day cefepime    MAR reviewed.  Pertinent other medications include: Dofetilide      Reason for consultation: Persistent leukocytosis    Interval history: Antibiotics were discontinued yesterday.  She has been off of steroids for the past 3 days.  Her white blood cell count has slightly improved today.  She is feeling well.  She is breathing comfortably on 4 L of supplemental oxygen via nasal cannula.  Minimal dry cough.    ROS:  No fevers/chills overnight.  No new rashes.  No SOB or chest pain.  No nausea/vomiting/diarrhea.  Denies side effects from antimicrobials.    Objective:  Temp (24hrs), Av.4 °F (36.9 °C), Min:98.1 °F (36.7 °C), Max:98.5 °F (36.9 °C)    /65 (BP Location: Left arm, Patient Position: Lying)   Pulse 100   Temp 98.4 °F (36.9 °C) (Oral)   Resp 16   Ht 160 cm (63\")   Wt 72.6 kg (160 lb)   LMP  (LMP Unknown)   SpO2 97%   BMI 28.34 kg/m²     Physical Examination:  GENERAL: Awake and alert, in no acute distress.  Up in chair   HEENT: Normocephalic, atraumatic.  EOMI. No conjunctival injection.  LUNGS:  Normal respiratory effort. Nonlabored. Now on 5LNC    EXT:  No edema.   SKIN: Warm and dry   NEURO: Oriented to PPT.   PSYCHIATRIC: Normal insight and judgment. Cooperative with PE    Laboratory Data:    Results from last 7 days   Lab Units 24  0725 24  0830 24  1038   WBC 10*3/mm3 33.58* 36.11* 35.42*   HEMOGLOBIN g/dL 10.3* 11.1* 11.2*   HEMATOCRIT % 32.8* 37.4 36.2   PLATELETS 10*3/mm3 379 443 405     Results from last 7 days   Lab Units 24  0725   SODIUM mmol/L 139   POTASSIUM mmol/L 4.4   CHLORIDE mmol/L 98   CO2 mmol/L 35.0*   BUN mg/dL 9   CREATININE mg/dL 0.35*   GLUCOSE mg/dL 111*   CALCIUM mg/dL 9.2     Results from " last 7 days   Lab Units 08/30/24  0124   ALK PHOS U/L 90   BILIRUBIN mg/dL 0.4   ALT (SGPT) U/L 36*   AST (SGOT) U/L 24             Estimated Creatinine Clearance: 116.3 mL/min (A) (by C-G formula based on SCr of 0.35 mg/dL (L)).                    Microbiology:    Microbiology Results (last 10 days)       Procedure Component Value - Date/Time    S. Pneumo Ag Urine or CSF - Urine, Urine, Clean Catch [[id number]]  (Normal) Collected: 09/01/24 1228    Lab Status: Final result Specimen: Urine, Clean Catch Updated: 09/01/24 2113     Strep Pneumo Ag Negative    Legionella Antigen, Urine - Urine, Urine, Clean Catch [[id number]]  (Normal) Collected: 09/01/24 1228    Lab Status: Final result Specimen: Urine, Clean Catch Updated: 09/01/24 2112     LEGIONELLA ANTIGEN, URINE Negative    Respiratory Panel PCR w/COVID-19(SARS-CoV-2) NASIR/ELIAS/RHIANNA/PAD/COR/FELIPE In-House, NP Swab in UTM/VTM, 2 HR TAT - Swab, Nasopharynx [[id number]]  (Normal) Collected: 09/01/24 1227    Lab Status: Final result Specimen: Swab from Nasopharynx Updated: 09/01/24 1350     ADENOVIRUS, PCR Not Detected     Coronavirus 229E Not Detected     Coronavirus HKU1 Not Detected     Coronavirus NL63 Not Detected     Coronavirus OC43 Not Detected     COVID19 Not Detected     Human Metapneumovirus Not Detected     Human Rhinovirus/Enterovirus Not Detected     Influenza A PCR Not Detected     Influenza B PCR Not Detected     Parainfluenza Virus 1 Not Detected     Parainfluenza Virus 2 Not Detected     Parainfluenza Virus 3 Not Detected     Parainfluenza Virus 4 Not Detected     RSV, PCR Not Detected     Bordetella pertussis pcr Not Detected     Bordetella parapertussis PCR Not Detected     Chlamydophila pneumoniae PCR Not Detected     Mycoplasma pneumo by PCR Not Detected    Narrative:      In the setting of a positive respiratory panel with a viral infection PLUS a negative procalcitonin without other underlying concern for bacterial infection, consider observing off  antibiotics or discontinuation of antibiotics and continue supportive care. If the respiratory panel is positive for atypical bacterial infection (Bordetella pertussis, Chlamydophila pneumoniae, or Mycoplasma pneumoniae), consider antibiotic de-escalation to target atypical bacterial infection.    Blood Culture - Blood, Arm, Right [[id number]]  (Normal) Collected: 08/26/24 1630    Lab Status: Final result Specimen: Blood from Arm, Right Updated: 08/31/24 1700     Blood Culture No growth at 5 days    Blood Culture - Blood, Hand, Left [[id number]]  (Normal) Collected: 08/26/24 1530    Lab Status: Final result Specimen: Blood from Hand, Left Updated: 08/31/24 1700     Blood Culture No growth at 5 days    COVID PRE-OP / PRE-PROCEDURE SCREENING ORDER (NO ISOLATION) - Swab, Nasopharynx [[id number]]  (Normal) Collected: 08/26/24 1441    Lab Status: Final result Specimen: Swab from Nasopharynx Updated: 08/26/24 1519    Narrative:      The following orders were created for panel order COVID PRE-OP / PRE-PROCEDURE SCREENING ORDER (NO ISOLATION) - Swab, Nasopharynx.  Procedure                               Abnormality         Status                     ---------                               -----------         ------                     COVID-19 and FLU A/B PCR...[[id number]]  Normal              Final result                 Please view results for these tests on the individual orders.    COVID-19 and FLU A/B PCR, 1 HR TAT - Swab, Nasopharynx [[id number]]  (Normal) Collected: 08/26/24 1441    Lab Status: Final result Specimen: Swab from Nasopharynx Updated: 08/26/24 1519     COVID19 Not Detected     Influenza A PCR Not Detected     Influenza B PCR Not Detected    Narrative:      Fact sheet for providers: https://www.fda.gov/media/417910/download    Fact sheet for patients: https://www.fda.gov/media/352705/download    Test performed by PCR.                Radiology:  No radiology results for the last day        DISCUSSION:  83 year  old with COPD, chronic respiratory failure on home oxygen, CAD, developed persistent cough, wheezing, low grade fever, seen by PCP and referred for admission. CTA with multifocal pneumonia, has been on antibiotics with worsening leukocytosis.  Normal PCT at admission.    PROBLEM LIST:   - Persistent leukocytosis.  Without fever.  Clinically improving otherwise.  Low PCT.  Differential fairly normal.  Prior known history of unexplained leukocytosis, previously seen by hematology without clear explanation.  Recent steroid use likely exacerbating.  No splenectomy.  No diarrhea to suggest C. difficile.  WBC improving off of antibiotics.  - Multifocal pneumonia, s/p 7 days of appropriate antimicrobial therapy for CAP.    - Left pleural effusion.  - Afib/RVR- new and now on Tikosyn  - COPD, exacerbation  - h/o recurrent UTIs    PLAN:  -- Continue to monitor off of abx; she is clinically well, without fever  -- Follow WBC - improving.  If WBC does not continue to improve, would repeat CT chest to evaluate for loculated pleural effusion/empyema.  -- Suspect her leukocytosis is reactive due to acute stress and steroids in the setting of known prior chronic leukocytosis.  Recommend outpatient recheck of WBC in 1 to 2 weeks after discharge by PCP.  Would also consider reevaluation by hematology as an outpatient.     Plan discussed with patient.    Blade Eugene MD  9/2/2024  18:17 EDT

## 2024-09-02 NOTE — PROGRESS NOTES
[facility] Medicine Services  PROGRESS NOTE    Patient Name: Avelina Ceron  : 1940  MRN: [id number]    Date of Admission: 2024  Primary Care Physician: Dre Moura MD    Subjective   Subjective     CC: f/u copd    HPI: Up in bed with daughter on phone. Feels okay but feels like she needs to cough something up but can't.      Objective   Objective     Vital Signs:   Temp:  [97.8 °F (36.6 °C)-98.5 °F (36.9 °C)] 98.5 °F (36.9 °C)  Heart Rate:  [] 92  Resp:  [16-20] 18  BP: ()/(34-77) 134/44  Flow (L/min):  [4-5] 4     Physical Exam:  Constitutional: No acute distress, awake, alert  HENT: NCAT, mucous membranes moist, 5L o2  Respiratory: Clear to auscultation bilaterally, respiratory effort normal   Cardiovascular: RRR, no murmurs, rubs, or gallops  Gastrointestinal: Positive bowel sounds, soft, nontender, nondistended  Musculoskeletal: No bilateral ankle edema  Psychiatric: Appropriate affect, cooperative  Neurologic: Oriented x 3, strength symmetric in all extremities, Cranial Nerves grossly intact to confrontation, speech clear  Skin: No rashes     Results Reviewed:  LAB RESULTS:      Lab 24  0725 24  0830 24  1038 24  0124 24  0800 24  0728 24  1513   WBC 33.58* 36.11* 35.42* 30.95* 30.26*   < > 27.35*   HEMOGLOBIN 10.3* 11.1* 11.2* 10.6* 10.8*   < > 11.0*   HEMATOCRIT 32.8* 37.4 36.2 34.0 34.6   < > 35.2   PLATELETS 379 443 405 433 437   < > 365   NEUTROS ABS 24.85* 27.44* 27.35* 26.00* 24.51*   < > 23.93*   IMMATURE GRANS (ABS)  --   --  3.29*  --   --   --  0.30*   LYMPHS ABS  --   --  2.49  --   --   --  1.12   MONOS ABS  --   --  2.17*  --   --   --  1.92*   EOS ABS 0.34 0.36 0.08 0.00 0.00   < > 0.00   .9* 105.1* 101.7* 101.5* 100.9*   < > 101.1*   PROCALCITONIN  --   --   --   --   --   --  0.14   LACTATE  --   --   --   --   --   --  1.5    < > = values in this interval not displayed.         Lab  09/02/24  0725 09/01/24  0830 08/31/24  2320 08/31/24  1038 08/30/24  0124 08/29/24  0759 08/28/24  2317 08/28/24  1321 08/28/24  0803   SODIUM 139 139  --  139 139 141  --  140 143   POTASSIUM 4.4 5.1 5.1 3.6 4.3 4.3   < > 3.4* 3.5   CHLORIDE 98 97*  --  98 93* 95*  --  92* 96*   CO2 35.0* 33.0*  --  31.0* 40.0* 39.0*  --  37.0* 41.0*   ANION GAP 6.0 9.0  --  10.0 6.0 7.0  --  11.0 6.0   BUN 9 14  --  13 9 8  --  7* 8   CREATININE 0.35* 0.36*  --  0.44* 0.34* 0.35*  --  0.33* 0.37*   EGFR 101.6 100.9  --  96.1 102.3 101.6  --  103.0 100.2   GLUCOSE 111* 93  --  133* 100* 105*  --  154* 113*   CALCIUM 9.2 9.4  --  9.4 9.3 9.0  --  9.3 9.7   MAGNESIUM 1.9  --   --  1.8  --   --   --  1.7  --    TSH  --   --   --   --   --   --   --   --  0.382    < > = values in this interval not displayed.         Lab 08/30/24  0124 08/28/24  0803 08/26/24  1513   TOTAL PROTEIN 6.4 6.7 7.0   ALBUMIN 3.1* 3.3* 3.2*   GLOBULIN 3.3 3.4 3.8   ALT (SGPT) 36* 55* 20   AST (SGOT) 24 75* 27   BILIRUBIN 0.4 0.4 0.4   ALK PHOS 90 109 106         Lab 08/30/24  0124 08/28/24  1421 08/28/24  1321 08/26/24  1513   PROBNP 2,318.0*  --   --  3,091.0*   HSTROP T  --  37* 45* 31*                 Lab 08/27/24  0006   PH, ARTERIAL 7.419   PCO2, ARTERIAL 59.0*   PO2 ART 70.8*   FIO2 40   HCO3 ART 38.1*   BASE EXCESS ART 11.7*   CARBOXYHEMOGLOBIN 1.4     Brief Urine Lab Results  (Last result in the past 365 days)        Color   Clarity   Blood   Leuk Est   Nitrite   Protein   CREAT   Urine HCG        09/01/24 1228 Yellow   Cloudy   Large (3+)   Negative   Negative   Trace                   Microbiology Results Abnormal       Procedure Component Value - Date/Time    S. Pneumo Ag Urine or CSF - Urine, Urine, Clean Catch [[id number]]  (Normal) Collected: 09/01/24 1228    Lab Status: Final result Specimen: Urine, Clean Catch Updated: 09/01/24 2113     Strep Pneumo Ag Negative    Legionella Antigen, Urine - Urine, Urine, Clean Catch [[id number]]  (Normal)  Collected: 09/01/24 1228    Lab Status: Final result Specimen: Urine, Clean Catch Updated: 09/01/24 2112     LEGIONELLA ANTIGEN, URINE Negative    Respiratory Panel PCR w/COVID-19(SARS-CoV-2) NASIR/ELIAS/RHIANNA/PAD/COR/FELIPE In-House, NP Swab in UTM/VTM, 2 HR TAT - Swab, Nasopharynx [[id number]]  (Normal) Collected: 09/01/24 1227    Lab Status: Final result Specimen: Swab from Nasopharynx Updated: 09/01/24 1350     ADENOVIRUS, PCR Not Detected     Coronavirus 229E Not Detected     Coronavirus HKU1 Not Detected     Coronavirus NL63 Not Detected     Coronavirus OC43 Not Detected     COVID19 Not Detected     Human Metapneumovirus Not Detected     Human Rhinovirus/Enterovirus Not Detected     Influenza A PCR Not Detected     Influenza B PCR Not Detected     Parainfluenza Virus 1 Not Detected     Parainfluenza Virus 2 Not Detected     Parainfluenza Virus 3 Not Detected     Parainfluenza Virus 4 Not Detected     RSV, PCR Not Detected     Bordetella pertussis pcr Not Detected     Bordetella parapertussis PCR Not Detected     Chlamydophila pneumoniae PCR Not Detected     Mycoplasma pneumo by PCR Not Detected    Narrative:      In the setting of a positive respiratory panel with a viral infection PLUS a negative procalcitonin without other underlying concern for bacterial infection, consider observing off antibiotics or discontinuation of antibiotics and continue supportive care. If the respiratory panel is positive for atypical bacterial infection (Bordetella pertussis, Chlamydophila pneumoniae, or Mycoplasma pneumoniae), consider antibiotic de-escalation to target atypical bacterial infection.    Blood Culture - Blood, Hand, Left [[id number]]  (Normal) Collected: 08/26/24 1530    Lab Status: Final result Specimen: Blood from Hand, Left Updated: 08/31/24 1700     Blood Culture No growth at 5 days    Blood Culture - Blood, Arm, Right [[id number]]  (Normal) Collected: 08/26/24 1630    Lab Status: Final result Specimen: Blood from Arm,  Right Updated: 08/31/24 1700     Blood Culture No growth at 5 days    COVID PRE-OP / PRE-PROCEDURE SCREENING ORDER (NO ISOLATION) - Swab, Nasopharynx [[id number]]  (Normal) Collected: 08/26/24 1441    Lab Status: Final result Specimen: Swab from Nasopharynx Updated: 08/26/24 1519    Narrative:      The following orders were created for panel order COVID PRE-OP / PRE-PROCEDURE SCREENING ORDER (NO ISOLATION) - Swab, Nasopharynx.  Procedure                               Abnormality         Status                     ---------                               -----------         ------                     COVID-19 and FLU A/B PCR...[[id number]]  Normal              Final result                 Please view results for these tests on the individual orders.    COVID-19 and FLU A/B PCR, 1 HR TAT - Swab, Nasopharynx [[id number]]  (Normal) Collected: 08/26/24 1441    Lab Status: Final result Specimen: Swab from Nasopharynx Updated: 08/26/24 1519     COVID19 Not Detected     Influenza A PCR Not Detected     Influenza B PCR Not Detected    Narrative:      Fact sheet for providers: https://www.fda.gov/media/641390/download    Fact sheet for patients: https://www.fda.gov/media/555870/download    Test performed by PCR.            No radiology results from the last 24 hrs    Results for orders placed during the hospital encounter of 08/26/24    Adult Transthoracic Echo Limited W/ Cont if Necessary Per Protocol    Interpretation Summary    Left ventricular systolic function is normal. Calculated left ventricular EF = 64%    Moderate tricuspid valve regurgitation is present.    Estimated right ventricular systolic pressure from tricuspid regurgitation is moderately elevated (45-55 mmHg).    Mild MR      Current medications:  Scheduled Meds:apixaban, 5 mg, Oral, Q12H  atorvastatin, 20 mg, Oral, Nightly  clopidogrel, 75 mg, Oral, Daily  digoxin, 250 mcg, Intravenous, Once  dofetilide, 500 mcg, Oral, Once   Followed by  dofetilide, 250 mcg,  Oral, Q12H  gabapentin, 300 mg, Oral, Nightly  guaiFENesin, 1,200 mg, Oral, Q12H  ipratropium-albuterol, 3 mL, Nebulization, 4x Daily - RT  isosorbide mononitrate, 30 mg, Oral, Daily  levothyroxine, 200 mcg, Oral, Daily  losartan, 50 mg, Oral, Q24H  [Held by provider] nebivolol, 2.5 mg, Oral, Q24H  nitroglycerin, 1 inch, Topical, Q6H  nystatin, 1 Application, Topical, Q12H  pantoprazole, 40 mg, Oral, Daily  roflumilast, 500 mcg, Oral, Daily  sertraline, 100 mg, Oral, Daily  sodium chloride, 10 mL, Intravenous, Q12H  sodium chloride, 4 mL, Nebulization, Once  traMADol, 50 mg, Oral, Q6H      Continuous Infusions:   PRN Meds:.  senna-docusate sodium **AND** polyethylene glycol **AND** bisacodyl **AND** bisacodyl    Calcium Replacement - Follow Nurse / BPA Driven Protocol    ipratropium-albuterol    Magnesium Standard Dose Replacement - Follow Nurse / BPA Driven Protocol    nitroglycerin    ondansetron    Pharmacy Consult    Phosphorus Replacement - Follow Nurse / BPA Driven Protocol    Potassium Replacement - Follow Nurse / BPA Driven Protocol    sodium chloride    sodium chloride    sodium chloride    Assessment & Plan   Assessment & Plan     Active Hospital Problems    Diagnosis  POA    **Acute hypoxemic respiratory failure [J96.01]  Yes    Depression [F32.A]  Yes    Hyperlipidemia [E78.5]  Yes    Hypertension [I10]  Yes    Hypothyroidism [E03.9]  Yes      Resolved Hospital Problems   No resolved problems to display.        Brief Hospital Course to date:  Avelina Ceron is a 83-year-old female with history of chronic respiratory failure on 2 L NC, COPD, CAD, HTN, hypothyroidism, hyperlipidemia who presented to PCP with 1 week of  worsening cough, wheezing and fever, decreased p.o. intake and congestion.  Found to have with acute on chronic respiratory failure, with COPD exacerbation and pneumonia.     Acute on chronic respiratory failure with hypoxia  Multifocal PNA on CT imaging  COPD, likely  exacerbation  Leukocytosis  -Patient with O2 sats of mid 80s at PCP, requiring 6 L NC (2 L NC at baseline). Currently on 5L o2 - as I discussed with her and her daughter on phone this is still a little too high for her to be discharged safely.   -Respiratory PCR with COVID is negative, follow-up blood cultures (NGTD)  -Follow-up urine Ag, sputum cx (unlikely to produce sample given dry cough)  -CTA imaging obtained 8/28 due to worsening respiratory status and to r/o PE- noted with multifocal PNA with repeat CT recommended in short term to ensure resolution. No PE noted   -ID has seen - favoring monitoring off abx given she has received 7 day course. Doing well this am, WBC down.  -Increase pulmonary toilet with mucinex, flutter valve, hypertonic nebs.     New onset afib RVR  -Patient denies past history. Cardiology consulted given new onset  -Tikosyn initiated, continue eliquis   -Bystolic held bc had blocked PACs and bradycardia after 1 dose  -ECHO done this admission with normal EF, mild VHD     HTN-Losartan; s/p Diamox x 1 per cards  Hypothyroidism-Continue Synthroid; TSH nl   Depression-Continue Zoloft     CAD  PAD  Hyperlipidemia  -Continue aspirin, Plavix, statin     Chronic back pain-Continue tramadol  Groin yeast infection-Continue statin    Expected Discharge Location and Transportation:   Expected Discharge   Expected Discharge Date: 9/2/2024; Expected Discharge Time:      VTE Prophylaxis:  Pharmacologic & mechanical VTE prophylaxis orders are present.         AM-PAC 6 Clicks Score (PT): 16 (09/02/24 0820)    CODE STATUS:   Code Status and Medical Interventions: CPR (Attempt to Resuscitate); Full Support   Ordered at: 08/26/24 1701     Level Of Support Discussed With:    Patient     Code Status (Patient has no pulse and is not breathing):    CPR (Attempt to Resuscitate)     Medical Interventions (Patient has pulse or is breathing):    Full Support       Rohini Dejesus II, DO  09/02/24

## 2024-09-02 NOTE — PROGRESS NOTES
[facility]  Progress Note       LOS: 7 days   Patient Care Team:  Dre Moura MD as PCP - General  Dre Moura MD as PCP - Family Medicine  Rainer Dowd MD as Consulting Physician (Cardiology)  Ascencion Abdullahi MD as Consulting Physician (Ophthalmology)    Chief Complaint: follow up AFIB/tikosyn.    Subjective     Patient sitting up in bed. Feeling fine. In NSR. Still requiring O2. Says she might be going home tomorrow. No side effects from Tikosyn.   Had episode of SVT last night 189 bpm        Review of Systems:   Pertinent positives in HPI, all others reviewed and negative.      Objective       Current Facility-Administered Medications:     apixaban (ELIQUIS) tablet 5 mg, 5 mg, Oral, Q12H, Que Herr MD, 5 mg at 09/02/24 0836    atorvastatin (LIPITOR) tablet 20 mg, 20 mg, Oral, Nightly, Yara Daly MD, 20 mg at 09/01/24 2129    sennosides-docusate (PERICOLACE) 8.6-50 MG per tablet 2 tablet, 2 tablet, Oral, BID PRN, 2 tablet at 08/31/24 1805 **AND** polyethylene glycol (MIRALAX) packet 17 g, 17 g, Oral, Daily PRN **AND** bisacodyl (DULCOLAX) EC tablet 5 mg, 5 mg, Oral, Daily PRN **AND** bisacodyl (DULCOLAX) suppository 10 mg, 10 mg, Rectal, Daily PRN, Yara Daly MD    Calcium Replacement - Follow Nurse / BPA Driven Protocol, , Does not apply, PRN, Que Herr MD    clopidogrel (PLAVIX) tablet 75 mg, 75 mg, Oral, Daily, Yara Daly MD, 75 mg at 09/02/24 0840    digoxin (LANOXIN) injection 250 mcg, 250 mcg, Intravenous, Once, Cristel James APRN    [COMPLETED] dofetilide (TIKOSYN) capsule 500 mcg, 500 mcg, Oral, Once, 500 mcg at 08/28/24 1821 **FOLLOWED BY** [COMPLETED] dofetilide (TIKOSYN) capsule 500 mcg, 500 mcg, Oral, Once, 500 mcg at 08/29/24 0408 **FOLLOWED BY** [COMPLETED] dofetilide (TIKOSYN) capsule 500 mcg, 500 mcg, Oral, Once, 500 mcg at 08/29/24 1407 **FOLLOWED BY** dofetilide (TIKOSYN) capsule 500  mcg, 500 mcg, Oral, Once **FOLLOWED BY** dofetilide (TIKOSYN) capsule 250 mcg, 250 mcg, Oral, Q12H, Gerald Edwards PA-C, 250 mcg at 09/02/24 0839    gabapentin (NEURONTIN) capsule 300 mg, 300 mg, Oral, Nightly, Yara Daly MD, 300 mg at 09/01/24 2129    guaiFENesin (MUCINEX) 12 hr tablet 1,200 mg, 1,200 mg, Oral, Q12H, Rohini Dejesus II, DO, 1,200 mg at 09/02/24 1001    ipratropium-albuterol (DUO-NEB) nebulizer solution 3 mL, 3 mL, Nebulization, 4x Daily - RT, Yara Daly MD, 3 mL at 09/02/24 1132    ipratropium-albuterol (DUO-NEB) nebulizer solution 3 mL, 3 mL, Nebulization, Q4H PRN, Martina Chiang APRN    isosorbide mononitrate (IMDUR) 24 hr tablet 30 mg, 30 mg, Oral, Daily, Yara Daly MD, 30 mg at 09/02/24 0840    levothyroxine (SYNTHROID, LEVOTHROID) tablet 200 mcg, 200 mcg, Oral, Daily, Yara Daly MD, 200 mcg at 09/02/24 0624    losartan (COZAAR) tablet 50 mg, 50 mg, Oral, Q24H, Que Herr MD, 50 mg at 08/31/24 0858    Magnesium Standard Dose Replacement - Follow Nurse / BPA Driven Protocol, , Does not apply, PRN, Yara Daly MD    [Held by provider] nebivolol (BYSTOLIC) tablet 2.5 mg, 2.5 mg, Oral, Q24H, Que Herr MD, 2.5 mg at 08/30/24 0810    nitroglycerin (NITROSTAT) ointment 1 inch, 1 inch, Topical, Q6H, Que Herr MD, 1 inch at 09/02/24 0156    nitroglycerin (NITROSTAT) SL tablet 0.4 mg, 0.4 mg, Sublingual, Q5 Min PRN, Que Herr MD    nystatin (MYCOSTATIN) 339590 UNIT/GM cream 1 Application, 1 Application, Topical, Q12H, Yara Daly MD, 1 Application at 09/02/24 0841    ondansetron (ZOFRAN) injection 4 mg, 4 mg, Intravenous, Q6H PRN, Yara Daly MD    pantoprazole (PROTONIX) EC tablet 40 mg, 40 mg, Oral, Daily, Yara Daly MD, 40 mg at 09/02/24 0839    Pharmacy Consult, , Does not apply, Once PRN, Que Herr MD    Phosphorus Replacement - Follow Nurse / BPA Driven Protocol, , Does not apply, PRN, [place],  "Que Lim MD    Potassium Replacement - Follow Nurse / BPA Driven Protocol, , Does not apply, PRN, Que Herr MD    roflumilast (DALIRESP) tablet 500 mcg, 500 mcg, Oral, Daily, Yara Daly MD, 500 mcg at 09/02/24 0840    sertraline (ZOLOFT) tablet 100 mg, 100 mg, Oral, Daily, Yara Daly MD, 100 mg at 09/02/24 0839    sodium chloride 0.9 % flush 10 mL, 10 mL, Intravenous, PRN, Kendall Smyth MD    sodium chloride 0.9 % flush 10 mL, 10 mL, Intravenous, Q12H, Yara Daly MD, 10 mL at 09/02/24 0843    sodium chloride 0.9 % flush 10 mL, 10 mL, Intravenous, PRN, Yara Daly MD    sodium chloride 0.9 % infusion 40 mL, 40 mL, Intravenous, PRN, Yara Daly MD    traMADol (ULTRAM) tablet 50 mg, 50 mg, Oral, Q6H, Beena Gray MD, 50 mg at 09/02/24 0840    Vital Sign Min/Max for last 24 hours  Temp  Min: 97.8 °F (36.6 °C)  Max: 98.5 °F (36.9 °C)   BP  Min: 93/35  Max: 137/55   Pulse  Min: 66  Max: 189   Resp  Min: 16  Max: 20   SpO2  Min: 90 %  Max: 96 %   Flow (L/min)  Min: 4  Max: 4   No data recorded     Flowsheet Rows      Flowsheet Row First Filed Value   Admission Height 160 cm (63\") Documented at 08/26/2024 1438   Admission Weight 72.6 kg (160 lb) Documented at 08/26/2024 1438              Intake/Output Summary (Last 24 hours) at 9/2/2024 1142  Last data filed at 9/2/2024 1137  Gross per 24 hour   Intake 460 ml   Output --   Net 460 ml       Physical Exam:     General Appearance:    Alert, cooperative, in no acute distress   Lungs:     Clear to auscultation with diminished BS at bases,respirations regular, even and unlabored    Heart:    Regular rhythm and normal rate with occasional ectopic beat, normal S1 and S2,   no        murmur, no gallop, no rub, no click   Chest Wall:    No abnormalities observed   Abdomen:     Normal bowel sounds, no masses, no organomegaly, soft      nontender, nondistended, no guarding, no rebound                tenderness   Extremities:  Moves all " "extremities well, no edema, no cyanosis, no            redness              Pulses:   Pulses palpable and equal bilaterally   Skin:   No bleeding, bruising or rash        Results Review:   Results from last 7 days   Lab Units 09/02/24  0725 09/01/24  0830 08/31/24  1038   WBC 10*3/mm3 33.58* 36.11* 35.42*   HEMOGLOBIN g/dL 10.3* 11.1* 11.2*   HEMATOCRIT % 32.8* 37.4 36.2   PLATELETS 10*3/mm3 379 443 405     Results from last 7 days   Lab Units 09/02/24  0725 09/01/24  0830 08/31/24  2320 08/31/24  1038   SODIUM mmol/L 139 139  --  139   POTASSIUM mmol/L 4.4 5.1 5.1 3.6   CHLORIDE mmol/L 98 97*  --  98   CO2 mmol/L 35.0* 33.0*  --  31.0*   BUN mg/dL 9 14  --  13   CREATININE mg/dL 0.35* 0.36*  --  0.44*   GLUCOSE mg/dL 111* 93  --  133*              Results from last 7 days   Lab Units 08/28/24  0803   TSH uIU/mL 0.382     Results from last 7 days   Lab Units 08/30/24  0124   PROBNP pg/mL 2,318.0*         Results from last 7 days   Lab Units 08/28/24  1421 08/28/24  1321 08/26/24  1513   HSTROP T ng/L 37* 45* 31*       Intake/Output Summary (Last 24 hours) at 9/2/2024 1142  Last data filed at 9/2/2024 1137  Gross per 24 hour   Intake 460 ml   Output --   Net 460 ml       I personally viewed and interpreted the patient's EKG/Telemetry data    EKG: NSR with PACs, 51 bpm, QTc is 400 ms  EKG 21:26 9/1/2024: SVT, 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 Has received 9 doses since initiation.  QTc acceptable.  Blocked PACs and bradycardia after one dose of Bystolic, now held. QTc acceptable on EKG today   - on Eliquis  - Echo: EF 60%, RVSP 50 mmHg    2. " HTN:  - controlled.  -Losartan 50 mg daily    3.  COPD with chronic CO2 retention admitted with pneumonitis acute respiratory failure, treatment per hospitalist    4. SVT:  - patient had an episode of SVT on 9/1/2024. 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 Currently not on beta blocker due to bradycardia.   - if has recurrence, could consider outpatient EPS +/- RFA once she is improved from a respiratory standpoint.     Plan for disposition:will continue to follow. EKG in AM. Expected d/c 9/3/2024 per hospitalist. She should follow up outpatient with Dr. Gallegos to establish care with EP due to new AFIB and now AVNRT.       Electronically signed by JT Mancia, 09/02/24, 11:38 AM EDT.

## 2024-09-02 NOTE — PROGRESS NOTES
"          Clinical Nutrition Assessment     Patient Name: Avelina Ceron  YOB: 1940  MRN: [id number]  Date of Encounter: 09/02/24 13:27 EDT  Admission date: 8/26/2024  Reason for Visit: LOS    Assessment   Nutrition Assessment   Admission Diagnosis:  Acute hypoxemic respiratory failure [J96.01]    Problem List:    Acute hypoxemic respiratory failure    Depression    Hyperlipidemia    Hypertension    Hypothyroidism      PMH:   She  has a past medical history of Anesthesia complication, Arthritis of back, Arthritis of neck, Broken ribs, COPD (chronic obstructive pulmonary disease), Coronary artery disease, Degenerative lumbar disc, Disease of thyroid gland, Fracture, fibula (1997?), Fracture, humerus (12/13/23), Fracture, tibia and fibula (1997?), Hip arthrosis, History of colonoscopy, Hyperlipidemia, Hypertension, Kidney stone (July 2023), Knee swelling, Lumbosacral disc disease, On home O2, Osteoarthritis, Periarthritis of shoulder, Urinary incontinence, and Urinary tract infection.    PSH:  She  has a past surgical history that includes Hysterectomy; Gallbladder surgery (2012); Total hip arthroplasty (Bilateral); Eye surgery (10/17/2022); ureteroscopy laser lithotripsy with stent insertion (Right, 07/26/2023); Carotid stent (Bilateral); Iliac artery stent (Bilateral); Colonoscopy; ureteroscopy laser lithotripsy with stent insertion (Right, 08/10/2023); Joint replacement (1995; 2020); Oophorectomy (1976); and Kidney stone surgery (July August 2023).    Applicable Nutrition History:   +dentures    Anthropometrics     Height: Height: 160 cm (63\")  Last Filed Weight: Weight: 72.6 kg (160 lb) (08/26/24 1438)  Method: Weight Method: Stated  BMI: BMI (Calculated): 28.4    UBW:  3/14/24 164#   8/10/23 180#  Weight change: No significant changes    Nutrition Focused Physical Exam    Date:  09/02/24        Pt does not meet criteria for malnutrition diagnosis, at this time.  Mild wasting most likely 2/2 " age-related sarcopenia    Subjective   Reported/Observed/Food/Nutrition Related History:     09/02/24  Spoke with patient and daughter at bedside. Patient reported she has been eating as much here as she normally would at home. Stated she eats 1 meal plus a few snacks per day. Daughter said her appetite started to decrease around 6 months ago, but has not really noticed much weight loss, maybe 5 or so pounds. Stated she is unsure what happened that her appetite decreased. Reported a fall over a year ago, but stated she did not have any appetite/PO changes at that point. Drinks ONS 1-2 times per day at home as well. Would like to receive boost plus vanilla BID while here. Denied any chewing or swallowing difficulties, however, said her dentures have began to wear down and it's making it harder to chew, but hasn't become an issue yet. NKFA.    Current Nutrition Prescription   PO: Diet: Regular/House, Cardiac; Healthy Heart (2-3 Na+); Fluid Consistency: Thin (IDDSI 0)  Oral Nutrition Supplement: n/a  Intake: 0-25% PO intake documented since admit    Assessment & Plan   Nutrition Diagnosis   Date:  09/02/24             Updated:    Problem Inadequate oral intake    Etiology COPD?   Signs/Symptoms < Or = 50% of est energy req for > or = 5 days documented   Status: New    Goal:   Nutrition to support treatment and Increase intake    Nutrition Intervention      Follow treatment progress, Care plan reviewed, Interview for preferences, Encourage intake, Supplement provided, Education provided - discussed importance of protein and overall nutrition     Patient does not meet malnutrition criteria at this time.  Ordering boost plus vanilla BID  Encourage increasing PO intake as able  Watch for signs of inability to chew foods properly    Monitoring/Evaluation:   Per protocol, PO intake, Supplement intake, Symptoms, POC/GOC    Olivia Miller, MS,RD,LD  Time Spent: 30min

## 2024-09-02 NOTE — PLAN OF CARE
Problem: Fluid Imbalance (Pneumonia)  Goal: Fluid Balance  Outcome: Ongoing, Progressing     Problem: Infection (Pneumonia)  Goal: Resolution of Infection Signs and Symptoms  Outcome: Ongoing, Progressing     Problem: Respiratory Compromise (Pneumonia)  Goal: Effective Oxygenation and Ventilation  Outcome: Ongoing, Progressing  Intervention: Promote Airway Secretion Clearance  Recent Flowsheet Documentation  Taken 9/2/2024 0820 by Gloria Kelly RN  Cough And Deep Breathing: done independently per patient  Intervention: Optimize Oxygenation and Ventilation  Recent Flowsheet Documentation  Taken 9/2/2024 1400 by Gloria Kelly RN  Head of Bed (HOB) Positioning: HOB elevated  Taken 9/2/2024 1200 by Gloria Kelly RN  Head of Bed (HOB) Positioning: HOB elevated  Taken 9/2/2024 1000 by Gloria Kelly RN  Head of Bed (HOB) Positioning: HOB elevated  Taken 9/2/2024 0820 by Gloria Kelly RN  Head of Bed (HOB) Positioning: HOB elevated     Problem: Adult Inpatient Plan of Care  Goal: Plan of Care Review  Outcome: Ongoing, Progressing  Flowsheets  Taken 9/2/2024 1550 by Gloria Kelly RN  Progress: improving  Taken 9/1/2024 1051 by Dee Humphrey OT  Plan of Care Reviewed With: patient  Goal: Patient-Specific Goal (Individualized)  Outcome: Ongoing, Progressing  Goal: Absence of Hospital-Acquired Illness or Injury  Outcome: Ongoing, Progressing  Intervention: Identify and Manage Fall Risk  Recent Flowsheet Documentation  Taken 9/2/2024 1400 by Gloria Kelly RN  Safety Promotion/Fall Prevention:   room organization consistent   safety round/check completed  Taken 9/2/2024 1200 by Gloria Kelly RN  Safety Promotion/Fall Prevention:   activity supervised   room organization consistent   safety round/check completed  Taken 9/2/2024 1000 by Gloria Kelly RN  Safety Promotion/Fall Prevention:   assistive device/personal items within reach   clutter free environment maintained   room organization consistent   safety  round/check completed  Taken 9/2/2024 0820 by Gloria Kelly RN  Safety Promotion/Fall Prevention:   room organization consistent   safety round/check completed  Intervention: Prevent Skin Injury  Recent Flowsheet Documentation  Taken 9/2/2024 1400 by Gloria Kelly RN  Body Position: left  Skin Protection: adhesive use limited  Taken 9/2/2024 1200 by Gloria Kelly RN  Body Position:   left   tilted  Skin Protection: adhesive use limited  Taken 9/2/2024 1000 by Gloria Kelly RN  Body Position: right  Skin Protection: adhesive use limited  Taken 9/2/2024 0820 by Gloria Kelly RN  Body Position: supine, legs elevated  Skin Protection: adhesive use limited  Intervention: Prevent and Manage VTE (Venous Thromboembolism) Risk  Recent Flowsheet Documentation  Taken 9/2/2024 0820 by Gloria Kelly RN  VTE Prevention/Management: sequential compression devices on  Range of Motion: active ROM (range of motion) encouraged  Intervention: Prevent Infection  Recent Flowsheet Documentation  Taken 9/2/2024 1400 by Gloria Kelly RN  Infection Prevention: environmental surveillance performed  Taken 9/2/2024 1200 by Gloria Kelly RN  Infection Prevention: environmental surveillance performed  Taken 9/2/2024 1000 by Gloria Kelly RN  Infection Prevention: environmental surveillance performed  Taken 9/2/2024 0820 by Gloria Kelly RN  Infection Prevention: environmental surveillance performed  Goal: Optimal Comfort and Wellbeing  Outcome: Ongoing, Progressing  Intervention: Provide Person-Centered Care  Recent Flowsheet Documentation  Taken 9/2/2024 0820 by Gloria Kelly RN  Trust Relationship/Rapport: care explained  Goal: Readiness for Transition of Care  Outcome: Ongoing, Progressing     Problem: Skin Injury Risk Increased  Goal: Skin Health and Integrity  Outcome: Ongoing, Progressing  Intervention: Optimize Skin Protection  Recent Flowsheet Documentation  Taken 9/2/2024 1400 by Gloria Kelly RN  Pressure  Reduction Techniques:   frequent weight shift encouraged   heels elevated off bed   weight shift assistance provided  Head of Bed (HOB) Positioning: HOB elevated  Pressure Reduction Devices:   pressure-redistributing mattress utilized   positioning supports utilized  Skin Protection: adhesive use limited  Taken 9/2/2024 1200 by Gloria Kelly RN  Pressure Reduction Techniques:   frequent weight shift encouraged   heels elevated off bed   weight shift assistance provided  Head of Bed (HOB) Positioning: HOB elevated  Pressure Reduction Devices:   positioning supports utilized   pressure-redistributing mattress utilized  Skin Protection: adhesive use limited  Taken 9/2/2024 1000 by Gloria Kelly RN  Pressure Reduction Techniques:   frequent weight shift encouraged   heels elevated off bed   weight shift assistance provided  Head of Bed (HOB) Positioning: HOB elevated  Pressure Reduction Devices:   positioning supports utilized   pressure-redistributing mattress utilized  Skin Protection: adhesive use limited  Taken 9/2/2024 0820 by Gloria Kelly RN  Pressure Reduction Techniques:   frequent weight shift encouraged   heels elevated off bed   weight shift assistance provided  Head of Bed (HOB) Positioning: HOB elevated  Pressure Reduction Devices:   positioning supports utilized   pressure-redistributing mattress utilized  Skin Protection: adhesive use limited     Problem: Adjustment to Illness (Sepsis/Septic Shock)  Goal: Optimal Coping  Outcome: Ongoing, Progressing  Intervention: Optimize Psychosocial Adjustment to Illness  Recent Flowsheet Documentation  Taken 9/2/2024 0820 by Gloria Kelly RN  Supportive Measures: active listening utilized     Problem: Bleeding (Sepsis/Septic Shock)  Goal: Absence of Bleeding  Outcome: Ongoing, Progressing     Problem: Glycemic Control Impaired (Sepsis/Septic Shock)  Goal: Blood Glucose Level Within Desired Range  Outcome: Ongoing, Progressing     Problem: Infection  Progression (Sepsis/Septic Shock)  Goal: Absence of Infection Signs and Symptoms  Outcome: Ongoing, Progressing  Intervention: Initiate Sepsis Management  Recent Flowsheet Documentation  Taken 9/2/2024 1400 by Gloria Kelly RN  Infection Prevention: environmental surveillance performed  Taken 9/2/2024 1200 by Gloria eKlly RN  Infection Prevention: environmental surveillance performed  Taken 9/2/2024 1000 by Gloria Kelly RN  Infection Prevention: environmental surveillance performed  Taken 9/2/2024 0820 by Gloria Kelly RN  Infection Prevention: environmental surveillance performed     Problem: Nutrition Impaired (Sepsis/Septic Shock)  Goal: Optimal Nutrition Intake  Outcome: Ongoing, Progressing     Problem: Fall Injury Risk  Goal: Absence of Fall and Fall-Related Injury  Outcome: Ongoing, Progressing  Intervention: Identify and Manage Contributors  Recent Flowsheet Documentation  Taken 9/2/2024 1400 by Gloria Kelly RN  Medication Review/Management: medications reviewed  Taken 9/2/2024 1200 by Gloria Kelly RN  Medication Review/Management: medications reviewed  Taken 9/2/2024 1000 by Gloria Kelly RN  Medication Review/Management: medications reviewed  Taken 9/2/2024 0820 by Gloria Kelly RN  Medication Review/Management: medications reviewed  Intervention: Promote Injury-Free Environment  Recent Flowsheet Documentation  Taken 9/2/2024 1400 by Gloria Kelly RN  Safety Promotion/Fall Prevention:   room organization consistent   safety round/check completed  Taken 9/2/2024 1200 by Gloria Kelly RN  Safety Promotion/Fall Prevention:   activity supervised   room organization consistent   safety round/check completed  Taken 9/2/2024 1000 by Gloria Kelly RN  Safety Promotion/Fall Prevention:   assistive device/personal items within reach   clutter free environment maintained   room organization consistent   safety round/check completed  Taken 9/2/2024 0820 by Gloria Kelly RN  Safety  Promotion/Fall Prevention:   room organization consistent   safety round/check completed   Goal Outcome Evaluation:           Progress: improving

## 2024-09-03 LAB
ALBUMIN SERPL-MCNC: 3.2 G/DL (ref 3.5–5.2)
ALBUMIN/GLOB SERPL: 1.3 G/DL
ALP SERPL-CCNC: 68 U/L (ref 39–117)
ALT SERPL W P-5'-P-CCNC: 33 U/L (ref 1–33)
ANION GAP SERPL CALCULATED.3IONS-SCNC: 9 MMOL/L (ref 5–15)
AST SERPL-CCNC: 19 U/L (ref 1–32)
BASOPHILS # BLD AUTO: 0.13 10*3/MM3 (ref 0–0.2)
BASOPHILS NFR BLD AUTO: 0.5 % (ref 0–1.5)
BILIRUB SERPL-MCNC: 0.3 MG/DL (ref 0–1.2)
BUN SERPL-MCNC: 6 MG/DL (ref 8–23)
BUN/CREAT SERPL: 17.1 (ref 7–25)
CALCIUM SPEC-SCNC: 9 MG/DL (ref 8.6–10.5)
CHLORIDE SERPL-SCNC: 97 MMOL/L (ref 98–107)
CO2 SERPL-SCNC: 33 MMOL/L (ref 22–29)
CREAT SERPL-MCNC: 0.35 MG/DL (ref 0.57–1)
CYTOLOGIST CVX/VAG CYTO: NORMAL
DEPRECATED RDW RBC AUTO: 49.7 FL (ref 37–54)
EGFRCR SERPLBLD CKD-EPI 2021: 101.6 ML/MIN/1.73
EOSINOPHIL # BLD AUTO: 0.18 10*3/MM3 (ref 0–0.4)
EOSINOPHIL NFR BLD AUTO: 0.7 % (ref 0.3–6.2)
ERYTHROCYTE [DISTWIDTH] IN BLOOD BY AUTOMATED COUNT: 13.3 % (ref 12.3–15.4)
GLOBULIN UR ELPH-MCNC: 2.5 GM/DL
GLUCOSE SERPL-MCNC: 159 MG/DL (ref 65–99)
HCT VFR BLD AUTO: 32.5 % (ref 34–46.6)
HGB BLD-MCNC: 10.2 G/DL (ref 12–15.9)
IMM GRANULOCYTES # BLD AUTO: 1.58 10*3/MM3 (ref 0–0.05)
IMM GRANULOCYTES NFR BLD AUTO: 5.9 % (ref 0–0.5)
LYMPHOCYTES # BLD AUTO: 2.25 10*3/MM3 (ref 0.7–3.1)
LYMPHOCYTES NFR BLD AUTO: 8.4 % (ref 19.6–45.3)
MCH RBC QN AUTO: 31.9 PG (ref 26.6–33)
MCHC RBC AUTO-ENTMCNC: 31.4 G/DL (ref 31.5–35.7)
MCV RBC AUTO: 101.6 FL (ref 79–97)
MONOCYTES # BLD AUTO: 1.7 10*3/MM3 (ref 0.1–0.9)
MONOCYTES NFR BLD AUTO: 6.3 % (ref 5–12)
NEUTROPHILS NFR BLD AUTO: 21.04 10*3/MM3 (ref 1.7–7)
NEUTROPHILS NFR BLD AUTO: 78.2 % (ref 42.7–76)
NRBC BLD AUTO-RTO: 0 /100 WBC (ref 0–0.2)
PATH INTERP BLD-IMP: NORMAL
PLATELET # BLD AUTO: 360 10*3/MM3 (ref 140–450)
PMV BLD AUTO: 8.8 FL (ref 6–12)
POTASSIUM SERPL-SCNC: 4 MMOL/L (ref 3.5–5.2)
PROT SERPL-MCNC: 5.7 G/DL (ref 6–8.5)
RBC # BLD AUTO: 3.2 10*6/MM3 (ref 3.77–5.28)
SODIUM SERPL-SCNC: 139 MMOL/L (ref 136–145)
WBC NRBC COR # BLD AUTO: 26.88 10*3/MM3 (ref 3.4–10.8)

## 2024-09-03 PROCEDURE — 80053 COMPREHEN METABOLIC PANEL: CPT | Performed by: INTERNAL MEDICINE

## 2024-09-03 PROCEDURE — 93005 ELECTROCARDIOGRAM TRACING: CPT | Performed by: INTERNAL MEDICINE

## 2024-09-03 PROCEDURE — 97530 THERAPEUTIC ACTIVITIES: CPT

## 2024-09-03 PROCEDURE — 94664 DEMO&/EVAL PT USE INHALER: CPT

## 2024-09-03 PROCEDURE — 99232 SBSQ HOSP IP/OBS MODERATE 35: CPT | Performed by: INTERNAL MEDICINE

## 2024-09-03 PROCEDURE — 94799 UNLISTED PULMONARY SVC/PX: CPT

## 2024-09-03 PROCEDURE — 93010 ELECTROCARDIOGRAM REPORT: CPT | Performed by: INTERNAL MEDICINE

## 2024-09-03 PROCEDURE — 94761 N-INVAS EAR/PLS OXIMETRY MLT: CPT

## 2024-09-03 PROCEDURE — 85025 COMPLETE CBC W/AUTO DIFF WBC: CPT | Performed by: INTERNAL MEDICINE

## 2024-09-03 RX ADMIN — IPRATROPIUM BROMIDE AND ALBUTEROL SULFATE 3 ML: 2.5; .5 SOLUTION RESPIRATORY (INHALATION) at 16:20

## 2024-09-03 RX ADMIN — GABAPENTIN 300 MG: 300 CAPSULE ORAL at 20:17

## 2024-09-03 RX ADMIN — APIXABAN 5 MG: 5 TABLET, FILM COATED ORAL at 08:49

## 2024-09-03 RX ADMIN — PANTOPRAZOLE SODIUM 40 MG: 40 TABLET, DELAYED RELEASE ORAL at 08:48

## 2024-09-03 RX ADMIN — TRAMADOL HYDROCHLORIDE 50 MG: 50 TABLET ORAL at 01:14

## 2024-09-03 RX ADMIN — NITROGLYCERIN 1 INCH: 20 OINTMENT TOPICAL at 05:58

## 2024-09-03 RX ADMIN — IPRATROPIUM BROMIDE AND ALBUTEROL SULFATE 3 ML: 2.5; .5 SOLUTION RESPIRATORY (INHALATION) at 18:50

## 2024-09-03 RX ADMIN — TRAMADOL HYDROCHLORIDE 50 MG: 50 TABLET ORAL at 20:17

## 2024-09-03 RX ADMIN — GUAIFENESIN 1200 MG: 600 TABLET, EXTENDED RELEASE ORAL at 08:48

## 2024-09-03 RX ADMIN — NYSTATIN 1 APPLICATION: 100000 CREAM TOPICAL at 08:49

## 2024-09-03 RX ADMIN — APIXABAN 5 MG: 5 TABLET, FILM COATED ORAL at 20:17

## 2024-09-03 RX ADMIN — TRAMADOL HYDROCHLORIDE 50 MG: 50 TABLET ORAL at 14:37

## 2024-09-03 RX ADMIN — ATORVASTATIN CALCIUM 20 MG: 20 TABLET, FILM COATED ORAL at 20:17

## 2024-09-03 RX ADMIN — SERTRALINE 100 MG: 100 TABLET, FILM COATED ORAL at 08:48

## 2024-09-03 RX ADMIN — TRAMADOL HYDROCHLORIDE 50 MG: 50 TABLET ORAL at 08:47

## 2024-09-03 RX ADMIN — IPRATROPIUM BROMIDE AND ALBUTEROL SULFATE 3 ML: 2.5; .5 SOLUTION RESPIRATORY (INHALATION) at 07:28

## 2024-09-03 RX ADMIN — CLOPIDOGREL BISULFATE 75 MG: 75 TABLET ORAL at 08:49

## 2024-09-03 RX ADMIN — Medication 10 ML: at 08:49

## 2024-09-03 RX ADMIN — NYSTATIN 1 APPLICATION: 100000 CREAM TOPICAL at 20:19

## 2024-09-03 RX ADMIN — GUAIFENESIN 1200 MG: 600 TABLET, EXTENDED RELEASE ORAL at 20:17

## 2024-09-03 RX ADMIN — ROFLUMILAST 500 MCG: 500 TABLET ORAL at 08:47

## 2024-09-03 RX ADMIN — DOFETILIDE 250 MCG: 0.25 CAPSULE ORAL at 20:17

## 2024-09-03 RX ADMIN — ISOSORBIDE MONONITRATE 30 MG: 30 TABLET, EXTENDED RELEASE ORAL at 08:48

## 2024-09-03 RX ADMIN — NITROGLYCERIN 1 INCH: 20 OINTMENT TOPICAL at 01:13

## 2024-09-03 RX ADMIN — NITROGLYCERIN 1 INCH: 20 OINTMENT TOPICAL at 17:18

## 2024-09-03 RX ADMIN — Medication 10 ML: at 20:18

## 2024-09-03 RX ADMIN — DOFETILIDE 250 MCG: 0.25 CAPSULE ORAL at 08:50

## 2024-09-03 RX ADMIN — LEVOTHYROXINE SODIUM 200 MCG: 0.1 TABLET ORAL at 05:58

## 2024-09-03 NOTE — PROGRESS NOTES
"  INFECTIOUS DISEASES  INPATIENT PROGRESS NOTE  9/3/2024      PATIENT NAME: Avelina Ceron  :  1940  MRN:  [id number]  Date of Admission:  2024      Antimicrobials:  OFF    S/p 6 days doxycycline  S/p 5 days ceftriaxone  S/p 1+ day cefepime    MAR reviewed.  Pertinent other medications include: Dofetilide      Reason for consultation: Persistent leukocytosis    Interval history: Patient remains off of steroids and antibiotics.  Respiratory status improving.  Down to 2-1/2 L of supplemental oxygen via nasal cannula.  White blood cell count continues to improve.    ROS:  No fevers/chills overnight.  No new rashes.  No SOB or chest pain.  No nausea/vomiting/diarrhea.  Denies side effects from antimicrobials.    Objective:  Temp (24hrs), Av.5 °F (36.9 °C), Min:98 °F (36.7 °C), Max:99.1 °F (37.3 °C)    /43 (BP Location: Left arm, Patient Position: Lying)   Pulse 100   Temp 98 °F (36.7 °C) (Oral)   Resp 20   Ht 160 cm (63\")   Wt 72.6 kg (160 lb)   LMP  (LMP Unknown)   SpO2 91%   BMI 28.34 kg/m²     Physical Examination:  GENERAL: Awake and alert, in no acute distress.  Up in chair   HEENT: Normocephalic, atraumatic.  EOMI. No conjunctival injection.  LUNGS:  Normal respiratory effort. Nonlabored. Down to 2.5 L NC from 5 L yesterday.  SKIN: Warm and dry   NEURO: Oriented to PPT.   PSYCHIATRIC: Normal insight and judgment. Cooperative with PE    Laboratory Data:    Results from last 7 days   Lab Units 24  1206 24  0725 24  0830   WBC 10*3/mm3 26.88* 33.58* 36.11*   HEMOGLOBIN g/dL 10.2* 10.3* 11.1*   HEMATOCRIT % 32.5* 32.8* 37.4   PLATELETS 10*3/mm3 360 379 443     Results from last 7 days   Lab Units 24  1206   SODIUM mmol/L 139   POTASSIUM mmol/L 4.0   CHLORIDE mmol/L 97*   CO2 mmol/L 33.0*   BUN mg/dL 6*   CREATININE mg/dL 0.35*   GLUCOSE mg/dL 159*   CALCIUM mg/dL 9.0     Results from last 7 days   Lab Units 24  1206   ALK PHOS U/L 68   BILIRUBIN mg/dL 0.3 "   ALT (SGPT) U/L 33   AST (SGOT) U/L 19             Estimated Creatinine Clearance: 116.3 mL/min (A) (by C-G formula based on SCr of 0.35 mg/dL (L)).                    Microbiology:    Microbiology Results (last 10 days)       Procedure Component Value - Date/Time    S. Pneumo Ag Urine or CSF - Urine, Urine, Clean Catch [[id number]]  (Normal) Collected: 09/01/24 1228    Lab Status: Final result Specimen: Urine, Clean Catch Updated: 09/01/24 2113     Strep Pneumo Ag Negative    Legionella Antigen, Urine - Urine, Urine, Clean Catch [[id number]]  (Normal) Collected: 09/01/24 1228    Lab Status: Final result Specimen: Urine, Clean Catch Updated: 09/01/24 2112     LEGIONELLA ANTIGEN, URINE Negative    Respiratory Panel PCR w/COVID-19(SARS-CoV-2) NASIR/ELIAS/RHIANNA/PAD/COR/FELIPE In-House, NP Swab in UTM/VTM, 2 HR TAT - Swab, Nasopharynx [[id number]]  (Normal) Collected: 09/01/24 1227    Lab Status: Final result Specimen: Swab from Nasopharynx Updated: 09/01/24 1350     ADENOVIRUS, PCR Not Detected     Coronavirus 229E Not Detected     Coronavirus HKU1 Not Detected     Coronavirus NL63 Not Detected     Coronavirus OC43 Not Detected     COVID19 Not Detected     Human Metapneumovirus Not Detected     Human Rhinovirus/Enterovirus Not Detected     Influenza A PCR Not Detected     Influenza B PCR Not Detected     Parainfluenza Virus 1 Not Detected     Parainfluenza Virus 2 Not Detected     Parainfluenza Virus 3 Not Detected     Parainfluenza Virus 4 Not Detected     RSV, PCR Not Detected     Bordetella pertussis pcr Not Detected     Bordetella parapertussis PCR Not Detected     Chlamydophila pneumoniae PCR Not Detected     Mycoplasma pneumo by PCR Not Detected    Narrative:      In the setting of a positive respiratory panel with a viral infection PLUS a negative procalcitonin without other underlying concern for bacterial infection, consider observing off antibiotics or discontinuation of antibiotics and continue supportive care. If the  respiratory panel is positive for atypical bacterial infection (Bordetella pertussis, Chlamydophila pneumoniae, or Mycoplasma pneumoniae), consider antibiotic de-escalation to target atypical bacterial infection.    Blood Culture - Blood, Arm, Right [[id number]]  (Normal) Collected: 08/26/24 1630    Lab Status: Final result Specimen: Blood from Arm, Right Updated: 08/31/24 1700     Blood Culture No growth at 5 days    Blood Culture - Blood, Hand, Left [[id number]]  (Normal) Collected: 08/26/24 1530    Lab Status: Final result Specimen: Blood from Hand, Left Updated: 08/31/24 1700     Blood Culture No growth at 5 days    COVID PRE-OP / PRE-PROCEDURE SCREENING ORDER (NO ISOLATION) - Swab, Nasopharynx [[id number]]  (Normal) Collected: 08/26/24 1441    Lab Status: Final result Specimen: Swab from Nasopharynx Updated: 08/26/24 1519    Narrative:      The following orders were created for panel order COVID PRE-OP / PRE-PROCEDURE SCREENING ORDER (NO ISOLATION) - Swab, Nasopharynx.  Procedure                               Abnormality         Status                     ---------                               -----------         ------                     COVID-19 and FLU A/B PCR...[[id number]]  Normal              Final result                 Please view results for these tests on the individual orders.    COVID-19 and FLU A/B PCR, 1 HR TAT - Swab, Nasopharynx [[id number]]  (Normal) Collected: 08/26/24 1441    Lab Status: Final result Specimen: Swab from Nasopharynx Updated: 08/26/24 1519     COVID19 Not Detected     Influenza A PCR Not Detected     Influenza B PCR Not Detected    Narrative:      Fact sheet for providers: https://www.fda.gov/media/733885/download    Fact sheet for patients: https://www.fda.gov/media/698529/download    Test performed by PCR.                Radiology:  No radiology results for the last day        DISCUSSION:  83 year old with COPD, chronic respiratory failure on home oxygen, CAD, developed  persistent cough, wheezing, low grade fever, seen by PCP and referred for admission. CTA with multifocal pneumonia, has been on antibiotics with worsening leukocytosis.  Normal PCT at admission.    PROBLEM LIST:   - Persistent leukocytosis.  Without fever.  Clinically improving otherwise.  Low PCT.  Differential fairly normal.  Prior known history of unexplained leukocytosis, previously seen by hematology without clear explanation.  Recent steroid use likely exacerbating.  No splenectomy.  No diarrhea to suggest C. difficile.  WBC improving off of antibiotics.  - Multifocal pneumonia, s/p 7 days of appropriate antimicrobial therapy for CAP.    - Left pleural effusion.  No evidence for loculation.  - Afib/RVR- new and now on Tikosyn  - COPD, exacerbation  - h/o recurrent UTIs    PLAN:  -- Following WBC - continues to improve, off of steroids and off of antibiotics  -- Continue to monitor off of abx  -- Suspect her leukocytosis is reactive due to acute stress and steroids in the setting of known prior chronic leukocytosis.  Recommend outpatient recheck of WBC in 1 to 2 weeks after discharge by PCP.  Would also consider reevaluation by hematology as an outpatient (previously seen by Dr. Fuentes).    Plan discussed with patient.    I will sign off at this time.  Please call back if I can be of further assistance.      Blade Eugene MD  9/3/2024  18:19 EDT

## 2024-09-03 NOTE — PROGRESS NOTES
Subjective:     Encounter Date:08/26/2024      Patient ID: Avelina Ceron is a 83 y.o. female.    Chief Complaint:  Shortness of Breath      Update 9/3/24  In and out of afib.  Rate controlled in afib.      Review of Systems   Respiratory:  Positive for shortness of breath.        Procedures       Objective:     Constitutional:       Appearance: Healthy appearance. Not in distress.   Neck:      Vascular: No JVR. JVD normal.   Pulmonary:      Effort: Increased respiratory effort.      Breath sounds: Normal breath sounds. No wheezing. No rhonchi. No rales.   Chest:      Chest wall: Not tender to palpatation.   Cardiovascular:      PMI at left midclavicular line. Normal rate. Regular rhythm. Normal S1. Normal S2.       Murmurs: There is no murmur.      No gallop.  No click. No rub.   Pulses:     Intact distal pulses.   Edema:     Peripheral edema absent.   Abdominal:      General: Bowel sounds are normal.      Palpations: Abdomen is soft.      Tenderness: There is no abdominal tenderness.   Musculoskeletal: Normal range of motion.         General: No tenderness. Skin:     General: Skin is warm and dry.   Neurological:      General: No focal deficit present.      Mental Status: Alert and oriented to person, place and time.         Lab Review:       Assessment:       New onset AFIB with RVR:  - initiated on Tikosyn and Bystolic 8/29/2024.  QTc acceptable.  Blocked PACs and bradycardia after one dose of Bystolic, now held. QTc acceptable on EKG today   - on Eliquis  - Echo: EF 60%, RVSP 50 mmHg     2. HTN:  - controlled.  -Losartan 50 mg daily     3.  COPD with chronic CO2 retention admitted with pneumonitis acute respiratory failure, treatment per hospitalist     4. SVT:  - patient had an episode of SVT on 9/1/2024. 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 Currently not on beta blocker due to bradycardia.   - if has recurrence, could consider outpatient EPS +/- RFA once she is improved from a respiratory  standpoint.       Maverick Mccray MD

## 2024-09-03 NOTE — THERAPY TREATMENT NOTE
"Patient Name: Avelina Ceron  : 1940    MRN: [id number]                              Today's Date: 9/3/2024       Admit Date: 2024    Visit Dx:     ICD-10-CM ICD-9-CM   1. Acute sepsis  A41.9 038.9     995.91   2. Pneumonia of left lower lobe due to infectious organism  J18.9 486   3. Acute exacerbation of chronic obstructive pulmonary disease (COPD)  J44.1 491.21   4. History of coronary artery disease  Z86.79 V12.59   5. Acute on chronic respiratory failure with hypoxia  J96.21 518.84     799.02     Patient Active Problem List   Diagnosis    Coronary arteriosclerosis in native artery    Chronic obstructive pulmonary disease    Depression    Gastroesophageal reflux disease with esophagitis    Hyperlipidemia    Hypertension    Hypothyroidism    Osteoarthritis    Peripheral arterial occlusive disease    Solitary pulmonary nodule    Vitamin D deficiency    Lower back pain    Carotid bruit    Hypoxemia    Chronic respiratory failure with hypoxia and hypercapnia    Allergic rhinitis    Chronic leukocytosis    Ureterolithiasis    Murmur, cardiac    Hospital discharge follow-up    Closed fracture of right proximal humerus    Right shoulder pain    Acute hypoxemic respiratory failure     Past Medical History:   Diagnosis Date    Anesthesia complication     Pt went into respiratory failure during total hip replacement in     Arthritis of back     Arthritis of neck     Broken ribs     COPD (chronic obstructive pulmonary disease)     Coronary artery disease     Degenerative lumbar disc     Disease of thyroid gland     \"low thyroid\"    Fracture, fibula ?    Fracture, humerus 23    Fracture, tibia and fibula ?    Hip arthrosis     History of colonoscopy     Hyperlipidemia     Hypertension     Kidney stone 2023    Knee swelling     Lumbosacral disc disease     On home O2     3L    Osteoarthritis     Periarthritis of shoulder     Urinary incontinence     Urinary tract infection      Past " Surgical History:   Procedure Laterality Date    CAROTID STENT Bilateral     COLONOSCOPY      EYE SURGERY  10/17/2022    yag surgery    GALLBLADDER SURGERY  2012    HYSTERECTOMY      ILIAC ARTERY STENT Bilateral     JOINT REPLACEMENT  1995; 2020    KIDNEY STONE SURGERY  July August 2023    OOPHORECTOMY  1976    1 ovary removed    TOTAL HIP ARTHROPLASTY Bilateral     URETEROSCOPY LASER LITHOTRIPSY WITH STENT INSERTION Right 07/26/2023    Procedure: CYSTOSCOPY, RIGHT RETROGRADE PYELOGRAM; WITH STENT INSERTION;  Surgeon: Lee Kim MD;  Location: [facility] OR;  Service: Urology;  Laterality: Right;    URETEROSCOPY LASER LITHOTRIPSY WITH STENT INSERTION Right 08/10/2023    Procedure: URETEROSCOPY, LASER LITHOTRIPSY, STENT EXCHANGE;  Surgeon: Lee Kim MD;  Location: [facility] OR;  Service: Urology;  Laterality: Right;      General Information       Row Name 09/03/24 1116          Physical Therapy Time and Intention    Document Type therapy note (daily note)  -ND     Mode of Treatment physical therapy  -ND       Row Name 09/03/24 1116          General Information    Patient Profile Reviewed yes  -ND     Existing Precautions/Restrictions fall;oxygen therapy device and L/min;other (see comments)  Don brief for mobility.  -ND     Barriers to Rehab medically complex;previous functional deficit  -ND       Row Name 09/03/24 1116          Cognition    Orientation Status (Cognition) oriented x 4  -ND       Row Name 09/03/24 1116          Safety Issues, Functional Mobility    Safety Issues Affecting Function (Mobility) safety precaution awareness;sequencing abilities  -ND               User Key  (r) = Recorded By, (t) = Taken By, (c) = Cosigned By      Initials Name Provider Type    ND Lindsay Rowe PT Physical Therapist                   Mobility       Row Name 09/03/24 1117          Bed Mobility    Bed Mobility supine-sit  -ND     Supine-Sit [place] (Bed Mobility) contact guard;verbal cues;nonverbal  cues (demo/gesture)  -ND     Assistive Device (Bed Mobility) bed rails;head of bed elevated  -ND     Comment, (Bed Mobility) Increased time for task, cues for sequencing. Pt denies dizziness but endorses FIELDS with sitting EOB. Pt maintains sitting EOB to catch breath while gown is changed and shoes are donned.  -ND       Row Name 09/03/24 1117          Sit-Stand Transfer    Sit-Stand [place] (Transfers) contact guard;verbal cues  -ND     Assistive Device (Sit-Stand Transfers) walker, front-wheeled  -ND     Comment, (Sit-Stand Transfer) x1 from EOB.  -ND       Row Name 09/03/24 1117          Gait/Stairs (Locomotion)    [place] Level (Gait) contact guard;verbal cues  -ND     Distance in Feet (Gait) 45  -ND     Deviations/Abnormal Patterns (Gait) clraisse decreased;gait speed decreased;stride length decreased  -ND     Bilateral Gait Deviations forward flexed posture  -ND     Comment, (Gait/Stairs) Pt ambulates 45' with forward flexed posture and decreased speed. Pt requires seated rest due to fatigue and FIELDS. Further ambulation deferred secondary to BLE fatigue.  -ND               User Key  (r) = Recorded By, (t) = Taken By, (c) = Cosigned By      Initials Name Provider Type    ND Lindsay Rowe PT Physical Therapist                   Obj/Interventions       Row Name 09/03/24 1120          Balance    Balance Assessment sitting static balance;sitting dynamic balance;sit to stand dynamic balance;standing static balance;standing dynamic balance  -ND     Static Sitting Balance standby assist  -ND     Dynamic Sitting Balance standby assist  -ND     Position, Sitting Balance unsupported;sitting edge of bed  -ND     Sit to Stand Dynamic Balance contact guard;verbal cues  -ND     Static Standing Balance contact guard  -ND     Dynamic Standing Balance contact guard  -ND     Position/Device Used, Standing Balance supported;walker, front-wheeled  -ND     Balance Interventions sitting;standing;sit to  stand;supported;static;dynamic  -ND     Comment, Balance Adequate stability and safety awareness with RWx.  -ND               User Key  (r) = Recorded By, (t) = Taken By, (c) = Cosigned By      Initials Name Provider Type    Lindsay Robertson PT Physical Therapist                   Goals/Plan    No documentation.                  Clinical Impression       Row Name 09/03/24 1120          Pain    Pretreatment Pain Rating 0/10 - no pain  -ND     Posttreatment Pain Rating 0/10 - no pain  -ND       Row Name 09/03/24 1120          Plan of Care Review    Plan of Care Reviewed With patient  -ND     Progress improving  -ND     Outcome Evaluation Pt able to ambulate short distance with RWx and CGA with chair follow due to fatigue and FIELDS. Pt would benefit from continued skilled therapy to address activity tolerance and FIELDS and facilitate return to baseline. Recommend home with 24/7 and HHPT.  -ND       Row Name 09/03/24 1120          Vital Signs    Pre Systolic BP Rehab 124  -ND     Pre Treatment Diastolic BP 50  -ND     Pretreatment Heart Rate (beats/min) 92  -ND     Posttreatment Heart Rate (beats/min) 92  -ND     Pre SpO2 (%) 95  -ND     O2 Delivery Pre Treatment nasal cannula  -ND     O2 Delivery Intra Treatment nasal cannula  -ND     Post SpO2 (%) 94  -ND     O2 Delivery Post Treatment nasal cannula  -ND     Pre Patient Position Supine  -ND     Intra Patient Position Standing  -ND     Post Patient Position Sitting  -ND       Row Name 09/03/24 1120          Positioning and Restraints    Pre-Treatment Position in bed  -ND     Post Treatment Position chair  -ND     In Chair notified nsg;reclined;legs elevated;call light within reach;encouraged to call for assist;exit alarm on;waffle cushion  -ND               User Key  (r) = Recorded By, (t) = Taken By, (c) = Cosigned By      Initials Name Provider Type    Lindsay Robertson PT Physical Therapist                   Outcome Measures       Row Name 09/03/24 1123           How much help from another person do you currently need...    Turning from your back to your side while in flat bed without using bedrails? 3  -ND     Moving from lying on back to sitting on the side of a flat bed without bedrails? 3  -ND     Moving to and from a bed to a chair (including a wheelchair)? 3  -ND     Standing up from a chair using your arms (e.g., wheelchair, bedside chair)? 3  -ND     Climbing 3-5 steps with a railing? 2  -ND     To walk in hospital room? 3  -ND     AM-PAC 6 Clicks Score (PT) 17  -ND     Highest Level of Mobility Goal 5 --> Static standing  -ND       Row Name 09/03/24 1123          Functional Assessment    Outcome Measure Options AM-PAC 6 Clicks Basic Mobility (PT)  -ND               User Key  (r) = Recorded By, (t) = Taken By, (c) = Cosigned By      Initials Name Provider Type    Lindsay Robertson PT Physical Therapist                                 Physical Therapy Education       Title: PT OT SLP Therapies (Done)       Topic: Physical Therapy (Done)       Point: Mobility training (Done)       Learning Progress Summary             Patient Acceptance, E, VU by ND at 9/3/2024 1123    Acceptance, E, VU by DC at 9/2/2024 1550    Acceptance, E, VU,NR by CD at 9/1/2024 1524    Comment: BENEFITS OF OOB ACTIVITY, SAFETY WITH MOBILITY, PROGRESSION OF POC, D/C PLANNING,   Family Acceptance, E, VU by DC at 9/2/2024 1550                         Point: Home exercise program (Done)       Learning Progress Summary             Patient Acceptance, E, VU by DC at 9/2/2024 1550    Acceptance, E, VU,NR by CD at 9/1/2024 1524    Comment: BENEFITS OF OOB ACTIVITY, SAFETY WITH MOBILITY, PROGRESSION OF POC, D/C PLANNING,   Family Acceptance, E, VU by DC at 9/2/2024 1550                         Point: Body mechanics (Done)       Learning Progress Summary             Patient Acceptance, E, VU by ND at 9/3/2024 1123    Acceptance, E, VU by DC at 9/2/2024 1550    Acceptance, E, VU,NR by CD at 9/1/2024  1524    Comment: BENEFITS OF OOB ACTIVITY, SAFETY WITH MOBILITY, PROGRESSION OF POC, D/C PLANNING,   Family Acceptance, E, VU by DC at 9/2/2024 1550                         Point: Precautions (Done)       Learning Progress Summary             Patient Acceptance, E, VU by ND at 9/3/2024 1123    Acceptance, E, VU by DC at 9/2/2024 1550    Acceptance, E, VU,NR by  at 9/1/2024 1524    Comment: BENEFITS OF OOB ACTIVITY, SAFETY WITH MOBILITY, PROGRESSION OF POC, D/C PLANNING,   Family Acceptance, E, VU by DC at 9/2/2024 1550                                         User Key       Initials Effective Dates Name Provider Type Discipline    CD 02/03/23 -  Magdalene Duke PT Physical Therapist PT    ND 11/16/23 -  Lindsay Rowe PT Physical Therapist PT    [place] 03/25/24 -  Gloria Kelly RN Registered Nurse Nurse                  PT Recommendation and Plan     Plan of Care Reviewed With: patient  Progress: improving  Outcome Evaluation: Pt able to ambulate short distance with RWx and CGA with chair follow due to fatigue and FIELDS. Pt would benefit from continued skilled therapy to address activity tolerance and FIELDS and facilitate return to baseline. Recommend home with 24/7 and HHPT.     Time Calculation:         PT Charges       Row Name 09/03/24 1123             Time Calculation    Start Time 1044  -ND      PT Received On 09/03/24  -ND         Timed Charges    81435 - PT Therapeutic Activity Minutes 26  -ND         Total Minutes    Timed Charges Total Minutes 26  -ND       Total Minutes 26  -ND                User Key  (r) = Recorded By, (t) = Taken By, (c) = Cosigned By      Initials Name Provider Type    ND Lindsay Rowe, PT Physical Therapist                  Therapy Charges for Today       Code Description Service Date Service Provider Modifiers Qty    [id number] HC PT THERAPEUTIC ACT EA 15 MIN 9/3/2024 Lindsay Rowe, PT GP 2            PT G-Codes  Outcome Measure Options: AM-PAC 6 Clicks Basic Mobility  (PT)  AM-PAC 6 Clicks Score (PT): 17  AM-PAC 6 Clicks Score (OT): 16  PT Discharge Summary  Anticipated Discharge Disposition (PT): home with 24/7 care, home with home health    Lindsay Rowe, PT  9/3/2024

## 2024-09-03 NOTE — CASE MANAGEMENT/SOCIAL WORK
Continued Stay Note  [facility]     Patient Name: Avelina Ceron  MRN: [id number]  Today's Date: 9/3/2024    Admit Date: 8/26/2024    Plan: Home   Discharge Plan       Row Name 09/03/24 1422       Plan    Plan Home    Patient/Family in Agreement with Plan yes    Plan Comments Discussed in MDR. Ms. Ceron is not being discharged today, possibly tomorrow, 9/4/24. Called her daughter Clary, to discuss home health for PT, no answer. Will attempt later. CM will continue to follow.    Final Discharge Disposition Code 01 - home or self-care                   Discharge Codes    No documentation.                 Expected Discharge Date and Time       Expected Discharge Date Expected Discharge Time    Sep 6, 2024               Dinora Joseph RN

## 2024-09-03 NOTE — PROGRESS NOTES
[facility] Medicine Services  PROGRESS NOTE    Patient Name: Avelina Ceron  : 1940  MRN: [id number]    Date of Admission: 2024  Primary Care Physician: Dre Moura MD    Subjective   Subjective     CC: f/u copd    HPI: Patient doing ok. She remained on 5L upon entering but was able to wean her to 4L while in room. Patient reports she doesn't feel quite ready to go yet as she doesn't want to be right back in the hospital. Otherwise she reports she overall feels much better.     ROS  As above       Objective   Objective     Vital Signs:   Temp:  [98.1 °F (36.7 °C)-99.1 °F (37.3 °C)] 99.1 °F (37.3 °C)  Heart Rate:  [] 89  Resp:  [16-18] 18  BP: (123-157)/(43-65) 124/47  Flow (L/min):  [4-5] 4     Physical Exam:  Constitutional: No acute distress, awake, alert  HENT: NCAT, mucous membranes moist, 5L o2 but weaned to 4L while in room   Respiratory: Clear to auscultation bilaterally, respiratory effort normal   Cardiovascular: RRR, on tele   Musculoskeletal: No bilateral ankle edema  Psychiatric: Appropriate affect, cooperative  Neurologic: Oriented x 3, strength symmetric in all extremities, Cranial Nerves grossly intact to confrontation, speech clear  Skin: No rashes     Results Reviewed:  LAB RESULTS:      Lab 24  0725 24  0830 24  1038 24  0124 24  0800   WBC 33.58* 36.11* 35.42* 30.95* 30.26*   HEMOGLOBIN 10.3* 11.1* 11.2* 10.6* 10.8*   HEMATOCRIT 32.8* 37.4 36.2 34.0 34.6   PLATELETS 379 443 405 433 437   NEUTROS ABS 24.85* 27.44* 27.35* 26.00* 24.51*   IMMATURE GRANS (ABS)  --   --  3.29*  --   --    LYMPHS ABS  --   --  2.49  --   --    MONOS ABS  --   --  2.17*  --   --    EOS ABS 0.34 0.36 0.08 0.00 0.00   .9* 105.1* 101.7* 101.5* 100.9*         Lab 24  0725 24  0830 24  2320 24  1038 24  0124 24  0759 24  2317 24  1321 24  0803   SODIUM 139 139  --  139 139 141  --   140 143   POTASSIUM 4.4 5.1 5.1 3.6 4.3 4.3   < > 3.4* 3.5   CHLORIDE 98 97*  --  98 93* 95*  --  92* 96*   CO2 35.0* 33.0*  --  31.0* 40.0* 39.0*  --  37.0* 41.0*   ANION GAP 6.0 9.0  --  10.0 6.0 7.0  --  11.0 6.0   BUN 9 14  --  13 9 8  --  7* 8   CREATININE 0.35* 0.36*  --  0.44* 0.34* 0.35*  --  0.33* 0.37*   EGFR 101.6 100.9  --  96.1 102.3 101.6  --  103.0 100.2   GLUCOSE 111* 93  --  133* 100* 105*  --  154* 113*   CALCIUM 9.2 9.4  --  9.4 9.3 9.0  --  9.3 9.7   MAGNESIUM 1.9  --   --  1.8  --   --   --  1.7  --    TSH  --   --   --   --   --   --   --   --  0.382    < > = values in this interval not displayed.         Lab 08/30/24  0124 08/28/24  0803   TOTAL PROTEIN 6.4 6.7   ALBUMIN 3.1* 3.3*   GLOBULIN 3.3 3.4   ALT (SGPT) 36* 55*   AST (SGOT) 24 75*   BILIRUBIN 0.4 0.4   ALK PHOS 90 109         Lab 08/30/24  0124 08/28/24  1421 08/28/24  1321   PROBNP 2,318.0*  --   --    HSTROP T  --  37* 45*                   Brief Urine Lab Results  (Last result in the past 365 days)        Color   Clarity   Blood   Leuk Est   Nitrite   Protein   CREAT   Urine HCG        09/01/24 1228 Yellow   Cloudy   Large (3+)   Negative   Negative   Trace                   Microbiology Results Abnormal       Procedure Component Value - Date/Time    S. Pneumo Ag Urine or CSF - Urine, Urine, Clean Catch [[id number]]  (Normal) Collected: 09/01/24 1228    Lab Status: Final result Specimen: Urine, Clean Catch Updated: 09/01/24 2113     Strep Pneumo Ag Negative    Legionella Antigen, Urine - Urine, Urine, Clean Catch [[id number]]  (Normal) Collected: 09/01/24 1228    Lab Status: Final result Specimen: Urine, Clean Catch Updated: 09/01/24 2112     LEGIONELLA ANTIGEN, URINE Negative    Respiratory Panel PCR w/COVID-19(SARS-CoV-2) NASIR/ELIAS/RHIANNA/PAD/COR/FELIPE In-House, NP Swab in UTM/VTM, 2 HR TAT - Swab, Nasopharynx [[id number]]  (Normal) Collected: 09/01/24 1227    Lab Status: Final result Specimen: Swab from Nasopharynx Updated: 09/01/24 1350      ADENOVIRUS, PCR Not Detected     Coronavirus 229E Not Detected     Coronavirus HKU1 Not Detected     Coronavirus NL63 Not Detected     Coronavirus OC43 Not Detected     COVID19 Not Detected     Human Metapneumovirus Not Detected     Human Rhinovirus/Enterovirus Not Detected     Influenza A PCR Not Detected     Influenza B PCR Not Detected     Parainfluenza Virus 1 Not Detected     Parainfluenza Virus 2 Not Detected     Parainfluenza Virus 3 Not Detected     Parainfluenza Virus 4 Not Detected     RSV, PCR Not Detected     Bordetella pertussis pcr Not Detected     Bordetella parapertussis PCR Not Detected     Chlamydophila pneumoniae PCR Not Detected     Mycoplasma pneumo by PCR Not Detected    Narrative:      In the setting of a positive respiratory panel with a viral infection PLUS a negative procalcitonin without other underlying concern for bacterial infection, consider observing off antibiotics or discontinuation of antibiotics and continue supportive care. If the respiratory panel is positive for atypical bacterial infection (Bordetella pertussis, Chlamydophila pneumoniae, or Mycoplasma pneumoniae), consider antibiotic de-escalation to target atypical bacterial infection.    Blood Culture - Blood, Hand, Left [[id number]]  (Normal) Collected: 08/26/24 1530    Lab Status: Final result Specimen: Blood from Hand, Left Updated: 08/31/24 1700     Blood Culture No growth at 5 days    Blood Culture - Blood, Arm, Right [[id number]]  (Normal) Collected: 08/26/24 1630    Lab Status: Final result Specimen: Blood from Arm, Right Updated: 08/31/24 1700     Blood Culture No growth at 5 days    COVID PRE-OP / PRE-PROCEDURE SCREENING ORDER (NO ISOLATION) - Swab, Nasopharynx [[id number]]  (Normal) Collected: 08/26/24 1441    Lab Status: Final result Specimen: Swab from Nasopharynx Updated: 08/26/24 1519    Narrative:      The following orders were created for panel order COVID PRE-OP / PRE-PROCEDURE SCREENING ORDER (NO  ISOLATION) - Swab, Nasopharynx.  Procedure                               Abnormality         Status                     ---------                               -----------         ------                     COVID-19 and FLU A/B PCR...[[id number]]  Normal              Final result                 Please view results for these tests on the individual orders.    COVID-19 and FLU A/B PCR, 1 HR TAT - Swab, Nasopharynx [[id number]]  (Normal) Collected: 08/26/24 1441    Lab Status: Final result Specimen: Swab from Nasopharynx Updated: 08/26/24 1519     COVID19 Not Detected     Influenza A PCR Not Detected     Influenza B PCR Not Detected    Narrative:      Fact sheet for providers: https://www.fda.gov/media/880358/download    Fact sheet for patients: https://www.fda.gov/media/836900/download    Test performed by PCR.            No radiology results from the last 24 hrs    Results for orders placed during the hospital encounter of 08/26/24    Adult Transthoracic Echo Limited W/ Cont if Necessary Per Protocol    Interpretation Summary    Left ventricular systolic function is normal. Calculated left ventricular EF = 64%    Moderate tricuspid valve regurgitation is present.    Estimated right ventricular systolic pressure from tricuspid regurgitation is moderately elevated (45-55 mmHg).    Mild MR      Current medications:  Scheduled Meds:apixaban, 5 mg, Oral, Q12H  atorvastatin, 20 mg, Oral, Nightly  clopidogrel, 75 mg, Oral, Daily  digoxin, 250 mcg, Intravenous, Once  dofetilide, 500 mcg, Oral, Once   Followed by  dofetilide, 250 mcg, Oral, Q12H  gabapentin, 300 mg, Oral, Nightly  guaiFENesin, 1,200 mg, Oral, Q12H  ipratropium-albuterol, 3 mL, Nebulization, 4x Daily - RT  isosorbide mononitrate, 30 mg, Oral, Daily  levothyroxine, 200 mcg, Oral, Daily  losartan, 50 mg, Oral, Q24H  [Held by provider] nebivolol, 2.5 mg, Oral, Q24H  nitroglycerin, 1 inch, Topical, Q6H  nystatin, 1 Application, Topical, Q12H  pantoprazole, 40 mg,  Oral, Daily  roflumilast, 500 mcg, Oral, Daily  sertraline, 100 mg, Oral, Daily  sodium chloride, 10 mL, Intravenous, Q12H  traMADol, 50 mg, Oral, Q6H      Continuous Infusions:   PRN Meds:.  senna-docusate sodium **AND** polyethylene glycol **AND** bisacodyl **AND** bisacodyl    Calcium Replacement - Follow Nurse / BPA Driven Protocol    ipratropium-albuterol    Magnesium Standard Dose Replacement - Follow Nurse / BPA Driven Protocol    nitroglycerin    ondansetron    Pharmacy Consult    Phosphorus Replacement - Follow Nurse / BPA Driven Protocol    Potassium Replacement - Follow Nurse / BPA Driven Protocol    sodium chloride    sodium chloride    sodium chloride    Assessment & Plan   Assessment & Plan     Active Hospital Problems    Diagnosis  POA    **Acute hypoxemic respiratory failure [J96.01]  Yes    Depression [F32.A]  Yes    Hyperlipidemia [E78.5]  Yes    Hypertension [I10]  Yes    Hypothyroidism [E03.9]  Yes      Resolved Hospital Problems   No resolved problems to display.        Brief Hospital Course to date:  Avelina Ceron is a 83-year-old female with history of chronic respiratory failure on 2 L NC, COPD, CAD, HTN, hypothyroidism, hyperlipidemia who presented to PCP with 1 week of  worsening cough, wheezing and fever, decreased p.o. intake and congestion.  Found to have with acute on chronic respiratory failure, with COPD exacerbation and pneumonia.     Acute on chronic respiratory failure with hypoxia  Multifocal PNA on CT imaging  COPD, likely exacerbation  Leukocytosis  -Patient with O2 sats of mid 80s at PCP, requiring 6 L NC (2 L NC at baseline). Currently on 5L o2 - as I discussed with her and her daughter on phone this is still a little too high for her to be discharged safely.   -Respiratory PCR with COVID is negative, follow-up blood cultures (NGTD)  -Follow-up urine Ag, sputum cx (unlikely to produce sample given dry cough)  -CTA imaging obtained 8/28 due to worsening respiratory status and  to r/o PE- noted with multifocal PNA with repeat CT recommended in short term to ensure resolution. No PE noted   -ID has seen - favoring monitoring off abx given she has received 7 day course. Doing well this am, WBC down.  -Increase pulmonary toilet with mucinex, flutter valve, hypertonic nebs.     New onset afib RVR  -Patient denies past history. Cardiology consulted given new onset  -Tikosyn initiated, continue eliquis   -Bystolic held bc had blocked PACs and bradycardia after 1 dose  -ECHO done this admission with normal EF, mild VHD     HTN-Losartan; s/p Diamox x 1 per cards  Hypothyroidism-Continue Synthroid; TSH nl   Depression-Continue Zoloft     CAD  PAD  Hyperlipidemia  -Continue aspirin, Plavix, statin     Chronic back pain-Continue tramadol  Groin yeast infection-Continue statin    Expected Discharge Location and Transportation: home with 24/7 care, hopefully tomorrow if able to be weaned and stable on 3-4 L  Expected Discharge   Expected Discharge Date: 9/6/2024; Expected Discharge Time:      VTE Prophylaxis:  Pharmacologic & mechanical VTE prophylaxis orders are present.         AM-PAC 6 Clicks Score (PT): 17 (09/03/24 1123)    CODE STATUS:   Code Status and Medical Interventions: CPR (Attempt to Resuscitate); Full Support   Ordered at: 08/26/24 1701     Level Of Support Discussed With:    Patient     Code Status (Patient has no pulse and is not breathing):    CPR (Attempt to Resuscitate)     Medical Interventions (Patient has pulse or is breathing):    Full Support       Beena Gray MD  09/03/24

## 2024-09-03 NOTE — PLAN OF CARE
Goal Outcome Evaluation:              Outcome Evaluation: Patient with new onset of Afib RVR, cardiology is following the patient. Currently on 4L of oxygen per nasal cannula, the patient wears 2L at home. Goal is to get the patient back to baseline of 2L per nasal cannula.

## 2024-09-03 NOTE — PLAN OF CARE
Goal Outcome Evaluation:  Plan of Care Reviewed With: patient        Progress: improving  Outcome Evaluation: Pt able to ambulate short distance with RWx and CGA with chair follow due to fatigue and FIELDS. Pt would benefit from continued skilled therapy to address activity tolerance and FIELDS and facilitate return to baseline. Recommend home with 24/7 and HHPT.      Anticipated Discharge Disposition (PT): home with 24/7 care, home with home health

## 2024-09-04 ENCOUNTER — READMISSION MANAGEMENT (OUTPATIENT)
Dept: CALL CENTER | Facility: HOSPITAL | Age: 84
End: 2024-09-04
Payer: MEDICARE

## 2024-09-04 VITALS
OXYGEN SATURATION: 93 % | SYSTOLIC BLOOD PRESSURE: 131 MMHG | TEMPERATURE: 98.4 F | HEART RATE: 67 BPM | DIASTOLIC BLOOD PRESSURE: 54 MMHG | WEIGHT: 160 LBS | HEIGHT: 63 IN | BODY MASS INDEX: 28.35 KG/M2 | RESPIRATION RATE: 18 BRPM

## 2024-09-04 LAB
ANION GAP SERPL CALCULATED.3IONS-SCNC: 8 MMOL/L (ref 5–15)
BUN SERPL-MCNC: 7 MG/DL (ref 8–23)
BUN/CREAT SERPL: 19.4 (ref 7–25)
CALCIUM SPEC-SCNC: 9.2 MG/DL (ref 8.6–10.5)
CHLORIDE SERPL-SCNC: 95 MMOL/L (ref 98–107)
CO2 SERPL-SCNC: 36 MMOL/L (ref 22–29)
CREAT SERPL-MCNC: 0.36 MG/DL (ref 0.57–1)
EGFRCR SERPLBLD CKD-EPI 2021: 100.9 ML/MIN/1.73
GLUCOSE SERPL-MCNC: 93 MG/DL (ref 65–99)
MAGNESIUM SERPL-MCNC: 1.9 MG/DL (ref 1.6–2.4)
POTASSIUM SERPL-SCNC: 4.6 MMOL/L (ref 3.5–5.2)
SODIUM SERPL-SCNC: 139 MMOL/L (ref 136–145)

## 2024-09-04 PROCEDURE — 83735 ASSAY OF MAGNESIUM: CPT

## 2024-09-04 PROCEDURE — 80048 BASIC METABOLIC PNL TOTAL CA: CPT

## 2024-09-04 PROCEDURE — 93010 ELECTROCARDIOGRAM REPORT: CPT | Performed by: INTERNAL MEDICINE

## 2024-09-04 PROCEDURE — 94799 UNLISTED PULMONARY SVC/PX: CPT

## 2024-09-04 PROCEDURE — 94664 DEMO&/EVAL PT USE INHALER: CPT

## 2024-09-04 PROCEDURE — 99239 HOSP IP/OBS DSCHRG MGMT >30: CPT | Performed by: INTERNAL MEDICINE

## 2024-09-04 PROCEDURE — 93005 ELECTROCARDIOGRAM TRACING: CPT | Performed by: INTERNAL MEDICINE

## 2024-09-04 PROCEDURE — 99232 SBSQ HOSP IP/OBS MODERATE 35: CPT | Performed by: INTERNAL MEDICINE

## 2024-09-04 RX ORDER — DOFETILIDE 0.25 MG/1
250 CAPSULE ORAL 2 TIMES DAILY
Qty: 60 CAPSULE | Refills: 2 | Status: SHIPPED | OUTPATIENT
Start: 2024-09-04 | End: 2024-12-03

## 2024-09-04 RX ORDER — LOSARTAN POTASSIUM 50 MG/1
50 TABLET ORAL
Qty: 30 TABLET | Refills: 2 | Status: SHIPPED | OUTPATIENT
Start: 2024-09-05

## 2024-09-04 RX ORDER — DOFETILIDE 0.25 MG/1
250 CAPSULE ORAL 2 TIMES DAILY
Qty: 60 CAPSULE | Refills: 2 | Status: SHIPPED | OUTPATIENT
Start: 2024-09-04 | End: 2024-09-04

## 2024-09-04 RX ORDER — LOSARTAN POTASSIUM 50 MG/1
50 TABLET ORAL
Qty: 30 TABLET | Refills: 2 | Status: SHIPPED | OUTPATIENT
Start: 2024-09-05 | End: 2024-09-04

## 2024-09-04 RX ADMIN — GUAIFENESIN 1200 MG: 600 TABLET, EXTENDED RELEASE ORAL at 08:23

## 2024-09-04 RX ADMIN — APIXABAN 5 MG: 5 TABLET, FILM COATED ORAL at 08:22

## 2024-09-04 RX ADMIN — TRAMADOL HYDROCHLORIDE 50 MG: 50 TABLET ORAL at 08:23

## 2024-09-04 RX ADMIN — CLOPIDOGREL BISULFATE 75 MG: 75 TABLET ORAL at 08:23

## 2024-09-04 RX ADMIN — LOSARTAN POTASSIUM 50 MG: 50 TABLET, FILM COATED ORAL at 08:23

## 2024-09-04 RX ADMIN — IPRATROPIUM BROMIDE AND ALBUTEROL SULFATE 3 ML: 2.5; .5 SOLUTION RESPIRATORY (INHALATION) at 08:16

## 2024-09-04 RX ADMIN — ISOSORBIDE MONONITRATE 30 MG: 30 TABLET, EXTENDED RELEASE ORAL at 08:22

## 2024-09-04 RX ADMIN — ROFLUMILAST 500 MCG: 500 TABLET ORAL at 08:23

## 2024-09-04 RX ADMIN — TRAMADOL HYDROCHLORIDE 50 MG: 50 TABLET ORAL at 01:07

## 2024-09-04 RX ADMIN — PANTOPRAZOLE SODIUM 40 MG: 40 TABLET, DELAYED RELEASE ORAL at 08:23

## 2024-09-04 RX ADMIN — DOFETILIDE 250 MCG: 0.25 CAPSULE ORAL at 08:23

## 2024-09-04 RX ADMIN — NITROGLYCERIN 1 INCH: 20 OINTMENT TOPICAL at 05:26

## 2024-09-04 RX ADMIN — IPRATROPIUM BROMIDE AND ALBUTEROL SULFATE 3 ML: 2.5; .5 SOLUTION RESPIRATORY (INHALATION) at 12:06

## 2024-09-04 RX ADMIN — NYSTATIN 1 APPLICATION: 100000 CREAM TOPICAL at 08:22

## 2024-09-04 RX ADMIN — SERTRALINE 100 MG: 100 TABLET, FILM COATED ORAL at 08:23

## 2024-09-04 RX ADMIN — LEVOTHYROXINE SODIUM 200 MCG: 0.1 TABLET ORAL at 05:25

## 2024-09-04 NOTE — PROGRESS NOTES
[facility]  IP Progress Note      Chief Complaint/Reason for service: Paroxysmal atrial fibrillation with RVR #2 elevated BNP    Subjective   Subjective: The patient is awake and alert.  She states she feels well.  No chest pain or shortness of breath.  She would like to go home today    Past medical, surgical, social and family history reviewed in the patient's electronic medical record.    Objective     Vital Sign Min/Max for last 24 hours  Temp  Min: 98 °F (36.7 °C)  Max: 99.2 °F (37.3 °C)   BP  Min: 113/43  Max: 140/52   Pulse  Min: 48  Max: 113   Resp  Min: 16  Max: 20   SpO2  Min: 90 %  Max: 99 %   Flow (L/min)  Min: 2  Max: 4      Intake/Output Summary (Last 24 hours) at 9/4/2024 0732  Last data filed at 9/4/2024 0500  Gross per 24 hour   Intake 150 ml   Output 800 ml   Net -650 ml             Current Facility-Administered Medications:     apixaban (ELIQUIS) tablet 5 mg, 5 mg, Oral, Q12H, Que Herr MD, 5 mg at 09/03/24 2017    atorvastatin (LIPITOR) tablet 20 mg, 20 mg, Oral, Nightly, Yara Daly MD, 20 mg at 09/03/24 2017    sennosides-docusate (PERICOLACE) 8.6-50 MG per tablet 2 tablet, 2 tablet, Oral, BID PRN, 2 tablet at 08/31/24 1805 **AND** polyethylene glycol (MIRALAX) packet 17 g, 17 g, Oral, Daily PRN **AND** bisacodyl (DULCOLAX) EC tablet 5 mg, 5 mg, Oral, Daily PRN **AND** bisacodyl (DULCOLAX) suppository 10 mg, 10 mg, Rectal, Daily PRN, Yara Daly MD    Calcium Replacement - Follow Nurse / BPA Driven Protocol, , Does not apply, PRN, Que Herr MD    clopidogrel (PLAVIX) tablet 75 mg, 75 mg, Oral, Daily, Yara Daly MD, 75 mg at 09/03/24 0849    digoxin (LANOXIN) injection 250 mcg, 250 mcg, Intravenous, Once, Cristel James APRN    [COMPLETED] dofetilide (TIKOSYN) capsule 500 mcg, 500 mcg, Oral, Once, 500 mcg at 08/28/24 1821 **FOLLOWED BY** [COMPLETED] dofetilide (TIKOSYN) capsule 500 mcg, 500 mcg, Oral, Once, 500 mcg  at 08/29/24 0408 **FOLLOWED BY** [COMPLETED] dofetilide (TIKOSYN) capsule 500 mcg, 500 mcg, Oral, Once, 500 mcg at 08/29/24 1407 **FOLLOWED BY** dofetilide (TIKOSYN) capsule 500 mcg, 500 mcg, Oral, Once **FOLLOWED BY** dofetilide (TIKOSYN) capsule 250 mcg, 250 mcg, Oral, Q12H, Gerald Edwards PA-C, 250 mcg at 09/03/24 2017    gabapentin (NEURONTIN) capsule 300 mg, 300 mg, Oral, Nightly, Yara Daly MD, 300 mg at 09/03/24 2017    guaiFENesin (MUCINEX) 12 hr tablet 1,200 mg, 1,200 mg, Oral, Q12H, Rohini Dejesus II, DO, 1,200 mg at 09/03/24 2017    ipratropium-albuterol (DUO-NEB) nebulizer solution 3 mL, 3 mL, Nebulization, 4x Daily - RT, Yara Daly MD, 3 mL at 09/03/24 1850    ipratropium-albuterol (DUO-NEB) nebulizer solution 3 mL, 3 mL, Nebulization, Q4H PRN, Martina Chiang APRN    isosorbide mononitrate (IMDUR) 24 hr tablet 30 mg, 30 mg, Oral, Daily, Yara Daly MD, 30 mg at 09/03/24 0848    levothyroxine (SYNTHROID, LEVOTHROID) tablet 200 mcg, 200 mcg, Oral, Daily, Yara Daly MD, 200 mcg at 09/04/24 0525    losartan (COZAAR) tablet 50 mg, 50 mg, Oral, Q24H, Que Herr MD, 50 mg at 08/31/24 0858    Magnesium Standard Dose Replacement - Follow Nurse / BPA Driven Protocol, , Does not apply, PRNMalika Wycliffe, MD    [Held by provider] nebivolol (BYSTOLIC) tablet 2.5 mg, 2.5 mg, Oral, Q24H, Que Herr MD, 2.5 mg at 08/30/24 0810    nitroglycerin (NITROSTAT) SL tablet 0.4 mg, 0.4 mg, Sublingual, Q5 Min PRN, Que Herr MD    nystatin (MYCOSTATIN) 474379 UNIT/GM cream 1 Application, 1 Application, Topical, Q12H, Yara Daly MD, 1 Application at 09/03/24 2019    ondansetron (ZOFRAN) injection 4 mg, 4 mg, Intravenous, Q6H PRN, Yara Daly MD    pantoprazole (PROTONIX) EC tablet 40 mg, 40 mg, Oral, Daily, Yara Daly MD, 40 mg at 09/03/24 0848    Pharmacy Consult, , Does not apply, Once PRN, Que Herr MD    Phosphorus Replacement - Follow Nurse  / BPA Driven Protocol, , Does not apply, Nemesio JEFF Robert Todd, MD    Potassium Replacement - Follow Nurse / BPA Driven Protocol, , Does not apply, Nemesio JEFF Robert Todd, MD    roflumilast (DALIRESP) tablet 500 mcg, 500 mcg, Oral, Daily, Yara Daly MD, 500 mcg at 09/03/24 0847    sertraline (ZOLOFT) tablet 100 mg, 100 mg, Oral, Daily, Yara Daly MD, 100 mg at 09/03/24 0848    sodium chloride 0.9 % flush 10 mL, 10 mL, Intravenous, PRN, Kendall Smyth MD    sodium chloride 0.9 % flush 10 mL, 10 mL, Intravenous, Q12H, Yara Daly MD, 10 mL at 09/03/24 2018    sodium chloride 0.9 % flush 10 mL, 10 mL, Intravenous, PRN, Yara Daly MD    sodium chloride 0.9 % infusion 40 mL, 40 mL, Intravenous, PRN, Yara Daly MD    traMADol (ULTRAM) tablet 50 mg, 50 mg, Oral, Q6H, Beena Gray MD, 50 mg at 09/04/24 0107    Physical Exam: General Very pleasant elderly female in bed 60 degree angle not dyspneic nontachypneic        HEENT: No JVP.  No icterus       Respiratory: Equal bilateral symmetrical expansion with no repeat wheezing or rhonchi       Cardiovascular: Regular rate and rhythm with occasional ectopic and a grade 2-3 over systolic ejection murmur       GI: Soft       Lower Extremities: No lesions       Neuro: Facial expressions are symmetrical moves all 4 extremities       Skin: Warm and dry with no edema       Psych: Pleasant affect    Results Review: Output exceeds intake by 7.1 L.  Heart rate 77-1 13.  Blood pressures 1 13-1 40.  Blood work is not available today for review.  Yesterday's GFR was 104 hemoglobin was 10    Radiology Results:  Imaging Results (Last 72 Hours)       ** No results found for the last 72 hours. **            EKG: Sinus rhythm QTc 441.  Low voltage.  Nonspecific ST abnormalities    ECHO: Normal wall motion EF    Tele: Still has occasional PAF RVR.  Has some bradycardia during sleeping hours.    Assessment   Assessment/Plan: Paroxysmal atrial fibrillation  with RVR.  The patient is on Tikosyn and tolerating it.  QTc is 441..  Continue Tikosyn and Eliquis.  The patient was on a beta-blocker but was having some bradycardia so it was discontinued.  The patient can be discharged home from my point of view.  She should follow-up with her primary cardiologist 2 to 4 weeks  2 elevated BNP-clinically the patient has no heart failure  3 DC Nitropaste    Que Herr MD  09/04/24  07:32 EDT

## 2024-09-04 NOTE — DISCHARGE SUMMARY
[facility] Medicine Services  DISCHARGE SUMMARY    Patient Name: Avelina Ceron  : 1940  MRN: [id number]    Date of Admission: 2024  2:35 PM  Date of Discharge:  24  Primary Care Physician: Dre Moura MD    Consults       Date and Time Order Name Status Description    2024  9:24 AM Inpatient Infectious Diseases Consult Completed     2024  4:17 AM Inpatient Cardiology Consult Completed             Hospital Course     Presenting Problem: acute hypoxic RF     Active Hospital Problems    Diagnosis  POA    **Acute hypoxemic respiratory failure [J96.01]  Yes    Depression [F32.A]  Yes    Hyperlipidemia [E78.5]  Yes    Hypertension [I10]  Yes    Hypothyroidism [E03.9]  Yes      Resolved Hospital Problems   No resolved problems to display.          Hospital Course:  Avelina Ceron is a 83-year-old female with history of chronic respiratory failure on 2 L NC, COPD, CAD, HTN, hypothyroidism, hyperlipidemia who presented to PCP with 1 week of  worsening cough, wheezing and fever, decreased p.o. intake and congestion.  Found to have with acute on chronic respiratory failure, with COPD exacerbation and pneumonia. Below is a summary of hospital course, patient was discharged to home in stable condition 24     Acute on chronic respiratory failure with hypoxia  Multifocal PNA on CT imaging  COPD, likely exacerbation  Leukocytosis  -Patient with O2 sats of mid 80s at PCP, requiring 6 L NC (2 L NC at baseline). Ultimately, she was dc home when she was able to be weaned to 3-4L consistently. She was doing well on this level of oxygen   -Respiratory PCR with COVID is negative, follow-up blood cultures (NGTD)  -Follow-up urine Ag, sputum cx (unlikely to produce sample given dry cough)  -CTA imaging obtained  due to worsening respiratory status and to r/o PE- noted with multifocal PNA with repeat CT recommended in short term to ensure resolution. No PE noted   -ID has  seen - favoring monitoring off abx given she has received 7 day course. Doing well without abx, was dc home after completing course.   -Increase pulmonary toilet with mucinex, flutter valve, hypertonic nebs.     New onset afib RVR  -Patient denies past history. Cardiology consulted given new onset  -Tikosyn initiated, continue eliquis . Patient will be dc home with tikosyn and eliquis.   -Bystolic held bc had blocked PACs and bradycardia after 1 dose. Bblocker dc for future and patient not discharged on Bblocker   -ECHO done this admission with normal EF, mild VHD     HTN-Losartan; s/p Diamox x 1 per cards  Hypothyroidism-Continue Synthroid; TSH nl   Depression-Continue Zoloft     CAD  PAD  Hyperlipidemia  -Continue aspirin, Plavix, statin     Chronic back pain-Continue tramadol  Groin yeast infection-Continue statin      Discharge Follow Up Recommendations for outpatient labs/diagnostics:   PCP 1-2 weeks- Patient will need repeat CT scan in next month or so to ensure resolution of multifocal PNA  Primary cardiology in 2-4 weeks    Day of Discharge     HPI:   Doing well. On 3L. Would like to go home today.     Review of Systems  As above     Vital Signs:   Temp:  [98 °F (36.7 °C)-99.2 °F (37.3 °C)] 98.2 °F (36.8 °C)  Heart Rate:  [] 77  Resp:  [16-20] 18  BP: (113-153)/(43-81) 153/53  Flow (L/min):  [2-4] 2.5      Physical Exam:  Constitutional: No acute distress, awake, alert  HENT: NCAT, mucous membranes moist, doing well on 3L NC  Respiratory: Clear to auscultation bilaterally, respiratory effort normal   Cardiovascular: RRR, on tele   Musculoskeletal: No bilateral ankle edema  Psychiatric: Appropriate affect, cooperative  Neurologic: Oriented x 3, strength symmetric in all extremities, Cranial Nerves grossly intact to confrontation, speech clear  Skin: No rashes       Pertinent  and/or Most Recent Results     LAB RESULTS:      Lab 09/03/24  1206 09/02/24  0725 09/01/24  0830 08/31/24  1038 08/30/24  0124    WBC 26.88* 33.58* 36.11* 35.42* 30.95*   HEMOGLOBIN 10.2* 10.3* 11.1* 11.2* 10.6*   HEMATOCRIT 32.5* 32.8* 37.4 36.2 34.0   PLATELETS 360 379 443 405 433   NEUTROS ABS 21.04* 24.85* 27.44* 27.35* 26.00*   IMMATURE GRANS (ABS) 1.58*  --   --  3.29*  --    LYMPHS ABS 2.25  --   --  2.49  --    MONOS ABS 1.70*  --   --  2.17*  --    EOS ABS 0.18 0.34 0.36 0.08 0.00   .6* 100.9* 105.1* 101.7* 101.5*         Lab 09/04/24  0740 09/03/24  1206 09/02/24  0725 09/01/24  0830 08/31/24  2320 08/31/24  1038 08/28/24  2317 08/28/24  1321   SODIUM 139 139 139 139  --  139   < > 140   POTASSIUM 4.6 4.0 4.4 5.1 5.1 3.6   < > 3.4*   CHLORIDE 95* 97* 98 97*  --  98   < > 92*   CO2 36.0* 33.0* 35.0* 33.0*  --  31.0*   < > 37.0*   ANION GAP 8.0 9.0 6.0 9.0  --  10.0   < > 11.0   BUN 7* 6* 9 14  --  13   < > 7*   CREATININE 0.36* 0.35* 0.35* 0.36*  --  0.44*   < > 0.33*   EGFR 100.9 101.6 101.6 100.9  --  96.1   < > 103.0   GLUCOSE 93 159* 111* 93  --  133*   < > 154*   CALCIUM 9.2 9.0 9.2 9.4  --  9.4   < > 9.3   MAGNESIUM 1.9  --  1.9  --   --  1.8  --  1.7    < > = values in this interval not displayed.         Lab 09/03/24  1206 08/30/24  0124   TOTAL PROTEIN 5.7* 6.4   ALBUMIN 3.2* 3.1*   GLOBULIN 2.5 3.3   ALT (SGPT) 33 36*   AST (SGOT) 19 24   BILIRUBIN 0.3 0.4   ALK PHOS 68 90         Lab 08/30/24  0124 08/28/24  1421 08/28/24  1321   PROBNP 2,318.0*  --   --    HSTROP T  --  37* 45*                 Brief Urine Lab Results  (Last result in the past 365 days)        Color   Clarity   Blood   Leuk Est   Nitrite   Protein   CREAT   Urine HCG        09/01/24 1228 Yellow   Cloudy   Large (3+)   Negative   Negative   Trace                 Microbiology Results (last 10 days)       Procedure Component Value - Date/Time    S. Pneumo Ag Urine or CSF - Urine, Urine, Clean Catch [[id number]]  (Normal) Collected: 09/01/24 1228    Lab Status: Final result Specimen: Urine, Clean Catch Updated: 09/01/24 2113     Strep Pneumo Ag Negative     Legionella Antigen, Urine - Urine, Urine, Clean Catch [[id number]]  (Normal) Collected: 09/01/24 1228    Lab Status: Final result Specimen: Urine, Clean Catch Updated: 09/01/24 2112     LEGIONELLA ANTIGEN, URINE Negative    Respiratory Panel PCR w/COVID-19(SARS-CoV-2) NASIR/ELIAS/RHIANNA/PAD/COR/FELIPE In-House, NP Swab in UTM/VTM, 2 HR TAT - Swab, Nasopharynx [[id number]]  (Normal) Collected: 09/01/24 1227    Lab Status: Final result Specimen: Swab from Nasopharynx Updated: 09/01/24 1350     ADENOVIRUS, PCR Not Detected     Coronavirus 229E Not Detected     Coronavirus HKU1 Not Detected     Coronavirus NL63 Not Detected     Coronavirus OC43 Not Detected     COVID19 Not Detected     Human Metapneumovirus Not Detected     Human Rhinovirus/Enterovirus Not Detected     Influenza A PCR Not Detected     Influenza B PCR Not Detected     Parainfluenza Virus 1 Not Detected     Parainfluenza Virus 2 Not Detected     Parainfluenza Virus 3 Not Detected     Parainfluenza Virus 4 Not Detected     RSV, PCR Not Detected     Bordetella pertussis pcr Not Detected     Bordetella parapertussis PCR Not Detected     Chlamydophila pneumoniae PCR Not Detected     Mycoplasma pneumo by PCR Not Detected    Narrative:      In the setting of a positive respiratory panel with a viral infection PLUS a negative procalcitonin without other underlying concern for bacterial infection, consider observing off antibiotics or discontinuation of antibiotics and continue supportive care. If the respiratory panel is positive for atypical bacterial infection (Bordetella pertussis, Chlamydophila pneumoniae, or Mycoplasma pneumoniae), consider antibiotic de-escalation to target atypical bacterial infection.    Blood Culture - Blood, Arm, Right [[id number]]  (Normal) Collected: 08/26/24 1630    Lab Status: Final result Specimen: Blood from Arm, Right Updated: 08/31/24 1700     Blood Culture No growth at 5 days    Blood Culture - Blood, Hand, Left [[id number]]  (Normal)  Collected: 08/26/24 1530    Lab Status: Final result Specimen: Blood from Hand, Left Updated: 08/31/24 1700     Blood Culture No growth at 5 days    COVID PRE-OP / PRE-PROCEDURE SCREENING ORDER (NO ISOLATION) - Swab, Nasopharynx [[id number]]  (Normal) Collected: 08/26/24 1441    Lab Status: Final result Specimen: Swab from Nasopharynx Updated: 08/26/24 1519    Narrative:      The following orders were created for panel order COVID PRE-OP / PRE-PROCEDURE SCREENING ORDER (NO ISOLATION) - Swab, Nasopharynx.  Procedure                               Abnormality         Status                     ---------                               -----------         ------                     COVID-19 and FLU A/B PCR...[[id number]]  Normal              Final result                 Please view results for these tests on the individual orders.    COVID-19 and FLU A/B PCR, 1 HR TAT - Swab, Nasopharynx [[id number]]  (Normal) Collected: 08/26/24 1441    Lab Status: Final result Specimen: Swab from Nasopharynx Updated: 08/26/24 1519     COVID19 Not Detected     Influenza A PCR Not Detected     Influenza B PCR Not Detected    Narrative:      Fact sheet for providers: https://www.fda.gov/media/919343/download    Fact sheet for patients: https://www.fda.gov/media/622316/download    Test performed by PCR.            XR Chest 1 View    Result Date: 8/31/2024  XR CHEST 1 VW Date of Exam: 8/31/2024 10:07 AM EDT Indication: hypoxia Comparison: CT 8/28/2024. Findings: Left-sided pleural effusion with adjacent volume loss appears similar in addition to some multifocal areas of consolidation also noted on CT, for example in the right upper lobe. No definite new focal opacity is present. There is no distinct pneumothorax. Unchanged heart and mediastinal contours.     Impression: Left-sided pleural effusion with adjacent volume loss appears similar in addition to some multifocal areas of consolidation also noted on CT, for example in the right upper  lobe. No definite new focal opacity is present. There is no distinct pneumothorax. Unchanged heart and mediastinal contours. Electronically Signed: Ramy Davison MD  8/31/2024 10:44 AM EDT  Workstation ID: [id number]    CT Angiogram Chest    Result Date: 8/28/2024  CT ANGIOGRAM CHEST Date of Exam: 8/28/2024 4:41 PM EDT Indication: hypoxia, worsening leukocytosis. Comparison: Chest CT dated 8/4/2020 Technique: CTA of the chest was performed after the uneventful intravenous administration of 80 mL Isovue-370. Reconstructed coronal and sagittal images were also obtained. In addition, a 3-D volume rendered image was created for interpretation. Automated exposure control and iterative reconstruction methods were used. FINDINGS: Thoracic inlet: Thyroid gland is diminutive or absent. There are surgical clips within the left lower neck. Pulmonary arteries: No filling defects are identified within the pulmonary arteries to suggest acute pulmonary embolism. Great vessels: Atherosclerotic plaque is seen within the thoracic aorta and proximal arch vessels. Mediastinum/Jnena: Scattered mildly enlarged mediastinal lymph nodes, possibly reactive. There is a large hiatal hernia. The esophagus is otherwise unremarkable. Lung parenchyma/pleura: Pulmonary emphysema. Patchy/nodular airspace disease is present within both lungs, most confluent within the inferior lingula. Appearance is concerning for multifocal pneumonia. There are small bilateral pleural effusions, left greater than right. There is bibasilar atelectasis. No pneumothorax is seen. Trachea and airways: The trachea and central airways appear unremarkable. Heart and pericardium: Coronary artery calcifications. Otherwise, the heart and pericardium appear unremarkable. Chest wall: No acute or suspicious osseous or soft tissue lesion is identified. Bones are demineralized. There are degenerative changes within the spine. Apparent chronic remodeling of the right proximal humerus.  Upper abdomen: No acute abnormality is identified within the visualized upper abdomen. Large hiatal hernia. Status post cholecystectomy.     1.No evidence of acute pulmonary embolism. 2.Bilateral multifocal pneumonia, most confluent within the inferior lingula. Recommend short interval CT follow-up to confirm resolution. 3.Small bilateral pleural effusions, left greater than right, with bibasilar atelectasis. 4.Pulmonary emphysema. Please correlate with patient's smoking history/risk factors to determine whether the patient meets criteria for routine lung cancer screening with low dose chest CT. 5.Hiatal hernia. Electronically Signed: Ramon Escobar MD  8/28/2024 5:49 PM EDT  Workstation ID: [id number]    Adult Transthoracic Echo Limited W/ Cont if Necessary Per Protocol    Result Date: 8/28/2024    Left ventricular systolic function is normal. Calculated left ventricular EF = 64%   Moderate tricuspid valve regurgitation is present.   Estimated right ventricular systolic pressure from tricuspid regurgitation is moderately elevated (45-55 mmHg).   Mild MR     XR Chest 1 View    Result Date: 8/26/2024  XR CHEST 1 VW Date of Exam: 8/26/2024 2:41 PM EDT Indication: SOA triage protocol Comparison: Chest radiograph 3/14/2024. Findings: Cardiomediastinal silhouette is unchanged. Patchy airspace disease in the left lower lobe with small left pleural effusion. Background of chronic/senescent changes of the lungs. No pneumothorax. Osseous structures are unchanged. Hiatal hernia.     Impression: Patchy airspace disease in the left lower lobe with small left pleural effusion, suspicious for pneumonia. Recommend imaging follow-up to resolution. Electronically Signed: Bandar Joseph MD  8/26/2024 2:57 PM EDT  Workstation ID: [id number]     Results for orders placed in visit on 11/01/18    SCANNED VASCULAR STUDIES      Results for orders placed in visit on 11/01/18    SCANNED VASCULAR STUDIES      Results for orders placed during the  hospital encounter of 08/26/24    Adult Transthoracic Echo Limited W/ Cont if Necessary Per Protocol    Interpretation Summary    Left ventricular systolic function is normal. Calculated left ventricular EF = 64%    Moderate tricuspid valve regurgitation is present.    Estimated right ventricular systolic pressure from tricuspid regurgitation is moderately elevated (45-55 mmHg).    Mild MR      Plan for Follow-up of Pending Labs/Results:     Discharge Details        Discharge Medications        New Medications        Instructions Start Date   apixaban 5 MG tablet tablet  Commonly known as: ELIQUIS   5 mg, Oral, Every 12 Hours Scheduled      dofetilide 250 MCG capsule  Commonly known as: TIKOSYN   250 mcg, Oral, 2 Times Daily      losartan 50 MG tablet  Commonly known as: COZAAR   50 mg, Oral, Every 24 Hours Scheduled   Start Date: September 5, 2024            Changes to Medications        Instructions Start Date   levothyroxine 200 MCG tablet  Commonly known as: SYNTHROID, LEVOTHROID  What changed: when to take this   200 mcg, Oral, Daily             Continue These Medications        Instructions Start Date   acetaminophen 500 MG tablet  Commonly known as: TYLENOL   500 mg, Oral, Every 6 Hours PRN, OTC      albuterol sulfate  (90 Base) MCG/ACT inhaler  Commonly known as: PROVENTIL HFA;VENTOLIN HFA;PROAIR HFA   2 puffs, Inhalation, Every 4 Hours PRN      aspirin 81 MG chewable tablet   81 mg, Oral, Daily, OTC      cetirizine 10 MG tablet  Commonly known as: zyrTEC   10 mg, Oral, Daily, OTC      clopidogrel 75 MG tablet  Commonly known as: PLAVIX   75 mg, Oral, Daily      Fiber Select Gummies chewable tablet   2 tablets, Oral, Daily, OTC      gabapentin 300 MG capsule  Commonly known as: NEURONTIN   300 mg, Oral, Nightly      isosorbide mononitrate 30 MG 24 hr tablet  Commonly known as: IMDUR   30 mg, Oral, Daily      latanoprost 0.005 % ophthalmic solution  Commonly known as: XALATAN   Administer 1 drop to  both eyes Every Night.      nitroglycerin 0.4 MG SL tablet  Commonly known as: NITROSTAT   PLACE 1 TABLET UNDER THE TONGUE AS NEEDED EVERY 5 MINUTES FOR CHEST PAIN. CALL 911 IF NO RELIEF AFTER 2ND DOSE      nystatin [id number] UNIT/GM cream  Commonly known as: MYCOSTATIN   1 Application, Topical, 4 Times Daily      ondansetron 4 MG tablet  Commonly known as: Zofran   4 mg, Oral, Every 8 Hours PRN      pantoprazole 40 MG EC tablet  Commonly known as: PROTONIX   40 mg, Oral, Daily      polyethylene glycol 17 GM/SCOOP powder  Commonly known as: MIRALAX   17 g, Oral, As Needed, OTC      PRESERVISION AREDS 2 PO   1 tablet, Oral, 2 Times Daily, OTC      roflumilast 500 MCG tablet tablet  Commonly known as: DALIRESP   500 mcg, Oral, Daily      sertraline 100 MG tablet  Commonly known as: ZOLOFT   100 mg, Oral, Daily      simvastatin 40 MG tablet  Commonly known as: ZOCOR   40 mg, Oral, Nightly      traMADol 50 MG tablet  Commonly known as: ULTRAM   50 mg, Oral, Every 6 Hours PRN      Trelegy Ellipta 100-62.5-25 MCG/ACT inhaler  Generic drug: Fluticasone-Umeclidin-Vilant   1 puff, Inhalation, Daily      Vibegron 75 MG tablet   75 mg, Oral, Daily             Stop These Medications      atenolol 50 MG tablet  Commonly known as: TENORMIN              Allergies   Allergen Reactions    Penicillins Hives, Swelling and Mental Status Change    Codeine Mental Status Change    Dopamine Palpitations and Other (See Comments)     Other reaction(s): Hypertension         Discharge Disposition:  Home or Self Care    Diet:  Hospital:  Diet Order   Procedures    Diet: Regular/House, Cardiac; Healthy Heart (2-3 Na+); Fluid Consistency: Thin (IDDSI 0)            Activity:  as tolerated    Restrictions or Other Recommendations:  As tolerated       CODE STATUS:    Code Status and Medical Interventions: CPR (Attempt to Resuscitate); Full Support   Ordered at: 08/26/24 1701     Level Of Support Discussed With:    Patient     Code Status (Patient has  no pulse and is not breathing):    CPR (Attempt to Resuscitate)     Medical Interventions (Patient has pulse or is breathing):    Full Support       Future Appointments   Date Time Provider Department Center   9/11/2024 11:00 AM Florence Landrum DO [facility]   9/25/2024 10:45 AM Yvette Leonard APRN [facility]   11/14/2024 11:00 AM Lee Kim MD [facility]   11/19/2024 12:00 PM Dre Moura MD [facility]       Additional Instructions for the Follow-ups that You Need to Schedule       Discharge Follow-up with PCP   As directed       Currently Documented PCP:    Dre Moura MD    PCP Phone Number:    [phone]     Follow Up Details: 1-2 weeks        Discharge Follow-up with Specified Provider: Cardiology; 2 Weeks   As directed      To: Cardiology   Follow Up: 2 Weeks                      Beena Gray MD  09/04/24      Time Spent on Discharge:  I spent  35  minutes on this discharge activity which included: face-to-face encounter with the patient, reviewing the data in the system, coordination of the care with the nursing staff as well as consultants, documentation, and entering orders.

## 2024-09-04 NOTE — OUTREACH NOTE
Prep Survey      Flowsheet Row Responses   [facility] patient discharged from? [place]   Is LACE score < 7 ? No   Eligibility [facility]   Date of Admission 08/26/24   Date of Discharge 09/04/24   Discharge Disposition Home or Self Care   Discharge diagnosis Acute hypoxemic respiratory failure, multifocal PNA   Does the patient have one of the following disease processes/diagnoses(primary or secondary)? Pneumonia   Does the patient have Home health ordered? No   Is there a DME ordered? No   Prep survey completed? Yes            Yasmin NUNEZ - Registered Nurse

## 2024-09-04 NOTE — PLAN OF CARE
Problem: Fluid Imbalance (Pneumonia)  Goal: Fluid Balance  Outcome: Ongoing, Progressing     Problem: Infection (Pneumonia)  Goal: Resolution of Infection Signs and Symptoms  Outcome: Ongoing, Progressing     Problem: Respiratory Compromise (Pneumonia)  Goal: Effective Oxygenation and Ventilation  Outcome: Ongoing, Progressing  Intervention: Promote Airway Secretion Clearance  Recent Flowsheet Documentation  Taken 9/4/2024 0400 by Nelda Ruiz RN  Breathing Techniques/Airway Clearance: deep/controlled cough encouraged  Taken 9/4/2024 0200 by Nelda Ruiz RN  Breathing Techniques/Airway Clearance: deep/controlled cough encouraged  Taken 9/4/2024 0000 by Nelda Ruiz RN  Breathing Techniques/Airway Clearance: deep/controlled cough encouraged  Taken 9/3/2024 2200 by Nelda Ruiz RN  Breathing Techniques/Airway Clearance: deep/controlled cough encouraged  Taken 9/3/2024 2017 by Nelda Ruiz RN  Breathing Techniques/Airway Clearance: deep/controlled cough encouraged  Cough And Deep Breathing: done independently per patient  Intervention: Optimize Oxygenation and Ventilation  Recent Flowsheet Documentation  Taken 9/4/2024 0400 by Nelda Ruiz RN  Head of Bed ([facility]) Positioning: [facility] elevated  Airway/Ventilation Management:   calming measures promoted   airway patency maintained  Taken 9/4/2024 0200 by Nelda Ruiz RN  Head of Bed ([facility]) Positioning: [facility] elevated  Airway/Ventilation Management:   calming measures promoted   airway patency maintained  Taken 9/4/2024 0000 by Nelda Ruiz RN  Head of Bed ([facility]) Positioning: [facility] elevated  Airway/Ventilation Management:   calming measures promoted   airway patency maintained  Taken 9/3/2024 2200 by Nelda Ruiz RN  Head of Bed ([facility]) Positioning: [facility] elevated  Airway/Ventilation Management:   calming measures promoted   airway patency maintained  Taken 9/3/2024 2017 by Nelda Ruiz RN  Head of Bed ([facility]) Positioning: [facility]  elevated  Airway/Ventilation Management:   calming measures promoted   airway patency maintained     Problem: Adult Inpatient Plan of Care  Goal: Plan of Care Review  Outcome: Ongoing, Progressing  Goal: Patient-Specific Goal (Individualized)  Outcome: Ongoing, Progressing  Goal: Absence of Hospital-Acquired Illness or Injury  Outcome: Ongoing, Progressing  Intervention: Identify and Manage Fall Risk  Recent Flowsheet Documentation  Taken 9/4/2024 0400 by Nelda Ruiz RN  Safety Promotion/Fall Prevention:   activity supervised   assistive device/personal items within reach   clutter free environment maintained   fall prevention program maintained   lighting adjusted   room organization consistent   safety round/check completed   nonskid shoes/slippers when out of bed  Taken 9/4/2024 0200 by Nelda Ruiz, RN  Safety Promotion/Fall Prevention:   activity supervised   assistive device/personal items within reach   clutter free environment maintained   fall prevention program maintained   lighting adjusted   room organization consistent   safety round/check completed   nonskid shoes/slippers when out of bed  Taken 9/4/2024 0000 by Nelda Ruiz RN  Safety Promotion/Fall Prevention:   activity supervised   assistive device/personal items within reach   clutter free environment maintained   fall prevention program maintained   lighting adjusted   room organization consistent   safety round/check completed   nonskid shoes/slippers when out of bed  Taken 9/3/2024 2200 by Nelda Ruiz, RN  Safety Promotion/Fall Prevention:   activity supervised   assistive device/personal items within reach   clutter free environment maintained   fall prevention program maintained   lighting adjusted   room organization consistent   safety round/check completed   nonskid shoes/slippers when out of bed  Taken 9/3/2024 2017 by Nelda Ruiz, RN  Safety Promotion/Fall Prevention:   activity supervised   assistive  device/personal items within reach   clutter free environment maintained   fall prevention program maintained   lighting adjusted   room organization consistent   safety round/check completed   nonskid shoes/slippers when out of bed  Intervention: Prevent Skin Injury  Recent Flowsheet Documentation  Taken 9/4/2024 0400 by Nelda Ruiz RN  Body Position:   neutral body alignment   neutral head position   supine  Skin Protection:   adhesive use limited   transparent dressing maintained   tubing/devices free from skin contact  Taken 9/4/2024 0200 by Nelda Ruiz RN  Body Position:   turned   left  Skin Protection:   adhesive use limited   transparent dressing maintained   tubing/devices free from skin contact  Taken 9/4/2024 0000 by Nelda Ruiz RN  Body Position:   turned   right  Skin Protection:   adhesive use limited   transparent dressing maintained   tubing/devices free from skin contact  Taken 9/3/2024 2200 by Nelda Ruiz RN  Body Position: sitting up in bed  Skin Protection:   adhesive use limited   transparent dressing maintained   tubing/devices free from skin contact  Taken 9/3/2024 2017 by Nelda Ruiz RN  Body Position: supine, legs elevated  Skin Protection:   adhesive use limited   transparent dressing maintained   tubing/devices free from skin contact  Intervention: Prevent and Manage VTE (Venous Thromboembolism) Risk  Recent Flowsheet Documentation  Taken 9/4/2024 0400 by Nelda Ruiz RN  Activity Management: activity encouraged  Taken 9/4/2024 0200 by Nelda Ruiz RN  Activity Management: activity encouraged  Taken 9/4/2024 0000 by Nelda Ruiz RN  Activity Management: activity encouraged  Taken 9/3/2024 2200 by Nleda Ruiz RN  Activity Management: activity encouraged  Taken 9/3/2024 2017 by Nelda Ruiz RN  Activity Management: activity encouraged  VTE Prevention/Management: (pt takes eliquis)   bilateral   sequential compression devices off    patient refused intervention  Range of Motion: active ROM (range of motion) encouraged  Intervention: Prevent Infection  Recent Flowsheet Documentation  Taken 9/4/2024 0400 by Nelda Ruiz RN  Infection Prevention:   cohorting utilized   environmental surveillance performed   hand hygiene promoted   rest/sleep promoted  Taken 9/4/2024 0200 by Nelda Ruiz RN  Infection Prevention:   cohorting utilized   environmental surveillance performed   hand hygiene promoted   rest/sleep promoted  Taken 9/4/2024 0000 by Nelda Ruiz RN  Infection Prevention:   cohorting utilized   environmental surveillance performed   hand hygiene promoted   rest/sleep promoted  Taken 9/3/2024 2200 by Nelda Ruiz RN  Infection Prevention:   cohorting utilized   environmental surveillance performed   hand hygiene promoted   rest/sleep promoted  Taken 9/3/2024 2017 by Nelda Ruiz RN  Infection Prevention:   cohorting utilized   environmental surveillance performed   hand hygiene promoted   rest/sleep promoted  Goal: Optimal Comfort and Wellbeing  Outcome: Ongoing, Progressing  Intervention: Provide Person-Centered Care  Recent Flowsheet Documentation  Taken 9/3/2024 2017 by Nelda Ruiz RN  Trust Relationship/Rapport:   choices provided   care explained   emotional support provided   empathic listening provided   questions answered   questions encouraged   reassurance provided   thoughts/feelings acknowledged  Goal: Readiness for Transition of Care  Outcome: Ongoing, Progressing     Problem: Skin Injury Risk Increased  Goal: Skin Health and Integrity  Outcome: Ongoing, Progressing  Intervention: Optimize Skin Protection  Recent Flowsheet Documentation  Taken 9/4/2024 0400 by Nelda Ruiz RN  Pressure Reduction Techniques:   frequent weight shift encouraged   weight shift assistance provided   pressure points protected  Head of Bed ([facility]) Positioning: [facility] elevated  Pressure Reduction Devices:    pressure-redistributing mattress utilized   positioning supports utilized  Skin Protection:   adhesive use limited   transparent dressing maintained   tubing/devices free from skin contact  Taken 9/4/2024 0200 by Nelda Ruiz RN  Pressure Reduction Techniques:   frequent weight shift encouraged   weight shift assistance provided   pressure points protected  Head of Bed ([facility]) Positioning: [facility] elevated  Pressure Reduction Devices:   pressure-redistributing mattress utilized   positioning supports utilized  Skin Protection:   adhesive use limited   transparent dressing maintained   tubing/devices free from skin contact  Taken 9/4/2024 0000 by Nelda Ruiz RN  Pressure Reduction Techniques:   frequent weight shift encouraged   weight shift assistance provided   pressure points protected  Head of Bed ([facility]) Positioning: [facility] elevated  Pressure Reduction Devices:   pressure-redistributing mattress utilized   positioning supports utilized  Skin Protection:   adhesive use limited   transparent dressing maintained   tubing/devices free from skin contact  Taken 9/3/2024 2200 by Nelda Ruiz RN  Pressure Reduction Techniques:   frequent weight shift encouraged   weight shift assistance provided   pressure points protected  Head of Bed ([facility]) Positioning: [facility] elevated  Pressure Reduction Devices:   pressure-redistributing mattress utilized   positioning supports utilized  Skin Protection:   adhesive use limited   transparent dressing maintained   tubing/devices free from skin contact  Taken 9/3/2024 2017 by Nelda Ruiz RN  Pressure Reduction Techniques:   frequent weight shift encouraged   weight shift assistance provided   pressure points protected  Head of Bed ([facility]) Positioning: [facility] elevated  Pressure Reduction Devices:   pressure-redistributing mattress utilized   positioning supports utilized  Skin Protection:   adhesive use limited   transparent dressing maintained   tubing/devices free from skin  contact     Problem: Adjustment to Illness (Sepsis/Septic Shock)  Goal: Optimal Coping  Outcome: Ongoing, Progressing  Intervention: Optimize Psychosocial Adjustment to Illness  Recent Flowsheet Documentation  Taken 9/4/2024 0400 by Nelda Ruiz RN  Supportive Measures:   active listening utilized   self-care encouraged  Taken 9/4/2024 0200 by Nelda Ruiz RN  Supportive Measures:   active listening utilized   self-care encouraged  Taken 9/4/2024 0000 by Nelda Ruiz RN  Supportive Measures:   active listening utilized   self-care encouraged  Taken 9/3/2024 2200 by Nelda Ruiz RN  Supportive Measures:   active listening utilized   self-care encouraged  Taken 9/3/2024 2017 by Nelda Ruiz RN  Supportive Measures:   active listening utilized   self-care encouraged  Family/Support System Care: self-care encouraged     Problem: Bleeding (Sepsis/Septic Shock)  Goal: Absence of Bleeding  Outcome: Ongoing, Progressing     Problem: Glycemic Control Impaired (Sepsis/Septic Shock)  Goal: Blood Glucose Level Within Desired Range  Outcome: Ongoing, Progressing     Problem: Infection Progression (Sepsis/Septic Shock)  Goal: Absence of Infection Signs and Symptoms  Outcome: Ongoing, Progressing  Intervention: Initiate Sepsis Management  Recent Flowsheet Documentation  Taken 9/4/2024 0400 by Nelda Ruiz RN  Infection Prevention:   cohorting utilized   environmental surveillance performed   hand hygiene promoted   rest/sleep promoted  Taken 9/4/2024 0200 by Nelda Ruiz RN  Infection Prevention:   cohorting utilized   environmental surveillance performed   hand hygiene promoted   rest/sleep promoted  Taken 9/4/2024 0000 by Nelda Ruiz RN  Infection Prevention:   cohorting utilized   environmental surveillance performed   hand hygiene promoted   rest/sleep promoted  Taken 9/3/2024 2200 by Nelda Ruiz RN  Infection Prevention:   cohorting utilized   environmental surveillance  performed   hand hygiene promoted   rest/sleep promoted  Taken 9/3/2024 2017 by Nelda Ruiz RN  Infection Prevention:   cohorting utilized   environmental surveillance performed   hand hygiene promoted   rest/sleep promoted  Intervention: Promote Recovery  Recent Flowsheet Documentation  Taken 9/4/2024 0400 by Nelda Ruiz RN  Activity Management: activity encouraged  Airway/Ventilation Support: comfort measures provided  Sleep/Rest Enhancement:   awakenings minimized   consistent schedule promoted   noise level reduced   regular sleep/rest pattern promoted   relaxation techniques promoted   room darkened   therapeutic touch utilized  Taken 9/4/2024 0200 by Nelda Ruiz RN  Activity Management: activity encouraged  Airway/Ventilation Support: comfort measures provided  Sleep/Rest Enhancement:   awakenings minimized   consistent schedule promoted   noise level reduced   regular sleep/rest pattern promoted   relaxation techniques promoted   room darkened   therapeutic touch utilized  Taken 9/4/2024 0000 by Nelda Ruiz RN  Activity Management: activity encouraged  Airway/Ventilation Support: comfort measures provided  Sleep/Rest Enhancement:   awakenings minimized   consistent schedule promoted   noise level reduced   regular sleep/rest pattern promoted   relaxation techniques promoted   room darkened   therapeutic touch utilized  Taken 9/3/2024 2200 by Nelda Ruiz, RN  Activity Management: activity encouraged  Airway/Ventilation Support: comfort measures provided  Sleep/Rest Enhancement:   awakenings minimized   consistent schedule promoted   noise level reduced   regular sleep/rest pattern promoted   relaxation techniques promoted   room darkened   therapeutic touch utilized  Taken 9/3/2024 2017 by Nelda Ruiz RN  Activity Management: activity encouraged  Airway/Ventilation Support: comfort measures provided  Sleep/Rest Enhancement:   awakenings minimized   consistent schedule  promoted   noise level reduced   regular sleep/rest pattern promoted   relaxation techniques promoted   room darkened   therapeutic touch utilized  Intervention: Promote Stabilization  Recent Flowsheet Documentation  Taken 9/4/2024 0400 by Nelda Ruiz RN  Lung Protection Measures: fluid excess minimized  Taken 9/4/2024 0200 by Nelda Ruiz RN  Lung Protection Measures: fluid excess minimized  Taken 9/4/2024 0000 by Nelda Ruiz RN  Lung Protection Measures: fluid excess minimized  Taken 9/3/2024 2200 by Nelda Ruiz RN  Lung Protection Measures: fluid excess minimized  Taken 9/3/2024 2017 by Nelda Ruiz RN  Lung Protection Measures: (suction at bedside) fluid excess minimized     Problem: Nutrition Impaired (Sepsis/Septic Shock)  Goal: Optimal Nutrition Intake  Outcome: Ongoing, Progressing     Problem: Fall Injury Risk  Goal: Absence of Fall and Fall-Related Injury  Outcome: Ongoing, Progressing  Intervention: Identify and Manage Contributors  Recent Flowsheet Documentation  Taken 9/4/2024 0400 by Nelda Ruiz RN  Medication Review/Management: medications reviewed  Self-Care Promotion: independence encouraged  Taken 9/4/2024 0200 by Nelda Ruiz RN  Medication Review/Management: medications reviewed  Self-Care Promotion: independence encouraged  Taken 9/4/2024 0000 by Nelda Ruiz RN  Medication Review/Management: medications reviewed  Self-Care Promotion: independence encouraged  Taken 9/3/2024 2200 by Nelda Ruiz RN  Medication Review/Management: medications reviewed  Self-Care Promotion: independence encouraged  Taken 9/3/2024 2017 by Nelda Ruiz RN  Medication Review/Management: medications reviewed  Self-Care Promotion: independence encouraged  Intervention: Promote Injury-Free Environment  Recent Flowsheet Documentation  Taken 9/4/2024 0400 by Nelda Ruiz RN  Safety Promotion/Fall Prevention:   activity supervised   assistive device/personal items  within reach   clutter free environment maintained   fall prevention program maintained   lighting adjusted   room organization consistent   safety round/check completed   nonskid shoes/slippers when out of bed  Taken 9/4/2024 0200 by Nelda Ruiz, RN  Safety Promotion/Fall Prevention:   activity supervised   assistive device/personal items within reach   clutter free environment maintained   fall prevention program maintained   lighting adjusted   room organization consistent   safety round/check completed   nonskid shoes/slippers when out of bed  Taken 9/4/2024 0000 by Nelda Ruiz, RN  Safety Promotion/Fall Prevention:   activity supervised   assistive device/personal items within reach   clutter free environment maintained   fall prevention program maintained   lighting adjusted   room organization consistent   safety round/check completed   nonskid shoes/slippers when out of bed  Taken 9/3/2024 2200 by Nelda Ruiz, RN  Safety Promotion/Fall Prevention:   activity supervised   assistive device/personal items within reach   clutter free environment maintained   fall prevention program maintained   lighting adjusted   room organization consistent   safety round/check completed   nonskid shoes/slippers when out of bed  Taken 9/3/2024 2017 by Nelda Ruiz, RN  Safety Promotion/Fall Prevention:   activity supervised   assistive device/personal items within reach   clutter free environment maintained   fall prevention program maintained   lighting adjusted   room organization consistent   safety round/check completed   nonskid shoes/slippers when out of bed   Goal Outcome Evaluation:               9/4/24- Pt ranged from 2L NC to 4L NC tonight. Patients cardiac rhythm has switched between afib, sinus arrhythmia w/occasional sinus blocks), frequent PAC & PVC. Plan of care ongoing.

## 2024-09-04 NOTE — CASE MANAGEMENT/SOCIAL WORK
Continued Stay Note  [facility]     Patient Name: Avelina Ceron  MRN: [id number]  Today's Date: 9/4/2024    Admit Date: 8/26/2024    Plan: Home with daughter's assistance   Discharge Plan       Row Name 09/04/24 1053       Plan    Plan Home with daughter's assistance    Patient/Family in Agreement with Plan yes    Plan Comments CM spoke with patient and daughter at bedside regarding DC planning. Discussed therapy recommendations - HH PT. They are both declining home health and are aware that if patient gets home and needs HH they will reach out to patient's PCP. Daughter has portable O2 tank in vehicle for discharge to home. CM following.    Final Discharge Disposition Code 01 - home or self-care                   Discharge Codes    No documentation.                 Expected Discharge Date and Time       Expected Discharge Date Expected Discharge Time    Sep 6, 2024               Fabiana Brasher RN

## 2024-09-05 ENCOUNTER — TRANSITIONAL CARE MANAGEMENT TELEPHONE ENCOUNTER (OUTPATIENT)
Dept: CALL CENTER | Facility: HOSPITAL | Age: 84
End: 2024-09-05
Payer: MEDICARE

## 2024-09-05 LAB
QT INTERVAL: 294 MS
QT INTERVAL: 348 MS
QT INTERVAL: 388 MS
QTC INTERVAL: 393 MS
QTC INTERVAL: 436 MS
QTC INTERVAL: 441 MS

## 2024-09-05 NOTE — OUTREACH NOTE
Call Center TCM Note      Flowsheet Row Responses   [facility] patient discharged from? [facility]   Does the patient have one of the following disease processes/diagnoses(primary or secondary)? Pneumonia   TCM attempt successful? No   Unsuccessful attempts Attempt 1  [daughter Clary Hightowerer listed on verbal PCP is only number listed]            Rut Bhandari RN    9/5/2024, 11:54 EDT         History & Physical    Name: Jung Chau MRN: [id number]  SSN: [id number]    YOB: 1991  Age: 34 y.o. Sex: female      Subjective:     Estimated Date of Delivery: 21  OB History    Para Term  AB Living   1             SAB TAB Ectopic Molar Multiple Live Births                    # Outcome Date GA Lbr Nino/2nd Weight Sex Delivery Anes PTL Lv   1 Current                Ms. Charlotte Dunbar is admitted with pregnancy at 36w4d for induction of labor due to poor fetal growth and non reassuring fetal heart tones. Prenatal course was complicated by fetal growth restriction. Please see prenatal records for details. Past Medical History:   Diagnosis Date    Essential hypertension     Genital herpes     STD (female) 2014    chlamydia treated with medication     Past Surgical History:   Procedure Laterality Date    HX COLONOSCOPY  2018     Social History     Occupational History    Not on file   Tobacco Use    Smoking status: Never Smoker    Smokeless tobacco: Never Used   Vaping Use    Vaping Use: Never used   Substance and Sexual Activity    Alcohol use: Not Currently    Drug use: Not Currently    Sexual activity: Yes     Partners: Male     Birth control/protection: None     Family History   Problem Relation Age of Onset    Hypertension Mother     Hypertension Maternal Grandmother     Thyroid Disease Maternal Grandmother        Allergies   Allergen Reactions    Penicillins Unknown (comments)     Since she was a baby  Other reaction(s): other/intolerance     Prior to Admission medications    Medication Sig Start Date End Date Taking? Authorizing Provider   valACYclovir (VALTREX) 500 mg tablet TAKE 1 TABLET BY MOUTH TWICE A DAY 21  Yes Mark Henning NP   ferrous sulfate (IRON) 325 mg (65 mg iron) EC tablet TAKE 1 TABLET BY MOUTH EVERY OTHER DAY 21  Yes Jose Schmitt MD   PNV no.14/iron/FA 1/dha/docusa (PNV #14-IRON-FA#1-DHA-DOCUSATE PO) Take  by mouth.    Yes Provider, Historical   terconazole (TERAZOL 7) 0.4 % vaginal cream Insert 1 Applicator into vagina nightly. Patient not taking: Reported on 7/15/2021 7/15/21   Alessandra Gordon NP        Review of Systems: A comprehensive review of systems was negative except for that written in the History of Present Illness. Objective:     Vitals:  Vitals:    08/02/21 1155 08/02/21 1156   BP:  (!) 140/85   Pulse:  (!) 101   Resp:  16   Temp:  98.7 °F (37.1 °C)   Weight: 149 lb (67.6 kg)    Height: 5' 6\" (1.676 m)         Physical Exam:  Patient without distress. Abdomen: soft, nontender, EFW 5 lbs. Cervical Exam: fingertip dilated/50/0-1/mid    Membranes:  Intact  Fetal Heart Rate: Decelerations: variable and late      Ultrasound:  Fetal breathing, tone and movement present. NST:  Variable declerations on occasion and one late deceleration. Prenatal Labs:   No results found for: ABORH, RUBELLAEXT, HBSAGEXT, HIVEXT, RPREXT, GONNOEXT, CHLAMEXT, ABORHEXT, RUBELLAEXT, GRBSEXT, HBSAGEXT, HIVEXT, RPREXT, GONNOEXT, CHLAMEXT    Impression/Plan:     Active Problems:    NST (non-stress test) nonreactive (8/2/2021)      Non-reassuring fetal heart tones complicating pregnancy, antepartum (8/2/2021)         Plan: Admit for induction of labor due to non reassuring fetal heart tones and IUGR. Group B Strep positive, will treat prophylactically with vancomycin. Will administer betamethasone for 2 doses if possible.

## 2024-09-05 NOTE — OUTREACH NOTE
Call Center TCM Note      Flowsheet Row Responses   [facility] patient discharged from? [facility]   Does the patient have one of the following disease processes/diagnoses(primary or secondary)? Pneumonia   TCM attempt successful? No   Unsuccessful attempts Attempt 2  [daughter Clary Hightowerer listed on verbal PCP and is only number listed]            Rut Bhandari RN    9/5/2024, 14:53 EDT

## 2024-09-06 ENCOUNTER — OFFICE VISIT (OUTPATIENT)
Dept: INTERNAL MEDICINE | Facility: CLINIC | Age: 84
End: 2024-09-06
Payer: MEDICARE

## 2024-09-06 ENCOUNTER — TRANSITIONAL CARE MANAGEMENT TELEPHONE ENCOUNTER (OUTPATIENT)
Dept: CALL CENTER | Facility: HOSPITAL | Age: 84
End: 2024-09-06
Payer: MEDICARE

## 2024-09-06 VITALS
DIASTOLIC BLOOD PRESSURE: 64 MMHG | RESPIRATION RATE: 16 BRPM | SYSTOLIC BLOOD PRESSURE: 124 MMHG | HEIGHT: 63 IN | BODY MASS INDEX: 28.34 KG/M2 | OXYGEN SATURATION: 94 % | HEART RATE: 84 BPM

## 2024-09-06 DIAGNOSIS — J96.01 ACUTE HYPOXEMIC RESPIRATORY FAILURE: ICD-10-CM

## 2024-09-06 DIAGNOSIS — I48.91 ATRIAL FIBRILLATION, UNSPECIFIED TYPE: ICD-10-CM

## 2024-09-06 DIAGNOSIS — Z09 HOSPITAL DISCHARGE FOLLOW-UP: Primary | ICD-10-CM

## 2024-09-06 DIAGNOSIS — I10 ESSENTIAL HYPERTENSION: ICD-10-CM

## 2024-09-06 PROCEDURE — 1159F MED LIST DOCD IN RCRD: CPT

## 2024-09-06 PROCEDURE — 1160F RVW MEDS BY RX/DR IN RCRD: CPT

## 2024-09-06 PROCEDURE — 1111F DSCHRG MED/CURRENT MED MERGE: CPT

## 2024-09-06 PROCEDURE — 99495 TRANSJ CARE MGMT MOD F2F 14D: CPT

## 2024-09-06 PROCEDURE — 3074F SYST BP LT 130 MM HG: CPT

## 2024-09-06 PROCEDURE — 3078F DIAST BP <80 MM HG: CPT

## 2024-09-06 PROCEDURE — 1126F AMNT PAIN NOTED NONE PRSNT: CPT

## 2024-09-06 NOTE — OUTREACH NOTE
Call Center TCM Note      Flowsheet Row Responses   [facility] patient discharged from? [facility]   Does the patient have one of the following disease processes/diagnoses(primary or secondary)? Pneumonia   TCM attempt successful? No   Unsuccessful attempts Attempt 3  [daughter Clary Hightowerer is only number listed and is listed on PCP verbal release]            Rut Bhandari RN    9/6/2024, 08:48 EDT

## 2024-09-06 NOTE — PROGRESS NOTES
Transitional Care Follow Up Visit  Subjective     Avelina Ceron is a 83 y.o. female who presents for a transitional care management visit.    Within 48 business hours after discharge our office contacted her via telephone to coordinate her care and needs.      I reviewed and discussed the details of that call along with the discharge summary, hospital problems, inpatient lab results, inpatient diagnostic studies, and consultation reports with Avelina.     Current outpatient and discharge medications have been reconciled for the patient.  Reviewed by: AMADOR Hector          9/4/2024     5:57 PM   Date of TCM Phone Call   [facility]   Date of Admission 8/26/2024   Date of Discharge 9/4/2024   Discharge Disposition Home or Self Care   TCM phone call was attempted 3 times per documentation.   Risk for Readmission (LACE) Score: 10 (9/4/2024  6:00 AM)      History of Present Illness   Course During Hospital Stay:    Patient was originally seen in PCP office on 08/26/2024.  She presented with worsening cough, wheezing, and fever.  Upon further examination in the office, her oxygen saturation was in the mid 80s at PCP.  She was requiring 6 L of oxygen via nasal cannula.  Her baseline is 2 L nasal cannula.  She was able to be weaned down to 3 to 4 L prior to transportation to [facility].  Respiratory PCR with COVID was negative.  CTA imaging obtained on 8/2 8 due to worsening respiratory status with presence of multifocal pneumonia.  No PE was noted.  She is recommended to have a repeat CT within a month to evaluate for resolution.  Infectious disease evaluated her in the hospital.  Infectious disease recommended monitoring her off antibiotics as she had recently received a 7-day course of antibiotics.  Their recommendations were to increase pulmonary toilet with Mucinex, flutter valve, and hypertonic nebs.    While hospitalized she developed new onset A-fib with RVR.  Cardiology was  "consulted given the new onset.  Tikosyn was initiated as well as Eliquis.  She was discharged home on both of these medications.  Trial of Bystolic was held because she had blocked PACs and bradycardia after 1 dose.  Echocardiogram done this admission with normal EF, mild VHD.    She remains on losartan for her high blood pressure.  She received a x 1 dose of Diamox per cardiology while hospitalized.  She is recommended to follow-up with her primary cardiologist which is Randa Ruiz in 2 to 4 weeks.  Daughter is present with patient at visit today and is interested in transitioning care with cardiology to [facility] as their primary cardiologist is with [facility].  She states it is a lot easier to have MyChart.    Patient's daughter states that patient's tramadol that is scheduled was discontinued while she was at the hospital.  She reports the patient started to have withdrawal symptoms in the hospital until they put her back on the medication.  Her daughter states that she improved immensely once she was restarted on her tramadol.      She is overall doing well at this time.  She is wearing 3 L of oxygen today via nasal cannula.  She states she noticed last night her heart rate jumped up to around 107 bpm.  Was asymptomatic with this.  Denied chest pain or shortness of breath with this.    The following portions of the patient's history were reviewed and updated as appropriate: allergies, current medications, past family history, past medical history, past social history, past surgical history, and problem list.    Review of Systems   Constitutional:  Negative for chills, fatigue and fever.   Respiratory:  Negative for shortness of breath.         Exertional shortness of breath   Cardiovascular:  Positive for palpitations. Negative for chest pain and leg swelling.       Objective   /64 (BP Location: Left arm, Patient Position: Sitting, Cuff Size: Adult)   Pulse 84   Resp 16   Ht 160 cm (63\")   " SpO2 94% Comment: intermittent 3 liters of O2  BMI 28.34 kg/m²   Physical Exam  Constitutional:       General: She is not in acute distress.     Appearance: She is not ill-appearing or toxic-appearing.   HENT:      Head: Normocephalic and atraumatic.   Cardiovascular:      Rate and Rhythm: Normal rate and regular rhythm.      Pulses: Normal pulses.      Heart sounds: Normal heart sounds. No murmur heard.  Pulmonary:      Effort: Pulmonary effort is normal. No respiratory distress.      Breath sounds: Normal breath sounds. No wheezing or rhonchi.      Comments: Lungs clear to auscultation in all lung fields anterior and posterior.  She is currently on 3 L of oxygen via nasal cannula.  Abdominal:      General: Bowel sounds are normal. There is no distension.      Palpations: Abdomen is soft.      Tenderness: There is no abdominal tenderness.   Musculoskeletal:      Right lower leg: No edema.      Left lower leg: No edema.      Comments: In wheelchair today.  She was able to stand during visit.  Slow but cautious.   Skin:     General: Skin is warm.   Neurological:      General: No focal deficit present.      Mental Status: She is alert and oriented to person, place, and time. Mental status is at baseline.   Psychiatric:         Mood and Affect: Mood normal.       Assessment & Plan   Diagnoses and all orders for this visit:    1. Hospital discharge follow-up (Primary)    2. Acute hypoxemic respiratory failure    3. Atrial fibrillation, unspecified type    4. Essential hypertension      Discussed with patient and daughter that she is overall doing well.  She is currently on 3 L of oxygen via nasal cannula and her baseline is 2 L. Per hospital discharge instructions, she will need a CT of her chest done in about a month to evaluate for clearance of pneumonia.  Discussed that we could order this today, however, she has an appointment on 10/09/2024 with her PCP.  Discussed that her palpitations that she experienced last  night are likely due to the atrial fibrillation.  Recommend continuing Eliquis 5 mg twice a day as well as Tikosyn 250 mcg twice a day.  Advised that she follow-up with her primary cardiologist with [facility] within the next 2 to 4 weeks for hospital discharge follow-up.  Advised that we can set up a cardiology referral for her care to be transitioned to [facility] or she can seek this referral with her primary cardiologist.  Advised that if she develops chest pain, shortness of breath, lower extremity swelling, fever, or chills, or worsening palpitations to seek medical attention at the ED.  Recommend continue to monitor blood pressure at home.  Blood pressure stable in office today at 124/64.  Continue losartan 50 mg daily.  Patient and daughter are agreeable to plan and verbalized understanding of this.    Recommend following up with PCP as scheduled.  Recommend following up with cardiology 2 to 4 weeks for hospital discharge follow-up.    AMADOR Roy

## 2024-09-08 LAB
QT INTERVAL: 416 MS
QTC INTERVAL: 500 MS

## 2024-09-11 ENCOUNTER — OFFICE VISIT (OUTPATIENT)
Dept: PULMONOLOGY | Facility: CLINIC | Age: 84
End: 2024-09-11
Payer: MEDICARE

## 2024-09-11 VITALS
HEART RATE: 88 BPM | HEIGHT: 63 IN | BODY MASS INDEX: 28.35 KG/M2 | TEMPERATURE: 97.6 F | DIASTOLIC BLOOD PRESSURE: 72 MMHG | OXYGEN SATURATION: 95 % | SYSTOLIC BLOOD PRESSURE: 122 MMHG

## 2024-09-11 DIAGNOSIS — J18.9 PNEUMONIA OF BOTH LUNGS DUE TO INFECTIOUS ORGANISM, UNSPECIFIED PART OF LUNG: Primary | ICD-10-CM

## 2024-09-11 DIAGNOSIS — R93.89 ABNORMAL CT OF THE CHEST: ICD-10-CM

## 2024-09-11 PROCEDURE — 99214 OFFICE O/P EST MOD 30 MIN: CPT | Performed by: INTERNAL MEDICINE

## 2024-09-11 PROCEDURE — 3078F DIAST BP <80 MM HG: CPT | Performed by: INTERNAL MEDICINE

## 2024-09-11 PROCEDURE — 3074F SYST BP LT 130 MM HG: CPT | Performed by: INTERNAL MEDICINE

## 2024-09-11 NOTE — PROGRESS NOTES
"Pulmonary Office Follow Up      Subjective   Chief Complaint: Shortness of Breath    Avelina Ceron is a 83 y.o. female is being seen in follow up for COPD    History of Present Illness    Ms. Ceron is an 84yo F who is followed for COPD and Chronic Respiratory Failure.     She was last seen in clinic on 3/14/24.    Since her last visit, she was hospitalized at [facility] for acute on chronic respiratory failure secondary to pneumonia with COPD exacerbation. She was discharged on 9/4/24.    She reports that she is feeling better than she has been in a while. She is having nose bleeds. 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     The following portions of the patient's history were reviewed and updated as appropriate: allergies, current medications, past family history, past medical history, past social history, past surgical history and problem list.    Review of Systems   Constitutional: Negative.    HENT: Negative.    Eyes: Negative.    Respiratory: Positive for shortness of breath.    Cardiovascular: Negative.    Gastrointestinal: Negative.    Endocrine: Negative.    Genitourinary: Negative.    Musculoskeletal: Negative.    Skin: Negative.    Allergic/Immunologic: Negative.    Neurological: Negative.    Hematological: Negative.    Psychiatric/Behavioral: Negative.          Objective   Blood pressure 122/72, pulse 88, temperature 97.6 °F (36.4 °C), height 160 cm (62.99\"), SpO2 95%, not currently breastfeeding.  Physical Exam  Vitals and nursing note reviewed.   Constitutional:       General: She is not in acute distress.     Appearance: She is well-developed.   HENT:      Head: Normocephalic and atraumatic.   Eyes:      General: No scleral icterus.     Conjunctiva/sclera: Conjunctivae normal.      Pupils: Pupils are equal, round, and reactive to light.   Neck:      Thyroid: No thyromegaly.      Trachea: No tracheal deviation.   Cardiovascular:      Rate and Rhythm: Normal rate and " regular rhythm.      Heart sounds: Normal heart sounds.   Pulmonary:      Effort: Pulmonary effort is normal. No respiratory distress.      Breath sounds: Decreased breath sounds present. No wheezing.   Abdominal:      General: Bowel sounds are normal.      Palpations: Abdomen is soft.      Tenderness: There is no abdominal tenderness.   Musculoskeletal:         General: Normal range of motion.      Cervical back: Normal range of motion and neck supple.   Lymphadenopathy:      Cervical: No cervical adenopathy.   Skin:     General: Skin is warm and dry.      Findings: No erythema or rash.   Neurological:      Mental Status: She is alert and oriented to person, place, and time.      Motor: No abnormal muscle tone.      Coordination: Coordination normal.   Psychiatric:         Speech: Speech normal.         Behavior: Behavior normal.         Judgment: Judgment normal.         PFTs:  No new PFTs.    Imaging:  CTA chest from 8/28/24 reviewed. There is no PE. There is bilateral multifocal pneumonia. There are small bilateral pleural effusions. A hiatal hernia is present.     Assessment & Plan   Diagnoses and all orders for this visit:    1. Pneumonia of both lungs due to infectious organism, unspecified part of lung (Primary)  -     CT Chest Without Contrast; Future    2. Abnormal CT of the chest  -     CT Chest Without Contrast; Future          Discussion:  Ms. Ceron is a 84yo F who presents for follow up of COPD.      1. Moderate COPD  - Hospitalized in September 2024 for an acute exacerbation.   - Continue Trelegy 100 inhaler.    - Continue Daliresp 500mcg daily.   - Combivent/Albuterol as needed.   - Mucinex as needed. Continue IS and flutter valve when needed.      2. Chronic Hypoxic and Hypercapnic Respiratory Failure  - Continue supplemental O2. Currently on 2L.   - Previously used Trilogy machine but unable to tolerate secondary to recurrent sinus infections.     3. Nosebleeds  - Instructed to use nasal saline and  nasal arye gel along with humidity with supplemental oxygen.   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 Plan to refer back to ENT if bleeding persists.     4. Afib  - Following with Cardiology.   - On anticoagulation.     Follow up in 3 months.        Florence PACHECO Case, DO  Pulmonary and Critical Care Medicine  Note Electronically Signed

## 2024-09-12 RX ORDER — PANTOPRAZOLE SODIUM 40 MG/1
40 TABLET, DELAYED RELEASE ORAL DAILY
Qty: 90 TABLET | Refills: 3 | Status: SHIPPED | OUTPATIENT
Start: 2024-09-12

## 2024-09-21 LAB
QT INTERVAL: 378 MS
QTC INTERVAL: 449 MS

## 2024-09-23 ENCOUNTER — TELEPHONE (OUTPATIENT)
Dept: UROLOGY | Facility: CLINIC | Age: 84
End: 2024-09-23

## 2024-09-23 ENCOUNTER — TELEPHONE (OUTPATIENT)
Dept: UROLOGY | Facility: CLINIC | Age: 84
End: 2024-09-23
Payer: MEDICARE

## 2024-09-23 NOTE — TELEPHONE ENCOUNTER
Hub staff attempted to follow warm transfer process and was unsuccessful     Caller: CECILIO DAVIDSON    Relationship to patient: Emergency Contact    Best call back number: [phone]    Patient is needing: PT HAS AN APPT ON 09.25.2024 WITH RUBEN AND A KUB PRIOR. PT HAS NOT HAD KUB. PT ALSO HAS AN APPT ON 11.14.2024 WITH KUB PRIOR.  CAN PT CANCEL 09.25 APPT AND JUST COME IN 11.14.2024. PLEASE CALL TO ADVISE. THANK YOU

## 2024-09-26 DIAGNOSIS — G89.29 CHRONIC MIDLINE LOW BACK PAIN WITHOUT SCIATICA: ICD-10-CM

## 2024-09-26 DIAGNOSIS — M54.50 CHRONIC MIDLINE LOW BACK PAIN WITHOUT SCIATICA: ICD-10-CM

## 2024-09-27 DIAGNOSIS — M54.50 CHRONIC MIDLINE LOW BACK PAIN WITHOUT SCIATICA: ICD-10-CM

## 2024-09-27 DIAGNOSIS — G89.29 CHRONIC MIDLINE LOW BACK PAIN WITHOUT SCIATICA: ICD-10-CM

## 2024-09-27 RX ORDER — NITROGLYCERIN 0.4 MG/1
0.4 TABLET SUBLINGUAL
Qty: 25 TABLET | Refills: 0 | Status: SHIPPED | OUTPATIENT
Start: 2024-09-27

## 2024-09-27 RX ORDER — TRAMADOL HYDROCHLORIDE 50 MG/1
50 TABLET ORAL EVERY 6 HOURS PRN
Qty: 120 TABLET | Refills: 0 | Status: SHIPPED | OUTPATIENT
Start: 2024-09-27

## 2024-09-29 RX ORDER — TRAMADOL HYDROCHLORIDE 50 MG/1
50 TABLET ORAL EVERY 6 HOURS PRN
Qty: 120 TABLET | Refills: 0 | OUTPATIENT
Start: 2024-09-29

## 2024-10-07 DIAGNOSIS — N39.41 URGENCY INCONTINENCE: ICD-10-CM

## 2024-10-07 DIAGNOSIS — N32.81 OAB (OVERACTIVE BLADDER): ICD-10-CM

## 2024-10-07 RX ORDER — VIBEGRON 75 MG/1
1 TABLET, FILM COATED ORAL DAILY
Qty: 90 TABLET | Refills: 3 | Status: ON HOLD | OUTPATIENT
Start: 2024-10-07

## 2024-10-07 NOTE — TELEPHONE ENCOUNTER
Rx Refill Note  Requested Prescriptions     Pending Prescriptions Disp Refills    Gemtesa 75 MG tablet [Pharmacy Med Name: Gemtesa 75 MG Oral Tablet] 210 tablet 0     Sig: Take 1 tablet by mouth once daily      Last office visit with prescribing clinician: 11/13/2023   Last telemedicine visit with prescribing clinician: Visit date not found   Next office visit with prescribing clinician: 11/14/2024       Lo Veloz MA  10/07/24, 08:35 EDT

## 2024-10-09 ENCOUNTER — OFFICE VISIT (OUTPATIENT)
Dept: INTERNAL MEDICINE | Facility: CLINIC | Age: 84
End: 2024-10-09
Payer: MEDICARE

## 2024-10-09 ENCOUNTER — HOSPITAL ENCOUNTER (OUTPATIENT)
Dept: GENERAL RADIOLOGY | Facility: HOSPITAL | Age: 84
Discharge: HOME | End: 2024-10-09
Admitting: INTERNAL MEDICINE
Payer: MEDICARE

## 2024-10-09 VITALS
WEIGHT: 152 LBS | HEART RATE: 88 BPM | SYSTOLIC BLOOD PRESSURE: 132 MMHG | BODY MASS INDEX: 26.93 KG/M2 | DIASTOLIC BLOOD PRESSURE: 60 MMHG | RESPIRATION RATE: 20 BRPM | TEMPERATURE: 97.8 F

## 2024-10-09 DIAGNOSIS — D64.9 ANEMIA, UNSPECIFIED TYPE: ICD-10-CM

## 2024-10-09 DIAGNOSIS — I10 ESSENTIAL HYPERTENSION: ICD-10-CM

## 2024-10-09 DIAGNOSIS — G89.29 CHRONIC MIDLINE LOW BACK PAIN WITHOUT SCIATICA: ICD-10-CM

## 2024-10-09 DIAGNOSIS — M54.50 CHRONIC MIDLINE LOW BACK PAIN WITHOUT SCIATICA: ICD-10-CM

## 2024-10-09 DIAGNOSIS — K21.00 GASTROESOPHAGEAL REFLUX DISEASE WITH ESOPHAGITIS WITHOUT HEMORRHAGE: ICD-10-CM

## 2024-10-09 DIAGNOSIS — J18.9 PNEUMONIA DUE TO INFECTIOUS ORGANISM, UNSPECIFIED LATERALITY, UNSPECIFIED PART OF LUNG: ICD-10-CM

## 2024-10-09 DIAGNOSIS — J44.9 CHRONIC OBSTRUCTIVE PULMONARY DISEASE, UNSPECIFIED COPD TYPE: ICD-10-CM

## 2024-10-09 DIAGNOSIS — E03.9 HYPOTHYROIDISM, UNSPECIFIED TYPE: ICD-10-CM

## 2024-10-09 DIAGNOSIS — R30.0 DYSURIA: ICD-10-CM

## 2024-10-09 DIAGNOSIS — I48.91 ATRIAL FIBRILLATION, UNSPECIFIED TYPE: Primary | ICD-10-CM

## 2024-10-09 LAB
BASOPHILS # BLD AUTO: 0.1 10*3/MM3 (ref 0–0.2)
BASOPHILS NFR BLD AUTO: 0.5 % (ref 0–1.5)
DEPRECATED RDW RBC AUTO: 47.5 FL (ref 37–54)
EOSINOPHIL # BLD AUTO: 0.02 10*3/MM3 (ref 0–0.4)
EOSINOPHIL NFR BLD AUTO: 0.1 % (ref 0.3–6.2)
ERYTHROCYTE [DISTWIDTH] IN BLOOD BY AUTOMATED COUNT: 12.6 % (ref 12.3–15.4)
HCT VFR BLD AUTO: 24.5 % (ref 34–46.6)
HGB BLD-MCNC: 7.7 G/DL (ref 12–15.9)
IMM GRANULOCYTES # BLD AUTO: 0.18 10*3/MM3 (ref 0–0.05)
IMM GRANULOCYTES NFR BLD AUTO: 0.9 % (ref 0–0.5)
LYMPHOCYTES # BLD AUTO: 1.78 10*3/MM3 (ref 0.7–3.1)
LYMPHOCYTES NFR BLD AUTO: 8.9 % (ref 19.6–45.3)
MCH RBC QN AUTO: 32.1 PG (ref 26.6–33)
MCHC RBC AUTO-ENTMCNC: 31.4 G/DL (ref 31.5–35.7)
MCV RBC AUTO: 102.1 FL (ref 79–97)
MONOCYTES # BLD AUTO: 0.72 10*3/MM3 (ref 0.1–0.9)
MONOCYTES NFR BLD AUTO: 3.6 % (ref 5–12)
NEUTROPHILS NFR BLD AUTO: 17.12 10*3/MM3 (ref 1.7–7)
NEUTROPHILS NFR BLD AUTO: 86 % (ref 42.7–76)
NRBC BLD AUTO-RTO: 0 /100 WBC (ref 0–0.2)
PLATELET # BLD AUTO: 411 10*3/MM3 (ref 140–450)
PMV BLD AUTO: 9.7 FL (ref 6–12)
RBC # BLD AUTO: 2.4 10*6/MM3 (ref 3.77–5.28)
WBC NRBC COR # BLD AUTO: 19.92 10*3/MM3 (ref 3.4–10.8)

## 2024-10-09 PROCEDURE — 3078F DIAST BP <80 MM HG: CPT | Performed by: INTERNAL MEDICINE

## 2024-10-09 PROCEDURE — 72100 X-RAY EXAM L-S SPINE 2/3 VWS: CPT

## 2024-10-09 PROCEDURE — 1125F AMNT PAIN NOTED PAIN PRSNT: CPT | Performed by: INTERNAL MEDICINE

## 2024-10-09 PROCEDURE — 82728 ASSAY OF FERRITIN: CPT | Performed by: INTERNAL MEDICINE

## 2024-10-09 PROCEDURE — 85025 COMPLETE CBC W/AUTO DIFF WBC: CPT | Performed by: INTERNAL MEDICINE

## 2024-10-09 PROCEDURE — 84443 ASSAY THYROID STIM HORMONE: CPT | Performed by: INTERNAL MEDICINE

## 2024-10-09 PROCEDURE — 74018 RADEX ABDOMEN 1 VIEW: CPT

## 2024-10-09 PROCEDURE — 99214 OFFICE O/P EST MOD 30 MIN: CPT | Performed by: INTERNAL MEDICINE

## 2024-10-09 PROCEDURE — 84466 ASSAY OF TRANSFERRIN: CPT | Performed by: INTERNAL MEDICINE

## 2024-10-09 PROCEDURE — 84439 ASSAY OF FREE THYROXINE: CPT | Performed by: INTERNAL MEDICINE

## 2024-10-09 PROCEDURE — 83540 ASSAY OF IRON: CPT | Performed by: INTERNAL MEDICINE

## 2024-10-09 PROCEDURE — 80053 COMPREHEN METABOLIC PANEL: CPT | Performed by: INTERNAL MEDICINE

## 2024-10-09 PROCEDURE — 71046 X-RAY EXAM CHEST 2 VIEWS: CPT

## 2024-10-09 PROCEDURE — 3075F SYST BP GE 130 - 139MM HG: CPT | Performed by: INTERNAL MEDICINE

## 2024-10-10 ENCOUNTER — APPOINTMENT (OUTPATIENT)
Dept: CT IMAGING | Facility: HOSPITAL | Age: 84
DRG: 394 | End: 2024-10-10
Payer: MEDICARE

## 2024-10-10 ENCOUNTER — HOSPITAL ENCOUNTER (INPATIENT)
Facility: HOSPITAL | Age: 84
LOS: 5 days | Discharge: HOME | DRG: 394 | End: 2024-10-16
Attending: EMERGENCY MEDICINE | Admitting: HOSPITALIST
Payer: MEDICARE

## 2024-10-10 ENCOUNTER — LAB (OUTPATIENT)
Dept: INTERNAL MEDICINE | Facility: CLINIC | Age: 84
End: 2024-10-10
Payer: MEDICARE

## 2024-10-10 DIAGNOSIS — R82.998 LEUKOCYTES IN URINE: ICD-10-CM

## 2024-10-10 DIAGNOSIS — D72.829 LEUKOCYTOSIS, UNSPECIFIED TYPE: ICD-10-CM

## 2024-10-10 DIAGNOSIS — D50.0 BLOOD LOSS ANEMIA: Primary | ICD-10-CM

## 2024-10-10 DIAGNOSIS — M54.50 CHRONIC MIDLINE LOW BACK PAIN WITHOUT SCIATICA: ICD-10-CM

## 2024-10-10 DIAGNOSIS — G89.29 CHRONIC MIDLINE LOW BACK PAIN WITHOUT SCIATICA: ICD-10-CM

## 2024-10-10 DIAGNOSIS — R10.13 EPIGASTRIC PAIN: ICD-10-CM

## 2024-10-10 DIAGNOSIS — R30.0 DYSURIA: ICD-10-CM

## 2024-10-10 DIAGNOSIS — Z79.01 CHRONIC ANTICOAGULATION: ICD-10-CM

## 2024-10-10 DIAGNOSIS — D64.9 ANEMIA, UNSPECIFIED TYPE: Primary | ICD-10-CM

## 2024-10-10 PROBLEM — D62 ANEMIA DUE TO BLOOD LOSS, ACUTE: Status: ACTIVE | Noted: 2024-10-10

## 2024-10-10 LAB
ABO GROUP BLD: NORMAL
ABO GROUP BLD: NORMAL
ALBUMIN SERPL-MCNC: 3.7 G/DL (ref 3.5–5.2)
ALBUMIN SERPL-MCNC: 3.8 G/DL (ref 3.5–5.2)
ALBUMIN/GLOB SERPL: 1.5 G/DL
ALBUMIN/GLOB SERPL: 1.8 G/DL
ALP SERPL-CCNC: 66 U/L (ref 39–117)
ALP SERPL-CCNC: 68 U/L (ref 39–117)
ALT SERPL W P-5'-P-CCNC: 15 U/L (ref 1–33)
ALT SERPL W P-5'-P-CCNC: 15 U/L (ref 1–33)
ANION GAP SERPL CALCULATED.3IONS-SCNC: 10 MMOL/L (ref 5–15)
ANION GAP SERPL CALCULATED.3IONS-SCNC: 9 MMOL/L (ref 5–15)
AST SERPL-CCNC: 21 U/L (ref 1–32)
AST SERPL-CCNC: 23 U/L (ref 1–32)
BACTERIA UR QL AUTO: ABNORMAL /HPF
BASOPHILS # BLD AUTO: 0.12 10*3/MM3 (ref 0–0.2)
BASOPHILS NFR BLD AUTO: 0.8 % (ref 0–1.5)
BILIRUB BLD-MCNC: NEGATIVE MG/DL
BILIRUB SERPL-MCNC: 0.2 MG/DL (ref 0–1.2)
BILIRUB SERPL-MCNC: 0.3 MG/DL (ref 0–1.2)
BILIRUB UR QL STRIP: NEGATIVE
BLD GP AB SCN SERPL QL: NEGATIVE
BUN SERPL-MCNC: 8 MG/DL (ref 8–23)
BUN SERPL-MCNC: 9 MG/DL (ref 8–23)
BUN/CREAT SERPL: 24.2 (ref 7–25)
BUN/CREAT SERPL: 27.3 (ref 7–25)
CALCIUM SPEC-SCNC: 9.1 MG/DL (ref 8.6–10.5)
CALCIUM SPEC-SCNC: 9.5 MG/DL (ref 8.6–10.5)
CHLORIDE SERPL-SCNC: 101 MMOL/L (ref 98–107)
CHLORIDE SERPL-SCNC: 99 MMOL/L (ref 98–107)
CLARITY UR: CLEAR
CLARITY, POC: ABNORMAL
CO2 SERPL-SCNC: 32 MMOL/L (ref 22–29)
CO2 SERPL-SCNC: 33 MMOL/L (ref 22–29)
COLOR UR: YELLOW
COLOR UR: YELLOW
CREAT SERPL-MCNC: 0.33 MG/DL (ref 0.57–1)
CREAT SERPL-MCNC: 0.33 MG/DL (ref 0.57–1)
DEPRECATED RDW RBC AUTO: 60.4 FL (ref 37–54)
DEVELOPER EXPIRATION DATE: ABNORMAL
DEVELOPER LOT NUMBER: ABNORMAL
EGFRCR SERPLBLD CKD-EPI 2021: 103 ML/MIN/1.73
EGFRCR SERPLBLD CKD-EPI 2021: 103 ML/MIN/1.73
EOSINOPHIL # BLD AUTO: 0.1 10*3/MM3 (ref 0–0.4)
EOSINOPHIL NFR BLD AUTO: 0.7 % (ref 0.3–6.2)
ERYTHROCYTE [DISTWIDTH] IN BLOOD BY AUTOMATED COUNT: 15.8 % (ref 12.3–15.4)
EXPIRATION DATE: ABNORMAL
FECAL OCCULT BLOOD SCREEN, POC: POSITIVE
FERRITIN SERPL-MCNC: 315 NG/ML (ref 13–150)
GLOBULIN UR ELPH-MCNC: 2.1 GM/DL
GLOBULIN UR ELPH-MCNC: 2.5 GM/DL
GLUCOSE SERPL-MCNC: 114 MG/DL (ref 65–99)
GLUCOSE SERPL-MCNC: 114 MG/DL (ref 65–99)
GLUCOSE UR STRIP-MCNC: NEGATIVE MG/DL
GLUCOSE UR STRIP-MCNC: NEGATIVE MG/DL
HCT VFR BLD AUTO: 24.1 % (ref 34–46.6)
HCT VFR BLD AUTO: 24.7 % (ref 34–46.6)
HGB BLD-MCNC: 7.2 G/DL (ref 12–15.9)
HGB BLD-MCNC: 7.9 G/DL (ref 12–15.9)
HGB BLDA-MCNC: 6.8 G/DL (ref 12–17)
HGB UR QL STRIP.AUTO: NEGATIVE
HYALINE CASTS UR QL AUTO: ABNORMAL /LPF
IMM GRANULOCYTES # BLD AUTO: 0.1 10*3/MM3 (ref 0–0.05)
IMM GRANULOCYTES NFR BLD AUTO: 0.7 % (ref 0–0.5)
IRON 24H UR-MRATE: 69 MCG/DL (ref 37–145)
IRON SATN MFR SERPL: 19 % (ref 20–50)
KETONES UR QL STRIP: NEGATIVE
KETONES UR QL: ABNORMAL
LEUKOCYTE EST, POC: ABNORMAL
LEUKOCYTE ESTERASE UR QL STRIP.AUTO: ABNORMAL
LYMPHOCYTES # BLD AUTO: 2.68 10*3/MM3 (ref 0.7–3.1)
LYMPHOCYTES NFR BLD AUTO: 18.2 % (ref 19.6–45.3)
Lab: ABNORMAL
MCH RBC QN AUTO: 32 PG (ref 26.6–33)
MCHC RBC AUTO-ENTMCNC: 29.9 G/DL (ref 31.5–35.7)
MCV RBC AUTO: 107.1 FL (ref 79–97)
MONOCYTES # BLD AUTO: 0.8 10*3/MM3 (ref 0.1–0.9)
MONOCYTES NFR BLD AUTO: 5.4 % (ref 5–12)
NEGATIVE CONTROL: NEGATIVE
NEUTROPHILS NFR BLD AUTO: 10.92 10*3/MM3 (ref 1.7–7)
NEUTROPHILS NFR BLD AUTO: 74.2 % (ref 42.7–76)
NITRITE UR QL STRIP: NEGATIVE
NITRITE UR-MCNC: NEGATIVE MG/ML
NRBC BLD AUTO-RTO: 0 /100 WBC (ref 0–0.2)
PH UR STRIP.AUTO: 6.5 [PH] (ref 5–8)
PH UR: 5 [PH] (ref 5–8)
PLATELET # BLD AUTO: 362 10*3/MM3 (ref 140–450)
PMV BLD AUTO: 8.9 FL (ref 6–12)
POSITIVE CONTROL: POSITIVE
POTASSIUM SERPL-SCNC: 3.1 MMOL/L (ref 3.5–5.2)
POTASSIUM SERPL-SCNC: 3.7 MMOL/L (ref 3.5–5.2)
PROCALCITONIN SERPL-MCNC: 0.06 NG/ML (ref 0–0.25)
PROT SERPL-MCNC: 5.9 G/DL (ref 6–8.5)
PROT SERPL-MCNC: 6.2 G/DL (ref 6–8.5)
PROT UR QL STRIP: NEGATIVE
PROT UR STRIP-MCNC: ABNORMAL MG/DL
QT INTERVAL: 386 MS
QTC INTERVAL: 456 MS
RBC # BLD AUTO: 2.25 10*6/MM3 (ref 3.77–5.28)
RBC # UR STRIP: ABNORMAL /HPF
RBC # UR STRIP: NEGATIVE /UL
REF LAB TEST METHOD: ABNORMAL
RH BLD: POSITIVE
RH BLD: POSITIVE
SODIUM SERPL-SCNC: 142 MMOL/L (ref 136–145)
SODIUM SERPL-SCNC: 142 MMOL/L (ref 136–145)
SP GR UR STRIP: 1.03 (ref 1–1.03)
SP GR UR: 1.02 (ref 1–1.03)
SQUAMOUS #/AREA URNS HPF: ABNORMAL /HPF
T&S EXPIRATION DATE: NORMAL
T4 FREE SERPL-MCNC: 0.86 NG/DL (ref 0.92–1.68)
TIBC SERPL-MCNC: 361 MCG/DL (ref 298–536)
TRANSFERRIN SERPL-MCNC: 242 MG/DL (ref 200–360)
TSH SERPL DL<=0.05 MIU/L-ACNC: 6.35 UIU/ML (ref 0.27–4.2)
UROBILINOGEN UR QL STRIP: ABNORMAL
UROBILINOGEN UR QL: NORMAL
WBC # UR STRIP: ABNORMAL /HPF
WBC NRBC COR # BLD AUTO: 14.72 10*3/MM3 (ref 3.4–10.8)

## 2024-10-10 PROCEDURE — 99204 OFFICE O/P NEW MOD 45 MIN: CPT | Performed by: INTERNAL MEDICINE

## 2024-10-10 PROCEDURE — 86900 BLOOD TYPING SEROLOGIC ABO: CPT

## 2024-10-10 PROCEDURE — 25510000001 IOPAMIDOL PER 1 ML: Performed by: EMERGENCY MEDICINE

## 2024-10-10 PROCEDURE — 86900 BLOOD TYPING SEROLOGIC ABO: CPT | Performed by: EMERGENCY MEDICINE

## 2024-10-10 PROCEDURE — 86923 COMPATIBILITY TEST ELECTRIC: CPT

## 2024-10-10 PROCEDURE — G0378 HOSPITAL OBSERVATION PER HR: HCPCS

## 2024-10-10 PROCEDURE — 80053 COMPREHEN METABOLIC PANEL: CPT | Performed by: EMERGENCY MEDICINE

## 2024-10-10 PROCEDURE — 86901 BLOOD TYPING SEROLOGIC RH(D): CPT

## 2024-10-10 PROCEDURE — 85018 HEMOGLOBIN: CPT | Performed by: INTERNAL MEDICINE

## 2024-10-10 PROCEDURE — 36415 COLL VENOUS BLD VENIPUNCTURE: CPT

## 2024-10-10 PROCEDURE — 84145 PROCALCITONIN (PCT): CPT | Performed by: EMERGENCY MEDICINE

## 2024-10-10 PROCEDURE — 99222 1ST HOSP IP/OBS MODERATE 55: CPT | Performed by: STUDENT IN AN ORGANIZED HEALTH CARE EDUCATION/TRAINING PROGRAM

## 2024-10-10 PROCEDURE — 99291 CRITICAL CARE FIRST HOUR: CPT

## 2024-10-10 PROCEDURE — 74178 CT ABD&PLV WO CNTR FLWD CNTR: CPT

## 2024-10-10 PROCEDURE — 93005 ELECTROCARDIOGRAM TRACING: CPT | Performed by: EMERGENCY MEDICINE

## 2024-10-10 PROCEDURE — 85025 COMPLETE CBC W/AUTO DIFF WBC: CPT | Performed by: EMERGENCY MEDICINE

## 2024-10-10 PROCEDURE — P9016 RBC LEUKOCYTES REDUCED: HCPCS

## 2024-10-10 PROCEDURE — 82270 OCCULT BLOOD FECES: CPT | Performed by: EMERGENCY MEDICINE

## 2024-10-10 PROCEDURE — 94640 AIRWAY INHALATION TREATMENT: CPT

## 2024-10-10 PROCEDURE — 81003 URINALYSIS AUTO W/O SCOPE: CPT | Performed by: INTERNAL MEDICINE

## 2024-10-10 PROCEDURE — 81001 URINALYSIS AUTO W/SCOPE: CPT | Performed by: STUDENT IN AN ORGANIZED HEALTH CARE EDUCATION/TRAINING PROGRAM

## 2024-10-10 PROCEDURE — 86850 RBC ANTIBODY SCREEN: CPT | Performed by: EMERGENCY MEDICINE

## 2024-10-10 PROCEDURE — 94799 UNLISTED PULMONARY SVC/PX: CPT

## 2024-10-10 PROCEDURE — 36430 TRANSFUSION BLD/BLD COMPNT: CPT

## 2024-10-10 PROCEDURE — 87086 URINE CULTURE/COLONY COUNT: CPT | Performed by: INTERNAL MEDICINE

## 2024-10-10 PROCEDURE — 86901 BLOOD TYPING SEROLOGIC RH(D): CPT | Performed by: EMERGENCY MEDICINE

## 2024-10-10 PROCEDURE — 85018 HEMOGLOBIN: CPT | Performed by: STUDENT IN AN ORGANIZED HEALTH CARE EDUCATION/TRAINING PROGRAM

## 2024-10-10 PROCEDURE — 85014 HEMATOCRIT: CPT | Performed by: STUDENT IN AN ORGANIZED HEALTH CARE EDUCATION/TRAINING PROGRAM

## 2024-10-10 RX ORDER — POLYETHYLENE GLYCOL 3350 17 G/17G
17 POWDER, FOR SOLUTION ORAL DAILY PRN
Status: DISCONTINUED | OUTPATIENT
Start: 2024-10-10 | End: 2024-10-16 | Stop reason: HOSPADM

## 2024-10-10 RX ORDER — SERTRALINE HYDROCHLORIDE 100 MG/1
100 TABLET, FILM COATED ORAL DAILY
Status: DISCONTINUED | OUTPATIENT
Start: 2024-10-10 | End: 2024-10-16 | Stop reason: HOSPADM

## 2024-10-10 RX ORDER — GABAPENTIN 300 MG/1
300 CAPSULE ORAL NIGHTLY
Status: DISCONTINUED | OUTPATIENT
Start: 2024-10-10 | End: 2024-10-16 | Stop reason: HOSPADM

## 2024-10-10 RX ORDER — POTASSIUM CHLORIDE 1500 MG/1
40 TABLET, EXTENDED RELEASE ORAL EVERY 4 HOURS
Status: DISPENSED | OUTPATIENT
Start: 2024-10-10 | End: 2024-10-11

## 2024-10-10 RX ORDER — SODIUM CHLORIDE 0.9 % (FLUSH) 0.9 %
10 SYRINGE (ML) INJECTION EVERY 12 HOURS SCHEDULED
Status: DISCONTINUED | OUTPATIENT
Start: 2024-10-10 | End: 2024-10-16 | Stop reason: HOSPADM

## 2024-10-10 RX ORDER — BISACODYL 10 MG
10 SUPPOSITORY, RECTAL RECTAL DAILY PRN
Status: DISCONTINUED | OUTPATIENT
Start: 2024-10-10 | End: 2024-10-16 | Stop reason: HOSPADM

## 2024-10-10 RX ORDER — HYDROMORPHONE HYDROCHLORIDE 1 MG/ML
0.5 INJECTION, SOLUTION INTRAMUSCULAR; INTRAVENOUS; SUBCUTANEOUS
Status: ACTIVE | OUTPATIENT
Start: 2024-10-10 | End: 2024-10-15

## 2024-10-10 RX ORDER — SODIUM CHLORIDE 0.9 % (FLUSH) 0.9 %
10 SYRINGE (ML) INJECTION AS NEEDED
Status: DISCONTINUED | OUTPATIENT
Start: 2024-10-10 | End: 2024-10-16 | Stop reason: HOSPADM

## 2024-10-10 RX ORDER — BISACODYL 5 MG/1
5 TABLET, DELAYED RELEASE ORAL DAILY PRN
Status: DISCONTINUED | OUTPATIENT
Start: 2024-10-10 | End: 2024-10-16 | Stop reason: HOSPADM

## 2024-10-10 RX ORDER — LEVOTHYROXINE SODIUM 100 UG/1
200 TABLET ORAL
Status: DISCONTINUED | OUTPATIENT
Start: 2024-10-11 | End: 2024-10-16 | Stop reason: HOSPADM

## 2024-10-10 RX ORDER — ROFLUMILAST 500 UG/1
500 TABLET ORAL DAILY
Status: DISCONTINUED | OUTPATIENT
Start: 2024-10-10 | End: 2024-10-16 | Stop reason: HOSPADM

## 2024-10-10 RX ORDER — PANTOPRAZOLE SODIUM 40 MG/10ML
40 INJECTION, POWDER, LYOPHILIZED, FOR SOLUTION INTRAVENOUS EVERY 12 HOURS SCHEDULED
Status: DISCONTINUED | OUTPATIENT
Start: 2024-10-10 | End: 2024-10-11

## 2024-10-10 RX ORDER — LOSARTAN POTASSIUM 50 MG/1
50 TABLET ORAL
Status: DISCONTINUED | OUTPATIENT
Start: 2024-10-10 | End: 2024-10-16 | Stop reason: HOSPADM

## 2024-10-10 RX ORDER — ATENOLOL 50 MG/1
50 TABLET ORAL DAILY
Status: DISCONTINUED | OUTPATIENT
Start: 2024-10-10 | End: 2024-10-10

## 2024-10-10 RX ORDER — TRAMADOL HYDROCHLORIDE 50 MG/1
50 TABLET ORAL EVERY 6 HOURS PRN
Status: DISCONTINUED | OUTPATIENT
Start: 2024-10-10 | End: 2024-10-13

## 2024-10-10 RX ORDER — ASPIRIN 81 MG/1
81 TABLET, CHEWABLE ORAL DAILY
Status: DISCONTINUED | OUTPATIENT
Start: 2024-10-10 | End: 2024-10-16 | Stop reason: HOSPADM

## 2024-10-10 RX ORDER — AMOXICILLIN 250 MG
2 CAPSULE ORAL 2 TIMES DAILY PRN
Status: DISCONTINUED | OUTPATIENT
Start: 2024-10-10 | End: 2024-10-16 | Stop reason: HOSPADM

## 2024-10-10 RX ORDER — ATORVASTATIN CALCIUM 20 MG/1
20 TABLET, FILM COATED ORAL DAILY
Status: DISCONTINUED | OUTPATIENT
Start: 2024-10-10 | End: 2024-10-16 | Stop reason: HOSPADM

## 2024-10-10 RX ORDER — CLOPIDOGREL BISULFATE 75 MG/1
75 TABLET ORAL DAILY
Status: DISCONTINUED | OUTPATIENT
Start: 2024-10-10 | End: 2024-10-15

## 2024-10-10 RX ORDER — ATENOLOL 50 MG/1
50 TABLET ORAL DAILY
COMMUNITY

## 2024-10-10 RX ORDER — ACETAMINOPHEN 325 MG/1
650 TABLET ORAL EVERY 6 HOURS PRN
Status: DISCONTINUED | OUTPATIENT
Start: 2024-10-10 | End: 2024-10-16 | Stop reason: HOSPADM

## 2024-10-10 RX ORDER — ISOSORBIDE MONONITRATE 30 MG/1
30 TABLET, EXTENDED RELEASE ORAL DAILY
Status: DISCONTINUED | OUTPATIENT
Start: 2024-10-10 | End: 2024-10-15

## 2024-10-10 RX ORDER — BUDESONIDE AND FORMOTEROL FUMARATE DIHYDRATE 160; 4.5 UG/1; UG/1
2 AEROSOL RESPIRATORY (INHALATION)
Status: DISCONTINUED | OUTPATIENT
Start: 2024-10-10 | End: 2024-10-16 | Stop reason: HOSPADM

## 2024-10-10 RX ORDER — DOFETILIDE 0.25 MG/1
250 CAPSULE ORAL 2 TIMES DAILY
Status: DISCONTINUED | OUTPATIENT
Start: 2024-10-10 | End: 2024-10-16 | Stop reason: HOSPADM

## 2024-10-10 RX ORDER — IOPAMIDOL 755 MG/ML
50 INJECTION, SOLUTION INTRAVASCULAR
Status: COMPLETED | OUTPATIENT
Start: 2024-10-10 | End: 2024-10-10

## 2024-10-10 RX ORDER — OXYBUTYNIN CHLORIDE 10 MG/1
10 TABLET, EXTENDED RELEASE ORAL DAILY
Status: DISCONTINUED | OUTPATIENT
Start: 2024-10-11 | End: 2024-10-16 | Stop reason: HOSPADM

## 2024-10-10 RX ORDER — PANTOPRAZOLE SODIUM 40 MG/10ML
80 INJECTION, POWDER, LYOPHILIZED, FOR SOLUTION INTRAVENOUS ONCE
Status: COMPLETED | OUTPATIENT
Start: 2024-10-10 | End: 2024-10-10

## 2024-10-10 RX ADMIN — POTASSIUM CHLORIDE 40 MEQ: 1500 TABLET, EXTENDED RELEASE ORAL at 20:17

## 2024-10-10 RX ADMIN — ATORVASTATIN CALCIUM 20 MG: 20 TABLET, FILM COATED ORAL at 20:17

## 2024-10-10 RX ADMIN — POTASSIUM CHLORIDE 40 MEQ: 1500 TABLET, EXTENDED RELEASE ORAL at 16:15

## 2024-10-10 RX ADMIN — PANTOPRAZOLE SODIUM 40 MG: 40 INJECTION, POWDER, FOR SOLUTION INTRAVENOUS at 20:17

## 2024-10-10 RX ADMIN — IOPAMIDOL 50 ML: 755 INJECTION, SOLUTION INTRAVENOUS at 13:55

## 2024-10-10 RX ADMIN — PANTOPRAZOLE SODIUM 80 MG: 40 INJECTION, POWDER, FOR SOLUTION INTRAVENOUS at 14:17

## 2024-10-10 RX ADMIN — TRAMADOL HYDROCHLORIDE 50 MG: 50 TABLET ORAL at 20:25

## 2024-10-10 RX ADMIN — GABAPENTIN 300 MG: 300 CAPSULE ORAL at 20:17

## 2024-10-10 RX ADMIN — BUDESONIDE AND FORMOTEROL FUMARATE DIHYDRATE 2 PUFF: 160; 4.5 AEROSOL RESPIRATORY (INHALATION) at 19:00

## 2024-10-10 RX ADMIN — DOFETILIDE 250 MCG: 0.25 CAPSULE ORAL at 20:17

## 2024-10-10 NOTE — PLAN OF CARE
Problem: Adult Inpatient Plan of Care  Goal: Plan of Care Review  Outcome: Progressing  Goal: Patient-Specific Goal (Individualized)  Outcome: Progressing  Goal: Absence of Hospital-Acquired Illness or Injury  Outcome: Progressing  Intervention: Identify and Manage Fall Risk  Recent Flowsheet Documentation  Taken 10/10/2024 1600 by Viridiana Valdovinos RN  Safety Promotion/Fall Prevention:   activity supervised   assistive device/personal items within reach   clutter free environment maintained   fall prevention program maintained   nonskid shoes/slippers when out of bed   room organization consistent   safety round/check completed  Intervention: Prevent Skin Injury  Recent Flowsheet Documentation  Taken 10/10/2024 1600 by Viridiana Valdovinos RN  Body Position: supine  Skin Protection: (silicone dressings placed on heels and coccyx)   adhesive use limited   incontinence pads utilized   transparent dressing maintained   tubing/devices free from skin contact   skin sealant/moisture barrier applied   silicone foam dressing in place  Intervention: Prevent and Manage VTE (Venous Thromboembolism) Risk  Recent Flowsheet Documentation  Taken 10/10/2024 1744 by Viridiana Valdovinos RN  Activity Management: up to bedside commode  Taken 10/10/2024 1600 by Viridiana Valdovinos RN  Activity Management: activity encouraged  Goal: Optimal Comfort and Wellbeing  Outcome: Progressing  Intervention: Provide Person-Centered Care  Recent Flowsheet Documentation  Taken 10/10/2024 1600 by Viridiana Valdovinos RN  Trust Relationship/Rapport:   care explained   choices provided  Goal: Readiness for Transition of Care  Outcome: Progressing  Intervention: Mutually Develop Transition Plan  Recent Flowsheet Documentation  Taken 10/10/2024 1609 by Viridiana Valdovinos RN  Transportation Anticipated: family or friend will provide  Patient/Family Anticipated Services at Transition: none  Patient/Family Anticipates Transition to: home  Taken 10/10/2024  1603 by Viridiana Valdovinos, RN  Equipment Currently Used at Home:   wheelchair   walker, rolling     Problem: Anemia  Goal: Anemia Symptom Improvement  Outcome: Progressing  Intervention: Monitor and Manage Anemia  Recent Flowsheet Documentation  Taken 10/10/2024 1600 by Viridiana Valdovinos, RN  Safety Promotion/Fall Prevention:   activity supervised   assistive device/personal items within reach   clutter free environment maintained   fall prevention program maintained   nonskid shoes/slippers when out of bed   room organization consistent   safety round/check completed   Goal Outcome Evaluation:

## 2024-10-10 NOTE — ED NOTES
" Avelina Ceron    Nursing Report ED to Floor:  Mental status: A/Ox4  Ambulatory status: Assist x2  Oxygen Therapy:  2lpm  Cardiac Rhythm: NSR  Admitted from: ED  Safety Concerns:  Fall  Social Issues: N/A  ED Room #:  19    ED Nurse Phone Extension - [phone] or may call [phone].      HPI:   Chief Complaint   Patient presents with    Abnormal Lab       Past Medical History:  Past Medical History:   Diagnosis Date    Anesthesia complication     Pt went into respiratory failure during total hip replacement in 2020    Arthritis of back     Arthritis of neck     Broken ribs     COPD (chronic obstructive pulmonary disease)     Coronary artery disease     Degenerative lumbar disc     Disease of thyroid gland     \"low thyroid\"    Fracture, fibula 1997?    Fracture, humerus 12/13/23    Fracture, tibia and fibula 1997?    Hip arthrosis     History of colonoscopy     Hyperlipidemia     Hypertension     Kidney stone July 2023    Knee swelling     Lumbosacral disc disease     On home O2     3L    Osteoarthritis     Periarthritis of shoulder     Urinary incontinence     Urinary tract infection         Past Surgical History:  Past Surgical History:   Procedure Laterality Date    CAROTID STENT Bilateral     COLONOSCOPY      EYE SURGERY  10/17/2022    yag surgery    GALLBLADDER SURGERY  2012    HYSTERECTOMY      ILIAC ARTERY STENT Bilateral     JOINT REPLACEMENT  1995; 2020    KIDNEY STONE SURGERY  July August 2023    OOPHORECTOMY  1976    1 ovary removed    TOTAL HIP ARTHROPLASTY Bilateral     URETEROSCOPY LASER LITHOTRIPSY WITH STENT INSERTION Right 07/26/2023    Procedure: CYSTOSCOPY, RIGHT RETROGRADE PYELOGRAM; WITH STENT INSERTION;  Surgeon: Lee Kim MD;  Location: [facility] OR;  Service: Urology;  Laterality: Right;    URETEROSCOPY LASER LITHOTRIPSY WITH STENT INSERTION Right 08/10/2023    Procedure: URETEROSCOPY, LASER LITHOTRIPSY, STENT EXCHANGE;  Surgeon: Lee Kim MD;  Location: [facility] OR;  Service: Urology;  " "Laterality: Right;        Admitting Doctor:   Daisy Amaya DO    Consulting Provider(s):  Consults       No orders found from 9/11/2024 to 10/11/2024.             Admitting Diagnosis:   The primary encounter diagnosis was Blood loss anemia. Diagnoses of Chronic anticoagulation, Leukocytosis, unspecified type, and Epigastric pain were also pertinent to this visit.    Most Recent Vitals:   Vitals:    10/10/24 1252 10/10/24 1254 10/10/24 1255 10/10/24 1307   BP: 121/50   142/55   BP Location: Right arm      Patient Position: Sitting      Pulse: 91   85   Resp: 19      Temp: 98.1 °F (36.7 °C)      SpO2: (!) 80% 95% 100% 98%   Weight: 68.9 kg (152 lb)      Height: 160 cm (63\")          Active LDAs/IV Access:   Lines, Drains & Airways       Active LDAs       Name Placement date Placement time Site Days    Peripheral IV 10/10/24 1315 Left Antecubital 10/10/24  1315  Antecubital  less than 1                    Labs (abnormal labs have a star):   Labs Reviewed   COMPREHENSIVE METABOLIC PANEL - Abnormal; Notable for the following components:       Result Value    Glucose 114 (*)     Creatinine 0.33 (*)     Potassium 3.1 (*)     CO2 33.0 (*)     Total Protein 5.9 (*)     All other components within normal limits    Narrative:     GFR Normal >60  Chronic Kidney Disease <60  Kidney Failure <15    The GFR formula is only valid for adults with stable renal function between ages 18 and 70.   CBC WITH AUTO DIFFERENTIAL - Abnormal; Notable for the following components:    WBC 14.72 (*)     RBC 2.25 (*)     Hemoglobin 7.2 (*)     Hematocrit 24.1 (*)     .1 (*)     MCHC 29.9 (*)     RDW 15.8 (*)     RDW-SD 60.4 (*)     Lymphocyte % 18.2 (*)     Immature Grans % 0.7 (*)     Neutrophils, Absolute 10.92 (*)     Immature Grans, Absolute 0.10 (*)     All other components within normal limits   URINALYSIS W/ MICROSCOPIC IF INDICATED (NO CULTURE)   PROCALCITONIN   POCT OCCULT BLOOD STOOL   TYPE AND SCREEN   ABO/RH SPECIMEN " VERIFICATION PREOP   PREPARE RBC   CBC AND DIFFERENTIAL    Narrative:     The following orders were created for panel order CBC & Differential.  Procedure                               Abnormality         Status                     ---------                               -----------         ------                     CBC Auto Differential[[id number]]        Abnormal            Final result               Scan Slide[[id number]]                                                                    Please view results for these tests on the individual orders.       Meds Given in ED:   Medications   sodium chloride 0.9 % flush 10 mL (has no administration in time range)   pantoprazole (PROTONIX) injection 80 mg (80 mg Intravenous Given 10/10/24 1417)   iopamidol (ISOVUE-370) 76 % injection 50 mL (50 mL Intravenous Given 10/10/24 1355)           Last NIH score:                                                          Dysphagia screening results:        Juan [place] Coma Scale:  No data recorded     CIWA:        Restraint Type:            Isolation Status:  No active isolations

## 2024-10-10 NOTE — PROGRESS NOTES
Chief Complaint   Patient presents with    Follow-up       History of Present Illness      The patient presents for a follow-up related to chronic atrial fibrillation. She denies palpitations. The patient denies fatigue, dizziness, nausea, dyspnea, edema, chest pain, syncope or exercise intolerance.    The patient presents for a follow-up related to hypertension. The patient reports that she has had no headaches or blurred vision. She states that she is taking her medication as prescribed. She is not having medication side effects.    The patient presents for a follow-up related to hypothyroidism. She reports a good energy level. She reports no hair loss, heat intolerance, cold intolerance, diarrhea, constipation or sweats. She is taking her medication as prescribed.    The patient presents for a follow-up related to GERD. The patient is on pantoprazole for her gastroesophageal reflux. The medication is taken on a regular basis and gives complete relief of the symptoms. She reports no abdominal pain, belching, dysphagia, early satiety, heartburn, hoarseness, odynophagia, rectal bleeding, vomiting or weight loss. The GERD has no known aggravating factors.    The patient presents for a follow-up related to chronic obstructive pulmonary disease. Rescue inhaler usage is reported to be four times daily. The patient reports that she has no known triggers. Since the last office visit, the patient has not sought emergency care. She currently reports that she is experiencing cough and sputum production but experiences no wheezing. The patient is using an inhaled steroid. She does rinse her mouth after using her steroid inhaler. She denies hemoptysis. She is not a smoker.    The patient presents for a follow-up related to pneumonia. She states that she completed her antibiotics. She reports a persistent cough. The cough is productive. The sputum is clear. The patient denies a fever. The patient also denies chills, rashes,  sore throat or congestion.    The patient presents for follow-up of low back pain. She reports that the pain is stable.    The patient presents for a follow-up related to anemia. There is a history of blood loss from the GI tract. The patient has no symptoms of a dry cough, a wet cough, myalgias, sweats, decreased appetite or paresthesias. The patient's energy level is normal.    The patient presents for a follow-up related to a one day history of dark red blood per rectum. The patient denies emesis. The patient denies a change in bowel habits. The stool caliber is normal. The patient denies diarrhea. There has not been constipation. The patient has not had a fever. There is no associated rectal pain. The patient denies abdominal pain. There has not been any unexplained weight loss. The patient denies nose bleeds. There has been no lightheadedness.         Medications      Current Outpatient Medications:     acetaminophen (TYLENOL) 500 MG tablet, Take 1 tablet by mouth Every 6 (Six) Hours As Needed for Mild Pain, Headache or Fever. OTC, Disp: , Rfl:     albuterol sulfate  (90 Base) MCG/ACT inhaler, Inhale 2 puffs Every 4 (Four) Hours As Needed for Shortness of Air or Wheezing., Disp: , Rfl:     apixaban (ELIQUIS) 5 MG tablet tablet, Take 1 tablet by mouth Every 12 (Twelve) Hours as directed for Atrial Fibrillation, Disp: 60 tablet, Rfl: 2    aspirin 81 MG chewable tablet, Chew 1 tablet Daily. OTC, Disp: , Rfl:     cetirizine (zyrTEC) 10 MG tablet, Take 1 tablet by mouth Daily. OTC, Disp: , Rfl:     clopidogrel (PLAVIX) 75 MG tablet, Take 1 tablet by mouth Daily., Disp: 90 tablet, Rfl: 1    dofetilide (TIKOSYN) 250 MCG capsule, Take 1 capsule by mouth 2 (Two) Times a Day for 90 days., Disp: 60 capsule, Rfl: 2    Fiber Select Gummies chewable tablet, Chew 2 tablets Daily. OTC, Disp: , Rfl:     Fluticasone-Umeclidin-Vilant (Trelegy Ellipta) 100-62.5-25 MCG/ACT inhaler, Inhale 1 puff Daily., Disp: 180 each, Rfl:  3    gabapentin (NEURONTIN) 300 MG capsule, Take 1 capsule by mouth Every Night., Disp: 90 capsule, Rfl: 0    isosorbide mononitrate (IMDUR) 30 MG 24 hr tablet, Take 1 tablet by mouth Daily., Disp: 90 tablet, Rfl: 1    latanoprost (XALATAN) 0.005 % ophthalmic solution, Administer 1 drop to both eyes Every Night., Disp: , Rfl:     levothyroxine (SYNTHROID, LEVOTHROID) 200 MCG tablet, TAKE 1 TABLET DAILY (Patient taking differently: Take 1 tablet by mouth Every Morning.), Disp: 90 tablet, Rfl: 1    losartan (COZAAR) 50 MG tablet, Take 1 tablet by mouth Daily., Disp: 30 tablet, Rfl: 2    Multiple Vitamins-Minerals (PRESERVISION AREDS 2 PO), Take 1 tablet by mouth 2 (Two) Times a Day. OTC, Disp: , Rfl:     nitroglycerin (NITROSTAT) 0.4 MG SL tablet, Place 1 tablet under the tongue Every 5 (Five) Minutes As Needed for Chest Pain. Take no more than 3 doses in 15 minutes., Disp: 25 tablet, Rfl: 0    nystatin (MYCOSTATIN) 114008 UNIT/GM cream, Apply 1 Application topically to the appropriate area as directed 4 (Four) Times a Day., Disp: 30 g, Rfl: 1    ondansetron (Zofran) 4 MG tablet, Take 1 tablet by mouth Every 8 (Eight) Hours As Needed for Nausea or Vomiting., Disp: 30 tablet, Rfl: 1    pantoprazole (PROTONIX) 40 MG EC tablet, TAKE 1 TABLET DAILY, Disp: 90 tablet, Rfl: 3    polyethylene glycol (MIRALAX) 17 GM/SCOOP powder, Take 17 g by mouth As Needed (Constipation). OTC, Disp: , Rfl:     roflumilast (DALIRESP) 500 MCG tablet tablet, Take 1 tablet by mouth Daily., Disp: 90 tablet, Rfl: 1    sertraline (ZOLOFT) 100 MG tablet, Take 1 tablet by mouth Daily., Disp: 90 tablet, Rfl: 1    simvastatin (ZOCOR) 40 MG tablet, Take 1 tablet by mouth Every Night., Disp: 90 tablet, Rfl: 1    traMADol (ULTRAM) 50 MG tablet, Take 1 tablet by mouth Every 6 (Six) Hours As Needed for Moderate Pain., Disp: 120 tablet, Rfl: 0    Vibegron (Gemtesa) 75 MG tablet, Take 1 tablet by mouth once daily, Disp: 90 tablet, Rfl: 3      Allergies    Allergies   Allergen Reactions    Penicillins Hives, Swelling and Mental Status Change    Codeine Mental Status Change    Dopamine Palpitations and Other (See Comments)     Other reaction(s): Hypertension       Problem List    Patient Active Problem List   Diagnosis    Coronary arteriosclerosis in native artery    Chronic obstructive pulmonary disease    Depression    Gastroesophageal reflux disease with esophagitis    Hyperlipidemia    Hypertension    Hypothyroidism    Osteoarthritis    Peripheral arterial occlusive disease    Solitary pulmonary nodule    Vitamin D deficiency    Lower back pain    Carotid bruit    Hypoxemia    Chronic respiratory failure with hypoxia and hypercapnia    Allergic rhinitis    Chronic leukocytosis    Ureterolithiasis    Murmur, cardiac    Hospital discharge follow-up    Closed fracture of right proximal humerus    Right shoulder pain    Acute hypoxemic respiratory failure       Medications, Allergies, Problems List and Past History were reviewed and updated.    Physical Examination    /60 (BP Location: Right arm, Patient Position: Sitting, Cuff Size: Adult)   Pulse 88   Temp 97.8 °F (36.6 °C) (Temporal)   Resp 20   Wt 68.9 kg (152 lb)   LMP  (LMP Unknown)   BMI 26.93 kg/m²       HEENT: Head- Normocephalic Atraumatic. Facies- Within normal limits. Pinnas- Normal texture and shape bilaterally. Canals- Normal bilaterally. TMs- Normal bilaterally. Nares- Patent bilaterally. Nasal Septum- is normal. There is no tenderness to palpation over the frontal or maxillary sinuses. Lids- Normal bilaterally. Conjunctiva- Clear bilaterally. Sclera- Anicteric bilaterally. Oropharynx- Moist with no lesions. Tonsils- No enlargement, erythema or exudate.    Neck: Thyroid- non enlarged, symmetric and has no nodules. No bruits are detected. ROM- Normal Range of Motion with no rigidity.    Lungs: Auscultation- Clear to auscultation bilaterally. There are no retractions, clubbing or  cyanosis. The Expiratory to Inspiratory ratio is equal.    Lymph Nodes: Cervical- no enlarged lymph nodes noted. Clavicular- Deferred. Axillary- Deferred. Inguinal- Deferred.    Cardiovascular: There are no carotid bruits. Heart- Normal Rate with Irregularly Irregular rhythm and no murmurs. There are no gallops. There are no rubs. In the lower extremities there is no edema. The upper extremities do not have edema.      Abdomen: Soft, benign, non-tender with no masses, hernias, organomegaly or scars.    Rectal: reveals normal external sphincter tone with no external lesions.    Back: The patient has a normal spinal curvature with no evidence of scoliosis. There are no skin lesions noted on the back. Palpation reveals no tenderness and normal muscle tone. The straight leg raising test is negative. The back has normal flexion, extension, rotation, and lateral bending.    Lower Extremity Neuro Exam: Muscle Strength is 5/5 in all major lower extremity muscle groups. Deep Tendon Reflexes are 2+ and equal in the patellar and Achilles distributions bilaterally. Sensation is normal in all distributions.    Impression and Assessment    Atrial Fibrillation.    Essential Hypertension.    Hypothyroidism.    Gastroesophageal Reflux Disease.    Chronic Obstructive Pulmonary Disease.    Pneumonia.    Low Back Pain.    Anemia.    Plan    Gastroesophageal Reflux Disease Plan: The patient was instructed to continue the current medications.    Atrial Fibrillation Plan: The patient was instructed to continue the current medications.    Essential Hypertension Plan: The patient was instructed to continue the current medications.    Hypothyroidism Plan: The patient was instructed to continue the current medications.    Chronic Obstructive Pulmonary Disease Plan: Continue the current medication regimen.    Pneumonia Plan: Further plans will be formulated after the test results are available.    Low Back Pain Plan: The patient was instructed  to continue the current medications.    Anemia Plan: Further plans will be made after results of tests are available.    Diagnoses and all orders for this visit:    1. Atrial fibrillation, unspecified type (Primary)  -     CBC & Differential; Future  -     Comprehensive Metabolic Panel; Future  -     CBC & Differential  -     Comprehensive Metabolic Panel    2. Essential hypertension  -     CBC & Differential; Future  -     Comprehensive Metabolic Panel; Future  -     CBC & Differential  -     Comprehensive Metabolic Panel    3. Hypothyroidism, unspecified type  -     TSH; Future  -     T4, Free; Future  -     TSH  -     T4, Free    4. Gastroesophageal reflux disease with esophagitis without hemorrhage    5. Chronic obstructive pulmonary disease, unspecified COPD type    6. Pneumonia due to infectious organism, unspecified laterality, unspecified part of lung  -     XR Chest PA & Lateral; Future    7. Anemia, unspecified type  -     CBC & Differential; Future  -     Ferritin; Future  -     Iron Profile; Future  -     CBC & Differential  -     Ferritin  -     Iron Profile    8. Chronic midline low back pain without sciatica  -     XR Spine Lumbar 2 or 3 View; Future    9. Dysuria  -     XR Abdomen KUB; Future  -     POC Urinalysis Dipstick, Automated; Future            Return to Office    The patient was instructed to return for follow-up at the next scheduled visit. The patient was instructed to return sooner if the condition changes, worsens, or does not resolve.

## 2024-10-10 NOTE — CONSULTS
[facility]    Referring Provider: No ref. provider found    PCP: Dre Moura MD    Reason for Consultation: melena, ABLA    Chief complaint: melena    History of present illness:    Avelina Ceron is a 83 y.o. female w h/o CAD s/p bilateral ICA stents on ASA and Plavix, PAD s/p bilateral iliac artery stents, Afib on Eliquis, COPD on 2L NC, recent admission for multifocal PNA who presents with melena and is noted to have resultant blood loss anemia.  Patient reports she thinks she has had black stools for the past month at least.  She previously was on iron but this was discontinued over the summer.  She denies any n/v.  Patient denies taking NSAIDs but then reports I would have to ask her daughter as she reports her daughter helps her with medications.  Patient reports some intermittent crampy lower abdominal pain.  She denies any previous h/o GI bleeding.      Allergies:  Penicillins, Codeine, and Dopamine    Scheduled Meds:  [Held by provider] apixaban, 5 mg, Oral, Q12H  [Held by provider] aspirin, 81 mg, Oral, Daily  atorvastatin, 20 mg, Oral, Daily  budesonide-formoterol, 2 puff, Inhalation, BID - RT   And  [START ON 10/11/2024] tiotropium bromide monohydrate, 2 puff, Inhalation, Daily - RT  [Held by provider] clopidogrel, 75 mg, Oral, Daily  dofetilide, 250 mcg, Oral, BID  gabapentin, 300 mg, Oral, Nightly  [Held by provider] isosorbide mononitrate, 30 mg, Oral, Daily  [START ON 10/11/2024] levothyroxine, 200 mcg, Oral, Q AM  [Held by provider] losartan, 50 mg, Oral, Q24H  [START ON 10/11/2024] oxybutynin XL, 10 mg, Oral, Daily  pantoprazole, 40 mg, Intravenous, Q12H  potassium chloride ER, 40 mEq, Oral, Q4H  roflumilast, 500 mcg, Oral, Daily  sertraline, 100 mg, Oral, Daily  sodium chloride, 10 mL, Intravenous, Q12H  sodium chloride, 10 mL, Intravenous, Q12H         Infusions:       PRN Meds:    acetaminophen    senna-docusate sodium **AND** polyethylene glycol **AND** bisacodyl  **AND** bisacodyl    Calcium Replacement - Follow Nurse / BPA Driven Protocol    HYDROmorphone    [START ON 10/11/2024] influenza vaccine    Magnesium Standard Dose Replacement - Follow Nurse / BPA Driven Protocol    Phosphorus Replacement - Follow Nurse / BPA Driven Protocol    Potassium Replacement - Follow Nurse / BPA Driven Protocol    Insert Peripheral IV **AND** sodium chloride    sodium chloride    sodium chloride    traMADol    Home Meds:  Medications Prior to Admission   Medication Sig Dispense Refill Last Dose    acetaminophen (TYLENOL) 500 MG tablet Take 1 tablet by mouth Every 6 (Six) Hours As Needed for Mild Pain, Headache or Fever. OTC   Past Week    albuterol sulfate  (90 Base) MCG/ACT inhaler Inhale 2 puffs Every 4 (Four) Hours As Needed for Shortness of Air or Wheezing.   Past Week    apixaban (ELIQUIS) 5 MG tablet tablet Take 1 tablet by mouth Every 12 (Twelve) Hours as directed for Atrial Fibrillation 60 tablet 2 10/10/2024    aspirin 81 MG chewable tablet Chew 1 tablet Daily. OTC   10/9/2024    cetirizine (zyrTEC) 10 MG tablet Take 1 tablet by mouth Daily. OTC   10/9/2024    clopidogrel (PLAVIX) 75 MG tablet Take 1 tablet by mouth Daily. 90 tablet 1 10/9/2024    dofetilide (TIKOSYN) 250 MCG capsule Take 1 capsule by mouth 2 (Two) Times a Day for 90 days. 60 capsule 2 10/10/2024    Fiber Select Gummies chewable tablet Chew 2 tablets Daily. OTC   10/9/2024    Fluticasone-Umeclidin-Vilant (Trelegy Ellipta) 100-62.5-25 MCG/ACT inhaler Inhale 1 puff Daily. 180 each 3 10/9/2024    gabapentin (NEURONTIN) 300 MG capsule Take 1 capsule by mouth Every Night. 90 capsule 0 10/9/2024    isosorbide mononitrate (IMDUR) 30 MG 24 hr tablet Take 1 tablet by mouth Daily. 90 tablet 1 10/9/2024    latanoprost (XALATAN) 0.005 % ophthalmic solution Administer 1 drop to both eyes Every Night.   10/9/2024    levothyroxine (SYNTHROID, LEVOTHROID) 200 MCG tablet TAKE 1 TABLET DAILY (Patient taking differently: Take  1 tablet by mouth Every Morning.) 90 tablet 1 10/10/2024    losartan (COZAAR) 50 MG tablet Take 1 tablet by mouth Daily. 30 tablet 2 10/9/2024    Multiple Vitamins-Minerals (PRESERVISION AREDS 2 PO) Take 1 tablet by mouth 2 (Two) Times a Day. OTC   10/10/2024    ondansetron (Zofran) 4 MG tablet Take 1 tablet by mouth Every 8 (Eight) Hours As Needed for Nausea or Vomiting. 30 tablet 1 Past Week    pantoprazole (PROTONIX) 40 MG EC tablet TAKE 1 TABLET DAILY 90 tablet 3 10/9/2024    polyethylene glycol (MIRALAX) 17 GM/SCOOP powder Take 17 g by mouth As Needed (Constipation). OTC   Past Week    roflumilast (DALIRESP) 500 MCG tablet tablet Take 1 tablet by mouth Daily. 90 tablet 1 10/9/2024    sertraline (ZOLOFT) 100 MG tablet Take 1 tablet by mouth Daily. 90 tablet 1 10/9/2024    simvastatin (ZOCOR) 40 MG tablet Take 1 tablet by mouth Every Night. 90 tablet 1 10/9/2024    traMADol (ULTRAM) 50 MG tablet Take 1 tablet by mouth Every 6 (Six) Hours As Needed for Moderate Pain. (Patient taking differently: Take 1 tablet by mouth Every 6 (Six) Hours.) 120 tablet 0 10/9/2024    Vibegron (Gemtesa) 75 MG tablet Take 1 tablet by mouth once daily 90 tablet 3 10/9/2024    atenolol (TENORMIN) 50 MG tablet Take 1 tablet by mouth Daily.       nitroglycerin (NITROSTAT) 0.4 MG SL tablet Place 1 tablet under the tongue Every 5 (Five) Minutes As Needed for Chest Pain. Take no more than 3 doses in 15 minutes. 25 tablet 0 More than a month       ROS: Review of Systems   Constitutional:  Negative for chills and fever.   Respiratory:  Negative for cough and shortness of breath.    Gastrointestinal:  Positive for blood in stool. Negative for abdominal distention, abdominal pain, nausea and vomiting.   Skin:  Negative for color change and rash.       PAST MED HX:  Past Medical History:   Diagnosis Date    Anesthesia complication     Pt went into respiratory failure during total hip replacement in 2020    Arthritis of back     Arthritis of neck   "   Broken ribs     COPD (chronic obstructive pulmonary disease)     Coronary artery disease     Degenerative lumbar disc     Disease of thyroid gland     \"low thyroid\"    Fracture, fibula 1997?    Fracture, humerus 12/13/23    Fracture, tibia and fibula 1997?    Hip arthrosis     History of colonoscopy     Hyperlipidemia     Hypertension     Kidney stone July 2023    Knee swelling     Lumbosacral disc disease     On home O2     3L    Osteoarthritis     Periarthritis of shoulder     Urinary incontinence     Urinary tract infection        PAST SURG HX:  Past Surgical History:   Procedure Laterality Date    CAROTID STENT Bilateral     COLONOSCOPY      EYE SURGERY  10/17/2022    yag surgery    GALLBLADDER SURGERY  2012    HYSTERECTOMY      ILIAC ARTERY STENT Bilateral     JOINT REPLACEMENT  1995; 2020    KIDNEY STONE SURGERY  July August 2023    OOPHORECTOMY  1976    1 ovary removed    TOTAL HIP ARTHROPLASTY Bilateral     URETEROSCOPY LASER LITHOTRIPSY WITH STENT INSERTION Right 07/26/2023    Procedure: CYSTOSCOPY, RIGHT RETROGRADE PYELOGRAM; WITH STENT INSERTION;  Surgeon: Lee Kim MD;  Location[facility];  Service: Urology;  Laterality: Right;    URETEROSCOPY LASER LITHOTRIPSY WITH STENT INSERTION Right 08/10/2023    Procedure: URETEROSCOPY, LASER LITHOTRIPSY, STENT EXCHANGE;  Surgeon: Lee Kim MD;  Location: [facility] OR;  Service: Urology;  Laterality: Right;       FAM HX:  Family History   Problem Relation Age of Onset    Cancer Other         BREAST, COLON, OVARIAN    Depression Other     Hyperlipidemia Other     Hypertension Other     Heart disease Mother     Hypertension Mother     Arthritis Daughter     Cancer Daughter     Hyperlipidemia Daughter     Liver disease Daughter     Thyroid disease Daughter     Cancer Maternal Aunt     Diabetes Maternal Uncle     Alzheimer's disease Sister        SOC HX:  Social History     Socioeconomic History    Marital status:    Tobacco Use    " "Smoking status: Former     Current packs/day: 0.00     Average packs/day: 1 pack/day for 50.0 years (50.0 ttl pk-yrs)     Types: Cigarettes     Start date: 1956     Quit date: 2006     Years since quittin.7     Passive exposure: Past    Smokeless tobacco: Never   Vaping Use    Vaping status: Never Used   Substance and Sexual Activity    Alcohol use: Not Currently     Comment: no etoh for past 10 years    Drug use: No    Sexual activity: Not Currently       PHYSICAL EXAM  /56   Pulse 86   Temp 97.8 °F (36.6 °C) (Oral)   Resp 16   Ht 160 cm (63\")   Wt 68.9 kg (152 lb)   LMP  (LMP Unknown)   SpO2 97%   BMI 26.93 kg/m²   Wt Readings from Last 3 Encounters:   10/10/24 68.9 kg (152 lb)   10/09/24 68.9 kg (152 lb)   24 72.6 kg (160 lb)   ,body mass index is 26.93 kg/m².  Physical Exam   General: Patient resting in bed, cooperative   Eyes: Normal lids and lashes, no scleral icterus   Neck: Supple, normal ROM   Skin: Warm and dry, not jaundiced   Cardiovascular: Regular rate, well-perfused extremities   Pulm: Equal expansion bilaterally, no increased WOB   Abdomen: Soft, nontender, nondistended;    Extremities: No rash or edema              Neuro: A&O, interactive   Psychiatric: Normal mood and behavior;     Results Review:   I reviewed the patient's new clinical results.    Lab Results   Component Value Date    WBC 14.72 (H) 10/10/2024    HGB 7.2 (L) 10/10/2024    HGB 6.8 (A) 10/10/2024    HGB 7.7 (L) 10/09/2024    HCT 24.1 (L) 10/10/2024    .1 (H) 10/10/2024     10/10/2024       Lab Results   Component Value Date    INR 0.98 2023    INR 1.04 2020       Lab Results   Component Value Date    GLUCOSE 114 (H) 10/10/2024    BUN 8 10/10/2024    CREATININE 0.33 (L) 10/10/2024    EGFRIFNONA 119 2021    BCR 24.2 10/10/2024     10/10/2024    K 3.1 (L) 10/10/2024    CO2 33.0 (H) 10/10/2024    CALCIUM 9.1 10/10/2024    ALBUMIN 3.8 10/10/2024    ALKPHOS 66 " 10/10/2024    BILITOT 0.3 10/10/2024    ALT 15 10/10/2024    AST 23 10/10/2024     CT abd/pelvis  IMPRESSION:     1. Accounting for the limitations of the exam there is no evidence of active extravasation or obvious pooling blood noted within the GI tract     2. Diverticulosis without evidence of diverticulitis     3. Mild prominence of the wall thickness of the descending colon accentuated by incomplete distention. Findings likely artifactual. Colitis thought to be less likely     4. Nonobstructing left renal calculi     5. Other findings as above     ASSESSMENTS/PLANS  Impression:  Melena  ABLA, Macrocytic Indices with   CAD s/p bilateral ICA stents on ASA and Plavix'  PAD s/p bilateral iliac artery stents  Afib on Eliquis  COPD on 2L NC  Hypokalemia  Leukocytosis (improved from recent admission)    Plan:  -IV PPI  -Continue to monitor H/H, transfuse to keep Hb above 7 unless Cardiology prefers higher threshold of 8  -Admitting team has consulted Cardiology to weigh in on DAPT, AC going forward  -CLD this evening  - NPO after midnight  - Plan for EGD tomorrow  - Please let on call GI know of any change in clinical status overnight    - I attempted to call patient's daughter, Clary Harman to further discuss recent history per patient request but was unable to reach her.    I discussed the patient's findings and my recommendations with patient    Markie Bautista MD  10/10/24  18:37 EDT

## 2024-10-10 NOTE — Clinical Note
Level of Care: Telemetry [5]   Diagnosis: Anemia due to blood loss, acute [324228]   Admitting Physician: JOSE BRANDT [[id number]]   Attending Physician: JOSE BRANDT [[id number]]

## 2024-10-10 NOTE — H&P
[facility] Medicine Services  HISTORY AND PHYSICAL    Patient Name: Avelina Ceron  : 1940  MRN: [id number]  Primary Care Physician: Dre Moura MD  Date of admission: 10/10/2024      Subjective   Subjective     Chief Complaint:  Generalized weakness, fatigue, nausea    HPI:  Avelina Ceron is a 83 y.o. female with PMHx of CAD s/p bilateral ICA stents, PAD s/p bilateral iliac artery stents, recently diagnosed A-fib on Eliquis, hypertension, hyperlipidemia, hypothyroidism, GERD, COPD with chronic hypoxemic respiratory failure on 2L NC, recent hospitalization  to  for AECOPD, multifocal pneumonia, acute on chronic respiratory failure and new A-fib with RVR who presented with gradually worsening nausea, fatigue, generalized weakness and melena. Presented from PCPs office due to melena and acute anemia.    History obtained from patient and daughter while in the ER. Patient reports that she has been really tired, sleeping a lot over the past few weeks. Has had worsening nausea and generalized weakness. Per daughter, patient has had black stools since July, at which time her oral iron supplement was discontinued. Was started on Eliquis after recent admission where A-fib was diagnosed. Patient has continued to have black tarry stools, coffee-ground appearing over the past few weeks, often 2-4 times per day. Last EGD was around , could not remember date of last colonoscopy but polyps were removed at that time. Fell at home on 10/1, no LOC, legs gave out due to weakness. Currently reporting back pain from uncomfortable ER bed. Of note, patient has been on ASA, Plavix and Eliquis since last admission.    Personal History     Past Medical History:   Diagnosis Date    Anesthesia complication     Pt went into respiratory failure during total hip replacement in     Arthritis of back     Arthritis of neck     Broken ribs     COPD (chronic obstructive pulmonary disease)      "Coronary artery disease     Degenerative lumbar disc     Disease of thyroid gland     \"low thyroid\"    Fracture, fibula 1997?    Fracture, humerus 12/13/23    Fracture, tibia and fibula 1997?    Hip arthrosis     History of colonoscopy     Hyperlipidemia     Hypertension     Kidney stone July 2023    Knee swelling     Lumbosacral disc disease     On home O2     3L    Osteoarthritis     Periarthritis of shoulder     Urinary incontinence     Urinary tract infection            Past Surgical History:   Procedure Laterality Date    CAROTID STENT Bilateral     COLONOSCOPY      EYE SURGERY  10/17/2022    yag surgery    GALLBLADDER SURGERY  2012    HYSTERECTOMY      ILIAC ARTERY STENT Bilateral     JOINT REPLACEMENT  1995; 2020    KIDNEY STONE SURGERY  July August 2023    OOPHORECTOMY  1976    1 ovary removed    TOTAL HIP ARTHROPLASTY Bilateral     URETEROSCOPY LASER LITHOTRIPSY WITH STENT INSERTION Right 07/26/2023    Procedure: CYSTOSCOPY, RIGHT RETROGRADE PYELOGRAM; WITH STENT INSERTION;  Surgeon: Lee Kim MD;  Location: [facility] OR;  Service: Urology;  Laterality: Right;    URETEROSCOPY LASER LITHOTRIPSY WITH STENT INSERTION Right 08/10/2023    Procedure: URETEROSCOPY, LASER LITHOTRIPSY, STENT EXCHANGE;  Surgeon: Lee Kim MD;  Location:  [facility] OR;  Service: Urology;  Laterality: Right;       Family History: family history includes Alzheimer's disease in her sister; Arthritis in her daughter; Cancer in her daughter, maternal aunt, and another family member; Depression in an other family member; Diabetes in her maternal uncle; Heart disease in her mother; Hyperlipidemia in her daughter and another family member; Hypertension in her mother and another family member; Liver disease in her daughter; Thyroid disease in her daughter.     Social History:  reports that she quit smoking about 18 years ago. Her smoking use included cigarettes. She started smoking about 68 years ago. She has a 50 pack-year " smoking history. She has been exposed to tobacco smoke. She has never used smokeless tobacco. She reports that she does not currently use alcohol. She reports that she does not use drugs.  Social History     Social History Narrative    Not on file       Medications:  Available home medication information reviewed.  Fiber Select Gummies, Fluticasone-Umeclidin-Vilant, Multiple Vitamins-Minerals, Vibegron, acetaminophen, albuterol sulfate HFA, apixaban, aspirin, atenolol, cetirizine, clopidogrel, dofetilide, gabapentin, isosorbide mononitrate, latanoprost, levothyroxine, losartan, nitroglycerin, ondansetron, pantoprazole, polyethylene glycol, roflumilast, sertraline, simvastatin, and traMADol    Allergies   Allergen Reactions    Penicillins Hives, Swelling and Mental Status Change    Codeine Mental Status Change    Dopamine Palpitations and Other (See Comments)     Other reaction(s): Hypertension       Objective   Objective     Vital Signs:   Temp:  [97.8 °F (36.6 °C)-98.1 °F (36.7 °C)] 97.8 °F (36.6 °C)  Heart Rate:  [84-91] 87  Resp:  [16-19] 16  BP: (111-178)/(35-71) 111/56  Flow (L/min):  [2-6] 2       Physical Exam   Constitutional: Awake, alert, resting comfortably  HENT: NCAT, mucous membranes moist  Respiratory: Clear to auscultation bilaterally, respiratory effort normal, on 2 L nasal cannula  Cardiovascular: RRR, no murmurs, rubs, or gallops  Gastrointestinal: soft, nontender, nondistended  Musculoskeletal: No bilateral ankle edema  Psychiatric: Appropriate affect, cooperative  Neurologic: Alert, oriented x 3, no focal deficits, speech clear  Skin: Generalized pallor present, medium-sized ecchymosis on right hip, nontender      Result Review:  I have personally reviewed the results from the time of this admission to 10/10/2024 16:09 EDT and agree with these findings:  [x]  Laboratory list / accordion  [x]  Microbiology  [x]  Radiology  [x]  EKG/Telemetry   []  Cardiology/Vascular   []  Pathology  []  Old  records  []  Other:      LAB RESULTS:      Lab 10/10/24  1316 10/10/24  1212 10/09/24  1328   WBC 14.72*  --  19.92*   HEMOGLOBIN 7.2*  --  7.7*   HEMOGLOBIN, POC  --  6.8*  --    HEMATOCRIT 24.1*  --  24.5*   PLATELETS 362  --  411   NEUTROS ABS 10.92*  --  17.12*   IMMATURE GRANS (ABS) 0.10*  --  0.18*   LYMPHS ABS 2.68  --  1.78   MONOS ABS 0.80  --  0.72   EOS ABS 0.10  --  0.02   .1*  --  102.1*   PROCALCITONIN 0.06  --   --          Lab 10/10/24  1316 10/09/24  1328   SODIUM 142 142   POTASSIUM 3.1* 3.7   CHLORIDE 99 101   CO2 33.0* 32.0*   ANION GAP 10.0 9.0   BUN 8 9   CREATININE 0.33* 0.33*   EGFR 103.0 103.0   GLUCOSE 114* 114*   CALCIUM 9.1 9.5   TSH  --  6.350*         Lab 10/10/24  1316 10/09/24  1328   TOTAL PROTEIN 5.9* 6.2   ALBUMIN 3.8 3.7   GLOBULIN 2.1 2.5   ALT (SGPT) 15 15   AST (SGOT) 23 21   BILIRUBIN 0.3 0.2   ALK PHOS 66 68                 Lab 10/10/24  1421 10/10/24  1324 10/10/24  1324 10/09/24  1328   IRON  --   --   --  69   IRON SATURATION (TSAT)  --   --   --  19*   TIBC  --   --   --  361   TRANSFERRIN  --   --   --  242   FERRITIN  --   --   --  315.00*   ABO TYPING A   < > A  --    RH TYPING Positive   < > Positive  --    ANTIBODY SCREEN  --   --  Negative  --     < > = values in this interval not displayed.         UA          7/25/2024    14:49 9/1/2024    12:28 10/10/2024    12:12   Urinalysis   Squamous Epithelial Cells, UA  7-12     Specific Gravity, UA  1.022     Ketones, UA Negative  Trace  15 mg/dL    Blood, UA  Large (3+)     Leukocytes,  Yen/ul  Negative  500 Yen/ul    Nitrite, UA  Negative     RBC, UA  Too Numerous to Count     WBC, UA  3-5     Bacteria, UA  None Seen         Microbiology Results (last 10 days)       ** No results found for the last 240 hours. **            XR Spine Lumbar 2 or 3 View    Result Date: 10/10/2024  XR SPINE LUMBAR 2 OR 3 VW Date of Exam: 10/9/2024 1:46 PM EDT Indication: low back pain Comparison: 12/12/2013 Findings: There are 5  nonrib-bearing lumbar vertebral segments. There is intervertebral disc space narrowing with some reactive endplate changes throughout the lumbar spine. Vertebral body height and alignment are unchanged from the previous study. There is very mild retrolisthesis of L2 on L3. There are facet degenerative changes throughout the lumbar spine and there is dense atherosclerotic calcification within the aorta and there are bilateral iliac stents placed. No fractures are identified.     Impression: Impression: 1. Advanced multilevel degenerative disc and facet disease with no fractures or acute findings. 2. Atherosclerotic disease with bilateral iliac stents. Electronically Signed: Markie Mac MD  10/10/2024 3:13 PM EDT  Workstation ID: [id number]    XR Chest PA & Lateral    Result Date: 10/10/2024  XR CHEST PA AND LATERAL Date of Exam: 10/9/2024 1:46 PM EDT Indication: follow-up pneumonia Comparison: 8/31/2024 Findings: Lungs appear hyperinflated. Interval resolution of multifocal infiltrates. Lungs currently clear. Heart size and pulmonary vessels normal     Impression: Impression: 1. Resolved pneumonia 2. Pulmonary emphysema Electronically Signed: Pete Valdez  10/10/2024 3:11 PM EDT  Workstation ID: [id number]    CT Abdomen Pelvis With & Without Contrast    Result Date: 10/10/2024  CT ABDOMEN PELVIS W WO CONTRAST Date of Exam: 10/10/2024 1:43 PM EDT Indication: abd pain, gi bleed. Comparison: CT from 7/25/2023 Technique: Axial CT images were obtained of the abdomen and pelvis before and after the uneventful intravenous administration of 50 mL Isovue 370. Sagittal and coronal reconstructions were performed.  Automated exposure control and iterative reconstruction methods were used. FINDINGS: Abdomen/Pelvis: Lower Chest: Atelectasis and scarring noted at the lung bases. Small to moderate size hiatal hernia is noted. No free air is noted below the diaphragm. Organs: Noncontrast and multi phase contrast imaging was obtained.  Noncontrast imaging of the kidneys demonstrate nonobstructing calculi. There is cortical scarring on the left. No hydronephrosis is noted. Small low-attenuation lesions too small to characterize are noted of the kidneys statistically likely represent cysts. There is no evidence of obstructive uropathy. Arterial phase imaging demonstrates no suspicious hypervascular lesion within the solid organs. Solid organs demonstrate no definite acute abnormality. The patient is status post cholecystectomy with dilation of the common duct compatible with postcholecystectomy ectasia. GI/Bowel: Noncontrast imaging and multiphase contrast images of the GI tract was obtained. There is mild motion limitation as well as streak artifact from bilateral hip prostheses causing limitation of the GI tract. Noncontrast imaging demonstrates no definite acute abnormality within the GI tract. Arterial phase imaging demonstrates no abnormal areas of contrast associated with the GI tract. No obvious pooling of contrast or focal abnormality noted on portal venous or delayed imaging. There is diverticulosis without evidence of acute diverticulitis. Relative decompression of the descending colon noted. Postsurgical changes are noted at the base of the cecum. There is no suspicious mesenteric adenopathy or fluid collection noted. Pelvis: Evaluation of the pelvis is limited by streak artifact. Bladder is poorly visualized. No suspicious fluid collections or adenopathy appreciated Peritoneum/Retroperitoneum: Atherosclerotic changes are noted of the aorta which is widely patent. There is no aneurysmal dilation. No suspicious retroperitoneal adenopathy Bones/Soft Tissues: Multilevel degenerative change noted at the spine. Bilateral hip prostheses are noted as previously discussed. There is exuberant heterotopic bone formation on the left. Remote healed fracture right iliac wing.     Impression: 1. Accounting for the limitations of the exam there is no  evidence of active extravasation or obvious pooling blood noted within the GI tract 2. Diverticulosis without evidence of diverticulitis 3. Mild prominence of the wall thickness of the descending colon accentuated by incomplete distention. Findings likely artifactual. Colitis thought to be less likely 4. Nonobstructing left renal calculi 5. Other findings as above Electronically Signed: Zack Perez MD  10/10/2024 3:01 PM EDT  Workstation ID: [id number]    XR Abdomen KUB    Result Date: 10/10/2024  XR ABDOMEN KUB Date of Exam: 10/9/2024 1:46 PM EDT Indication: dysuria Comparison: None available. There are surgical clips in the right upper quadrant. Single view. There is a small to moderate amount of stool. There is no bowel dilatation. There are bilateral hip arthroplasties. There is diffuse atherosclerotic vascular calcification. No definite calcifications are seen over the kidneys although bowel contents somewhat obscures detail. Pelvic calcifications likely are phleboliths.     Impression: Impression: Chronic findings. No acute process. No findings suspicious for urinary tract calculi. Electronically Signed: Mary Wheat MD  10/10/2024 2:29 PM EDT  Workstation ID: [id number]     Results for orders placed during the hospital encounter of 08/26/24    Adult Transthoracic Echo Limited W/ Cont if Necessary Per Protocol    Interpretation Summary    Left ventricular systolic function is normal. Calculated left ventricular EF = 64%    Moderate tricuspid valve regurgitation is present.    Estimated right ventricular systolic pressure from tricuspid regurgitation is moderately elevated (45-55 mmHg).    Mild MR      Assessment & Plan   Assessment & Plan       Anemia due to blood loss, acute    Avelina Ceron is a 83 y.o. female with PMHx of CAD s/p bilateral ICA stents, PAD s/p bilateral iliac artery stents, recently diagnosed A-fib on Eliquis, hypertension, hyperlipidemia, hypothyroidism, GERD, COPD with chronic  hypoxemic respiratory failure on 2L NC, recent hospitalization 8/26 to 9/4 for AECOPD, multifocal pneumonia, acute on chronic respiratory failure and new A-fib with RVR who presented with gradually worsening nausea, fatigue, generalized weakness and melena. Presented from PCPs office due to melena and acute anemia.    Acute GI bleeding   Acute on chronic blood loss anemia  -suspect upper GIB given persistent melena, has been on DAPT plus Eliquis since last hospital discharge  -Hgb 7.2 on admission, previously 10.2 one month ago  -CT abdomen/pelvis without evidence of active extravasation within GI tract  -Placing 2 PIVs. Continue IV PPI BID. 1 unit PRBCs ordered in ED, to be transfused. Will prepare additional unit and placed on hold. H&H q6h. Transfuse for Hgb <7 or active bleeding. GI consulted for endoscopic evaluation. SCDs, hold DAPT, Eliquis, anti-hypertensives.     Leukocytosis  -possibly from resolving pneumonia, could also be reactive from anemia, no new infectious symptoms  -WBC 14.72 on admission, improving from last admission for multifocal pneumonia, procal normal on admission  -CXR 10/9 with resolved pneumonia, evidence of pulmonary emphysema.  -Monitor clinically for now, off of antibiotics. Repeat CBC in AM.    Hypokalemia  -Secondary to GI losses  -Monitor and replace per protocol.    CAD s/p bilateral ICA stents- holding ASA, Plavix, Imdur. Cardiology consulted to weigh in on AC for discharge.  PAD s/p bilateral iliac artery stents- as above.   Hypertension- holding losartan due to GI bleeding and low-normal BP.  Hyperlipidemia- continue atorvastatin.   Hypothyroidism- continue levothyroxine.   COPD with hypoxemic respiratory failure- continue Symbicort, Spiriva, Roflumilast. Continue home oxygen.  PAF on Eliquis- continue Tikosyn, holding Eliquis.  Chronic pain- continue gabapentin, tramadol, PRN IV Dilaudid for breakthrough pain.  Chronic bladder dysfunction- oxybutynin ordered as  alternative.  Anxiety/depression-continue sertraline.      VTE Prophylaxis:  Pharmacologic & mechanical VTE prophylaxis orders are present.      CODE STATUS:    Code Status and Medical Interventions: CPR (Attempt to Resuscitate); Full Support   Ordered at: 10/10/24 1547     Level Of Support Discussed With:    Patient     Code Status (Patient has no pulse and is not breathing):    CPR (Attempt to Resuscitate)     Medical Interventions (Patient has pulse or is breathing):    Full Support       Expected Discharge   Expected discharge date/ time has not been documented.     Daisy Amaya, DO  10/10/24

## 2024-10-10 NOTE — ED PROVIDER NOTES
Subjective   History of Present Illness  This is a very pleasant 83-year-old female who is accompanied by her daughter who she lives with.  Dr. Moura is her primary care and she is sent by Dr. Moura from his office today for evaluation of dark stools and a dropping hemoglobin.    Baseline patient gets around room to room using a walker with modest difficulty is on 2 L of oxygen nasal cannula due to longstanding COPD with a remote smoking history.  She goes any distance she rides in a wheelchair.    She was admitted to the hospital here in the end of August beginning of September with hypoxia and multifocal pneumonia.  She improved but was left in a weakened state.  She had new onset atrial fibrillation during that admission and was started on Tikosyn and Eliquis.    Over the course of the past few weeks she has noticed persistent darkening of her stool intermittently associated with increasing fatigue and had blood work done as a matter routine and was noted to be worsening with her anemia.    She has had no fevers or chills.  Has occasional cough.  She has been compliant with all her medications.    She has had no history of GI bleed in the past.  She believes she has had a colonoscopy between 5 and 10 years ago and had polyps but she is not sure where it was done or who did it.  She probably had an EGD around 2016 and may have had gastritis but no history of ulcers that she knows of.    She has had some persistent abdominal pain that is upper abdomen does not change with eating or position.    Previously on oral iron therapy but started having pain back in July and was taken off this.    All other systems are reviewed and are negative except as noted above.        Review of Systems   All other systems reviewed and are negative.      Past Medical History:   Diagnosis Date    Anesthesia complication     Pt went into respiratory failure during total hip replacement in 2020    Arthritis of back     Arthritis of  "neck     Broken ribs     COPD (chronic obstructive pulmonary disease)     Coronary artery disease     Degenerative lumbar disc     Disease of thyroid gland     \"low thyroid\"    Fracture, fibula 1997?    Fracture, humerus 12/13/23    Fracture, tibia and fibula 1997?    Hip arthrosis     History of colonoscopy     Hyperlipidemia     Hypertension     Kidney stone July 2023    Knee swelling     Lumbosacral disc disease     On home O2     3L    Osteoarthritis     Periarthritis of shoulder     Urinary incontinence     Urinary tract infection      Echo Aug 2024  nterpretation Summary         Left ventricular systolic function is normal. Calculated left ventricular EF = 64%    Moderate tricuspid valve regurgitation is present.    Estimated right ventricular systolic pressure from tricuspid regurgitation is moderately elevated (45-55 mmHg).    Mild MR     Allergies   Allergen Reactions    Penicillins Hives, Swelling and Mental Status Change    Codeine Mental Status Change    Dopamine Palpitations and Other (See Comments)     Other reaction(s): Hypertension       Past Surgical History:   Procedure Laterality Date    CAROTID STENT Bilateral     COLONOSCOPY      EYE SURGERY  10/17/2022    yag surgery    GALLBLADDER SURGERY  2012    HYSTERECTOMY      ILIAC ARTERY STENT Bilateral     JOINT REPLACEMENT  1995; 2020    KIDNEY STONE SURGERY  July August 2023    OOPHORECTOMY  1976    1 ovary removed    TOTAL HIP ARTHROPLASTY Bilateral     URETEROSCOPY LASER LITHOTRIPSY WITH STENT INSERTION Right 07/26/2023    Procedure: CYSTOSCOPY, RIGHT RETROGRADE PYELOGRAM; WITH STENT INSERTION;  Surgeon: Lee Kim MD;  Location:  [facility];  Service: Urology;  Laterality: Right;    URETEROSCOPY LASER LITHOTRIPSY WITH STENT INSERTION Right 08/10/2023    Procedure: URETEROSCOPY, LASER LITHOTRIPSY, STENT EXCHANGE;  Surgeon: Lee Kim MD;  Location:  [facility];  Service: Urology;  Laterality: Right;       Family History   Problem " Relation Age of Onset    Cancer Other         BREAST, COLON, OVARIAN    Depression Other     Hyperlipidemia Other     Hypertension Other     Heart disease Mother     Hypertension Mother     Arthritis Daughter     Cancer Daughter     Hyperlipidemia Daughter     Liver disease Daughter     Thyroid disease Daughter     Cancer Maternal Aunt     Diabetes Maternal Uncle     Alzheimer's disease Sister        Social History     Socioeconomic History    Marital status:    Tobacco Use    Smoking status: Former     Current packs/day: 0.00     Average packs/day: 1 pack/day for 50.0 years (50.0 ttl pk-yrs)     Types: Cigarettes     Start date: 1956     Quit date: 2006     Years since quittin.7     Passive exposure: Past    Smokeless tobacco: Never   Vaping Use    Vaping status: Never Used   Substance and Sexual Activity    Alcohol use: Not Currently     Comment: no etoh for past 10 years    Drug use: No    Sexual activity: Not Currently           Objective   Physical Exam  Vitals and nursing note reviewed.   Constitutional:       Comments: This is a pleasant 83-year-old female she is alert and oriented GCS 15 she looks pale she is hemodynamically stable.   HENT:      Head: Normocephalic and atraumatic.      Right Ear: External ear normal.      Left Ear: External ear normal.      Nose: Nose normal.      Mouth/Throat:      Mouth: Mucous membranes are moist.      Pharynx: Oropharynx is clear.   Eyes:      Extraocular Movements: Extraocular movements intact.      Pupils: Pupils are equal, round, and reactive to light.      Comments: Her conjunctiva are pale.   Cardiovascular:      Rate and Rhythm: Normal rate and regular rhythm.      Pulses: Normal pulses.      Comments: She has a 2/6 systolic murmur at the left sternal border and has been told she had had a murmur in the past.  Pulmonary:      Effort: Pulmonary effort is normal.      Breath sounds: Normal breath sounds.   Abdominal:      Comments: BMI 26  positive bowel sounds soft with epigastric tenderness without organomegaly, masses, or guarding.   Genitourinary:     Comments: Buttocks bruise right buttock from a fall that she had is healing well not a large volume bruise.  Rectal exam shows normal sphincter tone there is dark nearly melanotic stool in the rectal vault that strongly guaiac positive.  Musculoskeletal:         General: Normal range of motion.      Cervical back: Normal range of motion and neck supple.      Comments: Equal full pulses in all 4 extremities without edema synovitis or rash.  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  Nailbeds are pale.   Skin:     General: Skin is warm and dry.      Capillary Refill: Capillary refill takes less than 2 seconds.   Neurological:      Comments: Face symmetric, voice soft, tongue midline.  Vision, hearing, speech preserved.  Modest global weakness without focality.         Critical Care    Performed by: Jett An MD  Authorized by: Jett An MD    Critical care provider statement:     Critical care time (minutes):  45    Critical care was necessary to treat or prevent imminent or life-threatening deterioration of the following conditions: Blood loss anemia requiring transfusion.    Critical care was time spent personally by me on the following activities:  Discussions with consultants, development of treatment plan with patient or surrogate, evaluation of patient's response to treatment, obtaining history from patient or surrogate, examination of patient, review of old charts, re-evaluation of patient's condition, pulse oximetry, ordering and review of radiographic studies, ordering and review of laboratory studies and ordering and performing treatments and interventions    Care discussed with: admitting provider               ED Course             Recent Results (from the past 24 hour(s))   POC Urinalysis Dipstick, Automated    Collection Time: 10/10/24 12:12 PM    Specimen:  Urine   Result Value Ref Range    Color Yellow Yellow, Straw, Dark Yellow, Vera    Clarity, UA Cloudy (A) Clear    Specific Gravity  1.025 1.005 - 1.030    pH, Urine 5.0 5.0 - 8.0    Leukocytes 500 Yen/ul (A) Negative    Nitrite, UA Negative Negative    Protein, POC Trace (A) Negative mg/dL    Glucose, UA Negative Negative mg/dL    Ketones, UA 15 mg/dL (A) Negative    Urobilinogen, UA Normal Normal, 0.2 E.U./dL    Bilirubin Negative Negative    Blood, UA Negative Negative    Lot Number [id number]     Expiration Date 07/31/25    POC Hemoglobin    Collection Time: 10/10/24 12:12 PM    Specimen: Blood   Result Value Ref Range    Hemoglobin 6.8 (A) 12.0 - 17.0 g/dL    Lot Number [id number]     Expiration Date 07/31/26    ECG 12 Lead Pre-Op / Pre-Procedure    Collection Time: 10/10/24  1:07 PM   Result Value Ref Range    QT Interval 386 ms    QTC Interval 456 ms   Comprehensive Metabolic Panel    Collection Time: 10/10/24  1:16 PM    Specimen: Blood   Result Value Ref Range    Glucose 114 (H) 65 - 99 mg/dL    BUN 8 8 - 23 mg/dL    Creatinine 0.33 (L) 0.57 - 1.00 mg/dL    Sodium 142 136 - 145 mmol/L    Potassium 3.1 (L) 3.5 - 5.2 mmol/L    Chloride 99 98 - 107 mmol/L    CO2 33.0 (H) 22.0 - 29.0 mmol/L    Calcium 9.1 8.6 - 10.5 mg/dL    Total Protein 5.9 (L) 6.0 - 8.5 g/dL    Albumin 3.8 3.5 - 5.2 g/dL    ALT (SGPT) 15 1 - 33 U/L    AST (SGOT) 23 1 - 32 U/L    Alkaline Phosphatase 66 39 - 117 U/L    Total Bilirubin 0.3 0.0 - 1.2 mg/dL    Globulin 2.1 gm/dL    A/G Ratio 1.8 g/dL    BUN/Creatinine Ratio 24.2 7.0 - 25.0    Anion Gap 10.0 5.0 - 15.0 mmol/L    eGFR 103.0 >60.0 mL/min/1.73   CBC Auto Differential    Collection Time: 10/10/24  1:16 PM    Specimen: Blood   Result Value Ref Range    WBC 14.72 (H) 3.40 - 10.80 10*3/mm3    RBC 2.25 (L) 3.77 - 5.28 10*6/mm3    Hemoglobin 7.2 (L) 12.0 - 15.9 g/dL    Hematocrit 24.1 (L) 34.0 - 46.6 %    .1 (H) 79.0 - 97.0 fL    MCH 32.0 26.6 - 33.0 pg    MCHC 29.9 (L) 31.5 - 35.7  g/dL    RDW 15.8 (H) 12.3 - 15.4 %    RDW-SD 60.4 (H) 37.0 - 54.0 fl    MPV 8.9 6.0 - 12.0 fL    Platelets 362 140 - 450 10*3/mm3    Neutrophil % 74.2 42.7 - 76.0 %    Lymphocyte % 18.2 (L) 19.6 - 45.3 %    Monocyte % 5.4 5.0 - 12.0 %    Eosinophil % 0.7 0.3 - 6.2 %    Basophil % 0.8 0.0 - 1.5 %    Immature Grans % 0.7 (H) 0.0 - 0.5 %    Neutrophils, Absolute 10.92 (H) 1.70 - 7.00 10*3/mm3    Lymphocytes, Absolute 2.68 0.70 - 3.10 10*3/mm3    Monocytes, Absolute 0.80 0.10 - 0.90 10*3/mm3    Eosinophils, Absolute 0.10 0.00 - 0.40 10*3/mm3    Basophils, Absolute 0.12 0.00 - 0.20 10*3/mm3    Immature Grans, Absolute 0.10 (H) 0.00 - 0.05 10*3/mm3    nRBC 0.0 0.0 - 0.2 /100 WBC   Procalcitonin    Collection Time: 10/10/24  1:16 PM    Specimen: Blood   Result Value Ref Range    Procalcitonin 0.06 0.00 - 0.25 ng/mL   Type & Screen    Collection Time: 10/10/24  1:24 PM    Specimen: Blood   Result Value Ref Range    ABO Type A     RH type Positive     Antibody Screen Negative     T&S Expiration Date 10/13/2024 11:59:59 PM    ABO/Rh Specimen Verification    Collection Time: 10/10/24  2:21 PM    Specimen: Blood   Result Value Ref Range    ABO Type A     RH type Positive    POC Occult Blood Stool    Collection Time: 10/10/24  3:08 PM    Specimen: Per Rectum; Stool   Result Value Ref Range    Fecal Occult Blood Positive (A)     Lot Number [id number]     Expiration Date 12/26     DEVELOPER LOT NUMBER [id number]     DEVELOPER EXPIRATION DATE 8/2,027     Positive Control Positive     Negative Control Negative    Prepare RBC, 1 Units    Collection Time: 10/10/24  3:40 PM   Result Value Ref Range    Product Code H1887X60     Unit Number [id number]     UNIT  ABO A     UNIT  RH POS     Crossmatch Interpretation Compatible     Dispense Status IS     Blood Expiration Date 776455835114     Blood Type Barcode 6200    Prepare RBC, 1 Units    Collection Time: 10/10/24  4:19 PM   Result Value Ref Range    Product Code K0708U70     Unit Number  [id number]     UNIT  ABO A     UNIT  RH POS     Crossmatch Interpretation Compatible     Dispense Status XM     Blood Expiration Date 690128999541     Blood Type Barcode 6200      Note: In addition to lab results from this visit, the labs listed above may include labs taken at another facility or during a different encounter within the last 24 hours. Please correlate lab times with ED admission and discharge times for further clarification of the services performed during this visit.    CT Abdomen Pelvis With & Without Contrast   Final Result      1. Accounting for the limitations of the exam there is no evidence of active extravasation or obvious pooling blood noted within the GI tract      2. Diverticulosis without evidence of diverticulitis      3. Mild prominence of the wall thickness of the descending colon accentuated by incomplete distention. Findings likely artifactual. Colitis thought to be less likely      4. Nonobstructing left renal calculi      5. Other findings as above               Electronically Signed: Zack Perez MD     10/10/2024 3:01 PM EDT     Workstation ID: [id number]        Vitals:    10/10/24 1340 10/10/24 1401 10/10/24 1528 10/10/24 1606   BP:  178/71 104/47 111/56   BP Location:   Right arm    Patient Position:   Lying    Pulse: 87  86    Resp:   16 16   Temp:   98.5 °F (36.9 °C) 97.8 °F (36.6 °C)   TempSrc:   Oral Oral   SpO2: 100%  99% 97%   Weight:       Height:         Medications   sodium chloride 0.9 % flush 10 mL (has no administration in time range)   sodium chloride 0.9 % flush 10 mL ( Intravenous Canceled Entry 10/10/24 1558)   sodium chloride 0.9 % flush 10 mL (has no administration in time range)   HYDROmorphone (DILAUDID) injection 0.5 mg (has no administration in time range)   pantoprazole (PROTONIX) injection 40 mg (has no administration in time range)   acetaminophen (TYLENOL) tablet 650 mg (has no administration in time range)   apixaban (ELIQUIS) tablet 5 mg (  Oral Dose Auto Held 10/18/24 2100)   aspirin chewable tablet 81 mg ( Oral Dose Auto Held 10/18/24 0900)   clopidogrel (PLAVIX) tablet 75 mg ( Oral Dose Auto Held 10/18/24 0900)   dofetilide (TIKOSYN) capsule 250 mcg (has no administration in time range)   budesonide-formoterol (SYMBICORT) 160-4.5 MCG/ACT inhaler 2 puff (has no administration in time range)     And   tiotropium (SPIRIVA RESPIMAT) 2.5 mcg/act aerosol solution inhaler (has no administration in time range)   gabapentin (NEURONTIN) capsule 300 mg (has no administration in time range)   isosorbide mononitrate (IMDUR) 24 hr tablet 30 mg ( Oral Dose Auto Held 10/18/24 0900)   levothyroxine (SYNTHROID, LEVOTHROID) tablet 200 mcg (has no administration in time range)   losartan (COZAAR) tablet 50 mg ( Oral Dose Auto Held 10/18/24 0900)   roflumilast (DALIRESP) tablet 500 mcg (has no administration in time range)   sertraline (ZOLOFT) tablet 100 mg (has no administration in time range)   atorvastatin (LIPITOR) tablet 20 mg (has no administration in time range)   traMADol (ULTRAM) tablet 50 mg (has no administration in time range)   oxybutynin XL (DITROPAN-XL) 24 hr tablet 10 mg (has no administration in time range)   sodium chloride 0.9 % flush 10 mL (has no administration in time range)   sodium chloride 0.9 % flush 10 mL (has no administration in time range)   Potassium Replacement - Follow Nurse / BPA Driven Protocol (has no administration in time range)   Magnesium Standard Dose Replacement - Follow Nurse / BPA Driven Protocol (has no administration in time range)   Phosphorus Replacement - Follow Nurse / BPA Driven Protocol (has no administration in time range)   Calcium Replacement - Follow Nurse / BPA Driven Protocol (has no administration in time range)   sennosides-docusate (PERICOLACE) 8.6-50 MG per tablet 2 tablet (has no administration in time range)     And   polyethylene glycol (MIRALAX) packet 17 g (has no administration in time range)     And    bisacodyl (DULCOLAX) EC tablet 5 mg (has no administration in time range)     And   bisacodyl (DULCOLAX) suppository 10 mg (has no administration in time range)   potassium chloride (KLOR-CON M20) CR tablet 40 mEq (40 mEq Oral Given 10/10/24 1615)   influenza vac split high-dose (FLUZONE HIGH DOSE) injection 0.5 mL (has no administration in time range)   pantoprazole (PROTONIX) injection 80 mg (80 mg Intravenous Given 10/10/24 1417)   iopamidol (ISOVUE-370) 76 % injection 50 mL (50 mL Intravenous Given 10/10/24 1355)     ECG/EMG Results (last 24 hours)       Procedure Component Value Units Date/Time    ECG 12 Lead Pre-Op / Pre-Procedure [[id number]] Collected: 10/10/24 1307     Updated: 10/10/24 1327     QT Interval 386 ms      QTC Interval 456 ms     Narrative:      Test Reason : Pre-Op / Pre-Procedure  Blood Pressure :   */*   mmHG  Vent. Rate :  84 BPM     Atrial Rate :  84 BPM     P-R Int : 148 ms          QRS Dur :  86 ms      QT Int : 386 ms       P-R-T Axes :  58  30  57 degrees     QTc Int : 456 ms    Normal sinus rhythm  Nonspecific ST and T wave abnormality  Abnormal ECG  When compared with ECG of 04-SEP-2024 04:51,  Nonspecific T wave abnormality no longer evident in Anterior leads  QT has shortened    Referred By:            Confirmed By:           ECG 12 Lead Pre-Op / Pre-Procedure   Final Result   Test Reason : Pre-Op / Pre-Procedure   Blood Pressure :   */*   mmHG   Vent. Rate :  84 BPM     Atrial Rate :  84 BPM      P-R Int : 148 ms          QRS Dur :  86 ms       QT Int : 386 ms       P-R-T Axes :  58  30  57 degrees      QTc Int : 456 ms      Normal sinus rhythm   Nonspecific ST and T wave abnormality   Abnormal ECG   When compared with ECG of 04-SEP-2024 04:51,   Nonspecific T wave abnormality no longer evident in Anterior leads   QT has shortened   Confirmed by YVONNE THAYER MD ([id number]) on 10/10/2024 3:38:19 PM      Referred By:            Confirmed By: YVONNE THAYER MD                                           Medical Decision Making      I have reviewed all available studies at the bedside with the patient and her daughter.  She has worsening anemia and a strongly guaiac positive stool that is dark reminiscent of melena.    Most likely upper GI source.    Agree inpatient IV proton pump inhibitor and I have typed and crossed her for blood and ordered 1 unit of packed red blood cells.    Situation complicated by the patient's chronic anticoagulation that I do not think she needs emergent reversal of this at this point but does need to be admitted for serial exams consideration of endoscopy.    I have contacted Dr. Bautista, on-call GI and he will see the patient in consultation.    I have contacted Dr. Nuno, on-call \[facility]\"" medicine he and his colleagues will admit the patient.    Given the patient's epigastric pain and leukocytosis a CT of the abdomen pelvis was ordered but is pending at the time of admission will be followed by the admitting service.  Procalcitonin is normal and it may be a reactive elevation of white blood cell count.    All are agreeable with the plan    Problems Addressed:  Blood loss anemia: complicated acute illness or injury with systemic symptoms that poses a threat to life or bodily functions  Chronic anticoagulation: chronic illness or injury with exacerbation, progression, or side effects of treatment that poses a threat to life or bodily functions  Epigastric pain: complicated acute illness or injury with systemic symptoms that poses a threat to life or bodily functions  Leukocytosis, unspecified type: complicated acute illness or injury with systemic symptoms that poses a threat to life or bodily functions    Amount and/or Complexity of Data Reviewed  Independent Historian: guardian  External Data Reviewed: ECG and notes.  Labs: ordered. Decision-making details documented in ED Course.  Radiology: ordered and independent interpretation performed. Decision-making details  documented in ED Course.  ECG/medicine tests: ordered and independent interpretation performed. Decision-making details documented in ED Course.    Risk  Prescription drug management.  Decision regarding hospitalization.    Critical Care  Total time providing critical care: 45 minutes        Final diagnoses:   Blood loss anemia   Chronic anticoagulation   Leukocytosis, unspecified type   Epigastric pain       ED Disposition  ED Disposition       ED Disposition   Decision to Admit    Condition   --    Comment   Level of Care: Telemetry [5]   Diagnosis: Anemia due to blood loss, acute [770442]   Admitting Physician: JOSE BRANDT [[id number]]   Attending Physician: JOSE BRANDT [[id number]]                 No follow-up provider specified.       Medication List      No changes were made to your prescriptions during this visit.            Jett An MD  10/10/24 [id number]

## 2024-10-11 ENCOUNTER — ANESTHESIA EVENT (OUTPATIENT)
Dept: GASTROENTEROLOGY | Facility: HOSPITAL | Age: 84
End: 2024-10-11
Payer: MEDICARE

## 2024-10-11 ENCOUNTER — ANESTHESIA (OUTPATIENT)
Dept: GASTROENTEROLOGY | Facility: HOSPITAL | Age: 84
End: 2024-10-11
Payer: MEDICARE

## 2024-10-11 PROBLEM — D50.0 BLOOD LOSS ANEMIA: Status: ACTIVE | Noted: 2024-10-10

## 2024-10-11 PROBLEM — D62 ANEMIA DUE TO ACUTE BLOOD LOSS: Status: ACTIVE | Noted: 2024-10-11

## 2024-10-11 LAB
ANION GAP SERPL CALCULATED.3IONS-SCNC: 5 MMOL/L (ref 5–15)
BASOPHILS # BLD AUTO: 0.12 10*3/MM3 (ref 0–0.2)
BASOPHILS NFR BLD AUTO: 0.8 % (ref 0–1.5)
BH BB BLOOD EXPIRATION DATE: NORMAL
BH BB BLOOD TYPE BARCODE: 6200
BH BB DISPENSE STATUS: NORMAL
BH BB PRODUCT CODE: NORMAL
BH BB UNIT NUMBER: NORMAL
BUN SERPL-MCNC: 8 MG/DL (ref 8–23)
BUN/CREAT SERPL: 25.8 (ref 7–25)
CALCIUM SPEC-SCNC: 8.6 MG/DL (ref 8.6–10.5)
CHLORIDE SERPL-SCNC: 103 MMOL/L (ref 98–107)
CO2 SERPL-SCNC: 35 MMOL/L (ref 22–29)
CREAT SERPL-MCNC: 0.31 MG/DL (ref 0.57–1)
CROSSMATCH INTERPRETATION: NORMAL
DEPRECATED RDW RBC AUTO: 72.1 FL (ref 37–54)
EGFRCR SERPLBLD CKD-EPI 2021: 104.6 ML/MIN/1.73
EOSINOPHIL # BLD AUTO: 0.17 10*3/MM3 (ref 0–0.4)
EOSINOPHIL NFR BLD AUTO: 1.1 % (ref 0.3–6.2)
ERYTHROCYTE [DISTWIDTH] IN BLOOD BY AUTOMATED COUNT: 20.3 % (ref 12.3–15.4)
GLUCOSE SERPL-MCNC: 104 MG/DL (ref 65–99)
HCT VFR BLD AUTO: 25.2 % (ref 34–46.6)
HCT VFR BLD AUTO: 26.8 % (ref 34–46.6)
HCT VFR BLD AUTO: 28.7 % (ref 34–46.6)
HCT VFR BLD AUTO: 29 % (ref 34–46.6)
HGB BLD-MCNC: 7.9 G/DL (ref 12–15.9)
HGB BLD-MCNC: 8.5 G/DL (ref 12–15.9)
HGB BLD-MCNC: 9 G/DL (ref 12–15.9)
HGB BLD-MCNC: 9.1 G/DL (ref 12–15.9)
IMM GRANULOCYTES # BLD AUTO: 0.09 10*3/MM3 (ref 0–0.05)
IMM GRANULOCYTES NFR BLD AUTO: 0.6 % (ref 0–0.5)
LYMPHOCYTES # BLD AUTO: 2.72 10*3/MM3 (ref 0.7–3.1)
LYMPHOCYTES NFR BLD AUTO: 17.8 % (ref 19.6–45.3)
MAGNESIUM SERPL-MCNC: 1.8 MG/DL (ref 1.6–2.4)
MCH RBC QN AUTO: 31.6 PG (ref 26.6–33)
MCHC RBC AUTO-ENTMCNC: 31.7 G/DL (ref 31.5–35.7)
MCV RBC AUTO: 99.6 FL (ref 79–97)
MONOCYTES # BLD AUTO: 0.89 10*3/MM3 (ref 0.1–0.9)
MONOCYTES NFR BLD AUTO: 5.8 % (ref 5–12)
NEUTROPHILS NFR BLD AUTO: 11.32 10*3/MM3 (ref 1.7–7)
NEUTROPHILS NFR BLD AUTO: 73.9 % (ref 42.7–76)
NRBC BLD AUTO-RTO: 0.1 /100 WBC (ref 0–0.2)
PLATELET # BLD AUTO: 357 10*3/MM3 (ref 140–450)
PMV BLD AUTO: 9.1 FL (ref 6–12)
POTASSIUM SERPL-SCNC: 4.2 MMOL/L (ref 3.5–5.2)
POTASSIUM SERPL-SCNC: 4.2 MMOL/L (ref 3.5–5.2)
RBC # BLD AUTO: 2.69 10*6/MM3 (ref 3.77–5.28)
SODIUM SERPL-SCNC: 143 MMOL/L (ref 136–145)
UNIT  ABO: NORMAL
UNIT  RH: NORMAL
WBC NRBC COR # BLD AUTO: 15.31 10*3/MM3 (ref 3.4–10.8)

## 2024-10-11 PROCEDURE — 0DJ08ZZ INSPECTION OF UPPER INTESTINAL TRACT, VIA NATURAL OR ARTIFICIAL OPENING ENDOSCOPIC: ICD-10-PCS | Performed by: INTERNAL MEDICINE

## 2024-10-11 PROCEDURE — 94664 DEMO&/EVAL PT USE INHALER: CPT

## 2024-10-11 PROCEDURE — 94799 UNLISTED PULMONARY SVC/PX: CPT

## 2024-10-11 PROCEDURE — 25010000002 PROPOFOL 10 MG/ML EMULSION: Performed by: NURSE ANESTHETIST, CERTIFIED REGISTERED

## 2024-10-11 PROCEDURE — 99222 1ST HOSP IP/OBS MODERATE 55: CPT | Performed by: INTERNAL MEDICINE

## 2024-10-11 PROCEDURE — 84132 ASSAY OF SERUM POTASSIUM: CPT | Performed by: STUDENT IN AN ORGANIZED HEALTH CARE EDUCATION/TRAINING PROGRAM

## 2024-10-11 PROCEDURE — 85025 COMPLETE CBC W/AUTO DIFF WBC: CPT | Performed by: STUDENT IN AN ORGANIZED HEALTH CARE EDUCATION/TRAINING PROGRAM

## 2024-10-11 PROCEDURE — 43235 EGD DIAGNOSTIC BRUSH WASH: CPT | Performed by: INTERNAL MEDICINE

## 2024-10-11 PROCEDURE — 83735 ASSAY OF MAGNESIUM: CPT

## 2024-10-11 PROCEDURE — 25010000002 MORPHINE PER 10 MG: Performed by: STUDENT IN AN ORGANIZED HEALTH CARE EDUCATION/TRAINING PROGRAM

## 2024-10-11 PROCEDURE — 25010000002 LIDOCAINE PF 1% 1 % SOLUTION: Performed by: NURSE ANESTHETIST, CERTIFIED REGISTERED

## 2024-10-11 PROCEDURE — 94761 N-INVAS EAR/PLS OXIMETRY MLT: CPT

## 2024-10-11 PROCEDURE — 85014 HEMATOCRIT: CPT | Performed by: STUDENT IN AN ORGANIZED HEALTH CARE EDUCATION/TRAINING PROGRAM

## 2024-10-11 PROCEDURE — 85018 HEMOGLOBIN: CPT | Performed by: STUDENT IN AN ORGANIZED HEALTH CARE EDUCATION/TRAINING PROGRAM

## 2024-10-11 PROCEDURE — 99232 SBSQ HOSP IP/OBS MODERATE 35: CPT | Performed by: STUDENT IN AN ORGANIZED HEALTH CARE EDUCATION/TRAINING PROGRAM

## 2024-10-11 PROCEDURE — 80048 BASIC METABOLIC PNL TOTAL CA: CPT | Performed by: STUDENT IN AN ORGANIZED HEALTH CARE EDUCATION/TRAINING PROGRAM

## 2024-10-11 RX ORDER — FAMOTIDINE 10 MG/ML
20 INJECTION, SOLUTION INTRAVENOUS ONCE
Status: CANCELLED | OUTPATIENT
Start: 2024-10-11 | End: 2024-10-11

## 2024-10-11 RX ORDER — PROPOFOL 10 MG/ML
VIAL (ML) INTRAVENOUS AS NEEDED
Status: DISCONTINUED | OUTPATIENT
Start: 2024-10-11 | End: 2024-10-11 | Stop reason: SURG

## 2024-10-11 RX ORDER — HYDROMORPHONE HYDROCHLORIDE 1 MG/ML
0.5 INJECTION, SOLUTION INTRAMUSCULAR; INTRAVENOUS; SUBCUTANEOUS
Status: CANCELLED | OUTPATIENT
Start: 2024-10-11

## 2024-10-11 RX ORDER — MORPHINE SULFATE 2 MG/ML
2 INJECTION, SOLUTION INTRAMUSCULAR; INTRAVENOUS ONCE
Status: COMPLETED | OUTPATIENT
Start: 2024-10-11 | End: 2024-10-11

## 2024-10-11 RX ORDER — SODIUM CHLORIDE 0.9 % (FLUSH) 0.9 %
10 SYRINGE (ML) INJECTION EVERY 12 HOURS SCHEDULED
Status: CANCELLED | OUTPATIENT
Start: 2024-10-11

## 2024-10-11 RX ORDER — BUPIVACAINE HCL/0.9 % NACL/PF 0.125 %
PLASTIC BAG, INJECTION (ML) EPIDURAL AS NEEDED
Status: DISCONTINUED | OUTPATIENT
Start: 2024-10-11 | End: 2024-10-11 | Stop reason: SURG

## 2024-10-11 RX ORDER — PEG-3350, SODIUM SULFATE, SODIUM CHLORIDE, POTASSIUM CHLORIDE, SODIUM ASCORBATE AND ASCORBIC ACID 7.5-2.691G
1000 KIT ORAL
Status: COMPLETED | OUTPATIENT
Start: 2024-10-12 | End: 2024-10-12

## 2024-10-11 RX ORDER — FAMOTIDINE 20 MG/1
20 TABLET, FILM COATED ORAL ONCE
Status: CANCELLED | OUTPATIENT
Start: 2024-10-11 | End: 2024-10-11

## 2024-10-11 RX ORDER — MIDAZOLAM HYDROCHLORIDE 1 MG/ML
0.5 INJECTION INTRAMUSCULAR; INTRAVENOUS
Status: CANCELLED | OUTPATIENT
Start: 2024-10-11

## 2024-10-11 RX ORDER — LIDOCAINE HYDROCHLORIDE 10 MG/ML
INJECTION, SOLUTION EPIDURAL; INFILTRATION; INTRACAUDAL; PERINEURAL AS NEEDED
Status: DISCONTINUED | OUTPATIENT
Start: 2024-10-11 | End: 2024-10-11 | Stop reason: SURG

## 2024-10-11 RX ORDER — SODIUM CHLORIDE 0.9 % (FLUSH) 0.9 %
10 SYRINGE (ML) INJECTION AS NEEDED
Status: CANCELLED | OUTPATIENT
Start: 2024-10-11

## 2024-10-11 RX ORDER — LIDOCAINE HYDROCHLORIDE 10 MG/ML
0.5 INJECTION, SOLUTION EPIDURAL; INFILTRATION; INTRACAUDAL; PERINEURAL ONCE AS NEEDED
Status: CANCELLED | OUTPATIENT
Start: 2024-10-11

## 2024-10-11 RX ORDER — SODIUM CHLORIDE, SODIUM LACTATE, POTASSIUM CHLORIDE, CALCIUM CHLORIDE 600; 310; 30; 20 MG/100ML; MG/100ML; MG/100ML; MG/100ML
9 INJECTION, SOLUTION INTRAVENOUS CONTINUOUS
Status: CANCELLED | OUTPATIENT
Start: 2024-10-12 | End: 2024-10-12

## 2024-10-11 RX ORDER — PEG-3350, SODIUM SULFATE, SODIUM CHLORIDE, POTASSIUM CHLORIDE, SODIUM ASCORBATE AND ASCORBIC ACID 7.5-2.691G
1000 KIT ORAL
Status: COMPLETED | OUTPATIENT
Start: 2024-10-11 | End: 2024-10-11

## 2024-10-11 RX ORDER — DROPERIDOL 2.5 MG/ML
0.62 INJECTION, SOLUTION INTRAMUSCULAR; INTRAVENOUS ONCE AS NEEDED
Status: CANCELLED | OUTPATIENT
Start: 2024-10-11

## 2024-10-11 RX ORDER — FENTANYL CITRATE 50 UG/ML
50 INJECTION, SOLUTION INTRAMUSCULAR; INTRAVENOUS
Status: CANCELLED | OUTPATIENT
Start: 2024-10-11

## 2024-10-11 RX ORDER — PANTOPRAZOLE SODIUM 40 MG/1
40 TABLET, DELAYED RELEASE ORAL
Status: DISCONTINUED | OUTPATIENT
Start: 2024-10-12 | End: 2024-10-16 | Stop reason: HOSPADM

## 2024-10-11 RX ADMIN — TRAMADOL HYDROCHLORIDE 50 MG: 50 TABLET ORAL at 20:41

## 2024-10-11 RX ADMIN — ATORVASTATIN CALCIUM 20 MG: 20 TABLET, FILM COATED ORAL at 20:41

## 2024-10-11 RX ADMIN — PROPOFOL 50 MG: 10 INJECTION, EMULSION INTRAVENOUS at 12:18

## 2024-10-11 RX ADMIN — PROPOFOL 50 MG: 10 INJECTION, EMULSION INTRAVENOUS at 12:28

## 2024-10-11 RX ADMIN — BUDESONIDE AND FORMOTEROL FUMARATE DIHYDRATE 2 PUFF: 160; 4.5 AEROSOL RESPIRATORY (INHALATION) at 19:26

## 2024-10-11 RX ADMIN — DOFETILIDE 250 MCG: 0.25 CAPSULE ORAL at 09:14

## 2024-10-11 RX ADMIN — LIDOCAINE HYDROCHLORIDE 50 MG: 10 INJECTION, SOLUTION EPIDURAL; INFILTRATION; INTRACAUDAL; PERINEURAL at 12:18

## 2024-10-11 RX ADMIN — MORPHINE SULFATE 2 MG: 2 INJECTION, SOLUTION INTRAMUSCULAR; INTRAVENOUS at 09:14

## 2024-10-11 RX ADMIN — GABAPENTIN 300 MG: 300 CAPSULE ORAL at 20:41

## 2024-10-11 RX ADMIN — ACETAMINOPHEN 650 MG: 325 TABLET ORAL at 21:35

## 2024-10-11 RX ADMIN — BUDESONIDE AND FORMOTEROL FUMARATE DIHYDRATE 2 PUFF: 160; 4.5 AEROSOL RESPIRATORY (INHALATION) at 08:19

## 2024-10-11 RX ADMIN — PROPOFOL 25 MG: 10 INJECTION, EMULSION INTRAVENOUS at 12:21

## 2024-10-11 RX ADMIN — TIOTROPIUM BROMIDE INHALATION SPRAY 2 PUFF: 3.12 SPRAY, METERED RESPIRATORY (INHALATION) at 08:19

## 2024-10-11 RX ADMIN — PEG-3350, SODIUM SULFATE, SODIUM CHLORIDE, POTASSIUM CHLORIDE, SODIUM ASCORBATE AND ASCORBIC ACID 1000 ML: KIT at 17:27

## 2024-10-11 RX ADMIN — Medication 100 MCG: at 12:24

## 2024-10-11 RX ADMIN — LIDOCAINE HYDROCHLORIDE 50 MG: 10 INJECTION, SOLUTION EPIDURAL; INFILTRATION; INTRACAUDAL; PERINEURAL at 12:24

## 2024-10-11 RX ADMIN — PROPOFOL 25 MG: 10 INJECTION, EMULSION INTRAVENOUS at 12:24

## 2024-10-11 RX ADMIN — PANTOPRAZOLE SODIUM 40 MG: 40 INJECTION, POWDER, FOR SOLUTION INTRAVENOUS at 09:15

## 2024-10-11 RX ADMIN — DOFETILIDE 250 MCG: 0.25 CAPSULE ORAL at 20:41

## 2024-10-11 NOTE — CASE MANAGEMENT/SOCIAL WORK
Discharge Planning Assessment  [facility]     Patient Name: Avelina Ceron  MRN: [id number]  Today's Date: 10/11/2024    Admit Date: 10/10/2024    Plan: home   Discharge Needs Assessment       Row Name 10/11/24 0842       Living Environment    People in Home child(gabriela), adult    Name(s) of People in Home Nan    Current Living Arrangements home    Primary Care Provided by self;child(gabriela)       Transition Planning    Patient/Family Anticipates Transition to home with family       Discharge Needs Assessment    Readmission Within the Last 30 Days no previous admission in last 30 days    Equipment Currently Used at Home rollator;grab bar;oxygen  2L cont thru [employer]    Concerns to be Addressed basic needs;discharge planning                   Discharge Plan       Row Name 10/11/24 0843       Plan    Plan home    Patient/Family in Agreement with Plan yes    Plan Comments I met with this patient bedside. She lives with her daughter in [facility]. She is independent with self care, but needs assistance with all other activities of daily living. She is independent with mobility with the use of a rollator. She also has grab bars and home oxygen 2L continuous through [employer]. She anticipates returning home after this hospitalization, and her daughter can transport. Case management will follow.    Final Discharge Disposition Code 01 - home or self-care                  Continued Care and Services - Admitted Since 10/10/2024    No active coordination exists for this encounter.          Demographic Summary       Row Name 10/11/24 0842       General Information    General Information Comments I confirmed that Dre Moura is Ms Ceron' PCP and she has Medicare AB and [place] [employer]                   Functional Status       Row Name 10/11/24 0842       Functional Status, IADL    Medications completely dependent    Meal Preparation completely dependent    Housekeeping completely dependent    Laundry completely dependent     Shopping completely dependent                   Psychosocial    No documentation.                  Abuse/Neglect    No documentation.                  Legal    No documentation.                  Substance Abuse    No documentation.                  Patient Forms    No documentation.                     Maame Soriano RN

## 2024-10-11 NOTE — PROGRESS NOTES
[facility] Medicine Services  PROGRESS NOTE    Patient Name: Avelina Ceron  : 1940  MRN: [id number]    Date of Admission: 10/10/2024  Primary Care Physician: Dre Moura MD    Subjective   Subjective     CC:  Weakness, nausea    HPI:  Patient reports no further bowel movements since admission and her primary complaint this morning is chronic low back pain which is typically relieved by tramadol but could not receive any due to n.p.o. status.      Objective   Objective     Vital Signs:   Temp:  [97.8 °F (36.6 °C)-98.6 °F (37 °C)] 98 °F (36.7 °C)  Heart Rate:  [76-96] 76  Resp:  [16-20] 20  BP: (104-178)/(35-71) 145/57  Flow (L/min):  [2-6] 2     Physical Exam:  General appearance: alert, awake, oriented, no acute distress   Cardiovascular: RRR, systolic murmur, radial pulse full 2/4 BL   Respiratory: CTAB, no crackles or wheezes   Abdomen: soft, non-tender, no organomegaly, bowel sounds normoactive    MSK: L spine with mild tenderness to palpation around midline and paraspinal musculature  Neuro/CNS: alert and oriented x3, normal speech    Results Reviewed:  LAB RESULTS:      Lab 10/11/24  0446 10/10/24  2348 10/10/24  2001 10/10/24  1316 10/10/24  1212 10/09/24  1328   WBC 15.31*  --   --  14.72*  --  19.92*   HEMOGLOBIN 8.5* 7.9* 7.9* 7.2*  --  7.7*   HEMOGLOBIN, POC  --   --   --   --  6.8*  --    HEMATOCRIT 26.8* 25.2* 24.7* 24.1*  --  24.5*   PLATELETS 357  --   --  362  --  411   NEUTROS ABS 11.32*  --   --  10.92*  --  17.12*   IMMATURE GRANS (ABS) 0.09*  --   --  0.10*  --  0.18*   LYMPHS ABS 2.72  --   --  2.68  --  1.78   MONOS ABS 0.89  --   --  0.80  --  0.72   EOS ABS 0.17  --   --  0.10  --  0.02   MCV 99.6*  --   --  107.1*  --  102.1*   PROCALCITONIN  --   --   --  0.06  --   --          Lab 10/11/24  0446 10/10/24  1316 10/09/24  1328   SODIUM 143 142 142   POTASSIUM 4.2  4.2 3.1* 3.7   CHLORIDE 103 99 101   CO2 35.0* 33.0* 32.0*   ANION GAP 5.0 10.0 9.0    BUN 8 8 9   CREATININE 0.31* 0.33* 0.33*   EGFR 104.6 103.0 103.0   GLUCOSE 104* 114* 114*   CALCIUM 8.6 9.1 9.5   MAGNESIUM 1.8  --   --    TSH  --   --  6.350*         Lab 10/10/24  1316 10/09/24  1328   TOTAL PROTEIN 5.9* 6.2   ALBUMIN 3.8 3.7   GLOBULIN 2.1 2.5   ALT (SGPT) 15 15   AST (SGOT) 23 21   BILIRUBIN 0.3 0.2   ALK PHOS 66 68                 Lab 10/10/24  1421 10/10/24  1324 10/10/24  1324 10/09/24  1328   IRON  --   --   --  69   IRON SATURATION (TSAT)  --   --   --  19*   TIBC  --   --   --  361   TRANSFERRIN  --   --   --  242   FERRITIN  --   --   --  315.00*   ABO TYPING A   < > A  --    RH TYPING Positive   < > Positive  --    ANTIBODY SCREEN  --   --  Negative  --     < > = values in this interval not displayed.         Brief Urine Lab Results  (Last result in the past 365 days)        Color   Clarity   Blood   Leuk Est   Nitrite   Protein   CREAT   Urine HCG        10/10/24 2332 Yellow   Clear   Negative   Small (1+)   Negative   Negative                   Microbiology Results Abnormal       None            XR Spine Lumbar 2 or 3 View    Result Date: 10/10/2024  XR SPINE LUMBAR 2 OR 3 VW Date of Exam: 10/9/2024 1:46 PM EDT Indication: low back pain Comparison: 12/12/2013 Findings: There are 5 nonrib-bearing lumbar vertebral segments. There is intervertebral disc space narrowing with some reactive endplate changes throughout the lumbar spine. Vertebral body height and alignment are unchanged from the previous study. There is very mild retrolisthesis of L2 on L3. There are facet degenerative changes throughout the lumbar spine and there is dense atherosclerotic calcification within the aorta and there are bilateral iliac stents placed. No fractures are identified.     Impression: Impression: 1. Advanced multilevel degenerative disc and facet disease with no fractures or acute findings. 2. Atherosclerotic disease with bilateral iliac stents. Electronically Signed: Markie Mac MD  10/10/2024  3:13 PM EDT  Workstation ID: [id number]    XR Chest PA & Lateral    Result Date: 10/10/2024  XR CHEST PA AND LATERAL Date of Exam: 10/9/2024 1:46 PM EDT Indication: follow-up pneumonia Comparison: 8/31/2024 Findings: Lungs appear hyperinflated. Interval resolution of multifocal infiltrates. Lungs currently clear. Heart size and pulmonary vessels normal     Impression: Impression: 1. Resolved pneumonia 2. Pulmonary emphysema Electronically Signed: Pete Valdez  10/10/2024 3:11 PM EDT  Workstation ID: [id number]    CT Abdomen Pelvis With & Without Contrast    Result Date: 10/10/2024  CT ABDOMEN PELVIS W WO CONTRAST Date of Exam: 10/10/2024 1:43 PM EDT Indication: abd pain, gi bleed. Comparison: CT from 7/25/2023 Technique: Axial CT images were obtained of the abdomen and pelvis before and after the uneventful intravenous administration of 50 mL Isovue 370. Sagittal and coronal reconstructions were performed.  Automated exposure control and iterative reconstruction methods were used. FINDINGS: Abdomen/Pelvis: Lower Chest: Atelectasis and scarring noted at the lung bases. Small to moderate size hiatal hernia is noted. No free air is noted below the diaphragm. Organs: Noncontrast and multi phase contrast imaging was obtained. Noncontrast imaging of the kidneys demonstrate nonobstructing calculi. There is cortical scarring on the left. No hydronephrosis is noted. Small low-attenuation lesions too small to characterize are noted of the kidneys statistically likely represent cysts. There is no evidence of obstructive uropathy. Arterial phase imaging demonstrates no suspicious hypervascular lesion within the solid organs. Solid organs demonstrate no definite acute abnormality. The patient is status post cholecystectomy with dilation of the common duct compatible with postcholecystectomy ectasia. GI/Bowel: Noncontrast imaging and multiphase contrast images of the GI tract was obtained. There is mild motion limitation as well as  streak artifact from bilateral hip prostheses causing limitation of the GI tract. Noncontrast imaging demonstrates no definite acute abnormality within the GI tract. Arterial phase imaging demonstrates no abnormal areas of contrast associated with the GI tract. No obvious pooling of contrast or focal abnormality noted on portal venous or delayed imaging. There is diverticulosis without evidence of acute diverticulitis. Relative decompression of the descending colon noted. Postsurgical changes are noted at the base of the cecum. There is no suspicious mesenteric adenopathy or fluid collection noted. Pelvis: Evaluation of the pelvis is limited by streak artifact. Bladder is poorly visualized. No suspicious fluid collections or adenopathy appreciated Peritoneum/Retroperitoneum: Atherosclerotic changes are noted of the aorta which is widely patent. There is no aneurysmal dilation. No suspicious retroperitoneal adenopathy Bones/Soft Tissues: Multilevel degenerative change noted at the spine. Bilateral hip prostheses are noted as previously discussed. There is exuberant heterotopic bone formation on the left. Remote healed fracture right iliac wing.     Impression: 1. Accounting for the limitations of the exam there is no evidence of active extravasation or obvious pooling blood noted within the GI tract 2. Diverticulosis without evidence of diverticulitis 3. Mild prominence of the wall thickness of the descending colon accentuated by incomplete distention. Findings likely artifactual. Colitis thought to be less likely 4. Nonobstructing left renal calculi 5. Other findings as above Electronically Signed: Zack Perez MD  10/10/2024 3:01 PM EDT  Workstation ID: [id number]    XR Abdomen KUB    Result Date: 10/10/2024  XR ABDOMEN KUB Date of Exam: 10/9/2024 1:46 PM EDT Indication: dysuria Comparison: None available. There are surgical clips in the right upper quadrant. Single view. There is a small to moderate amount of  stool. There is no bowel dilatation. There are bilateral hip arthroplasties. There is diffuse atherosclerotic vascular calcification. No definite calcifications are seen over the kidneys although bowel contents somewhat obscures detail. Pelvic calcifications likely are phleboliths.     Impression: Impression: Chronic findings. No acute process. No findings suspicious for urinary tract calculi. Electronically Signed: Mary Wheat MD  10/10/2024 2:29 PM EDT  Workstation ID: [id number]     Results for orders placed during the hospital encounter of 08/26/24    Adult Transthoracic Echo Limited W/ Cont if Necessary Per Protocol    Interpretation Summary    Left ventricular systolic function is normal. Calculated left ventricular EF = 64%    Moderate tricuspid valve regurgitation is present.    Estimated right ventricular systolic pressure from tricuspid regurgitation is moderately elevated (45-55 mmHg).    Mild MR      Current medications:  Scheduled Meds:[Held by provider] apixaban, 5 mg, Oral, Q12H  [Held by provider] aspirin, 81 mg, Oral, Daily  atorvastatin, 20 mg, Oral, Daily  budesonide-formoterol, 2 puff, Inhalation, BID - RT   And  tiotropium bromide monohydrate, 2 puff, Inhalation, Daily - RT  [Held by provider] clopidogrel, 75 mg, Oral, Daily  dofetilide, 250 mcg, Oral, BID  gabapentin, 300 mg, Oral, Nightly  [Held by provider] isosorbide mononitrate, 30 mg, Oral, Daily  levothyroxine, 200 mcg, Oral, Q AM  [Held by provider] losartan, 50 mg, Oral, Q24H  oxybutynin XL, 10 mg, Oral, Daily  pantoprazole, 40 mg, Intravenous, Q12H  roflumilast, 500 mcg, Oral, Daily  sertraline, 100 mg, Oral, Daily  sodium chloride, 10 mL, Intravenous, Q12H  sodium chloride, 10 mL, Intravenous, Q12H      Continuous Infusions:   PRN Meds:.  acetaminophen    senna-docusate sodium **AND** polyethylene glycol **AND** bisacodyl **AND** bisacodyl    Calcium Replacement - Follow Nurse / BPA Driven Protocol    HYDROmorphone    influenza  vaccine    Magnesium Standard Dose Replacement - Follow Nurse / BPA Driven Protocol    Phosphorus Replacement - Follow Nurse / BPA Driven Protocol    Potassium Replacement - Follow Nurse / BPA Driven Protocol    Insert Peripheral IV **AND** sodium chloride    sodium chloride    sodium chloride    traMADol    Assessment & Plan   Assessment & Plan     Active Hospital Problems    Diagnosis  POA    **Anemia due to blood loss, acute [D62]  Yes    Anemia due to acute blood loss [D62]  Yes    Blood loss anemia [D50.0]  Unknown      Resolved Hospital Problems   No resolved problems to display.        Brief Hospital Course to date:  Avelina Ceron is a 83 y.o. female with past medical history significant for atrial fibrillation on Eliquis, CAD status post stenting, PAD status post stenting, hypertension, hyperlipidemia, hypothyroidism, COPD with chronic hypoxia on 2 L nasal cannula, was admitted for acute blood loss anemia secondary to GI bleed.  Gastroenterology and cardiology consulted    Acute GI bleed  Acute on chronic blood loss anemia  Leukocytosis, reactive   -Hemodynamically stable  -s/p 1 unit pRBC  -GI consulted, will scope today  -Continue PPI and trend Hgb         CAD s/p bilateral ICA stents  PAD s/p bilateral iliac artery stents  Paroxysmal atrial fibrillation on anticoagulation  Hypertension  Hyperlipidemia.   -Cardiology consulted, will determine antiplatelet regiment and anticoagulation pending endoscopy results    COPD with hypoxemic respiratory failure- continue Symbicort, Spiriva, Roflumilast. Continue home oxygen.  Chronic pain- continue gabapentin, tramadol, PRN IV Dilaudid for breakthrough pain.  Chronic bladder dysfunction- oxybutynin ordered as alternative  Anxiety/depression-continue sertraline.  Hypothyroidism- continue levothyroxine.     Expected Discharge Location and Transportation: TBD  Expected Discharge   Expected Discharge Date: 10/14/2024; Expected Discharge Time:      VTE  Prophylaxis:  Pharmacologic & mechanical VTE prophylaxis orders are present.         AM-PAC 6 Clicks Score (PT): 17 (10/11/24 0800)    CODE STATUS:   Code Status and Medical Interventions: CPR (Attempt to Resuscitate); Full Support   Ordered at: 10/10/24 6980     Level Of Support Discussed With:    Patient     Code Status (Patient has no pulse and is not breathing):    CPR (Attempt to Resuscitate)     Medical Interventions (Patient has pulse or is breathing):    Full Support       Андрей Nuno, DO  10/11/24

## 2024-10-11 NOTE — ANESTHESIA PREPROCEDURE EVALUATION
Anesthesia Evaluation     Patient summary reviewed and Nursing notes reviewed                Airway   Mallampati: II  TM distance: >3 FB  Neck ROM: full  No difficulty expected  Dental - normal exam   (+) edentulous    Pulmonary - normal exam   (+) a smoker (2006) Former, COPD,home oxygen (3L)  Cardiovascular - normal exam    (+) hypertension, CAD, PVD, hyperlipidemia    ROS comment:   Echo 8/24: normal EF, mild MR, mod TR    Neuro/Psych- negative ROS  GI/Hepatic/Renal/Endo    (+) renal disease- CRI, thyroid problem hypothyroidism    Musculoskeletal (-) negative ROS    Abdominal  - normal exam    Bowel sounds: normal.   Substance History - negative use     OB/GYN negative ob/gyn ROS         Other        ROS/Med Hx Other: HCT 27                Anesthesia Plan    ASA 3     general     (propofol)  intravenous induction     Anesthetic plan, risks, benefits, and alternatives have been provided, discussed and informed consent has been obtained with: patient.    Plan discussed with CRNA.    CODE STATUS:    Level Of Support Discussed With: Patient  Code Status (Patient has no pulse and is not breathing): CPR (Attempt to Resuscitate)  Medical Interventions (Patient has pulse or is breathing): Full Support

## 2024-10-11 NOTE — ANESTHESIA POSTPROCEDURE EVALUATION
Patient: Avelina Ceron    Procedure Summary       Date: 10/11/24 Room / Location: [facility] ENDOSCOPY 2 /  [facility]    Anesthesia Start: 1215 Anesthesia Stop: 1243    Procedure: ESOPHAGOGASTRODUODENOSCOPY Diagnosis:       Blood loss anemia      (Blood loss anemia [D50.0])    Surgeons: Markie Bautista MD Provider: Andrew Durán MD    Anesthesia Type: general ASA Status: 3            Anesthesia Type: general    Vitals  No vitals data found for the desired time range.    BP 96/34  T 97.3F  RR 18  SPO2 97% RA  RR 15      Post Anesthesia Care and Evaluation    Patient location during evaluation: PACU  Patient participation: complete - patient participated  Level of consciousness: awake and alert  Pain management: adequate    Airway patency: patent  Anesthetic complications: No anesthetic complications  PONV Status: none  Cardiovascular status: hemodynamically stable and acceptable  Respiratory status: nonlabored ventilation, acceptable and nasal cannula  Hydration status: acceptable

## 2024-10-11 NOTE — PLAN OF CARE
Goal Outcome Evaluation:  Plan of Care Reviewed With: patient   - VSS, hypotensive at times, 2L NC, A&Ox4  - One time dose of morphine given for back pain  - No complaints of nausea  - Bowel prep initiated  - No other complaints at this time     Progress: no change

## 2024-10-11 NOTE — CONSULTS
"[facility] at [facility]  Cardiovascular Consultation Note    Reason for consultation: GI bleeding in a patient with recent A-fib and on Eliquis No. 2 CAD and peripheral vascular disease patient was on DAPT in addition to Eliquis    History of Present Illness:  83-year-old female with chronic hypoxic respiratory failure and COPD requiring oxygen.  She also has a history of hypothyroidism, depression, carotid disease with previous stenting, peripheral vascular disease with previous catheter-based intervention.  She was hospitalized in September with pneumonia.  Her CHADS2 Vascor at that time was 5.  Because of significant lung disease the only real option she had for antiarrhythmic drug was Tikosyn..  The patient Plavix for quite a while.  She also tells me she has been taking aspirin 3 times a day.  She has occasional palpitations which last 1 to 2 minutes.  She has had no prolonged palpitations.  She has had no tightness heaviness squeezing pressure chest jaw throat arms.  She does get short of breath rather easily.  No history of stroke TIA or neurologic event.    Cardiac risk factors: #1 CAD #2 advanced age #3 hypertension #4 hyperlipidemia    Past medical and surgical history, social and family history reviewed in EMR.    REVIEW OF SYSTEMS:     Past Medical History:   Diagnosis Date    Anesthesia complication     Pt went into respiratory failure during total hip replacement in 2020    Arthritis of back     Arthritis of neck     Broken ribs     COPD (chronic obstructive pulmonary disease)     Coronary artery disease     Degenerative lumbar disc     Disease of thyroid gland     \"low thyroid\"    Fracture, fibula 1997?    Fracture, humerus 12/13/23    Fracture, tibia and fibula 1997?    Hip arthrosis     History of colonoscopy     Hyperlipidemia     Hypertension     Kidney stone July 2023    Knee swelling     Lumbosacral disc disease     On home O2     3L    Osteoarthritis     Periarthritis " of shoulder     Urinary incontinence     Urinary tract infection      Past Surgical History:   Procedure Laterality Date    CAROTID STENT Bilateral     COLONOSCOPY      EYE SURGERY  10/17/2022    yag surgery    GALLBLADDER SURGERY  2012    HYSTERECTOMY      ILIAC ARTERY STENT Bilateral     JOINT REPLACEMENT  ;     KIDNEY STONE SURGERY      OOPHORECTOMY      1 ovary removed    TOTAL HIP ARTHROPLASTY Bilateral     URETEROSCOPY LASER LITHOTRIPSY WITH STENT INSERTION Right 2023    Procedure: CYSTOSCOPY, RIGHT RETROGRADE PYELOGRAM; WITH STENT INSERTION;  Surgeon: Lee Kim MD;  Location: [facility] OR;  Service: Urology;  Laterality: Right;    URETEROSCOPY LASER LITHOTRIPSY WITH STENT INSERTION Right 08/10/2023    Procedure: URETEROSCOPY, LASER LITHOTRIPSY, STENT EXCHANGE;  Surgeon: Lee Kim MD;  Location:  [facility] OR;  Service: Urology;  Laterality: Right;     Social History     Socioeconomic History    Marital status:    Tobacco Use    Smoking status: Former     Current packs/day: 0.00     Average packs/day: 1 pack/day for 50.0 years (50.0 ttl pk-yrs)     Types: Cigarettes     Start date: 1956     Quit date: 2006     Years since quittin.7     Passive exposure: Past    Smokeless tobacco: Never   Vaping Use    Vaping status: Never Used   Substance and Sexual Activity    Alcohol use: Not Currently     Comment: no etoh for past 10 years    Drug use: No    Sexual activity: Not Currently     Family History   Problem Relation Age of Onset    Cancer Other         BREAST, COLON, OVARIAN    Depression Other     Hyperlipidemia Other     Hypertension Other     Heart disease Mother     Hypertension Mother     Arthritis Daughter     Cancer Daughter     Hyperlipidemia Daughter     Liver disease Daughter     Thyroid disease Daughter     Cancer Maternal Aunt     Diabetes Maternal Uncle     Alzheimer's disease Sister        H&P ROS reviewed and pertinent CV ROS as  noted in HPI.    Cardiac: Patient was here in September with A-fib RVR.  She converted to sinus rhythm was placed on Tikosyn therapy.  Has occasional palpitations at home which last 1 to 2 minutes.  No anginal type pain.  Respiratory: Has severe COPD with chronic hypoxic respiratory failure.  She is on oxygen continually.  Lower Extremities: History of lower extremity swelling when she lets her legs hanging down in the dependent position  GI: Complains of black tarry stools  Neuro: No history of stroke TIA or neurologic event  Hematology: No history of bleeding diathesis ecchymosis or petechiae  Renal: No renal disease  Musculoskeletal: Has bad back and takes pain medicine for that  Endocrine: Has hypothyroidism  Constitutional: Complains of fatigue and weakness  Psych: No depression or suicidal ideation      Physical Exam: General Very pleasant elderly female lying flat not dyspneic not tachypneic       HEENT: Bilateral carotid bruits are present.  No JVP.  No icterus.  Nasal O2 present       Respiratory: Equal bilateral symmetrical expansion with no crackles or rhonchi       Cardiovascular: Regular rate and rhythm with a grade 3-4 over systolic ejection murmur       GI: Soft and positive bowel sounds       Lower Extremities: No lesions       Neuro: Facial expressions are symmetrical moves all 4 extremities       Skin: Warm and dry with no edema       Psych: Pleasant affect orient x 3    Results Review: Hemoglobin was 7.7 on admission then down to 7.2.  Is currently up to 8.5.  GFR is 104.  Blood pressures 1 11-1 41.  Free T4 is low TSH is elevated.  Heart rate 80s           Vital Sign Min/Max for last 24 hours  Temp  Min: 97.8 °F (36.6 °C)  Max: 98.6 °F (37 °C)   BP  Min: 104/47  Max: 178/71   Pulse  Min: 80  Max: 96   Resp  Min: 16  Max: 19   SpO2  Min: 80 %  Max: 100 %   No data recorded      Intake/Output Summary (Last 24 hours) at 10/11/2024 0827  Last data filed at 10/10/2024 2310  Gross per 24 hour   Intake  332.83 ml   Output 125 ml   Net 207.83 ml             Current Facility-Administered Medications:     acetaminophen (TYLENOL) tablet 650 mg, 650 mg, Oral, Q6H PRN, Daisy Amaya,     [Held by provider] apixaban (ELIQUIS) tablet 5 mg, 5 mg, Oral, Q12H, Daisy Amaya, DO    [Held by provider] aspirin chewable tablet 81 mg, 81 mg, Oral, Daily, Daisy Amaya, DO    atorvastatin (LIPITOR) tablet 20 mg, 20 mg, Oral, Daily, Daisy Amaya, DO, 20 mg at 10/10/24 2017    sennosides-docusate (PERICOLACE) 8.6-50 MG per tablet 2 tablet, 2 tablet, Oral, BID PRN **AND** polyethylene glycol (MIRALAX) packet 17 g, 17 g, Oral, Daily PRN **AND** bisacodyl (DULCOLAX) EC tablet 5 mg, 5 mg, Oral, Daily PRN **AND** bisacodyl (DULCOLAX) suppository 10 mg, 10 mg, Rectal, Daily PRN, Daisy Amaya, DO    budesonide-formoterol (SYMBICORT) 160-4.5 MCG/ACT inhaler 2 puff, 2 puff, Inhalation, BID - RT, 2 puff at 10/11/24 0819 **AND** tiotropium (SPIRIVA RESPIMAT) 2.5 mcg/act aerosol solution inhaler, 2 puff, Inhalation, Daily - RT, Daisy Amaya, , 2 puff at 10/11/24 0819    Calcium Replacement - Follow Nurse / BPA Driven Protocol, , Does not apply, PRN, Daisy Amaya, DO    [Held by provider] clopidogrel (PLAVIX) tablet 75 mg, 75 mg, Oral, Daily, Daisy Amaya, DO    dofetilide (TIKOSYN) capsule 250 mcg, 250 mcg, Oral, BID, ElpidioiciDaisy oh, DO, 250 mcg at 10/10/24 2017    gabapentin (NEURONTIN) capsule 300 mg, 300 mg, Oral, Nightly, Daisy Amaya, DO, 300 mg at 10/10/24 2017    HYDROmorphone (DILAUDID) injection 0.5 mg, 0.5 mg, Intravenous, Q3H PRN, Daisy Amaya DO    influenza vac split high-dose (FLUZONE HIGH DOSE) injection 0.5 mL, 0.5 mL, Intramuscular, During Hospitalization, Daisy Amaya DO    [Held by provider] isosorbide mononitrate (IMDUR) 24 hr tablet 30 mg, 30 mg, Oral, Daily, Daisy Amaya DO    levothyroxine  (SYNTHROID, LEVOTHROID) tablet 200 mcg, 200 mcg, Oral, Q AM, Daisy Amaya, DO    [Held by provider] losartan (COZAAR) tablet 50 mg, 50 mg, Oral, Q24H, Dave Amayaika, DO    Magnesium Standard Dose Replacement - Follow Nurse / BPA Driven Protocol, , Does not apply, PRN, Elpidioicius Daisy, DO    morphine injection 2 mg, 2 mg, Intravenous, Once, Андрей Nuno DO    oxybutynin XL (DITROPAN-XL) 24 hr tablet 10 mg, 10 mg, Oral, Daily, Dave Amayaika, DO    pantoprazole (PROTONIX) injection 40 mg, 40 mg, Intravenous, Q12H, Daisy Amaya, , 40 mg at 10/10/24 2017    Phosphorus Replacement - Follow Nurse / BPA Driven Protocol, , Does not apply, PRN, Jazmínevicius, Daisy, DO    Potassium Replacement - Follow Nurse / BPA Driven Protocol, , Does not apply, PRN, Elpidioicius Daisy, DO    roflumilast (DALIRESP) tablet 500 mcg, 500 mcg, Oral, Daily, Elpidioicius, Daisy, DO    sertraline (ZOLOFT) tablet 100 mg, 100 mg, Oral, Daily, Dave Amaayika, DO    Insert Peripheral IV, , , Once **AND** sodium chloride 0.9 % flush 10 mL, 10 mL, Intravenous, PRN, Jazmínevicius, Daisy, DO    sodium chloride 0.9 % flush 10 mL, 10 mL, Intravenous, Q12H, Jazmínevicius, Daisy, DO    sodium chloride 0.9 % flush 10 mL, 10 mL, Intravenous, PRN, Jazmínevicius, Daisy, DO    sodium chloride 0.9 % flush 10 mL, 10 mL, Intravenous, Q12H, Jazmínevicius, Daisy, DO    sodium chloride 0.9 % flush 10 mL, 10 mL, Intravenous, PRN, Jazmínevicius, Daisy, DO    traMADol (ULTRAM) tablet 50 mg, 50 mg, Oral, Q6H PRN, Jazmínevicius, Daisy, DO, 50 mg at 10/10/24 2025    Lab Review:   Results from last 7 days   Lab Units 10/11/24  0446 10/10/24  2348 10/10/24  2001 10/10/24  1316 10/10/24  1212 10/09/24  1328   WBC 10*3/mm3 15.31*  --   --  14.72*  --  19.92*   HEMOGLOBIN g/dL 8.5* 7.9* 7.9* 7.2*  --  7.7*   HEMOGLOBIN, POC   --   --   --   --    < >  --    PLATELETS 10*3/mm3 357  --   --  362  --   411    < > = values in this interval not displayed.     Results from last 7 days   Lab Units 10/11/24  0446 10/10/24  1316 10/09/24  1328   SODIUM mmol/L 143 142 142   POTASSIUM mmol/L 4.2  4.2 3.1* 3.7   CO2 mmol/L 35.0* 33.0* 32.0*   BUN mg/dL 8 8 9   CREATININE mg/dL 0.31* 0.33* 0.33*   GLUCOSE mg/dL 104* 114* 114*     Estimated Creatinine Clearance: 128.1 mL/min (A) (by C-G formula based on SCr of 0.31 mg/dL (L)).        .lipd  Lab Results   Component Value Date    TRIG 151 (H) 07/25/2024    HDL 48 07/25/2024    AST 23 10/10/2024    ALT 15 10/10/2024       Radiology Reports:  Imaging Results (Last 72 Hours)       Procedure Component Value Units Date/Time    CT Abdomen Pelvis With & Without Contrast [[id number]] Collected: 10/10/24 1447     Updated: 10/10/24 1504    Narrative:      CT ABDOMEN PELVIS W WO CONTRAST    Date of Exam: 10/10/2024 1:43 PM EDT    Indication: abd pain, gi bleed.    Comparison: CT from 7/25/2023    Technique: Axial CT images were obtained of the abdomen and pelvis before and after the uneventful intravenous administration of 50 mL Isovue 370. Sagittal and coronal reconstructions were performed.  Automated exposure control and iterative   reconstruction methods were used.      FINDINGS:    Abdomen/Pelvis:    Lower Chest: Atelectasis and scarring noted at the lung bases.    Small to moderate size hiatal hernia is noted.    No free air is noted below the diaphragm.    Organs: Noncontrast and multi phase contrast imaging was obtained. Noncontrast imaging of the kidneys demonstrate nonobstructing calculi. There is cortical scarring on the left. No hydronephrosis is noted. Small low-attenuation lesions too small to   characterize are noted of the kidneys statistically likely represent cysts. There is no evidence of obstructive uropathy.    Arterial phase imaging demonstrates no suspicious hypervascular lesion within the solid organs. Solid organs demonstrate no definite acute abnormality. The  patient is status post cholecystectomy with dilation of the common duct compatible with   postcholecystectomy ectasia.    GI/Bowel: Noncontrast imaging and multiphase contrast images of the GI tract was obtained.    There is mild motion limitation as well as streak artifact from bilateral hip prostheses causing limitation of the GI tract. Noncontrast imaging demonstrates no definite acute abnormality within the GI tract.    Arterial phase imaging demonstrates no abnormal areas of contrast associated with the GI tract. No obvious pooling of contrast or focal abnormality noted on portal venous or delayed imaging. There is diverticulosis without evidence of acute   diverticulitis. Relative decompression of the descending colon noted. Postsurgical changes are noted at the base of the cecum. There is no suspicious mesenteric adenopathy or fluid collection noted.    Pelvis: Evaluation of the pelvis is limited by streak artifact. Bladder is poorly visualized. No suspicious fluid collections or adenopathy appreciated    Peritoneum/Retroperitoneum: Atherosclerotic changes are noted of the aorta which is widely patent. There is no aneurysmal dilation. No suspicious retroperitoneal adenopathy    Bones/Soft Tissues: Multilevel degenerative change noted at the spine. Bilateral hip prostheses are noted as previously discussed. There is exuberant heterotopic bone formation on the left. Remote healed fracture right iliac wing.      Impression:        1. Accounting for the limitations of the exam there is no evidence of active extravasation or obvious pooling blood noted within the GI tract    2. Diverticulosis without evidence of diverticulitis    3. Mild prominence of the wall thickness of the descending colon accentuated by incomplete distention. Findings likely artifactual. Colitis thought to be less likely    4. Nonobstructing left renal calculi    5. Other findings as above          Electronically Signed: Zack Perez MD     10/10/2024 3:01 PM EDT    Workstation ID: [id number]            Assessment/Plan: Patient has history of A-fib RVR with recent admission for pneumonia and A-fib was diagnosed then.  She has been on Tikosyn therapy as well as Eliquis.  She presents with significant GI bleeding.  The patient tells me that she has been taking aspirin 3 times a day.  Await results of endoscopy.  Since she has some palpitations at home it would be good if we can restart Eliquis.  She definitely does not need to be on dual antiplatelet therapy.  She does have a history of carotid stents peripheral vascular stents and CAD.  Pending results of EGD will decide whether or not we should add a singular antiplatelet agent or not.  2 history of CAD/PVD/carotid disease-currently asymptomatic      Que Herr MD  10/11/24  08:33 EDT

## 2024-10-11 NOTE — PLAN OF CARE
Problem: Adult Inpatient Plan of Care  Goal: Plan of Care Review  Outcome: Progressing  Goal: Patient-Specific Goal (Individualized)  Outcome: Progressing  Goal: Absence of Hospital-Acquired Illness or Injury  Outcome: Progressing  Intervention: Identify and Manage Fall Risk  Recent Flowsheet Documentation  Taken 10/11/2024 0200 by Yadira Gannon, RN  Safety Promotion/Fall Prevention:   assistive device/personal items within reach   clutter free environment maintained   fall prevention program maintained   gait belt   lighting adjusted   nonskid shoes/slippers when out of bed   room organization consistent   safety round/check completed   toileting scheduled  Taken 10/11/2024 0000 by Yadira Gannon RN  Safety Promotion/Fall Prevention:   assistive device/personal items within reach   clutter free environment maintained   fall prevention program maintained   gait belt   lighting adjusted   nonskid shoes/slippers when out of bed   room organization consistent   safety round/check completed   toileting scheduled  Taken 10/10/2024 2200 by Yadira Gannon RN  Safety Promotion/Fall Prevention:   assistive device/personal items within reach   clutter free environment maintained   fall prevention program maintained   gait belt   lighting adjusted   mobility aid in reach   nonskid shoes/slippers when out of bed   room organization consistent   safety round/check completed   toileting scheduled  Taken 10/10/2024 2000 by Yadira Gannon RN  Safety Promotion/Fall Prevention:   assistive device/personal items within reach   clutter free environment maintained   fall prevention program maintained   gait belt   lighting adjusted   nonskid shoes/slippers when out of bed   room organization consistent   safety round/check completed   toileting scheduled  Intervention: Prevent Skin Injury  Recent Flowsheet Documentation  Taken 10/11/2024 0200 by Yadira Gannon, RN  Body Position:   left   side-lying  Skin  Protection:   adhesive use limited   incontinence pads utilized   protective footwear used   silicone foam dressing in place   transparent dressing maintained   tubing/devices free from skin contact  Taken 10/11/2024 0000 by Yadira Gannon RN  Body Position:   right   side-lying  Skin Protection:   adhesive use limited   incontinence pads utilized   protective footwear used   silicone foam dressing in place   transparent dressing maintained   tubing/devices free from skin contact  Taken 10/10/2024 2200 by Yadira Gannon RN  Body Position:   left   side-lying  Skin Protection:   adhesive use limited   incontinence pads utilized   protective footwear used   silicone foam dressing in place   transparent dressing maintained   tubing/devices free from skin contact  Taken 10/10/2024 2000 by Yadira Gannon RN  Body Position: supine, legs elevated  Skin Protection: (silicone dressings pulled back and skin assessed)   adhesive use limited   incontinence pads utilized   protective footwear used   silicone foam dressing in place   transparent dressing maintained   tubing/devices free from skin contact   other (see comments)  Intervention: Prevent and Manage VTE (Venous Thromboembolism) Risk  Recent Flowsheet Documentation  Taken 10/11/2024 0200 by Yadira Gannon RN  Activity Management: activity encouraged  Taken 10/11/2024 0000 by Yadira Gannon RN  Activity Management: activity encouraged  Taken 10/10/2024 2200 by Yadira Gannon RN  Activity Management: activity encouraged  Taken 10/10/2024 2000 by Yadira Gannon RN  Activity Management: activity encouraged  VTE Prevention/Management:   bilateral   sequential compression devices on  Intervention: Prevent Infection  Recent Flowsheet Documentation  Taken 10/11/2024 0200 by Yadira Gannon RN  Infection Prevention:   environmental surveillance performed   hand hygiene promoted   personal protective equipment utilized   rest/sleep  promoted  Taken 10/11/2024 0000 by Yadira Gannon RN  Infection Prevention:   environmental surveillance performed   hand hygiene promoted   personal protective equipment utilized   rest/sleep promoted  Taken 10/10/2024 2200 by Yadira Gannon RN  Infection Prevention:   environmental surveillance performed   hand hygiene promoted   personal protective equipment utilized   rest/sleep promoted  Taken 10/10/2024 2000 by Yadira Gannon RN  Infection Prevention:   environmental surveillance performed   hand hygiene promoted   personal protective equipment utilized   rest/sleep promoted  Goal: Optimal Comfort and Wellbeing  Outcome: Progressing  Goal: Readiness for Transition of Care  Outcome: Progressing     Problem: Anemia  Goal: Anemia Symptom Improvement  Outcome: Progressing  Intervention: Monitor and Manage Anemia  Recent Flowsheet Documentation  Taken 10/11/2024 0200 by Yadira Gannon RN  Safety Promotion/Fall Prevention:   assistive device/personal items within reach   clutter free environment maintained   fall prevention program maintained   gait belt   lighting adjusted   nonskid shoes/slippers when out of bed   room organization consistent   safety round/check completed   toileting scheduled  Taken 10/11/2024 0000 by Yadira Gannon RN  Safety Promotion/Fall Prevention:   assistive device/personal items within reach   clutter free environment maintained   fall prevention program maintained   gait belt   lighting adjusted   nonskid shoes/slippers when out of bed   room organization consistent   safety round/check completed   toileting scheduled  Taken 10/10/2024 2200 by Yadira Gannon RN  Safety Promotion/Fall Prevention:   assistive device/personal items within reach   clutter free environment maintained   fall prevention program maintained   gait belt   lighting adjusted   mobility aid in reach   nonskid shoes/slippers when out of bed   room organization consistent   safety  round/check completed   toileting scheduled  Taken 10/10/2024 2000 by Yadira Gannon RN  Safety Promotion/Fall Prevention:   assistive device/personal items within reach   clutter free environment maintained   fall prevention program maintained   gait belt   lighting adjusted   nonskid shoes/slippers when out of bed   room organization consistent   safety round/check completed   toileting scheduled     Problem: Skin Injury Risk Increased  Goal: Skin Health and Integrity  Outcome: Progressing  Intervention: Optimize Skin Protection  Recent Flowsheet Documentation  Taken 10/11/2024 0200 by Yadira Gannon RN  Pressure Reduction Techniques:   pressure points protected   weight shift assistance provided  Head of Bed ([facility]) Positioning: [facility] elevated  Pressure Reduction Devices:   foam padding utilized   positioning supports utilized   pressure-redistributing mattress utilized  Skin Protection:   adhesive use limited   incontinence pads utilized   protective footwear used   silicone foam dressing in place   transparent dressing maintained   tubing/devices free from skin contact  Taken 10/11/2024 0000 by Yadira Gannon RN  Pressure Reduction Techniques:   pressure points protected   weight shift assistance provided  Head of Bed ([facility]) Positioning: [facility] elevated  Pressure Reduction Devices:   foam padding utilized   positioning supports utilized   pressure-redistributing mattress utilized  Skin Protection:   adhesive use limited   incontinence pads utilized   protective footwear used   silicone foam dressing in place   transparent dressing maintained   tubing/devices free from skin contact  Taken 10/10/2024 2200 by Yadira Gannon RN  Pressure Reduction Techniques:   pressure points protected   weight shift assistance provided  Head of Bed ([facility]) Positioning: [facility] elevated  Pressure Reduction Devices:   foam padding utilized   positioning supports utilized   pressure-redistributing mattress utilized  Skin  Protection:   adhesive use limited   incontinence pads utilized   protective footwear used   silicone foam dressing in place   transparent dressing maintained   tubing/devices free from skin contact  Taken 10/10/2024 2000 by Yadira Gannon RN  Pressure Reduction Techniques:   pressure points protected   weight shift assistance provided  Head of Bed ([facility]) Positioning: [facility] elevated  Pressure Reduction Devices:   foam padding utilized   positioning supports utilized   pressure-redistributing mattress utilized  Skin Protection: (silicone dressings pulled back and skin assessed)   adhesive use limited   incontinence pads utilized   protective footwear used   silicone foam dressing in place   transparent dressing maintained   tubing/devices free from skin contact   other (see comments)   Goal Outcome Evaluation:

## 2024-10-12 ENCOUNTER — ANESTHESIA EVENT (OUTPATIENT)
Dept: GASTROENTEROLOGY | Facility: HOSPITAL | Age: 84
End: 2024-10-12
Payer: MEDICARE

## 2024-10-12 ENCOUNTER — ANESTHESIA (OUTPATIENT)
Dept: GASTROENTEROLOGY | Facility: HOSPITAL | Age: 84
End: 2024-10-12
Payer: MEDICARE

## 2024-10-12 LAB
ANION GAP SERPL CALCULATED.3IONS-SCNC: 8 MMOL/L (ref 5–15)
ANISOCYTOSIS BLD QL: NORMAL
BACTERIA SPEC AEROBE CULT: NO GROWTH
BASOPHILS # BLD AUTO: 0.12 10*3/MM3 (ref 0–0.2)
BASOPHILS NFR BLD AUTO: 1 % (ref 0–1.5)
BUN SERPL-MCNC: 4 MG/DL (ref 8–23)
BUN/CREAT SERPL: 13.8 (ref 7–25)
CALCIUM SPEC-SCNC: 8.9 MG/DL (ref 8.6–10.5)
CHLORIDE SERPL-SCNC: 104 MMOL/L (ref 98–107)
CO2 SERPL-SCNC: 33 MMOL/L (ref 22–29)
CREAT SERPL-MCNC: 0.29 MG/DL (ref 0.57–1)
DEPRECATED RDW RBC AUTO: 72.1 FL (ref 37–54)
EGFRCR SERPLBLD CKD-EPI 2021: 106.3 ML/MIN/1.73
EOSINOPHIL # BLD AUTO: 0.14 10*3/MM3 (ref 0–0.4)
EOSINOPHIL NFR BLD AUTO: 1.1 % (ref 0.3–6.2)
ERYTHROCYTE [DISTWIDTH] IN BLOOD BY AUTOMATED COUNT: 19.7 % (ref 12.3–15.4)
GLUCOSE SERPL-MCNC: 101 MG/DL (ref 65–99)
HCT VFR BLD AUTO: 26.4 % (ref 34–46.6)
HCT VFR BLD AUTO: 27.8 % (ref 34–46.6)
HCT VFR BLD AUTO: 28.2 % (ref 34–46.6)
HGB BLD-MCNC: 8.2 G/DL (ref 12–15.9)
HGB BLD-MCNC: 8.8 G/DL (ref 12–15.9)
HGB BLD-MCNC: 9 G/DL (ref 12–15.9)
IMM GRANULOCYTES # BLD AUTO: 0.06 10*3/MM3 (ref 0–0.05)
IMM GRANULOCYTES NFR BLD AUTO: 0.5 % (ref 0–0.5)
LYMPHOCYTES # BLD AUTO: 2.27 10*3/MM3 (ref 0.7–3.1)
LYMPHOCYTES NFR BLD AUTO: 18.5 % (ref 19.6–45.3)
MACROCYTES BLD QL SMEAR: NORMAL
MCH RBC QN AUTO: 32.3 PG (ref 26.6–33)
MCHC RBC AUTO-ENTMCNC: 31.9 G/DL (ref 31.5–35.7)
MCV RBC AUTO: 101.1 FL (ref 79–97)
MONOCYTES # BLD AUTO: 0.73 10*3/MM3 (ref 0.1–0.9)
MONOCYTES NFR BLD AUTO: 5.9 % (ref 5–12)
NEUTROPHILS NFR BLD AUTO: 73 % (ref 42.7–76)
NEUTROPHILS NFR BLD AUTO: 8.96 10*3/MM3 (ref 1.7–7)
NRBC BLD AUTO-RTO: 0 /100 WBC (ref 0–0.2)
PLAT MORPH BLD: NORMAL
PLATELET # BLD AUTO: 376 10*3/MM3 (ref 140–450)
PMV BLD AUTO: 8.9 FL (ref 6–12)
POTASSIUM SERPL-SCNC: 4 MMOL/L (ref 3.5–5.2)
RBC # BLD AUTO: 2.79 10*6/MM3 (ref 3.77–5.28)
SODIUM SERPL-SCNC: 145 MMOL/L (ref 136–145)
WBC MORPH BLD: NORMAL
WBC NRBC COR # BLD AUTO: 12.28 10*3/MM3 (ref 3.4–10.8)

## 2024-10-12 PROCEDURE — 94799 UNLISTED PULMONARY SVC/PX: CPT

## 2024-10-12 PROCEDURE — 45380 COLONOSCOPY AND BIOPSY: CPT | Performed by: INTERNAL MEDICINE

## 2024-10-12 PROCEDURE — 0DBM8ZX EXCISION OF DESCENDING COLON, VIA NATURAL OR ARTIFICIAL OPENING ENDOSCOPIC, DIAGNOSTIC: ICD-10-PCS | Performed by: INTERNAL MEDICINE

## 2024-10-12 PROCEDURE — 85014 HEMATOCRIT: CPT | Performed by: STUDENT IN AN ORGANIZED HEALTH CARE EDUCATION/TRAINING PROGRAM

## 2024-10-12 PROCEDURE — 0DBP8ZX EXCISION OF RECTUM, VIA NATURAL OR ARTIFICIAL OPENING ENDOSCOPIC, DIAGNOSTIC: ICD-10-PCS | Performed by: HOSPITALIST

## 2024-10-12 PROCEDURE — 25010000002 PROPOFOL 10 MG/ML EMULSION: Performed by: NURSE ANESTHETIST, CERTIFIED REGISTERED

## 2024-10-12 PROCEDURE — 85025 COMPLETE CBC W/AUTO DIFF WBC: CPT | Performed by: STUDENT IN AN ORGANIZED HEALTH CARE EDUCATION/TRAINING PROGRAM

## 2024-10-12 PROCEDURE — 25010000002 LIDOCAINE PF 1% 1 % SOLUTION: Performed by: NURSE ANESTHETIST, CERTIFIED REGISTERED

## 2024-10-12 PROCEDURE — 25810000003 LACTATED RINGERS PER 1000 ML: Performed by: NURSE ANESTHETIST, CERTIFIED REGISTERED

## 2024-10-12 PROCEDURE — 0DBK8ZX EXCISION OF ASCENDING COLON, VIA NATURAL OR ARTIFICIAL OPENING ENDOSCOPIC, DIAGNOSTIC: ICD-10-PCS | Performed by: INTERNAL MEDICINE

## 2024-10-12 PROCEDURE — 85007 BL SMEAR W/DIFF WBC COUNT: CPT | Performed by: STUDENT IN AN ORGANIZED HEALTH CARE EDUCATION/TRAINING PROGRAM

## 2024-10-12 PROCEDURE — 99232 SBSQ HOSP IP/OBS MODERATE 35: CPT | Performed by: INTERNAL MEDICINE

## 2024-10-12 PROCEDURE — 45385 COLONOSCOPY W/LESION REMOVAL: CPT | Performed by: INTERNAL MEDICINE

## 2024-10-12 PROCEDURE — 85018 HEMOGLOBIN: CPT | Performed by: INTERNAL MEDICINE

## 2024-10-12 PROCEDURE — 85014 HEMATOCRIT: CPT | Performed by: INTERNAL MEDICINE

## 2024-10-12 PROCEDURE — 88305 TISSUE EXAM BY PATHOLOGIST: CPT | Performed by: INTERNAL MEDICINE

## 2024-10-12 PROCEDURE — 80048 BASIC METABOLIC PNL TOTAL CA: CPT | Performed by: STUDENT IN AN ORGANIZED HEALTH CARE EDUCATION/TRAINING PROGRAM

## 2024-10-12 PROCEDURE — 85018 HEMOGLOBIN: CPT | Performed by: STUDENT IN AN ORGANIZED HEALTH CARE EDUCATION/TRAINING PROGRAM

## 2024-10-12 PROCEDURE — 94664 DEMO&/EVAL PT USE INHALER: CPT

## 2024-10-12 PROCEDURE — 45381 COLONOSCOPY SUBMUCOUS NJX: CPT | Performed by: INTERNAL MEDICINE

## 2024-10-12 PROCEDURE — 94761 N-INVAS EAR/PLS OXIMETRY MLT: CPT

## 2024-10-12 PROCEDURE — 0DBC8ZX EXCISION OF ILEOCECAL VALVE, VIA NATURAL OR ARTIFICIAL OPENING ENDOSCOPIC, DIAGNOSTIC: ICD-10-PCS | Performed by: INTERNAL MEDICINE

## 2024-10-12 PROCEDURE — 97161 PT EVAL LOW COMPLEX 20 MIN: CPT

## 2024-10-12 PROCEDURE — 99232 SBSQ HOSP IP/OBS MODERATE 35: CPT | Performed by: STUDENT IN AN ORGANIZED HEALTH CARE EDUCATION/TRAINING PROGRAM

## 2024-10-12 PROCEDURE — 0DBN8ZX EXCISION OF SIGMOID COLON, VIA NATURAL OR ARTIFICIAL OPENING ENDOSCOPIC, DIAGNOSTIC: ICD-10-PCS | Performed by: INTERNAL MEDICINE

## 2024-10-12 DEVICE — DEV CLIP ENDO RESOLUTION360 CONTRL ROT 235CM: Type: IMPLANTABLE DEVICE | Site: COLON | Status: FUNCTIONAL

## 2024-10-12 RX ORDER — PROPOFOL 10 MG/ML
VIAL (ML) INTRAVENOUS CONTINUOUS PRN
Status: DISCONTINUED | OUTPATIENT
Start: 2024-10-12 | End: 2024-10-12 | Stop reason: SURG

## 2024-10-12 RX ORDER — SODIUM CHLORIDE, SODIUM LACTATE, POTASSIUM CHLORIDE, CALCIUM CHLORIDE 600; 310; 30; 20 MG/100ML; MG/100ML; MG/100ML; MG/100ML
INJECTION, SOLUTION INTRAVENOUS CONTINUOUS PRN
Status: DISCONTINUED | OUTPATIENT
Start: 2024-10-12 | End: 2024-10-12 | Stop reason: SURG

## 2024-10-12 RX ORDER — LIDOCAINE HYDROCHLORIDE 10 MG/ML
INJECTION, SOLUTION EPIDURAL; INFILTRATION; INTRACAUDAL; PERINEURAL AS NEEDED
Status: DISCONTINUED | OUTPATIENT
Start: 2024-10-12 | End: 2024-10-12 | Stop reason: SURG

## 2024-10-12 RX ADMIN — TRAMADOL HYDROCHLORIDE 50 MG: 50 TABLET ORAL at 02:56

## 2024-10-12 RX ADMIN — DOFETILIDE 250 MCG: 0.25 CAPSULE ORAL at 21:03

## 2024-10-12 RX ADMIN — ATORVASTATIN CALCIUM 20 MG: 20 TABLET, FILM COATED ORAL at 21:03

## 2024-10-12 RX ADMIN — LIDOCAINE HYDROCHLORIDE 50 MG: 10 INJECTION, SOLUTION EPIDURAL; INFILTRATION; INTRACAUDAL; PERINEURAL at 11:40

## 2024-10-12 RX ADMIN — ROFLUMILAST 500 MCG: 500 TABLET ORAL at 09:00

## 2024-10-12 RX ADMIN — SERTRALINE 100 MG: 100 TABLET, FILM COATED ORAL at 08:59

## 2024-10-12 RX ADMIN — TRAMADOL HYDROCHLORIDE 50 MG: 50 TABLET ORAL at 09:07

## 2024-10-12 RX ADMIN — Medication 10 ML: at 21:04

## 2024-10-12 RX ADMIN — OXYBUTYNIN CHLORIDE 10 MG: 10 TABLET, EXTENDED RELEASE ORAL at 09:00

## 2024-10-12 RX ADMIN — PROPOFOL 50 MCG/KG/MIN: 10 INJECTION, EMULSION INTRAVENOUS at 11:40

## 2024-10-12 RX ADMIN — TRAMADOL HYDROCHLORIDE 50 MG: 50 TABLET ORAL at 21:03

## 2024-10-12 RX ADMIN — SODIUM CHLORIDE, POTASSIUM CHLORIDE, SODIUM LACTATE AND CALCIUM CHLORIDE: 600; 310; 30; 20 INJECTION, SOLUTION INTRAVENOUS at 11:38

## 2024-10-12 RX ADMIN — BUDESONIDE AND FORMOTEROL FUMARATE DIHYDRATE 2 PUFF: 160; 4.5 AEROSOL RESPIRATORY (INHALATION) at 20:11

## 2024-10-12 RX ADMIN — DOFETILIDE 250 MCG: 0.25 CAPSULE ORAL at 09:00

## 2024-10-12 RX ADMIN — GABAPENTIN 300 MG: 300 CAPSULE ORAL at 21:03

## 2024-10-12 RX ADMIN — PEG-3350, SODIUM SULFATE, SODIUM CHLORIDE, POTASSIUM CHLORIDE, SODIUM ASCORBATE AND ASCORBIC ACID 1000 ML: KIT at 04:17

## 2024-10-12 NOTE — PROGRESS NOTES
[facility] at [facility]  IP Progress Note      Chief Complaint/Reason for service: History of PAF #2 preprocedure clearance    Subjective   Subjective: Results of EGD noted.  Patient is finishing her bowel prep.  She denies chest pain shortness of breath.    Past medical, surgical, social and family history reviewed in the patient's electronic medical record.    Objective     Vital Sign Min/Max for last 24 hours  Temp  Min: 98 °F (36.7 °C)  Max: 99.2 °F (37.3 °C)   BP  Min: 129/60  Max: 155/49   Pulse  Min: 74  Max: 103   Resp  Min: 18  Max: 20   SpO2  Min: 91 %  Max: 100 %   Flow (L/min) (Oxygen Therapy)  Min: 2  Max: 2      Intake/Output Summary (Last 24 hours) at 10/12/2024 0706  Last data filed at 10/12/2024 0240  Gross per 24 hour   Intake --   Output 500 ml   Net -500 ml             Current Facility-Administered Medications:     acetaminophen (TYLENOL) tablet 650 mg, 650 mg, Oral, Q6H PRN, ElpidioiciDaisy oh, DO, 650 mg at 10/11/24 2135    [Held by provider] apixaban (ELIQUIS) tablet 5 mg, 5 mg, Oral, Q12H, Daisy Amaya, DO    [Held by provider] aspirin chewable tablet 81 mg, 81 mg, Oral, Daily, Jazmínevicius, Daisy, DO    atorvastatin (LIPITOR) tablet 20 mg, 20 mg, Oral, Daily, JazmíneviciusDaisy, DO, 20 mg at 10/11/24 2041    sennosides-docusate (PERICOLACE) 8.6-50 MG per tablet 2 tablet, 2 tablet, Oral, BID PRN **AND** polyethylene glycol (MIRALAX) packet 17 g, 17 g, Oral, Daily PRN **AND** bisacodyl (DULCOLAX) EC tablet 5 mg, 5 mg, Oral, Daily PRN **AND** bisacodyl (DULCOLAX) suppository 10 mg, 10 mg, Rectal, Daily PRN, Daisy Amaya, DO    budesonide-formoterol (SYMBICORT) 160-4.5 MCG/ACT inhaler 2 puff, 2 puff, Inhalation, BID - RT, 2 puff at 10/11/24 1926 **AND** tiotropium (SPIRIVA RESPIMAT) 2.5 mcg/act aerosol solution inhaler, 2 puff, Inhalation, Daily - RT, Daisy Amaya, DO, 2 puff at 10/11/24 0819    Calcium Replacement - Follow Nurse  / BPA Driven Protocol, , Does not apply, PRN, Daisy Amaya DO    [Held by provider] clopidogrel (PLAVIX) tablet 75 mg, 75 mg, Oral, Daily, Daisy Amaya DO dofetilide (TIKOSYN) capsule 250 mcg, 250 mcg, Oral, BID, Daisy Amaya DO, 250 mcg at 10/11/24 2041    gabapentin (NEURONTIN) capsule 300 mg, 300 mg, Oral, Nightly, Daisy Amaya DO, 300 mg at 10/11/24 2041    HYDROmorphone (DILAUDID) injection 0.5 mg, 0.5 mg, Intravenous, Q3H PRN, Daisy Aamya DO    influenza vac split high-dose (FLUZONE HIGH DOSE) injection 0.5 mL, 0.5 mL, Intramuscular, During Hospitalization, Daisy Amaya DO    [Held by provider] isosorbide mononitrate (IMDUR) 24 hr tablet 30 mg, 30 mg, Oral, Daily, Daisy Amaya DO    levothyroxine (SYNTHROID, LEVOTHROID) tablet 200 mcg, 200 mcg, Oral, Q AM, Daisy Amaya DO    [Held by provider] losartan (COZAAR) tablet 50 mg, 50 mg, Oral, Q24H, Daisy Amaya DO    Magnesium Standard Dose Replacement - Follow Nurse / BPA Driven Protocol, , Does not apply, PRN, Daisy Amaya DO    oxybutynin XL (DITROPAN-XL) 24 hr tablet 10 mg, 10 mg, Oral, Daily, Daisy Amaya DO    pantoprazole (PROTONIX) EC tablet 40 mg, 40 mg, Oral, Q AM, Markie Bautista MD    Phosphorus Replacement - Follow Nurse / BPA Driven Protocol, , Does not apply, PRN, Daisy Amaya DO    Potassium Replacement - Follow Nurse / BPA Driven Protocol, , Does not apply, PRN, Daisy Amaya DO    roflumilast (DALIRESP) tablet 500 mcg, 500 mcg, Oral, Daily, Daisy Amaya DO    sertraline (ZOLOFT) tablet 100 mg, 100 mg, Oral, Daily, Marcinkevicius, Daisy, DO    Insert Peripheral IV, , , Once **AND** sodium chloride 0.9 % flush 10 mL, 10 mL, Intravenous, PRN, Marcinkevicius, Daisy, DO    sodium chloride 0.9 % flush 10 mL, 10 mL, Intravenous, Q12H, Marcinkevicius, Daisy, DO    sodium chloride 0.9 % flush 10 mL,  "10 mL, Intravenous, PRN, Marcinkevicius, Daisy, DO    sodium chloride 0.9 % flush 10 mL, 10 mL, Intravenous, Q12H, Marcinkevicius, Daisy, DO    sodium chloride 0.9 % flush 10 mL, 10 mL, Intravenous, PRN, Marcinkevicius, Daisy, DO    traMADol (ULTRAM) tablet 50 mg, 50 mg, Oral, Q6H PRN, Marcinkevicius, Daisy, DO, 50 mg at 10/12/24 0256    Physical Exam: General Very pleasant elderly female lying on her right side flat.  No dyspnea or tachypnea        HEENT: No JVP       Respiratory: Equal bilateral symmetrical expansion\" bilaterally       Cardiovascular: Regular rate and rhythm and no edema palpation               Neuro: Facial expressions are symmetrical moves all 4 extremities       Skin: Warm and dry       Psych: Pleasant affect    Results Review: Blood work pending.  Blood pressures 1 29-1 55.  Intake and output are even.  Heart rate 76-1 03    Radiology Results:  Imaging Results (Last 72 Hours)       Procedure Component Value Units Date/Time    CT Abdomen Pelvis With & Without Contrast [[id number]] Collected: 10/10/24 1447     Updated: 10/10/24 1504    Narrative:      CT ABDOMEN PELVIS W WO CONTRAST    Date of Exam: 10/10/2024 1:43 PM EDT    Indication: abd pain, gi bleed.    Comparison: CT from 7/25/2023    Technique: Axial CT images were obtained of the abdomen and pelvis before and after the uneventful intravenous administration of 50 mL Isovue 370. Sagittal and coronal reconstructions were performed.  Automated exposure control and iterative   reconstruction methods were used.      FINDINGS:    Abdomen/Pelvis:    Lower Chest: Atelectasis and scarring noted at the lung bases.    Small to moderate size hiatal hernia is noted.    No free air is noted below the diaphragm.    Organs: Noncontrast and multi phase contrast imaging was obtained. Noncontrast imaging of the kidneys demonstrate nonobstructing calculi. There is cortical scarring on the left. No hydronephrosis is noted. Small low-attenuation lesions " too small to   characterize are noted of the kidneys statistically likely represent cysts. There is no evidence of obstructive uropathy.    Arterial phase imaging demonstrates no suspicious hypervascular lesion within the solid organs. Solid organs demonstrate no definite acute abnormality. 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    GI/Bowel: Noncontrast imaging and multiphase contrast images of the GI tract was obtained.    There is mild motion limitation as well as streak artifact from bilateral hip prostheses causing limitation of the GI tract. Noncontrast imaging demonstrates no definite acute abnormality within the GI tract.    Arterial phase imaging demonstrates no abnormal areas of contrast associated with the GI tract. No obvious pooling of contrast or focal abnormality noted on portal venous or delayed imaging. There is diverticulosis without evidence of acute   diverticulitis. Relative decompression of the descending colon noted. Postsurgical changes are noted at the base of the cecum. There is no suspicious mesenteric adenopathy or fluid collection noted.    Pelvis: Evaluation of the pelvis is limited by streak artifact. Bladder is poorly visualized. No suspicious fluid collections or adenopathy appreciated    Peritoneum/Retroperitoneum: Atherosclerotic changes are noted of the aorta which is widely patent. There is no aneurysmal dilation. No suspicious retroperitoneal adenopathy    Bones/Soft Tissues: Multilevel degenerative change noted at the spine. Bilateral hip prostheses are noted as previously discussed. There is exuberant heterotopic bone formation on the left. Remote healed fracture right iliac wing.      Impression:        1. Accounting for the limitations of the exam there is no evidence of active extravasation or obvious pooling blood noted within the GI tract    2. Diverticulosis without evidence of  diverticulitis    3. Mild prominence of the wall thickness of the descending colon accentuated by incomplete distention. Findings likely artifactual. Colitis thought to be less likely    4. Nonobstructing left renal calculi    5. Other findings as above          Electronically Signed: Zack Perez MD    10/10/2024 3:01 PM EDT    Workstation ID: [id number]            EKG: Sinus rhythm no ischemia normal QTc    ECHO: Previous known normal EF    Tele: Baseline rhythm is sinus occasional 3 beat run of PAT.  No A-fib    Assessment   Assessment/Plan: Patient has a history of PAF when she was in the hospital recently.  She was placed on Tikosyn therapy which appears to be controlling her A-fib nicely.  It would be ideal to restart the patient on anticoagulation therapy but since her rhythm is stable this could be postponed for a while  She definitely does not need dual antiplatelet therapy, and I told her she cannot take aspirin 3 times a day    Que Herr MD  10/12/24  07:06 EDT

## 2024-10-12 NOTE — ANESTHESIA PREPROCEDURE EVALUATION
Anesthesia Evaluation     Patient summary reviewed and Nursing notes reviewed                Airway   Mallampati: II  TM distance: >3 FB  Neck ROM: full  No difficulty expected  Dental - normal exam   (+) upper dentures and lower dentures    Pulmonary - normal exam   (+) COPD,  Cardiovascular - normal exam    (+) hypertension, valvular problems/murmurs murmur, CAD, dysrhythmias Atrial Fib, PVD, hyperlipidemia    ROS comment: ECHO 8/28/24    Neuro/Psych  (+) psychiatric history Depression  GI/Hepatic/Renal/Endo    (+) renal disease- stones, thyroid problem hypothyroidism    Musculoskeletal     Abdominal  - normal exam    Bowel sounds: normal.   Substance History - negative use     OB/GYN negative ob/gyn ROS         Other   arthritis,                   Anesthesia Plan    ASA 3     general     (PROPOFOL)  intravenous induction     Anesthetic plan, risks, benefits, and alternatives have been provided, discussed and informed consent has been obtained with: patient.    Plan discussed with CRNA.    CODE STATUS:    Level Of Support Discussed With: Patient  Code Status (Patient has no pulse and is not breathing): CPR (Attempt to Resuscitate)  Medical Interventions (Patient has pulse or is breathing): Full Support

## 2024-10-12 NOTE — PLAN OF CARE
Problem: Adult Inpatient Plan of Care  Goal: Plan of Care Review  Outcome: Progressing  Goal: Patient-Specific Goal (Individualized)  Outcome: Progressing  Goal: Absence of Hospital-Acquired Illness or Injury  Outcome: Progressing  Intervention: Identify and Manage Fall Risk  Recent Flowsheet Documentation  Taken 10/12/2024 0200 by Yadira Gannon RN  Safety Promotion/Fall Prevention:   assistive device/personal items within reach   clutter free environment maintained   fall prevention program maintained   gait belt   lighting adjusted   nonskid shoes/slippers when out of bed   room organization consistent   safety round/check completed   toileting scheduled  Taken 10/12/2024 0000 by Yadira Gannon RN  Safety Promotion/Fall Prevention:   assistive device/personal items within reach   clutter free environment maintained   fall prevention program maintained   lighting adjusted   gait belt   nonskid shoes/slippers when out of bed   room organization consistent   safety round/check completed   toileting scheduled  Taken 10/11/2024 2200 by Yadira Gannon RN  Safety Promotion/Fall Prevention:   assistive device/personal items within reach   clutter free environment maintained   fall prevention program maintained   gait belt   lighting adjusted   nonskid shoes/slippers when out of bed   room organization consistent   safety round/check completed   toileting scheduled  Taken 10/11/2024 2000 by Yadira Gannon RN  Safety Promotion/Fall Prevention:   assistive device/personal items within reach   clutter free environment maintained   fall prevention program maintained   gait belt   lighting adjusted   nonskid shoes/slippers when out of bed   patient off unit   room organization consistent   safety round/check completed  Intervention: Prevent Skin Injury  Recent Flowsheet Documentation  Taken 10/12/2024 0200 by Yadira Gannon RN  Body Position:   right   side-lying  Skin Protection:   incontinence pads  utilized   protective footwear used   silicone foam dressing in place   transparent dressing maintained  Taken 10/12/2024 0000 by Yadira Gannon RN  Body Position:   left   side-lying  Skin Protection:   adhesive use limited   incontinence pads utilized   protective footwear used   silicone foam dressing in place   transparent dressing maintained   tubing/devices free from skin contact  Taken 10/11/2024 2200 by Yadira Gannon RN  Body Position: (up in chair) other (see comments)  Skin Protection:   adhesive use limited   incontinence pads utilized   protective footwear used   silicone foam dressing in place   transparent dressing maintained   tubing/devices free from skin contact  Taken 10/11/2024 2000 by Yadira Gannon RN  Body Position:   left   side-lying   turned  Skin Protection: (silicone dressings pulled back and skin assessed)   adhesive use limited   incontinence pads utilized   protective footwear used   silicone foam dressing in place   transparent dressing maintained   tubing/devices free from skin contact  Intervention: Prevent and Manage VTE (Venous Thromboembolism) Risk  Recent Flowsheet Documentation  Taken 10/12/2024 0200 by Yadira Gannon RN  VTE Prevention/Management:   bilateral   SCDs (sequential compression devices) on  Taken 10/12/2024 0000 by Yadira Gannon RN  VTE Prevention/Management:   bilateral   sequential compression devices on  Taken 10/11/2024 2200 by Yadira Gannon RN  VTE Prevention/Management:   sequential compression devices on   bilateral  Taken 10/11/2024 2000 by Yadira Gannon RN  VTE Prevention/Management:   bilateral   sequential compression devices on  Intervention: Prevent Infection  Recent Flowsheet Documentation  Taken 10/12/2024 0200 by Yadira Gannon RN  Infection Prevention:   environmental surveillance performed   hand hygiene promoted   personal protective equipment utilized   rest/sleep promoted  Taken 10/12/2024 0000 by  Yadira Gannon, RN  Infection Prevention:   environmental surveillance performed   hand hygiene promoted   personal protective equipment utilized   rest/sleep promoted  Taken 10/11/2024 2000 by Yadira Gannon RN  Infection Prevention:   cohorting utilized   hand hygiene promoted   personal protective equipment utilized   rest/sleep promoted  Goal: Optimal Comfort and Wellbeing  Outcome: Progressing  Intervention: Monitor Pain and Promote Comfort  Recent Flowsheet Documentation  Taken 10/11/2024 2041 by Yadira Gannon RN  Pain Management Interventions: see MAR  Goal: Readiness for Transition of Care  Outcome: Progressing     Problem: Anemia  Goal: Anemia Symptom Improvement  Outcome: Progressing  Intervention: Monitor and Manage Anemia  Recent Flowsheet Documentation  Taken 10/12/2024 0200 by Yadira Gannon, RN  Safety Promotion/Fall Prevention:   assistive device/personal items within reach   clutter free environment maintained   fall prevention program maintained   gait belt   lighting adjusted   nonskid shoes/slippers when out of bed   room organization consistent   safety round/check completed   toileting scheduled  Taken 10/12/2024 0000 by Yadira Gannon, RN  Safety Promotion/Fall Prevention:   assistive device/personal items within reach   clutter free environment maintained   fall prevention program maintained   lighting adjusted   gait belt   nonskid shoes/slippers when out of bed   room organization consistent   safety round/check completed   toileting scheduled  Taken 10/11/2024 2200 by Yadira Gannon, RN  Safety Promotion/Fall Prevention:   assistive device/personal items within reach   clutter free environment maintained   fall prevention program maintained   gait belt   lighting adjusted   nonskid shoes/slippers when out of bed   room organization consistent   safety round/check completed   toileting scheduled  Taken 10/11/2024 2000 by Yadira Gannon, RN  Safety  Promotion/Fall Prevention:   assistive device/personal items within reach   clutter free environment maintained   fall prevention program maintained   gait belt   lighting adjusted   nonskid shoes/slippers when out of bed   patient off unit   room organization consistent   safety round/check completed     Problem: Skin Injury Risk Increased  Goal: Skin Health and Integrity  Outcome: Progressing  Intervention: Optimize Skin Protection  Recent Flowsheet Documentation  Taken 10/12/2024 0200 by Yadira Gannon RN  Activity Management: activity encouraged  Pressure Reduction Techniques:   pressure points protected   weight shift assistance provided  Head of Bed (HOB) Positioning: HOB flat  Pressure Reduction Devices:   foam padding utilized   pressure-redistributing mattress utilized   positioning supports utilized  Skin Protection:   incontinence pads utilized   protective footwear used   silicone foam dressing in place   transparent dressing maintained  Taken 10/12/2024 0000 by Yadira Gannon RN  Activity Management: activity encouraged  Pressure Reduction Techniques:   pressure points protected   weight shift assistance provided  Head of Bed (HOB) Positioning: HOB flat  Pressure Reduction Devices:   foam padding utilized   positioning supports utilized   pressure-redistributing mattress utilized  Skin Protection:   adhesive use limited   incontinence pads utilized   protective footwear used   silicone foam dressing in place   transparent dressing maintained   tubing/devices free from skin contact  Taken 10/11/2024 2200 by Yadira Gannon, RN  Activity Management: up in chair  Pressure Reduction Techniques:   pressure points protected   weight shift assistance provided  Head of Bed (HOB) Positioning: (up in chair) other (see comments)  Pressure Reduction Devices:   foam padding utilized   positioning supports utilized   pressure-redistributing mattress utilized  Skin Protection:   adhesive use limited    incontinence pads utilized   protective footwear used   silicone foam dressing in place   transparent dressing maintained   tubing/devices free from skin contact  Taken 10/11/2024 2000 by Yadira Gannon RN  Activity Management:   up to bedside commode   activity encouraged  Pressure Reduction Techniques:   pressure points protected   weight shift assistance provided  Head of Bed (HOB) Positioning: HOB elevated  Pressure Reduction Devices:   foam padding utilized   positioning supports utilized   pressure-redistributing mattress utilized  Skin Protection: (silicone dressings pulled back and skin assessed)   adhesive use limited   incontinence pads utilized   protective footwear used   silicone foam dressing in place   transparent dressing maintained   tubing/devices free from skin contact     Problem: Fall Injury Risk  Goal: Absence of Fall and Fall-Related Injury  Outcome: Progressing  Intervention: Identify and Manage Contributors  Recent Flowsheet Documentation  Taken 10/12/2024 0200 by Yadira Gannon RN  Medication Review/Management: medications reviewed  Taken 10/12/2024 0000 by Yadira Gannon RN  Medication Review/Management: medications reviewed  Taken 10/11/2024 2200 by Yadira Gannon RN  Medication Review/Management: medications reviewed  Taken 10/11/2024 2000 by Yadira Gannon RN  Medication Review/Management: medications reviewed  Intervention: Promote Injury-Free Environment  Recent Flowsheet Documentation  Taken 10/12/2024 0200 by Yadira Gannon, RN  Safety Promotion/Fall Prevention:   assistive device/personal items within reach   clutter free environment maintained   fall prevention program maintained   gait belt   lighting adjusted   nonskid shoes/slippers when out of bed   room organization consistent   safety round/check completed   toileting scheduled  Taken 10/12/2024 0000 by Yadira Gannon, RN  Safety Promotion/Fall Prevention:   assistive device/personal items  within reach   clutter free environment maintained   fall prevention program maintained   lighting adjusted   gait belt   nonskid shoes/slippers when out of bed   room organization consistent   safety round/check completed   toileting scheduled  Taken 10/11/2024 2200 by Yadira Gannon, RN  Safety Promotion/Fall Prevention:   assistive device/personal items within reach   clutter free environment maintained   fall prevention program maintained   gait belt   lighting adjusted   nonskid shoes/slippers when out of bed   room organization consistent   safety round/check completed   toileting scheduled  Taken 10/11/2024 2000 by Yadira Gannon, RN  Safety Promotion/Fall Prevention:   assistive device/personal items within reach   clutter free environment maintained   fall prevention program maintained   gait belt   lighting adjusted   nonskid shoes/slippers when out of bed   patient off unit   room organization consistent   safety round/check completed   Goal Outcome Evaluation:

## 2024-10-12 NOTE — PLAN OF CARE
Goal Outcome Evaluation:  Plan of Care Reviewed With: patient        Progress: improving   VSS, 2LNC, A/Ox4. Pt had EGD/colonoscopy. Tolerating diet. Skin and fall interventions in place. Will continue POC.     Problem: Adult Inpatient Plan of Care  Goal: Plan of Care Review  10/12/2024 1532 by Indu Watson RN  Outcome: Progressing  Flowsheets (Taken 10/12/2024 1532)  Progress: improving  Plan of Care Reviewed With: patient  10/12/2024 1532 by Indu Watson RN  Outcome: Not Progressing  Flowsheets (Taken 10/12/2024 1532)  Progress: improving  Plan of Care Reviewed With: patient  Goal: Patient-Specific Goal (Individualized)  10/12/2024 1532 by Indu Watson RN  Outcome: Progressing  10/12/2024 1532 by Indu Watson RN  Outcome: Not Progressing  Goal: Absence of Hospital-Acquired Illness or Injury  10/12/2024 1532 by Indu Watson RN  Outcome: Progressing  10/12/2024 1532 by Indu Watson RN  Outcome: Not Progressing  Intervention: Identify and Manage Fall Risk  Recent Flowsheet Documentation  Taken 10/12/2024 1400 by Indu Watson RN  Safety Promotion/Fall Prevention:   assistive device/personal items within reach   activity supervised   clutter free environment maintained   fall prevention program maintained   safety round/check completed   room organization consistent   toileting scheduled   nonskid shoes/slippers when out of bed   lighting adjusted  Taken 10/12/2024 1200 by Indu Watson RN  Safety Promotion/Fall Prevention: patient off unit  Taken 10/12/2024 1000 by Indu Watson RN  Safety Promotion/Fall Prevention:   activity supervised   assistive device/personal items within reach   clutter free environment maintained   fall prevention program maintained   room organization consistent   toileting scheduled   safety round/check completed   nonskid shoes/slippers when out of bed   lighting adjusted  Taken 10/12/2024 0800 by Indu Watson RN  Safety Promotion/Fall Prevention:   assistive  device/personal items within reach   activity supervised   clutter free environment maintained   fall prevention program maintained   room organization consistent   toileting scheduled   safety round/check completed   lighting adjusted   nonskid shoes/slippers when out of bed  Intervention: Prevent Skin Injury  Recent Flowsheet Documentation  Taken 10/12/2024 1400 by Indu Watson RN  Body Position: sitting up in bed  Skin Protection:   incontinence pads utilized   skin sealant/moisture barrier applied   silicone foam dressing in place   transparent dressing maintained  Taken 10/12/2024 1000 by Indu Watson RN  Body Position: supine  Skin Protection:   incontinence pads utilized   skin sealant/moisture barrier applied   silicone foam dressing in place  Taken 10/12/2024 0800 by Indu Watson RN  Skin Protection: (dressing peeled back skin assessed)   incontinence pads utilized   transparent dressing maintained   skin sealant/moisture barrier applied   silicone foam dressing in place   other (see comments)  Intervention: Prevent and Manage VTE (Venous Thromboembolism) Risk  Recent Flowsheet Documentation  Taken 10/12/2024 0800 by Indu Watson RN  VTE Prevention/Management:   bilateral   SCDs (sequential compression devices) on  Intervention: Prevent Infection  Recent Flowsheet Documentation  Taken 10/12/2024 1400 by Indu Watson RN  Infection Prevention:   rest/sleep promoted   single patient room provided   visitors restricted/screened   hand hygiene promoted  Taken 10/12/2024 1000 by Indu Watson RN  Infection Prevention:   single patient room provided   rest/sleep promoted   visitors restricted/screened   hand hygiene promoted  Taken 10/12/2024 0800 by Indu Watson RN  Infection Prevention:   single patient room provided   hand hygiene promoted   rest/sleep promoted   visitors restricted/screened  Goal: Optimal Comfort and Wellbeing  10/12/2024 1532 by Indu Watson RN  Outcome: Progressing  10/12/2024  1532 by Indu Watson RN  Outcome: Not Progressing  Intervention: Monitor Pain and Promote Comfort  Recent Flowsheet Documentation  Taken 10/12/2024 0907 by Indu Watson RN  Pain Management Interventions: pain medication given  Intervention: Provide Person-Centered Care  Recent Flowsheet Documentation  Taken 10/12/2024 0800 by Indu Watson RN  Trust Relationship/Rapport:   care explained   choices provided  Goal: Readiness for Transition of Care  10/12/2024 1532 by Indu Watson RN  Outcome: Progressing  10/12/2024 1532 by Indu Watson RN  Outcome: Not Progressing     Problem: Anemia  Goal: Anemia Symptom Improvement  10/12/2024 1532 by Indu Watson RN  Outcome: Progressing  10/12/2024 1532 by Indu Watson RN  Outcome: Not Progressing  Intervention: Monitor and Manage Anemia  Recent Flowsheet Documentation  Taken 10/12/2024 1400 by Indu Watson RN  Safety Promotion/Fall Prevention:   assistive device/personal items within reach   activity supervised   clutter free environment maintained   fall prevention program maintained   safety round/check completed   room organization consistent   toileting scheduled   nonskid shoes/slippers when out of bed   lighting adjusted  Taken 10/12/2024 1200 by Indu Watson RN  Safety Promotion/Fall Prevention: patient off unit  Taken 10/12/2024 1000 by Indu Watson RN  Safety Promotion/Fall Prevention:   activity supervised   assistive device/personal items within reach   clutter free environment maintained   fall prevention program maintained   room organization consistent   toileting scheduled   safety round/check completed   nonskid shoes/slippers when out of bed   lighting adjusted  Taken 10/12/2024 0800 by Indu Watson RN  Safety Promotion/Fall Prevention:   assistive device/personal items within reach   activity supervised   clutter free environment maintained   fall prevention program maintained   room organization consistent   toileting scheduled   safety  round/check completed   lighting adjusted   nonskid shoes/slippers when out of bed     Problem: Skin Injury Risk Increased  Goal: Skin Health and Integrity  10/12/2024 1532 by Indu Watson RN  Outcome: Progressing  10/12/2024 1532 by Indu Watson RN  Outcome: Not Progressing  Intervention: Optimize Skin Protection  Recent Flowsheet Documentation  Taken 10/12/2024 1400 by Indu Watson RN  Activity Management: activity encouraged  Pressure Reduction Techniques:   heels elevated off bed   frequent weight shift encouraged   weight shift assistance provided  Head of Bed (HOB) Positioning: HOB elevated  Pressure Reduction Devices:   pressure-redistributing mattress utilized   heel offloading device utilized  Skin Protection:   incontinence pads utilized   skin sealant/moisture barrier applied   silicone foam dressing in place   transparent dressing maintained  Taken 10/12/2024 1000 by Indu Watson RN  Activity Management: activity encouraged  Pressure Reduction Techniques:   frequent weight shift encouraged   heels elevated off bed   weight shift assistance provided  Head of Bed (HOB) Positioning: HOB elevated  Pressure Reduction Devices:   pressure-redistributing mattress utilized   heel offloading device utilized  Skin Protection:   incontinence pads utilized   skin sealant/moisture barrier applied   silicone foam dressing in place  Taken 10/12/2024 0800 by Indu Watson RN  Activity Management: activity encouraged  Pressure Reduction Techniques:   heels elevated off bed   frequent weight shift encouraged   weight shift assistance provided  Head of Bed (HOB) Positioning: HOB elevated  Pressure Reduction Devices:   heel offloading device utilized   pressure-redistributing mattress utilized  Skin Protection: (dressing peeled back skin assessed)   incontinence pads utilized   transparent dressing maintained   skin sealant/moisture barrier applied   silicone foam dressing in place   other (see comments)      Problem: Fall Injury Risk  Goal: Absence of Fall and Fall-Related Injury  10/12/2024 1532 by Indu Watson RN  Outcome: Progressing  10/12/2024 1532 by Indu Watson RN  Outcome: Not Progressing  Intervention: Identify and Manage Contributors  Recent Flowsheet Documentation  Taken 10/12/2024 1400 by Indu Watson RN  Medication Review/Management: medications reviewed  Taken 10/12/2024 1000 by Indu Wtason RN  Medication Review/Management: medications reviewed  Taken 10/12/2024 0800 by Indu Watson RN  Medication Review/Management: medications reviewed  Intervention: Promote Injury-Free Environment  Recent Flowsheet Documentation  Taken 10/12/2024 1400 by Indu Watson RN  Safety Promotion/Fall Prevention:   assistive device/personal items within reach   activity supervised   clutter free environment maintained   fall prevention program maintained   safety round/check completed   room organization consistent   toileting scheduled   nonskid shoes/slippers when out of bed   lighting adjusted  Taken 10/12/2024 1200 by Indu Watson RN  Safety Promotion/Fall Prevention: patient off unit  Taken 10/12/2024 1000 by Indu Watson RN  Safety Promotion/Fall Prevention:   activity supervised   assistive device/personal items within reach   clutter free environment maintained   fall prevention program maintained   room organization consistent   toileting scheduled   safety round/check completed   nonskid shoes/slippers when out of bed   lighting adjusted  Taken 10/12/2024 0800 by Indu Watson RN  Safety Promotion/Fall Prevention:   assistive device/personal items within reach   activity supervised   clutter free environment maintained   fall prevention program maintained   room organization consistent   toileting scheduled   safety round/check completed   lighting adjusted   nonskid shoes/slippers when out of bed

## 2024-10-12 NOTE — PROGRESS NOTES
[facility] Medicine Services  PROGRESS NOTE    Patient Name: Avelina Ceron  : 1940  MRN: [id number]    Date of Admission: 10/10/2024  Primary Care Physician: Dre Moura MD    Subjective   Subjective     CC:  Weakness, nausea      HPI:  Patient denies any worsening abdominal discomfort.  States she is having difficulty completing the bowel prep but will continue to do so.  She states she notices black bloody output with her bowel movements.  Denies any chest pain or shortness of breath.      Objective   Objective     Vital Signs:   Temp:  [97.9 °F (36.6 °C)-99.2 °F (37.3 °C)] 97.9 °F (36.6 °C)  Heart Rate:  [] 84  Resp:  [16-20] 16  BP: (129-155)/(41-73) 139/61  Flow (L/min) (Oxygen Therapy):  [2] 2     Physical Exam:  General appearance: alert, awake, oriented, no acute distress   Cardiovascular: RRR, systolic murmur, radial pulse full 2/4 BL   Respiratory: CTAB, no crackles or wheezes   Abdomen: soft, non-tender, no organomegaly, bowel sounds normoactive    MSK: L knee minimal TTP, no edema/erythema/warmth   Neuro/CNS: alert and oriented x3, normal speech    Results Reviewed:  LAB RESULTS:      Lab 10/12/24  0802 10/12/24  0030 10/11/24  2053 10/11/24  1503 10/11/24  0446 10/10/24  2001 10/10/24  1316 10/10/24  1212 10/09/24  1328   WBC 12.28*  --   --   --  15.31*  --  14.72*  --  19.92*   HEMOGLOBIN 9.0* 8.2* 9.0* 9.1* 8.5*   < > 7.2*  --  7.7*   HEMOGLOBIN, POC  --   --   --   --   --   --   --    < >  --    HEMATOCRIT 28.2* 26.4* 29.0* 28.7* 26.8*   < > 24.1*  --  24.5*   PLATELETS 376  --   --   --  357  --  362  --  411   NEUTROS ABS 8.96*  --   --   --  11.32*  --  10.92*  --  17.12*   IMMATURE GRANS (ABS) 0.06*  --   --   --  0.09*  --  0.10*  --  0.18*   LYMPHS ABS 2.27  --   --   --  2.72  --  2.68  --  1.78   MONOS ABS 0.73  --   --   --  0.89  --  0.80  --  0.72   EOS ABS 0.14  --   --   --  0.17  --  0.10  --  0.02   .1*  --   --   --  99.6*   --  107.1*  --  102.1*   PROCALCITONIN  --   --   --   --   --   --  0.06  --   --     < > = values in this interval not displayed.         Lab 10/12/24  0802 10/11/24  0446 10/10/24  1316 10/09/24  1328   SODIUM 145 143 142 142   POTASSIUM 4.0 4.2  4.2 3.1* 3.7   CHLORIDE 104 103 99 101   CO2 33.0* 35.0* 33.0* 32.0*   ANION GAP 8.0 5.0 10.0 9.0   BUN 4* 8 8 9   CREATININE 0.29* 0.31* 0.33* 0.33*   EGFR 106.3 104.6 103.0 103.0   GLUCOSE 101* 104* 114* 114*   CALCIUM 8.9 8.6 9.1 9.5   MAGNESIUM  --  1.8  --   --    TSH  --   --   --  6.350*         Lab 10/10/24  1316 10/09/24  1328   TOTAL PROTEIN 5.9* 6.2   ALBUMIN 3.8 3.7   GLOBULIN 2.1 2.5   ALT (SGPT) 15 15   AST (SGOT) 23 21   BILIRUBIN 0.3 0.2   ALK PHOS 66 68                 Lab 10/10/24  1421 10/10/24  1324 10/10/24  1324 10/09/24  1328   IRON  --   --   --  69   IRON SATURATION (TSAT)  --   --   --  19*   TIBC  --   --   --  361   TRANSFERRIN  --   --   --  242   FERRITIN  --   --   --  315.00*   ABO TYPING A   < > A  --    RH TYPING Positive   < > Positive  --    ANTIBODY SCREEN  --   --  Negative  --     < > = values in this interval not displayed.         Brief Urine Lab Results  (Last result in the past 365 days)        Color   Clarity   Blood   Leuk Est   Nitrite   Protein   CREAT   Urine HCG        10/10/24 2332 Yellow   Clear   Negative   Small (1+)   Negative   Negative                   Microbiology Results Abnormal       None            CT Abdomen Pelvis With & Without Contrast    Result Date: 10/10/2024  CT ABDOMEN PELVIS W WO CONTRAST Date of Exam: 10/10/2024 1:43 PM EDT Indication: abd pain, gi bleed. Comparison: CT from 7/25/2023 Technique: Axial CT images were obtained of the abdomen and pelvis before and after the uneventful intravenous administration of 50 mL Isovue 370. Sagittal and coronal reconstructions were performed.  Automated exposure control and iterative reconstruction methods were used. FINDINGS: Abdomen/Pelvis: Lower Chest:  Atelectasis and scarring noted at the lung bases. Small to moderate size hiatal hernia is noted. No free air is noted below the diaphragm. Organs: Noncontrast and multi phase contrast imaging was obtained. Noncontrast imaging of the kidneys demonstrate nonobstructing calculi. There is cortical scarring on the left. No hydronephrosis is noted. Small low-attenuation lesions too small to characterize are noted of the kidneys statistically likely represent cysts. There is no evidence of obstructive uropathy. Arterial phase imaging demonstrates no suspicious hypervascular lesion within the solid organs. Solid organs demonstrate no definite acute abnormality. The patient is status post cholecystectomy with dilation of the common duct compatible with postcholecystectomy ectasia. GI/Bowel: Noncontrast imaging and multiphase contrast images of the GI tract was obtained. There is mild motion limitation as well as streak artifact from bilateral hip prostheses causing limitation of the GI tract. Noncontrast imaging demonstrates no definite acute abnormality within the GI tract. Arterial phase imaging demonstrates no abnormal areas of contrast associated with the GI tract. No obvious pooling of contrast or focal abnormality noted on portal venous or delayed imaging. There is diverticulosis without evidence of acute diverticulitis. Relative decompression of the descending colon noted. Postsurgical changes are noted at the base of the cecum. There is no suspicious mesenteric adenopathy or fluid collection noted. Pelvis: Evaluation of the pelvis is limited by streak artifact. Bladder is poorly visualized. No suspicious fluid collections or adenopathy appreciated Peritoneum/Retroperitoneum: Atherosclerotic changes are noted of the aorta which is widely patent. There is no aneurysmal dilation. No suspicious retroperitoneal adenopathy Bones/Soft Tissues: Multilevel degenerative change noted at the spine. Bilateral hip prostheses are  noted as previously discussed. There is exuberant heterotopic bone formation on the left. Remote healed fracture right iliac wing.     Impression: 1. Accounting for the limitations of the exam there is no evidence of active extravasation or obvious pooling blood noted within the GI tract 2. Diverticulosis without evidence of diverticulitis 3. Mild prominence of the wall thickness of the descending colon accentuated by incomplete distention. Findings likely artifactual. Colitis thought to be less likely 4. Nonobstructing left renal calculi 5. Other findings as above Electronically Signed: Zack Perez MD  10/10/2024 3:01 PM EDT  Workstation ID: [id number]     Results for orders placed during the hospital encounter of 08/26/24    Adult Transthoracic Echo Limited W/ Cont if Necessary Per Protocol    Interpretation Summary    Left ventricular systolic function is normal. Calculated left ventricular EF = 64%    Moderate tricuspid valve regurgitation is present.    Estimated right ventricular systolic pressure from tricuspid regurgitation is moderately elevated (45-55 mmHg).    Mild MR      Current medications:  Scheduled Meds:[Held by provider] apixaban, 5 mg, Oral, Q12H  [Held by provider] aspirin, 81 mg, Oral, Daily  atorvastatin, 20 mg, Oral, Daily  budesonide-formoterol, 2 puff, Inhalation, BID - RT   And  tiotropium bromide monohydrate, 2 puff, Inhalation, Daily - RT  [Held by provider] clopidogrel, 75 mg, Oral, Daily  dofetilide, 250 mcg, Oral, BID  gabapentin, 300 mg, Oral, Nightly  [Held by provider] isosorbide mononitrate, 30 mg, Oral, Daily  levothyroxine, 200 mcg, Oral, Q AM  [Held by provider] losartan, 50 mg, Oral, Q24H  oxybutynin XL, 10 mg, Oral, Daily  pantoprazole, 40 mg, Oral, Q AM  roflumilast, 500 mcg, Oral, Daily  sertraline, 100 mg, Oral, Daily  sodium chloride, 10 mL, Intravenous, Q12H  sodium chloride, 10 mL, Intravenous, Q12H      Continuous Infusions:   PRN Meds:.  acetaminophen     senna-docusate sodium **AND** polyethylene glycol **AND** bisacodyl **AND** bisacodyl    Calcium Replacement - Follow Nurse / BPA Driven Protocol    HYDROmorphone    influenza vaccine    Magnesium Standard Dose Replacement - Follow Nurse / BPA Driven Protocol    Phosphorus Replacement - Follow Nurse / BPA Driven Protocol    Potassium Replacement - Follow Nurse / BPA Driven Protocol    Insert Peripheral IV **AND** sodium chloride    sodium chloride    sodium chloride    traMADol    Assessment & Plan   Assessment & Plan     Active Hospital Problems    Diagnosis  POA    **Anemia due to blood loss, acute [D62]  Yes    Anemia due to acute blood loss [D62]  Yes    Blood loss anemia [D50.0]  Unknown      Resolved Hospital Problems   No resolved problems to display.        Brief Hospital Course to date:  Avelina Ceron is a 83 y.o. female with past medical history significant for atrial fibrillation on Eliquis, CAD status post stenting, PAD status post stenting, hypertension, hyperlipidemia, hypothyroidism, COPD with chronic hypoxia on 2 L nasal cannula, was admitted for acute blood loss anemia secondary to GI bleed.  Gastroenterology and cardiology consulted     Acute GI bleed  Acute on chronic blood loss anemia  Leukocytosis, reactive   -Hemodynamically stable  -s/p 1 unit pRBC, Hgb stable   -GI consulted, s/p EGD w no acute findings. Completing bowel prep today for colonoscopy; possible pill cam endoscopy afterwards   -Continue PPI and trend Hgb, transfuse as needed      CAD s/p bilateral ICA stents  PAD s/p bilateral iliac artery stents  Paroxysmal atrial fibrillation on anticoagulation  Hypertension  Hyperlipidemia.   -Cardiology consulted, does not need DAPT but will consider restarting anticoagulation down the line  -Rate controlled w Tikosyn      COPD with hypoxemic respiratory failure- continue Symbicort, Spiriva, Roflumilast. Continue home oxygen.  Chronic pain- continue gabapentin, tramadol, PRN IV Dilaudid for  breakthrough pain.  Chronic bladder dysfunction- oxybutynin ordered as alternative  Anxiety/depression-continue sertraline.  Hypothyroidism- continue levothyroxine.     Expected Discharge Location and Transportation: Home   Expected Discharge   Expected Discharge Date: 10/14/2024; Expected Discharge Time:      VTE Prophylaxis:  Pharmacologic & mechanical VTE prophylaxis orders are present.         AM-PAC 6 Clicks Score (PT): 19 (10/12/24 0800)    CODE STATUS:   Code Status and Medical Interventions: CPR (Attempt to Resuscitate); Full Support   Ordered at: 10/10/24 7386     Level Of Support Discussed With:    Patient     Code Status (Patient has no pulse and is not breathing):    CPR (Attempt to Resuscitate)     Medical Interventions (Patient has pulse or is breathing):    Full Support       Андрей Nuno, DO  10/12/24

## 2024-10-12 NOTE — PLAN OF CARE
Goal Outcome Evaluation:  Plan of Care Reviewed With: patient        Progress: improving  Outcome Evaluation: This nurse took over as primary RN for this patient at 16:00. VSS on 2L NC. A&O x4. No complaints of pain or nausea. Colonoscopy EGD performed today. Fall and skin precautions in place. Will continue plan of care.

## 2024-10-12 NOTE — THERAPY EVALUATION
"Patient Name: Avelina Ceron  : 1940    MRN: [id number]                              Today's Date: 10/12/2024       Admit Date: 10/10/2024    Visit Dx:     ICD-10-CM ICD-9-CM   1. Blood loss anemia  D50.0 280.0   2. Chronic anticoagulation  Z79.01 V58.61   3. Leukocytosis, unspecified type  D72.829 288.60   4. Epigastric pain  R10.13 789.06     Patient Active Problem List   Diagnosis    Coronary arteriosclerosis in native artery    Chronic obstructive pulmonary disease    Depression    Gastroesophageal reflux disease with esophagitis    Hyperlipidemia    Hypertension    Hypothyroidism    Osteoarthritis    Peripheral arterial occlusive disease    Solitary pulmonary nodule    Vitamin D deficiency    Lower back pain    Carotid bruit    Hypoxemia    Chronic respiratory failure with hypoxia and hypercapnia    Allergic rhinitis    Chronic leukocytosis    Ureterolithiasis    Murmur, cardiac    Hospital discharge follow-up    Closed fracture of right proximal humerus    Right shoulder pain    Acute hypoxemic respiratory failure    Anemia due to blood loss, acute    Blood loss anemia    Anemia due to acute blood loss     Past Medical History:   Diagnosis Date    Anesthesia complication     Pt went into respiratory failure during total hip replacement in     Arthritis of back     Arthritis of neck     Broken ribs     COPD (chronic obstructive pulmonary disease)     Coronary artery disease     Degenerative lumbar disc     Disease of thyroid gland     \"low thyroid\"    Fracture, fibula ?    Fracture, humerus 23    Fracture, tibia and fibula ?    Hip arthrosis     History of colonoscopy     Hyperlipidemia     Hypertension     Kidney stone 2023    Knee swelling     Lumbosacral disc disease     On home O2     3L    Osteoarthritis     Periarthritis of shoulder     Urinary incontinence     Urinary tract infection      Past Surgical History:   Procedure Laterality Date    CAROTID STENT Bilateral     " COLONOSCOPY      EYE SURGERY  10/17/2022    yag surgery    GALLBLADDER SURGERY  2012    HYSTERECTOMY      ILIAC ARTERY STENT Bilateral     JOINT REPLACEMENT  1995; 2020    KIDNEY STONE SURGERY  July August 2023    OOPHORECTOMY  1976    1 ovary removed    TOTAL HIP ARTHROPLASTY Bilateral     URETEROSCOPY LASER LITHOTRIPSY WITH STENT INSERTION Right 07/26/2023    Procedure: CYSTOSCOPY, RIGHT RETROGRADE PYELOGRAM; WITH STENT INSERTION;  Surgeon: Lee Kim MD;  Location: [facility] OR;  Service: Urology;  Laterality: Right;    URETEROSCOPY LASER LITHOTRIPSY WITH STENT INSERTION Right 08/10/2023    Procedure: URETEROSCOPY, LASER LITHOTRIPSY, STENT EXCHANGE;  Surgeon: Lee Kim MD;  Location: [facility] OR;  Service: Urology;  Laterality: Right;      General Information       Row Name 10/12/24 1603          Physical Therapy Time and Intention    Document Type evaluation  -CK     Mode of Treatment physical therapy;individual therapy  -CK       Row Name 10/12/24 1603          General Information    Patient Profile Reviewed yes  -CK     Prior Level of Function independent:;all household mobility;ADL's  [place] with rollator, wears 2L O2 at baseline  -CK     Existing Precautions/Restrictions fall;oxygen therapy device and L/min  -CK     Barriers to Rehab medically complex;previous functional deficit  -CK       Row Name 10/12/24 1603          Living Environment    People in Home child(gabriela), adult  -CK       Row Name 10/12/24 1603          Home Main Entrance    Number of Stairs, Main Entrance two;other (see comments)  1+1 or ramp  -CK     Stair Railings, Main Entrance railings on both sides of stairs  -CK       Row Name 10/12/24 1603          Stairs Within Home, Primary    Stairs, Within Home, Primary 2 single steps throughout home, patient reports there are grab bars available to assist  -CK     Number of Stairs, Within Home, Primary two  -CK     Stair Railings, Within Home, Primary railings on both sides of stairs   -CK       Row Name 10/12/24 1603          Cognition    Orientation Status (Cognition) oriented x 3  -CK       Row Name 10/12/24 1603          Safety Issues/Impairments Affecting Functional Mobility    Safety Issues Affecting Function (Mobility) awareness of need for assistance;insight into deficits/self-awareness;safety precaution awareness  -CK     Impairments Affecting Function (Mobility) balance;endurance/activity tolerance;shortness of breath;strength  -CK               User Key  (r) = Recorded By, (t) = Taken By, (c) = Cosigned By      Initials Name Provider Type    CK Grisel Ponce, PT Physical Therapist                   Mobility       Row Name 10/12/24 1604          Bed Mobility    Bed Mobility rolling left;supine-sit  -CK     Supine-Sit [place] (Bed Mobility) contact guard  -CK     Assistive Device (Bed Mobility) bed rails  -CK     Comment, (Bed Mobility) increased time required, but patient able to complete with reliance on bedrails  -CK       Row Name 10/12/24 1604          Bed-Chair Transfer    Bed-Chair [place] (Transfers) contact guard;1 person assist;verbal cues  -CK     Assistive Device (Bed-Chair Transfers) other (see comments)  armrests of bedside commode  -CK       Row Name 10/12/24 1604          Sit-Stand Transfer    Sit-Stand [place] (Transfers) contact guard;1 person assist;verbal cues  -CK     Assistive Device (Sit-Stand Transfers) walker, front-wheeled  -CK       Row Name 10/12/24 1604          Gait/Stairs (Locomotion)    [place] Level (Gait) contact guard;1 person assist;verbal cues  -CK     Assistive Device (Gait) walker, front-wheeled  -CK     Patient was able to Ambulate yes  -CK     Distance in Feet (Gait) 80  -CK     Deviations/Abnormal Patterns (Gait) bilateral deviations;gait speed decreased;clarisse decreased;stride length decreased  -CK     Bilateral Gait Deviations forward flexed posture;heel strike decreased  -CK     Comment, (Gait/Stairs) Patient ambulated  in fish on 2L O2 with step through gait pattern and forward flexed posture. Good sequencing with AD, did tend to push too far in front of her as she fatigued. SpO2 89% on 2L after ambulation, elevates quickly to 92%.  -CK               User Key  (r) = Recorded By, (t) = Taken By, (c) = Cosigned By      Initials Name Provider Type    CK Grisel Ponce PT Physical Therapist                   Obj/Interventions       Row Name 10/12/24 1606          Range of Motion Comprehensive    General Range of Motion no range of motion deficits identified  -CK       Row Name 10/12/24 1606          Strength Comprehensive (MMT)    General Manual Muscle Testing (MMT) Assessment lower extremity strength deficits identified  -CK     Comment, General Manual Muscle Testing (MMT) Assessment BLE grossly 4/5  -CK       Row Name 10/12/24 1606          Balance    Balance Assessment sitting static balance;standing static balance;standing dynamic balance  -CK     Static Sitting Balance supervision  -CK     Position, Sitting Balance unsupported;sitting in chair;sitting edge of bed;other (see comments)  bedside commode  -CK     Static Standing Balance standby assist  -CK     Dynamic Standing Balance contact guard  -CK     Position/Device Used, Standing Balance supported;walker, front-wheeled  -CK     Comment, Balance no LOB  -CK       Row Name 10/12/24 1606          Sensory Assessment (Somatosensory)    Sensory Assessment (Somatosensory) LE sensation intact  -CK               User Key  (r) = Recorded By, (t) = Taken By, (c) = Cosigned By      Initials Name Provider Type    CK Grisel Ponce PT Physical Therapist                   Goals/Plan       Row Name 10/12/24 1608          Bed Mobility Goal 1 (PT)    Activity/Assistive Device (Bed Mobility Goal 1, PT) sit to supine/supine to sit  -CK     [place] Level/Cues Needed (Bed Mobility Goal 1, PT) modified independence  -CK     Time Frame (Bed Mobility Goal 1, PT) short term goal (STG);3  days  -CK     Progress/Outcomes (Bed Mobility Goal 1, PT) new goal  -CK       Row Name 10/12/24 1608          Transfer Goal 1 (PT)    Activity/Assistive Device (Transfer Goal 1, PT) sit-to-stand/stand-to-sit;bed-to-chair/chair-to-bed  -CK     [place] Level/Cues Needed (Transfer Goal 1, PT) standby assist  -CK     Time Frame (Transfer Goal 1, PT) long term goal (LTG);10 days  -CK     Progress/Outcome (Transfer Goal 1, PT) new goal  -CK       Row Name 10/12/24 1608          Gait Training Goal 1 (PT)    Activity/Assistive Device (Gait Training Goal 1, PT) gait (walking locomotion);assistive device use  -CK     [place] Level (Gait Training Goal 1, PT) contact guard required  -CK     Distance (Gait Training Goal 1, PT) 250'  -CK     Time Frame (Gait Training Goal 1, PT) long term goal (LTG);10 days  -CK     Progress/Outcome (Gait Training Goal 1, PT) new goal  -CK       Row Name 10/12/24 1608          Stairs Goal 1 (PT)    Activity/Assistive Device (Stairs Goal 1, PT) ascending stairs;descending stairs;using handrail, left;using handrail, right  -CK     [place] Level/Cues Needed (Stairs Goal 1, PT) contact guard required  -CK     Number of Stairs (Stairs Goal 1, PT) 1+1  -CK     Time Frame (Stairs Goal 1, PT) long term goal (LTG);10 days  -CK     Progress/Outcome (Stairs Goal 1, PT) new goal  -CK       Row Name 10/12/24 1606          Therapy Assessment/Plan (PT)    Planned Therapy Interventions (PT) balance training;bed mobility training;gait training;home exercise program;neuromuscular re-education;transfer training;stretching;strengthening;stair training;ROM (range of motion);postural re-education;patient/family education  -CK               User Key  (r) = Recorded By, (t) = Taken By, (c) = Cosigned By      Initials Name Provider Type    CK Grisel Ponce, PT Physical Therapist                   Clinical Impression       Row Name 10/12/24 1606          Pain    Pretreatment Pain Rating 0/10 - no pain   -CK     Posttreatment Pain Rating 0/10 - no pain  -CK       Row Name 10/12/24 1606          Plan of Care Review    Plan of Care Reviewed With patient  -CK     Outcome Evaluation Patient presents with deficits in balance, endurance, and strength. She was able to ambulate 80' CGA with FWW while on 2L O2, mild desaturation to 89% with ambulation. IPPT is indicated to address current deficits. Recommend D/C home with assist and HHPT when medically appropriate.  -CK       Row Name 10/12/24 1606          Therapy Assessment/Plan (PT)    Patient/Family Therapy Goals Statement (PT) eat and get stronger  -CK     Rehab Potential (PT) good  -CK     Criteria for Skilled Interventions Met (PT) yes;meets criteria;skilled treatment is necessary  -CK     Therapy Frequency (PT) daily  -CK     Predicted Duration of Therapy Intervention (PT) 5 days  -CK       Row Name 10/12/24 1606          Vital Signs    Pre Systolic BP Rehab 150  -CK     Pre Treatment Diastolic BP 55  -CK     Pretreatment Heart Rate (beats/min) 91  -CK     Posttreatment Heart Rate (beats/min) 86  -CK     Pre SpO2 (%) 99  -CK     O2 Delivery Pre Treatment nasal cannula  -CK     Intra SpO2 (%) 89  -CK     O2 Delivery Intra Treatment nasal cannula  -CK     Post SpO2 (%) 98  -CK     O2 Delivery Post Treatment nasal cannula  -CK     Pre Patient Position Supine  -CK     Intra Patient Position Standing  -CK     Post Patient Position Sitting  -CK       Row Name 10/12/24 1606          Positioning and Restraints    Pre-Treatment Position in bed  -CK     Post Treatment Position chair  -CK     In Chair reclined;call light within reach;encouraged to call for assist;exit alarm on;waffle cushion;notified nsg  -CK               User Key  (r) = Recorded By, (t) = Taken By, (c) = Cosigned By      Initials Name Provider Type    CK Grisel Ponce, PT Physical Therapist                   Outcome Measures       Row Name 10/12/24 1609 10/12/24 0800       How much help from another  person do you currently need...    Turning from your back to your side while in flat bed without using bedrails? 4  -CK 4  -CN    Moving from lying on back to sitting on the side of a flat bed without bedrails? 3  -CK 4  -CN    Moving to and from a bed to a chair (including a wheelchair)? 3  -CK 3  -CN    Standing up from a chair using your arms (e.g., wheelchair, bedside chair)? 3  -CK 3  -CN    Climbing 3-5 steps with a railing? 2  -CK 2  -CN    To walk in hospital room? 3  -CK 3  -CN    AM-PAC 6 Clicks Score (PT) 18  -CK 19  -CN    Highest Level of Mobility Goal 6 --> Walk 10 steps or more  -CK 6 --> Walk 10 steps or more  -CN      Row Name 10/12/24 1609          Functional Assessment    Outcome Measure Options AM-PAC 6 Clicks Basic Mobility (PT)  -               User Key  (r) = Recorded By, (t) = Taken By, (c) = Cosigned By      Initials Name Provider Type    CN Indu Watson, RN Registered Nurse    CK Grisel Ponce, ROSA Physical Therapist                                 Physical Therapy Education       Title: PT OT SLP Therapies (In Progress)       Topic: Physical Therapy (In Progress)       Point: Mobility training (Done)       Learning Progress Summary            Patient Acceptance, E, VU by  at 10/12/2024 1609                      Point: Home exercise program (Not Started)       Learner Progress:  Not documented in this visit.              Point: Body mechanics (Done)       Learning Progress Summary            Patient Acceptance, E, VU by CK at 10/12/2024 1609                      Point: Precautions (Done)       Learning Progress Summary            Patient Acceptance, E, VU by  at 10/12/2024 1609                                      User Key       Initials Effective Dates Name Provider Type Discipline     02/06/24 -  Grisel Ponce PT Physical Therapist PT                  PT Recommendation and Plan  Planned Therapy Interventions (PT): balance training, bed mobility training, gait training,  home exercise program, neuromuscular re-education, transfer training, stretching, strengthening, stair training, ROM (range of motion), postural re-education, patient/family education  Outcome Evaluation: Patient presents with deficits in balance, endurance, and strength. She was able to ambulate 80' CGA with FWW while on 2L O2, mild desaturation to 89% with ambulation. IPPT is indicated to address current deficits. Recommend D/C home with assist and HHPT when medically appropriate.     Time Calculation:   PT Evaluation Complexity  History, PT Evaluation Complexity: 3 or more personal factors and/or comorbidities  Examination of Body Systems (PT Eval Complexity): total of 3 or more elements  Clinical Presentation (PT Evaluation Complexity): stable  Clinical Decision Making (PT Evaluation Complexity): low complexity  Overall Complexity (PT Evaluation Complexity): low complexity     PT Charges       Row Name 10/12/24 1609             Time Calculation    Start Time 1523  -CK      PT Received On 10/12/24  -CK      PT Goal Re-Cert Due Date 10/22/24  -CK         Untimed Charges    PT Eval/Re-eval Minutes 52  -CK         Total Minutes    Untimed Charges Total Minutes 52  -CK       Total Minutes 52  -CK                User Key  (r) = Recorded By, (t) = Taken By, (c) = Cosigned By      Initials Name Provider Type    CK Grisel Ponce, ROSA Physical Therapist                  Therapy Charges for Today       Code Description Service Date Service Provider Modifiers Qty    [id number]  PT EVAL LOW COMPLEXITY 4 10/12/2024 Grisel Ponce, PT GP 1            PT G-Codes  Outcome Measure Options: AM-PAC 6 Clicks Basic Mobility (PT)  AM-PAC 6 Clicks Score (PT): 18  PT Discharge Summary  Anticipated Discharge Disposition (PT): home with assist, home with home health    Grisel Ponce, ROSA  10/12/2024

## 2024-10-12 NOTE — ANESTHESIA POSTPROCEDURE EVALUATION
Patient: Avelina Ceron    Procedure Summary       Date: 10/12/24 Room / Location:  [facility]    Anesthesia Start: 1137 Anesthesia Stop: 1315    Procedures:       COLONOSCOPY      ESOPHAGOGASTRODUODENOSCOPY WITH POSSIBLE PILL CAM Diagnosis:       Blood loss anemia      (Blood loss anemia [D50.0])    Surgeons: Markie Bautista MD Provider: Raffaele Mccallum MD    Anesthesia Type: general ASA Status: 3            Anesthesia Type: general    Vitals  Vitals Value Taken Time   /51 10/12/24 1314   Temp 97.8 °F (36.6 °C) 10/12/24 1313   Pulse 86 10/12/24 1319   Resp 16 10/12/24 1313   SpO2 100 % 10/12/24 1319   Vitals shown include unfiled device data.        Post Anesthesia Care and Evaluation    Patient location during evaluation: PACU  Patient participation: complete - patient participated  Level of consciousness: awake and alert  Pain management: adequate    Airway patency: patent  Anesthetic complications: No anesthetic complications  PONV Status: none  Cardiovascular status: hemodynamically stable and acceptable  Respiratory status: nonlabored ventilation, acceptable and nasal cannula  Hydration status: acceptable

## 2024-10-12 NOTE — PLAN OF CARE
Goal Outcome Evaluation:  Plan of Care Reviewed With: patient           Outcome Evaluation: Patient presents with deficits in balance, endurance, and strength. She was able to ambulate 80' CGA with FWW while on 2L O2, mild desaturation to 89% with ambulation. IPPT is indicated to address current deficits. Recommend D/C home with assist and HHPT when medically appropriate.    Anticipated Discharge Disposition (PT): home with assist, home with home health

## 2024-10-13 LAB
ANION GAP SERPL CALCULATED.3IONS-SCNC: 8 MMOL/L (ref 5–15)
BASOPHILS # BLD AUTO: 0.09 10*3/MM3 (ref 0–0.2)
BASOPHILS NFR BLD AUTO: 0.6 % (ref 0–1.5)
BUN SERPL-MCNC: 3 MG/DL (ref 8–23)
BUN/CREAT SERPL: 9.7 (ref 7–25)
CALCIUM SPEC-SCNC: 8.6 MG/DL (ref 8.6–10.5)
CHLORIDE SERPL-SCNC: 101 MMOL/L (ref 98–107)
CO2 SERPL-SCNC: 31 MMOL/L (ref 22–29)
CREAT SERPL-MCNC: 0.31 MG/DL (ref 0.57–1)
DEPRECATED RDW RBC AUTO: 69.3 FL (ref 37–54)
EGFRCR SERPLBLD CKD-EPI 2021: 104.6 ML/MIN/1.73
EOSINOPHIL # BLD AUTO: 0.25 10*3/MM3 (ref 0–0.4)
EOSINOPHIL NFR BLD AUTO: 1.7 % (ref 0.3–6.2)
ERYTHROCYTE [DISTWIDTH] IN BLOOD BY AUTOMATED COUNT: 18.4 % (ref 12.3–15.4)
GLUCOSE SERPL-MCNC: 93 MG/DL (ref 65–99)
HCT VFR BLD AUTO: 26.8 % (ref 34–46.6)
HCT VFR BLD AUTO: 28.1 % (ref 34–46.6)
HCT VFR BLD AUTO: 28.3 % (ref 34–46.6)
HCT VFR BLD AUTO: 28.7 % (ref 34–46.6)
HGB BLD-MCNC: 8.1 G/DL (ref 12–15.9)
HGB BLD-MCNC: 8.7 G/DL (ref 12–15.9)
HGB BLD-MCNC: 8.8 G/DL (ref 12–15.9)
HGB BLD-MCNC: 8.9 G/DL (ref 12–15.9)
IMM GRANULOCYTES # BLD AUTO: 0.06 10*3/MM3 (ref 0–0.05)
IMM GRANULOCYTES NFR BLD AUTO: 0.4 % (ref 0–0.5)
LYMPHOCYTES # BLD AUTO: 2.43 10*3/MM3 (ref 0.7–3.1)
LYMPHOCYTES NFR BLD AUTO: 16.2 % (ref 19.6–45.3)
MCH RBC QN AUTO: 31.8 PG (ref 26.6–33)
MCHC RBC AUTO-ENTMCNC: 31.1 G/DL (ref 31.5–35.7)
MCV RBC AUTO: 102.2 FL (ref 79–97)
MONOCYTES # BLD AUTO: 0.83 10*3/MM3 (ref 0.1–0.9)
MONOCYTES NFR BLD AUTO: 5.5 % (ref 5–12)
NEUTROPHILS NFR BLD AUTO: 11.31 10*3/MM3 (ref 1.7–7)
NEUTROPHILS NFR BLD AUTO: 75.6 % (ref 42.7–76)
NRBC BLD AUTO-RTO: 0 /100 WBC (ref 0–0.2)
PLATELET # BLD AUTO: 342 10*3/MM3 (ref 140–450)
PMV BLD AUTO: 9.3 FL (ref 6–12)
POTASSIUM SERPL-SCNC: 3.6 MMOL/L (ref 3.5–5.2)
POTASSIUM SERPL-SCNC: 4.6 MMOL/L (ref 3.5–5.2)
RBC # BLD AUTO: 2.77 10*6/MM3 (ref 3.77–5.28)
SODIUM SERPL-SCNC: 140 MMOL/L (ref 136–145)
WBC NRBC COR # BLD AUTO: 14.97 10*3/MM3 (ref 3.4–10.8)

## 2024-10-13 PROCEDURE — 99232 SBSQ HOSP IP/OBS MODERATE 35: CPT | Performed by: INTERNAL MEDICINE

## 2024-10-13 PROCEDURE — 94799 UNLISTED PULMONARY SVC/PX: CPT

## 2024-10-13 PROCEDURE — 85025 COMPLETE CBC W/AUTO DIFF WBC: CPT | Performed by: INTERNAL MEDICINE

## 2024-10-13 PROCEDURE — 85014 HEMATOCRIT: CPT | Performed by: INTERNAL MEDICINE

## 2024-10-13 PROCEDURE — 80048 BASIC METABOLIC PNL TOTAL CA: CPT | Performed by: INTERNAL MEDICINE

## 2024-10-13 PROCEDURE — 99232 SBSQ HOSP IP/OBS MODERATE 35: CPT | Performed by: HOSPITALIST

## 2024-10-13 PROCEDURE — 94761 N-INVAS EAR/PLS OXIMETRY MLT: CPT

## 2024-10-13 PROCEDURE — 97166 OT EVAL MOD COMPLEX 45 MIN: CPT

## 2024-10-13 PROCEDURE — 94664 DEMO&/EVAL PT USE INHALER: CPT

## 2024-10-13 PROCEDURE — 97530 THERAPEUTIC ACTIVITIES: CPT

## 2024-10-13 PROCEDURE — 97535 SELF CARE MNGMENT TRAINING: CPT

## 2024-10-13 PROCEDURE — 84132 ASSAY OF SERUM POTASSIUM: CPT | Performed by: HOSPITALIST

## 2024-10-13 PROCEDURE — 85018 HEMOGLOBIN: CPT | Performed by: INTERNAL MEDICINE

## 2024-10-13 RX ORDER — TRAMADOL HYDROCHLORIDE 50 MG/1
50 TABLET ORAL EVERY 6 HOURS
Status: DISCONTINUED | OUTPATIENT
Start: 2024-10-13 | End: 2024-10-16 | Stop reason: HOSPADM

## 2024-10-13 RX ORDER — POTASSIUM CHLORIDE 1500 MG/1
40 TABLET, EXTENDED RELEASE ORAL EVERY 4 HOURS
Status: COMPLETED | OUTPATIENT
Start: 2024-10-13 | End: 2024-10-13

## 2024-10-13 RX ADMIN — Medication 10 ML: at 20:29

## 2024-10-13 RX ADMIN — PANTOPRAZOLE SODIUM 40 MG: 40 TABLET, DELAYED RELEASE ORAL at 05:46

## 2024-10-13 RX ADMIN — TRAMADOL HYDROCHLORIDE 50 MG: 50 TABLET ORAL at 03:10

## 2024-10-13 RX ADMIN — ROFLUMILAST 500 MCG: 500 TABLET ORAL at 09:24

## 2024-10-13 RX ADMIN — BUDESONIDE AND FORMOTEROL FUMARATE DIHYDRATE 2 PUFF: 160; 4.5 AEROSOL RESPIRATORY (INHALATION) at 07:25

## 2024-10-13 RX ADMIN — POTASSIUM CHLORIDE 40 MEQ: 1500 TABLET, EXTENDED RELEASE ORAL at 10:19

## 2024-10-13 RX ADMIN — GABAPENTIN 300 MG: 300 CAPSULE ORAL at 20:28

## 2024-10-13 RX ADMIN — BUDESONIDE AND FORMOTEROL FUMARATE DIHYDRATE 2 PUFF: 160; 4.5 AEROSOL RESPIRATORY (INHALATION) at 19:34

## 2024-10-13 RX ADMIN — Medication 10 ML: at 20:30

## 2024-10-13 RX ADMIN — DOFETILIDE 250 MCG: 0.25 CAPSULE ORAL at 20:27

## 2024-10-13 RX ADMIN — DOFETILIDE 250 MCG: 0.25 CAPSULE ORAL at 09:24

## 2024-10-13 RX ADMIN — TIOTROPIUM BROMIDE INHALATION SPRAY 2 PUFF: 3.12 SPRAY, METERED RESPIRATORY (INHALATION) at 07:25

## 2024-10-13 RX ADMIN — OXYBUTYNIN CHLORIDE 10 MG: 10 TABLET, EXTENDED RELEASE ORAL at 09:24

## 2024-10-13 RX ADMIN — ATORVASTATIN CALCIUM 20 MG: 20 TABLET, FILM COATED ORAL at 20:28

## 2024-10-13 RX ADMIN — POTASSIUM CHLORIDE 40 MEQ: 1500 TABLET, EXTENDED RELEASE ORAL at 14:10

## 2024-10-13 RX ADMIN — SERTRALINE 100 MG: 100 TABLET, FILM COATED ORAL at 09:24

## 2024-10-13 RX ADMIN — TRAMADOL HYDROCHLORIDE 50 MG: 50 TABLET, COATED ORAL at 09:24

## 2024-10-13 RX ADMIN — LEVOTHYROXINE SODIUM 200 MCG: 0.1 TABLET ORAL at 05:46

## 2024-10-13 RX ADMIN — TRAMADOL HYDROCHLORIDE 50 MG: 50 TABLET, COATED ORAL at 20:28

## 2024-10-13 RX ADMIN — TRAMADOL HYDROCHLORIDE 50 MG: 50 TABLET, COATED ORAL at 14:10

## 2024-10-13 NOTE — PLAN OF CARE
Goal Outcome Evaluation:  Plan of Care Reviewed With: patient        Progress: no change   VSS, 2LNC, A/Ox4. Pt worked with OT, sat pt in chair. One formed BM. Skin and fall interventions in place. SCDs in place. No complaints at this time. Will continue POC.   Problem: Adult Inpatient Plan of Care  Goal: Plan of Care Review  Outcome: Progressing  Flowsheets (Taken 10/13/2024 1500)  Progress: no change  Plan of Care Reviewed With: patient  Goal: Patient-Specific Goal (Individualized)  Outcome: Progressing  Goal: Absence of Hospital-Acquired Illness or Injury  Outcome: Progressing  Intervention: Identify and Manage Fall Risk  Recent Flowsheet Documentation  Taken 10/13/2024 1400 by Indu Watson RN  Safety Promotion/Fall Prevention:   clutter free environment maintained   assistive device/personal items within reach   activity supervised   fall prevention program maintained   room organization consistent   safety round/check completed   toileting scheduled   nonskid shoes/slippers when out of bed   lighting adjusted  Taken 10/13/2024 1200 by Indu Watson RN  Safety Promotion/Fall Prevention:   activity supervised   assistive device/personal items within reach   fall prevention program maintained   clutter free environment maintained   room organization consistent   safety round/check completed   toileting scheduled   nonskid shoes/slippers when out of bed   lighting adjusted  Taken 10/13/2024 1000 by Indu Watson, RN  Safety Promotion/Fall Prevention:   activity supervised   assistive device/personal items within reach   clutter free environment maintained   fall prevention program maintained   room organization consistent   safety round/check completed   toileting scheduled   nonskid shoes/slippers when out of bed   lighting adjusted  Taken 10/13/2024 0800 by Indu Watson, GABRIELA  Safety Promotion/Fall Prevention:   assistive device/personal items within reach   clutter free environment maintained   activity  supervised   fall prevention program maintained   safety round/check completed   toileting scheduled   room organization consistent   nonskid shoes/slippers when out of bed   lighting adjusted  Intervention: Prevent Skin Injury  Recent Flowsheet Documentation  Taken 10/13/2024 1400 by Indu Watson RN  Body Position:   right   turned  Skin Protection:   incontinence pads utilized   transparent dressing maintained   silicone foam dressing in place   skin sealant/moisture barrier applied  Taken 10/13/2024 1200 by Indu Watson RN  Body Position: supine  Skin Protection:   incontinence pads utilized   transparent dressing maintained   skin sealant/moisture barrier applied   silicone foam dressing in place  Taken 10/13/2024 1000 by Indu Watson RN  Skin Protection:   incontinence pads utilized   skin sealant/moisture barrier applied   silicone foam dressing in place   transparent dressing maintained  Taken 10/13/2024 0800 by Indu Watson RN  Skin Protection: (dressing peeled back, skin assessed)   incontinence pads utilized   skin sealant/moisture barrier applied   transparent dressing maintained   silicone foam dressing in place   other (see comments)  Intervention: Prevent and Manage VTE (Venous Thromboembolism) Risk  Recent Flowsheet Documentation  Taken 10/13/2024 0800 by Indu Watson RN  VTE Prevention/Management:   bilateral   SCDs (sequential compression devices) on  Intervention: Prevent Infection  Recent Flowsheet Documentation  Taken 10/13/2024 1400 by Indu Watson RN  Infection Prevention:   rest/sleep promoted   single patient room provided   visitors restricted/screened   hand hygiene promoted  Taken 10/13/2024 1200 by Indu Watson RN  Infection Prevention:   single patient room provided   rest/sleep promoted   visitors restricted/screened   hand hygiene promoted  Taken 10/13/2024 1000 by Indu Watson RN  Infection Prevention:   single patient room provided   rest/sleep promoted   visitors  restricted/screened   hand hygiene promoted  Taken 10/13/2024 0800 by Indu Watson RN  Infection Prevention:   rest/sleep promoted   single patient room provided   visitors restricted/screened   hand hygiene promoted  Goal: Optimal Comfort and Wellbeing  Outcome: Progressing  Intervention: Provide Person-Centered Care  Recent Flowsheet Documentation  Taken 10/13/2024 0800 by Indu Watson RN  Trust Relationship/Rapport:   choices provided   care explained  Goal: Readiness for Transition of Care  Outcome: Progressing     Problem: Anemia  Goal: Anemia Symptom Improvement  Outcome: Progressing  Intervention: Monitor and Manage Anemia  Recent Flowsheet Documentation  Taken 10/13/2024 1400 by Indu Watson RN  Safety Promotion/Fall Prevention:   clutter free environment maintained   assistive device/personal items within reach   activity supervised   fall prevention program maintained   room organization consistent   safety round/check completed   toileting scheduled   nonskid shoes/slippers when out of bed   lighting adjusted  Taken 10/13/2024 1200 by Indu Watson RN  Safety Promotion/Fall Prevention:   activity supervised   assistive device/personal items within reach   fall prevention program maintained   clutter free environment maintained   room organization consistent   safety round/check completed   toileting scheduled   nonskid shoes/slippers when out of bed   lighting adjusted  Taken 10/13/2024 1000 by Indu Watson RN  Safety Promotion/Fall Prevention:   activity supervised   assistive device/personal items within reach   clutter free environment maintained   fall prevention program maintained   room organization consistent   safety round/check completed   toileting scheduled   nonskid shoes/slippers when out of bed   lighting adjusted  Taken 10/13/2024 0800 by Indu Watson RN  Safety Promotion/Fall Prevention:   assistive device/personal items within reach   clutter free environment maintained    activity supervised   fall prevention program maintained   safety round/check completed   toileting scheduled   room organization consistent   nonskid shoes/slippers when out of bed   lighting adjusted     Problem: Skin Injury Risk Increased  Goal: Skin Health and Integrity  Outcome: Progressing  Intervention: Optimize Skin Protection  Recent Flowsheet Documentation  Taken 10/13/2024 1400 by Indu Watson RN  Activity Management: activity encouraged  Pressure Reduction Techniques:   heels elevated off bed   frequent weight shift encouraged   weight shift assistance provided  Head of Bed (HOB) Positioning: HOB elevated  Pressure Reduction Devices:   pressure-redistributing mattress utilized   heel offloading device utilized  Skin Protection:   incontinence pads utilized   transparent dressing maintained   silicone foam dressing in place   skin sealant/moisture barrier applied  Taken 10/13/2024 1200 by Indu Watson RN  Activity Management: activity encouraged  Pressure Reduction Techniques:   frequent weight shift encouraged   heels elevated off bed   weight shift assistance provided  Head of Bed (HOB) Positioning: HOB elevated  Pressure Reduction Devices:   positioning supports utilized   heel offloading device utilized  Skin Protection:   incontinence pads utilized   transparent dressing maintained   skin sealant/moisture barrier applied   silicone foam dressing in place  Taken 10/13/2024 1000 by Indu Watson RN  Activity Management: activity encouraged  Pressure Reduction Techniques:   frequent weight shift encouraged   heels elevated off bed   weight shift assistance provided  Head of Bed (HOB) Positioning: HOB elevated  Pressure Reduction Devices:   positioning supports utilized   heel offloading device utilized  Skin Protection:   incontinence pads utilized   skin sealant/moisture barrier applied   silicone foam dressing in place   transparent dressing maintained  Taken 10/13/2024 0800 by Indu Watson  RN  Activity Management: activity encouraged  Pressure Reduction Techniques:   heels elevated off bed   frequent weight shift encouraged   weight shift assistance provided  Head of Bed (HOB) Positioning: HOB elevated  Pressure Reduction Devices:   positioning supports utilized   pressure-redistributing mattress utilized  Skin Protection: (dressing peeled back, skin assessed)   incontinence pads utilized   skin sealant/moisture barrier applied   transparent dressing maintained   silicone foam dressing in place   other (see comments)     Problem: Fall Injury Risk  Goal: Absence of Fall and Fall-Related Injury  Outcome: Progressing  Intervention: Identify and Manage Contributors  Recent Flowsheet Documentation  Taken 10/13/2024 1400 by Inud Watson RN  Medication Review/Management: medications reviewed  Taken 10/13/2024 1200 by Indu Watson RN  Medication Review/Management: medications reviewed  Taken 10/13/2024 1000 by Indu Watson RN  Medication Review/Management: medications reviewed  Taken 10/13/2024 0800 by Indu Watson RN  Medication Review/Management: medications reviewed  Intervention: Promote Injury-Free Environment  Recent Flowsheet Documentation  Taken 10/13/2024 1400 by Indu Watson RN  Safety Promotion/Fall Prevention:   clutter free environment maintained   assistive device/personal items within reach   activity supervised   fall prevention program maintained   room organization consistent   safety round/check completed   toileting scheduled   nonskid shoes/slippers when out of bed   lighting adjusted  Taken 10/13/2024 1200 by Indu Watson RN  Safety Promotion/Fall Prevention:   activity supervised   assistive device/personal items within reach   fall prevention program maintained   clutter free environment maintained   room organization consistent   safety round/check completed   toileting scheduled   nonskid shoes/slippers when out of bed   lighting adjusted  Taken 10/13/2024 1000 by Walter  Indu, RN  Safety Promotion/Fall Prevention:   activity supervised   assistive device/personal items within reach   clutter free environment maintained   fall prevention program maintained   room organization consistent   safety round/check completed   toileting scheduled   nonskid shoes/slippers when out of bed   lighting adjusted  Taken 10/13/2024 0800 by Indu Watson, RN  Safety Promotion/Fall Prevention:   assistive device/personal items within reach   clutter free environment maintained   activity supervised   fall prevention program maintained   safety round/check completed   toileting scheduled   room organization consistent   nonskid shoes/slippers when out of bed   lighting adjusted

## 2024-10-13 NOTE — PLAN OF CARE
Goal Outcome Evaluation:  Plan of Care Reviewed With: patient        Progress: improving  Outcome Evaluation: VSS on 2L nasal cannula, prn pain meds given as requested. Fall and skin precautions in place. Pt resting in bed at this time.

## 2024-10-13 NOTE — THERAPY EVALUATION
"Patient Name: Avelina Ceron  : 1940    MRN: [id number]                              Today's Date: 10/13/2024       Admit Date: 10/10/2024    Visit Dx:     ICD-10-CM ICD-9-CM   1. Blood loss anemia  D50.0 280.0   2. Chronic anticoagulation  Z79.01 V58.61   3. Leukocytosis, unspecified type  D72.829 288.60   4. Epigastric pain  R10.13 789.06     Patient Active Problem List   Diagnosis    Coronary arteriosclerosis in native artery    Chronic obstructive pulmonary disease    Depression    Gastroesophageal reflux disease with esophagitis    Hyperlipidemia    Hypertension    Hypothyroidism    Osteoarthritis    Peripheral arterial occlusive disease    Solitary pulmonary nodule    Vitamin D deficiency    Lower back pain    Carotid bruit    Hypoxemia    Chronic respiratory failure with hypoxia and hypercapnia    Allergic rhinitis    Chronic leukocytosis    Ureterolithiasis    Murmur, cardiac    Hospital discharge follow-up    Closed fracture of right proximal humerus    Right shoulder pain    Acute hypoxemic respiratory failure    Anemia due to blood loss, acute    Blood loss anemia    Anemia due to acute blood loss     Past Medical History:   Diagnosis Date    Anesthesia complication     Pt went into respiratory failure during total hip replacement in     Arthritis of back     Arthritis of neck     Broken ribs     COPD (chronic obstructive pulmonary disease)     Coronary artery disease     Degenerative lumbar disc     Disease of thyroid gland     \"low thyroid\"    Fracture, fibula ?    Fracture, humerus 23    Fracture, tibia and fibula ?    Hip arthrosis     History of colonoscopy     Hyperlipidemia     Hypertension     Kidney stone 2023    Knee swelling     Lumbosacral disc disease     On home O2     3L    Osteoarthritis     Periarthritis of shoulder     Urinary incontinence     Urinary tract infection      Past Surgical History:   Procedure Laterality Date    CAROTID STENT Bilateral     " COLONOSCOPY      EYE SURGERY  10/17/2022    yag surgery    GALLBLADDER SURGERY  2012    HYSTERECTOMY      ILIAC ARTERY STENT Bilateral     JOINT REPLACEMENT  1995; 2020    KIDNEY STONE SURGERY  July August 2023    OOPHORECTOMY  1976    1 ovary removed    TOTAL HIP ARTHROPLASTY Bilateral     URETEROSCOPY LASER LITHOTRIPSY WITH STENT INSERTION Right 07/26/2023    Procedure: CYSTOSCOPY, RIGHT RETROGRADE PYELOGRAM; WITH STENT INSERTION;  Surgeon: Lee Kim MD;  Location:  [facility];  Service: Urology;  Laterality: Right;    URETEROSCOPY LASER LITHOTRIPSY WITH STENT INSERTION Right 08/10/2023    Procedure: URETEROSCOPY, LASER LITHOTRIPSY, STENT EXCHANGE;  Surgeon: Lee Kim MD;  Location:  [facility];  Service: Urology;  Laterality: Right;      General Information       Row Name 10/13/24 0853          OT Time and Intention    Document Type evaluation  -JY     Mode of Treatment occupational therapy;individual therapy  -JY     Patient Effort excellent  -JY       Row Name 10/13/24 0853          General Information    Patient Profile Reviewed yes  -JY     Prior Level of Function independent:;all household mobility;gait;transfer;bed mobility;feeding;grooming;bathing;min assist:;dressing;dependent:;home management;cooking;cleaning;driving  mod I with 4WW use, uses 2L O2 continuously at baseline, receives A with LBD (largely socks) w/ AE use PRN, dtr completes most home mgmt tasks, med mgmt and driving; pt endorses 1 fall in last 6 months  -JY     Existing Precautions/Restrictions fall;oxygen therapy device and L/min;other (see comments)  chronic pain, chronic bladder dysfunction  -JY     Barriers to Rehab medically complex;previous functional deficit  -JY       Row Name 10/13/24 0853          Occupational Profile    Environmental Supports and Barriers (Occupational Profile) walk in shower w/ seat (pt sits and stands at baseline), elevated toilet seat, DME: has 4WW of which pt uses at baseline, LH AE, bed w/  elevating option and bedrails, transport chair for community distances  -JY     Patient Goals (Occupational Profile) to return to PLOF  -JY       Row Name 10/13/24 0853          Living Environment    People in Home child(gabriela), adult;other (see comments)  pt lives with daughter who is able to assist as needed  -JY       Row Name 10/13/24 0853          Home Main Entrance    Number of Stairs, Main Entrance two;other (see comments)  1+1 or ramp option  -JY     Stair Railings, Main Entrance railing on left side (ascending)  -JY       Row Name 10/13/24 0853          Stairs Within Home, Primary    Stairs, Within Home, Primary 2 single steps within home, pt reports there is grab bar option at every step that pt ascends, descends  -JY     Number of Stairs, Within Home, Primary two  -JY     Stair Railings, Within Home, Primary railings on both sides of stairs  -JY       Row Name 10/13/24 0853          Cognition    Orientation Status (Cognition) oriented x 4  -JY       Row Name 10/13/24 0853          Safety Issues/Impairments Affecting Functional Mobility    Safety Issues Affecting Function (Mobility) awareness of need for assistance;insight into deficits/self-awareness;safety precaution awareness  -JY     Impairments Affecting Function (Mobility) balance;endurance/activity tolerance;shortness of breath;strength;pain  -JY     Comment, Safety Issues/Impairments (Mobility) pt alert and able to follow commands; intermittent cues for FWW use vs routine 4WW not available; close monitoring of SPO2 w/ continuous 2L O2, cues for PLB, rest; pt w/ chronic back pain  -JY               User Key  (r) = Recorded By, (t) = Taken By, (c) = Cosigned By      Initials Name Provider Type    Dee Toribio OT Occupational Therapist                     Mobility/ADL's       Row Name 10/13/24 0901          Bed Mobility    Bed Mobility supine-sit;scooting/bridging  -JRODOLFO     Scooting/Bridging [place] (Bed Mobility) modified independence  -JRODOLFO      Supine-Sit [place] (Bed Mobility) supervision;verbal cues  -JY     Assistive Device (Bed Mobility) bed rails;head of bed elevated  -JY     Comment, (Bed Mobility) pt has option for elevation of HOB and B bed rails at d/c, at baseline does not always raise HOB; this date skilled cues for log roll tech if pain significant and limiting, pt opted to not complete log roll, did not require physical A yet increased time and heavy reliance on bed rails for uprighting trunk into sitting; denied any dizziness or feeling LH at EOB  -JY       Row Name 10/13/24 0901          Transfers    Transfers sit-stand transfer;stand-sit transfer;toilet transfer  -JY     Comment, (Transfers) skilled cues for optimal hand placement for controlled ascend, descend specifically to push up from seated surface vs reaching to FWW for support in stand and to reach back prior to sitting once aligned and in close proximity to seated surface; cued for upright standing posture as tolerated w/ chronic LBP within FWW parameters  -JY       Row Name 10/13/24 0901          Sit-Stand Transfer    Sit-Stand [place] (Transfers) contact guard;1 person assist;verbal cues  -JY     Assistive Device (Sit-Stand Transfers) walker, front-wheeled  -[place]ON [place]       Row Name 10/13/24 0901          Stand-Sit Transfer    Stand-Sit [place] (Transfers) contact guard;verbal cues  -JY     Assistive Device (Stand-Sit Transfers) walker, front-wheeled  -[place]       Row Name 10/13/24 0901          Toilet Transfer    Type (Toilet Transfer) sit-stand;stand-sit  -JY     [place] Level (Toilet Transfer) contact guard;verbal cues  -JY     Assistive Device (Toilet Transfer) commode;commode, bedside without drop arms;grab bars/safety frame  -JY     Comment, (Toilet Transfer) set pt up with [facility] [place] over toilet to elevate height and ensure BUE support; cued pt on using inner arm rest supports for more controlled descend at [facility] [place], pt performance improved with ascend  -JY       Row Name  10/13/24 0901          Functional Mobility    Functional Mobility- Ind. Level contact guard assist;verbal cues required  -JY     Functional Mobility- Device walker, front-wheeled  -JY     Functional Mobility-Distance (Feet) --  in room ADL related mobility  -JY     Functional Mobility- Comment defer to PT for specifics however during in room ADL related mobility pt req'd gross CGA w/ FWW; intermittent cues for positioning self in close proximity to AD (pt used to 4WW with seat) and most tolerable upright standing posture; pt reports increased pain at low back during standing and fxl mobility upward of 9/10, lessens with sitting or supine; continuous 2L O2 used, upon completion of fxl mobility pt presented w/ more SOA and req'd seated rest break  -JY     Patient was able to Ambulate yes  -       Row Name 10/13/24 0901          Activities of Daily Living    BADL Assessment/Intervention upper body dressing;lower body dressing;grooming;toileting  -       Row Name 10/13/24 0901          Upper Body Dressing Assessment/Training    [place] Level (Upper Body Dressing) doff;don;pajama/robe;contact guard assist  -JY     Position (Upper Body Dressing) unsupported sitting  -JY     Comment, (Upper Body Dressing) CGA for posterior mgmt of ties, no physical A for threading and unthreading  -ZOZI       Row Name 10/13/24 0901          Lower Body Dressing Assessment/Training    [place] Level (Lower Body Dressing) doff;don;socks;dependent (less than 25% patient effort)  -JY     Position (Lower Body Dressing) supported sitting  -JY     Comment, (Lower Body Dressing) pt dep for sock d/d, indicated pt either does not wear socks at home or receives A, indicates availability of LH AE however does not consistently use, pt indicates ability to d/d slip on shoes  -       Row Name 10/13/24 0901          Grooming Assessment/Training    [place] Level (Grooming) wash face, hands;hair care, combing/brushing;set up  -JY      Position (Grooming) supported sitting  -JY       Row Name 10/13/24 0901          Toileting Assessment/Training    [place] Level (Toileting) adjust/manage clothing;standby assist;perform perineal hygiene;independent  -     Assistive Devices (Toileting) commode;commode, bedside without drop arms  -     Position (Toileting) unsupported sitting;supported standing  -               User Key  (r) = Recorded By, (t) = Taken By, (c) = Cosigned By      Initials Name Provider Type    Dee Toribio OT Occupational Therapist                   Obj/Interventions       [facility] Name 10/13/24 1033          Sensory Assessment (Somatosensory)    Sensory Assessment (Somatosensory) bilateral UE;sensation intact  -     Bilateral UE Sensory Assessment general sensation;light touch awareness;intact  -     Sensory Assessment denies any numbness or tingling and able to recognize LT stimuli as intact and symmetrical at BUEs  -JY       Row Name 10/13/24 1033          Vision Assessment/Intervention    Visual Impairment/Limitations corrective lenses for reading  -     Vision Assessment Comment denies any acute changes to vision  -JY       Row Name 10/13/24 1033          Range of Motion Comprehensive    General Range of Motion bilateral upper extremity ROM WFL  -JY       Row Name 10/13/24 1033          Strength Comprehensive (MMT)    General Manual Muscle Testing (MMT) Assessment upper extremity strength deficits identified  -     Comment, General Manual Muscle Testing (MMT) Assessment BUE 4+/5 per MMT  -JY       Row Name 10/13/24 1033          Motor Skills    Motor Skills coordination;functional endurance  -     Coordination bilateral;upper extremity;finger to nose;other (see comments);WFL  finger thumb opposition  -     Functional Endurance decreased activity tolerance toward more dynamic demands, increased SOA and perceived exertion following toileting and related mobility, SPO2 briefly 86% on 2L O2, quickly recovered to  > 90%  -JY       Row Name 10/13/24 1033          Balance    Balance Assessment sitting static balance;sitting dynamic balance;standing static balance;standing dynamic balance  -JY     Static Sitting Balance supervision  -JY     Dynamic Sitting Balance supervision  -JY     Position, Sitting Balance unsupported;sitting edge of bed;sitting in chair;other (see comments)  sitting on toilet  -JY     Static Standing Balance standby assist  -JY     Dynamic Standing Balance contact guard  -JY     Position/Device Used, Standing Balance supported;walker, front-wheeled  -JY     Balance Interventions sitting;standing;static;dynamic;sit to stand;supported;occupation based/functional task  -JY     Comment, Balance no overt LOB during seated or standing tasks, utilized FWW during standing  -JY               User Key  (r) = Recorded By, (t) = Taken By, (c) = Cosigned By      Initials Name Provider Type    Dee Toribio OT Occupational Therapist                   Goals/Plan       Row Name 10/13/24 1045          Transfer Goal 1 (OT)    Activity/Assistive Device (Transfer Goal 1, OT) sit-to-stand/stand-to-sit;bed-to-chair/chair-to-bed;toilet;commode;commode, bedside without drop arms;walker, rolling  -JY     [place] Level/Cues Needed (Transfer Goal 1, OT) standby assist  -JY     Time Frame (Transfer Goal 1, OT) long term goal (LTG);5 days  -JY     Progress/Outcome (Transfer Goal 1, OT) new goal  -JY       Row Name 10/13/24 1045          Dressing Goal 1 (OT)    Activity/Device (Dressing Goal 1, OT) lower body dressing;other (see comments)  d/d LB garments with LH AE PRN  -JY     [place]/Cues Needed (Dressing Goal 1, OT) contact guard required;minimum assist (75% or more patient effort);verbal cues required  -JY     Time Frame (Dressing Goal 1, OT) long term goal (LTG);5 days  -JY     Progress/Outcome (Dressing Goal 1, OT) new goal  -JY       Row Name 10/13/24 1045          Toileting Goal 1 (OT)    Activity/Device  (Toileting Goal 1, OT) adjust/manage clothing;commode;commode, bedside without drop arms;grab bar/safety frame;raised toilet seat  -JY     [place] Level/Cues Needed (Toileting Goal 1, OT) modified independence  -JY     Time Frame (Toileting Goal 1, OT) short term goal (STG);2 days  -JY     Progress/Outcome (Toileting Goal 1, OT) new goal  -JY       Row Name 10/13/24 1045          Strength Goal 1 (OT)    Strength Goal 1 (OT) Pt to complete seated HEP encompassing BUEs targeting strength and endurance with progressive sets/reps/resistance in order to improve integration in ADLs, related t/fs and mobility  -JY     Time Frame (Strength Goal 1, OT) long term goal (LTG);5 days  -JY     Progress/Outcome (Strength Goal 1, OT) new goal  -JY       Row Name 10/13/24 1045          Therapy Assessment/Plan (OT)    Planned Therapy Interventions (OT) activity tolerance training;adaptive equipment training;BADL retraining;functional balance retraining;occupation/activity based interventions;patient/caregiver education/training;ROM/therapeutic exercise;strengthening exercise;transfer/mobility retraining  -JY               User Key  (r) = Recorded By, (t) = Taken By, (c) = Cosigned By      Initials Name Provider Type    Dee Toribio OT Occupational Therapist                   Clinical Impression       Row Name 10/13/24 1037          Pain Assessment    Pretreatment Pain Rating 2/10  -JY     Posttreatment Pain Rating 2/10  -JY     Pain Location back  -JY     Pain Side/Orientation bilateral;other (see comments)  lower  -JY     Pain Management Interventions activity modification encouraged;positioning techniques utilized  -JY     Response to Pain Interventions activity participation with tolerable pain  -JY     Pre/Posttreatment Pain Comment pt reported persistent pain at low back, reports chronic in nature; able to tolerate OT interventions w/ cues on limiting certain positions  -JY       Row Name 10/13/24 1037          Plan  of Care Review    Plan of Care Reviewed With patient  -JY     Progress no change  OT IE  -JY     Outcome Evaluation OT evaluation completed. Pt presents with decrease in ADL and related t/f, mobility I limited by decreased activity tolerance, fatigue/perceived SOA with more dynamic demands, muscle weakness and pain. Pt req'd spv to MI for bed mobility, gross CGA for fxl t/fs and ADL related mobility with FWW. Pt req'd CGA for UBD and dep A for sock d/d. OT reviewed with pt use of LH AE to assist with distal reach and minimize pain and decrease energy expenditure. Pt would benefit from further training. Pt SPO2 grossly > 90% on 2L, decreased briefly to 86% following toileting and ADL related mobility. Pt is below occupational performance baseline and would benefit from IPOT POC and home w/ A and HHOT at d/c when medically ready.  -JY       Row Name 10/13/24 1037          Therapy Assessment/Plan (OT)    Patient/Family Therapy Goal Statement (OT) to return to OF  -JY     Rehab Potential (OT) good  -JY     Criteria for Skilled Therapeutic Interventions Met (OT) yes;skilled treatment is necessary  -JY     Therapy Frequency (OT) daily  -JY     Predicted Duration of Therapy Intervention (OT) 2 days  -JY       Row Name 10/13/24 1037          Therapy Plan Review/Discharge Plan (OT)    Anticipated Discharge Disposition (OT) home with assist;home with home health  -JY       Row Name 10/13/24 1037          Vital Signs    Pre Systolic BP Rehab 122  -JY     Pre Treatment Diastolic BP 41  -JY     Pretreatment Heart Rate (beats/min) 84  -JY     Pre SpO2 (%) 99  -JY     O2 Delivery Pre Treatment supplemental O2  -JY     Intra SpO2 (%) 86  -JY     O2 Delivery Intra Treatment supplemental O2  -JY     Post SpO2 (%) 96  -JY     O2 Delivery Post Treatment supplemental O2  -JY     Pre Patient Position Supine  -JY     Post Patient Position Sitting  -JY       Row Name 10/13/24 1037          Positioning and Restraints    Pre-Treatment  Position in bed  -JY     Post Treatment Position chair  -JY     In Chair notified nsg;reclined;call light within reach;encouraged to call for assist;exit alarm on;waffle cushion;legs elevated  -JY               User Key  (r) = Recorded By, (t) = Taken By, (c) = Cosigned By      Initials Name Provider Type    Dee Toribio OT Occupational Therapist                   Outcome Measures       Row Name 10/13/24 1048          How much help from another is currently needed...    Putting on and taking off regular lower body clothing? 2  -JY     Bathing (including washing, rinsing, and drying) 2  -JY     Toileting (which includes using toilet bed pan or urinal) 3  -JY     Putting on and taking off regular upper body clothing 3  -JY     Taking care of personal grooming (such as brushing teeth) 4  -JY     Eating meals 4  -JY     AM-PAC 6 Clicks Score (OT) 18  -JY       Row Name 10/13/24 0800          How much help from another person do you currently need...    Turning from your back to your side while in flat bed without using bedrails? 4  -CN     Moving from lying on back to sitting on the side of a flat bed without bedrails? 3  -CN     Moving to and from a bed to a chair (including a wheelchair)? 3  -CN     Standing up from a chair using your arms (e.g., wheelchair, bedside chair)? 3  -CN     Climbing 3-5 steps with a railing? 3  -CN     To walk in hospital room? 3  -CN     AM-PAC 6 Clicks Score (PT) 19  -CN     Highest Level of Mobility Goal 6 --> Walk 10 steps or more  -CN       Row Name 10/13/24 1048          Functional Assessment    Outcome Measure Options AM-PAC 6 Clicks Daily Activity (OT)  -JY               User Key  (r) = Recorded By, (t) = Taken By, (c) = Cosigned By      Initials Name Provider Type    Dee Toribio OT Occupational Therapist    Indu Helton, RN Registered Nurse                    Occupational Therapy Education       Title: PT OT SLP Therapies (In Progress)       Topic: Occupational  Therapy (In Progress)       Point: ADL training (In Progress)       Description:   Instruct learner(s) on proper safety adaptation and remediation techniques during self care or transfers.   Instruct in proper use of assistive devices.                  Learning Progress Summary            Patient Acceptance, E,D, NR by FLAQUITA at 10/13/2024 0755                      Point: Home exercise program (Not Started)       Description:   Instruct learner(s) on appropriate technique for monitoring, assisting and/or progressing therapeutic exercises/activities.                  Learner Progress:  Not documented in this visit.              Point: Precautions (In Progress)       Description:   Instruct learner(s) on prescribed precautions during self-care and functional transfers.                  Learning Progress Summary            Patient Acceptance, E,D, NR by FLAQUITA at 10/13/2024 0755                      Point: Body mechanics (In Progress)       Description:   Instruct learner(s) on proper positioning and spine alignment during self-care, functional mobility activities and/or exercises.                  Learning Progress Summary            Patient Acceptance, E,D, NR by FLAQUITA at 10/13/2024 0755                                      User Key       Initials Effective Dates Name Provider Type Discipline    FLAQUITA 06/16/21 -  Dee Humphrey OT Occupational Therapist OT                  OT Recommendation and Plan  Planned Therapy Interventions (OT): activity tolerance training, adaptive equipment training, BADL retraining, functional balance retraining, occupation/activity based interventions, patient/caregiver education/training, ROM/therapeutic exercise, strengthening exercise, transfer/mobility retraining  Therapy Frequency (OT): daily  Plan of Care Review  Plan of Care Reviewed With: patient  Progress: no change (OT IE)  Outcome Evaluation: OT evaluation completed. Pt presents with decrease in ADL and related t/f, mobility I limited by  decreased activity tolerance, fatigue/perceived SOA with more dynamic demands, muscle weakness and pain. Pt req'd spv to MI for bed mobility, gross CGA for fxl t/fs and ADL related mobility with FWW. Pt req'd CGA for UBD and dep A for sock d/d. OT reviewed with pt use of LH AE to assist with distal reach and minimize pain and decrease energy expenditure. Pt would benefit from further training. Pt SPO2 grossly > 90% on 2L, decreased briefly to 86% following toileting and ADL related mobility. Pt is below occupational performance baseline and would benefit from IPOT POC and home w/ A and HHOT at d/c when medically ready.     Time Calculation:   Evaluation Complexity (OT)  Review Occupational Profile/Medical/Therapy History Complexity: expanded/moderate complexity  Assessment, Occupational Performance/Identification of Deficit Complexity: 3-5 performance deficits  Clinical Decision Making Complexity (OT): detailed assessment/moderate complexity  Overall Complexity of Evaluation (OT): moderate complexity     Time Calculation- OT       Row Name 10/13/24 1049             Time Calculation- OT    OT Start Time 0755  -JY      OT Received On 10/13/24  -JY      OT Goal Re-Cert Due Date 10/23/24  -JY         Timed Charges    39391 - OT Therapeutic Activity Minutes 10  -JY      62287 - OT Self Care/Mgmt Minutes 18  -JY         Untimed Charges    OT Eval/Re-eval Minutes 49  -JY         Total Minutes    Timed Charges Total Minutes 28  -JY      Untimed Charges Total Minutes 49  -JY       Total Minutes 77  -JY                User Key  (r) = Recorded By, (t) = Taken By, (c) = Cosigned By      Initials Name Provider Type    Dee Toribio OT Occupational Therapist                  Therapy Charges for Today       Code Description Service Date Service Provider Modifiers Qty    99891369733 HC OT THERAPEUTIC ACT EA 15 MIN 10/13/2024 Dee Humphrey OT GO 1    45419693628 HC OT SELF CARE/MGMT/TRAIN EA 15 MIN 10/13/2024 Dee Humphrey  OT GO 1    [id number]  OT EVAL MOD COMPLEXITY 4 10/13/2024 Dee Humphrey OT GO 1                 Dee Humphrey OT  10/13/2024

## 2024-10-13 NOTE — PROGRESS NOTES
[facility] at [facility]  IP Progress Note      Chief Complaint/Reason for service: #1 patient with history of atrial fibrillation treated with Tikosyn #2 GI bleeding felt secondary to bleeding polyps #3 peripheral vascular and carotid disease with previous catheter-based intervention    Subjective   Subjective: The patient is awake and alert.  States she feels better.  She does get short of breath with ambulating to the bathroom.  This is a chronic condition for her.  No angina    Past medical, surgical, social and family history reviewed in the patient's electronic medical record.    Objective     Vital Sign Min/Max for last 24 hours  Temp  Min: 97 °F (36.1 °C)  Max: 98.6 °F (37 °C)   BP  Min: 112/52  Max: 185/71   Pulse  Min: 68  Max: 97   Resp  Min: 16  Max: 18   SpO2  Min: 95 %  Max: 100 %   Flow (L/min) (Oxygen Therapy)  Min: 2  Max: 2    No intake or output data in the 24 hours ending 10/13/24 0851          Current Facility-Administered Medications:     acetaminophen (TYLENOL) tablet 650 mg, 650 mg, Oral, Q6H PRN, Markie Bautista MD, 650 mg at 10/11/24 2135    [Held by provider] apixaban (ELIQUIS) tablet 5 mg, 5 mg, Oral, Q12H, Daisy Amaya DO    [Held by provider] aspirin chewable tablet 81 mg, 81 mg, Oral, Daily, Daisy Amaya DO    atorvastatin (LIPITOR) tablet 20 mg, 20 mg, Oral, Daily, Markie Bautista MD, 20 mg at 10/12/24 2103    sennosides-docusate (PERICOLACE) 8.6-50 MG per tablet 2 tablet, 2 tablet, Oral, BID PRN **AND** polyethylene glycol (MIRALAX) packet 17 g, 17 g, Oral, Daily PRN **AND** bisacodyl (DULCOLAX) EC tablet 5 mg, 5 mg, Oral, Daily PRN **AND** bisacodyl (DULCOLAX) suppository 10 mg, 10 mg, Rectal, Daily PRN, Markie Bautista MD    budesonide-formoterol (SYMBICORT) 160-4.5 MCG/ACT inhaler 2 puff, 2 puff, Inhalation, BID - RT, 2 puff at 10/13/24 0725 **AND** tiotropium (SPIRIVA RESPIMAT) 2.5 mcg/act aerosol solution inhaler, 2 puff, Inhalation,  Daily - RT, Markie Bautista MD, 2 puff at 10/13/24 0725    Calcium Replacement - Follow Nurse / BPA Driven Protocol, , Does not apply, PRPHONG, Markie Bautista MD    [Held by provider] clopidogrel (PLAVIX) tablet 75 mg, 75 mg, Oral, Daily, Jose Luis Daisy, DO    dofetilide (TIKOSYN) capsule 250 mcg, 250 mcg, Oral, BID, Markie Bautista MD, 250 mcg at 10/12/24 2103    gabapentin (NEURONTIN) capsule 300 mg, 300 mg, Oral, Nightly, Markie Bautista MD, 300 mg at 10/12/24 2103    HYDROmorphone (DILAUDID) injection 0.5 mg, 0.5 mg, Intravenous, Q3H PRN, Markie Bautista MD    influenza vac split high-dose (FLUZONE HIGH DOSE) injection 0.5 mL, 0.5 mL, Intramuscular, During Hospitalization, Markie Bautista MD    [Held by provider] isosorbide mononitrate (IMDUR) 24 hr tablet 30 mg, 30 mg, Oral, Daily, Jose Luis, Daisy, DO    levothyroxine (SYNTHROID, LEVOTHROID) tablet 200 mcg, 200 mcg, Oral, Q AM, Markie Bautista MD, 200 mcg at 10/13/24 0546    [Held by provider] losartan (COZAAR) tablet 50 mg, 50 mg, Oral, Q24H, Dave Amayaika, DO    Magnesium Standard Dose Replacement - Follow Nurse / BPA Driven Protocol, , Does not apply, PRPHONG, Markie Bautista MD    oxybutynin XL (DITROPAN-XL) 24 hr tablet 10 mg, 10 mg, Oral, Daily, Markie Bautista MD, 10 mg at 10/12/24 0900    pantoprazole (PROTONIX) EC tablet 40 mg, 40 mg, Oral, Q AM, Markie Bautista MD, 40 mg at 10/13/24 0546    Phosphorus Replacement - Follow Nurse / BPA Driven Protocol, , Does not apply, PRMichele DARLING John A, MD    Potassium Replacement - Follow Nurse / BPA Driven Protocol, , Does not apply, PRNMichele John A, MD    roflumilast (DALIRESP) tablet 500 mcg, 500 mcg, Oral, Daily, Markie Bautista MD, 500 mcg at 10/12/24 0900    sertraline (ZOLOFT) tablet 100 mg, 100 mg, Oral, Daily, Markie Bautista MD, 100 mg at 10/12/24 0859    Insert Peripheral IV, , , Once **AND** sodium chloride 0.9 % flush 10 mL, 10 mL, Intravenous, PRN, Markie Bautista MD    sodium chloride 0.9 %  flush 10 mL, 10 mL, Intravenous, Q12H, Markie Bautista MD, 10 mL at 10/12/24 2104    sodium chloride 0.9 % flush 10 mL, 10 mL, Intravenous, PRN, Markie Bautista MD    sodium chloride 0.9 % flush 10 mL, 10 mL, Intravenous, Q12H, Markie Bautista MD, 10 mL at 10/12/24 2104    sodium chloride 0.9 % flush 10 mL, 10 mL, Intravenous, PRN, Markie Bautista MD    traMADol (ULTRAM) tablet 50 mg, 50 mg, Oral, Q6H, Louis Chandler MD    Physical Exam: General Well-developed pleasant elderly female in a recliner not dyspneic tachypneic        HEENT: No JVP       Respiratory: Equal bilateral symmetrical expansion with sparse crackles       Cardiovascular: Regular rate and rhythm with systolic ejection murmur       GI: Soft          Neuro: Facial expressions are symmetrical       Skin: Warm and dry       Psych: Pleasant affect    Results Review: I personally reviewed the report from the colonoscopy and EGD.  Heart rate 71-88.  Blood pressure is 1 12-1 53 input and output are even    Radiology Results:  Imaging Results (Last 72 Hours)       Procedure Component Value Units Date/Time    CT Abdomen Pelvis With & Without Contrast [[id number]] Collected: 10/10/24 1447     Updated: 10/10/24 1504    Narrative:      CT ABDOMEN PELVIS W WO CONTRAST    Date of Exam: 10/10/2024 1:43 PM EDT    Indication: abd pain, gi bleed.    Comparison: CT from 7/25/2023    Technique: Axial CT images were obtained of the abdomen and pelvis before and after the uneventful intravenous administration of 50 mL Isovue 370. Sagittal and coronal reconstructions were performed.  Automated exposure control and iterative   reconstruction methods were used.      FINDINGS:    Abdomen/Pelvis:    Lower Chest: Atelectasis and scarring noted at the lung bases.    Small to moderate size hiatal hernia is noted.    No free air is noted below the diaphragm.    Organs: Noncontrast and multi phase contrast imaging was obtained. Noncontrast imaging of the kidneys demonstrate  nonobstructing calculi. There is cortical scarring on the left. No hydronephrosis is noted. Small low-attenuation lesions too small to   characterize are noted of the kidneys statistically likely represent cysts. There is no evidence of obstructive uropathy.    Arterial phase imaging demonstrates no suspicious hypervascular lesion within the solid organs. Solid organs demonstrate no definite acute abnormality. 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    GI/Bowel: Noncontrast imaging and multiphase contrast images of the GI tract was obtained.    There is mild motion limitation as well as streak artifact from bilateral hip prostheses causing limitation of the GI tract. Noncontrast imaging demonstrates no definite acute abnormality within the GI tract.    Arterial phase imaging demonstrates no abnormal areas of contrast associated with the GI tract. No obvious pooling of contrast or focal abnormality noted on portal venous or delayed imaging. There is diverticulosis without evidence of acute   diverticulitis. Relative decompression of the descending colon noted. Postsurgical changes are noted at the base of the cecum. There is no suspicious mesenteric adenopathy or fluid collection noted.    Pelvis: Evaluation of the pelvis is limited by streak artifact. Bladder is poorly visualized. No suspicious fluid collections or adenopathy appreciated    Peritoneum/Retroperitoneum: Atherosclerotic changes are noted of the aorta which is widely patent. There is no aneurysmal dilation. No suspicious retroperitoneal adenopathy    Bones/Soft Tissues: Multilevel degenerative change noted at the spine. Bilateral hip prostheses are noted as previously discussed. There is exuberant heterotopic bone formation on the left. Remote healed fracture right iliac wing.      Impression:        1. Accounting for the limitations of the exam there is no evidence of  active extravasation or obvious pooling blood noted within the GI tract    2. Diverticulosis without evidence of diverticulitis    3. Mild prominence of the wall thickness of the descending colon accentuated by incomplete distention. Findings likely artifactual. Colitis thought to be less likely    4. Nonobstructing left renal calculi    5. Other findings as above          Electronically Signed: Zack Perez MD    10/10/2024 3:01 PM EDT    Workstation ID: [id number]            EKG: Sinus rhythm nonspecific ST abnormality.  Normal QTc    ECHO: Preserved EF    Tele: No A-fib    Assessment   Assessment/Plan: Patient with history of PAF-for the most part she is maintaining sinus rhythm with Tikosyn.  Would be advisable to take Eliquis and GI as recommended leaving off Eliquis for at least 72 hours postprocedure  2 patient has significant peripheral vascular disease and carotid disease will need 1 antiplatelet medication either aspirin or Plavix.  Would probably choose Plavix since it has less upper GI inflammation risk than aspirin    Que Herr MD  10/13/24  08:51 EDT

## 2024-10-13 NOTE — PLAN OF CARE
Goal Outcome Evaluation:  Plan of Care Reviewed With: patient        Progress: no change (OT IE)  Outcome Evaluation: OT evaluation completed. Pt presents with decrease in ADL and related t/f, mobility I limited by decreased activity tolerance, fatigue/perceived SOA with more dynamic demands, muscle weakness and pain. Pt req'd spv to MI for bed mobility, gross CGA for fxl t/fs and ADL related mobility with FWW. Pt req'd CGA for UBD and dep A for sock d/d. OT reviewed with pt use of LH AE to assist with distal reach and minimize pain and decrease energy expenditure. Pt would benefit from further training. Pt SPO2 grossly > 90% on 2L, decreased briefly to 86% following toileting and ADL related mobility. Pt is below occupational performance baseline and would benefit from IPOT POC and home w/ A and HHOT at d/c when medically ready.    Anticipated Discharge Disposition (OT): home with assist, home with home health

## 2024-10-13 NOTE — PROGRESS NOTES
[facility]  PROGRESS NOTE    Patient Name: Avelina Ceron  : 1940  MRN: [id number]    Date of Admission: 10/10/2024  Primary Care Physician: Dre Moura MD    Subjective   Subjective     CC: Weakness/nausea      HPI: No pain. Tolerating PO. No blood in stool. No f/c/sweats.     Objective   Objective     Vital Signs:   Temp:  [97 °F (36.1 °C)-98.6 °F (37 °C)] 98.5 °F (36.9 °C)  Heart Rate:  [73-97] 73  Resp:  [16-18] 18  BP: (112-185)/(42-71) 153/62  Flow (L/min) (Oxygen Therapy):  [2] 2     Physical Exam:  NAD, alert and oriented  OP clear, dry MM  Neck supple  No LAD  RRR  CTAB  +BS, soft  PEREZ  Normal affect    Results Reviewed:  LAB RESULTS:      Lab 10/12/24  2345 10/12/24  1543 10/12/24  0802 10/12/24  0030 10/11/24  2053 10/11/24  1503 10/11/24  0446 10/10/24  2001 10/10/24  1316 10/10/24  1212 10/09/24  1328   WBC  --   --  12.28*  --   --   --  15.31*  --  14.72*  --  19.92*   HEMOGLOBIN 8.7* 8.8* 9.0* 8.2* 9.0*   < > 8.5*   < > 7.2*  --  7.7*   HEMOGLOBIN, POC  --   --   --   --   --   --   --   --   --    < >  --    HEMATOCRIT 28.1* 27.8* 28.2* 26.4* 29.0*   < > 26.8*   < > 24.1*  --  24.5*   PLATELETS  --   --  376  --   --   --  357  --  362  --  411   NEUTROS ABS  --   --  8.96*  --   --   --  11.32*  --  10.92*  --  17.12*   IMMATURE GRANS (ABS)  --   --  0.06*  --   --   --  0.09*  --  0.10*  --  0.18*   LYMPHS ABS  --   --  2.27  --   --   --  2.72  --  2.68  --  1.78   MONOS ABS  --   --  0.73  --   --   --  0.89  --  0.80  --  0.72   EOS ABS  --   --  0.14  --   --   --  0.17  --  0.10  --  0.02   MCV  --   --  101.1*  --   --   --  99.6*  --  107.1*  --  102.1*   PROCALCITONIN  --   --   --   --   --   --   --   --  0.06  --   --     < > = values in this interval not displayed.         Lab 10/12/24  0802 10/11/24  0446 10/10/24  1316 10/09/24  1328   SODIUM 145 143 142 142   POTASSIUM 4.0 4.2  4.2 3.1* 3.7   CHLORIDE 104 103 99 101    CO2 33.0* 35.0* 33.0* 32.0*   ANION GAP 8.0 5.0 10.0 9.0   BUN 4* 8 8 9   CREATININE 0.29* 0.31* 0.33* 0.33*   EGFR 106.3 104.6 103.0 103.0   GLUCOSE 101* 104* 114* 114*   CALCIUM 8.9 8.6 9.1 9.5   MAGNESIUM  --  1.8  --   --    TSH  --   --   --  6.350*         Lab 10/10/24  1316 10/09/24  1328   TOTAL PROTEIN 5.9* 6.2   ALBUMIN 3.8 3.7   GLOBULIN 2.1 2.5   ALT (SGPT) 15 15   AST (SGOT) 23 21   BILIRUBIN 0.3 0.2   ALK PHOS 66 68                 Lab 10/10/24  1421 10/10/24  1324 10/10/24  1324 10/09/24  1328   IRON  --   --   --  69   IRON SATURATION (TSAT)  --   --   --  19*   TIBC  --   --   --  361   TRANSFERRIN  --   --   --  242   FERRITIN  --   --   --  315.00*   ABO TYPING A   < > A  --    RH TYPING Positive   < > Positive  --    ANTIBODY SCREEN  --   --  Negative  --     < > = values in this interval not displayed.         Brief Urine Lab Results  (Last result in the past 365 days)        Color   Clarity   Blood   Leuk Est   Nitrite   Protein   CREAT   Urine HCG        10/10/24 2332 Yellow   Clear   Negative   Small (1+)   Negative   Negative                   Microbiology Results Abnormal       None            No radiology results from the last 24 hrs    Results for orders placed during the hospital encounter of 08/26/24    Adult Transthoracic Echo Limited W/ Cont if Necessary Per Protocol    Interpretation Summary    Left ventricular systolic function is normal. Calculated left ventricular EF = 64%    Moderate tricuspid valve regurgitation is present.    Estimated right ventricular systolic pressure from tricuspid regurgitation is moderately elevated (45-55 mmHg).    Mild MR      Current medications:  Scheduled Meds:[Held by provider] apixaban, 5 mg, Oral, Q12H  [Held by provider] aspirin, 81 mg, Oral, Daily  atorvastatin, 20 mg, Oral, Daily  budesonide-formoterol, 2 puff, Inhalation, BID - RT   And  tiotropium bromide monohydrate, 2 puff, Inhalation, Daily - RT  [Held by provider] clopidogrel, 75 mg,  Oral, Daily  dofetilide, 250 mcg, Oral, BID  gabapentin, 300 mg, Oral, Nightly  [Held by provider] isosorbide mononitrate, 30 mg, Oral, Daily  levothyroxine, 200 mcg, Oral, Q AM  [Held by provider] losartan, 50 mg, Oral, Q24H  oxybutynin XL, 10 mg, Oral, Daily  pantoprazole, 40 mg, Oral, Q AM  roflumilast, 500 mcg, Oral, Daily  sertraline, 100 mg, Oral, Daily  sodium chloride, 10 mL, Intravenous, Q12H  sodium chloride, 10 mL, Intravenous, Q12H  traMADol, 50 mg, Oral, Q6H      Continuous Infusions:   PRN Meds:.  acetaminophen    senna-docusate sodium **AND** polyethylene glycol **AND** bisacodyl **AND** bisacodyl    Calcium Replacement - Follow Nurse / BPA Driven Protocol    HYDROmorphone    influenza vaccine    Magnesium Standard Dose Replacement - Follow Nurse / BPA Driven Protocol    Phosphorus Replacement - Follow Nurse / BPA Driven Protocol    Potassium Replacement - Follow Nurse / BPA Driven Protocol    Insert Peripheral IV **AND** sodium chloride    sodium chloride    sodium chloride    Assessment & Plan   Assessment & Plan     Active Hospital Problems    Diagnosis  POA    **Anemia due to blood loss, acute [D62]  Yes    Anemia due to acute blood loss [D62]  Yes    Blood loss anemia [D50.0]  Unknown      Resolved Hospital Problems   No resolved problems to display.        Brief Hospital Course to date:  Avelina Ceron is a 83 y.o. female with past medical history significant for atrial fibrillation on Eliquis, CAD status post stenting, PAD status post stenting, hypertension, hyperlipidemia, hypothyroidism, COPD with chronic hypoxia on 2 L nasal cannula, was admitted for acute blood loss anemia secondary to GI bleed.  Gastroenterology and cardiology consulted     Acute GI bleed  Acute on chronic blood loss anemia  Leukocytosis, reactive   -s/p PRBC  -anticoagulation held  -colonoscopy with hemorrhoids, polyps, some bleeding     CAD s/p bilateral ICA stents  PAD s/p bilateral iliac artery stents  Paroxysmal atrial  fibrillation on anticoagulation  Hypertension  Hyperlipidemia.   -Cardiology consulted, does not need DAPT but will consider restarting anticoagulation down the line  -Rate controlled with Tikosyn      COPD with hypoxemic respiratory failure  -continue Symbicort, Spiriva, Roflumilast. Continue home oxygen.    Chronic pain  -continue gabapentin, tramadol, PRN IV Dilaudid for breakthrough pain.    Chronic bladder dysfunction  -oxybutynin ordered as alternative    Anxiety/depression  -continue sertraline.    Hypothyroidism  -continue levothyroxine.     Expected Discharge Location and Transportation: Home   Expected Discharge   Expected Discharge Date: 10/14/2024; Expected Discharge Time:      VTE Prophylaxis:  Pharmacologic & mechanical VTE prophylaxis orders are present.         AM-PAC 6 Clicks Score (PT): 18 (10/12/24 2000)    CODE STATUS:   Code Status and Medical Interventions: CPR (Attempt to Resuscitate); Full Support   Ordered at: 10/10/24 3381     Level Of Support Discussed With:    Patient     Code Status (Patient has no pulse and is not breathing):    CPR (Attempt to Resuscitate)     Medical Interventions (Patient has pulse or is breathing):    Full Support       Louis Chandler MD  10/13/24

## 2024-10-14 LAB
ANION GAP SERPL CALCULATED.3IONS-SCNC: 6 MMOL/L (ref 5–15)
BASOPHILS # BLD AUTO: 0.07 10*3/MM3 (ref 0–0.2)
BASOPHILS NFR BLD AUTO: 0.5 % (ref 0–1.5)
BH BB BLOOD EXPIRATION DATE: NORMAL
BH BB BLOOD TYPE BARCODE: 6200
BH BB DISPENSE STATUS: NORMAL
BH BB PRODUCT CODE: NORMAL
BH BB UNIT NUMBER: NORMAL
BUN SERPL-MCNC: 5 MG/DL (ref 8–23)
BUN/CREAT SERPL: 16.7 (ref 7–25)
CALCIUM SPEC-SCNC: 8.7 MG/DL (ref 8.6–10.5)
CHLORIDE SERPL-SCNC: 102 MMOL/L (ref 98–107)
CO2 SERPL-SCNC: 31 MMOL/L (ref 22–29)
CREAT SERPL-MCNC: 0.3 MG/DL (ref 0.57–1)
CROSSMATCH INTERPRETATION: NORMAL
DEPRECATED RDW RBC AUTO: 68.3 FL (ref 37–54)
EGFRCR SERPLBLD CKD-EPI 2021: 105.4 ML/MIN/1.73
EOSINOPHIL # BLD AUTO: 0.18 10*3/MM3 (ref 0–0.4)
EOSINOPHIL NFR BLD AUTO: 1.3 % (ref 0.3–6.2)
ERYTHROCYTE [DISTWIDTH] IN BLOOD BY AUTOMATED COUNT: 17.7 % (ref 12.3–15.4)
GLUCOSE SERPL-MCNC: 110 MG/DL (ref 65–99)
HCT VFR BLD AUTO: 26.7 % (ref 34–46.6)
HCT VFR BLD AUTO: 27.9 % (ref 34–46.6)
HCT VFR BLD AUTO: 27.9 % (ref 34–46.6)
HGB BLD-MCNC: 8.1 G/DL (ref 12–15.9)
HGB BLD-MCNC: 8.3 G/DL (ref 12–15.9)
HGB BLD-MCNC: 8.6 G/DL (ref 12–15.9)
IMM GRANULOCYTES # BLD AUTO: 0.09 10*3/MM3 (ref 0–0.05)
IMM GRANULOCYTES NFR BLD AUTO: 0.6 % (ref 0–0.5)
LYMPHOCYTES # BLD AUTO: 1.51 10*3/MM3 (ref 0.7–3.1)
LYMPHOCYTES NFR BLD AUTO: 10.6 % (ref 19.6–45.3)
MAGNESIUM SERPL-MCNC: 1.8 MG/DL (ref 1.6–2.4)
MCH RBC QN AUTO: 31.1 PG (ref 26.6–33)
MCHC RBC AUTO-ENTMCNC: 29.7 G/DL (ref 31.5–35.7)
MCV RBC AUTO: 104.5 FL (ref 79–97)
MONOCYTES # BLD AUTO: 1 10*3/MM3 (ref 0.1–0.9)
MONOCYTES NFR BLD AUTO: 7 % (ref 5–12)
NEUTROPHILS NFR BLD AUTO: 11.36 10*3/MM3 (ref 1.7–7)
NEUTROPHILS NFR BLD AUTO: 80 % (ref 42.7–76)
NRBC BLD AUTO-RTO: 0 /100 WBC (ref 0–0.2)
PLATELET # BLD AUTO: 350 10*3/MM3 (ref 140–450)
PMV BLD AUTO: 9 FL (ref 6–12)
POTASSIUM SERPL-SCNC: 4.3 MMOL/L (ref 3.5–5.2)
RBC # BLD AUTO: 2.67 10*6/MM3 (ref 3.77–5.28)
SODIUM SERPL-SCNC: 139 MMOL/L (ref 136–145)
UNIT  ABO: NORMAL
UNIT  RH: NORMAL
WBC NRBC COR # BLD AUTO: 14.21 10*3/MM3 (ref 3.4–10.8)

## 2024-10-14 PROCEDURE — 94799 UNLISTED PULMONARY SVC/PX: CPT

## 2024-10-14 PROCEDURE — 94664 DEMO&/EVAL PT USE INHALER: CPT

## 2024-10-14 PROCEDURE — 85025 COMPLETE CBC W/AUTO DIFF WBC: CPT | Performed by: INTERNAL MEDICINE

## 2024-10-14 PROCEDURE — 99232 SBSQ HOSP IP/OBS MODERATE 35: CPT | Performed by: INTERNAL MEDICINE

## 2024-10-14 PROCEDURE — 99232 SBSQ HOSP IP/OBS MODERATE 35: CPT | Performed by: HOSPITALIST

## 2024-10-14 PROCEDURE — 85018 HEMOGLOBIN: CPT | Performed by: INTERNAL MEDICINE

## 2024-10-14 PROCEDURE — 80048 BASIC METABOLIC PNL TOTAL CA: CPT | Performed by: INTERNAL MEDICINE

## 2024-10-14 PROCEDURE — 83735 ASSAY OF MAGNESIUM: CPT

## 2024-10-14 PROCEDURE — 85014 HEMATOCRIT: CPT | Performed by: INTERNAL MEDICINE

## 2024-10-14 PROCEDURE — 99232 SBSQ HOSP IP/OBS MODERATE 35: CPT

## 2024-10-14 RX ADMIN — OXYBUTYNIN CHLORIDE 10 MG: 10 TABLET, EXTENDED RELEASE ORAL at 08:38

## 2024-10-14 RX ADMIN — LEVOTHYROXINE SODIUM 200 MCG: 0.1 TABLET ORAL at 05:14

## 2024-10-14 RX ADMIN — TIOTROPIUM BROMIDE INHALATION SPRAY 2 PUFF: 3.12 SPRAY, METERED RESPIRATORY (INHALATION) at 07:24

## 2024-10-14 RX ADMIN — TRAMADOL HYDROCHLORIDE 50 MG: 50 TABLET, COATED ORAL at 03:40

## 2024-10-14 RX ADMIN — ATORVASTATIN CALCIUM 20 MG: 20 TABLET, FILM COATED ORAL at 20:05

## 2024-10-14 RX ADMIN — BUDESONIDE AND FORMOTEROL FUMARATE DIHYDRATE 2 PUFF: 160; 4.5 AEROSOL RESPIRATORY (INHALATION) at 19:24

## 2024-10-14 RX ADMIN — TRAMADOL HYDROCHLORIDE 50 MG: 50 TABLET, COATED ORAL at 08:38

## 2024-10-14 RX ADMIN — PANTOPRAZOLE SODIUM 40 MG: 40 TABLET, DELAYED RELEASE ORAL at 08:38

## 2024-10-14 RX ADMIN — DOFETILIDE 250 MCG: 0.25 CAPSULE ORAL at 08:38

## 2024-10-14 RX ADMIN — GABAPENTIN 300 MG: 300 CAPSULE ORAL at 20:05

## 2024-10-14 RX ADMIN — DOFETILIDE 250 MCG: 0.25 CAPSULE ORAL at 20:32

## 2024-10-14 RX ADMIN — BUDESONIDE AND FORMOTEROL FUMARATE DIHYDRATE 2 PUFF: 160; 4.5 AEROSOL RESPIRATORY (INHALATION) at 07:24

## 2024-10-14 RX ADMIN — TRAMADOL HYDROCHLORIDE 50 MG: 50 TABLET, COATED ORAL at 15:24

## 2024-10-14 RX ADMIN — ROFLUMILAST 500 MCG: 500 TABLET ORAL at 08:38

## 2024-10-14 RX ADMIN — SERTRALINE 100 MG: 100 TABLET, FILM COATED ORAL at 08:38

## 2024-10-14 RX ADMIN — TRAMADOL HYDROCHLORIDE 50 MG: 50 TABLET, COATED ORAL at 20:05

## 2024-10-14 NOTE — PLAN OF CARE
Goal Outcome Evaluation:   Pt progressing well. Calm, Cooperative and accepting of plan of care.  Call light within reach.

## 2024-10-14 NOTE — PROGRESS NOTES
[facility] Medicine Services  PROGRESS NOTE    Patient Name: Avelina Ceron  : 1940  MRN: [id number]    Date of Admission: 10/10/2024  Primary Care Physician: Dre Moura MD    Subjective   Subjective     CC: Weakness/nausea      HPI: No pain. Did have dark stool. No BRBPR. No f/c. No n/v.     Objective   Objective     Vital Signs:   Temp:  [97.9 °F (36.6 °C)-98.7 °F (37.1 °C)] 98.1 °F (36.7 °C)  Heart Rate:  [73-95] 95  Resp:  [17-19] 18  BP: (100-135)/(41-58) 113/58  Flow (L/min) (Oxygen Therapy):  [2] 2     Physical Exam:  NAD, alert and oriented  OP clear, dry MM  Neck supple  No LAD  RRR  CTAB  +BS, soft  PEREZ  Normal affect  No change from 10/13    Results Reviewed:  LAB RESULTS:      Lab 10/13/24  2318 10/13/24  1710 10/13/24  0742 10/12/24  2345 10/12/24  1543 10/12/24  0802 10/11/24  1503 10/11/24  0446 10/10/24  2001 10/10/24  1316 10/10/24  1212 10/09/24  1328   WBC  --   --  14.97*  --   --  12.28*  --  15.31*  --  14.72*  --  19.92*   HEMOGLOBIN 8.1* 8.9* 8.8* 8.7* 8.8* 9.0*   < > 8.5*   < > 7.2*  --  7.7*   HEMOGLOBIN, POC  --   --   --   --   --   --   --   --   --   --    < >  --    HEMATOCRIT 26.8* 28.7* 28.3* 28.1* 27.8* 28.2*   < > 26.8*   < > 24.1*  --  24.5*   PLATELETS  --   --  342  --   --  376  --  357  --  362  --  411   NEUTROS ABS  --   --  11.31*  --   --  8.96*  --  11.32*  --  10.92*  --  17.12*   IMMATURE GRANS (ABS)  --   --  0.06*  --   --  0.06*  --  0.09*  --  0.10*  --  0.18*   LYMPHS ABS  --   --  2.43  --   --  2.27  --  2.72  --  2.68  --  1.78   MONOS ABS  --   --  0.83  --   --  0.73  --  0.89  --  0.80  --  0.72   EOS ABS  --   --  0.25  --   --  0.14  --  0.17  --  0.10  --  0.02   MCV  --   --  102.2*  --   --  101.1*  --  99.6*  --  107.1*  --  102.1*   PROCALCITONIN  --   --   --   --   --   --   --   --   --  0.06  --   --     < > = values in this interval not displayed.         Lab 10/13/24  1710 10/13/24  0742 10/12/24  0802  10/11/24  0446 10/10/24  1316 10/09/24  1328   SODIUM  --  140 145 143 142 142   POTASSIUM 4.6 3.6 4.0 4.2  4.2 3.1* 3.7   CHLORIDE  --  101 104 103 99 101   CO2  --  31.0* 33.0* 35.0* 33.0* 32.0*   ANION GAP  --  8.0 8.0 5.0 10.0 9.0   BUN  --  3* 4* 8 8 9   CREATININE  --  0.31* 0.29* 0.31* 0.33* 0.33*   EGFR  --  104.6 106.3 104.6 103.0 103.0   GLUCOSE  --  93 101* 104* 114* 114*   CALCIUM  --  8.6 8.9 8.6 9.1 9.5   MAGNESIUM  --   --   --  1.8  --   --    TSH  --   --   --   --   --  6.350*         Lab 10/10/24  1316 10/09/24  1328   TOTAL PROTEIN 5.9* 6.2   ALBUMIN 3.8 3.7   GLOBULIN 2.1 2.5   ALT (SGPT) 15 15   AST (SGOT) 23 21   BILIRUBIN 0.3 0.2   ALK PHOS 66 68                 Lab 10/10/24  1421 10/10/24  1324 10/10/24  1324 10/09/24  1328   IRON  --   --   --  69   IRON SATURATION (TSAT)  --   --   --  19*   TIBC  --   --   --  361   TRANSFERRIN  --   --   --  242   FERRITIN  --   --   --  315.00*   ABO TYPING A   < > A  --    RH TYPING Positive   < > Positive  --    ANTIBODY SCREEN  --   --  Negative  --     < > = values in this interval not displayed.         Brief Urine Lab Results  (Last result in the past 365 days)        Color   Clarity   Blood   Leuk Est   Nitrite   Protein   CREAT   Urine HCG        10/10/24 2332 Yellow   Clear   Negative   Small (1+)   Negative   Negative                   Microbiology Results Abnormal       None            No radiology results from the last 24 hrs    Results for orders placed during the hospital encounter of 08/26/24    Adult Transthoracic Echo Limited W/ Cont if Necessary Per Protocol    Interpretation Summary    Left ventricular systolic function is normal. Calculated left ventricular EF = 64%    Moderate tricuspid valve regurgitation is present.    Estimated right ventricular systolic pressure from tricuspid regurgitation is moderately elevated (45-55 mmHg).    Mild MR      Current medications:  Scheduled Meds:[Held by provider] apixaban, 5 mg, Oral,  Q12H  [Held by provider] aspirin, 81 mg, Oral, Daily  atorvastatin, 20 mg, Oral, Daily  budesonide-formoterol, 2 puff, Inhalation, BID - RT   And  tiotropium bromide monohydrate, 2 puff, Inhalation, Daily - RT  [Held by provider] clopidogrel, 75 mg, Oral, Daily  dofetilide, 250 mcg, Oral, BID  gabapentin, 300 mg, Oral, Nightly  [Held by provider] isosorbide mononitrate, 30 mg, Oral, Daily  levothyroxine, 200 mcg, Oral, Q AM  [Held by provider] losartan, 50 mg, Oral, Q24H  oxybutynin XL, 10 mg, Oral, Daily  pantoprazole, 40 mg, Oral, Q AM  roflumilast, 500 mcg, Oral, Daily  sertraline, 100 mg, Oral, Daily  sodium chloride, 10 mL, Intravenous, Q12H  sodium chloride, 10 mL, Intravenous, Q12H  traMADol, 50 mg, Oral, Q6H      Continuous Infusions:   PRN Meds:.  acetaminophen    senna-docusate sodium **AND** polyethylene glycol **AND** bisacodyl **AND** bisacodyl    Calcium Replacement - Follow Nurse / BPA Driven Protocol    HYDROmorphone    influenza vaccine    Magnesium Standard Dose Replacement - Follow Nurse / BPA Driven Protocol    Phosphorus Replacement - Follow Nurse / BPA Driven Protocol    Potassium Replacement - Follow Nurse / BPA Driven Protocol    Insert Peripheral IV **AND** sodium chloride    sodium chloride    sodium chloride    Assessment & Plan   Assessment & Plan     Active Hospital Problems    Diagnosis  POA    **Anemia due to blood loss, acute [D62]  Yes    Anemia due to acute blood loss [D62]  Yes    Blood loss anemia [D50.0]  Unknown      Resolved Hospital Problems   No resolved problems to display.        Brief Hospital Course to date:  Avelina Ceron is a 83 y.o. female with past medical history significant for atrial fibrillation on Eliquis, CAD status post stenting, PAD status post stenting, hypertension, hyperlipidemia, hypothyroidism, COPD with chronic hypoxia on 2 L nasal cannula, was admitted for acute blood loss anemia secondary to GI bleed.  Gastroenterology and cardiology consulted      Acute GI bleed  Acute on chronic blood loss anemia  Leukocytosis, reactive   -s/p PRBC  -anticoagulation held  -colonoscopy with hemorrhoids, polyps, some bleeding  -hold AC 72 hours per GI, can likely resume tonight/tomorrow AM if CBC stable     CAD s/p bilateral ICA stents  PAD s/p bilateral iliac artery stents  Paroxysmal atrial fibrillation on anticoagulation  Hypertension  Hyperlipidemia.   -Cardiology consulted, does not need DAPT but will consider restarting anticoagulation down the line  -Rate controlled with Tikosyn      COPD with hypoxemic respiratory failure  -continue Symbicort, Spiriva, Roflumilast. Continue home oxygen    Chronic pain  -continue gabapentin, tramadol, PRN IV Dilaudid for breakthrough pain.    Chronic bladder dysfunction  -oxybutynin ordered as alternative    Anxiety/depression  -continue sertraline    Hypothyroidism  -continue levothyroxine    Expected Discharge Location and Transportation: Home   Expected Discharge   Expected Discharge Date: 10/14/2024; Expected Discharge Time:      VTE Prophylaxis:  Pharmacologic & mechanical VTE prophylaxis orders are present.         AM-PAC 6 Clicks Score (PT): 19 (10/13/24 2030)    CODE STATUS:   Code Status and Medical Interventions: CPR (Attempt to Resuscitate); Full Support   Ordered at: 10/10/24 6193     Level Of Support Discussed With:    Patient     Code Status (Patient has no pulse and is not breathing):    CPR (Attempt to Resuscitate)     Medical Interventions (Patient has pulse or is breathing):    Full Support       Louis Chandler MD  10/14/24

## 2024-10-14 NOTE — NURSING NOTE
Pt extremely concerned over night time incontinence. States that she doesn't do that at home. Appeared very anxious and worried about the incidents

## 2024-10-14 NOTE — PLAN OF CARE
Problem: Adult Inpatient Plan of Care  Goal: Plan of Care Review  Outcome: Progressing  Flowsheets (Taken 10/14/2024 1603)  Outcome Evaluation: VSS. 2LNC. No complaints of pain or nausea. H&H stable. Fall and skin precautions maintained. Pt up in chair during the shift. No other concerns at this time. Will continue with the plan of care.   Goal Outcome Evaluation:              Outcome Evaluation: VSS. 2LNC. No complaints of pain or nausea. H&H stable. Fall and skin precautions maintained. Pt up in chair during the shift. No other concerns at this time. Will continue with the plan of care.

## 2024-10-14 NOTE — CASE MANAGEMENT/SOCIAL WORK
Continued Stay Note  [facility]     Patient Name: Avelina Ceron  MRN: [id number]  Today's Date: 10/14/2024    Admit Date: 10/10/2024    Plan: home   Discharge Plan       Row Name 10/14/24 0852       Plan    Plan home    Patient/Family in Agreement with Plan yes    Plan Comments I met with this patient bedside. We discussed that therapy is recommending home health services. She would like to think about this a bit. The CM will readdress later today with the patient and make referrals should she decide she wants HH. Her plan now is home with her daughter to transport. CM to follow.    1150   CM spoke with this patient regarding home health services, and she is declining at this time. She understands that if she decides she needs HH after discharge, she can call her PCP, and they can set it up for her. CM to follow.    Final Discharge Disposition Code 01 - home or self-care                   Discharge Codes    No documentation.                 Expected Discharge Date and Time       Expected Discharge Date Expected Discharge Time    Oct 14, 2024               Maame Soriano RN

## 2024-10-14 NOTE — PROGRESS NOTES
"  [facility]  PROGRESS NOTE    Date of Admission: 10/10/2024  Date of Service: 10/14/24    Primary Care Physician: Dre Moura MD    Chief Complaint: GI bleed on anticoagulation    Problem List:   Anemia due to blood loss, acute    Blood loss anemia    Anemia due to acute blood loss      Subjective      No acute events overnight. No chest pain or shortness of breath.       Objective   Vitals: /47   Pulse 81   Temp 98 °F (36.7 °C) (Oral)   Resp 18   Ht 160 cm (63\")   Wt 68.9 kg (152 lb)   LMP  (LMP Unknown)   SpO2 93%   BMI 26.93 kg/m²     Physical Exam:  GENERAL: Alert, cooperative, in no acute distress.   HEART: Regular rhythm, normal rate, and no murmurs, gallops, or rubs.   LUNGS: Clear to auscultation bilaterally. No wheezing, rales or rhonchi.  EXTREMITIES: No edema noted.     Results:  Results from last 7 days   Lab Units 10/13/24  2318 10/13/24  1710 10/13/24  0742 10/12/24  1543 10/12/24  0802 10/11/24  1503 10/11/24  0446   WBC 10*3/mm3  --   --  14.97*  --  12.28*  --  15.31*   HEMOGLOBIN g/dL 8.1* 8.9* 8.8*   < > 9.0*   < > 8.5*   HEMATOCRIT % 26.8* 28.7* 28.3*   < > 28.2*   < > 26.8*   PLATELETS 10*3/mm3  --   --  342  --  376  --  357    < > = values in this interval not displayed.     Results from last 7 days   Lab Units 10/13/24  1710 10/13/24  0742 10/12/24  0802 10/11/24  0446   SODIUM mmol/L  --  140 145 143   POTASSIUM mmol/L 4.6 3.6 4.0 4.2  4.2   CHLORIDE mmol/L  --  101 104 103   CO2 mmol/L  --  31.0* 33.0* 35.0*   BUN mg/dL  --  3* 4* 8   CREATININE mg/dL  --  0.31* 0.29* 0.31*   GLUCOSE mg/dL  --  93 101* 104*      Lab Results   Component Value Date    CHOL 133 07/25/2024    TRIG 151 (H) 07/25/2024    HDL 48 07/25/2024    LDL 59 07/25/2024    AST 23 10/10/2024    ALT 15 10/10/2024             Results from last 7 days   Lab Units 10/09/24  1328   TSH uIU/mL 6.350*   FREE T4 ng/dL 0.86*                         Intake/Output Summary " (Last 24 hours) at 10/14/2024 0932  Last data filed at 10/13/2024 1400  Gross per 24 hour   Intake 240 ml   Output --   Net 240 ml       I personally reviewed the patient's EKG/Telemetry data    Radiology Data:   Results for orders placed during the hospital encounter of 08/26/24    Adult Transthoracic Echo Limited W/ Cont if Necessary Per Protocol    Interpretation Summary    Left ventricular systolic function is normal. Calculated left ventricular EF = 64%    Moderate tricuspid valve regurgitation is present.    Estimated right ventricular systolic pressure from tricuspid regurgitation is moderately elevated (45-55 mmHg).    Mild MR        Current Medications:  [Held by provider] apixaban, 5 mg, Oral, Q12H  [Held by provider] aspirin, 81 mg, Oral, Daily  atorvastatin, 20 mg, Oral, Daily  budesonide-formoterol, 2 puff, Inhalation, BID - RT   And  tiotropium bromide monohydrate, 2 puff, Inhalation, Daily - RT  [Held by provider] clopidogrel, 75 mg, Oral, Daily  dofetilide, 250 mcg, Oral, BID  gabapentin, 300 mg, Oral, Nightly  [Held by provider] isosorbide mononitrate, 30 mg, Oral, Daily  levothyroxine, 200 mcg, Oral, Q AM  [Held by provider] losartan, 50 mg, Oral, Q24H  oxybutynin XL, 10 mg, Oral, Daily  pantoprazole, 40 mg, Oral, Q AM  roflumilast, 500 mcg, Oral, Daily  sertraline, 100 mg, Oral, Daily  sodium chloride, 10 mL, Intravenous, Q12H  sodium chloride, 10 mL, Intravenous, Q12H  traMADol, 50 mg, Oral, Q6H           Assessment:   Paroxysmal atrial fibrillation/AVNRT, on Eliquis at home but on hold due to GI bleed  Maintaining NSR on Tikosyn  Acute GI bleed/acute on chronic blood loss anemia status post PRBC  Per GI, hold anticoagulation x 72 hours, can likely resume tonight or tomorrow morning if H&H remained stable  Peripheral arterial disease status post bilateral iliac stents and carotid stents  Was on DAPT with Eliquis  Hypertension  Dyslipidemia  COPD    Plan:   Continue Tikosyn for atrial  fibrillation. Resume Eliquis when ok with GI. Can discuss watchman as an OP.   Resume aspirin OR plavix when ok with GI, either ok with us.   Continue all other cardiac medications at current dose.   Not much more to add from a cardiac standpoint. We will sign off and be available to see on an as needed basis.   Follow up with cardiology in 4 weeks.       Dee Worley PA-C

## 2024-10-14 NOTE — PROGRESS NOTES
"GI Daily Progress Note  Subjective:    Chief Complaint:  follow up GI bleeding    Patient sitting up in bed, reports feeling well with good appetite.    Objective:    /71 (BP Location: Right arm, Patient Position: Lying)   Pulse 77   Temp 98 °F (36.7 °C) (Oral)   Resp 18   Ht 160 cm (63\")   Wt 68.9 kg (152 lb)   LMP  (LMP Unknown)   SpO2 90%   BMI 26.93 kg/m²     Physical Exam   General: Patient awake, alert and cooperative   Cardiovascular: Regular rate, well-perfused extremities   Pulm: Equal expansion bilaterally, no increased WOB   Abdomen: nondistended;    Extremities: No rash or edema              Neuro: A&O, No obvious sign of focal deficit   Psychiatric: Normal mood and behavior; memory intact    Lab  Lab Results   Component Value Date    WBC 14.21 (H) 10/14/2024    HGB 8.6 (L) 10/14/2024    HGB 8.3 (L) 10/14/2024    HGB 8.1 (L) 10/13/2024    .5 (H) 10/14/2024     10/14/2024    INR 0.98 08/04/2023    INR 1.04 06/30/2020       Lab Results   Component Value Date    GLUCOSE 110 (H) 10/14/2024    BUN 5 (L) 10/14/2024    CREATININE 0.30 (L) 10/14/2024    EGFRIFNONA 119 11/24/2021    BCR 16.7 10/14/2024     10/14/2024    K 4.3 10/14/2024    CO2 31.0 (H) 10/14/2024    CALCIUM 8.7 10/14/2024    ALBUMIN 3.8 10/10/2024    ALKPHOS 66 10/10/2024    BILITOT 0.3 10/10/2024    ALT 15 10/10/2024    AST 23 10/10/2024       Assessment:  Melena  ABLA, Macrocytic Indices with   Bleeding Colon Polyps  H/o Cecal Surgery for Polyp with Polypoid lesion at Anastomosis  CAD s/p bilateral ICA stents on ASA and Plavix'  PAD s/p bilateral iliac artery stents  Afib on Eliquis  COPD on 2L NC  Hypokalemia  Leukocytosis (improved from recent admission)    Plan:  - EGD 10/11 with 5 cm hiatal hernia, mild gastritis, duodenal diverticulum  - Colonoscopy 10/12 with polypoid lesion at the anastomosis non-bleeding biopsied not removed due to need for complex EMR if true polyp, 3 polyps in ascending colon " 4-6 mm, one 13 mm polyp with friability, one 7 mm polyp descending colon, one 14 mm bleeding polyp in sigmoid colon, one 5 mm polyp in rectum all removed, clips placed, also with diverticulosis left colon and internal hemorrhoids  - Hb stable  - Okay to resume Eliquis tomorrow.  Reviewed Cardiology notes, would prefer ASA 81 as opposed to Plavix however will defer to Cardiology.    - If anastomotic polypoid lesion show evidence of adenoma then can consider EMR at later date when her Hb stabilizes and she can safely hold her Eliquis.    Markie Bautista MD  10/14/24  19:00 EDT

## 2024-10-15 LAB
ANION GAP SERPL CALCULATED.3IONS-SCNC: 5 MMOL/L (ref 5–15)
BUN SERPL-MCNC: 7 MG/DL (ref 8–23)
BUN/CREAT SERPL: 18.9 (ref 7–25)
CALCIUM SPEC-SCNC: 8.8 MG/DL (ref 8.6–10.5)
CHLORIDE SERPL-SCNC: 102 MMOL/L (ref 98–107)
CO2 SERPL-SCNC: 33 MMOL/L (ref 22–29)
CREAT SERPL-MCNC: 0.37 MG/DL (ref 0.57–1)
CYTO UR: NORMAL
EGFRCR SERPLBLD CKD-EPI 2021: 100.2 ML/MIN/1.73
GLUCOSE SERPL-MCNC: 107 MG/DL (ref 65–99)
LAB AP CASE REPORT: NORMAL
LAB AP CLINICAL INFORMATION: NORMAL
PATH REPORT.FINAL DX SPEC: NORMAL
PATH REPORT.GROSS SPEC: NORMAL
POTASSIUM SERPL-SCNC: 4.3 MMOL/L (ref 3.5–5.2)
SODIUM SERPL-SCNC: 140 MMOL/L (ref 136–145)

## 2024-10-15 PROCEDURE — 80048 BASIC METABOLIC PNL TOTAL CA: CPT | Performed by: INTERNAL MEDICINE

## 2024-10-15 PROCEDURE — 99232 SBSQ HOSP IP/OBS MODERATE 35: CPT | Performed by: HOSPITALIST

## 2024-10-15 PROCEDURE — 94799 UNLISTED PULMONARY SVC/PX: CPT

## 2024-10-15 RX ADMIN — TIOTROPIUM BROMIDE INHALATION SPRAY 2 PUFF: 3.12 SPRAY, METERED RESPIRATORY (INHALATION) at 08:05

## 2024-10-15 RX ADMIN — ASPIRIN 81 MG 81 MG: 81 TABLET ORAL at 11:20

## 2024-10-15 RX ADMIN — SERTRALINE 100 MG: 100 TABLET, FILM COATED ORAL at 09:11

## 2024-10-15 RX ADMIN — GABAPENTIN 300 MG: 300 CAPSULE ORAL at 20:47

## 2024-10-15 RX ADMIN — DOFETILIDE 250 MCG: 0.25 CAPSULE ORAL at 09:10

## 2024-10-15 RX ADMIN — LEVOTHYROXINE SODIUM 200 MCG: 0.1 TABLET ORAL at 05:27

## 2024-10-15 RX ADMIN — TRAMADOL HYDROCHLORIDE 50 MG: 50 TABLET, COATED ORAL at 09:11

## 2024-10-15 RX ADMIN — OXYBUTYNIN CHLORIDE 10 MG: 10 TABLET, EXTENDED RELEASE ORAL at 09:10

## 2024-10-15 RX ADMIN — BUDESONIDE AND FORMOTEROL FUMARATE DIHYDRATE 2 PUFF: 160; 4.5 AEROSOL RESPIRATORY (INHALATION) at 08:05

## 2024-10-15 RX ADMIN — APIXABAN 5 MG: 5 TABLET, FILM COATED ORAL at 20:47

## 2024-10-15 RX ADMIN — BUDESONIDE AND FORMOTEROL FUMARATE DIHYDRATE 2 PUFF: 160; 4.5 AEROSOL RESPIRATORY (INHALATION) at 20:17

## 2024-10-15 RX ADMIN — ROFLUMILAST 500 MCG: 500 TABLET ORAL at 09:10

## 2024-10-15 RX ADMIN — ATORVASTATIN CALCIUM 20 MG: 20 TABLET, FILM COATED ORAL at 20:47

## 2024-10-15 RX ADMIN — TRAMADOL HYDROCHLORIDE 50 MG: 50 TABLET, COATED ORAL at 02:05

## 2024-10-15 RX ADMIN — TRAMADOL HYDROCHLORIDE 50 MG: 50 TABLET, COATED ORAL at 15:16

## 2024-10-15 RX ADMIN — TRAMADOL HYDROCHLORIDE 50 MG: 50 TABLET, COATED ORAL at 20:50

## 2024-10-15 RX ADMIN — DOFETILIDE 250 MCG: 0.25 CAPSULE ORAL at 20:47

## 2024-10-15 RX ADMIN — PANTOPRAZOLE SODIUM 40 MG: 40 TABLET, DELAYED RELEASE ORAL at 05:27

## 2024-10-15 NOTE — PLAN OF CARE
Problem: Adult Inpatient Plan of Care  Goal: Plan of Care Review  Outcome: Progressing  Flowsheets (Taken 10/15/2024 0419)  Progress: improving  Plan of Care Reviewed With: patient  Goal: Patient-Specific Goal (Individualized)  Outcome: Progressing  Goal: Absence of Hospital-Acquired Illness or Injury  Outcome: Progressing  Intervention: Identify and Manage Fall Risk  Recent Flowsheet Documentation  Taken 10/15/2024 0205 by Mary Bryant RN  Safety Promotion/Fall Prevention:   activity supervised   assistive device/personal items within reach   clutter free environment maintained   lighting adjusted   nonskid shoes/slippers when out of bed   room organization consistent   safety round/check completed  Taken 10/15/2024 0000 by Mary Bryant RN  Safety Promotion/Fall Prevention:   activity supervised   assistive device/personal items within reach   clutter free environment maintained   lighting adjusted   nonskid shoes/slippers when out of bed   room organization consistent   safety round/check completed  Taken 10/14/2024 2200 by Mary Bryant RN  Safety Promotion/Fall Prevention:   activity supervised   assistive device/personal items within reach   clutter free environment maintained   lighting adjusted   nonskid shoes/slippers when out of bed   room organization consistent   safety round/check completed  Taken 10/14/2024 2000 by Mary Bryant RN  Safety Promotion/Fall Prevention:   activity supervised   assistive device/personal items within reach   clutter free environment maintained   lighting adjusted   nonskid shoes/slippers when out of bed   room organization consistent   safety round/check completed  Intervention: Prevent Skin Injury  Recent Flowsheet Documentation  Taken 10/15/2024 0205 by Mary Bryant RN  Body Position:   turned   supine  Skin Protection:   hydrocolloids used   protective footwear used   silicone foam dressing in place  Taken 10/15/2024 0000 by Mary Bryant RN  Body Position:   left    side-lying  Skin Protection:   incontinence pads utilized   protective footwear used   silicone foam dressing in place  Taken 10/14/2024 2200 by Mary Bryant RN  Body Position:   right   side-lying  Skin Protection:   incontinence pads utilized   silicone foam dressing in place   protective footwear used  Taken 10/14/2024 2000 by Mary Bryant RN  Body Position: supine  Skin Protection: (silicone dressings peeled back, skin assessed. blanchable redness on coccyx)   incontinence pads utilized   silicone foam dressing in place  Intervention: Prevent and Manage VTE (Venous Thromboembolism) Risk  Recent Flowsheet Documentation  Taken 10/15/2024 0205 by Mary Bryant RN  VTE Prevention/Management:   bilateral   SCDs (sequential compression devices) on  Taken 10/15/2024 0000 by Mary Bryant RN  VTE Prevention/Management:   bilateral   SCDs (sequential compression devices) on  Taken 10/14/2024 2200 by Mary Bryant RN  VTE Prevention/Management:   bilateral   SCDs (sequential compression devices) on  Taken 10/14/2024 2000 by Mary Bryant RN  VTE Prevention/Management:   bilateral   SCDs (sequential compression devices) on  Intervention: Prevent Infection  Recent Flowsheet Documentation  Taken 10/15/2024 0205 by Mary Bryant RN  Infection Prevention: environmental surveillance performed  Taken 10/15/2024 0000 by Mary Bryant RN  Infection Prevention: environmental surveillance performed  Taken 10/14/2024 2200 by Mary Bryant RN  Infection Prevention: environmental surveillance performed  Taken 10/14/2024 2000 by Mary Bryant RN  Infection Prevention: environmental surveillance performed  Goal: Optimal Comfort and Wellbeing  Outcome: Progressing  Intervention: Monitor Pain and Promote Comfort  Recent Flowsheet Documentation  Taken 10/15/2024 0205 by Mary Bryant RN  Pain Management Interventions: pain medication given  Intervention: Provide Person-Centered Care  Recent Flowsheet Documentation  Taken 10/14/2024 2000  by Mary Bryant RN  Trust Relationship/Rapport:   care explained   choices provided  Goal: Readiness for Transition of Care  Outcome: Progressing     Problem: Anemia  Goal: Anemia Symptom Improvement  Outcome: Progressing  Intervention: Monitor and Manage Anemia  Recent Flowsheet Documentation  Taken 10/15/2024 0205 by Mary Bryant RN  Safety Promotion/Fall Prevention:   activity supervised   assistive device/personal items within reach   clutter free environment maintained   lighting adjusted   nonskid shoes/slippers when out of bed   room organization consistent   safety round/check completed  Taken 10/15/2024 0000 by Mary Bryant RN  Safety Promotion/Fall Prevention:   activity supervised   assistive device/personal items within reach   clutter free environment maintained   lighting adjusted   nonskid shoes/slippers when out of bed   room organization consistent   safety round/check completed  Taken 10/14/2024 2200 by Mary Bryant RN  Safety Promotion/Fall Prevention:   activity supervised   assistive device/personal items within reach   clutter free environment maintained   lighting adjusted   nonskid shoes/slippers when out of bed   room organization consistent   safety round/check completed  Taken 10/14/2024 2000 by Mary Bryant RN  Safety Promotion/Fall Prevention:   activity supervised   assistive device/personal items within reach   clutter free environment maintained   lighting adjusted   nonskid shoes/slippers when out of bed   room organization consistent   safety round/check completed     Problem: Skin Injury Risk Increased  Goal: Skin Health and Integrity  Outcome: Progressing  Intervention: Optimize Skin Protection  Recent Flowsheet Documentation  Taken 10/15/2024 0205 by Mary Bryant RN  Activity Management: activity encouraged  Pressure Reduction Techniques:   frequent weight shift encouraged   heels elevated off bed   weight shift assistance provided  Head of Bed (HOB) Positioning: HOB  elevated  Pressure Reduction Devices:   pressure-redistributing mattress utilized   positioning supports utilized   heel offloading device utilized  Skin Protection:   hydrocolloids used   protective footwear used   silicone foam dressing in place  Taken 10/15/2024 0000 by Mary Bryant RN  Activity Management: activity encouraged  Pressure Reduction Techniques:   frequent weight shift encouraged   heels elevated off bed   weight shift assistance provided  Head of Bed (hospitals) Positioning: hospitals elevated  Pressure Reduction Devices:   positioning supports utilized   pressure-redistributing mattress utilized   heel offloading device utilized  Skin Protection:   incontinence pads utilized   protective footwear used   silicone foam dressing in place  Taken 10/14/2024 2200 by Mary Bryant RN  Activity Management: activity encouraged  Pressure Reduction Techniques:   frequent weight shift encouraged   heels elevated off bed   weight shift assistance provided  Head of Bed (hospitals) Positioning: hospitals elevated  Pressure Reduction Devices:   pressure-redistributing mattress utilized   positioning supports utilized   heel offloading device utilized  Skin Protection:   incontinence pads utilized   silicone foam dressing in place   protective footwear used  Taken 10/14/2024 2000 by Mary Bryant RN  Activity Management: activity encouraged  Pressure Reduction Techniques:   frequent weight shift encouraged   heels elevated off bed   weight shift assistance provided  Head of Bed (hospitals) Positioning: hospitals elevated  Pressure Reduction Devices:   pressure-redistributing mattress utilized   positioning supports utilized   heel offloading device utilized  Skin Protection: (silicone dressings peeled back, skin assessed. blanchable redness on coccyx)   incontinence pads utilized   silicone foam dressing in place     Problem: Fall Injury Risk  Goal: Absence of Fall and Fall-Related Injury  Outcome: Progressing  Intervention: Identify and Manage  Contributors  Recent Flowsheet Documentation  Taken 10/15/2024 0205 by Mary Bryant RN  Medication Review/Management: medications reviewed  Taken 10/15/2024 0000 by Mary Bryant RN  Medication Review/Management: medications reviewed  Taken 10/14/2024 2200 by Mary Bryant RN  Medication Review/Management: medications reviewed  Taken 10/14/2024 2000 by Mary Bryant RN  Medication Review/Management: medications reviewed  Intervention: Promote Injury-Free Environment  Recent Flowsheet Documentation  Taken 10/15/2024 0205 by Mary Bryant RN  Safety Promotion/Fall Prevention:   activity supervised   assistive device/personal items within reach   clutter free environment maintained   lighting adjusted   nonskid shoes/slippers when out of bed   room organization consistent   safety round/check completed  Taken 10/15/2024 0000 by Mary Bryant RN  Safety Promotion/Fall Prevention:   activity supervised   assistive device/personal items within reach   clutter free environment maintained   lighting adjusted   nonskid shoes/slippers when out of bed   room organization consistent   safety round/check completed  Taken 10/14/2024 2200 by Mary Bryant RN  Safety Promotion/Fall Prevention:   activity supervised   assistive device/personal items within reach   clutter free environment maintained   lighting adjusted   nonskid shoes/slippers when out of bed   room organization consistent   safety round/check completed  Taken 10/14/2024 2000 by Mary Bryant RN  Safety Promotion/Fall Prevention:   activity supervised   assistive device/personal items within reach   clutter free environment maintained   lighting adjusted   nonskid shoes/slippers when out of bed   room organization consistent   safety round/check completed   Goal Outcome Evaluation:  Plan of Care Reviewed With: patient        Progress: improving     -VSS, 2L NC  -No complaints of nausea or pain  -Skin and fall precautions in place  -No further complaints at this  time  -Pt resting comfortably this shift

## 2024-10-15 NOTE — PROGRESS NOTES
[facility] Medicine Services  PROGRESS NOTE    Patient Name: Avelina Ceron  : 1940  MRN: [id number]    Date of Admission: 10/10/2024  Primary Care Physician: Dre Moura MD    Subjective   Subjective     CC: Weakness/nausea      HPI: Denies pain. No f/c. No n/v.     Objective   Objective     Vital Signs:   Temp:  [97.1 °F (36.2 °C)-98.4 °F (36.9 °C)] 97.7 °F (36.5 °C)  Heart Rate:  [69-95] 80  Resp:  [18] 18  BP: (105-125)/(35-71) 105/53  Flow (L/min) (Oxygen Therapy):  [2] 2     Physical Exam:  NAD, alert and oriented  OP clear, dry MM  Neck supple  No LAD  RRR  CTAB  +BS, soft  PEREZ  Normal affect  No change from 10/14    Results Reviewed:  LAB RESULTS:      Lab 10/14/24  2319 10/14/24  1620 10/14/24  1025 10/13/24  2318 10/13/24  1710 10/13/24  0742 10/12/24  1543 10/12/24  0802 10/11/24  1503 10/11/24  0446 10/10/24  2001 10/10/24  1316   WBC  --   --  14.21*  --   --  14.97*  --  12.28*  --  15.31*  --  14.72*   HEMOGLOBIN 8.1* 8.6* 8.3* 8.1* 8.9* 8.8*   < > 9.0*   < > 8.5*   < > 7.2*   HEMATOCRIT 26.7* 27.9* 27.9* 26.8* 28.7* 28.3*   < > 28.2*   < > 26.8*   < > 24.1*   PLATELETS  --   --  350  --   --  342  --  376  --  357  --  362   NEUTROS ABS  --   --  11.36*  --   --  11.31*  --  8.96*  --  11.32*  --  10.92*   IMMATURE GRANS (ABS)  --   --  0.09*  --   --  0.06*  --  0.06*  --  0.09*  --  0.10*   LYMPHS ABS  --   --  1.51  --   --  2.43  --  2.27  --  2.72  --  2.68   MONOS ABS  --   --  1.00*  --   --  0.83  --  0.73  --  0.89  --  0.80   EOS ABS  --   --  0.18  --   --  0.25  --  0.14  --  0.17  --  0.10   MCV  --   --  104.5*  --   --  102.2*  --  101.1*  --  99.6*  --  107.1*   PROCALCITONIN  --   --   --   --   --   --   --   --   --   --   --  0.06    < > = values in this interval not displayed.         Lab 10/15/24  0313 10/14/24  1025 10/13/24  1710 10/13/24  0742 10/12/24  0802 10/11/24  0446 10/10/24  1316 10/09/24  1328   SODIUM 140 139  --  140 145  143   < > 142   POTASSIUM 4.3 4.3 4.6 3.6 4.0 4.2  4.2   < > 3.7   CHLORIDE 102 102  --  101 104 103   < > 101   CO2 33.0* 31.0*  --  31.0* 33.0* 35.0*   < > 32.0*   ANION GAP 5.0 6.0  --  8.0 8.0 5.0   < > 9.0   BUN 7* 5*  --  3* 4* 8   < > 9   CREATININE 0.37* 0.30*  --  0.31* 0.29* 0.31*   < > 0.33*   EGFR 100.2 105.4  --  104.6 106.3 104.6   < > 103.0   GLUCOSE 107* 110*  --  93 101* 104*   < > 114*   CALCIUM 8.8 8.7  --  8.6 8.9 8.6   < > 9.5   MAGNESIUM  --  1.8  --   --   --  1.8  --   --    TSH  --   --   --   --   --   --   --  6.350*    < > = values in this interval not displayed.         Lab 10/10/24  1316 10/09/24  1328   TOTAL PROTEIN 5.9* 6.2   ALBUMIN 3.8 3.7   GLOBULIN 2.1 2.5   ALT (SGPT) 15 15   AST (SGOT) 23 21   BILIRUBIN 0.3 0.2   ALK PHOS 66 68                 Lab 10/10/24  1421 10/10/24  1324 10/10/24  1324 10/09/24  1328   IRON  --   --   --  69   IRON SATURATION (TSAT)  --   --   --  19*   TIBC  --   --   --  361   TRANSFERRIN  --   --   --  242   FERRITIN  --   --   --  315.00*   ABO TYPING A   < > A  --    RH TYPING Positive   < > Positive  --    ANTIBODY SCREEN  --   --  Negative  --     < > = values in this interval not displayed.         Brief Urine Lab Results  (Last result in the past 365 days)        Color   Clarity   Blood   Leuk Est   Nitrite   Protein   CREAT   Urine HCG        10/10/24 2332 Yellow   Clear   Negative   Small (1+)   Negative   Negative                   Microbiology Results Abnormal       None            No radiology results from the last 24 hrs    Results for orders placed during the hospital encounter of 08/26/24    Adult Transthoracic Echo Limited W/ Cont if Necessary Per Protocol    Interpretation Summary    Left ventricular systolic function is normal. Calculated left ventricular EF = 64%    Moderate tricuspid valve regurgitation is present.    Estimated right ventricular systolic pressure from tricuspid regurgitation is moderately elevated (45-55 mmHg).     Mild MR      Current medications:  Scheduled Meds:apixaban, 5 mg, Oral, Q12H  aspirin, 81 mg, Oral, Daily  atorvastatin, 20 mg, Oral, Daily  budesonide-formoterol, 2 puff, Inhalation, BID - RT   And  tiotropium bromide monohydrate, 2 puff, Inhalation, Daily - RT  dofetilide, 250 mcg, Oral, BID  gabapentin, 300 mg, Oral, Nightly  levothyroxine, 200 mcg, Oral, Q AM  losartan, 50 mg, Oral, Q24H  oxybutynin XL, 10 mg, Oral, Daily  pantoprazole, 40 mg, Oral, Q AM  roflumilast, 500 mcg, Oral, Daily  sertraline, 100 mg, Oral, Daily  sodium chloride, 10 mL, Intravenous, Q12H  sodium chloride, 10 mL, Intravenous, Q12H  traMADol, 50 mg, Oral, Q6H      Continuous Infusions:   PRN Meds:.  acetaminophen    senna-docusate sodium **AND** polyethylene glycol **AND** bisacodyl **AND** bisacodyl    Calcium Replacement - Follow Nurse / BPA Driven Protocol    HYDROmorphone    influenza vaccine    Magnesium Standard Dose Replacement - Follow Nurse / BPA Driven Protocol    Phosphorus Replacement - Follow Nurse / BPA Driven Protocol    Potassium Replacement - Follow Nurse / BPA Driven Protocol    Insert Peripheral IV **AND** sodium chloride    sodium chloride    sodium chloride    Assessment & Plan   Assessment & Plan     Active Hospital Problems    Diagnosis  POA    **Anemia due to blood loss, acute [D62]  Yes    Anemia due to acute blood loss [D62]  Yes    Blood loss anemia [D50.0]  Unknown      Resolved Hospital Problems   No resolved problems to display.        Brief Hospital Course to date:  Avelina Ceron is a 83 y.o. female with past medical history significant for atrial fibrillation on Eliquis, CAD status post stenting, PAD status post stenting, hypertension, hyperlipidemia, hypothyroidism, COPD with chronic hypoxia on 2 L nasal cannula, was admitted for acute blood loss anemia secondary to GI bleed.  Gastroenterology and cardiology consulted     Acute GI bleed  Acute on chronic blood loss anemia  Leukocytosis, reactive   -s/p  PRBC  -anticoagulation held  -EGD with gastritis/HH  -Colonoscopy 10/12 with polypoid lesion at the anastomosis non-bleeding biopsied not removed due to need for complex EMR if true polyp, 3 polyps in ascending colon 4-6 mm, one 13 mm polyp with friability, one 7 mm polyp descending colon, one 14 mm bleeding polyp in sigmoid colon, one 5 mm polyp in rectum all removed, clips placed, also with diverticulosis left colon and internal hemorrhoids   -resume Eliquis, ASA only today and monitor     CAD s/p bilateral ICA stents  PAD s/p bilateral iliac artery stents  Paroxysmal atrial fibrillation on anticoagulation  Hypertension  Hyperlipidemia.   -Resume ASA/Eliquis and observe  -Rate controlled with Tikosyn      COPD with hypoxemic respiratory failure  -continue Symbicort, Spiriva, Roflumilast. Continue home oxygen    Chronic pain  -continue gabapentin, tramadol, PRN IV Dilaudid for breakthrough pain.    Chronic bladder dysfunction  -oxybutynin ordered as alternative    Anxiety/depression  -continue sertraline    Hypothyroidism  -continue levothyroxine    Expected Discharge Location and Transportation: Home   Expected Discharge   Expected Discharge Date: 10/16/2024; Expected Discharge Time:      VTE Prophylaxis:  Pharmacologic & mechanical VTE prophylaxis orders are present.         AM-PAC 6 Clicks Score (PT): 18 (10/14/24 2000)    CODE STATUS:   Code Status and Medical Interventions: CPR (Attempt to Resuscitate); Full Support   Ordered at: 10/10/24 7847     Level Of Support Discussed With:    Patient     Code Status (Patient has no pulse and is not breathing):    CPR (Attempt to Resuscitate)     Medical Interventions (Patient has pulse or is breathing):    Full Support       Louis Chandler MD  10/15/24

## 2024-10-15 NOTE — PLAN OF CARE
Problem: Adult Inpatient Plan of Care  Goal: Plan of Care Review  Outcome: Progressing  Flowsheets (Taken 10/15/2024 1672)  Outcome Evaluation: VSS. 2LNC. No complaints of pain or nausea. Skin and fall precautions in place. Resume blood thinners today. H&H stable. No other concerns at this time. Will continue with the plan of care.   Goal Outcome Evaluation:              Outcome Evaluation: VSS. 2LNC. No complaints of pain or nausea. Skin and fall precautions in place. Resume blood thinners today. H&H stable. No other concerns at this time. Will continue with the plan of care.

## 2024-10-16 ENCOUNTER — READMISSION MANAGEMENT (OUTPATIENT)
Dept: CALL CENTER | Facility: HOSPITAL | Age: 84
End: 2024-10-16
Payer: MEDICARE

## 2024-10-16 VITALS
RESPIRATION RATE: 16 BRPM | OXYGEN SATURATION: 93 % | HEIGHT: 63 IN | DIASTOLIC BLOOD PRESSURE: 64 MMHG | SYSTOLIC BLOOD PRESSURE: 117 MMHG | BODY MASS INDEX: 26.93 KG/M2 | TEMPERATURE: 98.1 F | HEART RATE: 89 BPM | WEIGHT: 152 LBS

## 2024-10-16 LAB
ANION GAP SERPL CALCULATED.3IONS-SCNC: 7 MMOL/L (ref 5–15)
BUN SERPL-MCNC: 6 MG/DL (ref 8–23)
BUN/CREAT SERPL: 18.2 (ref 7–25)
CALCIUM SPEC-SCNC: 8.9 MG/DL (ref 8.6–10.5)
CHLORIDE SERPL-SCNC: 101 MMOL/L (ref 98–107)
CO2 SERPL-SCNC: 32 MMOL/L (ref 22–29)
CREAT SERPL-MCNC: 0.33 MG/DL (ref 0.57–1)
DEPRECATED RDW RBC AUTO: 62.7 FL (ref 37–54)
EGFRCR SERPLBLD CKD-EPI 2021: 103 ML/MIN/1.73
ERYTHROCYTE [DISTWIDTH] IN BLOOD BY AUTOMATED COUNT: 16.2 % (ref 12.3–15.4)
GLUCOSE SERPL-MCNC: 103 MG/DL (ref 65–99)
HCT VFR BLD AUTO: 27.7 % (ref 34–46.6)
HGB BLD-MCNC: 8.4 G/DL (ref 12–15.9)
MCH RBC QN AUTO: 31.7 PG (ref 26.6–33)
MCHC RBC AUTO-ENTMCNC: 30.3 G/DL (ref 31.5–35.7)
MCV RBC AUTO: 104.5 FL (ref 79–97)
PLATELET # BLD AUTO: 350 10*3/MM3 (ref 140–450)
PMV BLD AUTO: 9.2 FL (ref 6–12)
POTASSIUM SERPL-SCNC: 4.2 MMOL/L (ref 3.5–5.2)
RBC # BLD AUTO: 2.65 10*6/MM3 (ref 3.77–5.28)
SODIUM SERPL-SCNC: 140 MMOL/L (ref 136–145)
WBC NRBC COR # BLD AUTO: 12.95 10*3/MM3 (ref 3.4–10.8)

## 2024-10-16 PROCEDURE — 94761 N-INVAS EAR/PLS OXIMETRY MLT: CPT

## 2024-10-16 PROCEDURE — 80048 BASIC METABOLIC PNL TOTAL CA: CPT | Performed by: HOSPITALIST

## 2024-10-16 PROCEDURE — 99239 HOSP IP/OBS DSCHRG MGMT >30: CPT | Performed by: HOSPITALIST

## 2024-10-16 PROCEDURE — 85027 COMPLETE CBC AUTOMATED: CPT | Performed by: HOSPITALIST

## 2024-10-16 PROCEDURE — 94799 UNLISTED PULMONARY SVC/PX: CPT

## 2024-10-16 RX ADMIN — LEVOTHYROXINE SODIUM 200 MCG: 0.1 TABLET ORAL at 05:59

## 2024-10-16 RX ADMIN — BUDESONIDE AND FORMOTEROL FUMARATE DIHYDRATE 2 PUFF: 160; 4.5 AEROSOL RESPIRATORY (INHALATION) at 09:44

## 2024-10-16 RX ADMIN — APIXABAN 5 MG: 5 TABLET, FILM COATED ORAL at 10:05

## 2024-10-16 RX ADMIN — OXYBUTYNIN CHLORIDE 10 MG: 10 TABLET, EXTENDED RELEASE ORAL at 10:07

## 2024-10-16 RX ADMIN — DOFETILIDE 250 MCG: 0.25 CAPSULE ORAL at 10:07

## 2024-10-16 RX ADMIN — TIOTROPIUM BROMIDE INHALATION SPRAY 2 PUFF: 3.12 SPRAY, METERED RESPIRATORY (INHALATION) at 09:44

## 2024-10-16 RX ADMIN — TRAMADOL HYDROCHLORIDE 50 MG: 50 TABLET, COATED ORAL at 10:06

## 2024-10-16 RX ADMIN — SERTRALINE 100 MG: 100 TABLET, FILM COATED ORAL at 10:06

## 2024-10-16 RX ADMIN — ROFLUMILAST 500 MCG: 500 TABLET ORAL at 10:07

## 2024-10-16 RX ADMIN — PANTOPRAZOLE SODIUM 40 MG: 40 TABLET, DELAYED RELEASE ORAL at 05:59

## 2024-10-16 RX ADMIN — TRAMADOL HYDROCHLORIDE 50 MG: 50 TABLET, COATED ORAL at 03:11

## 2024-10-16 RX ADMIN — LOSARTAN POTASSIUM 50 MG: 50 TABLET, FILM COATED ORAL at 10:06

## 2024-10-16 RX ADMIN — ASPIRIN 81 MG 81 MG: 81 TABLET ORAL at 10:07

## 2024-10-16 NOTE — DISCHARGE SUMMARY
[facility] Medicine Services  DISCHARGE SUMMARY    Patient Name: Avelina Ceron  : 1940  MRN: [id number]    Date of Admission: 10/10/2024 12:57 PM  Date of Discharge:  10/16/2024  Primary Care Physician: Dre Moura MD    Consults       Date and Time Order Name Status Description    10/10/2024  3:57 PM Inpatient Cardiology Consult Completed     10/10/2024  3:39 PM Inpatient Gastroenterology Consult Completed             Hospital Course     Presenting Problem: GIB/Anemia    Active Hospital Problems    Diagnosis  POA    **Anemia due to blood loss, acute [D62]  Yes    Anemia due to acute blood loss [D62]  Yes    Blood loss anemia [D50.0]  Unknown      Resolved Hospital Problems   No resolved problems to display.          Hospital Course:  Avelina Ceron is a 83 y.o. female with past medical history significant for atrial fibrillation on Eliquis, CAD status post stenting, PAD status post stenting, hypertension, hyperlipidemia, hypothyroidism, COPD with chronic hypoxia on 2 L nasal cannula, was admitted for acute blood loss anemia secondary to GI bleed.  Gastroenterology and cardiology were consulted.     Acute GI bleed  Acute on chronic blood loss anemia  Leukocytosis, reactive   -s/p PRBC  -anticoagulation held  -EGD with gastritis/HH  -Colonoscopy 10/12 with polypoid lesion at the anastomosis non-bleeding biopsied not removed due to need for complex EMR if true polyp, 3 polyps in ascending colon 4-6 mm, one 13 mm polyp with friability, one 7 mm polyp descending colon, one 14 mm bleeding polyp in sigmoid colon, one 5 mm polyp in rectum all removed, clips placed, also with diverticulosis left colon and internal hemorrhoids   -resumed Eliquis/ASA, hemoglobin stable  -home on PPI  -PCP follow up 1 week with CBC     CAD s/p bilateral ICA stents  PAD s/p bilateral iliac artery stents  Paroxysmal atrial fibrillation on anticoagulation  Hypertension  Hyperlipidemia.   -Resumed  ASA/Eliquis  -on Tikosyn     COPD with hypoxemic respiratory failure  -continue Symbicort, Spiriva, Roflumilast. Continue home oxygen     Chronic pain  -continue gabapentin, tramadol, PRN IV Dilaudid for breakthrough pain.     Chronic bladder dysfunction  -oxybutynin ordered as alternative     Anxiety/depression  -continue sertraline     Hypothyroidism  -continue levothyroxine  Discharge Follow Up Recommendations for outpatient labs/diagnostics:  PCP 1 week with CBC  Cardiology 4 weeks  GI follow up per PCP/GI    Day of Discharge     HPI:  No f/c. Tolerating PO. No dyspnea. No n/v. No blood in stool.    Review of Systems  As above    Vital Signs:   Temp:  [97.6 °F (36.4 °C)-98.5 °F (36.9 °C)] 97.9 °F (36.6 °C)  Heart Rate:  [71-95] 87  Resp:  [16-18] 16  BP: (105-129)/(39-53) 115/39  Flow (L/min) (Oxygen Therapy):  [2] 2      Physical Exam:  NAD, alert and oriented  OP clear, dry MM  Neck supple  No LAD  RRR  CTAB  +BS, soft  PEREZ  Normal affect  No rashes    Pertinent  and/or Most Recent Results     LAB RESULTS:      Lab 10/16/24  0355 10/14/24  2319 10/14/24  1620 10/14/24  1025 10/13/24  2318 10/13/24  1710 10/13/24  0742 10/12/24  1543 10/12/24  0802 10/11/24  1503 10/11/24  0446 10/10/24  2001 10/10/24  1316   WBC 12.95*  --   --  14.21*  --   --  14.97*  --  12.28*  --  15.31*  --  14.72*   HEMOGLOBIN 8.4* 8.1* 8.6* 8.3* 8.1*   < > 8.8*   < > 9.0*   < > 8.5*   < > 7.2*   HEMATOCRIT 27.7* 26.7* 27.9* 27.9* 26.8*   < > 28.3*   < > 28.2*   < > 26.8*   < > 24.1*   PLATELETS 350  --   --  350  --   --  342  --  376  --  357  --  362   NEUTROS ABS  --   --   --  11.36*  --   --  11.31*  --  8.96*  --  11.32*  --  10.92*   IMMATURE GRANS (ABS)  --   --   --  0.09*  --   --  0.06*  --  0.06*  --  0.09*  --  0.10*   LYMPHS ABS  --   --   --  1.51  --   --  2.43  --  2.27  --  2.72  --  2.68   MONOS ABS  --   --   --  1.00*  --   --  0.83  --  0.73  --  0.89  --  0.80   EOS ABS  --   --   --  0.18  --   --  0.25  --   0.14  --  0.17  --  0.10   .5*  --   --  104.5*  --   --  102.2*  --  101.1*  --  99.6*  --  107.1*   PROCALCITONIN  --   --   --   --   --   --   --   --   --   --   --   --  0.06    < > = values in this interval not displayed.         Lab 10/16/24  0355 10/15/24  0313 10/14/24  1025 10/13/24  1710 10/13/24  0742 10/12/24  0802 10/11/24  0446 10/10/24  1316 10/09/24  1328   SODIUM 140 140 139  --  140 145 143   < > 142   POTASSIUM 4.2 4.3 4.3 4.6 3.6 4.0 4.2  4.2   < > 3.7   CHLORIDE 101 102 102  --  101 104 103   < > 101   CO2 32.0* 33.0* 31.0*  --  31.0* 33.0* 35.0*   < > 32.0*   ANION GAP 7.0 5.0 6.0  --  8.0 8.0 5.0   < > 9.0   BUN 6* 7* 5*  --  3* 4* 8   < > 9   CREATININE 0.33* 0.37* 0.30*  --  0.31* 0.29* 0.31*   < > 0.33*   EGFR 103.0 100.2 105.4  --  104.6 106.3 104.6   < > 103.0   GLUCOSE 103* 107* 110*  --  93 101* 104*   < > 114*   CALCIUM 8.9 8.8 8.7  --  8.6 8.9 8.6   < > 9.5   MAGNESIUM  --   --  1.8  --   --   --  1.8  --   --    TSH  --   --   --   --   --   --   --   --  6.350*    < > = values in this interval not displayed.         Lab 10/10/24  1316 10/09/24  1328   TOTAL PROTEIN 5.9* 6.2   ALBUMIN 3.8 3.7   GLOBULIN 2.1 2.5   ALT (SGPT) 15 15   AST (SGOT) 23 21   BILIRUBIN 0.3 0.2   ALK PHOS 66 68                 Lab 10/10/24  1421 10/10/24  1324 10/10/24  1324 10/09/24  1328   IRON  --   --   --  69   IRON SATURATION (TSAT)  --   --   --  19*   TIBC  --   --   --  361   TRANSFERRIN  --   --   --  242   FERRITIN  --   --   --  315.00*   ABO TYPING A   < > A  --    RH TYPING Positive   < > Positive  --    ANTIBODY SCREEN  --   --  Negative  --     < > = values in this interval not displayed.         Brief Urine Lab Results  (Last result in the past 365 days)        Color   Clarity   Blood   Leuk Est   Nitrite   Protein   CREAT   Urine HCG        10/10/24 2332 Yellow   Clear   Negative   Small (1+)   Negative   Negative                 Microbiology Results (last 10 days)       Procedure  Component Value - Date/Time    Urine Culture - Urine, Urine, Clean Catch [[id number]]  (Normal) Collected: 10/10/24 1214    Lab Status: Final result Specimen: Urine, Clean Catch Updated: 10/12/24 1233     Urine Culture No growth            XR Spine Lumbar 2 or 3 View    Result Date: 10/10/2024  XR SPINE LUMBAR 2 OR 3 VW Date of Exam: 10/9/2024 1:46 PM EDT Indication: low back pain Comparison: 12/12/2013 Findings: There are 5 nonrib-bearing lumbar vertebral segments. There is intervertebral disc space narrowing with some reactive endplate changes throughout the lumbar spine. Vertebral body height and alignment are unchanged from the previous study. There is very mild retrolisthesis of L2 on L3. There are facet degenerative changes throughout the lumbar spine and there is dense atherosclerotic calcification within the aorta and there are bilateral iliac stents placed. No fractures are identified.     Impression: 1. Advanced multilevel degenerative disc and facet disease with no fractures or acute findings. 2. Atherosclerotic disease with bilateral iliac stents. Electronically Signed: Markie Mac MD  10/10/2024 3:13 PM EDT  Workstation ID: [id number]    XR Chest PA & Lateral    Result Date: 10/10/2024  XR CHEST PA AND LATERAL Date of Exam: 10/9/2024 1:46 PM EDT Indication: follow-up pneumonia Comparison: 8/31/2024 Findings: Lungs appear hyperinflated. Interval resolution of multifocal infiltrates. Lungs currently clear. Heart size and pulmonary vessels normal     Impression: 1. Resolved pneumonia 2. Pulmonary emphysema Electronically Signed: Pete Valdez  10/10/2024 3:11 PM EDT  Workstation ID: [id number]    CT Abdomen Pelvis With & Without Contrast    Result Date: 10/10/2024  CT ABDOMEN PELVIS W WO CONTRAST Date of Exam: 10/10/2024 1:43 PM EDT Indication: abd pain, gi bleed. Comparison: CT from 7/25/2023 Technique: Axial CT images were obtained of the abdomen and pelvis before and after the uneventful intravenous  administration of 50 mL Isovue 370. Sagittal and coronal reconstructions were performed.  Automated exposure control and iterative reconstruction methods were used. FINDINGS: Abdomen/Pelvis: Lower Chest: Atelectasis and scarring noted at the lung bases. Small to moderate size hiatal hernia is noted. No free air is noted below the diaphragm. Organs: Noncontrast and multi phase contrast imaging was obtained. Noncontrast imaging of the kidneys demonstrate nonobstructing calculi. There is cortical scarring on the left. No hydronephrosis is noted. Small low-attenuation lesions too small to characterize are noted of the kidneys statistically likely represent cysts. There is no evidence of obstructive uropathy. Arterial phase imaging demonstrates no suspicious hypervascular lesion within the solid organs. Solid organs demonstrate no definite acute abnormality. The patient is status post cholecystectomy with dilation of the common duct compatible with postcholecystectomy ectasia. GI/Bowel: Noncontrast imaging and multiphase contrast images of the GI tract was obtained. There is mild motion limitation as well as streak artifact from bilateral hip prostheses causing limitation of the GI tract. Noncontrast imaging demonstrates no definite acute abnormality within the GI tract. Arterial phase imaging demonstrates no abnormal areas of contrast associated with the GI tract. No obvious pooling of contrast or focal abnormality noted on portal venous or delayed imaging. There is diverticulosis without evidence of acute diverticulitis. Relative decompression of the descending colon noted. Postsurgical changes are noted at the base of the cecum. There is no suspicious mesenteric adenopathy or fluid collection noted. Pelvis: Evaluation of the pelvis is limited by streak artifact. Bladder is poorly visualized. No suspicious fluid collections or adenopathy appreciated Peritoneum/Retroperitoneum: Atherosclerotic changes are noted of the  aorta which is widely patent. There is no aneurysmal dilation. No suspicious retroperitoneal adenopathy Bones/Soft Tissues: Multilevel degenerative change noted at the spine. Bilateral hip prostheses are noted as previously discussed. There is exuberant heterotopic bone formation on the left. Remote healed fracture right iliac wing.     1. Accounting for the limitations of the exam there is no evidence of active extravasation or obvious pooling blood noted within the GI tract 2. Diverticulosis without evidence of diverticulitis 3. Mild prominence of the wall thickness of the descending colon accentuated by incomplete distention. Findings likely artifactual. Colitis thought to be less likely 4. Nonobstructing left renal calculi 5. Other findings as above Electronically Signed: Zack Perez MD  10/10/2024 3:01 PM EDT  Workstation ID: [id number]    XR Abdomen KUB    Result Date: 10/10/2024  XR ABDOMEN KUB Date of Exam: 10/9/2024 1:46 PM EDT Indication: dysuria Comparison: None available. There are surgical clips in the right upper quadrant. Single view. There is a small to moderate amount of stool. There is no bowel dilatation. There are bilateral hip arthroplasties. There is diffuse atherosclerotic vascular calcification. No definite calcifications are seen over the kidneys although bowel contents somewhat obscures detail. Pelvic calcifications likely are phleboliths.     Impression: Chronic findings. No acute process. No findings suspicious for urinary tract calculi. Electronically Signed: Mary Wheat MD  10/10/2024 2:29 PM EDT  Workstation ID: [id number]     Results for orders placed in visit on 11/01/18    SCANNED VASCULAR STUDIES      Results for orders placed in visit on 11/01/18    SCANNED VASCULAR STUDIES      Results for orders placed during the hospital encounter of 08/26/24    Adult Transthoracic Echo Limited W/ Cont if Necessary Per Protocol    Interpretation Summary    Left ventricular systolic  function is normal. Calculated left ventricular EF = 64%    Moderate tricuspid valve regurgitation is present.    Estimated right ventricular systolic pressure from tricuspid regurgitation is moderately elevated (45-55 mmHg).    Mild MR      Plan for Follow-up of Pending Labs/Results:     Discharge Details        Discharge Medications        Changes to Medications        Instructions Start Date   levothyroxine 200 MCG tablet  Commonly known as: SYNTHROID, LEVOTHROID  What changed: when to take this   200 mcg, Oral, Daily      traMADol 50 MG tablet  Commonly known as: ULTRAM  What changed: when to take this   50 mg, Oral, Every 6 Hours PRN             Continue These Medications        Instructions Start Date   acetaminophen 500 MG tablet  Commonly known as: TYLENOL   500 mg, Oral, Every 6 Hours PRN, OTC      albuterol sulfate  (90 Base) MCG/ACT inhaler  Commonly known as: PROVENTIL HFA;VENTOLIN HFA;PROAIR HFA   2 puffs, Inhalation, Every 4 Hours PRN      aspirin 81 MG chewable tablet   81 mg, Oral, Daily, OTC      atenolol 50 MG tablet  Commonly known as: TENORMIN   50 mg, Oral, Daily      cetirizine 10 MG tablet  Commonly known as: zyrTEC   10 mg, Oral, Daily, OTC      dofetilide 250 MCG capsule  Commonly known as: TIKOSYN   250 mcg, Oral, 2 Times Daily      Eliquis 5 MG tablet tablet  Generic drug: apixaban   Take 1 tablet by mouth Every 12 (Twelve) Hours as directed for Atrial Fibrillation      Fiber Select Gummies chewable tablet   2 tablets, Oral, Daily, OTC      gabapentin 300 MG capsule  Commonly known as: NEURONTIN   300 mg, Oral, Nightly      Gemtesa 75 MG tablet  Generic drug: Vibegron   75 mg, Oral, Daily      isosorbide mononitrate 30 MG 24 hr tablet  Commonly known as: IMDUR   30 mg, Oral, Daily      latanoprost 0.005 % ophthalmic solution  Commonly known as: XALATAN   Administer 1 drop to both eyes Every Night.      losartan 50 MG tablet  Commonly known as: COZAAR   50 mg, Oral, Every 24 Hours  Scheduled      nitroglycerin 0.4 MG SL tablet  Commonly known as: NITROSTAT   0.4 mg, Sublingual, Every 5 Minutes PRN, Take no more than 3 doses in 15 minutes.      ondansetron 4 MG tablet  Commonly known as: Zofran   4 mg, Oral, Every 8 Hours PRN      pantoprazole 40 MG EC tablet  Commonly known as: PROTONIX   40 mg, Oral, Daily      polyethylene glycol 17 GM/SCOOP powder  Commonly known as: MIRALAX   17 g, Oral, As Needed, OTC      PRESERVISION AREDS 2 PO   1 tablet, Oral, 2 Times Daily, OTC      roflumilast 500 MCG tablet tablet  Commonly known as: DALIRESP   500 mcg, Oral, Daily      sertraline 100 MG tablet  Commonly known as: ZOLOFT   100 mg, Oral, Daily      simvastatin 40 MG tablet  Commonly known as: ZOCOR   40 mg, Oral, Nightly      Trelegy Ellipta 100-62.5-25 MCG/ACT inhaler  Generic drug: Fluticasone-Umeclidin-Vilant   1 puff, Inhalation, Daily             Stop These Medications      clopidogrel 75 MG tablet  Commonly known as: PLAVIX              Allergies   Allergen Reactions    Penicillins Hives, Swelling and Mental Status Change    Codeine Mental Status Change    Dopamine Palpitations and Other (See Comments)     Other reaction(s): Hypertension         Discharge Disposition:  Home or Self Care    Diet:  Hospital:  Diet Order   Procedures    Diet: Cardiac; Healthy Heart (2-3 Na+); Fluid Consistency: Thin (IDDSI 0)            Activity:      Restrictions or Other Recommendations:         CODE STATUS:    Code Status and Medical Interventions: CPR (Attempt to Resuscitate); Full Support   Ordered at: 10/10/24 6903     Level Of Support Discussed With:    Patient     Code Status (Patient has no pulse and is not breathing):    CPR (Attempt to Resuscitate)     Medical Interventions (Patient has pulse or is breathing):    Full Support       Future Appointments   Date Time Provider Department Center   11/14/2024 11:00 AM Lee Kim MD [facility]   11/19/2024 12:00 PM Dre Moura MD [facility]   12/23/2024 11:00 AM [place] Ella [facility]   1/30/2025 11:00 AM Dre Moura MD [facility]       Additional Instructions for the Follow-ups that You Need to Schedule       Discharge Follow-up with PCP   As directed       Currently Documented PCP:    Dre Moura MD    PCP Phone Number:    [phone]     Follow Up Details: 1 week with CBC        Discharge Follow-up with Specified Provider: Cardiology, [facility] 4 weeks   As directed      To: Cardiology, [facility] 4 weeks                      Louis Chandler MD  10/16/24      Time Spent on Discharge:  I spent  40  minutes on this discharge activity which included: face-to-face encounter with the patient, reviewing the data in the system, coordination of the care with the nursing staff as well as consultants, documentation, and entering orders.

## 2024-10-16 NOTE — PLAN OF CARE
Goal Outcome Evaluation:  Plan of Care Reviewed With: patient        Progress: improving  Outcome Evaluation: VSS. 2LNC. NSR. A&O x4. Pain well controlled with scheduled Tramadol. Fall and skin precautions in place. PO eliquis restarted with night meds. Plan of care discussed with patient who expresses understanding. Pt resting comfortably at this time. Call light within reach. Pt eager to be d/c this AM.

## 2024-10-16 NOTE — CASE MANAGEMENT/SOCIAL WORK
Continued Stay Note  [facility]     Patient Name: Avelina Ceron  MRN: [id number]  Today's Date: 10/16/2024    Admit Date: 10/10/2024    Plan: home   Discharge Plan       Row Name 10/16/24 0826       Plan    Plan home    Patient/Family in Agreement with Plan yes    Plan Comments I met with this patient bedside regarding her discharge home today. She is in agreement, and her daughter can transport and will bring the patient's transport oxygen.  She is declining home health services at this time. She denies having any further discharge planning needs. The IMM form was completed and noted in EPIC.    Final Discharge Disposition Code 01 - home or self-care                   Discharge Codes    No documentation.                 Expected Discharge Date and Time       Expected Discharge Date Expected Discharge Time    Oct 16, 2024               Maame Soriano RN

## 2024-10-16 NOTE — DISCHARGE INSTRUCTIONS
*** To switch to  Cardiology, please have cardiologist fax referral to [facility] cardiologist. Referral group fax: [phone] or [phone].

## 2024-10-16 NOTE — OUTREACH NOTE
Prep Survey      Flowsheet Row Responses   [facility] patient discharged from? [place]   Is LACE score < 7 ? No   Eligibility [facility]   Date of Admission 10/10/24   Date of Discharge 10/16/24   Discharge Disposition Home or Self Care   Discharge diagnosis Anemia due to blood loss, acute   Does the patient have one of the following disease processes/diagnoses(primary or secondary)? Other   Does the patient have Home health ordered? No   Is there a DME ordered? No   Prep survey completed? Yes            Yasmin NUNEZ - Registered Nurse

## 2024-10-17 ENCOUNTER — TRANSITIONAL CARE MANAGEMENT TELEPHONE ENCOUNTER (OUTPATIENT)
Dept: CALL CENTER | Facility: HOSPITAL | Age: 84
End: 2024-10-17
Payer: MEDICARE

## 2024-10-17 NOTE — OUTREACH NOTE
Call Center TCM Note      Flowsheet Row Responses   [facility] patient discharged from? [place]   Does the patient have one of the following disease processes/diagnoses(primary or secondary)? Other   TCM attempt successful? Yes   Call start time 1414   Call end time 1419   Discharge diagnosis Anemia due to blood loss, acute   Person spoke with today (if not patient) and relationship Nan   Meds reviewed with patient/caregiver? Yes   Is the patient having any side effects they believe may be caused by any medication additions or changes? No   Does the patient have all medications ordered at discharge? Yes   Is the patient taking all medications as directed (includes completed medication regime)? Yes   Medication comments Daughter reports Pt has not been taking atenolol for a while as it was stopped by her Dr. She will comfirm with cardio to continue to stay off med.   Comments HOSP DC FU appt 10/28/24 1130 am   Does the patient have an appointment with their PCP within 7-14 days of discharge? Yes   Has home health visited the patient within 72 hours of discharge? N/A   Psychosocial issues? No   Did the patient receive a copy of their discharge instructions? Yes   Nursing interventions Reviewed instructions with patient   What is the patient's perception of their health status since discharge? Improving   Is the patient/caregiver able to teach back signs and symptoms related to disease process for when to call PCP? Yes   Is the patient/caregiver able to teach back signs and symptoms related to disease process for when to call 911? Yes   Is the patient/caregiver able to teach back the hierarchy of who to call/visit for symptoms/problems? PCP, Specialist, Home health nurse, Urgent Care, ED, 911 Yes   TCM call completed? Yes   Wrap up additional comments Daughter reports pt is doing well. No s/s of bleeding   Call end time 1419            Dinora Carter RN    10/17/2024, 14:19 EDT         [facility]  Anesthesia Pre-Operative Evaluation         Patient Name: Maribell Allen  YOB: 1996  MRN: [id number]    2022      Maribell Allen is a 26 y.o. female  @ 39w2d who presents for scheduled induction. She denies any complications this pregnancy. She denies HTN, DM, asthma, coagulopathies, spinal pathology.     OB History    Para Term  AB Living   1             SAB IAB Ectopic Multiple Live Births                  # Outcome Date GA Lbr Shahzad/2nd Weight Sex Delivery Anes PTL Lv   1 Current                Review of patient's allergies indicates:  No Known Allergies    Wt Readings from Last 1 Encounters:   12/15/22 1227 85.7 kg (188 lb 15 oz)       BP Readings from Last 3 Encounters:   22 131/83   12/15/22 112/78   22 102/68       Patient Active Problem List   Diagnosis    Encounter for supervision of normal first pregnancy in second trimester    Encounter for induction of labor       Past Surgical History:   Procedure Laterality Date    right great toe trauma         Social History     Socioeconomic History    Marital status:    Tobacco Use    Smoking status: Never    Smokeless tobacco: Never   Substance and Sexual Activity    Alcohol use: Yes     Comment: Rare    Drug use: Never    Sexual activity: Yes     Partners: Male     Birth control/protection: None         Chemistry        Component Value Date/Time     (L) 2021 0756    K 4.3 2021 0756     2021 0756    CO2 25 2021 0756    BUN 12 2021 0756    CREATININE 0.8 2021 0756    GLU 75 2021 0756        Component Value Date/Time    CALCIUM 9.4 2021 0756    ALKPHOS 43 (L) 2021 0756    AST 15 2021 0756    ALT 11 2021 0756    BILITOT 0.4 2021 0756    ESTGFRAFRICA >60.0 2021 0756    EGFRNONAA >60.0 2021 0756            Lab Results   Component Value Date    WBC 9.92 2022    HGB 12.7  12/22/2022    HCT 35.4 (L) 12/22/2022    MCV 90 12/22/2022     12/22/2022       No results for input(s): PT, INR, PROTIME, APTT in the last 72 hours.            Pre-op Assessment          Review of Systems  Anesthesia Hx:  Denies Family Hx of Anesthesia complications.   Denies Personal Hx of Anesthesia complications.   Social:  Non-Smoker, No Alcohol Use    Cardiovascular:  Cardiovascular Normal     Pulmonary:  Pulmonary Normal    Endocrine:  Endocrine Normal        Physical Exam  General: Well nourished and Cooperative    Airway:  Mallampati: II / I      Dental:  Intact        Anesthesia Plan  Type of Anesthesia, risks & benefits discussed:    Anesthesia Type: Gen ETT, Epidural  Intra-op Monitoring Plan: Standard ASA Monitors  Post Op Pain Control Plan: multimodal analgesia  Induction:  IV  Airway Plan: Video, Post-Induction  Informed Consent: Informed consent signed with the Patient and all parties understand the risks and agree with anesthesia plan.  All questions answered.   ASA Score: 2  Day of Surgery Review of History & Physical: H&P Update referred to the surgeon/provider.    Ready For Surgery From Anesthesia Perspective.     .

## 2024-10-21 DIAGNOSIS — D12.6 ADENOMATOUS POLYP OF COLON, UNSPECIFIED PART OF COLON: Primary | ICD-10-CM

## 2024-10-21 NOTE — PROGRESS NOTES
I tried to call patient to discuss results this morning but was unable to reach patient.  Please call patient and convey the following.  Please schedule colonoscopy with Dr. Callahan (I discussed case briefly with him this morning) within the next 2 months when patient can hold Eliquis for 48 hours.    Pathology from recent colonoscopy with polypectomies and biopsy of larger polyp at anastomosis as follows:    Polyps removed from the ascending, descending and rectum were tubular adenomas.  Polyp removed from the sigmoid colon was a tubulovillous adenoma.  These types of polyps are not cancer but could become cancer over period of time if not removed.    The polyp biopsies (not removed due to size and need for complex EMR) at the site of previous anastomosis shows adenoma with no evidence of malignancy.  Given your age and comorbid medical conditions, I would recommend repeat colonoscopy within the next 2 months when you can hold your Eliquis for 48 hours for endoscopic mucosal resection.  Repeat surveillance colonoscopy interval to be determined based on EMR findings at next colonoscopy, but will likely be 6 months after EMR of large polyp.

## 2024-10-24 ENCOUNTER — READMISSION MANAGEMENT (OUTPATIENT)
Dept: CALL CENTER | Facility: HOSPITAL | Age: 84
End: 2024-10-24
Payer: MEDICARE

## 2024-10-24 ENCOUNTER — HOSPITAL ENCOUNTER (OUTPATIENT)
Facility: HOSPITAL | Age: 84
Setting detail: OBSERVATION
Discharge: HOME | End: 2024-10-27
Attending: EMERGENCY MEDICINE | Admitting: INTERNAL MEDICINE
Payer: MEDICARE

## 2024-10-24 ENCOUNTER — APPOINTMENT (OUTPATIENT)
Dept: GENERAL RADIOLOGY | Facility: HOSPITAL | Age: 84
End: 2024-10-24
Payer: MEDICARE

## 2024-10-24 ENCOUNTER — APPOINTMENT (OUTPATIENT)
Dept: CT IMAGING | Facility: HOSPITAL | Age: 84
End: 2024-10-24
Payer: MEDICARE

## 2024-10-24 DIAGNOSIS — K92.2 GASTROINTESTINAL HEMORRHAGE, UNSPECIFIED GASTROINTESTINAL HEMORRHAGE TYPE: Primary | ICD-10-CM

## 2024-10-24 DIAGNOSIS — K92.1 MELENA: ICD-10-CM

## 2024-10-24 DIAGNOSIS — Z79.01 ANTICOAGULATED: ICD-10-CM

## 2024-10-24 DIAGNOSIS — D64.9 SYMPTOMATIC ANEMIA: ICD-10-CM

## 2024-10-24 LAB
ABO GROUP BLD: NORMAL
ALBUMIN SERPL-MCNC: 4.1 G/DL (ref 3.5–5.2)
ALBUMIN/GLOB SERPL: 1.5 G/DL
ALP SERPL-CCNC: 66 U/L (ref 39–117)
ALT SERPL W P-5'-P-CCNC: 19 U/L (ref 1–33)
ANION GAP SERPL CALCULATED.3IONS-SCNC: 10 MMOL/L (ref 5–15)
AST SERPL-CCNC: 23 U/L (ref 1–32)
BACTERIA UR QL AUTO: ABNORMAL /HPF
BASOPHILS # BLD AUTO: 0.09 10*3/MM3 (ref 0–0.2)
BASOPHILS NFR BLD AUTO: 0.7 % (ref 0–1.5)
BILIRUB SERPL-MCNC: <0.2 MG/DL (ref 0–1.2)
BILIRUB UR QL STRIP: NEGATIVE
BLD GP AB SCN SERPL QL: NEGATIVE
BUN SERPL-MCNC: 9 MG/DL (ref 8–23)
BUN/CREAT SERPL: 19.6 (ref 7–25)
CALCIUM SPEC-SCNC: 9.2 MG/DL (ref 8.6–10.5)
CHLORIDE SERPL-SCNC: 100 MMOL/L (ref 98–107)
CLARITY UR: CLEAR
CO2 SERPL-SCNC: 31 MMOL/L (ref 22–29)
COLOR UR: YELLOW
CREAT SERPL-MCNC: 0.46 MG/DL (ref 0.57–1)
D-LACTATE SERPL-SCNC: 1.2 MMOL/L (ref 0.5–2)
DEPRECATED RDW RBC AUTO: 56.8 FL (ref 37–54)
EGFRCR SERPLBLD CKD-EPI 2021: 95.1 ML/MIN/1.73
EOSINOPHIL # BLD AUTO: 0.09 10*3/MM3 (ref 0–0.4)
EOSINOPHIL NFR BLD AUTO: 0.7 % (ref 0.3–6.2)
ERYTHROCYTE [DISTWIDTH] IN BLOOD BY AUTOMATED COUNT: 15.2 % (ref 12.3–15.4)
FECAL OCCULT BLOOD SCREEN, POC: POSITIVE
GLOBULIN UR ELPH-MCNC: 2.7 GM/DL
GLUCOSE SERPL-MCNC: 113 MG/DL (ref 65–99)
GLUCOSE UR STRIP-MCNC: NEGATIVE MG/DL
HCT VFR BLD AUTO: 28.7 % (ref 34–46.6)
HGB BLD-MCNC: 8.7 G/DL (ref 12–15.9)
HGB UR QL STRIP.AUTO: NEGATIVE
HOLD SPECIMEN: NORMAL
HYALINE CASTS UR QL AUTO: ABNORMAL /LPF
IMM GRANULOCYTES # BLD AUTO: 0.06 10*3/MM3 (ref 0–0.05)
IMM GRANULOCYTES NFR BLD AUTO: 0.4 % (ref 0–0.5)
KETONES UR QL STRIP: NEGATIVE
LEUKOCYTE ESTERASE UR QL STRIP.AUTO: ABNORMAL
LYMPHOCYTES # BLD AUTO: 2.6 10*3/MM3 (ref 0.7–3.1)
LYMPHOCYTES NFR BLD AUTO: 18.9 % (ref 19.6–45.3)
MCH RBC QN AUTO: 30.6 PG (ref 26.6–33)
MCHC RBC AUTO-ENTMCNC: 30.3 G/DL (ref 31.5–35.7)
MCV RBC AUTO: 101.1 FL (ref 79–97)
MONOCYTES # BLD AUTO: 0.78 10*3/MM3 (ref 0.1–0.9)
MONOCYTES NFR BLD AUTO: 5.7 % (ref 5–12)
NEGATIVE CONTROL: ABNORMAL
NEUTROPHILS NFR BLD AUTO: 10.11 10*3/MM3 (ref 1.7–7)
NEUTROPHILS NFR BLD AUTO: 73.6 % (ref 42.7–76)
NITRITE UR QL STRIP: NEGATIVE
NRBC BLD AUTO-RTO: 0 /100 WBC (ref 0–0.2)
PH UR STRIP.AUTO: 5.5 [PH] (ref 5–8)
PLATELET # BLD AUTO: 368 10*3/MM3 (ref 140–450)
PMV BLD AUTO: 9.5 FL (ref 6–12)
POSITIVE CONTROL: ABNORMAL
POTASSIUM SERPL-SCNC: 3.4 MMOL/L (ref 3.5–5.2)
PROT SERPL-MCNC: 6.8 G/DL (ref 6–8.5)
PROT UR QL STRIP: NEGATIVE
RBC # BLD AUTO: 2.84 10*6/MM3 (ref 3.77–5.28)
RBC # UR STRIP: ABNORMAL /HPF
REF LAB TEST METHOD: ABNORMAL
RH BLD: POSITIVE
SODIUM SERPL-SCNC: 141 MMOL/L (ref 136–145)
SP GR UR STRIP: 1.01 (ref 1–1.03)
SQUAMOUS #/AREA URNS HPF: ABNORMAL /HPF
T&S EXPIRATION DATE: NORMAL
UROBILINOGEN UR QL STRIP: ABNORMAL
WBC # UR STRIP: ABNORMAL /HPF
WBC NRBC COR # BLD AUTO: 13.73 10*3/MM3 (ref 3.4–10.8)
WHOLE BLOOD HOLD COAG: NORMAL
WHOLE BLOOD HOLD SPECIMEN: NORMAL

## 2024-10-24 PROCEDURE — 96376 TX/PRO/DX INJ SAME DRUG ADON: CPT

## 2024-10-24 PROCEDURE — 99285 EMERGENCY DEPT VISIT HI MDM: CPT

## 2024-10-24 PROCEDURE — 86900 BLOOD TYPING SEROLOGIC ABO: CPT | Performed by: EMERGENCY MEDICINE

## 2024-10-24 PROCEDURE — 99223 1ST HOSP IP/OBS HIGH 75: CPT | Performed by: NURSE PRACTITIONER

## 2024-10-24 PROCEDURE — G0378 HOSPITAL OBSERVATION PER HR: HCPCS

## 2024-10-24 PROCEDURE — 86901 BLOOD TYPING SEROLOGIC RH(D): CPT | Performed by: EMERGENCY MEDICINE

## 2024-10-24 PROCEDURE — 80053 COMPREHEN METABOLIC PANEL: CPT | Performed by: EMERGENCY MEDICINE

## 2024-10-24 PROCEDURE — 81001 URINALYSIS AUTO W/SCOPE: CPT | Performed by: NURSE PRACTITIONER

## 2024-10-24 PROCEDURE — 96374 THER/PROPH/DIAG INJ IV PUSH: CPT

## 2024-10-24 PROCEDURE — 83605 ASSAY OF LACTIC ACID: CPT | Performed by: EMERGENCY MEDICINE

## 2024-10-24 PROCEDURE — 94640 AIRWAY INHALATION TREATMENT: CPT

## 2024-10-24 PROCEDURE — 93010 ELECTROCARDIOGRAM REPORT: CPT | Performed by: INTERNAL MEDICINE

## 2024-10-24 PROCEDURE — 87086 URINE CULTURE/COLONY COUNT: CPT | Performed by: NURSE PRACTITIONER

## 2024-10-24 PROCEDURE — 71045 X-RAY EXAM CHEST 1 VIEW: CPT

## 2024-10-24 PROCEDURE — 93005 ELECTROCARDIOGRAM TRACING: CPT | Performed by: INTERNAL MEDICINE

## 2024-10-24 PROCEDURE — 94799 UNLISTED PULMONARY SVC/PX: CPT

## 2024-10-24 PROCEDURE — 85025 COMPLETE CBC W/AUTO DIFF WBC: CPT | Performed by: EMERGENCY MEDICINE

## 2024-10-24 PROCEDURE — 86850 RBC ANTIBODY SCREEN: CPT | Performed by: EMERGENCY MEDICINE

## 2024-10-24 PROCEDURE — 82270 OCCULT BLOOD FECES: CPT | Performed by: EMERGENCY MEDICINE

## 2024-10-24 PROCEDURE — 74178 CT ABD&PLV WO CNTR FLWD CNTR: CPT

## 2024-10-24 PROCEDURE — 25510000001 IOPAMIDOL PER 1 ML: Performed by: EMERGENCY MEDICINE

## 2024-10-24 RX ORDER — ALBUTEROL SULFATE 0.83 MG/ML
2.5 SOLUTION RESPIRATORY (INHALATION)
Status: DISCONTINUED | OUTPATIENT
Start: 2024-10-24 | End: 2024-10-27 | Stop reason: HOSPADM

## 2024-10-24 RX ORDER — CETIRIZINE HYDROCHLORIDE 10 MG/1
10 TABLET ORAL DAILY
Status: DISCONTINUED | OUTPATIENT
Start: 2024-10-25 | End: 2024-10-27 | Stop reason: HOSPADM

## 2024-10-24 RX ORDER — LOSARTAN POTASSIUM 50 MG/1
50 TABLET ORAL
Status: DISCONTINUED | OUTPATIENT
Start: 2024-10-25 | End: 2024-10-27 | Stop reason: HOSPADM

## 2024-10-24 RX ORDER — GABAPENTIN 300 MG/1
300 CAPSULE ORAL NIGHTLY
Status: DISCONTINUED | OUTPATIENT
Start: 2024-10-24 | End: 2024-10-27 | Stop reason: HOSPADM

## 2024-10-24 RX ORDER — SERTRALINE HYDROCHLORIDE 100 MG/1
100 TABLET, FILM COATED ORAL DAILY
Status: DISCONTINUED | OUTPATIENT
Start: 2024-10-25 | End: 2024-10-27 | Stop reason: HOSPADM

## 2024-10-24 RX ORDER — LEVOTHYROXINE SODIUM 100 UG/1
200 TABLET ORAL
Status: DISCONTINUED | OUTPATIENT
Start: 2024-10-25 | End: 2024-10-27 | Stop reason: HOSPADM

## 2024-10-24 RX ORDER — POTASSIUM CHLORIDE 1500 MG/1
40 TABLET, EXTENDED RELEASE ORAL EVERY 4 HOURS
Status: COMPLETED | OUTPATIENT
Start: 2024-10-24 | End: 2024-10-25

## 2024-10-24 RX ORDER — LATANOPROST 50 UG/ML
1 SOLUTION/ DROPS OPHTHALMIC NIGHTLY
Status: DISCONTINUED | OUTPATIENT
Start: 2024-10-24 | End: 2024-10-27 | Stop reason: HOSPADM

## 2024-10-24 RX ORDER — NITROGLYCERIN 0.4 MG/1
0.4 TABLET SUBLINGUAL
Status: DISCONTINUED | OUTPATIENT
Start: 2024-10-24 | End: 2024-10-27 | Stop reason: HOSPADM

## 2024-10-24 RX ORDER — TRAMADOL HYDROCHLORIDE 50 MG/1
50 TABLET ORAL EVERY 6 HOURS PRN
Status: DISCONTINUED | OUTPATIENT
Start: 2024-10-24 | End: 2024-10-27 | Stop reason: HOSPADM

## 2024-10-24 RX ORDER — SODIUM CHLORIDE 0.9 % (FLUSH) 0.9 %
10 SYRINGE (ML) INJECTION AS NEEDED
Status: DISCONTINUED | OUTPATIENT
Start: 2024-10-24 | End: 2024-10-27 | Stop reason: HOSPADM

## 2024-10-24 RX ORDER — DOFETILIDE 0.25 MG/1
250 CAPSULE ORAL 2 TIMES DAILY
Status: DISCONTINUED | OUTPATIENT
Start: 2024-10-24 | End: 2024-10-27 | Stop reason: HOSPADM

## 2024-10-24 RX ORDER — OXYBUTYNIN CHLORIDE 5 MG/1
10 TABLET, EXTENDED RELEASE ORAL DAILY
Status: DISCONTINUED | OUTPATIENT
Start: 2024-10-25 | End: 2024-10-27 | Stop reason: HOSPADM

## 2024-10-24 RX ORDER — IOPAMIDOL 755 MG/ML
100 INJECTION, SOLUTION INTRAVASCULAR
Status: COMPLETED | OUTPATIENT
Start: 2024-10-24 | End: 2024-10-24

## 2024-10-24 RX ORDER — BUDESONIDE AND FORMOTEROL FUMARATE DIHYDRATE 160; 4.5 UG/1; UG/1
2 AEROSOL RESPIRATORY (INHALATION)
Status: DISCONTINUED | OUTPATIENT
Start: 2024-10-24 | End: 2024-10-27 | Stop reason: HOSPADM

## 2024-10-24 RX ORDER — PANTOPRAZOLE SODIUM 40 MG/10ML
80 INJECTION, POWDER, LYOPHILIZED, FOR SOLUTION INTRAVENOUS ONCE
Status: COMPLETED | OUTPATIENT
Start: 2024-10-24 | End: 2024-10-24

## 2024-10-24 RX ORDER — ISOSORBIDE MONONITRATE 30 MG/1
30 TABLET, EXTENDED RELEASE ORAL DAILY
Status: DISCONTINUED | OUTPATIENT
Start: 2024-10-25 | End: 2024-10-27 | Stop reason: HOSPADM

## 2024-10-24 RX ORDER — ATORVASTATIN CALCIUM 20 MG/1
20 TABLET, FILM COATED ORAL DAILY
Status: DISCONTINUED | OUTPATIENT
Start: 2024-10-25 | End: 2024-10-27 | Stop reason: HOSPADM

## 2024-10-24 RX ORDER — PANTOPRAZOLE SODIUM 40 MG/10ML
40 INJECTION, POWDER, LYOPHILIZED, FOR SOLUTION INTRAVENOUS EVERY 12 HOURS SCHEDULED
Status: DISCONTINUED | OUTPATIENT
Start: 2024-10-24 | End: 2024-10-27 | Stop reason: HOSPADM

## 2024-10-24 RX ORDER — SODIUM CHLORIDE 0.9 % (FLUSH) 0.9 %
10 SYRINGE (ML) INJECTION EVERY 12 HOURS SCHEDULED
Status: DISCONTINUED | OUTPATIENT
Start: 2024-10-24 | End: 2024-10-27 | Stop reason: HOSPADM

## 2024-10-24 RX ORDER — ROFLUMILAST 500 UG/1
500 TABLET ORAL DAILY
Status: DISCONTINUED | OUTPATIENT
Start: 2024-10-25 | End: 2024-10-27 | Stop reason: HOSPADM

## 2024-10-24 RX ADMIN — Medication 10 ML: at 21:45

## 2024-10-24 RX ADMIN — LATANOPROST 1 DROP: 50 SOLUTION OPHTHALMIC at 21:44

## 2024-10-24 RX ADMIN — PANTOPRAZOLE SODIUM 40 MG: 40 INJECTION, POWDER, FOR SOLUTION INTRAVENOUS at 21:43

## 2024-10-24 RX ADMIN — TRAMADOL HYDROCHLORIDE 50 MG: 50 TABLET, COATED ORAL at 22:44

## 2024-10-24 RX ADMIN — GABAPENTIN 300 MG: 300 CAPSULE ORAL at 21:43

## 2024-10-24 RX ADMIN — IOPAMIDOL 95 ML: 755 INJECTION, SOLUTION INTRAVENOUS at 16:12

## 2024-10-24 RX ADMIN — DOFETILIDE 250 MCG: 0.25 CAPSULE ORAL at 22:59

## 2024-10-24 RX ADMIN — PANTOPRAZOLE SODIUM 80 MG: 40 INJECTION, POWDER, FOR SOLUTION INTRAVENOUS at 16:48

## 2024-10-24 RX ADMIN — APIXABAN 5 MG: 5 TABLET, FILM COATED ORAL at 22:44

## 2024-10-24 RX ADMIN — BUDESONIDE AND FORMOTEROL FUMARATE DIHYDRATE 2 PUFF: 160; 4.5 AEROSOL RESPIRATORY (INHALATION) at 22:46

## 2024-10-24 RX ADMIN — ALBUTEROL SULFATE 2.5 MG: 2.5 SOLUTION RESPIRATORY (INHALATION) at 22:46

## 2024-10-24 RX ADMIN — POTASSIUM CHLORIDE 40 MEQ: 1500 TABLET, EXTENDED RELEASE ORAL at 21:43

## 2024-10-24 NOTE — ED NOTES
" Avelina Ceron    Nursing Report ED to Floor:  Mental status: A&OX4  Ambulatory status: ASSIST X1  Oxygen Therapy:  2L NC   Cardiac Rhythm: NS   Admitted from: ED  Safety Concerns:  FALL RISK   Social Issues: N/A  ED Room #:  10    ED Nurse Phone Extension - [phone] or may call [phone].      HPI:   Chief Complaint   Patient presents with    Black or Bloody Stool       Past Medical History:  Past Medical History:   Diagnosis Date    Anesthesia complication     Pt went into respiratory failure during total hip replacement in 2020    Arthritis of back     Arthritis of neck     Broken ribs     COPD (chronic obstructive pulmonary disease)     Coronary artery disease     Degenerative lumbar disc     Disease of thyroid gland     \"low thyroid\"    Fracture, fibula 1997?    Fracture, humerus 12/13/23    Fracture, tibia and fibula 1997?    Hip arthrosis     History of colonoscopy     Hyperlipidemia     Hypertension     Kidney stone July 2023    Knee swelling     Lumbosacral disc disease     On home O2     3L    Osteoarthritis     Periarthritis of shoulder     Urinary incontinence     Urinary tract infection         Past Surgical History:  Past Surgical History:   Procedure Laterality Date    CAROTID STENT Bilateral     COLONOSCOPY      COLONOSCOPY N/A 10/12/2024    Procedure: COLONOSCOPY;  Surgeon: Markie Bautista MD;  Location:  [facility];  Service: Gastroenterology;  Laterality: N/A;    ENDOSCOPY N/A 10/11/2024    Procedure: ESOPHAGOGASTRODUODENOSCOPY;  Surgeon: Markie Bautista MD;  Location:  [facility];  Service: Gastroenterology;  Laterality: N/A;    ENDOSCOPY N/A 10/12/2024    Procedure: ESOPHAGOGASTRODUODENOSCOPY;  Surgeon: Markie Bautista MD;  Location:  [facility];  Service: Gastroenterology;  Laterality: N/A;    EYE SURGERY  10/17/2022    yag surgery    GALLBLADDER SURGERY  2012    HYSTERECTOMY      ILIAC ARTERY STENT Bilateral     JOINT REPLACEMENT  1995; 2020    KIDNEY STONE SURGERY  July August 2023    " OOPHORECTOMY  1976    1 ovary removed    TOTAL HIP ARTHROPLASTY Bilateral     URETEROSCOPY LASER LITHOTRIPSY WITH STENT INSERTION Right 07/26/2023    Procedure: CYSTOSCOPY, RIGHT RETROGRADE PYELOGRAM; WITH STENT INSERTION;  Surgeon: Lee Kim MD;  Location: [facility] OR;  Service: Urology;  Laterality: Right;    URETEROSCOPY LASER LITHOTRIPSY WITH STENT INSERTION Right 08/10/2023    Procedure: URETEROSCOPY, LASER LITHOTRIPSY, STENT EXCHANGE;  Surgeon: Lee Kim MD;  Location: [facility] OR;  Service: Urology;  Laterality: Right;        Admitting Doctor:   Mima Bautista MD    Consulting Provider(s):  Consults       Date and Time Order Name Status Description    10/10/2024  3:57 PM Inpatient Cardiology Consult Completed     10/10/2024  3:39 PM Inpatient Gastroenterology Consult Completed              Admitting Diagnosis:   The primary encounter diagnosis was Gastrointestinal hemorrhage, unspecified gastrointestinal hemorrhage type. Diagnoses of Symptomatic anemia and Anticoagulated were also pertinent to this visit.    Most Recent Vitals:   Vitals:    10/24/24 1646 10/24/24 1659 10/24/24 1714 10/24/24 1729   BP: 134/57 116/50 127/47 140/52   BP Location:       Patient Position: Standing      Pulse: 100 88 95 93   Resp:       Temp:       TempSrc:       SpO2: 100% 97% 100% 100%   Weight:       Height:           Active LDAs/IV Access:   Lines, Drains & Airways       Active LDAs       Name Placement date Placement time Site Days    Peripheral IV 10/24/24 1509 Left Antecubital 10/24/24  1509  Antecubital  less than 1                    Labs (abnormal labs have a star):   Labs Reviewed   COMPREHENSIVE METABOLIC PANEL - Abnormal; Notable for the following components:       Result Value    Glucose 113 (*)     Creatinine 0.46 (*)     Potassium 3.4 (*)     CO2 31.0 (*)     All other components within normal limits    Narrative:     GFR Normal >60  Chronic Kidney Disease <60  Kidney Failure <15    The GFR formula  is only valid for adults with stable renal function between ages 18 and 70.   CBC WITH AUTO DIFFERENTIAL - Abnormal; Notable for the following components:    WBC 13.73 (*)     RBC 2.84 (*)     Hemoglobin 8.7 (*)     Hematocrit 28.7 (*)     .1 (*)     MCHC 30.3 (*)     RDW-SD 56.8 (*)     Lymphocyte % 18.9 (*)     Neutrophils, Absolute 10.11 (*)     Immature Grans, Absolute 0.06 (*)     All other components within normal limits   POCT OCCULT BLOOD STOOL (ED ONLY) - Abnormal; Notable for the following components:    Fecal Occult Blood Positive (*)     All other components within normal limits   LACTIC ACID, PLASMA - Normal   RAINBOW DRAW    Narrative:     The following orders were created for panel order [place] Draw.  Procedure                               Abnormality         Status                     ---------                               -----------         ------                     Green Top (Gel)[[id number]]                                  Final result               Lavender Top[[id number]]                                     Final result               Gold Top - SST[[id number]]                                   Final result               Mccord Top[[id number]]                                         Final result               Light Blue Top[[id number]]                                   Final result                 Please view results for these tests on the individual orders.   TYPE AND SCREEN   CBC AND DIFFERENTIAL    Narrative:     The following orders were created for panel order CBC & Differential.  Procedure                               Abnormality         Status                     ---------                               -----------         ------                     CBC Auto Differential[[id number]]        Abnormal            Final result                 Please view results for these tests on the individual orders.   GREEN TOP   LAVENDER TOP   GOLD TOP - SST   GRAY TOP   LIGHT BLUE TOP       [facility]  Given in ED:   Medications   sodium chloride 0.9 % flush 10 mL (has no administration in time range)   sodium chloride 0.9 % flush 10 mL (has no administration in time range)   pantoprazole (PROTONIX) injection 80 mg (80 mg Intravenous Given 10/24/24 1648)   iopamidol (ISOVUE-370) 76 % injection 100 mL (95 mL Intravenous Given 10/24/24 1612)           Last NIH score:                                                          Dysphagia screening results:  Patient Factors Component (Dysphagia:Stroke or Rule-out)  Best Eye Response: 4-->(E4) spontaneous (10/24/24 1646)  Best Motor Response: 6-->(M6) obeys commands (10/24/24 1646)  Best Verbal Response: 5-->(V5) oriented (10/24/24 1646)  Juan Diego Coma Scale Score: 15 (10/24/24 1646)     Blanco Coma Scale:  No data recorded     CIWA:        Restraint Type:            Isolation Status:  No active isolations

## 2024-10-24 NOTE — H&P
[facility] Medicine Services  HISTORY AND PHYSICAL    Patient Name: Avelina Ceron  : 1940  MRN: [id number]  Primary Care Physician: Dre Moura MD  Date of admission: 10/24/2024    Subjective   Subjective     Chief Complaint:  Dark tarry stools     HPI:  Avelina Ceron is a 83 y.o. female with a past medical history significant for atrial fibrillation on eliquis, CAD a/p stenting, PAD s/p stenting, essential hypertension, hyperlipidemia, hypothyroidism and COPD with chronic use of oxygen (2L NC) presents to the ED with complaints of dark tarry stools over the past three days  Patient was recently admitted to [facility] from 10/10/21-10/16/2024 secondary to Acute GI bleed.  Patient underwent colonoscopy  that showed polypoid lesion at that anastamosis non-bleeding biopsied not removed due to need for complex EMR, 3 polyps in ascending colon 4-6 mm, one 13 mm polyp with friability, one 7 mm polyps descending colon, one 14 mm bleeding polyp in sigmoid colon, one 5 mm polyp in rectum all removed Clips were placed also with noted diverticulosis and internal hemorrhoids.  Patient was taken off her plavix.  She states she had been doing well since being home until three days ago she started noticing black colored stools.  She denies any recent fever, chills, nausea, dizziness, shortness of air, chest pain, increased fatigue, vomiting or hematochezia.  She does report generalized abdominal tenderness.  She is currently on eliquis and her last dose was this morning.          Review of Systems   Constitutional:  Negative for activity change, appetite change, chills, diaphoresis, fatigue, fever and unexpected weight change.   HENT: Negative.     Eyes:  Negative for photophobia and visual disturbance.   Respiratory:  Negative for cough and shortness of breath.    Cardiovascular:  Negative for chest pain, palpitations and leg swelling.   Gastrointestinal:  Positive for abdominal pain and  "blood in stool. Negative for abdominal distention, diarrhea, nausea and vomiting.   Genitourinary:  Negative for difficulty urinating, dysuria, flank pain and frequency.   Musculoskeletal:  Negative for back pain, neck pain and neck stiffness.   Skin: Negative.    Neurological:  Negative for dizziness, speech difficulty, weakness, light-headedness and headaches.   Psychiatric/Behavioral: Negative.                  Personal History     Past Medical History:   Diagnosis Date    Anesthesia complication     Pt went into respiratory failure during total hip replacement in 2020    Arthritis of back     Arthritis of neck     Broken ribs     COPD (chronic obstructive pulmonary disease)     Coronary artery disease     Degenerative lumbar disc     Disease of thyroid gland     \"low thyroid\"    Fracture, fibula 1997?    Fracture, humerus 12/13/23    Fracture, tibia and fibula 1997?    Hip arthrosis     History of colonoscopy     Hyperlipidemia     Hypertension     Kidney stone July 2023    Knee swelling     Lumbosacral disc disease     On home O2     3L    Osteoarthritis     Periarthritis of shoulder     Urinary incontinence     Urinary tract infection              Past Surgical History:   Procedure Laterality Date    CAROTID STENT Bilateral     COLONOSCOPY      COLONOSCOPY N/A 10/12/2024    Procedure: COLONOSCOPY;  Surgeon: Markie Bautista MD;  Location:  [facility];  Service: Gastroenterology;  Laterality: N/A;    ENDOSCOPY N/A 10/11/2024    Procedure: ESOPHAGOGASTRODUODENOSCOPY;  Surgeon: Markie Bautista MD;  Location:  [facility];  Service: Gastroenterology;  Laterality: N/A;    ENDOSCOPY N/A 10/12/2024    Procedure: ESOPHAGOGASTRODUODENOSCOPY;  Surgeon: Markie Bautista MD;  Location:  [facility];  Service: Gastroenterology;  Laterality: N/A;    EYE SURGERY  10/17/2022    yag surgery    GALLBLADDER SURGERY  2012    HYSTERECTOMY      ILIAC ARTERY STENT Bilateral     JOINT REPLACEMENT  1995; 2020    KIDNEY STONE " SURGERY  July August 2023    OOPHORECTOMY  1976    1 ovary removed    TOTAL HIP ARTHROPLASTY Bilateral     URETEROSCOPY LASER LITHOTRIPSY WITH STENT INSERTION Right 07/26/2023    Procedure: CYSTOSCOPY, RIGHT RETROGRADE PYELOGRAM; WITH STENT INSERTION;  Surgeon: Lee Kim MD;  Location: [facility] OR;  Service: Urology;  Laterality: Right;    URETEROSCOPY LASER LITHOTRIPSY WITH STENT INSERTION Right 08/10/2023    Procedure: URETEROSCOPY, LASER LITHOTRIPSY, STENT EXCHANGE;  Surgeon: Lee Kim MD;  Location: [facility] OR;  Service: Urology;  Laterality: Right;       Family History:  family history includes Alzheimer's disease in her sister; Arthritis in her daughter; Cancer in her daughter, maternal aunt, and another family member; Depression in an other family member; Diabetes in her maternal uncle; Heart disease in her mother; Hyperlipidemia in her daughter and another family member; Hypertension in her mother and another family member; Liver disease in her daughter; Thyroid disease in her daughter.     Social History:  reports that she quit smoking about 18 years ago. Her smoking use included cigarettes. She started smoking about 68 years ago. She has a 50 pack-year smoking history. She has been exposed to tobacco smoke. She has never used smokeless tobacco. She reports that she does not currently use alcohol. She reports that she does not use drugs.  Social History     Social History Narrative    Not on file       Medications:  Fiber Select Gummies, Fluticasone-Umeclidin-Vilant, Multiple Vitamins-Minerals, Vibegron, acetaminophen, albuterol sulfate HFA, apixaban, aspirin, atenolol, cetirizine, dofetilide, gabapentin, isosorbide mononitrate, latanoprost, levothyroxine, losartan, nitroglycerin, ondansetron, pantoprazole, polyethylene glycol, roflumilast, sertraline, simvastatin, and traMADol    Allergies   Allergen Reactions    Penicillins Hives, Swelling and Mental Status Change    Codeine Mental  Status Change    Dopamine Palpitations and Other (See Comments)     Other reaction(s): Hypertension       Objective   Objective     Vital Signs:   Temp:  [97.8 °F (36.6 °C)-98.8 °F (37.1 °C)] 97.8 °F (36.6 °C)  Heart Rate:  [] 92  Resp:  [16-18] 18  BP: (116-164)/(47-99) 132/60  Flow (L/min) (Oxygen Therapy):  [2] 2    Physical Exam  Vitals and nursing note reviewed.   Constitutional:       General: She is not in acute distress.     Appearance: Normal appearance. She is not ill-appearing, toxic-appearing or diaphoretic.   HENT:      Head: Normocephalic and atraumatic.      Nose: Nose normal.      Mouth/Throat:      Mouth: Mucous membranes are dry.      Pharynx: Oropharynx is clear.   Eyes:      Extraocular Movements: Extraocular movements intact.      Pupils: Pupils are equal, round, and reactive to light.   Cardiovascular:      Rate and Rhythm: Normal rate and regular rhythm.      Pulses: Normal pulses.      Heart sounds: Normal heart sounds.   Pulmonary:      Effort: Pulmonary effort is normal.      Breath sounds: Normal breath sounds.   Abdominal:      General: Bowel sounds are normal. There is no distension.      Palpations: Abdomen is soft. There is no mass.      Tenderness: There is abdominal tenderness. There is no right CVA tenderness, left CVA tenderness, guarding or rebound.      Hernia: No hernia is present.      Comments: Right upper quadrant tenderness on exam    Musculoskeletal:         General: No swelling, tenderness, deformity or signs of injury. Normal range of motion.      Cervical back: Normal range of motion and neck supple.      Right lower leg: No edema.      Left lower leg: No edema.   Skin:     General: Skin is warm and dry.   Neurological:      General: No focal deficit present.      Mental Status: She is alert and oriented to person, place, and time. Mental status is at baseline.   Psychiatric:         Mood and Affect: Mood normal.         Behavior: Behavior normal.         Thought  Content: Thought content normal.         Judgment: Judgment normal.            Result Review:  I have personally reviewed the results from the time of this admission to 10/24/2024 19:56 EDT and agree with these findings:  [x]  Laboratory list / accordion  [x]  Microbiology  [x]  Radiology  [x]  EKG/Telemetry   []  Cardiology/Vascular   []  Pathology  []  Old records  []  Other:  Most notable findings include:     LAB RESULTS:      Lab 10/24/24  1508   WBC 13.73*   HEMOGLOBIN 8.7*   HEMATOCRIT 28.7*   PLATELETS 368   NEUTROS ABS 10.11*   IMMATURE GRANS (ABS) 0.06*   LYMPHS ABS 2.60   MONOS ABS 0.78   EOS ABS 0.09   .1*   LACTATE 1.2         Lab 10/24/24  1508   SODIUM 141   POTASSIUM 3.4*   CHLORIDE 100   CO2 31.0*   ANION GAP 10.0   BUN 9   CREATININE 0.46*   EGFR 95.1   GLUCOSE 113*   CALCIUM 9.2         Lab 10/24/24  1508   TOTAL PROTEIN 6.8   ALBUMIN 4.1   GLOBULIN 2.7   ALT (SGPT) 19   AST (SGOT) 23   BILIRUBIN <0.2   ALK PHOS 66                 Lab 10/24/24  1508   ABO TYPING A   RH TYPING Positive   ANTIBODY SCREEN Negative         Brief Urine Lab Results  (Last result in the past 365 days)        Color   Clarity   Blood   Leuk Est   Nitrite   Protein   CREAT   Urine HCG        10/10/24 2332 Yellow   Clear   Negative   Small (1+)   Negative   Negative                 Microbiology Results (last 10 days)       ** No results found for the last 240 hours. **            CT Abdomen Pelvis With & Without Contrast    Result Date: 10/24/2024  CT ABDOMEN PELVIS W WO CONTRAST Date of Exam: 10/24/2024 3:51 PM EDT Indication: Gi bleeding. GI Bleed. Comparison: 10/10/2024 Technique: Axial CT images were obtained of the abdomen and pelvis before and after the uneventful intravenous administration of iodinated contrast. Sagittal and coronal reconstructions were performed.  Automated exposure control and iterative reconstruction methods were used. Findings: Lower Chest: Lung bases clear. Moderate hiatal hernia Organs: 7  mm left lower pole renal calculus. Punctate right lower pole renal calculus small bilateral renal cysts. Liver, spleen, pancreas, adrenal glands unremarkable. Gallbladder surgically absent Gastrointestinal: 4 clips in the right colon, and a clip in the sigmoid colon. No intraluminal extravasation of intravenous contrast in the arterial, venous, and delayed phases. Moderate hiatal hernia. Postoperative changes of the cecum. Diverticula of the sigmoid colon without inflammation. No intestinal distention or definite wall thickening Pelvis: Streak artifact from bilateral prostheses obscures the lower pelvis Peritoneum/Retroperitoneum: No ascites or pneumoperitoneum. Normal caliber atherosclerotic aorta Bones/Soft Tissues: No acute bony abnormality. Degenerative disease of the lower thoracic and lumbar spine. Status post bilateral hip arthroplasties. Old fracture of the right ilium     Impression: 1. No site of active gastrointestinal hemorrhage is demonstrated on this exam 2. No acute process demonstrated 3. Bilateral nephrolithiasis 4. Moderate hiatal hernia 5. Colonic diverticulosis Electronically Signed: Pete Valdez  10/24/2024 4:33 PM EDT  Workstation ID: [id number]    XR Chest 1 View    Result Date: 10/24/2024  XR CHEST 1 VW Date of Exam: 10/24/2024 2:39 PM EDT Indication: SOA Comparison: Chest x-ray 10/9/2024 Findings: Emphysema. Heart size normal. No pneumothorax or pleural effusion or focal pulmonary parenchymal opacity.     Impression: Impression: No acute cardiopulmonary abnormality. Emphysema and chronic changes of the chest. Electronically Signed: Mer Oleary MD  10/24/2024 2:58 PM EDT  Workstation ID: [id number]     Results for orders placed during the hospital encounter of 08/26/24    Adult Transthoracic Echo Limited W/ Cont if Necessary Per Protocol    Interpretation Summary    Left ventricular systolic function is normal. Calculated left ventricular EF = 64%    Moderate tricuspid valve regurgitation is  present.    Estimated right ventricular systolic pressure from tricuspid regurgitation is moderately elevated (45-55 mmHg).    Mild MR      Assessment & Plan   Assessment & Plan       Melena    Coronary arteriosclerosis in native artery    Chronic obstructive pulmonary disease    Depression    Gastroesophageal reflux disease with esophagitis    Hyperlipidemia    Hypertension    Hypothyroidism    Anemia due to acute blood loss      83 year old female presents to the ED with dark tarry stools over the past three days.     1) melena       Anemia   -H&H stable at 8.7/28.7 from recent admission  -type and screen   -CT abdomen and pelvis noted above   -consult GI in am but currently no plans for repeat scope.  She has a known polyp with need for complex EMR with plan to address this outpatient once she can safely come off anticoagulation ideally if H/H stable.   -clear liquid diet for now    -Continue anticoagulation trend H&H overnight   -PPI BID     2) Hypokalemia  -replace per protocol  -check mag  -telemetry monitoring     3) CAD  -S/p bilaeral ICA stents  -last admission stopped plavix  -continue imdur  -follows with cardiology     4) PAD  -s/p bilateral iliac artery stents  -plavix d/c'd last admission     5) Essential hypertension       Hyperlipidemia   -continue statin, losartan     6) Oxygen dependent COPD   -continue symbicort, spirivia, roflumilast  -currently on 1.5L with oxygen sat of 100%    7) Anxiety/depression   -continue sertraline         DVT prophylaxis:  scds    CODE STATUS:  Full Code        Expected Discharge  TBD       This note has been completed as part of a split-shared workflow.     Signature: Electronically signed by AMADOR Cui, 10/24/24, 8:10 PM EDT.

## 2024-10-24 NOTE — ED PROVIDER NOTES
" EMERGENCY DEPARTMENT ENCOUNTER    Pt Name: Avelina Ceron  MRN: [id number]  Pt :   1940  Room Number:  S571/1  Date of encounter:  10/24/2024  PCP: Dre Moura MD  ED Provider: AMADOR Anderson    Historian: Patient    HPI:  Chief Complaint: Dark tarry stools.    Context: Avelina Ceron is a 83 y.o. female who presents to the ED c/o dark tarry stools.  Patient explains that she was in the hospital from October 10 through .  At this time she was treated for GI bleeding.  She was anemic at 7.7.  She had multiple transfusions during her stay.  According to family patient had a colonoscopy by Dr. Bautista.  8 polyps were removed.  Patient explains that she has a history of A-fib.  Upon admission she was on Plavix.  At discharge they have switched her medication to Eliquis.  Patient felt okay on the  when she got home.  She explains that she had a couple good days however she has continuingly felt weak.  Approximately 2 days ago she developed a repeat in the dark tarry stools.  Denies any bright red bleeding.  She complains of nausea with no vomiting.  She reports some mild abdominal discomfort.  She denies chest pain.  She denies shortness of breath.  She denies palpitations.  Negative for fever, chills.  Patient contacted her PCP and was told to come to the ER.  HPI     REVIEW OF SYSTEMS  A chief complaint appropriate review of systems was completed and is negative except as noted in the HPI.     PAST MEDICAL HISTORY  Past Medical History:   Diagnosis Date    Anesthesia complication     Pt went into respiratory failure during total hip replacement in     Arthritis of back     Arthritis of neck     Broken ribs     COPD (chronic obstructive pulmonary disease)     Coronary artery disease     Degenerative lumbar disc     Disease of thyroid gland     \"low thyroid\"    Fracture, fibula ?    Fracture, humerus 23    Fracture, tibia and fibula ?    Hip arthrosis     History of " colonoscopy     Hyperlipidemia     Hypertension     Kidney stone July 2023    Knee swelling     Lumbosacral disc disease     On home O2     3L    Osteoarthritis     Periarthritis of shoulder     Urinary incontinence     Urinary tract infection        PAST SURGICAL HISTORY  Past Surgical History:   Procedure Laterality Date    CAROTID STENT Bilateral     COLONOSCOPY      COLONOSCOPY N/A 10/12/2024    Procedure: COLONOSCOPY;  Surgeon: Markie Bautista MD;  Location:  [facility];  Service: Gastroenterology;  Laterality: N/A;    ENDOSCOPY N/A 10/11/2024    Procedure: ESOPHAGOGASTRODUODENOSCOPY;  Surgeon: Markie Bautista MD;  Location:  [facility];  Service: Gastroenterology;  Laterality: N/A;    ENDOSCOPY N/A 10/12/2024    Procedure: ESOPHAGOGASTRODUODENOSCOPY;  Surgeon: Markie Bautista MD;  Location:  [facility];  Service: Gastroenterology;  Laterality: N/A;    EYE SURGERY  10/17/2022    yag surgery    GALLBLADDER SURGERY  2012    HYSTERECTOMY      ILIAC ARTERY STENT Bilateral     JOINT REPLACEMENT  1995; 2020    KIDNEY STONE SURGERY  July August 2023    OOPHORECTOMY  1976    1 ovary removed    TOTAL HIP ARTHROPLASTY Bilateral     URETEROSCOPY LASER LITHOTRIPSY WITH STENT INSERTION Right 07/26/2023    Procedure: CYSTOSCOPY, RIGHT RETROGRADE PYELOGRAM; WITH STENT INSERTION;  Surgeon: Lee Kim MD;  Location:  [facility];  Service: Urology;  Laterality: Right;    URETEROSCOPY LASER LITHOTRIPSY WITH STENT INSERTION Right 08/10/2023    Procedure: URETEROSCOPY, LASER LITHOTRIPSY, STENT EXCHANGE;  Surgeon: Lee Kim MD;  Location:  [facility];  Service: Urology;  Laterality: Right;       FAMILY HISTORY  Family History   Problem Relation Age of Onset    Cancer Other         BREAST, COLON, OVARIAN    Depression Other     Hyperlipidemia Other     Hypertension Other     Heart disease Mother     Hypertension Mother     Arthritis Daughter     Cancer Daughter     Hyperlipidemia Daughter     Liver  disease Daughter     Thyroid disease Daughter     Cancer Maternal Aunt     Diabetes Maternal Uncle     Alzheimer's disease Sister        SOCIAL HISTORY  Social History     Socioeconomic History    Marital status:    Tobacco Use    Smoking status: Former     Current packs/day: 0.00     Average packs/day: 1 pack/day for 50.0 years (50.0 ttl pk-yrs)     Types: Cigarettes     Start date: 1956     Quit date: 2006     Years since quittin.8     Passive exposure: Past    Smokeless tobacco: Never   Vaping Use    Vaping status: Never Used   Substance and Sexual Activity    Alcohol use: Not Currently     Comment: no etoh for past 10 years    Drug use: No    Sexual activity: Not Currently       ALLERGIES  Penicillins, Codeine, and Dopamine    PHYSICAL EXAM  Physical Exam  Vitals and nursing note reviewed. Exam conducted with a chaperone present.   Constitutional:       Appearance: Normal appearance. She is ill-appearing.   HENT:      Head: Normocephalic and atraumatic.      Nose: Nose normal.      Mouth/Throat:      Mouth: Mucous membranes are moist.   Eyes:      Extraocular Movements: Extraocular movements intact.      Conjunctiva/sclera: Conjunctivae normal.      Pupils: Pupils are equal, round, and reactive to light.   Cardiovascular:      Rate and Rhythm: Normal rate and regular rhythm.      Pulses: Normal pulses.      Heart sounds: Normal heart sounds.   Pulmonary:      Effort: Pulmonary effort is normal. No respiratory distress.      Breath sounds: Normal breath sounds.   Abdominal:      General: Bowel sounds are normal. There is no distension.      Tenderness: There is no abdominal tenderness.   Genitourinary:     Rectum: Guaiac result positive.   Musculoskeletal:         General: Normal range of motion.      Cervical back: Normal range of motion and neck supple.   Skin:     Coloration: Skin is pale.   Neurological:      General: No focal deficit present.      Mental Status: She is alert and oriented  to person, place, and time.   Psychiatric:         Mood and Affect: Mood normal.         Behavior: Behavior normal.           LAB RESULTS  Results for orders placed or performed during the hospital encounter of 10/24/24   Comprehensive Metabolic Panel    Collection Time: 10/24/24  3:08 PM    Specimen: Blood   Result Value Ref Range    Glucose 113 (H) 65 - 99 mg/dL    BUN 9 8 - 23 mg/dL    Creatinine 0.46 (L) 0.57 - 1.00 mg/dL    Sodium 141 136 - 145 mmol/L    Potassium 3.4 (L) 3.5 - 5.2 mmol/L    Chloride 100 98 - 107 mmol/L    CO2 31.0 (H) 22.0 - 29.0 mmol/L    Calcium 9.2 8.6 - 10.5 mg/dL    Total Protein 6.8 6.0 - 8.5 g/dL    Albumin 4.1 3.5 - 5.2 g/dL    ALT (SGPT) 19 1 - 33 U/L    AST (SGOT) 23 1 - 32 U/L    Alkaline Phosphatase 66 39 - 117 U/L    Total Bilirubin <0.2 0.0 - 1.2 mg/dL    Globulin 2.7 gm/dL    A/G Ratio 1.5 g/dL    BUN/Creatinine Ratio 19.6 7.0 - 25.0    Anion Gap 10.0 5.0 - 15.0 mmol/L    eGFR 95.1 >60.0 mL/min/1.73   Lactic Acid, Plasma    Collection Time: 10/24/24  3:08 PM    Specimen: Blood   Result Value Ref Range    Lactate 1.2 0.5 - 2.0 mmol/L   CBC Auto Differential    Collection Time: 10/24/24  3:08 PM    Specimen: Blood   Result Value Ref Range    WBC 13.73 (H) 3.40 - 10.80 10*3/mm3    RBC 2.84 (L) 3.77 - 5.28 10*6/mm3    Hemoglobin 8.7 (L) 12.0 - 15.9 g/dL    Hematocrit 28.7 (L) 34.0 - 46.6 %    .1 (H) 79.0 - 97.0 fL    MCH 30.6 26.6 - 33.0 pg    MCHC 30.3 (L) 31.5 - 35.7 g/dL    RDW 15.2 12.3 - 15.4 %    RDW-SD 56.8 (H) 37.0 - 54.0 fl    MPV 9.5 6.0 - 12.0 fL    Platelets 368 140 - 450 10*3/mm3    Neutrophil % 73.6 42.7 - 76.0 %    Lymphocyte % 18.9 (L) 19.6 - 45.3 %    Monocyte % 5.7 5.0 - 12.0 %    Eosinophil % 0.7 0.3 - 6.2 %    Basophil % 0.7 0.0 - 1.5 %    Immature Grans % 0.4 0.0 - 0.5 %    Neutrophils, Absolute 10.11 (H) 1.70 - 7.00 10*3/mm3    Lymphocytes, Absolute 2.60 0.70 - 3.10 10*3/mm3    Monocytes, Absolute 0.78 0.10 - 0.90 10*3/mm3    Eosinophils, Absolute 0.09  0.00 - 0.40 10*3/mm3    Basophils, Absolute 0.09 0.00 - 0.20 10*3/mm3    Immature Grans, Absolute 0.06 (H) 0.00 - 0.05 10*3/mm3    nRBC 0.0 0.0 - 0.2 /100 WBC   Type & Screen    Collection Time: 10/24/24  3:08 PM    Specimen: Blood   Result Value Ref Range    ABO Type A     RH type Positive     Antibody Screen Negative     T&S Expiration Date 10/27/2024 11:59:59 PM    Green Top (Gel)    Collection Time: 10/24/24  3:08 PM   Result Value Ref Range    Extra Tube Hold for add-ons.    Lavender Top    Collection Time: 10/24/24  3:08 PM   Result Value Ref Range    Extra Tube hold for add-on    Gold Top - SST    Collection Time: 10/24/24  3:08 PM   Result Value Ref Range    Extra Tube Hold for add-ons.    Gray Top    Collection Time: 10/24/24  3:08 PM   Result Value Ref Range    Extra Tube Hold for add-ons.    Light Blue Top    Collection Time: 10/24/24  3:08 PM   Result Value Ref Range    Extra Tube Hold for add-ons.    POC Occult Blood Stool    Collection Time: 10/24/24  4:55 PM    Specimen: Stool   Result Value Ref Range    Fecal Occult Blood Positive (A)     Positive Control      Negative Control     ECG 12 Lead QT Measurement    Collection Time: 10/24/24  9:26 PM   Result Value Ref Range    QT Interval 352 ms    QTC Interval 444 ms     *Note: Due to a large number of results and/or encounters for the requested time period, some results have not been displayed. A complete set of results can be found in Results Review.       If labs were ordered, I independently reviewed the results and considered them in treating the patient.    RADIOLOGY  CT Abdomen Pelvis With & Without Contrast   Final Result      1. No site of active gastrointestinal hemorrhage is demonstrated on this exam   2. No acute process demonstrated   3. Bilateral nephrolithiasis   4. Moderate hiatal hernia   5. Colonic diverticulosis                  Electronically Signed: Pete Valdez     10/24/2024 4:33 PM EDT     Workstation ID: [id number]      XR Chest 1  View   Final Result   Impression:   No acute cardiopulmonary abnormality. Emphysema and chronic changes of the chest.         Electronically Signed: Mer Oleary MD     10/24/2024 2:58 PM EDT     Workstation ID: [id number]        [] Radiologist's Report Reviewed:  I ordered and independently interpreted the above noted radiographic studies.  See radiologist's dictation for official interpretation.      PROCEDURES    Procedures    ECG 12 Lead QT Measurement   Preliminary Result   Test Reason : QT Measurement   Blood Pressure :   */*   mmHG   Vent. Rate :  96 BPM     Atrial Rate :  96 BPM      P-R Int : 172 ms          QRS Dur :  86 ms       QT Int : 352 ms       P-R-T Axes :  61  68  41 degrees      QTc Int : 444 ms      Sinus rhythm with premature atrial complexes   ST & T wave abnormality, consider lateral ischemia   Abnormal ECG   When compared with ECG of 10-OCT-2024 13:07,   premature atrial complexes are now present   T wave inversion now evident in Inferior leads   T wave inversion now evident in Anterior leads      Referred By:            Confirmed By:           MEDICATIONS GIVEN IN ER    Medications   sodium chloride 0.9 % flush 10 mL (has no administration in time range)   sodium chloride 0.9 % flush 10 mL (has no administration in time range)   albuterol (PROVENTIL) nebulizer solution 0.083% 2.5 mg/3mL (2.5 mg Nebulization Given 10/24/24 2246)   cetirizine (zyrTEC) tablet 10 mg (has no administration in time range)   dofetilide (TIKOSYN) capsule 250 mcg (250 mcg Oral Given 10/24/24 2259)   budesonide-formoterol (SYMBICORT) 160-4.5 MCG/ACT inhaler 2 puff (2 puffs Inhalation Given 10/24/24 2246)     And   tiotropium (SPIRIVA RESPIMAT) 2.5 mcg/act aerosol solution inhaler (has no administration in time range)   gabapentin (NEURONTIN) capsule 300 mg (300 mg Oral Given 10/24/24 2143)   isosorbide mononitrate (IMDUR) 24 hr tablet 30 mg (has no administration in time range)   latanoprost (XALATAN) 0.005 % ophthalmic  solution 1 drop (1 drop Both Eyes Given 10/24/24 2144)   levothyroxine (SYNTHROID, LEVOTHROID) tablet 200 mcg (has no administration in time range)   losartan (COZAAR) tablet 50 mg (has no administration in time range)   roflumilast (DALIRESP) tablet 500 mcg (has no administration in time range)   sertraline (ZOLOFT) tablet 100 mg (has no administration in time range)   atorvastatin (LIPITOR) tablet 20 mg (has no administration in time range)   traMADol (ULTRAM) tablet 50 mg (50 mg Oral Given 10/24/24 2244)   oxybutynin XL (DITROPAN-XL) 24 hr tablet 10 mg (has no administration in time range)   pantoprazole (PROTONIX) injection 40 mg (40 mg Intravenous Given 10/24/24 2143)   sodium chloride 0.9 % flush 10 mL (10 mL Intravenous Given 10/24/24 2145)   sodium chloride 0.9 % flush 10 mL (has no administration in time range)   nitroglycerin (NITROSTAT) SL tablet 0.4 mg (has no administration in time range)   Potassium Replacement - Follow Nurse / BPA Driven Protocol (has no administration in time range)   Magnesium Standard Dose Replacement - Follow Nurse / BPA Driven Protocol (has no administration in time range)   Phosphorus Replacement - Follow Nurse / BPA Driven Protocol (has no administration in time range)   Calcium Replacement - Follow Nurse / BPA Driven Protocol (has no administration in time range)   potassium chloride (KLOR-CON M20) CR tablet 40 mEq (40 mEq Oral Given 10/24/24 2143)   apixaban (ELIQUIS) tablet 5 mg (5 mg Oral Given 10/24/24 2244)   pantoprazole (PROTONIX) injection 80 mg (80 mg Intravenous Given 10/24/24 1648)   iopamidol (ISOVUE-370) 76 % injection 100 mL (95 mL Intravenous Given 10/24/24 1612)       MEDICAL DECISION MAKING, PROGRESS, and CONSULTS   Medical Decision Making  Avelina Ceron is a 83 y.o. female who presents to the ED c/o dark tarry stools.  Patient explains that she was in the hospital from October 10 through 16.  At this time she was treated for GI bleeding.  She was anemic at  7.7.  She had multiple transfusions during her stay.  According to family patient had a colonoscopy by Dr. Bautista.  8 polyps were removed.  Patient explains that she has a history of A-fib.  Upon admission she was on Plavix.  At discharge they have switched her medication to Eliquis.  Patient felt okay on the 16th when she got home.  She explains that she had a couple good days however she has continuingly felt weak.  Approximately 2 days ago she developed a repeat in the dark tarry stools.  Denies any bright red bleeding.  She complains of nausea with no vomiting.  She reports some mild abdominal discomfort.  She denies chest pain.  She denies shortness of breath.  She denies palpitations.  Negative for fever, chills.  Patient contacted her PCP and was told to come to the ER.      Problems Addressed:  Anticoagulated: chronic illness or injury     Details: Patient is on Xarelto.  Patient has a history of A-fib.  Gastrointestinal hemorrhage, unspecified gastrointestinal hemorrhage type: complicated acute illness or injury  Symptomatic anemia: complicated acute illness or injury     Details: Patient's hemoglobin is 8.7.  Patient is stool occult positive.  She complains of weakness we will admit patient to the hospitalist.    Amount and/or Complexity of Data Reviewed  Labs: ordered. Decision-making details documented in ED Course.  Radiology: ordered. Decision-making details documented in ED Course.    Risk  Prescription drug management.  Decision regarding hospitalization.        Discussion below represents my analysis of pertinent findings related to patient's condition, differential diagnosis, treatment plan and final disposition.    Differential diagnosis:  The differential diagnosis associated with the patient's presentation includes: GI bleeding, anemia, abdominal abnormality    Additional sources  Discussed/ obtained information from independent historians:   [] Spouse  [] Parent  [] Family member  [] Friend  []  EMS   [] Other:  External (non-ED) record review:   [x] Inpatient record:   [] Office record:   [x] Outpatient record:   [x] Prior Outpatient labs:   [x] Prior Outpatient radiology:   [] Primary Care record:   [] Outside ED record:   [] Other:   Patient's care impacted by:   [] Diabetes  [x] Hypertension  [x] Hyperlipidemia  [] Hypothyroidism   [] Coronary Artery Disease   [x] COPD   [] Cancer   [] Obesity  [] GERD   [] Tobacco Abuse   [] Substance Abuse    [] Anxiety   [] Depression   [] Other:   Care significantly affected by Social Determinants of Health (housing and economic circumstances, unemployment)    [] Yes     [x] No   If yes, Patient's care significantly limited by  Social Determinants of Health including:   [] Inadequate housing   [] Low income   [] Alcoholism and drug addiction in family   [] Problems related to primary support group   [] Unemployment   [] Problems related to employment   [] Other Social Determinants of Health:   Shared decision making: Shared decision making we will obtain lab work, CT abdomen and pelvis.  Will check for active bleeding.  Will type and cross to prepare for transfusion.    CT imaging reveals no detectable active GI bleeding.  Patient's stool occult positive.  Patient's hemoglobin is 8.7.  We will admit patient to the hospitalist service.    Orders placed during this visit:  Orders Placed This Encounter   Procedures    CT Abdomen Pelvis With & Without Contrast    XR Chest 1 View    [place] Draw    Comprehensive Metabolic Panel    Lactic Acid, Plasma    CBC Auto Differential    CBC Auto Differential    Basic Metabolic Panel    Hemoglobin & Hematocrit, Blood    Magnesium    Urinalysis With Culture If Indicated -    Potassium    Urinalysis, Microscopic Only - Urine, Clean Catch    Diet: Liquid; Clear Liquid; Fluid Consistency: Thin (IDDSI 0)    Undress & Gown    Measure Blood Pressure    Vital Signs    Orthostatic Blood Pressure    Vital Signs    Intake & Output    Weigh  Patient    Oral Care    Place Sequential Compression Device    Maintain Sequential Compression Device    Maintain IV Access    Telemetry - Place Orders & Notify Provider of Results When Patient Experiences Acute Chest Pain, Dysrhythmia or Respiratory Distress    May Be Off Telemetry for Tests    Continuous Pulse Oximetry    Up With Assistance    Strict Intake & Output    Notify Provider (With Default Parameters)    Code Status and Medical Interventions: CPR (Attempt to Resuscitate); Full Support    Inpatient Gastroenterology Consult    PT Consult: Eval & Treat Functional Mobility Below Baseline    Pulse Oximetry    Oxygen Therapy- Nasal Cannula; Titrate 1-6 LPM Per SpO2; 90 - 95%    POC Occult Blood Stool    ECG 12 Lead QT Measurement    Type & Screen    Insert Peripheral IV    Insert Peripheral IV    Insert Peripheral IV    Initiate Observation Status    ED Bed Request    CBC & Differential    Green Top (Gel)    Lavender Top    Gold Top - SST    Gray Top    Light Blue Top       I considered prescription management  with:   [] Pain medication  [] Antiviral  [] Antibiotic   [] Other:   Rationale:  Additional orders considered but not ordered:  The following testing was considered but ultimately not selected after discussion with patient/family:    ED Course:    ED Course as of 10/24/24 2348   Thu Oct 24, 2024   1634 Hemoglobin(!): 8.7 [KG]   1635 Hematocrit(!): 28.7 [KG]   1635 Chest x-ray reviewed:  IMPRESSION:  Impression:  No acute cardiopulmonary abnormality. Emphysema and chronic changes of the chest.      [KG]   1635 WBC(!): 13.73 [KG]   1635 Lactate: 1.2 [KG]   1635 Potassium(!): 3.4 [KG]   1635 Glucose(!): 113 [KG]   1635 Creatinine(!): 0.46 [KG]   1705 Fecal Occult Blood(!): Positive [KG]   1736 IMPRESSION:     1. No site of active gastrointestinal hemorrhage is demonstrated on this exam  2. No acute process demonstrated  3. Bilateral nephrolithiasis  4. Moderate hiatal hernia  5. Colonic diverticulosis    [KG]      ED Course User Index  [KG] Kate Romano APRN            DIAGNOSIS  Final diagnoses:   Gastrointestinal hemorrhage, unspecified gastrointestinal hemorrhage type   Symptomatic anemia   Anticoagulated       DISPOSITION    ED Disposition       ED Disposition   Decision to Admit    Condition   --    Comment   Level of Care: Telemetry [5]   Diagnosis: Melena [[id number]]                 Please note that portions of this document were completed with voice recognition software.       Kate Romano APRN  10/24/24 8044

## 2024-10-25 LAB
ANION GAP SERPL CALCULATED.3IONS-SCNC: 9 MMOL/L (ref 5–15)
BASOPHILS # BLD AUTO: 0.12 10*3/MM3 (ref 0–0.2)
BASOPHILS NFR BLD AUTO: 1.1 % (ref 0–1.5)
BUN SERPL-MCNC: 5 MG/DL (ref 8–23)
BUN/CREAT SERPL: 15.6 (ref 7–25)
CALCIUM SPEC-SCNC: 9.1 MG/DL (ref 8.6–10.5)
CHLORIDE SERPL-SCNC: 104 MMOL/L (ref 98–107)
CO2 SERPL-SCNC: 33 MMOL/L (ref 22–29)
CREAT SERPL-MCNC: 0.32 MG/DL (ref 0.57–1)
DEPRECATED RDW RBC AUTO: 57 FL (ref 37–54)
EGFRCR SERPLBLD CKD-EPI 2021: 103.8 ML/MIN/1.73
EOSINOPHIL # BLD AUTO: 0.19 10*3/MM3 (ref 0–0.4)
EOSINOPHIL NFR BLD AUTO: 1.8 % (ref 0.3–6.2)
ERYTHROCYTE [DISTWIDTH] IN BLOOD BY AUTOMATED COUNT: 15.5 % (ref 12.3–15.4)
FERRITIN SERPL-MCNC: 219.2 NG/ML (ref 13–150)
FOLATE SERPL-MCNC: 7.56 NG/ML (ref 4.78–24.2)
GLUCOSE SERPL-MCNC: 95 MG/DL (ref 65–99)
HCT VFR BLD AUTO: 24.6 % (ref 34–46.6)
HCT VFR BLD AUTO: 25.8 % (ref 34–46.6)
HCT VFR BLD AUTO: 26.6 % (ref 34–46.6)
HCT VFR BLD AUTO: 27.4 % (ref 34–46.6)
HCT VFR BLD AUTO: 27.4 % (ref 34–46.6)
HGB BLD-MCNC: 7.3 G/DL (ref 12–15.9)
HGB BLD-MCNC: 7.8 G/DL (ref 12–15.9)
HGB BLD-MCNC: 8.1 G/DL (ref 12–15.9)
HGB BLD-MCNC: 8.3 G/DL (ref 12–15.9)
HGB BLD-MCNC: 8.3 G/DL (ref 12–15.9)
IMM GRANULOCYTES # BLD AUTO: 0.03 10*3/MM3 (ref 0–0.05)
IMM GRANULOCYTES NFR BLD AUTO: 0.3 % (ref 0–0.5)
LYMPHOCYTES # BLD AUTO: 1.97 10*3/MM3 (ref 0.7–3.1)
LYMPHOCYTES NFR BLD AUTO: 18.4 % (ref 19.6–45.3)
MAGNESIUM SERPL-MCNC: 1.9 MG/DL (ref 1.6–2.4)
MCH RBC QN AUTO: 30.5 PG (ref 26.6–33)
MCHC RBC AUTO-ENTMCNC: 30.3 G/DL (ref 31.5–35.7)
MCV RBC AUTO: 100.7 FL (ref 79–97)
MONOCYTES # BLD AUTO: 0.74 10*3/MM3 (ref 0.1–0.9)
MONOCYTES NFR BLD AUTO: 6.9 % (ref 5–12)
NEUTROPHILS NFR BLD AUTO: 7.63 10*3/MM3 (ref 1.7–7)
NEUTROPHILS NFR BLD AUTO: 71.5 % (ref 42.7–76)
NRBC BLD AUTO-RTO: 0 /100 WBC (ref 0–0.2)
PLATELET # BLD AUTO: 335 10*3/MM3 (ref 140–450)
PMV BLD AUTO: 9.6 FL (ref 6–12)
POTASSIUM SERPL-SCNC: 3.9 MMOL/L (ref 3.5–5.2)
POTASSIUM SERPL-SCNC: 3.9 MMOL/L (ref 3.5–5.2)
RBC # BLD AUTO: 2.72 10*6/MM3 (ref 3.77–5.28)
SODIUM SERPL-SCNC: 146 MMOL/L (ref 136–145)
VIT B12 BLD-MCNC: 332 PG/ML (ref 211–946)
WBC NRBC COR # BLD AUTO: 10.68 10*3/MM3 (ref 3.4–10.8)

## 2024-10-25 PROCEDURE — G0378 HOSPITAL OBSERVATION PER HR: HCPCS

## 2024-10-25 PROCEDURE — 82746 ASSAY OF FOLIC ACID SERUM: CPT | Performed by: INTERNAL MEDICINE

## 2024-10-25 PROCEDURE — 94799 UNLISTED PULMONARY SVC/PX: CPT

## 2024-10-25 PROCEDURE — 99232 SBSQ HOSP IP/OBS MODERATE 35: CPT | Performed by: INTERNAL MEDICINE

## 2024-10-25 PROCEDURE — 82728 ASSAY OF FERRITIN: CPT | Performed by: INTERNAL MEDICINE

## 2024-10-25 PROCEDURE — 94761 N-INVAS EAR/PLS OXIMETRY MLT: CPT

## 2024-10-25 PROCEDURE — 85014 HEMATOCRIT: CPT | Performed by: NURSE PRACTITIONER

## 2024-10-25 PROCEDURE — 80048 BASIC METABOLIC PNL TOTAL CA: CPT | Performed by: NURSE PRACTITIONER

## 2024-10-25 PROCEDURE — 96376 TX/PRO/DX INJ SAME DRUG ADON: CPT

## 2024-10-25 PROCEDURE — 99213 OFFICE O/P EST LOW 20 MIN: CPT | Performed by: PHYSICIAN ASSISTANT

## 2024-10-25 PROCEDURE — 83735 ASSAY OF MAGNESIUM: CPT | Performed by: NURSE PRACTITIONER

## 2024-10-25 PROCEDURE — 94664 DEMO&/EVAL PT USE INHALER: CPT

## 2024-10-25 PROCEDURE — 84132 ASSAY OF SERUM POTASSIUM: CPT | Performed by: INTERNAL MEDICINE

## 2024-10-25 PROCEDURE — 97161 PT EVAL LOW COMPLEX 20 MIN: CPT

## 2024-10-25 PROCEDURE — 85018 HEMOGLOBIN: CPT | Performed by: NURSE PRACTITIONER

## 2024-10-25 PROCEDURE — 85025 COMPLETE CBC W/AUTO DIFF WBC: CPT | Performed by: NURSE PRACTITIONER

## 2024-10-25 PROCEDURE — 82607 VITAMIN B-12: CPT | Performed by: INTERNAL MEDICINE

## 2024-10-25 RX ORDER — POTASSIUM CHLORIDE 1500 MG/1
20 TABLET, EXTENDED RELEASE ORAL ONCE
Status: COMPLETED | OUTPATIENT
Start: 2024-10-25 | End: 2024-10-25

## 2024-10-25 RX ADMIN — TRAMADOL HYDROCHLORIDE 50 MG: 50 TABLET, COATED ORAL at 06:05

## 2024-10-25 RX ADMIN — TIOTROPIUM BROMIDE INHALATION SPRAY 2 PUFF: 3.12 SPRAY, METERED RESPIRATORY (INHALATION) at 07:52

## 2024-10-25 RX ADMIN — LOSARTAN POTASSIUM 50 MG: 50 TABLET, FILM COATED ORAL at 08:59

## 2024-10-25 RX ADMIN — DOFETILIDE 250 MCG: 0.25 CAPSULE ORAL at 09:01

## 2024-10-25 RX ADMIN — BUDESONIDE AND FORMOTEROL FUMARATE DIHYDRATE 2 PUFF: 160; 4.5 AEROSOL RESPIRATORY (INHALATION) at 07:51

## 2024-10-25 RX ADMIN — TRAMADOL HYDROCHLORIDE 50 MG: 50 TABLET, COATED ORAL at 15:27

## 2024-10-25 RX ADMIN — APIXABAN 5 MG: 5 TABLET, FILM COATED ORAL at 09:00

## 2024-10-25 RX ADMIN — LATANOPROST 1 DROP: 50 SOLUTION OPHTHALMIC at 21:16

## 2024-10-25 RX ADMIN — ATORVASTATIN CALCIUM 20 MG: 20 TABLET, FILM COATED ORAL at 09:01

## 2024-10-25 RX ADMIN — ROFLUMILAST 500 MCG: 500 TABLET ORAL at 09:02

## 2024-10-25 RX ADMIN — DOFETILIDE 250 MCG: 0.25 CAPSULE ORAL at 21:15

## 2024-10-25 RX ADMIN — PANTOPRAZOLE SODIUM 40 MG: 40 INJECTION, POWDER, FOR SOLUTION INTRAVENOUS at 21:15

## 2024-10-25 RX ADMIN — OXYBUTYNIN CHLORIDE 10 MG: 5 TABLET, EXTENDED RELEASE ORAL at 08:59

## 2024-10-25 RX ADMIN — TRAMADOL HYDROCHLORIDE 50 MG: 50 TABLET, COATED ORAL at 22:51

## 2024-10-25 RX ADMIN — LEVOTHYROXINE SODIUM 200 MCG: 0.1 TABLET ORAL at 05:08

## 2024-10-25 RX ADMIN — POTASSIUM CHLORIDE 40 MEQ: 1500 TABLET, EXTENDED RELEASE ORAL at 05:08

## 2024-10-25 RX ADMIN — SERTRALINE 100 MG: 100 TABLET, FILM COATED ORAL at 09:00

## 2024-10-25 RX ADMIN — Medication 10 ML: at 21:16

## 2024-10-25 RX ADMIN — ISOSORBIDE MONONITRATE 30 MG: 30 TABLET, EXTENDED RELEASE ORAL at 09:00

## 2024-10-25 RX ADMIN — PANTOPRAZOLE SODIUM 40 MG: 40 INJECTION, POWDER, FOR SOLUTION INTRAVENOUS at 08:59

## 2024-10-25 RX ADMIN — GABAPENTIN 300 MG: 300 CAPSULE ORAL at 21:15

## 2024-10-25 RX ADMIN — POTASSIUM CHLORIDE 20 MEQ: 1500 TABLET, EXTENDED RELEASE ORAL at 21:14

## 2024-10-25 RX ADMIN — CETIRIZINE HYDROCHLORIDE 10 MG: 10 TABLET, FILM COATED ORAL at 09:00

## 2024-10-25 RX ADMIN — Medication 10 ML: at 09:02

## 2024-10-25 RX ADMIN — ALBUTEROL SULFATE 2.5 MG: 2.5 SOLUTION RESPIRATORY (INHALATION) at 12:38

## 2024-10-25 RX ADMIN — POTASSIUM CHLORIDE 20 MEQ: 1500 TABLET, EXTENDED RELEASE ORAL at 12:58

## 2024-10-25 RX ADMIN — ALBUTEROL SULFATE 2.5 MG: 2.5 SOLUTION RESPIRATORY (INHALATION) at 07:51

## 2024-10-25 NOTE — PLAN OF CARE
Goal Outcome Evaluation:  Plan of Care Reviewed With: patient           Outcome Evaluation: PT eval completed. Pt presents slightly below baseline function d/t generalized weakness, balance deficits, and decreased activity tolerance. Pt ambulated 80 ft w/ FWW, CGA. Ambulated using 2L O2 NC and O2 sats stable throughout. Pt would benefit from skilled IP PT. Recommend home w/ assist and HHPT at d/c.    Anticipated Discharge Disposition (PT): home with assist, home with home health

## 2024-10-25 NOTE — THERAPY EVALUATION
"Patient Name: Avelina Ceron  : 1940    MRN: [id number]                              Today's Date: 10/25/2024       Admit Date: 10/24/2024    Visit Dx:     ICD-10-CM ICD-9-CM   1. Gastrointestinal hemorrhage, unspecified gastrointestinal hemorrhage type  K92.2 578.9   2. Symptomatic anemia  D64.9 285.9   3. Anticoagulated  Z79.01 V58.61     Patient Active Problem List   Diagnosis    Coronary arteriosclerosis in native artery    Chronic obstructive pulmonary disease    Depression    Gastroesophageal reflux disease with esophagitis    Hyperlipidemia    Hypertension    Hypothyroidism    Osteoarthritis    Peripheral arterial occlusive disease    Solitary pulmonary nodule    Vitamin D deficiency    Lower back pain    Carotid bruit    Hypoxemia    Chronic respiratory failure with hypoxia and hypercapnia    Allergic rhinitis    Chronic leukocytosis    Ureterolithiasis    Murmur, cardiac    Hospital discharge follow-up    Closed fracture of right proximal humerus    Right shoulder pain    Acute hypoxemic respiratory failure    Anemia due to blood loss, acute    Blood loss anemia    Anemia due to acute blood loss    Melena     Past Medical History:   Diagnosis Date    Anesthesia complication     Pt went into respiratory failure during total hip replacement in     Arthritis of back     Arthritis of neck     Broken ribs     COPD (chronic obstructive pulmonary disease)     Coronary artery disease     Degenerative lumbar disc     Disease of thyroid gland     \"low thyroid\"    Fracture, fibula ?    Fracture, humerus 23    Fracture, tibia and fibula ?    Hip arthrosis     History of colonoscopy     Hyperlipidemia     Hypertension     Kidney stone 2023    Knee swelling     Lumbosacral disc disease     On home O2     3L    Osteoarthritis     Periarthritis of shoulder     Urinary incontinence     Urinary tract infection      Past Surgical History:   Procedure Laterality Date    CAROTID STENT Bilateral  "    COLONOSCOPY      COLONOSCOPY N/A 10/12/2024    Procedure: COLONOSCOPY;  Surgeon: Markie Bautista MD;  Location:  [facility];  Service: Gastroenterology;  Laterality: N/A;    ENDOSCOPY N/A 10/11/2024    Procedure: ESOPHAGOGASTRODUODENOSCOPY;  Surgeon: Markie Bautista MD;  Location:  [facility];  Service: Gastroenterology;  Laterality: N/A;    ENDOSCOPY N/A 10/12/2024    Procedure: ESOPHAGOGASTRODUODENOSCOPY;  Surgeon: Markie Bautista MD;  Location:  [facility];  Service: Gastroenterology;  Laterality: N/A;    EYE SURGERY  10/17/2022    yag surgery    GALLBLADDER SURGERY  2012    HYSTERECTOMY      ILIAC ARTERY STENT Bilateral     JOINT REPLACEMENT  1995; 2020    KIDNEY STONE SURGERY  July August 2023    OOPHORECTOMY  1976    1 ovary removed    TOTAL HIP ARTHROPLASTY Bilateral     URETEROSCOPY LASER LITHOTRIPSY WITH STENT INSERTION Right 07/26/2023    Procedure: CYSTOSCOPY, RIGHT RETROGRADE PYELOGRAM; WITH STENT INSERTION;  Surgeon: Lee Kim MD;  Location:  [facility];  Service: Urology;  Laterality: Right;    URETEROSCOPY LASER LITHOTRIPSY WITH STENT INSERTION Right 08/10/2023    Procedure: URETEROSCOPY, LASER LITHOTRIPSY, STENT EXCHANGE;  Surgeon: Lee Kim MD;  Location:  [facility];  Service: Urology;  Laterality: Right;      General Information       Row Name 10/25/24 1004          Physical Therapy Time and Intention    Document Type evaluation  -     Mode of Treatment physical therapy  -       Row Name 10/25/24 1004          General Information    Patient Profile Reviewed yes  -LH     Prior Level of Function independent:;all household mobility;gait;bed mobility;transfer;ADL's;dressing;bathing;dependent:;home management;cooking  uses rollator at baseline. Limited community ambulator and uses transport chair for farther distances. 2L O2 at all times. Endorses acute falls  -     Existing Precautions/Restrictions fall;oxygen therapy device and L/min  -     Barriers to Rehab  medically complex;previous functional deficit  -       Row Name 10/25/24 1004          Living Environment    People in Home child(gabriela), adult;other (see comments)  lives w/ daughter who is home majority of time  -       Row Name 10/25/24 1004          Home Main Entrance    Number of Stairs, Main Entrance two;other (see comments)  1+1  -     Stair Railings, Main Entrance railings on both sides of stairs  -       Row Name 10/25/24 1004          Stairs Within Home, Primary    Stairs, Within Home, Primary 1+1 steps inside to get from mud room to living room. grab bars on both sides  -     Number of Stairs, Within Home, Primary two  -     Stair Railings, Within Home, Primary railings on both sides of stairs  -       Row Name 10/25/24 1004          Cognition    Orientation Status (Cognition) oriented x 3  -[facility] Name 10/25/24 1004          Safety Issues/Impairments Affecting Functional Mobility    Safety Issues Affecting Function (Mobility) safety precaution awareness;safety precautions follow-through/compliance;sequencing abilities  -     Impairments Affecting Function (Mobility) balance;endurance/activity tolerance;strength  -               User Key  (r) = Recorded By, (t) = Taken By, (c) = Cosigned By      Initials Name Provider Type     Ju Bess, PT Physical Therapist                   Mobility       [facility] Name 10/25/24 1006          Bed Mobility    Bed Mobility supine-sit  -     Supine-Sit Doyle (Bed Mobility) modified independence  -     Assistive Device (Bed Mobility) bed rails;head of bed elevated  -     Comment, (Bed Mobility) VCs for hand placement and sequencing. Pt reports having adjustable bed at home  -       Row Name 10/25/24 1006          Transfers    Comment, (Transfers) VCs for hand placement and sequencing  -       Row Name 10/25/24 1006          Sit-Stand Transfer    Sit-Stand Doyle (Transfers) standby assist;verbal cues  -     Comment,  (Sit-Stand Transfer) STS from EOB  -       Row Name 10/25/24 1006          Gait/Stairs (Locomotion)    [place] Level (Gait) contact guard;verbal cues  -     Assistive Device (Gait) walker, front-wheeled  -     Patient was able to Ambulate yes  -     Distance in Feet (Gait) 80  -     Deviations/Abnormal Patterns (Gait) bilateral deviations;clarisse decreased;gait speed decreased;stride length decreased  -     Bilateral Gait Deviations forward flexed posture  -     Comment, (Gait/Stairs) Pt demo step through gait pattern w/ decreased speed and increased foward flexed posture. VCs for upright posture, forward gaze, and AD positioning. No overt LOB noted. Ambulated on 2L O2 and SpO2 98% once sitting in room after ambulation. Distance limited by weakness and fatigue.  -               User Key  (r) = Recorded By, (t) = Taken By, (c) = Cosigned By      Initials Name Provider Type     Ju Bess PT Physical Therapist                   Obj/Interventions       Row Name 10/25/24 1009          Range of Motion Comprehensive    General Range of Motion bilateral lower extremity ROM WFL  -[facility] Name 10/25/24 1009          Strength Comprehensive (MMT)    General Manual Muscle Testing (MMT) Assessment lower extremity strength deficits identified  -     Comment, General Manual Muscle Testing (MMT) Assessment BLEs grossly 4+/5  -[facility] Name 10/25/24 1009          Balance    Balance Assessment sitting static balance;sitting dynamic balance;standing static balance;standing dynamic balance  -     Static Sitting Balance supervision  -     Dynamic Sitting Balance standby assist  -     Position, Sitting Balance unsupported;sitting edge of bed  -     Static Standing Balance standby assist  -     Dynamic Standing Balance contact guard;verbal cues  -     Position/Device Used, Standing Balance supported;walker, front-wheeled  -     Balance Interventions sitting;standing;sit to  stand;supported;static;dynamic  -[facility] Name 10/25/24 1009          Sensory Assessment (Somatosensory)    Sensory Assessment (Somatosensory) LE sensation intact  -               User Key  (r) = Recorded By, (t) = Taken By, (c) = Cosigned By      Initials Name Provider Type     Ju Bess PT Physical Therapist                   Goals/Plan       Row Name 10/25/24 1011          Transfer Goal 1 (PT)    Activity/Assistive Device (Transfer Goal 1, PT) sit-to-stand/stand-to-sit;bed-to-chair/chair-to-bed  -     [place] Level/Cues Needed (Transfer Goal 1, PT) modified independence  -     Time Frame (Transfer Goal 1, PT) short term goal (STG);3 days  -[facility] Name 10/25/24 1011          Gait Training Goal 1 (PT)    Activity/Assistive Device (Gait Training Goal 1, PT) gait (walking locomotion);assistive device use  -     [place] Level (Gait Training Goal 1, PT) modified independence  -     Distance (Gait Training Goal 1, PT) 150 ft  -     Time Frame (Gait Training Goal 1, PT) long term goal (LTG);5 days  -LH       Row Name 10/25/24 1011          Therapy Assessment/Plan (PT)    Planned Therapy Interventions (PT) balance training;bed mobility training;gait training;home exercise program;neuromuscular re-education;patient/family education;postural re-education;ROM (range of motion);stair training;strengthening;stretching;transfer training  [facility] [place]               User Key  (r) = Recorded By, (t) = Taken By, (c) = Cosigned By      Initials Name Provider Type     Ju Bess PT Physical Therapist                   Clinical Impression       [facility] Name 10/25/24 1009          Pain    Pretreatment Pain Rating 0/10 - no pain  -     Posttreatment Pain Rating 0/10 - no pain  -     Pain Management Interventions premedicated for activity  -     Pre/Posttreatment Pain Comment Pt premedicated for chronic LBP  -[facility] Name 10/25/24 1009          Plan of Care Review    Plan of Care Reviewed  With patient  -     Outcome Evaluation PT eval completed. Pt presents slightly below baseline function d/t generalized weakness, balance deficits, and decreased activity tolerance. Pt ambulated 80 ft w/ FWW, CGA. Ambulated using 2L O2 NC and O2 sats stable throughout. Pt would benefit from skilled IP PT. Recommend home w/ assist and HHPT at d/c.  -       Row Name 10/25/24 1009          Therapy Assessment/Plan (PT)    Patient/Family Therapy Goals Statement (PT) to go home  -     Rehab Potential (PT) good  -     Criteria for Skilled Interventions Met (PT) yes;meets criteria;skilled treatment is necessary  -     Therapy Frequency (PT) daily  -     Predicted Duration of Therapy Intervention (PT) 5 days  -       Row Name 10/25/24 1009          Vital Signs    Pretreatment Heart Rate (beats/min) 87  -     Pre SpO2 (%) 96  -LH     O2 Delivery Pre Treatment supplemental O2  -     Intra SpO2 (%) 98  -     O2 Delivery Intra Treatment supplemental O2  -     Post SpO2 (%) 98  -     O2 Delivery Post Treatment supplemental O2  -     Pre Patient Position Supine  -     Intra Patient Position Sitting  -     Post Patient Position Sitting  -       Row Name 10/25/24 1009          Positioning and Restraints    Pre-Treatment Position in bed  -     Post Treatment Position chair  -     In Chair notified nsg;reclined;sitting;call light within reach;encouraged to call for assist;exit alarm on;waffle cushion;legs elevated  -               User Key  (r) = Recorded By, (t) = Taken By, (c) = Cosigned By      Initials Name Provider Type     Ju Bess, PT Physical Therapist                   Outcome Measures       Row Name 10/25/24 1012 10/24/24 8301       How much help from another person do you currently need...    Turning from your back to your side while in flat bed without using bedrails? 4  - 4  -AM    Moving from lying on back to sitting on the side of a flat bed without bedrails? 4  - 4  -AM     Moving to and from a bed to a chair (including a wheelchair)? 3  - 4  -AM    Standing up from a chair using your arms (e.g., wheelchair, bedside chair)? 3  - 4  -AM    Climbing 3-5 steps with a railing? 3  - 3  -AM    To walk in hospital room? 3  - 3  -AM    AM-PAC 6 Clicks Score (PT) 20  - 22  -AM    Highest Level of Mobility Goal 6 --> Walk 10 steps or more  - 7 --> Walk 25 feet or more  -AM      Row Name 10/25/24 1012          Functional Assessment    Outcome Measure Options AM-PAC 6 Clicks Basic Mobility (PT)  -               User Key  (r) = Recorded By, (t) = Taken By, (c) = Cosigned By      Initials Name Provider Type    AM Carly Garner, RN Registered Nurse     Ju Bess, PT Physical Therapist                                 Physical Therapy Education       Title: PT OT SLP Therapies (In Progress)       Topic: Physical Therapy (In Progress)       Point: Mobility training (Done)       Learning Progress Summary            Patient Acceptance, E, VU,DU,NR by  at 10/25/2024 1012                      Point: Home exercise program (Not Started)       Learner Progress:  Not documented in this visit.              Point: Body mechanics (Done)       Learning Progress Summary            Patient Acceptance, E, VU,DU,NR by  at 10/25/2024 1012                      Point: Precautions (Done)       Learning Progress Summary            Patient Acceptance, E, VU,DU,NR by  at 10/25/2024 1012                                      User Key       Initials Effective Dates Name Provider Type Discipline     09/21/23 -  Ju Bess, PT Physical Therapist PT                  PT Recommendation and Plan  Planned Therapy Interventions (PT): balance training, bed mobility training, gait training, home exercise program, neuromuscular re-education, patient/family education, postural re-education, ROM (range of motion), stair training, strengthening, stretching, transfer training  Outcome Evaluation: PT  eval completed. Pt presents slightly below baseline function d/t generalized weakness, balance deficits, and decreased activity tolerance. Pt ambulated 80 ft w/ FWW, CGA. Ambulated using 2L O2 NC and O2 sats stable throughout. Pt would benefit from skilled IP PT. Recommend home w/ assist and HHPT at d/c.     Time Calculation:   PT Evaluation Complexity  History, PT Evaluation Complexity: 1-2 personal factors and/or comorbidities  Examination of Body Systems (PT Eval Complexity): total of 3 or more elements  Clinical Presentation (PT Evaluation Complexity): stable  Clinical Decision Making (PT Evaluation Complexity): low complexity  Overall Complexity (PT Evaluation Complexity): low complexity     PT Charges       Row Name 10/25/24 1013             Time Calculation    Start Time 0915  -      PT Received On 10/25/24  -      PT Goal Re-Cert Due Date 11/04/24  -         Untimed Charges    PT Eval/Re-eval Minutes 48  -LH         Total Minutes    Untimed Charges Total Minutes 48  -LH       Total Minutes 48  -LH         PT/SLP KX Modifier    Exception criteria met to exceed therapy cap Apply KX Modifier  -                User Key  (r) = Recorded By, (t) = Taken By, (c) = Cosigned By      Initials Name Provider Type     Ju Bess, PT Physical Therapist                  Therapy Charges for Today       Code Description Service Date Service Provider Modifiers Qty    [id number]  PT EVAL LOW COMPLEXITY 4 10/25/2024 Ju Bess, PT GP 1            PT G-Codes  Outcome Measure Options: AM-PAC 6 Clicks Basic Mobility (PT)  AM-PAC 6 Clicks Score (PT): 20  PT Discharge Summary  Anticipated Discharge Disposition (PT): home with assist, home with home health    Ju Bess PT  10/25/2024

## 2024-10-25 NOTE — OUTREACH NOTE
Medical Week 2 Survey      Flowsheet Row Responses   [facility] patient discharged from? [facility]   Does the patient have one of the following disease processes/diagnoses(primary or secondary)? Other   Week 2 attempt successful? No   Unsuccessful attempts Attempt 1   Revoke Readmitted            EDGAR CRAWLEY - Registered Nurse

## 2024-10-25 NOTE — PROGRESS NOTES
[facility]  PROGRESS NOTE    Patient Name: Avelina Ceron  : 1940  MRN: [id number]    Date of Admission: 10/24/2024  Primary Care Physician: Dre Moura MD    Subjective   Subjective     CC:  Melena     HPI:  Patient reports that she was largely constipated after her last hospitalization and did not notice any bloody stools.  However, she states that over the past 2 days and has noted dark tarry stools, unable to quantify how many.  1 dark brown bowel movement per nursing report today.      Objective   Objective     Vital Signs:   Temp:  [97.6 °F (36.4 °C)-98 °F (36.7 °C)] 97.8 °F (36.6 °C)  Heart Rate:  [] 86  Resp:  [18-20] 18  BP: ()/(41-60) 105/45  Flow (L/min) (Oxygen Therapy):  [1-2] 1     Physical Exam:  Physical Exam  Constitutional:       General: She is not in acute distress.  HENT:      Head: Normocephalic and atraumatic.   Eyes:      Conjunctiva/sclera: Conjunctivae normal.   Cardiovascular:      Rate and Rhythm: Normal rate and regular rhythm.   Pulmonary:      Effort: Pulmonary effort is normal.      Breath sounds: Normal breath sounds.   Abdominal:      General: There is no distension.      Palpations: Abdomen is soft.      Tenderness: There is no abdominal tenderness.   Musculoskeletal:      Right lower leg: No edema.      Left lower leg: No edema.   Skin:     General: Skin is warm and dry.   Neurological:      Mental Status: She is alert and oriented to person, place, and time. Mental status is at baseline.          Results Reviewed:  LAB RESULTS:      Lab 10/25/24  1212 10/25/24  0712 10/25/24  0009 10/24/24  1508   WBC  --  10.68  --  13.73*   HEMOGLOBIN 7.8* 8.3*  8.3* 8.1* 8.7*   HEMATOCRIT 25.8* 27.4*  27.4* 26.6* 28.7*   PLATELETS  --  335  --  368   NEUTROS ABS  --  7.63*  --  10.11*   IMMATURE GRANS (ABS)  --  0.03  --  0.06*   LYMPHS ABS  --  1.97  --  2.60   MONOS ABS  --  0.74  --  0.78   EOS ABS  --  0.19  --   0.09   MCV  --  100.7*  --  101.1*   LACTATE  --   --   --  1.2         Lab 10/25/24  0712 10/24/24  1508   SODIUM 146* 141   POTASSIUM 3.9 3.4*   CHLORIDE 104 100   CO2 33.0* 31.0*   ANION GAP 9.0 10.0   BUN 5* 9   CREATININE 0.32* 0.46*   EGFR 103.8 95.1   GLUCOSE 95 113*   CALCIUM 9.1 9.2   MAGNESIUM 1.9  --          Lab 10/24/24  1508   TOTAL PROTEIN 6.8   ALBUMIN 4.1   GLOBULIN 2.7   ALT (SGPT) 19   AST (SGOT) 23   BILIRUBIN <0.2   ALK PHOS 66                 Lab 10/25/24  0712 10/24/24  1508   FERRITIN 219.20*  --    FOLATE 7.56  --    VITAMIN B 12 332  --    ABO TYPING  --  A   RH TYPING  --  Positive   ANTIBODY SCREEN  --  Negative         Brief Urine Lab Results  (Last result in the past 365 days)        Color   Clarity   Blood   Leuk Est   Nitrite   Protein   CREAT   Urine HCG        10/24/24 2328 Yellow   Clear   Negative   Small (1+)   Negative   Negative                   Microbiology Results Abnormal       None            CT Abdomen Pelvis With & Without Contrast    Result Date: 10/24/2024  CT ABDOMEN PELVIS W WO CONTRAST Date of Exam: 10/24/2024 3:51 PM EDT Indication: Gi bleeding. GI Bleed. Comparison: 10/10/2024 Technique: Axial CT images were obtained of the abdomen and pelvis before and after the uneventful intravenous administration of iodinated contrast. Sagittal and coronal reconstructions were performed.  Automated exposure control and iterative reconstruction methods were used. Findings: Lower Chest: Lung bases clear. Moderate hiatal hernia Organs: 7 mm left lower pole renal calculus. Punctate right lower pole renal calculus small bilateral renal cysts. Liver, spleen, pancreas, adrenal glands unremarkable. Gallbladder surgically absent Gastrointestinal: 4 clips in the right colon, and a clip in the sigmoid colon. No intraluminal extravasation of intravenous contrast in the arterial, venous, and delayed phases. Moderate hiatal hernia. Postoperative changes of the cecum. Diverticula of the  sigmoid colon without inflammation. No intestinal distention or definite wall thickening Pelvis: Streak artifact from bilateral prostheses obscures the lower pelvis Peritoneum/Retroperitoneum: No ascites or pneumoperitoneum. Normal caliber atherosclerotic aorta Bones/Soft Tissues: No acute bony abnormality. Degenerative disease of the lower thoracic and lumbar spine. Status post bilateral hip arthroplasties. Old fracture of the right ilium     Impression: 1. No site of active gastrointestinal hemorrhage is demonstrated on this exam 2. No acute process demonstrated 3. Bilateral nephrolithiasis 4. Moderate hiatal hernia 5. Colonic diverticulosis Electronically Signed: Pete Valdez  10/24/2024 4:33 PM EDT  Workstation ID: [id number]    XR Chest 1 View    Result Date: 10/24/2024  XR CHEST 1 VW Date of Exam: 10/24/2024 2:39 PM EDT Indication: SOA Comparison: Chest x-ray 10/9/2024 Findings: Emphysema. Heart size normal. No pneumothorax or pleural effusion or focal pulmonary parenchymal opacity.     Impression: Impression: No acute cardiopulmonary abnormality. Emphysema and chronic changes of the chest. Electronically Signed: Mer Oleary MD  10/24/2024 2:58 PM EDT  Workstation ID: [id number]     Results for orders placed during the hospital encounter of 08/26/24    Adult Transthoracic Echo Limited W/ Cont if Necessary Per Protocol    Interpretation Summary    Left ventricular systolic function is normal. Calculated left ventricular EF = 64%    Moderate tricuspid valve regurgitation is present.    Estimated right ventricular systolic pressure from tricuspid regurgitation is moderately elevated (45-55 mmHg).    Mild MR      Current medications:  Scheduled Meds:albuterol, 2.5 mg, Nebulization, Q6H - RT  apixaban, 5 mg, Oral, Q12H  atorvastatin, 20 mg, Oral, Daily  budesonide-formoterol, 2 puff, Inhalation, BID - RT   And  tiotropium bromide monohydrate, 2 puff, Inhalation, Daily - RT  cetirizine, 10 mg, Oral, Daily  dofetilide,  250 mcg, Oral, BID  gabapentin, 300 mg, Oral, Nightly  isosorbide mononitrate, 30 mg, Oral, Daily  latanoprost, 1 drop, Both Eyes, Nightly  levothyroxine, 200 mcg, Oral, Q AM  losartan, 50 mg, Oral, Q24H  oxybutynin XL, 10 mg, Oral, Daily  pantoprazole, 40 mg, Intravenous, Q12H  roflumilast, 500 mcg, Oral, Daily  sertraline, 100 mg, Oral, Daily  sodium chloride, 500 mL, Intravenous, Once  sodium chloride, 10 mL, Intravenous, Q12H      Continuous Infusions:   PRN Meds:.  Calcium Replacement - Follow Nurse / BPA Driven Protocol    Magnesium Standard Dose Replacement - Follow Nurse / BPA Driven Protocol    nitroglycerin    Phosphorus Replacement - Follow Nurse / BPA Driven Protocol    Potassium Replacement - Follow Nurse / BPA Driven Protocol    sodium chloride    [COMPLETED] Insert Peripheral IV **AND** sodium chloride    sodium chloride    traMADol    Assessment & Plan   Assessment & Plan     Active Hospital Problems    Diagnosis  POA    **Melena [K92.1]  Yes    Anemia due to acute blood loss [D62]  Yes    Chronic obstructive pulmonary disease [J44.9]  Yes    Coronary arteriosclerosis in native artery [I25.10]  Yes    Depression [F32.A]  Yes    Gastroesophageal reflux disease with esophagitis [K21.00]  Yes    Hyperlipidemia [E78.5]  Yes    Hypothyroidism [E03.9]  Yes    Hypertension [I10]  Yes      Resolved Hospital Problems   No resolved problems to display.        Brief Hospital Course to date:  Avelina Ceron is a 83 y.o. female presents to the ED with dark tarry stools over the past three days.     1) Possible melena       Macrocytic Anemia   -10/11 EGD largely unremarkable. 10/12: Recent lower GI bleed secondary to multiple bleeding colon polyps s/p colonoscopy on 10/12 with removal and endoclips applied.  Ileocecal adenomatous polyp, needs outpatient colonoscopy with EMR  -H&H 8.7--> 8.1 --> 8.3 --> 7.8.  Repeat hemoglobin pending  -Dark brown stool x1 during the day today   -consulted GI in am but currently no  plans for repeat scope.  Recommend to trend H&H and observe. She has a known polyp with need for complex EMR with plan to address this outpatient once she can safely come off anticoagulation ideally if H/H stable.   -clear liquid diet for now    -Continue PPI BID,   -Hold Eliquis  - Monitor closely     2) Hypokalemia  -replace per protocol     3) CAD  -S/p bilaeral ICA stents  -last admission stopped plavix  -continue imdur  -follows with cardiology outpatient      4) PAD  -s/p bilateral iliac artery stents  -plavix d/c'd last admission      5) Essential hypertension       Hyperlipidemia   -continue statin, losartan      6) Oxygen dependent COPD   -continue symbicort, spirivia, roflumilast  -currently on 1.5L with oxygen sat of 100%     7) Anxiety/depression   -continue sertraline      Expected Discharge Location and Transportation: Home   Expected Discharge   Expected discharge date/ time has not been documented.     VTE Prophylaxis:  Pharmacologic & mechanical VTE prophylaxis orders are present.       AM-PAC 6 Clicks Score (PT): 20 (10/25/24 1012)    CODE STATUS:   Code Status and Medical Interventions: CPR (Attempt to Resuscitate); Full Support   Ordered at: 10/24/24 2013     Level Of Support Discussed With:    Patient     Code Status (Patient has no pulse and is not breathing):    CPR (Attempt to Resuscitate)     Medical Interventions (Patient has pulse or is breathing):    Full Support       Kimani Clinton MD  10/25/24      Attending Attestation       I have performed an independent face-to-face diagnostic evaluation including performing an independent physical examination.  The documented plan of care above was reviewed and developed with Dr. Harman,  resident, who performed portions of the examination and documentation for this patient's care under my direct supervision.      Brief HPI  Patient seen and examined in the afternoon.  Comfortable in bed.  No abdominal pain.  Reported that she had another tarry  bowel movement in the afternoon.  Not feeling lightheaded.  Blood pressure is a little bit soft with systolic in the 90s to 100    Attending Physical Exam:  Temp:  [97.6 °F (36.4 °C)-98 °F (36.7 °C)] 97.8 °F (36.6 °C)  Heart Rate:  [] 86  Resp:  [18-20] 18  BP: ()/(41-60) 105/45  Flow (L/min) (Oxygen Therapy):  [1-2] 1    General: Comfortable, not in distress, conversant and cooperative  Head: Atraumatic and normocephalic  Eyes: No Icterus.++  Pallor  Ears:  Ears appear intact with no abnormalities noted  Throat: No oral lesions, no thrush  Neck: Supple, trachea midline  Lungs: Clear to auscultation bilaterally, equal air entry, no wheezing or crackles  Heart:  Normal S1 and S2, no murmur, no gallop, No JVD, no lower extremity swelling  Abdomen:  Soft, no tenderness, no organomegaly, normal bowel sounds, no organomegaly  Extremities: pulses equal bilaterally  Skin: No bleeding, bruising or rash, normal skin turgor and elasticity  Neurologic: Cranial nerves appear intact with no evidence of facial asymmetry, normal motor and sensory functions in all 4 extremities  Psych: Alert and oriented x 3, normal mood  Assessment and Plan:    See assessment and plan documented by resident above and updated/edited by me as appropriate.    Murtaza Rankin MD  10/25/24

## 2024-10-25 NOTE — CONSULTS
[facility]    Referring Provider: Murtaza Rankin MD     PCP: Dre Moura MD    Reason for Consultation: Dark stools     Chief complaint: Dark stools     History of present illness:    Avelina Ceron is a 83 y.o. female who is admitted with dark tarry stools.  She was recently discharged from our facility on 10/16 for GI bleed with melena.   She underwent an EGD 10/11 that showed a 5 cm hiatal hernia, gastritis, duodenal diverticulum with scant blood, this area was lavaged with no active bleeding seen.   Colonoscopy performed on 10/12/24 with multiple bleeding polyps removed and clipped.  One 30 mm, non bleeding polypoid lesion at ileo-colonic anastomosis was biopsied.  Pathology of this showed adenoma with no malignancy.  It was recommended she undergo endoscopic mucosal resection in the the next two months when she can be off of her anticoagulation for two days.      She is on chronic anticoagulation with Eliquis for atrial fibrillation.  She is on 81 mg aspirin daily for peripheral vascular disease.   Plavix was discontinued on her last admission.      She describes difficulty with constipation for several days after discharge.   She then had a dark tarry bowel movement.   These persisted and thus prompted her to present to the ER as instructed.   H&H was fortunately stable from last week at 8.3 and 27.             Allergies:  Penicillins, Codeine, and Dopamine    Scheduled Meds:  albuterol, 2.5 mg, Nebulization, Q6H - RT  apixaban, 5 mg, Oral, Q12H  atorvastatin, 20 mg, Oral, Daily  budesonide-formoterol, 2 puff, Inhalation, BID - RT   And  tiotropium bromide monohydrate, 2 puff, Inhalation, Daily - RT  cetirizine, 10 mg, Oral, Daily  dofetilide, 250 mcg, Oral, BID  gabapentin, 300 mg, Oral, Nightly  isosorbide mononitrate, 30 mg, Oral, Daily  latanoprost, 1 drop, Both Eyes, Nightly  levothyroxine, 200 mcg, Oral, Q AM  losartan, 50 mg, Oral, Q24H  oxybutynin XL, 10 mg, Oral,  Daily  pantoprazole, 40 mg, Intravenous, Q12H  roflumilast, 500 mcg, Oral, Daily  sertraline, 100 mg, Oral, Daily  sodium chloride, 500 mL, Intravenous, Once  sodium chloride, 10 mL, Intravenous, Q12H         Infusions:       PRN Meds:    Calcium Replacement - Follow Nurse / BPA Driven Protocol    Magnesium Standard Dose Replacement - Follow Nurse / BPA Driven Protocol    nitroglycerin    Phosphorus Replacement - Follow Nurse / BPA Driven Protocol    Potassium Replacement - Follow Nurse / BPA Driven Protocol    sodium chloride    [COMPLETED] Insert Peripheral IV **AND** sodium chloride    sodium chloride    traMADol    Home Meds:  Medications Prior to Admission   Medication Sig Dispense Refill Last Dose/Taking    acetaminophen (TYLENOL) 500 MG tablet Take 1 tablet by mouth Every 6 (Six) Hours As Needed for Mild Pain, Headache or Fever. OTC   10/24/2024 Morning    apixaban (ELIQUIS) 5 MG tablet tablet Take 1 tablet by mouth Every 12 (Twelve) Hours as directed for Atrial Fibrillation 60 tablet 2 10/24/2024 Morning    aspirin 81 MG chewable tablet Chew 1 tablet Daily. OTC   10/23/2024 Morning    dofetilide (TIKOSYN) 250 MCG capsule Take 1 capsule by mouth 2 (Two) Times a Day for 90 days. 60 capsule 2 10/24/2024 Morning    Fiber Select Gummies chewable tablet Chew 2 tablets Daily. OTC   10/24/2024 Noon    Fluticasone-Umeclidin-Vilant (Trelegy Ellipta) 100-62.5-25 MCG/ACT inhaler Inhale 1 puff Daily. 180 each 3 10/24/2024 Morning    gabapentin (NEURONTIN) 300 MG capsule Take 1 capsule by mouth Every Night. 90 capsule 0 10/23/2024 Evening    isosorbide mononitrate (IMDUR) 30 MG 24 hr tablet Take 1 tablet by mouth Daily. 90 tablet 1 10/23/2024 Noon    latanoprost (XALATAN) 0.005 % ophthalmic solution Administer 1 drop to both eyes Every Night.   10/23/2024 Evening    levothyroxine (SYNTHROID, LEVOTHROID) 200 MCG tablet TAKE 1 TABLET DAILY (Patient taking differently: Take 1 tablet by mouth Every Morning.) 90 tablet 1  10/24/2024 Morning    losartan (COZAAR) 50 MG tablet Take 1 tablet by mouth Daily. 30 tablet 2 10/24/2024 Morning    Multiple Vitamins-Minerals (PRESERVISION AREDS 2 PO) Take 1 tablet by mouth 2 (Two) Times a Day. OTC   10/24/2024 Morning    pantoprazole (PROTONIX) 40 MG EC tablet TAKE 1 TABLET DAILY 90 tablet 3 10/24/2024 Morning    polyethylene glycol (MIRALAX) 17 GM/SCOOP powder Take 17 g by mouth As Needed (Constipation). OTC   Past Week    roflumilast (DALIRESP) 500 MCG tablet tablet Take 1 tablet by mouth Daily. 90 tablet 1 10/23/2024 Noon    sertraline (ZOLOFT) 100 MG tablet Take 1 tablet by mouth Daily. 90 tablet 1 10/23/2024 Evening    simvastatin (ZOCOR) 40 MG tablet Take 1 tablet by mouth Every Night. 90 tablet 1 10/23/2024 Evening    traMADol (ULTRAM) 50 MG tablet Take 1 tablet by mouth Every 6 (Six) Hours As Needed for Moderate Pain. 120 tablet 0 10/24/2024 Morning    Vibegron (Gemtesa) 75 MG tablet Take 1 tablet by mouth once daily 90 tablet 3 10/23/2024 Evening    albuterol sulfate  (90 Base) MCG/ACT inhaler Inhale 2 puffs Every 4 (Four) Hours As Needed for Shortness of Air or Wheezing.   More than a month    atenolol (TENORMIN) 50 MG tablet Take 1 tablet by mouth Daily.       nitroglycerin (NITROSTAT) 0.4 MG SL tablet Place 1 tablet under the tongue Every 5 (Five) Minutes As Needed for Chest Pain. Take no more than 3 doses in 15 minutes. 25 tablet 0 More than a month    ondansetron (Zofran) 4 MG tablet Take 1 tablet by mouth Every 8 (Eight) Hours As Needed for Nausea or Vomiting. 30 tablet 1 More than a month       ROS: Review of Systems   Constitutional:  Positive for fatigue.   Eyes: Negative.    Respiratory: Negative.     Cardiovascular: Negative.    Gastrointestinal:  Negative for abdominal pain.        Dark stools    Musculoskeletal: Negative.    Skin:  Positive for pallor.   Neurological:  Positive for weakness.       PAST MED HX:  Past Medical History:   Diagnosis Date    Anesthesia  "complication     Pt went into respiratory failure during total hip replacement in 2020    Arthritis of back     Arthritis of neck     Broken ribs     COPD (chronic obstructive pulmonary disease)     Coronary artery disease     Degenerative lumbar disc     Disease of thyroid gland     \"low thyroid\"    Fracture, fibula 1997?    Fracture, humerus 12/13/23    Fracture, tibia and fibula 1997?    Hip arthrosis     History of colonoscopy     Hyperlipidemia     Hypertension     Kidney stone July 2023    Knee swelling     Lumbosacral disc disease     On home O2     3L    Osteoarthritis     Periarthritis of shoulder     Urinary incontinence     Urinary tract infection        PAST SURG HX:  Past Surgical History:   Procedure Laterality Date    CAROTID STENT Bilateral     COLONOSCOPY      COLONOSCOPY N/A 10/12/2024    Procedure: COLONOSCOPY;  Surgeon: Markie Bautista MD;  Location:  [facility];  Service: Gastroenterology;  Laterality: N/A;    ENDOSCOPY N/A 10/11/2024    Procedure: ESOPHAGOGASTRODUODENOSCOPY;  Surgeon: Markie Bautista MD;  Location: [facility];  Service: Gastroenterology;  Laterality: N/A;    ENDOSCOPY N/A 10/12/2024    Procedure: ESOPHAGOGASTRODUODENOSCOPY;  Surgeon: Markie Bautista MD;  Location: [facility];  Service: Gastroenterology;  Laterality: N/A;    EYE SURGERY  10/17/2022    yag surgery    GALLBLADDER SURGERY  2012    HYSTERECTOMY      ILIAC ARTERY STENT Bilateral     JOINT REPLACEMENT  1995; 2020    KIDNEY STONE SURGERY  July August 2023    OOPHORECTOMY  1976    1 ovary removed    TOTAL HIP ARTHROPLASTY Bilateral     URETEROSCOPY LASER LITHOTRIPSY WITH STENT INSERTION Right 07/26/2023    Procedure: CYSTOSCOPY, RIGHT RETROGRADE PYELOGRAM; WITH STENT INSERTION;  Surgeon: Lee Kim MD;  Location:  [facility];  Service: Urology;  Laterality: Right;    URETEROSCOPY LASER LITHOTRIPSY WITH STENT INSERTION Right 08/10/2023    Procedure: URETEROSCOPY, LASER LITHOTRIPSY, STENT EXCHANGE;  " "Surgeon: Lee Kim MD;  Location: [facility];  Service: Urology;  Laterality: Right;       FAM HX:  Family History   Problem Relation Age of Onset    Cancer Other         BREAST, COLON, OVARIAN    Depression Other     Hyperlipidemia Other     Hypertension Other     Heart disease Mother     Hypertension Mother     Arthritis Daughter     Cancer Daughter     Hyperlipidemia Daughter     Liver disease Daughter     Thyroid disease Daughter     Cancer Maternal Aunt     Diabetes Maternal Uncle     Alzheimer's disease Sister        SOC HX:  Social History     Socioeconomic History    Marital status:    Tobacco Use    Smoking status: Former     Current packs/day: 0.00     Average packs/day: 1 pack/day for 50.0 years (50.0 ttl pk-yrs)     Types: Cigarettes     Start date: 1956     Quit date: 2006     Years since quittin.8     Passive exposure: Past    Smokeless tobacco: Never   Vaping Use    Vaping status: Never Used   Substance and Sexual Activity    Alcohol use: Not Currently     Comment: no etoh for past 10 years    Drug use: No    Sexual activity: Not Currently       PHYSICAL EXAM  BP 98/41 (BP Location: Right arm, Patient Position: Sitting)   Pulse 84   Temp 97.8 °F (36.6 °C) (Oral)   Resp 18   Ht 160 cm (63\")   Wt 66.7 kg (147 lb)   LMP  (LMP Unknown)   SpO2 96%   BMI 26.04 kg/m²   Wt Readings from Last 3 Encounters:   10/24/24 66.7 kg (147 lb)   10/10/24 68.9 kg (152 lb)   10/09/24 68.9 kg (152 lb)   ,body mass index is 26.04 kg/m².  Physical Exam  Constitutional:       General: She is not in acute distress.  HENT:      Head: Normocephalic and atraumatic.   Cardiovascular:      Rate and Rhythm: Normal rate and regular rhythm.   Pulmonary:      Effort: Pulmonary effort is normal. No respiratory distress.   Abdominal:      General: Bowel sounds are normal. There is no distension.      Palpations: Abdomen is soft.      Tenderness: There is no abdominal tenderness.   Skin:     " Coloration: Skin is pale.   Neurological:      Mental Status: She is alert and oriented to person, place, and time.       Results Review:   I reviewed the patient's new clinical results.    Lab Results   Component Value Date    WBC 10.68 10/25/2024    HGB 7.8 (L) 10/25/2024    HGB 8.3 (L) 10/25/2024    HGB 8.3 (L) 10/25/2024    HCT 25.8 (L) 10/25/2024    .7 (H) 10/25/2024     10/25/2024     Lab Results   Component Value Date    INR 0.98 08/04/2023    INR 1.04 06/30/2020     Lab Results   Component Value Date    GLUCOSE 95 10/25/2024    BUN 5 (L) 10/25/2024    CREATININE 0.32 (L) 10/25/2024    EGFRIFNONA 119 11/24/2021    BCR 15.6 10/25/2024     (H) 10/25/2024    K 3.9 10/25/2024    CO2 33.0 (H) 10/25/2024    CALCIUM 9.1 10/25/2024    ALBUMIN 4.1 10/24/2024    ALKPHOS 66 10/24/2024    BILITOT <0.2 10/24/2024    ALT 19 10/24/2024    AST 23 10/24/2024     CT Abdomen/Pelvis (as interpreted by radiologist)   Lower Chest: Lung bases clear. Moderate hiatal hernia   Organs: 7 mm left lower pole renal calculus. Punctate right lower pole renal calculus small bilateral renal cysts. Liver, spleen, pancreas, adrenal glands unremarkable. Gallbladder surgically absent   Gastrointestinal: 4 clips in the right colon, and a clip in the sigmoid colon. No intraluminal extravasation of intravenous contrast in the arterial, venous, and delayed phases. Moderate hiatal hernia. Postoperative changes of the cecum. Diverticula of the sigmoid colon without inflammation. No intestinal distention or definite wall thickening   Pelvis: Streak artifact from bilateral prostheses obscures the lower pelvis   Peritoneum/Retroperitoneum: No ascites or pneumoperitoneum. Normal caliber atherosclerotic aorta   Bones/Soft Tissues: No acute bony abnormality. Degenerative disease of the lower thoracic and lumbar spine. Status post bilateral hip arthroplasties. Old fracture of the right ilium   IMPRESSION:   1. No site of active  gastrointestinal hemorrhage is demonstrated on this exam  2. No acute process demonstrated  3. Bilateral nephrolithiasis  4. Moderate hiatal hernia  5. Colonic diverticulosis    ASSESSMENTS/PLANS    Dark stools   Macrocytic anemia, currently stable from last admission.  Will trend.  Recent lower GI bleed secondary to multiple bleeding colon polyps s/p colonoscopy on 10/12 with removal and endoclips applied.     Ileocecal adenomatous polyp, needs outpatient colonoscopy with EMR.     Anticoagulation use for atrial fibrillation  Peripheral vascular disease, on aspirin     >> H&H currently stable from last admission.   Will trend H&H and observe.     >> Empiric PPI     I discussed the patient's findings and my recommendations with patient and her daughter via telephone.       JT Cox  10/25/24  14:00 EDT

## 2024-10-25 NOTE — NURSING NOTE
B/P reading 67/43. Patient alert and talking, no symptoms. Notified by PCT of B/P  91/41. B/P cord noted to be compressed between siderail and bed. Readings not correct. Repeat B/P 110/46. Provider notified

## 2024-10-25 NOTE — CASE MANAGEMENT/SOCIAL WORK
Continued Stay Note  [facility]     Patient Name: Avelina Ceron  MRN: [id number]  Today's Date: 10/25/2024    Admit Date: 10/24/2024    Plan: home   Discharge Plan       Row Name 10/25/24 1243       Plan    Plan home    Patient/Family in Agreement with Plan yes    Plan Comments Met with Ms. Ceron at the bedside to initiate discharge plan. She lives in her daughter's [facility] home. She is independent with self-care, and daughter assists with household, transportation, paperwork, and shopping needs. She uses a rollator for mobility and wears 2L NC O2 continuously ([facility] [employer] She also has a gait belt, ramp, and hand rails. Her primary care provider is Dr. Dre Moura. She denies obstacles to getting medical care or obtaining medications.PT recommended home health PT at discharge, but she declined. Her plan is home with her daughter, and she will transport.  will continue to follow plan of care and assist with discharge planning needs as indicated.    Final Discharge Disposition Code 01 - home or self-care                   Discharge Codes    No documentation.                       Sejal Hernandez RN

## 2024-10-25 NOTE — PLAN OF CARE
Problem: Adult Inpatient Plan of Care  Goal: Plan of Care Review  Outcome: Progressing  Goal: Patient-Specific Goal (Individualized)  Outcome: Progressing  Goal: Absence of Hospital-Acquired Illness or Injury  Outcome: Progressing  Intervention: Identify and Manage Fall Risk  Recent Flowsheet Documentation  Taken 10/25/2024 0605 by Carly Garner RN  Safety Promotion/Fall Prevention:   nonskid shoes/slippers when out of bed   room organization consistent   safety round/check completed   toileting scheduled  Taken 10/25/2024 0400 by Carly Garner RN  Safety Promotion/Fall Prevention:   activity supervised   assistive device/personal items within reach   clutter free environment maintained   fall prevention program maintained   nonskid shoes/slippers when out of bed   safety round/check completed  Taken 10/25/2024 0200 by Carly Garner RN  Safety Promotion/Fall Prevention:   activity supervised   assistive device/personal items within reach   clutter free environment maintained   fall prevention program maintained   nonskid shoes/slippers when out of bed   room organization consistent   safety round/check completed   toileting scheduled  Taken 10/25/2024 0000 by Carly Garner RN  Safety Promotion/Fall Prevention:   activity supervised   clutter free environment maintained   assistive device/personal items within reach   fall prevention program maintained   nonskid shoes/slippers when out of bed   safety round/check completed   room organization consistent   toileting scheduled  Taken 10/24/2024 2200 by Carly Garner RN  Safety Promotion/Fall Prevention:   activity supervised   assistive device/personal items within reach   clutter free environment maintained   fall prevention program maintained   nonskid shoes/slippers when out of bed   room organization consistent   safety round/check completed   toileting scheduled  Taken 10/24/2024 2000 by Carly Garner RN  Safety Promotion/Fall  Prevention:   activity supervised   assistive device/personal items within reach   clutter free environment maintained   nonskid shoes/slippers when out of bed   room organization consistent   safety round/check completed   toileting scheduled  Intervention: Prevent Skin Injury  Recent Flowsheet Documentation  Taken 10/25/2024 0605 by Carly Garner RN  Body Position: position changed independently  Taken 10/25/2024 0400 by Carly Garner RN  Body Position: position changed independently  Skin Protection:   drying agents applied   incontinence pads utilized   silicone foam dressing in place  Taken 10/25/2024 0200 by Carly Garner RN  Body Position: position changed independently  Skin Protection:   drying agents applied   incontinence pads utilized   skin sealant/moisture barrier applied  Taken 10/25/2024 0000 by Carly Garner RN  Body Position: position changed independently  Skin Protection:   drying agents applied   incontinence pads utilized   silicone foam dressing in place  Taken 10/24/2024 2200 by Carly Garner RN  Body Position: position changed independently  Skin Protection:   incontinence pads utilized   drying agents applied   silicone foam dressing in place  Taken 10/24/2024 2000 by Carly Garner RN  Body Position: position changed independently  Skin Protection:   drying agents applied   incontinence pads utilized   silicone foam dressing in place  Intervention: Prevent and Manage VTE (Venous Thromboembolism) Risk  Recent Flowsheet Documentation  Taken 10/24/2024 2000 by Carly Garner RN  VTE Prevention/Management: other (see comments)  Goal: Optimal Comfort and Wellbeing  Outcome: Progressing  Intervention: Provide Person-Centered Care  Recent Flowsheet Documentation  Taken 10/24/2024 2000 by Carly Garner RN  Trust Relationship/Rapport:   care explained   choices provided   questions answered   questions encouraged  Goal: Readiness for Transition of  Care  Outcome: Progressing  Intervention: Mutually Develop Transition Plan  Recent Flowsheet Documentation  Taken 10/24/2024 2054 by Carly Garner RN  Equipment Currently Used at Home: rollator  Transportation Anticipated: family or friend will provide  Patient/Family Anticipated Services at Transition: none  Patient/Family Anticipates Transition to: home with family     Problem: Comorbidity Management  Goal: Maintenance of COPD Symptom Control  Outcome: Progressing     Problem: Fall Injury Risk  Goal: Absence of Fall and Fall-Related Injury  Outcome: Progressing  Intervention: Promote Injury-Free Environment  Recent Flowsheet Documentation  Taken 10/25/2024 0605 by Carly Garner RN  Safety Promotion/Fall Prevention:   nonskid shoes/slippers when out of bed   room organization consistent   safety round/check completed   toileting scheduled  Taken 10/25/2024 0400 by Carly Garner RN  Safety Promotion/Fall Prevention:   activity supervised   assistive device/personal items within reach   clutter free environment maintained   fall prevention program maintained   nonskid shoes/slippers when out of bed   safety round/check completed  Taken 10/25/2024 0200 by Carly Garner RN  Safety Promotion/Fall Prevention:   activity supervised   assistive device/personal items within reach   clutter free environment maintained   fall prevention program maintained   nonskid shoes/slippers when out of bed   room organization consistent   safety round/check completed   toileting scheduled  Taken 10/25/2024 0000 by Carly Garner RN  Safety Promotion/Fall Prevention:   activity supervised   clutter free environment maintained   assistive device/personal items within reach   fall prevention program maintained   nonskid shoes/slippers when out of bed   safety round/check completed   room organization consistent   toileting scheduled  Taken 10/24/2024 2200 by Carly Garner, RN  Safety Promotion/Fall Prevention:    activity supervised   assistive device/personal items within reach   clutter free environment maintained   fall prevention program maintained   nonskid shoes/slippers when out of bed   room organization consistent   safety round/check completed   toileting scheduled  Taken 10/24/2024 2000 by Carly Garner RN  Safety Promotion/Fall Prevention:   activity supervised   assistive device/personal items within reach   clutter free environment maintained   nonskid shoes/slippers when out of bed   room organization consistent   safety round/check completed   toileting scheduled   Goal Outcome Evaluation:

## 2024-10-26 LAB
ANION GAP SERPL CALCULATED.3IONS-SCNC: 15 MMOL/L (ref 5–15)
BACTERIA SPEC AEROBE CULT: NORMAL
BASOPHILS # BLD AUTO: 0.12 10*3/MM3 (ref 0–0.2)
BASOPHILS NFR BLD AUTO: 1 % (ref 0–1.5)
BUN SERPL-MCNC: 4 MG/DL (ref 8–23)
BUN/CREAT SERPL: 11.8 (ref 7–25)
CALCIUM SPEC-SCNC: 9.1 MG/DL (ref 8.6–10.5)
CHLORIDE SERPL-SCNC: 104 MMOL/L (ref 98–107)
CO2 SERPL-SCNC: 23 MMOL/L (ref 22–29)
CREAT SERPL-MCNC: 0.34 MG/DL (ref 0.57–1)
DEPRECATED RDW RBC AUTO: 58.9 FL (ref 37–54)
EGFRCR SERPLBLD CKD-EPI 2021: 102.3 ML/MIN/1.73
EOSINOPHIL # BLD AUTO: 0.31 10*3/MM3 (ref 0–0.4)
EOSINOPHIL NFR BLD AUTO: 2.5 % (ref 0.3–6.2)
ERYTHROCYTE [DISTWIDTH] IN BLOOD BY AUTOMATED COUNT: 15.5 % (ref 12.3–15.4)
GLUCOSE SERPL-MCNC: 90 MG/DL (ref 65–99)
HCT VFR BLD AUTO: 25.6 % (ref 34–46.6)
HCT VFR BLD AUTO: 26.1 % (ref 34–46.6)
HCT VFR BLD AUTO: 27.6 % (ref 34–46.6)
HCT VFR BLD AUTO: 29.3 % (ref 34–46.6)
HCT VFR BLD AUTO: 29.3 % (ref 34–46.6)
HGB BLD-MCNC: 7.7 G/DL (ref 12–15.9)
HGB BLD-MCNC: 8 G/DL (ref 12–15.9)
HGB BLD-MCNC: 8.3 G/DL (ref 12–15.9)
HGB BLD-MCNC: 8.7 G/DL (ref 12–15.9)
HGB BLD-MCNC: 8.7 G/DL (ref 12–15.9)
IMM GRANULOCYTES # BLD AUTO: 0.04 10*3/MM3 (ref 0–0.05)
IMM GRANULOCYTES NFR BLD AUTO: 0.3 % (ref 0–0.5)
LYMPHOCYTES # BLD AUTO: 2.41 10*3/MM3 (ref 0.7–3.1)
LYMPHOCYTES NFR BLD AUTO: 19.3 % (ref 19.6–45.3)
MCH RBC QN AUTO: 30.6 PG (ref 26.6–33)
MCHC RBC AUTO-ENTMCNC: 29.7 G/DL (ref 31.5–35.7)
MCV RBC AUTO: 103.2 FL (ref 79–97)
MONOCYTES # BLD AUTO: 0.83 10*3/MM3 (ref 0.1–0.9)
MONOCYTES NFR BLD AUTO: 6.7 % (ref 5–12)
NEUTROPHILS NFR BLD AUTO: 70.2 % (ref 42.7–76)
NEUTROPHILS NFR BLD AUTO: 8.75 10*3/MM3 (ref 1.7–7)
NRBC BLD AUTO-RTO: 0 /100 WBC (ref 0–0.2)
PLATELET # BLD AUTO: 277 10*3/MM3 (ref 140–450)
PMV BLD AUTO: 9.3 FL (ref 6–12)
POTASSIUM SERPL-SCNC: 4.3 MMOL/L (ref 3.5–5.2)
POTASSIUM SERPL-SCNC: 5.1 MMOL/L (ref 3.5–5.2)
QT INTERVAL: 352 MS
QTC INTERVAL: 444 MS
RBC # BLD AUTO: 2.84 10*6/MM3 (ref 3.77–5.28)
SODIUM SERPL-SCNC: 142 MMOL/L (ref 136–145)
WBC NRBC COR # BLD AUTO: 12.46 10*3/MM3 (ref 3.4–10.8)

## 2024-10-26 PROCEDURE — 96372 THER/PROPH/DIAG INJ SC/IM: CPT

## 2024-10-26 PROCEDURE — 85014 HEMATOCRIT: CPT | Performed by: NURSE PRACTITIONER

## 2024-10-26 PROCEDURE — 84132 ASSAY OF SERUM POTASSIUM: CPT | Performed by: INTERNAL MEDICINE

## 2024-10-26 PROCEDURE — 94799 UNLISTED PULMONARY SVC/PX: CPT

## 2024-10-26 PROCEDURE — 94761 N-INVAS EAR/PLS OXIMETRY MLT: CPT

## 2024-10-26 PROCEDURE — 96376 TX/PRO/DX INJ SAME DRUG ADON: CPT

## 2024-10-26 PROCEDURE — 85018 HEMOGLOBIN: CPT | Performed by: NURSE PRACTITIONER

## 2024-10-26 PROCEDURE — G0378 HOSPITAL OBSERVATION PER HR: HCPCS

## 2024-10-26 PROCEDURE — 25010000002 CYANOCOBALAMIN PER 1000 MCG: Performed by: INTERNAL MEDICINE

## 2024-10-26 PROCEDURE — 80048 BASIC METABOLIC PNL TOTAL CA: CPT | Performed by: INTERNAL MEDICINE

## 2024-10-26 PROCEDURE — 85025 COMPLETE CBC W/AUTO DIFF WBC: CPT | Performed by: INTERNAL MEDICINE

## 2024-10-26 PROCEDURE — 99212 OFFICE O/P EST SF 10 MIN: CPT | Performed by: NURSE PRACTITIONER

## 2024-10-26 PROCEDURE — 99232 SBSQ HOSP IP/OBS MODERATE 35: CPT | Performed by: INTERNAL MEDICINE

## 2024-10-26 PROCEDURE — 94664 DEMO&/EVAL PT USE INHALER: CPT

## 2024-10-26 RX ORDER — ASPIRIN 81 MG/1
81 TABLET ORAL DAILY
Status: DISCONTINUED | OUTPATIENT
Start: 2024-10-26 | End: 2024-10-27 | Stop reason: HOSPADM

## 2024-10-26 RX ORDER — CYANOCOBALAMIN 1000 UG/ML
1000 INJECTION, SOLUTION INTRAMUSCULAR; SUBCUTANEOUS DAILY
Status: DISCONTINUED | OUTPATIENT
Start: 2024-10-26 | End: 2024-10-27 | Stop reason: HOSPADM

## 2024-10-26 RX ADMIN — BUDESONIDE AND FORMOTEROL FUMARATE DIHYDRATE 2 PUFF: 160; 4.5 AEROSOL RESPIRATORY (INHALATION) at 07:40

## 2024-10-26 RX ADMIN — SERTRALINE 100 MG: 100 TABLET, FILM COATED ORAL at 08:31

## 2024-10-26 RX ADMIN — CETIRIZINE HYDROCHLORIDE 10 MG: 10 TABLET, FILM COATED ORAL at 08:30

## 2024-10-26 RX ADMIN — ALBUTEROL SULFATE 2.5 MG: 2.5 SOLUTION RESPIRATORY (INHALATION) at 07:40

## 2024-10-26 RX ADMIN — TRAMADOL HYDROCHLORIDE 50 MG: 50 TABLET, COATED ORAL at 22:04

## 2024-10-26 RX ADMIN — DOFETILIDE 250 MCG: 0.25 CAPSULE ORAL at 20:26

## 2024-10-26 RX ADMIN — GABAPENTIN 300 MG: 300 CAPSULE ORAL at 20:25

## 2024-10-26 RX ADMIN — ISOSORBIDE MONONITRATE 30 MG: 30 TABLET, EXTENDED RELEASE ORAL at 08:31

## 2024-10-26 RX ADMIN — BUDESONIDE AND FORMOTEROL FUMARATE DIHYDRATE 2 PUFF: 160; 4.5 AEROSOL RESPIRATORY (INHALATION) at 20:00

## 2024-10-26 RX ADMIN — LEVOTHYROXINE SODIUM 200 MCG: 0.1 TABLET ORAL at 06:11

## 2024-10-26 RX ADMIN — OXYBUTYNIN CHLORIDE 10 MG: 5 TABLET, EXTENDED RELEASE ORAL at 08:31

## 2024-10-26 RX ADMIN — TRAMADOL HYDROCHLORIDE 50 MG: 50 TABLET, COATED ORAL at 15:01

## 2024-10-26 RX ADMIN — Medication 10 ML: at 20:25

## 2024-10-26 RX ADMIN — DOFETILIDE 250 MCG: 0.25 CAPSULE ORAL at 08:31

## 2024-10-26 RX ADMIN — Medication 10 ML: at 08:32

## 2024-10-26 RX ADMIN — CYANOCOBALAMIN 1000 MCG: 1000 INJECTION, SOLUTION INTRAMUSCULAR at 10:08

## 2024-10-26 RX ADMIN — ROFLUMILAST 500 MCG: 500 TABLET ORAL at 08:31

## 2024-10-26 RX ADMIN — ALBUTEROL SULFATE 2.5 MG: 2.5 SOLUTION RESPIRATORY (INHALATION) at 20:00

## 2024-10-26 RX ADMIN — TIOTROPIUM BROMIDE INHALATION SPRAY 2 PUFF: 3.12 SPRAY, METERED RESPIRATORY (INHALATION) at 07:40

## 2024-10-26 RX ADMIN — ASPIRIN 81 MG: 81 TABLET, COATED ORAL at 12:23

## 2024-10-26 RX ADMIN — APIXABAN 2.5 MG: 2.5 TABLET, FILM COATED ORAL at 12:22

## 2024-10-26 RX ADMIN — APIXABAN 2.5 MG: 2.5 TABLET, FILM COATED ORAL at 20:24

## 2024-10-26 RX ADMIN — ALBUTEROL SULFATE 2.5 MG: 2.5 SOLUTION RESPIRATORY (INHALATION) at 12:24

## 2024-10-26 RX ADMIN — ATORVASTATIN CALCIUM 20 MG: 20 TABLET, FILM COATED ORAL at 08:31

## 2024-10-26 RX ADMIN — PANTOPRAZOLE SODIUM 40 MG: 40 INJECTION, POWDER, FOR SOLUTION INTRAVENOUS at 20:25

## 2024-10-26 RX ADMIN — LATANOPROST 1 DROP: 50 SOLUTION OPHTHALMIC at 20:26

## 2024-10-26 RX ADMIN — TRAMADOL HYDROCHLORIDE 50 MG: 50 TABLET, COATED ORAL at 06:11

## 2024-10-26 RX ADMIN — PANTOPRAZOLE SODIUM 40 MG: 40 INJECTION, POWDER, FOR SOLUTION INTRAVENOUS at 08:31

## 2024-10-26 NOTE — PROGRESS NOTES
"  GI Daily Progress Note  Subjective:    Chief Complaint: Follow-up GI bleed    Patient resting in bed in no acute distress.  Had a dark bowel movement yesterday but no further BMs.  Hemoglobin continues to remain stable.  No new or worsening GI complaints.  Denies pain.    Objective:    /50 (BP Location: Right arm, Patient Position: Lying)   Pulse 89   Temp 98.3 °F (36.8 °C) (Oral)   Resp 18   Ht 160 cm (63\")   Wt 66.7 kg (147 lb)   LMP  (LMP Unknown)   SpO2 91%   BMI 26.04 kg/m²     Physical Exam  Vitals and nursing note reviewed.   Constitutional:       General: She is not in acute distress.     Appearance: Normal appearance. She is normal weight. She is not ill-appearing or toxic-appearing.   Cardiovascular:      Rate and Rhythm: Normal rate and regular rhythm.      Pulses: Normal pulses.      Heart sounds: Normal heart sounds.   Pulmonary:      Effort: Pulmonary effort is normal.      Breath sounds: Normal breath sounds.   Abdominal:      General: Abdomen is flat. Bowel sounds are normal. There is no distension.      Palpations: Abdomen is soft. There is no mass.      Tenderness: There is no abdominal tenderness. There is no guarding or rebound.      Hernia: No hernia is present.   Skin:     General: Skin is warm and dry.      Capillary Refill: Capillary refill takes less than 2 seconds.      Coloration: Skin is not jaundiced or pale.   Neurological:      General: No focal deficit present.      Mental Status: She is alert and oriented to person, place, and time.   Psychiatric:         Mood and Affect: Mood normal.         Behavior: Behavior normal.         Thought Content: Thought content normal.         Judgment: Judgment normal.     Lab  I have personally reviewed most recent cardiac tracings, lab results, and radiology images and interpretations and agree with findings.    Lab Results   Component Value Date    WBC 12.46 (H) 10/26/2024    HGB 7.7 (L) 10/26/2024    HGB 8.7 (L) 10/26/2024    HGB " 8.7 (L) 10/26/2024    .2 (H) 10/26/2024     10/26/2024    INR 0.98 08/04/2023    INR 1.04 06/30/2020       Lab Results   Component Value Date    GLUCOSE 90 10/26/2024    BUN 4 (L) 10/26/2024    CREATININE 0.34 (L) 10/26/2024    EGFRIFNONA 119 11/24/2021    BCR 11.8 10/26/2024     10/26/2024    K 4.3 10/26/2024    CO2 23.0 10/26/2024    CALCIUM 9.1 10/26/2024    ALBUMIN 4.1 10/24/2024    ALKPHOS 66 10/24/2024    BILITOT <0.2 10/24/2024    ALT 19 10/24/2024    AST 23 10/24/2024       Assessment:      Melena    Coronary arteriosclerosis in native artery    Chronic obstructive pulmonary disease    Depression    Gastroesophageal reflux disease with esophagitis    Hyperlipidemia    Hypertension    Hypothyroidism    Anemia due to acute blood loss    Dark stools   Macrocytic anemia, currently stable from last admission.  Will trend.  Recent lower GI bleed secondary to multiple bleeding colon polyps s/p colonoscopy on 10/12 with removal and endoclips applied.     Ileocecal adenomatous polyp, needs outpatient colonoscopy with EMR.     Anticoagulation use for atrial fibrillation  Peripheral vascular disease, on aspirin    Plan:  Patient doing well.  No further bloody stools.  Hemoglobin has been stable.  Noted anticoagulation regimen has been adjusted.    >> Continue to monitor H&H; transfuse if indicated  >> Continue PPI therapy  >> Recommend iron supplementation  Feosol complete with Bifera to be taken every other day    Patient will need outpatient follow-up with gastroenterology as well as follow-up with her PCP at discharge.    Jude Austin, AMADOR  10/26/24  17:16 EDT

## 2024-10-26 NOTE — PROGRESS NOTES
[facility] Medicine Services  PROGRESS NOTE    Patient Name: Avelina Ceron  : 1940  MRN: [id number]    Date of Admission: 10/24/2024  Primary Care Physician: Dre Moura MD    Subjective   Subjective     CC:  Melena     HPI:  Patient seen and examined this morning.  She feels that she had dark bowel movement yesterday afternoon.  Comfortable in the recliner.  Hemoglobin stable at 8.5-8.7.  Requesting regular diet    Objective   Objective     Vital Signs:   Temp:  [97.8 °F (36.6 °C)-98.3 °F (36.8 °C)] 98 °F (36.7 °C)  Heart Rate:  [71-86] 71  Resp:  [18] 18  BP: ()/(41-53) 120/48  Flow (L/min) (Oxygen Therapy):  [1] 1     Physical Exam:    General: Comfortable, not in distress, conversant and cooperative  Head: Atraumatic and normocephalic  Eyes: No Icterus.++  Pallor  Ears:  Ears appear intact with no abnormalities noted  Throat: No oral lesions, no thrush  Neck: Supple, trachea midline  Lungs: Clear to auscultation bilaterally, equal air entry, no wheezing or crackles  Heart:  Normal S1 and S2, no murmur, no gallop, No JVD, no lower extremity swelling  Abdomen:  Soft, no tenderness, no organomegaly, normal bowel sounds, no organomegaly  Extremities: pulses equal bilaterally  Skin: No bleeding, bruising or rash, normal skin turgor and elasticity  Neurologic: Cranial nerves appear intact with no evidence of facial asymmetry, normal motor and sensory functions in all 4 extremities  Psych: Alert and oriented x 3, normal mood         Results Reviewed:  LAB RESULTS:      Lab 10/26/24  0524 10/26/24  0143 10/25/24  1803 10/25/24  1212 10/25/24  0712 10/25/24  0009 10/24/24  1508   WBC 12.46*  --   --   --  10.68  --  13.73*   HEMOGLOBIN 8.7*  8.7* 8.0* 7.3* 7.8* 8.3*  8.3*   < > 8.7*   HEMATOCRIT 29.3*  29.3* 26.1* 24.6* 25.8* 27.4*  27.4*   < > 28.7*   PLATELETS 277  --   --   --  335  --  368   NEUTROS ABS 8.75*  --   --   --  7.63*  --  10.11*   IMMATURE GRANS  (ABS) 0.04  --   --   --  0.03  --  0.06*   LYMPHS ABS 2.41  --   --   --  1.97  --  2.60   MONOS ABS 0.83  --   --   --  0.74  --  0.78   EOS ABS 0.31  --   --   --  0.19  --  0.09   .2*  --   --   --  100.7*  --  101.1*   LACTATE  --   --   --   --   --   --  1.2    < > = values in this interval not displayed.         Lab 10/26/24  0524 10/26/24  0143 10/25/24  1803 10/25/24  0712 10/24/24  1508   SODIUM 142  --   --  146* 141   POTASSIUM 4.3 5.1 3.9 3.9 3.4*   CHLORIDE 104  --   --  104 100   CO2 23.0  --   --  33.0* 31.0*   ANION GAP 15.0  --   --  9.0 10.0   BUN 4*  --   --  5* 9   CREATININE 0.34*  --   --  0.32* 0.46*   EGFR 102.3  --   --  103.8 95.1   GLUCOSE 90  --   --  95 113*   CALCIUM 9.1  --   --  9.1 9.2   MAGNESIUM  --   --   --  1.9  --          Lab 10/24/24  1508   TOTAL PROTEIN 6.8   ALBUMIN 4.1   GLOBULIN 2.7   ALT (SGPT) 19   AST (SGOT) 23   BILIRUBIN <0.2   ALK PHOS 66                 Lab 10/25/24  0712 10/24/24  1508   FERRITIN 219.20*  --    FOLATE 7.56  --    VITAMIN B 12 332  --    ABO TYPING  --  A   RH TYPING  --  Positive   ANTIBODY SCREEN  --  Negative         Brief Urine Lab Results  (Last result in the past 365 days)        Color   Clarity   Blood   Leuk Est   Nitrite   Protein   CREAT   Urine HCG        10/24/24 2328 Yellow   Clear   Negative   Small (1+)   Negative   Negative                   Microbiology Results Abnormal       None            CT Abdomen Pelvis With & Without Contrast    Result Date: 10/24/2024  CT ABDOMEN PELVIS W WO CONTRAST Date of Exam: 10/24/2024 3:51 PM EDT Indication: Gi bleeding. GI Bleed. Comparison: 10/10/2024 Technique: Axial CT images were obtained of the abdomen and pelvis before and after the uneventful intravenous administration of iodinated contrast. Sagittal and coronal reconstructions were performed.  Automated exposure control and iterative reconstruction methods were used. Findings: Lower Chest: Lung bases clear. Moderate hiatal hernia  Organs: 7 mm left lower pole renal calculus. Punctate right lower pole renal calculus small bilateral renal cysts. Liver, spleen, pancreas, adrenal glands unremarkable. Gallbladder surgically absent Gastrointestinal: 4 clips in the right colon, and a clip in the sigmoid colon. No intraluminal extravasation of intravenous contrast in the arterial, venous, and delayed phases. Moderate hiatal hernia. Postoperative changes of the cecum. Diverticula of the sigmoid colon without inflammation. No intestinal distention or definite wall thickening Pelvis: Streak artifact from bilateral prostheses obscures the lower pelvis Peritoneum/Retroperitoneum: No ascites or pneumoperitoneum. Normal caliber atherosclerotic aorta Bones/Soft Tissues: No acute bony abnormality. Degenerative disease of the lower thoracic and lumbar spine. Status post bilateral hip arthroplasties. Old fracture of the right ilium     Impression: 1. No site of active gastrointestinal hemorrhage is demonstrated on this exam 2. No acute process demonstrated 3. Bilateral nephrolithiasis 4. Moderate hiatal hernia 5. Colonic diverticulosis Electronically Signed: Pete Valdez  10/24/2024 4:33 PM EDT  Workstation ID: [id number]    XR Chest 1 View    Result Date: 10/24/2024  XR CHEST 1 VW Date of Exam: 10/24/2024 2:39 PM EDT Indication: SOA Comparison: Chest x-ray 10/9/2024 Findings: Emphysema. Heart size normal. No pneumothorax or pleural effusion or focal pulmonary parenchymal opacity.     Impression: Impression: No acute cardiopulmonary abnormality. Emphysema and chronic changes of the chest. Electronically Signed: Mer Oleary MD  10/24/2024 2:58 PM EDT  Workstation ID: [id number]     Results for orders placed during the hospital encounter of 08/26/24    Adult Transthoracic Echo Limited W/ Cont if Necessary Per Protocol    Interpretation Summary    Left ventricular systolic function is normal. Calculated left ventricular EF = 64%    Moderate tricuspid valve  regurgitation is present.    Estimated right ventricular systolic pressure from tricuspid regurgitation is moderately elevated (45-55 mmHg).    Mild MR      Current medications:  Scheduled Meds:albuterol, 2.5 mg, Nebulization, Q6H - RT  [Held by provider] apixaban, 5 mg, Oral, Q12H  atorvastatin, 20 mg, Oral, Daily  budesonide-formoterol, 2 puff, Inhalation, BID - RT   And  tiotropium bromide monohydrate, 2 puff, Inhalation, Daily - RT  cetirizine, 10 mg, Oral, Daily  dofetilide, 250 mcg, Oral, BID  gabapentin, 300 mg, Oral, Nightly  isosorbide mononitrate, 30 mg, Oral, Daily  latanoprost, 1 drop, Both Eyes, Nightly  levothyroxine, 200 mcg, Oral, Q AM  losartan, 50 mg, Oral, Q24H  oxybutynin XL, 10 mg, Oral, Daily  pantoprazole, 40 mg, Intravenous, Q12H  roflumilast, 500 mcg, Oral, Daily  sertraline, 100 mg, Oral, Daily  sodium chloride, 500 mL, Intravenous, Once  sodium chloride, 10 mL, Intravenous, Q12H      Continuous Infusions:   PRN Meds:.  Calcium Replacement - Follow Nurse / BPA Driven Protocol    Magnesium Standard Dose Replacement - Follow Nurse / BPA Driven Protocol    nitroglycerin    Phosphorus Replacement - Follow Nurse / BPA Driven Protocol    Potassium Replacement - Follow Nurse / BPA Driven Protocol    sodium chloride    [COMPLETED] Insert Peripheral IV **AND** sodium chloride    sodium chloride    traMADol    Assessment & Plan   Assessment & Plan     Active Hospital Problems    Diagnosis  POA    **Melena [K92.1]  Yes    Anemia due to acute blood loss [D62]  Yes    Chronic obstructive pulmonary disease [J44.9]  Yes    Coronary arteriosclerosis in native artery [I25.10]  Yes    Depression [F32.A]  Yes    Gastroesophageal reflux disease with esophagitis [K21.00]  Yes    Hyperlipidemia [E78.5]  Yes    Hypothyroidism [E03.9]  Yes    Hypertension [I10]  Yes      Resolved Hospital Problems   No resolved problems to display.        Brief Hospital Course to date:  Avelina Ceron is a 83 y.o. female presents  to the ED with dark tarry stools over the past three days.     Melena  Macrocytic Anemia   Vitamin B12 deficiency  Patient presented with multiple melanotic bowel movements  She had EGD on 10/11/2024 that was unremarkable  She had colonoscopy 10/12/2024 showing multiple bleeding colon polyps status post removal and Endo Clip application but she still has ileocecal adenomatous polyp that needs outpatient colonoscopy with EMR  Hemoglobin has been stable around 8-8.5  GI evaluated the patient and recommended close observation  Continue PPI twice daily  Restart Eliquis at a lower dose 2.5 mg twice daily.  Start aspirin 81 mg daily and monitor H&H  Start IM vitamin B12 replacement.  She will need prescription of p.o. B12 upon discharge     Hypokalemia  Replace per protocol     CAD  S/p bilaeral ICA stents  PAD Status post bilateral iliac artery stents  Plavix was discontinued during most recent hospitalization in October 2024  Continue Imdur  Restart Eliquis at a lower dose 2.5 mg twice daily.  Start aspirin 81 mg daily and monitor H&H     Essential hypertension   Hyperlipidemia   Continue losartan and statins    COPD with chronic hypoxic respiratory failure  On her home dose oxygen 1.5 L/min  Continue symbicort, spirivia, roflumilast     Anxiety/depression   Continue sertraline      Expected Discharge Location and Transportation: Home   Expected Discharge   Expected discharge date/ time has not been documented.     VTE Prophylaxis:  Pharmacologic & mechanical VTE prophylaxis orders are present.       AM-PAC 6 Clicks Score (PT): 22 (10/25/24 2000)    CODE STATUS:   Code Status and Medical Interventions: CPR (Attempt to Resuscitate); Full Support   Ordered at: 10/24/24 2013     Level Of Support Discussed With:    Patient     Code Status (Patient has no pulse and is not breathing):    CPR (Attempt to Resuscitate)     Medical Interventions (Patient has pulse or is breathing):    Full Support       Murtaza Rankin,  MD  10/26/24

## 2024-10-27 ENCOUNTER — READMISSION MANAGEMENT (OUTPATIENT)
Dept: CALL CENTER | Facility: HOSPITAL | Age: 84
End: 2024-10-27
Payer: MEDICARE

## 2024-10-27 VITALS
HEART RATE: 95 BPM | SYSTOLIC BLOOD PRESSURE: 103 MMHG | DIASTOLIC BLOOD PRESSURE: 43 MMHG | OXYGEN SATURATION: 96 % | TEMPERATURE: 97.3 F | HEIGHT: 63 IN | RESPIRATION RATE: 20 BRPM | WEIGHT: 147 LBS | BODY MASS INDEX: 26.05 KG/M2

## 2024-10-27 LAB
BASOPHILS # BLD AUTO: 0.09 10*3/MM3 (ref 0–0.2)
BASOPHILS NFR BLD AUTO: 0.7 % (ref 0–1.5)
DEPRECATED RDW RBC AUTO: 58.4 FL (ref 37–54)
EOSINOPHIL # BLD AUTO: 0.29 10*3/MM3 (ref 0–0.4)
EOSINOPHIL NFR BLD AUTO: 2.4 % (ref 0.3–6.2)
ERYTHROCYTE [DISTWIDTH] IN BLOOD BY AUTOMATED COUNT: 15.8 % (ref 12.3–15.4)
HCT VFR BLD AUTO: 26.1 % (ref 34–46.6)
HCT VFR BLD AUTO: 26.9 % (ref 34–46.6)
HCT VFR BLD AUTO: 27.3 % (ref 34–46.6)
HCT VFR BLD AUTO: 29.8 % (ref 34–46.6)
HGB BLD-MCNC: 7.9 G/DL (ref 12–15.9)
HGB BLD-MCNC: 8 G/DL (ref 12–15.9)
HGB BLD-MCNC: 8.1 G/DL (ref 12–15.9)
HGB BLD-MCNC: 9.1 G/DL (ref 12–15.9)
IMM GRANULOCYTES # BLD AUTO: 0.03 10*3/MM3 (ref 0–0.05)
IMM GRANULOCYTES NFR BLD AUTO: 0.2 % (ref 0–0.5)
LYMPHOCYTES # BLD AUTO: 2.67 10*3/MM3 (ref 0.7–3.1)
LYMPHOCYTES NFR BLD AUTO: 21.8 % (ref 19.6–45.3)
MCH RBC QN AUTO: 30.3 PG (ref 26.6–33)
MCHC RBC AUTO-ENTMCNC: 30.1 G/DL (ref 31.5–35.7)
MCV RBC AUTO: 100.7 FL (ref 79–97)
MONOCYTES # BLD AUTO: 0.79 10*3/MM3 (ref 0.1–0.9)
MONOCYTES NFR BLD AUTO: 6.4 % (ref 5–12)
NEUTROPHILS NFR BLD AUTO: 68.5 % (ref 42.7–76)
NEUTROPHILS NFR BLD AUTO: 8.4 10*3/MM3 (ref 1.7–7)
NRBC BLD AUTO-RTO: 0 /100 WBC (ref 0–0.2)
PLATELET # BLD AUTO: 331 10*3/MM3 (ref 140–450)
PMV BLD AUTO: 9.6 FL (ref 6–12)
RBC # BLD AUTO: 2.67 10*6/MM3 (ref 3.77–5.28)
WBC NRBC COR # BLD AUTO: 12.27 10*3/MM3 (ref 3.4–10.8)

## 2024-10-27 PROCEDURE — 94799 UNLISTED PULMONARY SVC/PX: CPT

## 2024-10-27 PROCEDURE — 85014 HEMATOCRIT: CPT | Performed by: NURSE PRACTITIONER

## 2024-10-27 PROCEDURE — 99238 HOSP IP/OBS DSCHRG MGMT 30/<: CPT | Performed by: INTERNAL MEDICINE

## 2024-10-27 PROCEDURE — 94761 N-INVAS EAR/PLS OXIMETRY MLT: CPT

## 2024-10-27 PROCEDURE — 96376 TX/PRO/DX INJ SAME DRUG ADON: CPT

## 2024-10-27 PROCEDURE — 96372 THER/PROPH/DIAG INJ SC/IM: CPT

## 2024-10-27 PROCEDURE — 85018 HEMOGLOBIN: CPT | Performed by: NURSE PRACTITIONER

## 2024-10-27 PROCEDURE — 85025 COMPLETE CBC W/AUTO DIFF WBC: CPT | Performed by: INTERNAL MEDICINE

## 2024-10-27 PROCEDURE — 36430 TRANSFUSION BLD/BLD COMPNT: CPT

## 2024-10-27 PROCEDURE — 86900 BLOOD TYPING SEROLOGIC ABO: CPT

## 2024-10-27 PROCEDURE — G0378 HOSPITAL OBSERVATION PER HR: HCPCS

## 2024-10-27 PROCEDURE — 86923 COMPATIBILITY TEST ELECTRIC: CPT

## 2024-10-27 PROCEDURE — 94664 DEMO&/EVAL PT USE INHALER: CPT

## 2024-10-27 PROCEDURE — 99212 OFFICE O/P EST SF 10 MIN: CPT | Performed by: NURSE PRACTITIONER

## 2024-10-27 PROCEDURE — P9016 RBC LEUKOCYTES REDUCED: HCPCS

## 2024-10-27 PROCEDURE — 25010000002 CYANOCOBALAMIN PER 1000 MCG: Performed by: INTERNAL MEDICINE

## 2024-10-27 RX ORDER — FERROUS SULFATE 325(65) MG
325 TABLET ORAL
Qty: 30 TABLET | Refills: 0 | Status: SHIPPED | OUTPATIENT
Start: 2024-10-28 | End: 2024-11-27

## 2024-10-27 RX ORDER — FERROUS SULFATE 325(65) MG
325 TABLET ORAL
Status: DISCONTINUED | OUTPATIENT
Start: 2024-10-27 | End: 2024-10-27 | Stop reason: HOSPADM

## 2024-10-27 RX ORDER — PANTOPRAZOLE SODIUM 40 MG/1
40 TABLET, DELAYED RELEASE ORAL 2 TIMES DAILY
Qty: 60 TABLET | Refills: 0 | Status: SHIPPED | OUTPATIENT
Start: 2024-10-27 | End: 2024-11-26

## 2024-10-27 RX ORDER — CETIRIZINE HYDROCHLORIDE 10 MG/1
10 TABLET ORAL DAILY
Start: 2024-10-27

## 2024-10-27 RX ADMIN — TIOTROPIUM BROMIDE INHALATION SPRAY 2 PUFF: 3.12 SPRAY, METERED RESPIRATORY (INHALATION) at 07:13

## 2024-10-27 RX ADMIN — Medication 10 ML: at 09:14

## 2024-10-27 RX ADMIN — FERROUS SULFATE TAB 325 MG (65 MG ELEMENTAL FE) 325 MG: 325 (65 FE) TAB at 09:12

## 2024-10-27 RX ADMIN — CYANOCOBALAMIN 1000 MCG: 1000 INJECTION, SOLUTION INTRAMUSCULAR at 09:12

## 2024-10-27 RX ADMIN — SERTRALINE 100 MG: 100 TABLET, FILM COATED ORAL at 09:11

## 2024-10-27 RX ADMIN — PANTOPRAZOLE SODIUM 40 MG: 40 INJECTION, POWDER, FOR SOLUTION INTRAVENOUS at 09:12

## 2024-10-27 RX ADMIN — DOFETILIDE 250 MCG: 0.25 CAPSULE ORAL at 09:11

## 2024-10-27 RX ADMIN — APIXABAN 2.5 MG: 2.5 TABLET, FILM COATED ORAL at 09:11

## 2024-10-27 RX ADMIN — LEVOTHYROXINE SODIUM 200 MCG: 0.1 TABLET ORAL at 05:31

## 2024-10-27 RX ADMIN — BUDESONIDE AND FORMOTEROL FUMARATE DIHYDRATE 2 PUFF: 160; 4.5 AEROSOL RESPIRATORY (INHALATION) at 07:12

## 2024-10-27 RX ADMIN — ATORVASTATIN CALCIUM 20 MG: 20 TABLET, FILM COATED ORAL at 09:12

## 2024-10-27 RX ADMIN — ALBUTEROL SULFATE 2.5 MG: 2.5 SOLUTION RESPIRATORY (INHALATION) at 12:45

## 2024-10-27 RX ADMIN — ALBUTEROL SULFATE 2.5 MG: 2.5 SOLUTION RESPIRATORY (INHALATION) at 07:12

## 2024-10-27 RX ADMIN — ASPIRIN 81 MG: 81 TABLET, COATED ORAL at 09:12

## 2024-10-27 RX ADMIN — OXYBUTYNIN CHLORIDE 10 MG: 5 TABLET, EXTENDED RELEASE ORAL at 09:11

## 2024-10-27 RX ADMIN — ISOSORBIDE MONONITRATE 30 MG: 30 TABLET, EXTENDED RELEASE ORAL at 09:11

## 2024-10-27 RX ADMIN — LOSARTAN POTASSIUM 50 MG: 50 TABLET, FILM COATED ORAL at 09:11

## 2024-10-27 RX ADMIN — TRAMADOL HYDROCHLORIDE 50 MG: 50 TABLET, COATED ORAL at 13:29

## 2024-10-27 RX ADMIN — CETIRIZINE HYDROCHLORIDE 10 MG: 10 TABLET, FILM COATED ORAL at 09:11

## 2024-10-27 RX ADMIN — ROFLUMILAST 500 MCG: 500 TABLET ORAL at 09:14

## 2024-10-27 NOTE — PLAN OF CARE
Problem: Adult Inpatient Plan of Care  Goal: Absence of Hospital-Acquired Illness or Injury  Intervention: Identify and Manage Fall Risk  Recent Flowsheet Documentation  Taken 10/27/2024 0400 by Licha Trent RN  Safety Promotion/Fall Prevention:   safety round/check completed   nonskid shoes/slippers when out of bed   fall prevention program maintained   clutter free environment maintained   assistive device/personal items within reach   activity supervised  Taken 10/27/2024 0200 by Licha Trent RN  Safety Promotion/Fall Prevention:   safety round/check completed   nonskid shoes/slippers when out of bed   fall prevention program maintained   clutter free environment maintained   assistive device/personal items within reach   activity supervised  Taken 10/27/2024 0000 by Licha Trent RN  Safety Promotion/Fall Prevention:   safety round/check completed   nonskid shoes/slippers when out of bed   fall prevention program maintained   clutter free environment maintained   assistive device/personal items within reach   activity supervised  Taken 10/26/2024 2204 by Licha Trent RN  Safety Promotion/Fall Prevention:   safety round/check completed   nonskid shoes/slippers when out of bed   clutter free environment maintained   assistive device/personal items within reach   activity supervised  Taken 10/26/2024 2000 by Licha Trent RN  Safety Promotion/Fall Prevention:   safety round/check completed   nonskid shoes/slippers when out of bed   clutter free environment maintained   assistive device/personal items within reach   activity supervised  Intervention: Prevent Skin Injury  Recent Flowsheet Documentation  Taken 10/27/2024 0400 by Licha Trent RN  Body Position: position changed independently  Taken 10/27/2024 0200 by Licha Trent RN  Body Position:   right   tilted  Taken 10/27/2024 0000 by Licha Trent RN  Body Position: position changed independently  Taken 10/26/2024 2204 by Licha Trent RN  Body Position:  position changed independently  Skin Protection: other (see comments)  Taken 10/26/2024 2000 by Licha Trent RN  Body Position: position changed independently  Skin Protection: skin sealant/moisture barrier applied  Intervention: Prevent Infection  Recent Flowsheet Documentation  Taken 10/27/2024 0400 by Licha Trent RN  Infection Prevention: rest/sleep promoted  Taken 10/27/2024 0200 by Licha Trent RN  Infection Prevention: rest/sleep promoted  Taken 10/27/2024 0000 by Licha Trent RN  Infection Prevention: rest/sleep promoted  Taken 10/26/2024 2204 by Licha Trent RN  Infection Prevention: rest/sleep promoted  Taken 10/26/2024 2000 by Licha Trent RN  Infection Prevention: rest/sleep promoted  Goal: Optimal Comfort and Wellbeing  Intervention: Provide Person-Centered Care  Recent Flowsheet Documentation  Taken 10/27/2024 0000 by Licha Trent RN  Trust Relationship/Rapport:   care explained   choices provided   reassurance provided   thoughts/feelings acknowledged  Taken 10/26/2024 2204 by Licha Trent RN  Trust Relationship/Rapport:   care explained   choices provided   reassurance provided   thoughts/feelings acknowledged  Taken 10/26/2024 2000 by Licha Trent RN  Trust Relationship/Rapport:   care explained   choices provided   reassurance provided     Problem: Adult Inpatient Plan of Care  Goal: Optimal Comfort and Wellbeing  Intervention: Provide Person-Centered Care  Recent Flowsheet Documentation  Taken 10/27/2024 0000 by Licha Trent RN  Trust Relationship/Rapport:   care explained   choices provided   reassurance provided   thoughts/feelings acknowledged  Taken 10/26/2024 2204 by Licha Trent RN  Trust Relationship/Rapport:   care explained   choices provided   reassurance provided   thoughts/feelings acknowledged  Taken 10/26/2024 2000 by Licha Trent RN  Trust Relationship/Rapport:   care explained   choices provided   reassurance provided   Goal Outcome Evaluation:

## 2024-10-27 NOTE — OUTREACH NOTE
Prep Survey      Flowsheet Row Responses   [facility] patient discharged from? [place]   Is LACE score < 7 ? No   Eligibility [facility]   Date of Admission 10/24/24   Discharge Disposition Home or Self Care   Discharge diagnosis *Melena   Does the patient have one of the following disease processes/diagnoses(primary or secondary)? Other   Does the patient have Home health ordered? No   Is there a DME ordered? No   Prep survey completed? Yes            [facility] ORR - Registered Nurse

## 2024-10-27 NOTE — PROGRESS NOTES
"  GI Daily Progress Note  Subjective:    Chief Complaint: Follow-up anemia    Patient resting in bed in no acute distress.  Does not report any further evidence of GI bleed.  Hemoglobin has remained stable.  Tolerating diet.  Inquires on discharge planning.    Objective:    BP (P) 126/61 (BP Location: Right arm, Patient Position: Lying)   Pulse (P) 89   Temp (P) 98.3 °F (36.8 °C) (Oral)   Resp (P) 16   Ht 160 cm (63\")   Wt 66.7 kg (147 lb)   LMP  (LMP Unknown)   SpO2 (P) 95%   BMI 26.04 kg/m²     Physical Exam  Vitals and nursing note reviewed.   Constitutional:       General: She is not in acute distress.     Appearance: Normal appearance. She is normal weight. She is not ill-appearing or toxic-appearing.   Cardiovascular:      Rate and Rhythm: Normal rate and regular rhythm.      Pulses: Normal pulses.      Heart sounds: Normal heart sounds.   Pulmonary:      Effort: Pulmonary effort is normal. No respiratory distress.      Breath sounds: Normal breath sounds.   Abdominal:      General: Abdomen is flat. Bowel sounds are normal. There is no distension.      Palpations: Abdomen is soft. There is no mass.      Tenderness: There is no abdominal tenderness. There is no guarding or rebound.      Hernia: No hernia is present.   Genitourinary:     Rectum: Guaiac result positive.   Neurological:      General: No focal deficit present.      Mental Status: She is alert and oriented to person, place, and time.     Lab  I have personally reviewed most recent cardiac tracings, lab results, and radiology images and interpretations and agree with findings.    Lab Results   Component Value Date    WBC 12.27 (H) 10/27/2024    HGB 8.1 (L) 10/27/2024    HGB 8.0 (L) 10/26/2024    HGB 8.3 (L) 10/26/2024    .7 (H) 10/27/2024     10/27/2024    INR 0.98 08/04/2023    INR 1.04 06/30/2020     Lab Results   Component Value Date    GLUCOSE 90 10/26/2024    BUN 4 (L) 10/26/2024    CREATININE 0.34 (L) 10/26/2024    " EGFRIFNONA 119 11/24/2021    BCR 11.8 10/26/2024     10/26/2024    K 4.3 10/26/2024    CO2 23.0 10/26/2024    CALCIUM 9.1 10/26/2024    ALBUMIN 4.1 10/24/2024    ALKPHOS 66 10/24/2024    BILITOT <0.2 10/24/2024    ALT 19 10/24/2024    AST 23 10/24/2024     Assessment:  Dark stools   Macrocytic anemia, currently stable from last admission.  Will trend.  Recent lower GI bleed secondary to multiple bleeding colon polyps s/p colonoscopy on 10/12 with removal and endoclips applied.     Ileocecal adenomatous polyp, needs outpatient colonoscopy with EMR.     Anticoagulation use for atrial fibrillation  Peripheral vascular disease, on aspirin    Plan:  Patient doing well.  Hemoglobin stable.    >>> Continue twice daily PPI therapy  >>> Recommend supplemental iron  Feosol complete with Bifera to be taken every other day with vitamin C supplement  >>> Patient to follow-up with her primary care provider for outpatient labs x 1 week; instructed that she will need periodic labs to ensure stability of anemia  >>> Patient to follow-up with gastroenterology in 4 to 6 weeks to reassess anemia as well as timing of completion polypectomy of ileocecal adenomatous polyp.    Will sign off.  Please call with questions.    Jude Austin, AMADOR  10/27/24  10:09 EDT

## 2024-10-27 NOTE — DISCHARGE SUMMARY
[facility] Medicine Services  DISCHARGE SUMMARY    Patient Name: Avelina Ceron  : 1940  MRN: [id number]    Date of Admission: 10/24/2024  4:32 PM  Date of Discharge: 10/27/2024  Primary Care Physician: Dre Moura MD    Consults       Date and Time Order Name Status Description    10/24/2024  8:13 PM Inpatient Gastroenterology Consult Completed     10/10/2024  3:57 PM Inpatient Cardiology Consult Completed     10/10/2024  3:39 PM Inpatient Gastroenterology Consult Completed             Hospital Course     Presenting Problem:     Active Hospital Problems    Diagnosis  POA    **Melena [K92.1]  Yes    Anemia due to acute blood loss [D62]  Yes    Chronic obstructive pulmonary disease [J44.9]  Yes    Coronary arteriosclerosis in native artery [I25.10]  Yes    Depression [F32.A]  Yes    Gastroesophageal reflux disease with esophagitis [K21.00]  Yes    Hyperlipidemia [E78.5]  Yes    Hypothyroidism [E03.9]  Yes    Hypertension [I10]  Yes      Resolved Hospital Problems   No resolved problems to display.          Hospital Course:  Avelina Ceron is a 83 y.o. female presents to the ED with dark tarry stools over the past three days.  She was recently hospitalized early  and underwent EGD that was unremarkable and colonoscopy with multiple bleeding polyps that were removed but she still have some high risk polyps that needs EMR as outpatient, presented to the hospital with melena and symptomatic anemia.  Received 1 unit of blood transfusion during this hospitalization.  Seen by GI with no plan for any procedure.  She will need to follow-up with GI after discharge for adenomatous polyp removal with EMR.  Temporarily, I decreased her Eliquis dose to 2.5 mg twice daily and resumed aspirin.  She remained hemodynamically stable     Melena  Macrocytic Anemia   Vitamin B12 deficiency  Patient presented with multiple melanotic bowel movements  She had EGD on 10/11/2024 that was  unremarkable  She had colonoscopy 10/12/2024 showing multiple bleeding colon polyps status post removal and Endo Clip application but she still has ileocecal adenomatous polyp that needs outpatient colonoscopy with EMR  Hemoglobin has been stable around 8-8.5  GI evaluated the patient and recommended close observation.  No plan for repeating any procedure during this hospitalization  Continue PPI twice daily  Status post 1 unit of blood transfusion during this hospitalization  Started on p.o. ferrous sulfate upon discharge  Discussed with the patient and her daughter.  Agreeable to low-dose Eliquis at 2.5 mg twice daily.  Start aspirin 81 mg daily and monitor H&H tell her polyps are removed to decrease risk of further GI bleed  Started on IM vitamin B12 replacement.  Switch to p.o. B12 upon discharge  Outpatient GI referral placed in epic     Hypokalemia  Replace per protocol     CAD  S/p bilaeral ICA stents  PAD Status post bilateral iliac artery stents  Plavix was discontinued during most recent hospitalization in October 2024  Continue Imdur  Restart Eliquis at a lower dose 2.5 mg twice daily.  Start aspirin 81 mg daily and monitor H&H     Essential hypertension   Hyperlipidemia   Continue losartan and statins     COPD with chronic hypoxic respiratory failure  On her home dose oxygen 1.5 L/min  Continue symbicort, spirivia, roflumilast     Anxiety/depression   Continue sertraline       Discharge Follow Up Recommendations for outpatient labs/diagnostics:  GI for outpatient colonoscopy and polyp removal with EMR    Day of Discharge     HPI:   Patient seen and examined in the afternoon.  Comfortable in bed.  Hemodynamically stable.  Daughter at bedside.  Will transfuse 1 unit of blood given her symptomatic anemia with hemoglobin of 7.9 then discharg afterwards e.      Vital Signs:   Temp:  [97.8 °F (36.6 °C)-98.3 °F (36.8 °C)] (P) 98.3 °F (36.8 °C)  Heart Rate:  [] 91  Resp:  [16-18] 18  BP: (120-141)/(50-60)  (P) 126/61  Flow (L/min) (Oxygen Therapy):  [0.5-1] 1      Physical Exam:  General: Comfortable, not in distress, conversant and cooperative  Head: Atraumatic and normocephalic  Eyes: No Icterus.++  Pallor  Ears:  Ears appear intact with no abnormalities noted  Throat: No oral lesions, no thrush  Neck: Supple, trachea midline  Lungs: Clear to auscultation bilaterally, equal air entry, no wheezing or crackles  Heart:  Normal S1 and S2, no murmur, no gallop, No JVD, no lower extremity swelling  Abdomen:  Soft, no tenderness, no organomegaly, normal bowel sounds, no organomegaly  Extremities: pulses equal bilaterally  Skin: No bleeding, bruising or rash, normal skin turgor and elasticity  Neurologic: Cranial nerves appear intact with no evidence of facial asymmetry, normal motor and sensory functions in all 4 extremities  Psych: Alert and oriented x 3, normal mood    Pertinent  and/or Most Recent Results     LAB RESULTS:      Lab 10/27/24  1141 10/27/24  0523 10/26/24  2358 10/26/24  1808 10/26/24  1137 10/26/24  0524 10/25/24  1212 10/25/24  0712 10/25/24  0009 10/24/24  1508   WBC  --  12.27*  --   --   --  12.46*  --  10.68  --  13.73*   HEMOGLOBIN 7.9* 8.1* 8.0* 8.3* 7.7* 8.7*  8.7*   < > 8.3*  8.3*   < > 8.7*   HEMATOCRIT 26.1* 26.9* 27.3* 27.6* 25.6* 29.3*  29.3*   < > 27.4*  27.4*   < > 28.7*   PLATELETS  --  331  --   --   --  277  --  335  --  368   NEUTROS ABS  --  8.40*  --   --   --  8.75*  --  7.63*  --  10.11*   IMMATURE GRANS (ABS)  --  0.03  --   --   --  0.04  --  0.03  --  0.06*   LYMPHS ABS  --  2.67  --   --   --  2.41  --  1.97  --  2.60   MONOS ABS  --  0.79  --   --   --  0.83  --  0.74  --  0.78   EOS ABS  --  0.29  --   --   --  0.31  --  0.19  --  0.09   MCV  --  100.7*  --   --   --  103.2*  --  100.7*  --  101.1*   LACTATE  --   --   --   --   --   --   --   --   --  1.2    < > = values in this interval not displayed.         Lab 10/26/24  0524 10/26/24  0143 10/25/24  1803  10/25/24  0712 10/24/24  1508   SODIUM 142  --   --  146* 141   POTASSIUM 4.3 5.1 3.9 3.9 3.4*   CHLORIDE 104  --   --  104 100   CO2 23.0  --   --  33.0* 31.0*   ANION GAP 15.0  --   --  9.0 10.0   BUN 4*  --   --  5* 9   CREATININE 0.34*  --   --  0.32* 0.46*   EGFR 102.3  --   --  103.8 95.1   GLUCOSE 90  --   --  95 113*   CALCIUM 9.1  --   --  9.1 9.2   MAGNESIUM  --   --   --  1.9  --          Lab 10/24/24  1508   TOTAL PROTEIN 6.8   ALBUMIN 4.1   GLOBULIN 2.7   ALT (SGPT) 19   AST (SGOT) 23   BILIRUBIN <0.2   ALK PHOS 66                 Lab 10/25/24  0712 10/24/24  1508   FERRITIN 219.20*  --    FOLATE 7.56  --    VITAMIN B 12 332  --    ABO TYPING  --  A   RH TYPING  --  Positive   ANTIBODY SCREEN  --  Negative         Brief Urine Lab Results  (Last result in the past 365 days)        Color   Clarity   Blood   Leuk Est   Nitrite   Protein   CREAT   Urine HCG        10/24/24 2328 Yellow   Clear   Negative   Small (1+)   Negative   Negative                 Microbiology Results (last 10 days)       Procedure Component Value - Date/Time    Urine Culture - Urine, Urine, Clean Catch [[id number]] Collected: 10/24/24 2328    Lab Status: Final result Specimen: Urine, Clean Catch Updated: 10/26/24 1438     Urine Culture 50,000 CFU/mL Mixed Mirian Isolated    Narrative:      Specimen contains mixed organisms of questionable pathogenicity suggestive of contamination. If symptoms persist, suggest recollection.  Colonization of the urinary tract without infection is common. Treatment is discouraged unless the patient is symptomatic, pregnant, or undergoing an invasive urologic procedure.            CT Abdomen Pelvis With & Without Contrast    Result Date: 10/24/2024  CT ABDOMEN PELVIS W WO CONTRAST Date of Exam: 10/24/2024 3:51 PM EDT Indication: Gi bleeding. GI Bleed. Comparison: 10/10/2024 Technique: Axial CT images were obtained of the abdomen and pelvis before and after the uneventful intravenous administration of  iodinated contrast. Sagittal and coronal reconstructions were performed.  Automated exposure control and iterative reconstruction methods were used. Findings: Lower Chest: Lung bases clear. Moderate hiatal hernia Organs: 7 mm left lower pole renal calculus. Punctate right lower pole renal calculus small bilateral renal cysts. Liver, spleen, pancreas, adrenal glands unremarkable. Gallbladder surgically absent Gastrointestinal: 4 clips in the right colon, and a clip in the sigmoid colon. No intraluminal extravasation of intravenous contrast in the arterial, venous, and delayed phases. Moderate hiatal hernia. Postoperative changes of the cecum. Diverticula of the sigmoid colon without inflammation. No intestinal distention or definite wall thickening Pelvis: Streak artifact from bilateral prostheses obscures the lower pelvis Peritoneum/Retroperitoneum: No ascites or pneumoperitoneum. Normal caliber atherosclerotic aorta Bones/Soft Tissues: No acute bony abnormality. Degenerative disease of the lower thoracic and lumbar spine. Status post bilateral hip arthroplasties. Old fracture of the right ilium     1. No site of active gastrointestinal hemorrhage is demonstrated on this exam 2. No acute process demonstrated 3. Bilateral nephrolithiasis 4. Moderate hiatal hernia 5. Colonic diverticulosis Electronically Signed: Pete Valdez  10/24/2024 4:33 PM EDT  Workstation ID: [id number]    XR Chest 1 View    Result Date: 10/24/2024  XR CHEST 1 VW Date of Exam: 10/24/2024 2:39 PM EDT Indication: SOA Comparison: Chest x-ray 10/9/2024 Findings: Emphysema. Heart size normal. No pneumothorax or pleural effusion or focal pulmonary parenchymal opacity.     Impression: No acute cardiopulmonary abnormality. Emphysema and chronic changes of the chest. Electronically Signed: Mer Oleary MD  10/24/2024 2:58 PM EDT  Workstation ID: [id number]     Results for orders placed in visit on 11/01/18    SCANNED VASCULAR STUDIES      Results for orders  placed in visit on 11/01/18    SCANNED VASCULAR STUDIES      Results for orders placed during the hospital encounter of 08/26/24    Adult Transthoracic Echo Limited W/ Cont if Necessary Per Protocol    Interpretation Summary    Left ventricular systolic function is normal. Calculated left ventricular EF = 64%    Moderate tricuspid valve regurgitation is present.    Estimated right ventricular systolic pressure from tricuspid regurgitation is moderately elevated (45-55 mmHg).    Mild MR      Plan for Follow-up of Pending Labs/Results:     Discharge Details        Discharge Medications        New Medications        Instructions Start Date   cyanocobalamin 1000 MCG tablet  Commonly known as: CVS Vitamin B-12   1,000 mcg, Oral, Daily      ferrous sulfate 325 (65 FE) MG tablet   325 mg, Oral, Daily With Breakfast   Start Date: October 28, 2024            Changes to Medications        Instructions Start Date   apixaban 2.5 MG tablet tablet  Commonly known as: ELIQUIS  What changed:   medication strength  how much to take   2.5 mg, Oral, Every 12 Hours Scheduled      levothyroxine 200 MCG tablet  Commonly known as: SYNTHROID, LEVOTHROID  What changed: when to take this   200 mcg, Oral, Daily      pantoprazole 40 MG EC tablet  Commonly known as: PROTONIX  What changed: when to take this   40 mg, Oral, 2 Times Daily             Continue These Medications        Instructions Start Date   acetaminophen 500 MG tablet  Commonly known as: TYLENOL   500 mg, Every 6 Hours PRN      albuterol sulfate  (90 Base) MCG/ACT inhaler  Commonly known as: PROVENTIL HFA;VENTOLIN HFA;PROAIR HFA   2 puffs, Inhalation, Every 4 Hours PRN      aspirin 81 MG chewable tablet   81 mg, Daily      atenolol 50 MG tablet  Commonly known as: TENORMIN   50 mg, Oral, Daily      cetirizine 10 MG tablet  Commonly known as: zyrTEC   10 mg, Oral, Daily, OTC      dofetilide 250 MCG capsule  Commonly known as: TIKOSYN   250 mcg, Oral, 2 Times Daily       Fiber Select Gummies chewable tablet   2 tablets, Daily      gabapentin 300 MG capsule  Commonly known as: NEURONTIN   300 mg, Oral, Nightly      Gemtesa 75 MG tablet  Generic drug: Vibegron   75 mg, Oral, Daily      isosorbide mononitrate 30 MG 24 hr tablet  Commonly known as: IMDUR   30 mg, Oral, Daily      latanoprost 0.005 % ophthalmic solution  Commonly known as: XALATAN   Administer 1 drop to both eyes Every Night.      losartan 50 MG tablet  Commonly known as: COZAAR   50 mg, Oral, Every 24 Hours Scheduled      nitroglycerin 0.4 MG SL tablet  Commonly known as: NITROSTAT   0.4 mg, Sublingual, Every 5 Minutes PRN, Take no more than 3 doses in 15 minutes.      ondansetron 4 MG tablet  Commonly known as: Zofran   4 mg, Oral, Every 8 Hours PRN      polyethylene glycol 17 GM/SCOOP powder  Commonly known as: MIRALAX   17 g, As Needed      PRESERVISION AREDS 2 PO   1 tablet, 2 Times Daily      roflumilast 500 MCG tablet tablet  Commonly known as: DALIRESP   500 mcg, Oral, Daily      sertraline 100 MG tablet  Commonly known as: ZOLOFT   100 mg, Oral, Daily      simvastatin 40 MG tablet  Commonly known as: ZOCOR   40 mg, Oral, Nightly      traMADol 50 MG tablet  Commonly known as: ULTRAM   50 mg, Oral, Every 6 Hours PRN      Trelegy Ellipta 100-62.5-25 MCG/ACT inhaler  Generic drug: Fluticasone-Umeclidin-Vilant   1 puff, Inhalation, Daily               Allergies   Allergen Reactions    Penicillins Hives, Swelling and Mental Status Change    Codeine Mental Status Change    Dopamine Palpitations and Other (See Comments)     Other reaction(s): Hypertension         Discharge Disposition:  Home or Self Care    Diet:  Hospital:  Diet Order   Procedures    Diet: Regular/House; Texture: Regular (IDDSI 7); Fluid Consistency: Thin (IDDSI 0)            Activity:  As tolerated    Restrictions or Other Recommendations:  None       CODE STATUS:    Code Status and Medical Interventions: CPR (Attempt to Resuscitate); Full Support    Ordered at: 10/24/24 2013     Level Of Support Discussed With:    Patient     Code Status (Patient has no pulse and is not breathing):    CPR (Attempt to Resuscitate)     Medical Interventions (Patient has pulse or is breathing):    Full Support       Future Appointments   Date Time Provider Department Center   11/14/2024 11:00 AM Lee Kim MD [facility]   11/19/2024 12:00 PM Dre Moura MD [facility]   12/23/2024 11:00 AM Florence Landrum DO [facility]   1/30/2025 11:00 AM Dre Moura MD [facility]                 Murtaza Rankin MD  10/27/24      Time Spent on Discharge:  I spent  29  minutes on this discharge activity which included: face-to-face encounter with the patient, reviewing the data in the system, coordination of the care with the nursing staff as well as consultants, documentation, and entering orders.

## 2024-10-27 NOTE — CASE MANAGEMENT/SOCIAL WORK
Continued Stay Note  [facility]     Patient Name: Avelina Ceron  MRN: [id number]  Today's Date: 10/27/2024    Admit Date: 10/24/2024    Plan: home   Discharge Plan       Row Name 10/27/24 1343       Plan    Plan home    Patient/Family in Agreement with Plan yes    Final Discharge Disposition Code 01 - home or self-care                   Discharge Codes    No documentation.                 Expected Discharge Date and Time       Expected Discharge Date Expected Discharge Time    Oct 27, 2024               Maame Soriano RN

## 2024-10-28 ENCOUNTER — TRANSITIONAL CARE MANAGEMENT TELEPHONE ENCOUNTER (OUTPATIENT)
Dept: CALL CENTER | Facility: HOSPITAL | Age: 84
End: 2024-10-28
Payer: MEDICARE

## 2024-10-28 LAB
BH BB BLOOD EXPIRATION DATE: NORMAL
BH BB BLOOD TYPE BARCODE: 6200
BH BB DISPENSE STATUS: NORMAL
BH BB PRODUCT CODE: NORMAL
BH BB UNIT NUMBER: NORMAL
CROSSMATCH INTERPRETATION: NORMAL
UNIT  ABO: NORMAL
UNIT  RH: NORMAL

## 2024-10-28 NOTE — OUTREACH NOTE
Call Center TCM Note      Flowsheet Row Responses   [facility] patient discharged from? [place]   Does the patient have one of the following disease processes/diagnoses(primary or secondary)? Other   TCM attempt successful? Yes  [VR daughter]   Call start time 1324   Call end time 1326   Discharge diagnosis *Vivi   Person spoke with today (if not patient) and relationship Nan   Meds reviewed with patient/caregiver? Yes   Is the patient having any side effects they believe may be caused by any medication additions or changes? No   Does the patient have all medications ordered at discharge? Yes   Is the patient taking all medications as directed (includes completed medication regime)? Yes   Comments HOSP DC FU appt 11/1/24 845 am   Does the patient have an appointment with their PCP within 7-14 days of discharge? Yes   Has home health visited the patient within 72 hours of discharge? N/A   Psychosocial issues? No   Did the patient receive a copy of their discharge instructions? Yes   Nursing interventions Reviewed instructions with patient   What is the patient's perception of their health status since discharge? Improving   Is the patient/caregiver able to teach back signs and symptoms related to disease process for when to call PCP? Yes   Is the patient/caregiver able to teach back signs and symptoms related to disease process for when to call 911? Yes   Is the patient/caregiver able to teach back the hierarchy of who to call/visit for symptoms/problems? PCP, Specialist, Home health nurse, Urgent Care, ED, 911 Yes   TCM call completed? Yes   Wrap up additional comments No reports of bleeding. No needs   Call end time 1326            Dinora Carter RN    10/28/2024, 13:26 EDT

## 2024-10-29 ENCOUNTER — PREP FOR SURGERY (OUTPATIENT)
Dept: OTHER | Facility: HOSPITAL | Age: 84
End: 2024-10-29
Payer: MEDICARE

## 2024-10-29 DIAGNOSIS — K92.1 MELENA: Primary | ICD-10-CM

## 2024-10-30 ENCOUNTER — TELEPHONE (OUTPATIENT)
Dept: GASTROENTEROLOGY | Facility: CLINIC | Age: 84
End: 2024-10-30
Payer: MEDICARE

## 2024-10-30 DIAGNOSIS — E03.9 HYPOTHYROIDISM, UNSPECIFIED TYPE: ICD-10-CM

## 2024-10-30 RX ORDER — LEVOTHYROXINE SODIUM 200 UG/1
200 TABLET ORAL DAILY
Qty: 90 TABLET | Refills: 3 | Status: SHIPPED | OUTPATIENT
Start: 2024-10-30

## 2024-10-30 NOTE — TELEPHONE ENCOUNTER
PT SCHED FOR COLON IN MARCH, TAKES ELIQUIS BLOOD THINNER, AND NEEDS CARDIAC CLEARANCE. PT'S CARDIOLOGIST IS DR MACK MORA @ [facility]. THE PHONE NUMBER I FOUND ([facility]) [phone].

## 2024-11-01 ENCOUNTER — OFFICE VISIT (OUTPATIENT)
Dept: INTERNAL MEDICINE | Facility: CLINIC | Age: 84
End: 2024-11-01
Payer: MEDICARE

## 2024-11-01 VITALS
HEIGHT: 63 IN | SYSTOLIC BLOOD PRESSURE: 122 MMHG | DIASTOLIC BLOOD PRESSURE: 58 MMHG | BODY MASS INDEX: 26.75 KG/M2 | WEIGHT: 151 LBS | RESPIRATION RATE: 17 BRPM | HEART RATE: 84 BPM | OXYGEN SATURATION: 98 %

## 2024-11-01 DIAGNOSIS — K92.2 ACUTE UPPER GI BLEED: Primary | ICD-10-CM

## 2024-11-01 DIAGNOSIS — Z23 NEED FOR IMMUNIZATION AGAINST INFLUENZA: ICD-10-CM

## 2024-11-01 DIAGNOSIS — Z23 NEED FOR COVID-19 VACCINE: ICD-10-CM

## 2024-11-01 LAB
ALBUMIN SERPL-MCNC: 3.2 G/DL (ref 3.5–5.2)
ALBUMIN/GLOB SERPL: 0.9 G/DL
ALP SERPL-CCNC: 65 U/L (ref 39–117)
ALT SERPL W P-5'-P-CCNC: 12 U/L (ref 1–33)
ANION GAP SERPL CALCULATED.3IONS-SCNC: 10 MMOL/L (ref 5–15)
AST SERPL-CCNC: 24 U/L (ref 1–32)
BASOPHILS # BLD AUTO: 0.1 10*3/MM3 (ref 0–0.2)
BASOPHILS NFR BLD AUTO: 0.8 % (ref 0–1.5)
BILIRUB SERPL-MCNC: 0.3 MG/DL (ref 0–1.2)
BUN SERPL-MCNC: 6 MG/DL (ref 8–23)
BUN/CREAT SERPL: 13.3 (ref 7–25)
CALCIUM SPEC-SCNC: 9.6 MG/DL (ref 8.6–10.5)
CHLORIDE SERPL-SCNC: 102 MMOL/L (ref 98–107)
CO2 SERPL-SCNC: 29 MMOL/L (ref 22–29)
CREAT SERPL-MCNC: 0.45 MG/DL (ref 0.57–1)
DEPRECATED RDW RBC AUTO: 46.1 FL (ref 37–54)
EGFRCR SERPLBLD CKD-EPI 2021: 95.6 ML/MIN/1.73
EOSINOPHIL # BLD AUTO: 0.27 10*3/MM3 (ref 0–0.4)
EOSINOPHIL NFR BLD AUTO: 2.1 % (ref 0.3–6.2)
ERYTHROCYTE [DISTWIDTH] IN BLOOD BY AUTOMATED COUNT: 13.1 % (ref 12.3–15.4)
GLOBULIN UR ELPH-MCNC: 3.4 GM/DL
GLUCOSE SERPL-MCNC: 87 MG/DL (ref 65–99)
HCT VFR BLD AUTO: 33.8 % (ref 34–46.6)
HGB BLD-MCNC: 10.6 G/DL (ref 12–15.9)
IMM GRANULOCYTES # BLD AUTO: 0.05 10*3/MM3 (ref 0–0.05)
IMM GRANULOCYTES NFR BLD AUTO: 0.4 % (ref 0–0.5)
LYMPHOCYTES # BLD AUTO: 1.86 10*3/MM3 (ref 0.7–3.1)
LYMPHOCYTES NFR BLD AUTO: 14.3 % (ref 19.6–45.3)
MCH RBC QN AUTO: 30.5 PG (ref 26.6–33)
MCHC RBC AUTO-ENTMCNC: 31.4 G/DL (ref 31.5–35.7)
MCV RBC AUTO: 97.1 FL (ref 79–97)
MONOCYTES # BLD AUTO: 0.76 10*3/MM3 (ref 0.1–0.9)
MONOCYTES NFR BLD AUTO: 5.9 % (ref 5–12)
NEUTROPHILS NFR BLD AUTO: 76.5 % (ref 42.7–76)
NEUTROPHILS NFR BLD AUTO: 9.93 10*3/MM3 (ref 1.7–7)
NRBC BLD AUTO-RTO: 0 /100 WBC (ref 0–0.2)
PLATELET # BLD AUTO: 373 10*3/MM3 (ref 140–450)
PMV BLD AUTO: 9.6 FL (ref 6–12)
POTASSIUM SERPL-SCNC: 4.2 MMOL/L (ref 3.5–5.2)
PROT SERPL-MCNC: 6.6 G/DL (ref 6–8.5)
RBC # BLD AUTO: 3.48 10*6/MM3 (ref 3.77–5.28)
SODIUM SERPL-SCNC: 141 MMOL/L (ref 136–145)
WBC NRBC COR # BLD AUTO: 12.97 10*3/MM3 (ref 3.4–10.8)

## 2024-11-01 PROCEDURE — 85025 COMPLETE CBC W/AUTO DIFF WBC: CPT | Performed by: INTERNAL MEDICINE

## 2024-11-01 PROCEDURE — 80053 COMPREHEN METABOLIC PANEL: CPT | Performed by: INTERNAL MEDICINE

## 2024-11-01 NOTE — PROGRESS NOTES
Transitional Care Follow Up Visit    Subjective     Chief Complaint   Patient presents with    Hospital Follow Up Visit         Avelina ELANA Ceron is a 83 y.o. female who presents for a transitional care management visit.  The patient is here with her daughter.    Within 48 business hours after discharge our office contacted her via telephone to coordinate her care and needs.      I reviewed and discussed the details of that call along with the discharge summary, hospital problems, inpatient lab results, inpatient diagnostic studies, and consultation reports with Avelina.     Current outpatient and discharge medications have been reconciled for the patient.  Reviewed by: Shira Larson MD          10/27/2024     7:28 PM   Date of TCM Phone Call   [facility]   Date of Admission 10/24/2024   Discharge Disposition Home or Self Care     Risk for Readmission (LACE) Score: 7 (10/27/2024  6:00 AM)    History of Present Illness : Course During Hospital Stay: The patient is here for a hospital follow-up.  She was admitted to [facility] on 10/24/2024 and discharged on 10/27/2024.  Records have been received and reviewed.  Admitting diagnosis was Melena and Anemia (due to acute loss).  The patient had presented to the ED with dark tarry stools x 3 days. She was recently hospitalized early October 24 and underwent EGD that was unremarkable and colonoscopy with multiple bleeding polyps that were removed.  However, she still had some high risk polyps that needs EMR as outpatient.  She received 1 unit of blood transfusion during this hospitalization.  GI evaluated the patient and recommended close observation.  No plan for repeating any procedure during this hospitalization.  Continue PPI twice daily. She will need to follow-up with GI as an outpatient after discharge for adenomatous polyp removal with EMR (referral order was placed).  Temporarily, her Eliquis dose was decreased to 2.5 mg twice daily  and Aspirin 81 mg daily was resumed. Hemoglobin had been stable around 8-8.5.  She remained hemodynamically stable.  She was started on p.o. ferrous sulfate upon discharge.  She was also started on IM Vitamin B12 replacement and switched to switch to p.o. Vitamin B12 upon discharge.   The patient also had hypokalemia during the hospitalization which was replaced per protocol.  She was discharged on her home hypertension medications.    Labs: Admission labs were significant for CBC with an elevated WBC (13.73).  H/H was low (8.7/20.2).  Potassium was low (3.4).  Lactate was normal (1.2).  FOB was positive.  Urinalysis showed small leukocytes, microscopy 11-20 WBC, and negative culture.  On 10/25/2024, WBC was normal (10.8) H/H was low (8.3/27.3).  BMP, magnesium, ferritin, B12, and folate were normal.  On 10/26/2024, WBC was elevated (12.46) and H/H was low (8.7/29.3).  BMP was normal.  Discharge labs on 10/27/2024 were significant for an elevated WBC (12.27) and a low H/H (8.1/26.9).    Imaging: On 10/24/2024, chest x-ray showed: No acute cardiopulmonary abnormality. Emphysema and chronic changes of the chest.  CT abdomen pelvis with and without contrast showed: 1. No site of active gastrointestinal hemorrhage is demonstrated on this exam. 2. No acute process demonstrated. 3. Bilateral nephrolithiasis. 4. Moderate hiatal hernia. 5. Colonic diverticulosis    Since discharge, the patient reports occasional nausea and is taking Zofran as needed.  No abdominal pain, melena, or hematochezia.  She has fatigue, but no weakness.  She denies chest pain, shortness of breath, cough, and wheezing.  She denies urinary symptoms as per HPI.     The following portions of the patient's history were reviewed and updated as appropriate: allergies, current medications, past family history, past medical history, past social history, past surgical history, and problem list.    Review of Systems   Constitutional:  Positive for fatigue.  "Negative for chills and fever.   Respiratory:  Negative for cough, shortness of breath and wheezing.    Cardiovascular:  Negative for chest pain.   Gastrointestinal:  Positive for nausea. Negative for abdominal pain, blood in stool, constipation, diarrhea and vomiting.   Genitourinary:  Negative for dysuria, frequency, hematuria and urgency.   Musculoskeletal:  Positive for arthralgias. Negative for myalgias.   Neurological:  Negative for weakness.       Objective   /58 (BP Location: Right arm, Patient Position: Sitting, Cuff Size: Adult)   Pulse 84   Resp 17   Ht 160 cm (62.99\")   Wt 68.5 kg (151 lb)   SpO2 98% Comment: 2L o2  BMI 26.76 kg/m²   Physical Exam  Vitals and nursing note reviewed.   Constitutional:       Comments: BMI greater than 25.     Cardiovascular:      Rate and Rhythm: Normal rate. Rhythm irregularly irregular.      Pulses: Normal pulses.      Heart sounds: Murmur (2/6 JULES) heard.   Pulmonary:      Effort: Pulmonary effort is normal.      Breath sounds: Normal breath sounds.   Abdominal:      General: Bowel sounds are normal. There is no distension.      Palpations: Abdomen is soft. There is no hepatomegaly, splenomegaly or mass.      Tenderness: There is abdominal tenderness (mild RUQ). There is no guarding or rebound.      Comments: Exam was done while the patient was sitting in the chair, as she was unable to get on the exam table     Neurological:      Mental Status: She is alert.         Assessment & Plan   Diagnoses and all orders for this visit:    1. Acute upper GI bleed (Primary)  -     Comprehensive Metabolic Panel  -     CBC & Differential    The patient states she was told by the hospitalist to stay off Aspirin for a few days if the melena recurs.  If this does not resolve the problem, she is to have a CBC done with her PCP, Dr. Moura instead of going back to the ED.    2. Need for immunization against influenza  -     Fluzone High-Dose 65+yrs    3. Need for COVID-19 " vaccine  -     COVID-19 (Pfizer) 12yrs+ (COMIRNATY)      Transitional Care Management Certification  I certify that the following are true:  Communication was made within 2 business days of discharge.  Complexity of Medical Decision Making is moderate.  Face to face visit occurred within 5 days.    *Note: 09173 is for high complexity patients with a face to face visit within 7 days of discharge.  25774 is for high complexity patients with a face to face on days 8-14 post discharge or medium complexity with face to face visit within 14 days post discharge.    Return for Next scheduled follow up.

## 2024-11-04 DIAGNOSIS — G89.29 CHRONIC MIDLINE LOW BACK PAIN WITHOUT SCIATICA: ICD-10-CM

## 2024-11-04 DIAGNOSIS — M54.50 CHRONIC MIDLINE LOW BACK PAIN WITHOUT SCIATICA: ICD-10-CM

## 2024-11-04 RX ORDER — TRAMADOL HYDROCHLORIDE 50 MG/1
50 TABLET ORAL EVERY 6 HOURS PRN
Qty: 120 TABLET | Refills: 0 | Status: SHIPPED | OUTPATIENT
Start: 2024-11-04

## 2024-11-14 ENCOUNTER — OFFICE VISIT (OUTPATIENT)
Dept: UROLOGY | Facility: CLINIC | Age: 84
End: 2024-11-14
Payer: MEDICARE

## 2024-11-14 VITALS — OXYGEN SATURATION: 98 % | SYSTOLIC BLOOD PRESSURE: 106 MMHG | HEART RATE: 90 BPM | DIASTOLIC BLOOD PRESSURE: 63 MMHG

## 2024-11-14 DIAGNOSIS — N20.0 NEPHROLITHIASIS: ICD-10-CM

## 2024-11-14 DIAGNOSIS — N39.0 RECURRENT UTI (URINARY TRACT INFECTION): ICD-10-CM

## 2024-11-14 DIAGNOSIS — N39.41 URGENCY INCONTINENCE: Primary | ICD-10-CM

## 2024-11-14 NOTE — PROGRESS NOTES
Follow Up Office Visit      Patient Name: Avelina Ceron  : 1940   MRN: [id number]     Chief Complaint:    Chief Complaint   Patient presents with    OAB (overactive bladder)       Referring Provider: No ref. provider found    History of Present Illness: Avelina Ceron is a 83 y.o. female who presents today for follow up of nephrolithiasis, recurrent UTI, OAB, urgency incontinence.  Utilizing Gemtesa 75 mg daily for urgency incontinence with great results.  She is still having some mild leakage but not as concerning.  She is not a candidate for anticholinergics given her age and baseline macular degeneration and dry mouth concerns.  States she will be undergoing insurance change in January and Gemtesa likely will be covered.  Currently Myrbetriq is also not covered for the patient.    She underwent a CT scan during inpatient admission for GI bleed, I reviewed the CT which demonstrates a 4 to 5 mm left lower pole kidney stone with no hydronephrosis.  She underwent right ureteroscopy lithotripsy for obstructing stones remotely.  She has no stones in the right kidney on my evaluation.    She denies UTIs.  She has been prescribed vaginal estrogen cream for recurrent UTI but she does not use this despite counseling.  Continues drinking multiple Coke zeros per day and minimal water.  Last UTI E coli in March.     Subjective      Review of System: Review of Systems   Genitourinary:  Positive for frequency and urgency.      I have reviewed the ROS documented by my clinical staff, I have updated appropriately and I agree. Lee Kim MD    I have reviewed and the following portions of the patient's history were updated as appropriate: past family history, past medical history, past social history, past surgical history and problem list.    Medications:     Current Outpatient Medications:     acetaminophen (TYLENOL) 500 MG tablet, Take 1 tablet by mouth Every 6 (Six) Hours As Needed for Mild Pain, Headache  or Fever. OTC, Disp: , Rfl:     albuterol sulfate  (90 Base) MCG/ACT inhaler, Inhale 2 puffs Every 4 (Four) Hours As Needed for Shortness of Air or Wheezing., Disp: , Rfl:     apixaban (ELIQUIS) 2.5 MG tablet tablet, Take 1 tablet by mouth Every 12 (Twelve) Hours. Indications: Atrial Fibrillation, Disp: 60 tablet, Rfl: 0    aspirin 81 MG chewable tablet, Chew 1 tablet Daily. OTC, Disp: , Rfl:     cetirizine (zyrTEC) 10 MG tablet, Take 1 tablet by mouth Daily. OTC, Disp: , Rfl:     cyanocobalamin ([employer] Vitamin B-12) 1000 MCG tablet, Take 1 tablet by mouth Daily., Disp: 100 tablet, Rfl: 2    dofetilide (TIKOSYN) 250 MCG capsule, Take 1 capsule by mouth 2 (Two) Times a Day for 90 days., Disp: 60 capsule, Rfl: 2    ferrous sulfate 325 (65 FE) MG tablet, Take 1 tablet by mouth Daily With Breakfast for 30 days., Disp: 30 tablet, Rfl: 0    Fiber Select Gummies chewable tablet, Chew 2 tablets Daily. OTC, Disp: , Rfl:     Fluticasone-Umeclidin-Vilant (Trelegy Ellipta) 100-62.5-25 MCG/ACT inhaler, Inhale 1 puff Daily., Disp: 180 each, Rfl: 3    gabapentin (NEURONTIN) 300 MG capsule, Take 1 capsule by mouth Every Night., Disp: 90 capsule, Rfl: 0    isosorbide mononitrate (IMDUR) 30 MG 24 hr tablet, Take 1 tablet by mouth Daily., Disp: 90 tablet, Rfl: 1    latanoprost (XALATAN) 0.005 % ophthalmic solution, Administer 1 drop to both eyes Every Night., Disp: , Rfl:     levothyroxine (SYNTHROID, LEVOTHROID) 200 MCG tablet, TAKE 1 TABLET DAILY, Disp: 90 tablet, Rfl: 3    losartan (COZAAR) 50 MG tablet, Take 1 tablet by mouth Daily., Disp: 30 tablet, Rfl: 2    Multiple Vitamins-Minerals (PRESERVISION AREDS 2 PO), Take 1 tablet by mouth 2 (Two) Times a Day. OTC, Disp: , Rfl:     nitroglycerin (NITROSTAT) 0.4 MG SL tablet, Place 1 tablet under the tongue Every 5 (Five) Minutes As Needed for Chest Pain. Take no more than 3 doses in 15 minutes., Disp: 25 tablet, Rfl: 0    ondansetron (Zofran) 4 MG tablet, Take 1 tablet by mouth  Every 8 (Eight) Hours As Needed for Nausea or Vomiting., Disp: 30 tablet, Rfl: 1    pantoprazole (PROTONIX) 40 MG EC tablet, Take 1 tablet by mouth 2 (Two) Times a Day for 30 days., Disp: 60 tablet, Rfl: 0    polyethylene glycol (MIRALAX) 17 GM/SCOOP powder, Take 17 g by mouth As Needed (Constipation). OTC, Disp: , Rfl:     roflumilast (DALIRESP) 500 MCG tablet tablet, Take 1 tablet by mouth Daily., Disp: 90 tablet, Rfl: 1    sertraline (ZOLOFT) 100 MG tablet, Take 1 tablet by mouth Daily., Disp: 90 tablet, Rfl: 1    simvastatin (ZOCOR) 40 MG tablet, Take 1 tablet by mouth Every Night., Disp: 90 tablet, Rfl: 1    traMADol (ULTRAM) 50 MG tablet, TAKE 1 TABLET BY MOUTH EVERY 6 HOURS AS NEEDED FOR MODERATE PAIN, Disp: 120 tablet, Rfl: 0    Vibegron (Gemtesa) 75 MG tablet, Take 1 tablet by mouth once daily, Disp: 90 tablet, Rfl: 3    Allergies:   Allergies   Allergen Reactions    Penicillins Hives, Swelling and Mental Status Change    Codeine Mental Status Change    Dopamine Palpitations and Other (See Comments)     Other reaction(s): Hypertension       Bladder & Bowel Symptom Questionnaire    How often do you usually urinate during the day ?   2 - About every 2-3 hours    2.   How many timed do you urinate at night?   0 - 0-1 time at night   3.   What is the reason that you usually urinate?   3 - Severe urge (can delay less than 10 min)   4.   Once you get the urge to go, how long can you     comfortably delay?   3 - Less than 10 min   5.   How often do you get a sudden urge that makes you rush to the bathroom?   4 - At least once a day   6.   How often does a sudden urge to urinate result in you leaking urine or wetting pads?   3 - A few times a week   7.  In your opinion, how good is your bladder control?   3 - Poor   8.  Do you have accidental bowel leakage?   no   9.  Do you have difficulty fully emptying your bladder?   yes   10.  Do you experience accidental leakage when performing some physical activity such as  coughing, sneezing, laughing or exercise?   yes   11. Have you tried medications to help improve your symptoms?   yes   12. Would you be interested in learning about a long-lasting option that may help you with your symptoms?   no                                                                             Total Score   18     0-7 (Mild) 8-16 (Moderate) 17-28 (Severe)         Objective     Physical Exam:   Vital Signs:   Vitals:    11/14/24 1106   BP: 106/63   Pulse: 90   SpO2: 98%  Comment: 2L o2 nc     There is no height or weight on file to calculate BMI.     Physical Exam  Constitutional:       Appearance: Normal appearance.      Comments: Nasal cannula O2 chronically    HENT:      Head: Normocephalic and atraumatic.      Nose: Nose normal.      Mouth/Throat:      Mouth: Mucous membranes are moist.      Pharynx: Oropharynx is clear.   Eyes:      Extraocular Movements: Extraocular movements intact.      Pupils: Pupils are equal, round, and reactive to light.   Pulmonary:      Effort: Pulmonary effort is normal. No respiratory distress.   Musculoskeletal:         General: No swelling or deformity. Normal range of motion.      Cervical back: Normal range of motion and neck supple.   Skin:     General: Skin is warm and dry.   Neurological:      General: No focal deficit present.      Mental Status: She is alert and oriented to person, place, and time. Mental status is at baseline.   Psychiatric:         Mood and Affect: Mood normal.         Behavior: Behavior normal.         Labs:   Brief Urine Lab Results  (Last result in the past 365 days)        Color   Clarity   Blood   Leuk Est   Nitrite   Protein   CREAT   Urine HCG        10/24/24 2328 Yellow   Clear   Negative   Small (1+)   Negative   Negative                   Urine Culture          7/25/2024    13:35 10/10/2024    12:14 10/24/2024    23:28   Urine Culture   Urine Culture >100,000 CFU/mL Normal Urogenital Mirian  No growth  50,000 CFU/mL Mixed Mirian Isolated          Lab Results   Component Value Date    GLUCOSE 87 11/01/2024    CALCIUM 9.6 11/01/2024     11/01/2024    K 4.2 11/01/2024    CO2 29.0 11/01/2024     11/01/2024    BUN 6 (L) 11/01/2024    CREATININE 0.45 (L) 11/01/2024    EGFRIFNONA 119 11/24/2021    BCR 13.3 11/01/2024    ANIONGAP 10.0 11/01/2024       Lab Results   Component Value Date    WBC 12.97 (H) 11/01/2024    HGB 10.6 (L) 11/01/2024    HCT 33.8 (L) 11/01/2024    MCV 97.1 (H) 11/01/2024     11/01/2024       Images:   CT Abdomen Pelvis With & Without Contrast    Result Date: 10/24/2024  1. No site of active gastrointestinal hemorrhage is demonstrated on this exam 2. No acute process demonstrated 3. Bilateral nephrolithiasis 4. Moderate hiatal hernia 5. Colonic diverticulosis Electronically Signed: Pete Valdez  10/24/2024 4:33 PM EDT  Workstation ID: [id number]    XR Chest 1 View    Result Date: 10/24/2024  Impression: No acute cardiopulmonary abnormality. Emphysema and chronic changes of the chest. Electronically Signed: Mer Oleary MD  10/24/2024 2:58 PM EDT  Workstation ID: [id number]    XR Spine Lumbar 2 or 3 View    Result Date: 10/10/2024  Impression: 1. Advanced multilevel degenerative disc and facet disease with no fractures or acute findings. 2. Atherosclerotic disease with bilateral iliac stents. Electronically Signed: Markie Mac MD  10/10/2024 3:13 PM EDT  Workstation ID: [id number]    XR Chest PA & Lateral    Result Date: 10/10/2024  Impression: 1. Resolved pneumonia 2. Pulmonary emphysema Electronically Signed: Pete Valdez  10/10/2024 3:11 PM EDT  Workstation ID: [id number]    CT Abdomen Pelvis With & Without Contrast    Result Date: 10/10/2024  1. Accounting for the limitations of the exam there is no evidence of active extravasation or obvious pooling blood noted within the GI tract 2. Diverticulosis without evidence of diverticulitis 3. Mild prominence of the wall thickness of the descending colon accentuated by  incomplete distention. Findings likely artifactual. Colitis thought to be less likely 4. Nonobstructing left renal calculi 5. Other findings as above Electronically Signed: Zack Perez MD  10/10/2024 3:01 PM EDT  Workstation ID: [id number]    XR Abdomen KUB    Result Date: 10/10/2024  Impression: Chronic findings. No acute process. No findings suspicious for urinary tract calculi. Electronically Signed: Mary Wheat MD  10/10/2024 2:29 PM EDT  Workstation ID: [id number]    XR Chest 1 View    Result Date: 8/31/2024  Impression: Left-sided pleural effusion with adjacent volume loss appears similar in addition to some multifocal areas of consolidation also noted on CT, for example in the right upper lobe. No definite new focal opacity is present. There is no distinct pneumothorax. Unchanged heart and mediastinal contours. Electronically Signed: Ramy Davison MD  8/31/2024 10:44 AM EDT  Workstation ID: [id number]    CT Angiogram Chest    Result Date: 8/28/2024  1.No evidence of acute pulmonary embolism. 2.Bilateral multifocal pneumonia, most confluent within the inferior lingula. Recommend short interval CT follow-up to confirm resolution. 3.Small bilateral pleural effusions, left greater than right, with bibasilar atelectasis. 4.Pulmonary emphysema. Please correlate with patient's smoking history/risk factors to determine whether the patient meets criteria for routine lung cancer screening with low dose chest CT. 5.Hiatal hernia. Electronically Signed: Ramon Escobar MD  8/28/2024 5:49 PM EDT  Workstation ID: [id number]    XR Chest 1 View    Result Date: 8/26/2024  Impression: Patchy airspace disease in the left lower lobe with small left pleural effusion, suspicious for pneumonia. Recommend imaging follow-up to resolution. Electronically Signed: Bandar Joseph MD  8/26/2024 2:57 PM EDT  Workstation ID: [id number]      Measures:   Tobacco:   Avelina Ceron  reports that she quit smoking about 18 years ago. Her smoking  use included cigarettes. She started smoking about 68 years ago. She has a 50 pack-year smoking history. She has been exposed to tobacco smoke. She has never used smokeless tobacco.     Assessment / Plan      Assessment/Plan:   83 y.o. female who presented today for follow up of OAB, urgency incontinence, recurrent UTI, nephrolithiasis.  Not utilizing vaginal estrogen cream as prescribed.  Still drinking significant amount of caffeine with Coke 0 intake daily.  I recommend she increase her water intake.  Small nonobstructing left-sided lower pole kidney stone.  Patient is a high risk operative candidate.  They elected to avoid any surgical intervention discussion.  Will monitor for obstructing stone episodes.  See us back in 6 months.    Cont Gemtesa.  Patient's daughter will call her insurance company to see if Myrbetriq may be covered during her insurance supplemental plan switch in January.  I am happy to work with him to find a medication that works but would avoid anticholinergics.    Diagnoses and all orders for this visit:    1. Urgency incontinence (Primary)    2. Recurrent UTI (urinary tract infection)    3. Nephrolithiasis           Follow Up:   Return in about 6 months (around 5/14/2025).    I spent approximately 20 minutes providing clinical care for this patient; including review of patient's chart and provider documentation, face to face time spent with patient in examination room (obtaining history, performing physical exam, discussing diagnosis and management options), placing orders, and completing patient documentation.     Lee Kim MD  [facility] [place]

## 2024-11-19 ENCOUNTER — OFFICE VISIT (OUTPATIENT)
Dept: INTERNAL MEDICINE | Facility: CLINIC | Age: 84
End: 2024-11-19
Payer: MEDICARE

## 2024-11-19 VITALS
DIASTOLIC BLOOD PRESSURE: 68 MMHG | HEIGHT: 61 IN | RESPIRATION RATE: 18 BRPM | SYSTOLIC BLOOD PRESSURE: 156 MMHG | BODY MASS INDEX: 27.94 KG/M2 | TEMPERATURE: 98 F | WEIGHT: 148 LBS | HEART RATE: 84 BPM

## 2024-11-19 DIAGNOSIS — J44.9 CHRONIC OBSTRUCTIVE PULMONARY DISEASE, UNSPECIFIED COPD TYPE: ICD-10-CM

## 2024-11-19 DIAGNOSIS — I48.91 ATRIAL FIBRILLATION, UNSPECIFIED TYPE: ICD-10-CM

## 2024-11-19 DIAGNOSIS — K21.00 GASTROESOPHAGEAL REFLUX DISEASE WITH ESOPHAGITIS WITHOUT HEMORRHAGE: ICD-10-CM

## 2024-11-19 DIAGNOSIS — Z00.00 MEDICARE ANNUAL WELLNESS VISIT, SUBSEQUENT: Primary | ICD-10-CM

## 2024-11-19 DIAGNOSIS — E03.9 HYPOTHYROIDISM, UNSPECIFIED TYPE: ICD-10-CM

## 2024-11-19 DIAGNOSIS — E78.5 HYPERLIPIDEMIA, UNSPECIFIED HYPERLIPIDEMIA TYPE: ICD-10-CM

## 2024-11-19 DIAGNOSIS — I10 ESSENTIAL HYPERTENSION: ICD-10-CM

## 2024-11-19 LAB
ALBUMIN SERPL-MCNC: 4.1 G/DL (ref 3.5–5.2)
ALBUMIN/GLOB SERPL: 1.6 G/DL
ALP SERPL-CCNC: 68 U/L (ref 39–117)
ALT SERPL W P-5'-P-CCNC: 13 U/L (ref 1–33)
ANION GAP SERPL CALCULATED.3IONS-SCNC: 11.1 MMOL/L (ref 5–15)
AST SERPL-CCNC: 21 U/L (ref 1–32)
BASOPHILS # BLD AUTO: 0.07 10*3/MM3 (ref 0–0.2)
BASOPHILS NFR BLD AUTO: 0.6 % (ref 0–1.5)
BILIRUB BLD-MCNC: NEGATIVE MG/DL
BILIRUB SERPL-MCNC: <0.2 MG/DL (ref 0–1.2)
BUN SERPL-MCNC: 7 MG/DL (ref 8–23)
BUN/CREAT SERPL: 17.1 (ref 7–25)
CALCIUM SPEC-SCNC: 9.8 MG/DL (ref 8.6–10.5)
CHLORIDE SERPL-SCNC: 99 MMOL/L (ref 98–107)
CHOLEST SERPL-MCNC: 124 MG/DL (ref 0–200)
CLARITY, POC: CLEAR
CO2 SERPL-SCNC: 28.9 MMOL/L (ref 22–29)
COLOR UR: YELLOW
CREAT SERPL-MCNC: 0.41 MG/DL (ref 0.57–1)
DEPRECATED RDW RBC AUTO: 43 FL (ref 37–54)
EGFRCR SERPLBLD CKD-EPI 2021: 97.8 ML/MIN/1.73
EOSINOPHIL # BLD AUTO: 0.15 10*3/MM3 (ref 0–0.4)
EOSINOPHIL NFR BLD AUTO: 1.4 % (ref 0.3–6.2)
ERYTHROCYTE [DISTWIDTH] IN BLOOD BY AUTOMATED COUNT: 12.5 % (ref 12.3–15.4)
EXPIRATION DATE: ABNORMAL
GLOBULIN UR ELPH-MCNC: 2.6 GM/DL
GLUCOSE SERPL-MCNC: 92 MG/DL (ref 65–99)
GLUCOSE UR STRIP-MCNC: NEGATIVE MG/DL
HCT VFR BLD AUTO: 35.7 % (ref 34–46.6)
HDLC SERPL-MCNC: 49 MG/DL (ref 40–60)
HGB BLD-MCNC: 11.6 G/DL (ref 12–15.9)
IMM GRANULOCYTES # BLD AUTO: 0.03 10*3/MM3 (ref 0–0.05)
IMM GRANULOCYTES NFR BLD AUTO: 0.3 % (ref 0–0.5)
KETONES UR QL: NEGATIVE
LDLC SERPL CALC-MCNC: 54 MG/DL (ref 0–100)
LDLC/HDLC SERPL: 1.04 {RATIO}
LEUKOCYTE EST, POC: ABNORMAL
LYMPHOCYTES # BLD AUTO: 2.49 10*3/MM3 (ref 0.7–3.1)
LYMPHOCYTES NFR BLD AUTO: 22.6 % (ref 19.6–45.3)
Lab: ABNORMAL
MCH RBC QN AUTO: 30.6 PG (ref 26.6–33)
MCHC RBC AUTO-ENTMCNC: 32.5 G/DL (ref 31.5–35.7)
MCV RBC AUTO: 94.2 FL (ref 79–97)
MONOCYTES # BLD AUTO: 0.69 10*3/MM3 (ref 0.1–0.9)
MONOCYTES NFR BLD AUTO: 6.3 % (ref 5–12)
NEUTROPHILS NFR BLD AUTO: 68.8 % (ref 42.7–76)
NEUTROPHILS NFR BLD AUTO: 7.6 10*3/MM3 (ref 1.7–7)
NITRITE UR-MCNC: NEGATIVE MG/ML
NRBC BLD AUTO-RTO: 0 /100 WBC (ref 0–0.2)
PH UR: 5 [PH] (ref 5–8)
PLATELET # BLD AUTO: 294 10*3/MM3 (ref 140–450)
PMV BLD AUTO: 10.3 FL (ref 6–12)
POTASSIUM SERPL-SCNC: 4 MMOL/L (ref 3.5–5.2)
PROT SERPL-MCNC: 6.7 G/DL (ref 6–8.5)
PROT UR STRIP-MCNC: NEGATIVE MG/DL
RBC # BLD AUTO: 3.79 10*6/MM3 (ref 3.77–5.28)
RBC # UR STRIP: NEGATIVE /UL
SODIUM SERPL-SCNC: 139 MMOL/L (ref 136–145)
SP GR UR: 1.02 (ref 1–1.03)
T4 FREE SERPL-MCNC: 1.4 NG/DL (ref 0.92–1.68)
TRIGL SERPL-MCNC: 119 MG/DL (ref 0–150)
TSH SERPL DL<=0.05 MIU/L-ACNC: 0.54 UIU/ML (ref 0.27–4.2)
UROBILINOGEN UR QL: NORMAL
VLDLC SERPL-MCNC: 21 MG/DL (ref 5–40)
WBC NRBC COR # BLD AUTO: 11.03 10*3/MM3 (ref 3.4–10.8)

## 2024-11-19 PROCEDURE — 81003 URINALYSIS AUTO W/O SCOPE: CPT | Performed by: INTERNAL MEDICINE

## 2024-11-19 PROCEDURE — 85025 COMPLETE CBC W/AUTO DIFF WBC: CPT | Performed by: INTERNAL MEDICINE

## 2024-11-19 PROCEDURE — 80061 LIPID PANEL: CPT | Performed by: INTERNAL MEDICINE

## 2024-11-19 PROCEDURE — G0439 PPPS, SUBSEQ VISIT: HCPCS | Performed by: INTERNAL MEDICINE

## 2024-11-19 PROCEDURE — 80053 COMPREHEN METABOLIC PANEL: CPT | Performed by: INTERNAL MEDICINE

## 2024-11-19 PROCEDURE — 1125F AMNT PAIN NOTED PAIN PRSNT: CPT | Performed by: INTERNAL MEDICINE

## 2024-11-19 PROCEDURE — 3078F DIAST BP <80 MM HG: CPT | Performed by: INTERNAL MEDICINE

## 2024-11-19 PROCEDURE — 99214 OFFICE O/P EST MOD 30 MIN: CPT | Performed by: INTERNAL MEDICINE

## 2024-11-19 PROCEDURE — 3077F SYST BP >= 140 MM HG: CPT | Performed by: INTERNAL MEDICINE

## 2024-11-19 PROCEDURE — 84439 ASSAY OF FREE THYROXINE: CPT | Performed by: INTERNAL MEDICINE

## 2024-11-19 PROCEDURE — 1170F FXNL STATUS ASSESSED: CPT | Performed by: INTERNAL MEDICINE

## 2024-11-19 PROCEDURE — 84443 ASSAY THYROID STIM HORMONE: CPT | Performed by: INTERNAL MEDICINE

## 2024-11-19 RX ORDER — GABAPENTIN 300 MG/1
300 CAPSULE ORAL NIGHTLY
Qty: 90 CAPSULE | Refills: 0 | Status: SHIPPED | OUTPATIENT
Start: 2024-11-19

## 2024-11-19 NOTE — PATIENT INSTRUCTIONS
Medicare Wellness  Personal Prevention Plan of Service     Date of Office Visit:    Encounter Provider:  Dre Moura MD  Place of Service:  [facility]  Patient Name: Avelina Ceron  :  1940    As part of the Medicare Wellness portion of your visit today, we are providing you with this personalized preventive plan of services (PPPS). This plan is based upon recommendations of the [place] Preventive Services Task Force (USPSTF) and the Advisory Committee on Immunization Practices (ACIP).    This lists the preventive care services that should be considered, and provides dates of when you are due. Items listed as completed are up-to-date and do not require any further intervention.    Health Maintenance   Topic Date Due    DXA SCAN  Never done    ZOSTER VACCINE (1 of 2) Never done    TDAP/TD VACCINES (2 - Tdap) 2016    BMI FOLLOWUP  10/09/2024    LIPID PANEL  2025    ANNUAL WELLNESS VISIT  2025    COLORECTAL CANCER SCREENING  10/12/2034    COVID-19 Vaccine  Completed    RSV Vaccine - Adults  Completed    INFLUENZA VACCINE  Completed    Pneumococcal Vaccine 65+  Completed    MAMMOGRAM  Discontinued    LUNG CANCER SCREENING  Discontinued       Orders Placed This Encounter   Procedures    Comprehensive Metabolic Panel     Standing Status:   Future     Standing Expiration Date:   2025     Order Specific Question:   Release to patient     Answer:   Routine Release [[id number]]    Lipid Panel     Standing Status:   Future     Standing Expiration Date:   2025     Order Specific Question:   Release to patient     Answer:   Routine Release [[id number]]    TSH     Standing Status:   Future     Standing Expiration Date:   2025     Order Specific Question:   Release to patient     Answer:   Routine Release [[id number]]    T4, Free     Standing Status:   Future     Standing Expiration Date:   2025     Order Specific  Question:   Release to patient     Answer:   Routine Release [[id number]]    POC Urinalysis Dipstick, Automated     Standing Status:   Future     Order Specific Question:   Release to patient     Answer:   Routine Release [[id number]]    CBC & Differential     Standing Status:   Future     Standing Expiration Date:   11/20/2025     Order Specific Question:   Manual Differential     Answer:   No     Order Specific Question:   Release to patient     Answer:   Routine Release [[id number]]       Return in about 4 months (around 3/19/2025) for Follow-up.

## 2024-11-19 NOTE — PROGRESS NOTES
Subjective   The ABCs of the Annual Wellness Visit  Medicare Wellness Visit      Avelnia Ceron is a 83 y.o. patient who presents for a Medicare Wellness Visit.    The following portions of the patient's history were reviewed and   updated as appropriate: allergies, current medications, past family history, past medical history, past social history, past surgical history, and problem list.    Compared to one year ago, the patient's physical   health is worse.  Compared to one year ago, the patient's mental   health is the same.    Recent Hospitalizations:  This patient has had a [facility] admission record on file within the last 365 days.  Current Medical Providers:  Patient Care Team:  Dre Moura MD as PCP - General  Dre Moura MD as PCP - Family Medicine  Rainer Dowd MD as Consulting Physician (Cardiology)  Ascencion Abdullahi MD as Consulting Physician (Ophthalmology)  Florence Landrum DO as Consulting Physician (Pulmonary Disease)    Outpatient Medications Prior to Visit   Medication Sig Dispense Refill    acetaminophen (TYLENOL) 500 MG tablet Take 1 tablet by mouth Every 6 (Six) Hours As Needed for Mild Pain, Headache or Fever. OTC      albuterol sulfate  (90 Base) MCG/ACT inhaler Inhale 2 puffs Every 4 (Four) Hours As Needed for Shortness of Air or Wheezing.      apixaban (ELIQUIS) 2.5 MG tablet tablet Take 1 tablet by mouth Every 12 (Twelve) Hours. Indications: Atrial Fibrillation 60 tablet 0    cyanocobalamin ([employer] Vitamin B-12) 1000 MCG tablet Take 1 tablet by mouth Daily. 100 tablet 2    ferrous sulfate 325 (65 FE) MG tablet Take 1 tablet by mouth Daily With Breakfast for 30 days. 30 tablet 0    Fiber Select Gummies chewable tablet Chew 2 tablets Daily. OTC      Fluticasone-Umeclidin-Vilant (Trelegy Ellipta) 100-62.5-25 MCG/ACT inhaler Inhale 1 puff Daily. 180 each 3    gabapentin (NEURONTIN) 300 MG capsule Take 1 capsule by mouth Every Night. 90 capsule 0     isosorbide mononitrate (IMDUR) 30 MG 24 hr tablet Take 1 tablet by mouth Daily. 90 tablet 1    latanoprost (XALATAN) 0.005 % ophthalmic solution Administer 1 drop to both eyes Every Night.      levothyroxine (SYNTHROID, LEVOTHROID) 200 MCG tablet TAKE 1 TABLET DAILY 90 tablet 3    losartan (COZAAR) 50 MG tablet Take 1 tablet by mouth Daily. 30 tablet 2    Multiple Vitamins-Minerals (PRESERVISION AREDS 2 PO) Take 1 tablet by mouth 2 (Two) Times a Day. OTC      nitroglycerin (NITROSTAT) 0.4 MG SL tablet Place 1 tablet under the tongue Every 5 (Five) Minutes As Needed for Chest Pain. Take no more than 3 doses in 15 minutes. 25 tablet 0    ondansetron (Zofran) 4 MG tablet Take 1 tablet by mouth Every 8 (Eight) Hours As Needed for Nausea or Vomiting. 30 tablet 1    pantoprazole (PROTONIX) 40 MG EC tablet Take 1 tablet by mouth 2 (Two) Times a Day for 30 days. 60 tablet 0    polyethylene glycol (MIRALAX) 17 GM/SCOOP powder Take 17 g by mouth As Needed (Constipation). OTC      roflumilast (DALIRESP) 500 MCG tablet tablet Take 1 tablet by mouth Daily. 90 tablet 1    sertraline (ZOLOFT) 100 MG tablet Take 1 tablet by mouth Daily. 90 tablet 1    simvastatin (ZOCOR) 40 MG tablet Take 1 tablet by mouth Every Night. 90 tablet 1    traMADol (ULTRAM) 50 MG tablet TAKE 1 TABLET BY MOUTH EVERY 6 HOURS AS NEEDED FOR MODERATE PAIN 120 tablet 0    Vibegron (Gemtesa) 75 MG tablet Take 1 tablet by mouth once daily 90 tablet 3    aspirin 81 MG chewable tablet Chew 1 tablet Daily. OTC (Patient not taking: Reported on 11/19/2024)      dofetilide (TIKOSYN) 250 MCG capsule Take 1 capsule by mouth 2 (Two) Times a Day for 90 days. 60 capsule 2    cetirizine (zyrTEC) 10 MG tablet Take 1 tablet by mouth Daily. OTC       No facility-administered medications prior to visit.     Opioid medication/s are on active medication list.  and I have evaluated her active treatment plan and pain score trends (see table).  Vitals:    11/19/24 1204   PainSc:   6  "  PainLoc: Back     I have reviewed the chart for potential of high risk medication and harmful drug interactions in the elderly.        Aspirin is on active medication list. Aspirin use is not indicated based on review of current medical condition/s. Risk of harm outweighs potential benefits. Patient instructed to discontinue this medication.  .      Patient Active Problem List   Diagnosis    Coronary arteriosclerosis in native artery    Chronic obstructive pulmonary disease    Depression    Gastroesophageal reflux disease with esophagitis    Hyperlipidemia    Hypertension    Hypothyroidism    Osteoarthritis    Peripheral arterial occlusive disease    Solitary pulmonary nodule    Vitamin D deficiency    Lower back pain    Carotid bruit    Hypoxemia    Chronic respiratory failure with hypoxia and hypercapnia    Allergic rhinitis    Chronic leukocytosis    Ureterolithiasis    Murmur, cardiac    Hospital discharge follow-up    Closed fracture of right proximal humerus    Right shoulder pain    Acute hypoxemic respiratory failure    Anemia due to blood loss, acute    Blood loss anemia    Anemia due to acute blood loss    Melena     Advance Care Planning Advance Directive is on file.  ACP discussion was held with the patient during this visit. Patient has an advance directive in EMR which is still valid.             Objective   Vitals:    11/19/24 1204   BP: 156/68   BP Location: Left arm   Patient Position: Sitting   Cuff Size: Adult   Pulse: 84   Resp: 18   Temp: 98 °F (36.7 °C)   TempSrc: Infrared   Weight: 67.1 kg (148 lb)   Height: 154.9 cm (61\")   PainSc:   6   PainLoc: Back       Estimated body mass index is 27.96 kg/m² as calculated from the following:    Height as of this encounter: 154.9 cm (61\").    Weight as of this encounter: 67.1 kg (148 lb).    BMI is >= 25 and <30. (Overweight) The following options were offered after discussion;: weight loss educational material (shared in after visit summary), exercise " counseling/recommendations, nutrition counseling/recommendations, and information on healthy weight added to patient's after visit summary      Finger Rub Hearing{Test (right ear):passed  Finger Rub Hearing{Test (left ear):passed    Does the patient have evidence of cognitive impairment? No                                                                                               Health  Risk Assessment    Smoking Status:  Social History     Tobacco Use   Smoking Status Former    Current packs/day: 0.00    Average packs/day: 1 pack/day for 50.0 years (50.0 ttl pk-yrs)    Types: Cigarettes    Start date: 1956    Quit date: 2006    Years since quittin.8    Passive exposure: Past   Smokeless Tobacco Never     Alcohol Consumption:  Social History     Substance and Sexual Activity   Alcohol Use Not Currently    Comment: no etoh for past 10 years       Fall Risk Screen  STEADI Fall Risk Assessment was completed, and patient is at HIGH risk for falls. Assessment completed on:2024    Depression Screening   Little interest or pleasure in doing things? Almost all   Feeling down, depressed, or hopeless? Almost all   PHQ-2 Total Score 6   Trouble falling or staying asleep, or sleeping too much? Almost all   Feeling tired or having little energy? Almost all   Poor appetite or overeating? Over half   Feeling bad about yourself - or that you are a failure or have let yourself or your family down? Over half   Trouble concentrating on things, such as reading the newspaper or watching television? Over half   Moving or speaking so slowly that other people could have noticed? Or the opposite - being so fidgety or restless that you have been moving around a lot more than usual? Over half   Thoughts that you would be better off dead, or of hurting yourself in some way? Several days   PHQ-9 Total Score 21   If you checked off any problems, how difficult have these problems made it for you to do your work, take care  of things at home, or get along with other people? Somewhat difficult      Health Habits and Functional and Cognitive Screenin/19/2024    12:19 PM   Functional & Cognitive Status   Do you have difficulty preparing food and eating? Yes   Do you have difficulty bathing yourself, getting dressed or grooming yourself? No   Do you have difficulty using the toilet? No   Do you have difficulty moving around from place to place? Yes   Do you have trouble with steps or getting out of a bed or a chair? Yes   Current Diet Unhealthy Diet   Dental Exam Other   Eye Exam Up to date   Exercise (times per week) 0 times per week   Current Exercises Include No Regular Exercise   Do you need help using the phone?  No   Are you deaf or do you have serious difficulty hearing?  No   Do you need help to go to places out of walking distance? Yes   Do you need help shopping? Yes   Do you need help preparing meals?  Yes   Do you need help with housework?  Yes   Do you need help with laundry? Yes   Do you need help taking your medications? Yes   Do you need help managing money? No   Do you ever drive or ride in a car without wearing a seat belt? No   Have you felt unusual stress, anger or loneliness in the last month? No   Who do you live with? Child   If you need help, do you have trouble finding someone available to you? No   Have you been bothered in the last four weeks by sexual problems? No   Do you have difficulty concentrating, remembering or making decisions? Yes           Age-appropriate Screening Schedule:  Refer to the list below for future screening recommendations based on patient's age, sex and/or medical conditions. Orders for these recommended tests are listed in the plan section. The patient has been provided with a written plan.    Health Maintenance List  Health Maintenance   Topic Date Due    DXA SCAN  Never done    ZOSTER VACCINE (1 of 2) Never done    TDAP/TD VACCINES (2 - Tdap) 2016    ANNUAL WELLNESS  VISIT  10/09/2024    BMI FOLLOWUP  10/09/2024    LIPID PANEL  07/25/2025    COLORECTAL CANCER SCREENING  10/12/2034    COVID-19 Vaccine  Completed    RSV Vaccine - Adults  Completed    INFLUENZA VACCINE  Completed    Pneumococcal Vaccine 65+  Completed    MAMMOGRAM  Discontinued    LUNG CANCER SCREENING  Discontinued                                                                                                                                                CMS Preventative Services Quick Reference  Risk Factors Identified During Encounter  Immunizations Discussed/Encouraged: Tdap and Shingrix    The above risks/problems have been discussed with the patient.  Pertinent information has been shared with the patient in the After Visit Summary.  An After Visit Summary and PPPS were made available to the patient.    Diagnoses and all orders for this visit:    1. Medicare annual wellness visit, subsequent (Primary)    2. Hypothyroidism, unspecified type  -     TSH; Future  -     T4, Free; Future  -     TSH  -     T4, Free    3. Atrial fibrillation, unspecified type  -     Comprehensive Metabolic Panel; Future  -     Comprehensive Metabolic Panel    4. Essential hypertension  -     CBC & Differential; Future  -     Comprehensive Metabolic Panel; Future  -     POC Urinalysis Dipstick, Automated; Future  -     CBC & Differential  -     Comprehensive Metabolic Panel  -     POC Urinalysis Dipstick, Automated    5. Gastroesophageal reflux disease with esophagitis without hemorrhage    6. Chronic obstructive pulmonary disease, unspecified COPD type    7. Hyperlipidemia, unspecified hyperlipidemia type  -     Comprehensive Metabolic Panel; Future  -     Lipid Panel; Future  -     Comprehensive Metabolic Panel  -     Lipid Panel    Other orders  -     gabapentin (NEURONTIN) 300 MG capsule; Take 1 capsule by mouth Every Night.  Dispense: 90 capsule; Refill: 0  -     apixaban (ELIQUIS) 2.5 MG tablet tablet; Take 1 tablet by mouth  Every 12 (Twelve) Hours. Indications: Atrial Fibrillation  Dispense: 180 tablet; Refill: 1          Follow Up:   No follow-ups on file.

## 2024-11-20 NOTE — PROGRESS NOTES
Chief Complaint   Patient presents with    Medicare Wellness-subsequent       History of Present Illness      The patient presents for a follow-up related to hyperlipidemia. She is not following a low fat diet. She reports that she is not exercising. She is taking simvastatin. The patient is taking her medication as prescribed. She reports no medication side effects, including muscle cramps, abdominal pain, headaches or weakness. She denies chest pain, shortness of breath, orthopnea, paroxysmal nocturnal dyspnea, dyspnea on exertion, edema, palpitations or syncope.    The patient presents for a follow-up related to chronic obstructive pulmonary disease. She currently reports no wheezing, cough or sputum production. The patient is using an inhaled steroid. She does rinse her mouth after using her steroid inhaler. She denies hemoptysis.    The patient presents for a follow-up related to hypertension. The patient reports that she has had no headaches or blurred vision. She states that she is taking her medication as prescribed. She is not having medication side effects.    The patient presents for a follow-up related to hypothyroidism. She reports a good energy level. She reports no hair loss, heat intolerance, cold intolerance, diarrhea, constipation or sweats. She is taking her medication as prescribed.    The patient presents for a follow-up related to chronic atrial fibrillation. She denies palpitations. The patient denies dizziness, nausea or exercise intolerance.    The patient presents for a follow-up related to GERD. The patient is on pantoprazole for her gastroesophageal reflux. The medication is taken on a regular basis and gives complete relief of the symptoms. She reports no abdominal pain, belching, dysphagia, early satiety, heartburn, hoarseness, odynophagia, rectal bleeding, vomiting or weight loss. The GERD has no known aggravating factors.    Medications      Current Outpatient Medications:      acetaminophen (TYLENOL) 500 MG tablet, Take 1 tablet by mouth Every 6 (Six) Hours As Needed for Mild Pain, Headache or Fever. OTC, Disp: , Rfl:     albuterol sulfate  (90 Base) MCG/ACT inhaler, Inhale 2 puffs Every 4 (Four) Hours As Needed for Shortness of Air or Wheezing., Disp: , Rfl:     apixaban (ELIQUIS) 2.5 MG tablet tablet, Take 1 tablet by mouth Every 12 (Twelve) Hours. Indications: Atrial Fibrillation, Disp: 180 tablet, Rfl: 1    cyanocobalamin ([employer] Vitamin B-12) 1000 MCG tablet, Take 1 tablet by mouth Daily., Disp: 100 tablet, Rfl: 2    ferrous sulfate 325 (65 FE) MG tablet, Take 1 tablet by mouth Daily With Breakfast for 30 days., Disp: 30 tablet, Rfl: 0    Fiber Select Gummies chewable tablet, Chew 2 tablets Daily. OTC, Disp: , Rfl:     Fluticasone-Umeclidin-Vilant (Trelegy Ellipta) 100-62.5-25 MCG/ACT inhaler, Inhale 1 puff Daily., Disp: 180 each, Rfl: 3    gabapentin (NEURONTIN) 300 MG capsule, Take 1 capsule by mouth Every Night., Disp: 90 capsule, Rfl: 0    isosorbide mononitrate (IMDUR) 30 MG 24 hr tablet, Take 1 tablet by mouth Daily., Disp: 90 tablet, Rfl: 1    latanoprost (XALATAN) 0.005 % ophthalmic solution, Administer 1 drop to both eyes Every Night., Disp: , Rfl:     levothyroxine (SYNTHROID, LEVOTHROID) 200 MCG tablet, TAKE 1 TABLET DAILY, Disp: 90 tablet, Rfl: 3    losartan (COZAAR) 50 MG tablet, Take 1 tablet by mouth Daily., Disp: 30 tablet, Rfl: 2    Multiple Vitamins-Minerals (PRESERVISION AREDS 2 PO), Take 1 tablet by mouth 2 (Two) Times a Day. OTC, Disp: , Rfl:     nitroglycerin (NITROSTAT) 0.4 MG SL tablet, Place 1 tablet under the tongue Every 5 (Five) Minutes As Needed for Chest Pain. Take no more than 3 doses in 15 minutes., Disp: 25 tablet, Rfl: 0    ondansetron (Zofran) 4 MG tablet, Take 1 tablet by mouth Every 8 (Eight) Hours As Needed for Nausea or Vomiting., Disp: 30 tablet, Rfl: 1    pantoprazole (PROTONIX) 40 MG EC tablet, Take 1 tablet by mouth 2 (Two) Times a Day  for 30 days., Disp: 60 tablet, Rfl: 0    polyethylene glycol (MIRALAX) 17 GM/SCOOP powder, Take 17 g by mouth As Needed (Constipation). OTC, Disp: , Rfl:     roflumilast (DALIRESP) 500 MCG tablet tablet, Take 1 tablet by mouth Daily., Disp: 90 tablet, Rfl: 1    sertraline (ZOLOFT) 100 MG tablet, Take 1 tablet by mouth Daily., Disp: 90 tablet, Rfl: 1    simvastatin (ZOCOR) 40 MG tablet, Take 1 tablet by mouth Every Night., Disp: 90 tablet, Rfl: 1    traMADol (ULTRAM) 50 MG tablet, TAKE 1 TABLET BY MOUTH EVERY 6 HOURS AS NEEDED FOR MODERATE PAIN, Disp: 120 tablet, Rfl: 0    Vibegron (Gemtesa) 75 MG tablet, Take 1 tablet by mouth once daily, Disp: 90 tablet, Rfl: 3    dofetilide (TIKOSYN) 250 MCG capsule, Take 1 capsule by mouth 2 (Two) Times a Day for 90 days., Disp: 60 capsule, Rfl: 2     Allergies    Allergies   Allergen Reactions    Penicillins Hives, Swelling and Mental Status Change    Codeine Mental Status Change    Dopamine Palpitations and Other (See Comments)     Other reaction(s): Hypertension       Problem List    Patient Active Problem List   Diagnosis    Coronary arteriosclerosis in native artery    Chronic obstructive pulmonary disease    Depression    Gastroesophageal reflux disease with esophagitis    Hyperlipidemia    Hypertension    Hypothyroidism    Osteoarthritis    Peripheral arterial occlusive disease    Solitary pulmonary nodule    Vitamin D deficiency    Lower back pain    Carotid bruit    Hypoxemia    Chronic respiratory failure with hypoxia and hypercapnia    Allergic rhinitis    Chronic leukocytosis    Ureterolithiasis    Murmur, cardiac    Hospital discharge follow-up    Closed fracture of right proximal humerus    Right shoulder pain    Acute hypoxemic respiratory failure    Anemia due to blood loss, acute    Blood loss anemia    Anemia due to acute blood loss    Melena       Medications, Allergies, Problems List and Past History were reviewed and updated.    Physical Examination    BP  "156/68 (BP Location: Left arm, Patient Position: Sitting, Cuff Size: Adult)   Pulse 84   Temp 98 °F (36.7 °C) (Infrared)   Resp 18   Ht 154.9 cm (61\")   Wt 67.1 kg (148 lb)   LMP  (LMP Unknown)   BMI 27.96 kg/m²       HEENT: Head- Normocephalic Atraumatic. Facies- Within normal limits. Pinnas- Normal texture and shape bilaterally. Canals- Normal bilaterally. TMs- Normal bilaterally. Nares- Patent bilaterally. Nasal Septum- is normal. There is no tenderness to palpation over the frontal or maxillary sinuses. Lids- Normal bilaterally. Conjunctiva- Clear bilaterally. Sclera- Anicteric bilaterally. Oropharynx- Moist with no lesions. Tonsils- No enlargement, erythema or exudate.    Neck: Thyroid- non enlarged, symmetric and has no nodules. No bruits are detected. ROM- Normal Range of Motion with no rigidity.    Lungs: Auscultation- Clear to auscultation bilaterally. There are no retractions, clubbing or cyanosis. The Expiratory to Inspiratory ratio is equal.    Lymph Nodes: Cervical- no enlarged lymph nodes noted. Clavicular- no enlarged supraclavicular lymph nodes noted. Axillary- no enlarged axillary lymph nodes noted. Inguinal- no enlarged inguinal lymph nodes noted.    Cardiovascular: There are no carotid bruits. Heart- Normal Rate with Irregularly Irregular rhythm and no murmurs. There are no gallops. There are no rubs. In the lower extremities there is no edema. The upper extremities do not have edema.      Abdomen: Soft, benign, non-tender with no masses, hernias, organomegaly or scars.    Breast: Normal contours bilaterally with no masses, discharge, skin changes or lumps. There are no scars noted.    Impression and Assessment    Hyperlipidemia.    Chronic Obstructive Pulmonary Disease.    Essential Hypertension.    Hypothyroidism.    Atrial Fibrillation.    Gastroesophageal Reflux Disease.    Plan    Gastroesophageal Reflux Disease Plan: The patient was instructed to continue the current " medications.    Hyperlipidemia Plan: The patient was instructed to exercise daily, eat a low fat diet and continue her medications.    Essential Hypertension Plan: The patient was instructed to continue the current medications.    Atrial Fibrillation Plan: The patient was instructed to continue the current medications.    Hypothyroidism Plan: The patient was instructed to continue the current medications.    Chronic Obstructive Pulmonary Disease Plan: Continue the current medication regimen.    Diagnoses and all orders for this visit:    1. Medicare annual wellness visit, subsequent (Primary)    2. Hypothyroidism, unspecified type  -     TSH; Future  -     T4, Free; Future  -     TSH  -     T4, Free    3. Atrial fibrillation, unspecified type  -     Comprehensive Metabolic Panel; Future  -     Comprehensive Metabolic Panel    4. Essential hypertension  -     CBC & Differential; Future  -     Comprehensive Metabolic Panel; Future  -     POC Urinalysis Dipstick, Automated; Future  -     CBC & Differential  -     Comprehensive Metabolic Panel  -     POC Urinalysis Dipstick, Automated    5. Gastroesophageal reflux disease with esophagitis without hemorrhage    6. Chronic obstructive pulmonary disease, unspecified COPD type    7. Hyperlipidemia, unspecified hyperlipidemia type  -     Comprehensive Metabolic Panel; Future  -     Lipid Panel; Future  -     Comprehensive Metabolic Panel  -     Lipid Panel    Other orders  -     gabapentin (NEURONTIN) 300 MG capsule; Take 1 capsule by mouth Every Night.  Dispense: 90 capsule; Refill: 0  -     apixaban (ELIQUIS) 2.5 MG tablet tablet; Take 1 tablet by mouth Every 12 (Twelve) Hours. Indications: Atrial Fibrillation  Dispense: 180 tablet; Refill: 1            Return to Office    The patient was instructed to return for follow-up in 4 months. The patient was instructed to return sooner if the condition changes, worsens, or does not resolve.

## 2024-11-26 RX ORDER — GABAPENTIN 300 MG/1
300 CAPSULE ORAL NIGHTLY
Qty: 90 CAPSULE | Refills: 0 | Status: SHIPPED | OUTPATIENT
Start: 2024-11-26

## 2024-11-26 RX ORDER — GABAPENTIN 300 MG/1
300 CAPSULE ORAL NIGHTLY
Qty: 90 CAPSULE | Refills: 0 | OUTPATIENT
Start: 2024-11-26

## 2024-12-05 DIAGNOSIS — M54.50 CHRONIC MIDLINE LOW BACK PAIN WITHOUT SCIATICA: ICD-10-CM

## 2024-12-05 DIAGNOSIS — G89.29 CHRONIC MIDLINE LOW BACK PAIN WITHOUT SCIATICA: ICD-10-CM

## 2024-12-09 RX ORDER — TRAMADOL HYDROCHLORIDE 50 MG/1
50 TABLET ORAL EVERY 6 HOURS PRN
Qty: 120 TABLET | Refills: 0 | Status: SHIPPED | OUTPATIENT
Start: 2024-12-09

## 2024-12-23 ENCOUNTER — PATIENT MESSAGE (OUTPATIENT)
Dept: INTERNAL MEDICINE | Facility: CLINIC | Age: 84
End: 2024-12-23
Payer: MEDICARE

## 2024-12-23 RX ORDER — DOFETILIDE 0.25 MG/1
250 CAPSULE ORAL 2 TIMES DAILY
Qty: 60 CAPSULE | Refills: 2 | Status: SHIPPED | OUTPATIENT
Start: 2024-12-23 | End: 2025-03-23

## 2024-12-23 RX ORDER — LOSARTAN POTASSIUM 50 MG/1
50 TABLET ORAL
Qty: 30 TABLET | Refills: 2 | Status: SHIPPED | OUTPATIENT
Start: 2024-12-23

## 2025-01-02 ENCOUNTER — OFFICE VISIT (OUTPATIENT)
Dept: PULMONOLOGY | Facility: CLINIC | Age: 85
End: 2025-01-02
Payer: COMMERCIAL

## 2025-01-02 VITALS
DIASTOLIC BLOOD PRESSURE: 78 MMHG | SYSTOLIC BLOOD PRESSURE: 126 MMHG | BODY MASS INDEX: 27.98 KG/M2 | HEART RATE: 88 BPM | TEMPERATURE: 97.6 F | OXYGEN SATURATION: 94 % | HEIGHT: 61 IN

## 2025-01-02 DIAGNOSIS — J44.9 CHRONIC OBSTRUCTIVE PULMONARY DISEASE, UNSPECIFIED COPD TYPE: ICD-10-CM

## 2025-01-02 DIAGNOSIS — J96.12 CHRONIC RESPIRATORY FAILURE WITH HYPOXIA AND HYPERCAPNIA: ICD-10-CM

## 2025-01-02 DIAGNOSIS — J96.11 CHRONIC RESPIRATORY FAILURE WITH HYPOXIA AND HYPERCAPNIA: ICD-10-CM

## 2025-01-02 DIAGNOSIS — D62 ANEMIA DUE TO BLOOD LOSS, ACUTE: Primary | ICD-10-CM

## 2025-01-02 LAB
DEPRECATED RDW RBC AUTO: 40.6 FL (ref 37–54)
ERYTHROCYTE [DISTWIDTH] IN BLOOD BY AUTOMATED COUNT: 12.5 % (ref 12.3–15.4)
HCT VFR BLD AUTO: 36 % (ref 34–46.6)
HGB BLD-MCNC: 11.8 G/DL (ref 12–15.9)
MCH RBC QN AUTO: 29.2 PG (ref 26.6–33)
MCHC RBC AUTO-ENTMCNC: 32.8 G/DL (ref 31.5–35.7)
MCV RBC AUTO: 89.1 FL (ref 79–97)
PLATELET # BLD AUTO: 245 10*3/MM3 (ref 140–450)
PMV BLD AUTO: 10.5 FL (ref 6–12)
RBC # BLD AUTO: 4.04 10*6/MM3 (ref 3.77–5.28)
WBC NRBC COR # BLD AUTO: 11.19 10*3/MM3 (ref 3.4–10.8)

## 2025-01-02 PROCEDURE — 85027 COMPLETE CBC AUTOMATED: CPT | Performed by: INTERNAL MEDICINE

## 2025-01-02 RX ORDER — ROFLUMILAST 500 UG/1
500 TABLET ORAL DAILY
Qty: 90 TABLET | Refills: 3 | Status: SHIPPED | OUTPATIENT
Start: 2025-01-02

## 2025-01-02 NOTE — PROGRESS NOTES
"Pulmonary Office Follow Up      Subjective   Chief Complaint: Shortness of Breath    Avelina Ceron is a 84 y.o. female is being seen in follow up for COPD    History of Present Illness    Ms. Ceron is an 85yo F who is followed for COPD and Chronic Respiratory Failure.     She was last seen in clinic on 9/11/24.    Since her last visit, she was hospitalized at [facility] in October for a GI bleed.     She returns to clinic today for follow up. She has not had any pulmonary issues. She thinks that she may be having some melena again.     The following portions of the patient's history were reviewed and updated as appropriate: allergies, current medications, past family history, past medical history, past social history, past surgical history and problem list.    Review of Systems   Constitutional: Negative.    HENT: Negative.    Eyes: Negative.    Respiratory: Positive for shortness of breath.    Cardiovascular: Negative.    Gastrointestinal: Negative.    Endocrine: Negative.    Genitourinary: Negative.    Musculoskeletal: Negative.    Skin: Negative.    Allergic/Immunologic: Negative.    Neurological: Negative.    Hematological: Negative.    Psychiatric/Behavioral: Negative.          Objective   Blood pressure 126/78, pulse 88, temperature 97.6 °F (36.4 °C), height 154.9 cm (60.98\"), SpO2 94%, not currently breastfeeding.  Physical Exam  Vitals and nursing note reviewed.   Constitutional:       General: She is not in acute distress.     Appearance: She is well-developed.   HENT:      Head: Normocephalic and atraumatic.   Eyes:      General: No scleral icterus.     Conjunctiva/sclera: Conjunctivae normal.      Pupils: Pupils are equal, round, and reactive to light.   Neck:      Thyroid: No thyromegaly.      Trachea: No tracheal deviation.   Cardiovascular:      Rate and Rhythm: Normal rate and regular rhythm.      Heart sounds: Normal heart sounds.   Pulmonary:      Effort: Pulmonary effort is normal. No respiratory " distress.      Breath sounds: Decreased breath sounds present. No wheezing.   Abdominal:      General: Bowel sounds are normal.      Palpations: Abdomen is soft.      Tenderness: There is no abdominal tenderness.   Musculoskeletal:         General: Normal range of motion.      Cervical back: Normal range of motion and neck supple.   Lymphadenopathy:      Cervical: No cervical adenopathy.   Skin:     General: Skin is warm and dry.      Findings: No erythema or rash.   Neurological:      Mental Status: She is alert and oriented to person, place, and time.      Motor: No abnormal muscle tone.      Coordination: Coordination normal.   Psychiatric:         Speech: Speech normal.         Behavior: Behavior normal.         Judgment: Judgment normal.         PFTs:  No new PFTs.    Imaging:  No new imaging.     Assessment & Plan   Diagnoses and all orders for this visit:    1. Anemia due to blood loss, acute (Primary)  -     CBC (No Diff)    2. Chronic obstructive pulmonary disease, unspecified COPD type    3. Chronic respiratory failure with hypoxia and hypercapnia    Other orders  -     roflumilast (DALIRESP) 500 MCG tablet tablet; Take 1 tablet by mouth Daily.  Dispense: 90 tablet; Refill: 3        Discussion:  Ms. Ceron is a 83yo F who presents for follow up of COPD.      1. Moderate COPD  - No exacerbations since her last visit.   - Continue Trelegy 100 inhaler.    - Continue Daliresp 500mcg daily. Refill sent.   - Combivent/Albuterol as needed.   - Mucinex as needed. Continue IS and flutter valve when needed.      2. Chronic Hypoxic and Hypercapnic Respiratory Failure  - Continue supplemental O2. Currently on 2L.   - Previously used Trilogy machine but unable to tolerate secondary to recurrent sinus infections.     3. Recent GI Bleed  - She thinks that she may be having melena again.   - Check a CBC today. If her hemoglobin is decreasing slightly, she may need to stop anticoagulation but I have advised her that she  will need to get Cardiology's opinion on this. If her hemoglobin has decreased by more that 1.5 units, she may need to be evaluated at the hospital for recurrent bleeding.     4. Afib  - Following with Cardiology.   - Currently on Eliquis at reduced dose.     Follow up in 3 months.      Avelina Ceron  reports that she quit smoking about 19 years ago. Her smoking use included cigarettes. She started smoking about 69 years ago. She has a 50 pack-year smoking history. She has been exposed to tobacco smoke. She has never used smokeless tobacco.         Florence V Case, DO  Pulmonary and Critical Care Medicine  Note Electronically Signed

## 2025-01-08 DIAGNOSIS — G89.29 CHRONIC MIDLINE LOW BACK PAIN WITHOUT SCIATICA: ICD-10-CM

## 2025-01-08 DIAGNOSIS — M54.50 CHRONIC MIDLINE LOW BACK PAIN WITHOUT SCIATICA: ICD-10-CM

## 2025-01-08 RX ORDER — TRAMADOL HYDROCHLORIDE 50 MG/1
50 TABLET ORAL EVERY 6 HOURS PRN
Qty: 120 TABLET | Refills: 0 | Status: SHIPPED | OUTPATIENT
Start: 2025-01-08

## 2025-01-08 NOTE — TELEPHONE ENCOUNTER
Last office visit (LOV) for chronic conditions 11/19/2024  Next office visit (NOV) 03/04/2025  Urine drug screen (UDS)  Controlled substance agreement (CSA) 07/25/2024

## 2025-01-21 ENCOUNTER — TELEPHONE (OUTPATIENT)
Age: 85
End: 2025-01-21
Payer: MEDICARE

## 2025-01-21 NOTE — TELEPHONE ENCOUNTER
"Relay     \"Received a [facility] request for a follow up visit with Dr. Kim. Per last visit note, 6 month follow up should be scheduled around 05/15/25 at either location. HUB, ok to schedule visit, or can schedule next available if PT is having any immediate issues\"                 "

## 2025-01-28 RX ORDER — ISOSORBIDE MONONITRATE 30 MG/1
30 TABLET, EXTENDED RELEASE ORAL DAILY
Qty: 90 TABLET | Refills: 3 | Status: SHIPPED | OUTPATIENT
Start: 2025-01-28

## 2025-01-28 RX ORDER — SIMVASTATIN 40 MG
40 TABLET ORAL NIGHTLY
Qty: 90 TABLET | Refills: 3 | Status: SHIPPED | OUTPATIENT
Start: 2025-01-28

## 2025-01-28 RX ORDER — SERTRALINE HYDROCHLORIDE 100 MG/1
100 TABLET, FILM COATED ORAL DAILY
Qty: 90 TABLET | Refills: 3 | Status: SHIPPED | OUTPATIENT
Start: 2025-01-28

## 2025-02-03 RX ORDER — PANTOPRAZOLE SODIUM 40 MG/1
40 TABLET, DELAYED RELEASE ORAL 2 TIMES DAILY
Qty: 180 TABLET | Refills: 1 | Status: SHIPPED | OUTPATIENT
Start: 2025-02-03

## 2025-02-14 NOTE — TELEPHONE ENCOUNTER
Caller: [facility] - [place], [place] - [address] [place] [address] - [phone] [facility] [phone] FX    Relationship: Pharmacy    Best call back number:  [phone]    Requested Prescriptions:   Requested Prescriptions     Pending Prescriptions Disp Refills    losartan (COZAAR) 50 MG tablet 30 tablet 2     Sig: Take 1 tablet by mouth Daily.    gabapentin (NEURONTIN) 300 MG capsule 90 capsule 0     Sig: Take 1 capsule by mouth Every Night.    dofetilide (TIKOSYN) 250 MCG capsule 60 capsule 2     Sig: Take 1 capsule by mouth 2 (Two) Times a Day for 90 days.        Pharmacy where request should be sent: [facility] - [place], [place] - [address] [place] [address] - [phone] [facility] [phone] FX     Last office visit with prescribing clinician: 11/19/2024   Last telemedicine visit with prescribing clinician: Visit date not found   Next office visit with prescribing clinician: 3/11/2025     Additional details provided by patient: PATIENT REQUESTING 90 DAYS WITH 3 ADDITIONAL REFILLS    Does the patient have less than a 3 day supply:  [x] Yes  [] No      Adrian Johansen Rep   02/14/25 09:27 EST

## 2025-02-16 RX ORDER — LOSARTAN POTASSIUM 50 MG/1
50 TABLET ORAL
Qty: 30 TABLET | Refills: 2 | Status: SHIPPED | OUTPATIENT
Start: 2025-02-16

## 2025-02-16 RX ORDER — DOFETILIDE 0.25 MG/1
250 CAPSULE ORAL 2 TIMES DAILY
Qty: 60 CAPSULE | Refills: 2 | Status: SHIPPED | OUTPATIENT
Start: 2025-02-16 | End: 2025-02-17 | Stop reason: SDUPTHER

## 2025-02-16 RX ORDER — GABAPENTIN 300 MG/1
300 CAPSULE ORAL NIGHTLY
Qty: 90 CAPSULE | Refills: 0 | Status: SHIPPED | OUTPATIENT
Start: 2025-02-16 | End: 2025-02-17 | Stop reason: SDUPTHER

## 2025-02-17 RX ORDER — DOFETILIDE 0.25 MG/1
250 CAPSULE ORAL 2 TIMES DAILY
Qty: 180 CAPSULE | Refills: 1 | Status: SHIPPED | OUTPATIENT
Start: 2025-02-17

## 2025-02-17 RX ORDER — GABAPENTIN 300 MG/1
300 CAPSULE ORAL NIGHTLY
Qty: 90 CAPSULE | Refills: 0 | Status: SHIPPED | OUTPATIENT
Start: 2025-02-17

## 2025-02-18 DIAGNOSIS — M54.50 CHRONIC MIDLINE LOW BACK PAIN WITHOUT SCIATICA: ICD-10-CM

## 2025-02-18 DIAGNOSIS — G89.29 CHRONIC MIDLINE LOW BACK PAIN WITHOUT SCIATICA: ICD-10-CM

## 2025-02-18 RX ORDER — TRAMADOL HYDROCHLORIDE 50 MG/1
50 TABLET ORAL
Qty: 120 TABLET | Refills: 0 | Status: SHIPPED | OUTPATIENT
Start: 2025-02-18

## 2025-02-18 NOTE — TELEPHONE ENCOUNTER
Last office visit (LOV) for chronic conditions 11/19/2024  Next office visit (NOV) 03/11/2025  Urine drug screen (UDS) 01/23/2024  Controlled substance agreement (CSA) 07/25/2024

## 2025-02-21 RX ORDER — SODIUM, POTASSIUM,MAG SULFATES 17.5-3.13G
1 SOLUTION, RECONSTITUTED, ORAL ORAL TAKE AS DIRECTED
Qty: 354 ML | Refills: 0 | Status: SHIPPED | OUTPATIENT
Start: 2025-02-21

## 2025-02-26 ENCOUNTER — TELEPHONE (OUTPATIENT)
Age: 85
End: 2025-02-26
Payer: MEDICARE

## 2025-02-26 DIAGNOSIS — N32.81 OVERACTIVE BLADDER: Primary | ICD-10-CM

## 2025-02-26 RX ORDER — MIRABEGRON 50 MG/1
50 TABLET, FILM COATED, EXTENDED RELEASE ORAL DAILY
Qty: 90 TABLET | Refills: 3 | Status: SHIPPED | OUTPATIENT
Start: 2025-02-26

## 2025-02-26 NOTE — TELEPHONE ENCOUNTER
"Relay     \"Received another in basket Mychart message 02/26/25 from PT requesting a follow up appointment with Dr. Kim. Attempted to schedule, but phone goes straight to voicemail. M message for PT. HUB - ok to schedule a follow up with Dr. Kim\"                 "

## 2025-02-28 ENCOUNTER — PRE-ADMISSION TESTING (OUTPATIENT)
Dept: PREADMISSION TESTING | Facility: HOSPITAL | Age: 85
End: 2025-02-28
Payer: MEDICARE

## 2025-02-28 VITALS — HEIGHT: 61 IN | BODY MASS INDEX: 27.38 KG/M2 | WEIGHT: 145 LBS

## 2025-02-28 DIAGNOSIS — R93.89 ABNORMAL CT OF THE CHEST: ICD-10-CM

## 2025-02-28 DIAGNOSIS — J18.9 PNEUMONIA OF BOTH LUNGS DUE TO INFECTIOUS ORGANISM, UNSPECIFIED PART OF LUNG: ICD-10-CM

## 2025-02-28 LAB
APTT PPP: 31.7 SECONDS (ref 22–39)
DEPRECATED RDW RBC AUTO: 51.8 FL (ref 37–54)
ERYTHROCYTE [DISTWIDTH] IN BLOOD BY AUTOMATED COUNT: 14.9 % (ref 12.3–15.4)
HCT VFR BLD AUTO: 35 % (ref 34–46.6)
HGB BLD-MCNC: 10.8 G/DL (ref 12–15.9)
INR PPP: 1.09 (ref 0.89–1.12)
MCH RBC QN AUTO: 28.9 PG (ref 26.6–33)
MCHC RBC AUTO-ENTMCNC: 30.9 G/DL (ref 31.5–35.7)
MCV RBC AUTO: 93.6 FL (ref 79–97)
PLATELET # BLD AUTO: 255 10*3/MM3 (ref 140–450)
PMV BLD AUTO: 9.1 FL (ref 6–12)
POTASSIUM SERPL-SCNC: 4.5 MMOL/L (ref 3.5–5.2)
PROTHROMBIN TIME: 14.2 SECONDS (ref 12.2–14.5)
RBC # BLD AUTO: 3.74 10*6/MM3 (ref 3.77–5.28)
WBC NRBC COR # BLD AUTO: 11.41 10*3/MM3 (ref 3.4–10.8)

## 2025-02-28 PROCEDURE — 36415 COLL VENOUS BLD VENIPUNCTURE: CPT

## 2025-02-28 PROCEDURE — 85027 COMPLETE CBC AUTOMATED: CPT

## 2025-02-28 PROCEDURE — 85610 PROTHROMBIN TIME: CPT

## 2025-02-28 PROCEDURE — 85730 THROMBOPLASTIN TIME PARTIAL: CPT

## 2025-02-28 PROCEDURE — 84132 ASSAY OF SERUM POTASSIUM: CPT

## 2025-02-28 RX ORDER — VIBEGRON 75 MG/1
75 TABLET, FILM COATED ORAL DAILY
COMMUNITY

## 2025-02-28 NOTE — PAT
An arrival time for procedure was not provided during PAT visit. If patient had any questions or concerns about their arrival time, they were instructed to contact their surgeon/physician.  Additionally, if the patient referred to an arrival time that was acquired from their my chart account, patient was encouraged to verify that time with their surgeon/physician. Arrival times are NOT provided in Pre Admission Testing Department.    Per Anesthesia Request, patient instructed not to take their ACE/ARB medications on the AM of surgery.    Patient denies any current skin issues.     EKG on chart from 10/24/24, patient denies increased SOB and chest pain.    Cardiac Clearance on chart from 2/7/25.

## 2025-03-05 ENCOUNTER — TELEPHONE (OUTPATIENT)
Dept: GASTROENTEROLOGY | Facility: CLINIC | Age: 85
End: 2025-03-05
Payer: MEDICARE

## 2025-03-07 ENCOUNTER — ANESTHESIA EVENT (OUTPATIENT)
Dept: GASTROENTEROLOGY | Facility: HOSPITAL | Age: 85
End: 2025-03-07
Payer: MEDICARE

## 2025-03-07 ENCOUNTER — HOSPITAL ENCOUNTER (OUTPATIENT)
Facility: HOSPITAL | Age: 85
Setting detail: HOSPITAL OUTPATIENT SURGERY
Discharge: HOME | End: 2025-03-07
Attending: INTERNAL MEDICINE | Admitting: INTERNAL MEDICINE
Payer: MEDICARE

## 2025-03-07 ENCOUNTER — ANESTHESIA (OUTPATIENT)
Dept: GASTROENTEROLOGY | Facility: HOSPITAL | Age: 85
End: 2025-03-07
Payer: MEDICARE

## 2025-03-07 VITALS
SYSTOLIC BLOOD PRESSURE: 143 MMHG | HEART RATE: 84 BPM | WEIGHT: 145 LBS | DIASTOLIC BLOOD PRESSURE: 57 MMHG | BODY MASS INDEX: 25.69 KG/M2 | RESPIRATION RATE: 17 BRPM | TEMPERATURE: 98 F | OXYGEN SATURATION: 98 % | HEIGHT: 63 IN

## 2025-03-07 DIAGNOSIS — K92.1 MELENA: ICD-10-CM

## 2025-03-07 PROCEDURE — 25010000002 PROPOFOL 10 MG/ML EMULSION: Performed by: NURSE ANESTHETIST, CERTIFIED REGISTERED

## 2025-03-07 PROCEDURE — 25010000002 ONDANSETRON PER 1 MG: Performed by: ANESTHESIOLOGY

## 2025-03-07 PROCEDURE — 88305 TISSUE EXAM BY PATHOLOGIST: CPT | Performed by: INTERNAL MEDICINE

## 2025-03-07 PROCEDURE — 25810000003 SODIUM CHLORIDE 0.9 % SOLUTION: Performed by: NURSE ANESTHETIST, CERTIFIED REGISTERED

## 2025-03-07 PROCEDURE — 25010000002 LIDOCAINE 1 % SOLUTION: Performed by: NURSE ANESTHETIST, CERTIFIED REGISTERED

## 2025-03-07 PROCEDURE — 45385 COLONOSCOPY W/LESION REMOVAL: CPT | Performed by: INTERNAL MEDICINE

## 2025-03-07 PROCEDURE — 45381 COLONOSCOPY SUBMUCOUS NJX: CPT | Performed by: INTERNAL MEDICINE

## 2025-03-07 PROCEDURE — 25010000002 EPINEPHRINE 1 MG/10ML SOLUTION PREFILLED SYRINGE: Performed by: INTERNAL MEDICINE

## 2025-03-07 DEVICE — DEV CLIP ENDO RESOLUTION360 CONTRL ROT 235CM: Type: IMPLANTABLE DEVICE | Site: COLON | Status: FUNCTIONAL

## 2025-03-07 RX ORDER — SODIUM CHLORIDE, SODIUM LACTATE, POTASSIUM CHLORIDE, CALCIUM CHLORIDE 600; 310; 30; 20 MG/100ML; MG/100ML; MG/100ML; MG/100ML
9 INJECTION, SOLUTION INTRAVENOUS CONTINUOUS
Status: DISCONTINUED | OUTPATIENT
Start: 2025-03-08 | End: 2025-03-07 | Stop reason: HOSPADM

## 2025-03-07 RX ORDER — HYDROMORPHONE HYDROCHLORIDE 1 MG/ML
0.5 INJECTION, SOLUTION INTRAMUSCULAR; INTRAVENOUS; SUBCUTANEOUS
Status: DISCONTINUED | OUTPATIENT
Start: 2025-03-07 | End: 2025-03-07 | Stop reason: HOSPADM

## 2025-03-07 RX ORDER — FAMOTIDINE 20 MG/1
20 TABLET, FILM COATED ORAL ONCE
Status: DISCONTINUED | OUTPATIENT
Start: 2025-03-07 | End: 2025-03-07 | Stop reason: HOSPADM

## 2025-03-07 RX ORDER — ONDANSETRON 2 MG/ML
4 INJECTION INTRAMUSCULAR; INTRAVENOUS ONCE AS NEEDED
Status: COMPLETED | OUTPATIENT
Start: 2025-03-07 | End: 2025-03-07

## 2025-03-07 RX ORDER — SODIUM CHLORIDE 0.9 % (FLUSH) 0.9 %
10 SYRINGE (ML) INJECTION AS NEEDED
Status: DISCONTINUED | OUTPATIENT
Start: 2025-03-07 | End: 2025-03-07 | Stop reason: HOSPADM

## 2025-03-07 RX ORDER — SODIUM CHLORIDE 9 MG/ML
INJECTION, SOLUTION INTRAVENOUS CONTINUOUS PRN
Status: DISCONTINUED | OUTPATIENT
Start: 2025-03-07 | End: 2025-03-07 | Stop reason: SURG

## 2025-03-07 RX ORDER — SODIUM CHLORIDE 9 MG/ML
50 INJECTION, SOLUTION INTRAVENOUS CONTINUOUS
Status: DISCONTINUED | OUTPATIENT
Start: 2025-03-07 | End: 2025-03-07 | Stop reason: SDUPTHER

## 2025-03-07 RX ORDER — SODIUM CHLORIDE 0.9 % (FLUSH) 0.9 %
10 SYRINGE (ML) INJECTION EVERY 12 HOURS SCHEDULED
Status: DISCONTINUED | OUTPATIENT
Start: 2025-03-07 | End: 2025-03-07 | Stop reason: HOSPADM

## 2025-03-07 RX ORDER — SODIUM CHLORIDE 9 MG/ML
50 INJECTION, SOLUTION INTRAVENOUS CONTINUOUS
Status: DISCONTINUED | OUTPATIENT
Start: 2025-03-07 | End: 2025-03-07 | Stop reason: HOSPADM

## 2025-03-07 RX ORDER — PROPOFOL 10 MG/ML
VIAL (ML) INTRAVENOUS AS NEEDED
Status: DISCONTINUED | OUTPATIENT
Start: 2025-03-07 | End: 2025-03-07 | Stop reason: SURG

## 2025-03-07 RX ORDER — MIDAZOLAM HYDROCHLORIDE 1 MG/ML
0.5 INJECTION, SOLUTION INTRAMUSCULAR; INTRAVENOUS
Status: DISCONTINUED | OUTPATIENT
Start: 2025-03-07 | End: 2025-03-07 | Stop reason: HOSPADM

## 2025-03-07 RX ORDER — FAMOTIDINE 10 MG/ML
20 INJECTION, SOLUTION INTRAVENOUS ONCE
Status: DISCONTINUED | OUTPATIENT
Start: 2025-03-07 | End: 2025-03-07 | Stop reason: HOSPADM

## 2025-03-07 RX ORDER — LIDOCAINE HYDROCHLORIDE 10 MG/ML
INJECTION, SOLUTION INFILTRATION; PERINEURAL AS NEEDED
Status: DISCONTINUED | OUTPATIENT
Start: 2025-03-07 | End: 2025-03-07 | Stop reason: SURG

## 2025-03-07 RX ORDER — LIDOCAINE HYDROCHLORIDE 10 MG/ML
0.5 INJECTION, SOLUTION EPIDURAL; INFILTRATION; INTRACAUDAL; PERINEURAL ONCE AS NEEDED
Status: DISCONTINUED | OUTPATIENT
Start: 2025-03-07 | End: 2025-03-07 | Stop reason: HOSPADM

## 2025-03-07 RX ORDER — FENTANYL CITRATE 50 UG/ML
50 INJECTION, SOLUTION INTRAMUSCULAR; INTRAVENOUS
Status: DISCONTINUED | OUTPATIENT
Start: 2025-03-07 | End: 2025-03-07 | Stop reason: HOSPADM

## 2025-03-07 RX ADMIN — PROPOFOL 20 MG: 10 INJECTION, EMULSION INTRAVENOUS at 14:35

## 2025-03-07 RX ADMIN — PROPOFOL 20 MG: 10 INJECTION, EMULSION INTRAVENOUS at 14:38

## 2025-03-07 RX ADMIN — PROPOFOL 40 MG: 10 INJECTION, EMULSION INTRAVENOUS at 14:29

## 2025-03-07 RX ADMIN — SODIUM CHLORIDE: 9 INJECTION, SOLUTION INTRAVENOUS at 14:22

## 2025-03-07 RX ADMIN — PROPOFOL 150 MCG/KG/MIN: 10 INJECTION, EMULSION INTRAVENOUS at 14:29

## 2025-03-07 RX ADMIN — PROPOFOL 20 MG: 10 INJECTION, EMULSION INTRAVENOUS at 14:32

## 2025-03-07 RX ADMIN — LIDOCAINE HYDROCHLORIDE 50 MG: 10 INJECTION, SOLUTION INFILTRATION; PERINEURAL at 14:29

## 2025-03-07 RX ADMIN — ONDANSETRON 4 MG: 2 INJECTION INTRAMUSCULAR; INTRAVENOUS at 15:46

## 2025-03-07 NOTE — ANESTHESIA POSTPROCEDURE EVALUATION
Patient: Avelina Ceron    Procedure Summary       Date: 03/07/25 Room / [facility]:  [facility]    Anesthesia Start: 1422 Anesthesia Stop: 1532    Procedure: COLONOSCOPY Diagnosis:       Melena      (Melena [K92.1])    Surgeons: Shant Callahan MD Provider: Raffaele Mccallum MD    Anesthesia Type: general ASA Status: 4            Anesthesia Type: general    Vitals  Vitals Value Taken Time   /57 03/07/25 1532   Temp 99.6 °F (37.6 °C) 03/07/25 1532   Pulse 93 03/07/25 1532   Resp 28 03/07/25 1532   SpO2 100 % 03/07/25 1532           Post Anesthesia Care and Evaluation    Patient location during evaluation: PACU  Patient participation: complete - patient participated  Level of consciousness: sleepy but conscious  Pain management: adequate    Airway patency: patent  Anesthetic complications: No anesthetic complications  PONV Status: none  Cardiovascular status: hemodynamically stable and acceptable  Respiratory status: nonlabored ventilation, acceptable and nasal cannula  Hydration status: acceptable

## 2025-03-07 NOTE — ANESTHESIA PREPROCEDURE EVALUATION
Anesthesia Evaluation     Patient summary reviewed and Nursing notes reviewed   history of anesthetic complications:  difficult airway               Airway   Mallampati: II  TM distance: >3 FB  Neck ROM: full  No difficulty expected  Dental - normal exam     Comment: Few broken stubs    Pulmonary - negative pulmonary ROS and normal exam   (+) a smoker Former, COPD,home oxygen  Cardiovascular - negative cardio ROS and normal exam    (+) hypertension, valvular problems/murmurs murmur, CAD, dysrhythmias Atrial Fib, PVD, hyperlipidemia,  carotid artery disease (carotid stents)    ROS comment: ECHO 8/2/24  ·  Left ventricular systolic function is normal. Calculated left ventricular EF = 64%  ·  Moderate tricuspid valve regurgitation is present.  ·  Estimated right ventricular systolic pressure from tricuspid regurgitation is moderately elevated (45-55 mmHg).  ·  Mild MR      Neuro/Psych- negative ROS  (+) psychiatric history Depression  GI/Hepatic/Renal/Endo - negative ROS   (+) GI bleeding , renal disease- stones, thyroid problem hypothyroidism    Musculoskeletal     (+) back pain  Abdominal  - normal exam    Bowel sounds: normal.   Substance History - negative use     OB/GYN negative ob/gyn ROS         Other   arthritis,                 Anesthesia Plan    ASA 4     general     (PROPOFOL)  intravenous induction     Anesthetic plan, risks, benefits, and alternatives have been provided, discussed and informed consent has been obtained with: patient.    Plan discussed with CRNA.    CODE STATUS:

## 2025-03-07 NOTE — H&P
"GASTROENTEROLOGY OFFICE NOTE  Avelina Ceron  [id number]  1940      CHIEF COMPLAINT  Sessile right-sided colon polyp    HISTORY OF PRESENT ILLNESS:  First visit to me for this 84-year-old white female who was seen by the inpatient GI service during recent hospitalization.  She underwent colonoscopy October 12, 2024 by Dr. Markie Bautista.  Multiple polyps were removed but 1 large approxi-30 mm sessile polyp near the [place] ileal anastomosis was left in situ and patient presents today for elective removal of the polyp.  For the polyps.  Be tubular adenomas and tubulovillous adenomas.    She presents today with some nausea (R October 11, 2024 upper endoscopy was unremarkable except for some mild antral erythema and a small sliding hiatal hernia).  But without dysphagia, odynophagia, early satiety, unexplained weight loss, melena or bright red blood per rectum.    PAST MEDICAL HISTORY  Past Medical History:    Anemia    Anesthesia complication    Pt went into respiratory failure during total hip replacement in 2020    Arthritis of back    Arthritis of neck    Atrial fibrillation    Broken ribs    COPD (chronic obstructive pulmonary disease)    Coronary artery disease    Degenerative lumbar disc    Disease of thyroid gland    \"low thyroid\"    Fracture, fibula    Fracture, humerus    Fracture, tibia and fibula    GI bleed    Hard to intubate    Hip arthrosis    History of colonoscopy    History of transfusion    bhl 10/2024, no reaction    Hyperlipidemia    Hypertension    Internal carotid artery stent present    bialteral    Kidney stone    Knee swelling    Lumbosacral disc disease    Murmur    On home O2    3L    Osteoarthritis    Periarthritis of shoulder    PVD (peripheral vascular disease)    bilateral stents    Urinary incontinence    Urinary tract infection        PAST SURGICAL HISTORY  Past Surgical History:    CAROTID STENT    COLONOSCOPY    COLONOSCOPY    Procedure: COLONOSCOPY;  Surgeon: Markie Bautista, " MD;  Location:  [facility];  Service: Gastroenterology;  Laterality: N/A;    ENDOSCOPY    Procedure: ESOPHAGOGASTRODUODENOSCOPY;  Surgeon: Markie Bautista MD;  Location:  [facility];  Service: Gastroenterology;  Laterality: N/A;    ENDOSCOPY    Procedure: ESOPHAGOGASTRODUODENOSCOPY;  Surgeon: Markie Bautista MD;  Location:  [facility];  Service: Gastroenterology;  Laterality: N/A;    EYE SURGERY    yag surgery    GALLBLADDER SURGERY    HYSTERECTOMY    ILIAC ARTERY STENT    KIDNEY STONE SURGERY    OOPHORECTOMY    1 ovary removed    TOTAL HIP ARTHROPLASTY    URETEROSCOPY LASER LITHOTRIPSY WITH STENT INSERTION    Procedure: CYSTOSCOPY, RIGHT RETROGRADE PYELOGRAM; WITH STENT INSERTION;  Surgeon: Lee Kim MD;  Location:  [facility];  Service: Urology;  Laterality: Right;    URETEROSCOPY LASER LITHOTRIPSY WITH STENT INSERTION    Procedure: URETEROSCOPY, LASER LITHOTRIPSY, STENT EXCHANGE;  Surgeon: Lee iKm MD;  Location:  [facility];  Service: Urology;  Laterality: Right;        MEDICATIONS:  Prior to Admission medications    Medication Sig Start Date End Date Taking? Authorizing Provider   acetaminophen (TYLENOL) 500 MG tablet Take 1 tablet by mouth Every 6 (Six) Hours As Needed for Mild Pain, Headache or Fever. OTC    Provider, MD Jacquelin   albuterol sulfate  (90 Base) MCG/ACT inhaler Inhale 2 puffs Every 4 (Four) Hours As Needed for Shortness of Air or Wheezing. 6/11/13   Provider, MD Jacquelin   apixaban (ELIQUIS) 2.5 MG tablet tablet Take 1 tablet by mouth Every 12 (Twelve) Hours. Indications: Atrial Fibrillation  Patient taking differently: Take 1 tablet by mouth Every 12 (Twelve) Hours. Patient instructed to hold this medication prior to colonoscopy.  Last dose due 3/2/25  Indications: Atrial Fibrillation 11/19/24   Dre Moura MD   cyanocobalamin ([employer] Vitamin B-12) 1000 MCG tablet Take 1 tablet by mouth Daily. 10/27/24 10/27/25  Murtaza Rankin MD    dofetilide (TIKOSYN) 250 MCG capsule Take 1 capsule by mouth 2 (Two) Times a Day. 2/17/25   Dre Moura MD   Fiber Select Gummies chewable tablet Chew 2 tablets Daily. OTC    Jacquelin Hussein MD   Fluticasone-Umeclidin-Vilant (Trelegy Ellipta) 100-62.5-25 MCG/ACT inhaler Inhale 1 puff Daily. 5/21/24   Case, Florence ESPARZA,    gabapentin (NEURONTIN) 300 MG capsule Take 1 capsule by mouth Every Night. 2/17/25   Dre Moura MD   iron polysacch complex-B12-FA (FERREX 150 FORTE) 150-0.025-1 MG capsule capsule Take 1 capsule by mouth Daily. Patient instructed to hold prior to procedure    Jacquelin Hussein MD   isosorbide mononitrate (IMDUR) 30 MG 24 hr tablet TAKE 1 TABLET DAILY  Patient taking differently: Take 1 tablet by mouth Daily. Patient instructed to hold am of procedure by cardiology 1/28/25   Dre Moura MD   latanoprost (XALATAN) 0.005 % ophthalmic solution Administer 1 drop to both eyes Every Night. 4/20/22   Jacquelin Hussein MD   levothyroxine (SYNTHROID, LEVOTHROID) 200 MCG tablet TAKE 1 TABLET DAILY 10/30/24   Jacob Noyola MD   losartan (COZAAR) 50 MG tablet Take 1 tablet by mouth Daily. 2/16/25   Dre Moura MD   Mirabegron ER (MYRBETRIQ) 50 MG tablet sustained-release 24 hour 24 hr tablet Take 50 mg by mouth Daily.  Patient not taking: Reported on 2/28/2025 2/26/25   Lee Kim MD   Multiple Vitamins-Minerals (PRESERVISION AREDS 2 PO) Take 1 tablet by mouth 2 (Two) Times a Day. OTC 4/6/23   Jacquelin Hussein MD   nitroglycerin (NITROSTAT) 0.4 MG SL tablet Place 1 tablet under the tongue Every 5 (Five) Minutes As Needed for Chest Pain. Take no more than 3 doses in 15 minutes. 9/27/24   Tavo Maciel MD   ondansetron (Zofran) 4 MG tablet Take 1 tablet by mouth Every 8 (Eight) Hours As Needed for Nausea or Vomiting. 7/25/24   Dre Moura MD   pantoprazole (PROTONIX) 40 MG EC tablet Take 1 tablet by mouth 2 (Two) Times a Day. 2/3/25  "  Dre Moura MD   polyethylene glycol (MIRALAX) 17 GM/SCOOP powder Take 17 g by mouth As Needed (Constipation). OTC    Provider, MD Jacquelin   roflumilast (DALIRESP) 500 MCG tablet tablet Take 1 tablet by mouth Daily. 1/2/25   Diallo, Florence ESPARZA, DO   sertraline (ZOLOFT) 100 MG tablet TAKE 1 TABLET DAILY 1/28/25   Dre Moura MD   simvastatin (ZOCOR) 40 MG tablet TAKE 1 TABLET EVERY NIGHT  Patient taking differently: Take 1 tablet by mouth Every Night. 1/28/25   Dre Moura MD   sodium-potassium-magnesium sulfates (Suprep Bowel Prep Kit) 17.5-3.13-1.6 GM/177ML solution oral solution Take 1 bottle by mouth Take As Directed. 2/21/25   Mylene, Melissa Mir MD   traMADol (ULTRAM) 50 MG tablet TAKE 1 TABLET BY MOUTH EVERY 6 HOURS AS NEEDED FOR MODERATE PAIN 2/18/25   Dre Moura MD   Vibegron (Gemtesa) 75 MG tablet Take 1 tablet by mouth Daily.    Provider, MD Jacquelin        ALLERGIES  is allergic to penicillins, codeine, and dopamine.    FAMILY HISTORY:  Cancer-related family history includes Cancer in her daughter, maternal aunt, and another family member.  Colon Cancer-related family history is not on file.    SOCIAL HISTORY  She  reports that she quit smoking about 19 years ago. Her smoking use included cigarettes. She started smoking about 69 years ago. She has a 50 pack-year smoking history. She has been exposed to tobacco smoke. She has never used smokeless tobacco. She reports that she does not currently use alcohol. She reports that she does not use drugs.   She is a .  She has 2 daughters.  1 grand child.  She is retired.  She is accompanied by her daughter    REVIEW OF SYSTEMS  Vascular, pulmonary and generalized review of systems are pertinent as reviewed above    PHYSICAL EXAM   /71 (BP Location: Right arm, Patient Position: Lying)   Pulse 88   Temp 97.2 °F (36.2 °C) (Temporal)   Resp 20   Ht 160 cm (63\")   Wt 65.8 kg (145 lb)   LMP  (LMP Unknown)   SpO2 " 96%   BMI 25.69 kg/m²   General: Alert and oriented x 3. In no apparent or acute distress.  and No stigmata of chronic liver disease  HEENT: Anicteric sclerae. Normal oropharynx  Neck: Supple. Without lymphadenopathy  CV: Regular rate and rhythm, S1, S2  Lungs: Clear to ausculation. Without rales, rhonchi and wheezing  Abdomen:  Soft,non-distended without palpable masses or hepatosplenomeagaly, areas of rebound tenderness or guarding.   Extremeties: without clubbing, cyanosis or edema  Neurologic:  Alert and oriented x 3 without focal motor or sensory deficits  Rectal exam: deferred     Results for orders placed or performed in visit on 02/28/25   Potassium    Collection Time: 02/28/25 10:32 AM    Specimen: Blood   Result Value Ref Range    Potassium 4.5 3.5 - 5.2 mmol/L   CBC (No Diff)    Collection Time: 02/28/25 10:32 AM    Specimen: Blood   Result Value Ref Range    WBC 11.41 (H) 3.40 - 10.80 10*3/mm3    RBC 3.74 (L) 3.77 - 5.28 10*6/mm3    Hemoglobin 10.8 (L) 12.0 - 15.9 g/dL    Hematocrit 35.0 34.0 - 46.6 %    MCV 93.6 79.0 - 97.0 fL    MCH 28.9 26.6 - 33.0 pg    MCHC 30.9 (L) 31.5 - 35.7 g/dL    RDW 14.9 12.3 - 15.4 %    RDW-SD 51.8 37.0 - 54.0 fl    MPV 9.1 6.0 - 12.0 fL    Platelets 255 140 - 450 10*3/mm3   PTT    Collection Time: 02/28/25 10:32 AM    Specimen: Blood   Result Value Ref Range    PTT 31.7 22.0 - 39.0 seconds   Protime-INR    Collection Time: 02/28/25 10:32 AM    Specimen: Blood   Result Value Ref Range    Protime 14.2 12.2 - 14.5 Seconds    INR 1.09 0.89 - 1.12        Results Review:  I reviewed the patient's new clinical results.      ASSESSMENT  1.-30 mm ascending colon polyp.  Patient presents for polypectomy    PLAN  1.-Colonoscopy with polypectomy.  Risks of perforation, bleeding, cardiorespiratory compromise and missed lesions were reviewed in detail with patient and patient's daughter.  Ample opportunity for question was provided.  They wish to proceed with further recommendation  deferred pending findings of today's colonoscopy      Shant Callahan MD  3/7/2025   14:23 EST    Addendum  All visible polyp at the ileocolonic anastomosis (colonic side) was removed with snare/cautery and cold snare after submucosal saline and spot ink removal.  Endoclips placed to close the defect.  Repeat colonoscopy recommended in 3 months to reassess polypectomy site and ensure complete polypectomy

## 2025-03-11 ENCOUNTER — OFFICE VISIT (OUTPATIENT)
Dept: INTERNAL MEDICINE | Facility: CLINIC | Age: 85
End: 2025-03-11
Payer: MEDICARE

## 2025-03-11 VITALS
HEART RATE: 80 BPM | RESPIRATION RATE: 20 BRPM | DIASTOLIC BLOOD PRESSURE: 62 MMHG | TEMPERATURE: 97.8 F | BODY MASS INDEX: 24.62 KG/M2 | SYSTOLIC BLOOD PRESSURE: 148 MMHG | WEIGHT: 139 LBS

## 2025-03-11 DIAGNOSIS — E03.9 HYPOTHYROIDISM, UNSPECIFIED TYPE: Primary | ICD-10-CM

## 2025-03-11 DIAGNOSIS — J44.9 CHRONIC OBSTRUCTIVE PULMONARY DISEASE, UNSPECIFIED COPD TYPE: ICD-10-CM

## 2025-03-11 DIAGNOSIS — I48.91 ATRIAL FIBRILLATION, UNSPECIFIED TYPE: ICD-10-CM

## 2025-03-11 DIAGNOSIS — I10 ESSENTIAL HYPERTENSION: ICD-10-CM

## 2025-03-11 DIAGNOSIS — K21.00 GASTROESOPHAGEAL REFLUX DISEASE WITH ESOPHAGITIS WITHOUT HEMORRHAGE: ICD-10-CM

## 2025-03-11 DIAGNOSIS — E78.5 HYPERLIPIDEMIA, UNSPECIFIED HYPERLIPIDEMIA TYPE: ICD-10-CM

## 2025-03-11 LAB
ALBUMIN SERPL-MCNC: 3.4 G/DL (ref 3.5–5.2)
ALBUMIN/GLOB SERPL: 1 G/DL
ALP SERPL-CCNC: 80 U/L (ref 39–117)
ALT SERPL W P-5'-P-CCNC: 8 U/L (ref 1–33)
ANION GAP SERPL CALCULATED.3IONS-SCNC: 9.4 MMOL/L (ref 5–15)
AST SERPL-CCNC: 13 U/L (ref 1–32)
BASOPHILS # BLD AUTO: 0.07 10*3/MM3 (ref 0–0.2)
BASOPHILS NFR BLD AUTO: 0.7 % (ref 0–1.5)
BILIRUB SERPL-MCNC: 0.3 MG/DL (ref 0–1.2)
BUN SERPL-MCNC: 8 MG/DL (ref 8–23)
BUN/CREAT SERPL: 17.8 (ref 7–25)
CALCIUM SPEC-SCNC: 9.4 MG/DL (ref 8.6–10.5)
CHLORIDE SERPL-SCNC: 97 MMOL/L (ref 98–107)
CHOLEST SERPL-MCNC: 109 MG/DL (ref 0–200)
CO2 SERPL-SCNC: 36.6 MMOL/L (ref 22–29)
CREAT SERPL-MCNC: 0.45 MG/DL (ref 0.57–1)
CYTO UR: NORMAL
DEPRECATED RDW RBC AUTO: 42 FL (ref 37–54)
EGFRCR SERPLBLD CKD-EPI 2021: 95 ML/MIN/1.73
EOSINOPHIL # BLD AUTO: 0.14 10*3/MM3 (ref 0–0.4)
EOSINOPHIL NFR BLD AUTO: 1.3 % (ref 0.3–6.2)
ERYTHROCYTE [DISTWIDTH] IN BLOOD BY AUTOMATED COUNT: 12.7 % (ref 12.3–15.4)
GLOBULIN UR ELPH-MCNC: 3.5 GM/DL
GLUCOSE SERPL-MCNC: 100 MG/DL (ref 65–99)
HCT VFR BLD AUTO: 34.6 % (ref 34–46.6)
HDLC SERPL-MCNC: 45 MG/DL (ref 40–60)
HGB BLD-MCNC: 10.9 G/DL (ref 12–15.9)
IMM GRANULOCYTES # BLD AUTO: 0.04 10*3/MM3 (ref 0–0.05)
IMM GRANULOCYTES NFR BLD AUTO: 0.4 % (ref 0–0.5)
LAB AP CASE REPORT: NORMAL
LAB AP CLINICAL INFORMATION: NORMAL
LDLC SERPL CALC-MCNC: 46 MG/DL (ref 0–100)
LDLC/HDLC SERPL: 1 {RATIO}
LYMPHOCYTES # BLD AUTO: 1.66 10*3/MM3 (ref 0.7–3.1)
LYMPHOCYTES NFR BLD AUTO: 15.6 % (ref 19.6–45.3)
MCH RBC QN AUTO: 28.9 PG (ref 26.6–33)
MCHC RBC AUTO-ENTMCNC: 31.5 G/DL (ref 31.5–35.7)
MCV RBC AUTO: 91.8 FL (ref 79–97)
MONOCYTES # BLD AUTO: 0.78 10*3/MM3 (ref 0.1–0.9)
MONOCYTES NFR BLD AUTO: 7.4 % (ref 5–12)
NEUTROPHILS NFR BLD AUTO: 7.92 10*3/MM3 (ref 1.7–7)
NEUTROPHILS NFR BLD AUTO: 74.6 % (ref 42.7–76)
NRBC BLD AUTO-RTO: 0 /100 WBC (ref 0–0.2)
PATH REPORT.FINAL DX SPEC: NORMAL
PATH REPORT.GROSS SPEC: NORMAL
PLATELET # BLD AUTO: 309 10*3/MM3 (ref 140–450)
PMV BLD AUTO: 9.8 FL (ref 6–12)
POTASSIUM SERPL-SCNC: 3.5 MMOL/L (ref 3.5–5.2)
PROT SERPL-MCNC: 6.9 G/DL (ref 6–8.5)
RBC # BLD AUTO: 3.77 10*6/MM3 (ref 3.77–5.28)
SODIUM SERPL-SCNC: 143 MMOL/L (ref 136–145)
T4 FREE SERPL-MCNC: 1.62 NG/DL (ref 0.92–1.68)
TRIGL SERPL-MCNC: 96 MG/DL (ref 0–150)
TSH SERPL DL<=0.05 MIU/L-ACNC: 0.2 UIU/ML (ref 0.27–4.2)
VLDLC SERPL-MCNC: 18 MG/DL (ref 5–40)
WBC NRBC COR # BLD AUTO: 10.61 10*3/MM3 (ref 3.4–10.8)

## 2025-03-11 PROCEDURE — 84439 ASSAY OF FREE THYROXINE: CPT | Performed by: INTERNAL MEDICINE

## 2025-03-11 PROCEDURE — 80061 LIPID PANEL: CPT | Performed by: INTERNAL MEDICINE

## 2025-03-11 PROCEDURE — 80053 COMPREHEN METABOLIC PANEL: CPT | Performed by: INTERNAL MEDICINE

## 2025-03-11 PROCEDURE — 85025 COMPLETE CBC W/AUTO DIFF WBC: CPT | Performed by: INTERNAL MEDICINE

## 2025-03-11 PROCEDURE — 84443 ASSAY THYROID STIM HORMONE: CPT | Performed by: INTERNAL MEDICINE

## 2025-03-12 ENCOUNTER — APPOINTMENT (OUTPATIENT)
Dept: CT IMAGING | Facility: HOSPITAL | Age: 85
End: 2025-03-12
Payer: MEDICARE

## 2025-03-12 ENCOUNTER — LAB (OUTPATIENT)
Dept: INTERNAL MEDICINE | Facility: CLINIC | Age: 85
End: 2025-03-12
Payer: MEDICARE

## 2025-03-12 DIAGNOSIS — R31.9 HEMATURIA, UNSPECIFIED TYPE: ICD-10-CM

## 2025-03-12 DIAGNOSIS — R10.31 RIGHT LOWER QUADRANT ABDOMINAL PAIN: Primary | ICD-10-CM

## 2025-03-12 DIAGNOSIS — R30.0 DYSURIA: Primary | ICD-10-CM

## 2025-03-12 LAB
BACTERIA UR QL AUTO: ABNORMAL /HPF
BILIRUB BLD-MCNC: NEGATIVE MG/DL
CLARITY, POC: CLEAR
COD CRY URNS QL: PRESENT /HPF
COLOR UR: YELLOW
EXPIRATION DATE: ABNORMAL
GLUCOSE UR STRIP-MCNC: NEGATIVE MG/DL
HYALINE CASTS UR QL AUTO: ABNORMAL /LPF
KETONES UR QL: ABNORMAL
LEUKOCYTE EST, POC: NEGATIVE
Lab: ABNORMAL
NITRITE UR-MCNC: POSITIVE MG/ML
PH UR: 6 [PH] (ref 5–8)
PROT UR STRIP-MCNC: NEGATIVE MG/DL
RBC # UR STRIP: ABNORMAL /HPF
RBC # UR STRIP: ABNORMAL /UL
REF LAB TEST METHOD: ABNORMAL
SP GR UR: 1.01 (ref 1–1.03)
SQUAMOUS #/AREA URNS HPF: ABNORMAL /HPF
UROBILINOGEN UR QL: NORMAL
WBC # UR STRIP: ABNORMAL /HPF

## 2025-03-12 PROCEDURE — 81015 MICROSCOPIC EXAM OF URINE: CPT | Performed by: INTERNAL MEDICINE

## 2025-03-12 PROCEDURE — 87186 SC STD MICRODIL/AGAR DIL: CPT | Performed by: INTERNAL MEDICINE

## 2025-03-12 PROCEDURE — 87086 URINE CULTURE/COLONY COUNT: CPT | Performed by: INTERNAL MEDICINE

## 2025-03-12 PROCEDURE — 81003 URINALYSIS AUTO W/O SCOPE: CPT | Performed by: INTERNAL MEDICINE

## 2025-03-12 PROCEDURE — 87088 URINE BACTERIA CULTURE: CPT | Performed by: INTERNAL MEDICINE

## 2025-03-12 NOTE — PROGRESS NOTES
Chief Complaint   Patient presents with    Follow-up     4  month follow up chronic medical problems       History of Present Illness      The patient presents for a follow-up related to chronic atrial fibrillation. She denies palpitations. The patient denies fatigue, dizziness, nausea, dyspnea, edema, chest pain, syncope or exercise intolerance.    The patient presents for a follow-up related to hypothyroidism. She reports a good energy level. She reports no hair loss, heat intolerance, cold intolerance, diarrhea, constipation or sweats. She is taking her medication as prescribed.    The patient presents for a follow-up related to hypertension. The patient reports that she has had no headaches or blurred vision. She states that she is taking her medication as prescribed. She is not having medication side effects.    The patient presents for a follow-up related to GERD. The patient is on pantoprazole for her gastroesophageal reflux. The medication is taken on a regular basis and gives complete relief of the symptoms. She reports no abdominal pain, belching, dysphagia, early satiety, heartburn, hoarseness, odynophagia, rectal bleeding, vomiting or weight loss. The GERD has no known aggravating factors.    The patient presents for a follow-up related to chronic obstructive pulmonary disease. Rescue inhaler usage is reported to be every 2 weeks. The patient reports that she has no known triggers. Since the last office visit, the patient has not sought emergency care. She currently reports no wheezing, cough or sputum production. The patient is using an inhaled steroid. She does rinse her mouth after using her steroid inhaler. She thrush symptoms or a sore mouth. She denies hemoptysis. She is not a smoker.    The patient presents for a follow-up related to hyperlipidemia. She is following a low fat diet. She reports that she is exercising. She is taking simvastatin. The patient is taking her medication as prescribed.  She reports no medication side effects, including muscle cramps, abdominal pain, headaches or weakness. She denies orthopnea, paroxysmal nocturnal dyspnea or dyspnea on exertion.    Medications      Current Outpatient Medications:     acetaminophen (TYLENOL) 500 MG tablet, Take 1 tablet by mouth Every 6 (Six) Hours As Needed for Mild Pain, Headache or Fever. OTC, Disp: , Rfl:     albuterol sulfate  (90 Base) MCG/ACT inhaler, Inhale 2 puffs Every 4 (Four) Hours As Needed for Shortness of Air or Wheezing., Disp: , Rfl:     apixaban (ELIQUIS) 2.5 MG tablet tablet, Take 1 tablet by mouth Every 12 (Twelve) Hours. Indications: Atrial Fibrillation (Patient taking differently: Take 1 tablet by mouth Every 12 (Twelve) Hours. Patient instructed to hold this medication prior to colonoscopy.  Last dose due 3/2/25  Indications: Atrial Fibrillation), Disp: 180 tablet, Rfl: 1    cyanocobalamin ([employer] Vitamin B-12) 1000 MCG tablet, Take 1 tablet by mouth Daily., Disp: 100 tablet, Rfl: 2    dofetilide (TIKOSYN) 250 MCG capsule, Take 1 capsule by mouth 2 (Two) Times a Day., Disp: 180 capsule, Rfl: 1    Fiber Select Gummies chewable tablet, Chew 2 tablets Daily. OTC, Disp: , Rfl:     Fluticasone-Umeclidin-Vilant (Trelegy Ellipta) 100-62.5-25 MCG/ACT inhaler, Inhale 1 puff Daily., Disp: 180 each, Rfl: 3    gabapentin (NEURONTIN) 300 MG capsule, Take 1 capsule by mouth Every Night., Disp: 90 capsule, Rfl: 0    iron polysacch complex-B12-FA (FERREX 150 FORTE) 150-0.025-1 MG capsule capsule, Take 1 capsule by mouth Daily. Patient instructed to hold prior to procedure, Disp: , Rfl:     isosorbide mononitrate (IMDUR) 30 MG 24 hr tablet, TAKE 1 TABLET DAILY (Patient taking differently: Take 1 tablet by mouth Daily. Patient instructed to hold am of procedure by cardiology), Disp: 90 tablet, Rfl: 3    latanoprost (XALATAN) 0.005 % ophthalmic solution, Administer 1 drop to both eyes Every Night., Disp: , Rfl:     levothyroxine (SYNTHROID,  LEVOTHROID) 200 MCG tablet, TAKE 1 TABLET DAILY, Disp: 90 tablet, Rfl: 3    losartan (COZAAR) 50 MG tablet, Take 1 tablet by mouth Daily., Disp: 30 tablet, Rfl: 2    Multiple Vitamins-Minerals (PRESERVISION AREDS 2 PO), Take 1 tablet by mouth 2 (Two) Times a Day. OTC, Disp: , Rfl:     nitroglycerin (NITROSTAT) 0.4 MG SL tablet, Place 1 tablet under the tongue Every 5 (Five) Minutes As Needed for Chest Pain. Take no more than 3 doses in 15 minutes., Disp: 25 tablet, Rfl: 0    ondansetron (Zofran) 4 MG tablet, Take 1 tablet by mouth Every 8 (Eight) Hours As Needed for Nausea or Vomiting., Disp: 30 tablet, Rfl: 1    pantoprazole (PROTONIX) 40 MG EC tablet, Take 1 tablet by mouth 2 (Two) Times a Day., Disp: 180 tablet, Rfl: 1    polyethylene glycol (MIRALAX) 17 GM/SCOOP powder, Take 17 g by mouth As Needed (Constipation). OTC, Disp: , Rfl:     roflumilast (DALIRESP) 500 MCG tablet tablet, Take 1 tablet by mouth Daily., Disp: 90 tablet, Rfl: 3    sertraline (ZOLOFT) 100 MG tablet, TAKE 1 TABLET DAILY, Disp: 90 tablet, Rfl: 3    simvastatin (ZOCOR) 40 MG tablet, TAKE 1 TABLET EVERY NIGHT (Patient taking differently: Take 1 tablet by mouth Every Night.), Disp: 90 tablet, Rfl: 3    traMADol (ULTRAM) 50 MG tablet, TAKE 1 TABLET BY MOUTH EVERY 6 HOURS AS NEEDED FOR MODERATE PAIN, Disp: 120 tablet, Rfl: 0    Vibegron (Gemtesa) 75 MG tablet, Take 1 tablet by mouth Daily., Disp: , Rfl:     Mirabegron ER (MYRBETRIQ) 50 MG tablet sustained-release 24 hour 24 hr tablet, Take 50 mg by mouth Daily. (Patient not taking: Reported on 3/11/2025), Disp: 90 tablet, Rfl: 3     Allergies    Allergies   Allergen Reactions    Penicillins Hives, Swelling and Mental Status Change    Codeine Mental Status Change    Novocain [Procaine] Swelling    Dopamine Palpitations and Other (See Comments)     Other reaction(s): Hypertension       Problem List    Patient Active Problem List   Diagnosis    Coronary arteriosclerosis in native artery    Chronic  obstructive pulmonary disease    Depression    Gastroesophageal reflux disease with esophagitis    Hyperlipidemia    Hypertension    Hypothyroidism    Osteoarthritis    Peripheral arterial occlusive disease    Solitary pulmonary nodule    Vitamin D deficiency    Lower back pain    Carotid bruit    Hypoxemia    Chronic respiratory failure with hypoxia and hypercapnia    Allergic rhinitis    Chronic leukocytosis    Ureterolithiasis    Murmur, cardiac    Hospital discharge follow-up    Closed fracture of right proximal humerus    Right shoulder pain    Acute hypoxemic respiratory failure    Anemia due to blood loss, acute    Blood loss anemia    Anemia due to acute blood loss    Melena       Medications, Allergies, Problems List and Past History were reviewed and updated.    Physical Examination    /62 (BP Location: Left arm, Patient Position: Sitting, Cuff Size: Adult)   Pulse 80   Temp 97.8 °F (36.6 °C) (Infrared)   Resp 20   Wt 63 kg (139 lb)   LMP  (LMP Unknown)   BMI 24.62 kg/m²       HEENT: Head- Normocephalic Atraumatic. Facies- Within normal limits. Pinnas- Normal texture and shape bilaterally. Canals- Normal bilaterally. TMs- Normal bilaterally. Nares- Patent bilaterally. Nasal Septum- is normal. There is no tenderness to palpation over the frontal or maxillary sinuses. Lids- Normal bilaterally. Conjunctiva- Clear bilaterally. Sclera- Anicteric bilaterally. Oropharynx- Moist with no lesions. Tonsils- No enlargement, erythema or exudate.    Neck: Thyroid- non enlarged, symmetric and has no nodules. No bruits are detected. ROM- Normal Range of Motion with no rigidity.    Lungs: Auscultation- Clear to auscultation bilaterally. There are no retractions, clubbing or cyanosis. The Expiratory to Inspiratory ratio is equal.    Cardiovascular: There are no carotid bruits. Heart- Normal Rate with Irregularly Irregular rhythm and no murmurs. There are no gallops. There are no rubs. In the lower extremities  there is no edema. The upper extremities do not have edema.    Abdomen: Soft, benign, non-tender with no masses, hernias, organomegaly or scars.    Impression and Assessment    Atrial Fibrillation.    Hypothyroidism.    Essential Hypertension.    Gastroesophageal Reflux Disease.    Chronic Obstructive Pulmonary Disease.    Hyperlipidemia.    Plan    Gastroesophageal Reflux Disease Plan: The patient was instructed to continue the current medications.    Atrial Fibrillation Plan: The patient was instructed to continue the current medications.    Essential Hypertension Plan: The patient was instructed to continue the current medications.    Hyperlipidemia Plan: The patient was instructed to exercise daily and eat a low fat diet.    Hypothyroidism Plan: The patient was instructed to continue the current medications.    Chronic Obstructive Pulmonary Disease Plan: Continue the current medication regimen.      Diagnoses and all orders for this visit:    1. Hypothyroidism, unspecified type (Primary)  -     T4, Free; Future  -     TSH; Future  -     T4, Free  -     TSH    2. Atrial fibrillation, unspecified type  -     CBC & Differential; Future  -     Comprehensive Metabolic Panel; Future  -     CBC & Differential  -     Comprehensive Metabolic Panel    3. Essential hypertension  -     Comprehensive Metabolic Panel; Future  -     POC Urinalysis Dipstick, Automated; Future  -     POC Urinalysis Dipstick, Automated  -     Comprehensive Metabolic Panel    4. Gastroesophageal reflux disease with esophagitis without hemorrhage    5. Hyperlipidemia, unspecified hyperlipidemia type  -     Comprehensive Metabolic Panel; Future  -     Lipid Panel; Future  -     Comprehensive Metabolic Panel  -     Lipid Panel    6. Chronic obstructive pulmonary disease, unspecified COPD type  -     CBC & Differential; Future  -     CBC & Differential          Return to Office    The patient was instructed to return for follow-up in 3 months. The  patient was instructed to return sooner if the condition changes, worsens, or does not resolve.

## 2025-03-14 ENCOUNTER — HOSPITAL ENCOUNTER (OUTPATIENT)
Dept: CT IMAGING | Facility: HOSPITAL | Age: 85
Discharge: HOME | End: 2025-03-14
Payer: MEDICARE

## 2025-03-14 ENCOUNTER — PREP FOR SURGERY (OUTPATIENT)
Dept: OTHER | Facility: HOSPITAL | Age: 85
End: 2025-03-14
Payer: MEDICARE

## 2025-03-14 ENCOUNTER — RESULTS FOLLOW-UP (OUTPATIENT)
Dept: GASTROENTEROLOGY | Facility: HOSPITAL | Age: 85
End: 2025-03-14
Payer: MEDICARE

## 2025-03-14 DIAGNOSIS — Z99.81 O2 DEPENDENT: ICD-10-CM

## 2025-03-14 DIAGNOSIS — R10.31 RIGHT LOWER QUADRANT ABDOMINAL PAIN: ICD-10-CM

## 2025-03-14 DIAGNOSIS — K63.5 POLYP OF COLON, UNSPECIFIED PART OF COLON, UNSPECIFIED TYPE: Primary | ICD-10-CM

## 2025-03-14 PROCEDURE — 25510000002 DIATRIZOATE MEGLUMINE & SODIUM PER 1 ML: Performed by: INTERNAL MEDICINE

## 2025-03-14 PROCEDURE — 25510000001 IOPAMIDOL 61 % SOLUTION: Performed by: INTERNAL MEDICINE

## 2025-03-14 PROCEDURE — 74178 CT ABD&PLV WO CNTR FLWD CNTR: CPT

## 2025-03-14 RX ORDER — DIATRIZOATE MEGLUMINE AND DIATRIZOATE SODIUM 660; 100 MG/ML; MG/ML
15 SOLUTION ORAL; RECTAL ONCE
Status: COMPLETED | OUTPATIENT
Start: 2025-03-14 | End: 2025-03-14

## 2025-03-14 RX ORDER — IOPAMIDOL 612 MG/ML
85 INJECTION, SOLUTION INTRAVASCULAR
Status: COMPLETED | OUTPATIENT
Start: 2025-03-14 | End: 2025-03-14

## 2025-03-14 RX ADMIN — IOPAMIDOL 85 ML: 612 INJECTION, SOLUTION INTRAVENOUS at 09:47

## 2025-03-14 RX ADMIN — DIATRIZOATE MEGLUMINE AND DIATRIZOATE SODIUM 15 ML: 660; 100 LIQUID ORAL; RECTAL at 09:47

## 2025-03-14 NOTE — LETTER
March 17, 2025    Avelina Ceron  [address] [id number]        Dear Ms. Ceron:  This letter is to review the biopsy report from your March 7, 2025 colonoscopy at which time we removed a large polyp from your colon.    Biopsies returned unremarkable.  The polyp proved to be a tubular adenoma.  Tubular adenomas are benign polyps that have the potential for becoming malignant.  As you know, you had a very large polyp and therefore we have recommended you return in 3 months to make sure there is no residual polyp.  This is to best diminish your future risk for developing colon cancer.    I hope this letter finds you well.  I encourage you to call with any questions or concerns you may have.    Sincerely,  Jose Callahan MD    CC: Dr. Dre Moura

## 2025-03-14 NOTE — TELEPHONE ENCOUNTER
2025    Avelina Ceron  [address] [facility] [address]    : 1940      Dear Ms. Ceron:  This letter is to review the biopsy report from your 2025 colonoscopy which time we removed a large polyp from your colon.    Biopsies returned unremarkable.  The polyp proved to be a tubular adenoma.  Tubular adenomas are benign polyps that have the potential for becoming malignant.  As you know, he had a very large polyp and therefore we have recommended you return in 3 months to make sure there is no residual polyp.  This is to best diminish your future risk for developing colon cancer.    I hope this letter finds you well.  I encourage you to call with any questions or concerns you may have.    Sincerely,  Jose Callahan MD    CC: Dr. Dre Moura

## 2025-03-15 LAB — BACTERIA SPEC AEROBE CULT: ABNORMAL

## 2025-03-18 RX ORDER — NITROFURANTOIN 25; 75 MG/1; MG/1
100 CAPSULE ORAL 2 TIMES DAILY
Qty: 20 CAPSULE | Refills: 0 | Status: ON HOLD | OUTPATIENT
Start: 2025-03-18 | End: 2025-03-28

## 2025-03-23 DIAGNOSIS — M54.50 CHRONIC MIDLINE LOW BACK PAIN WITHOUT SCIATICA: ICD-10-CM

## 2025-03-23 DIAGNOSIS — G89.29 CHRONIC MIDLINE LOW BACK PAIN WITHOUT SCIATICA: ICD-10-CM

## 2025-03-24 ENCOUNTER — HOSPITAL ENCOUNTER (INPATIENT)
Facility: HOSPITAL | Age: 85
LOS: 5 days | Discharge: HOME | DRG: 920 | End: 2025-03-30
Attending: EMERGENCY MEDICINE | Admitting: INTERNAL MEDICINE
Payer: MEDICARE

## 2025-03-24 ENCOUNTER — APPOINTMENT (OUTPATIENT)
Dept: CT IMAGING | Facility: HOSPITAL | Age: 85
DRG: 920 | End: 2025-03-24
Payer: MEDICARE

## 2025-03-24 DIAGNOSIS — R53.83 OTHER FATIGUE: ICD-10-CM

## 2025-03-24 DIAGNOSIS — R79.89 ELEVATED LACTIC ACID LEVEL: ICD-10-CM

## 2025-03-24 DIAGNOSIS — K62.5 RECTAL BLEEDING: Primary | ICD-10-CM

## 2025-03-24 DIAGNOSIS — R07.89 CHEST PRESSURE: ICD-10-CM

## 2025-03-24 PROBLEM — K92.2 LOWER GI BLEEDING: Status: ACTIVE | Noted: 2025-03-24

## 2025-03-24 LAB
ABO GROUP BLD: NORMAL
ALBUMIN SERPL-MCNC: 3.8 G/DL (ref 3.5–5.2)
ALBUMIN/GLOB SERPL: 1.3 G/DL
ALP SERPL-CCNC: 71 U/L (ref 39–117)
ALT SERPL W P-5'-P-CCNC: 12 U/L (ref 1–33)
ANION GAP SERPL CALCULATED.3IONS-SCNC: 14 MMOL/L (ref 5–15)
AST SERPL-CCNC: 18 U/L (ref 1–32)
BASOPHILS # BLD AUTO: 0.08 10*3/MM3 (ref 0–0.2)
BASOPHILS NFR BLD AUTO: 0.6 % (ref 0–1.5)
BILIRUB SERPL-MCNC: 0.3 MG/DL (ref 0–1.2)
BILIRUB UR QL STRIP: NEGATIVE
BLD GP AB SCN SERPL QL: NEGATIVE
BUN BLDA-MCNC: 12 MG/DL (ref 8–26)
BUN SERPL-MCNC: 14 MG/DL (ref 8–23)
BUN/CREAT SERPL: 35.9 (ref 7–25)
CALCIUM BLD QL: 1.15 MG/DL
CALCIUM SPEC-SCNC: 9.5 MG/DL (ref 8.6–10.5)
CHLORIDE BLDA-SCNC: 95 MMOL/L (ref 98–109)
CHLORIDE SERPL-SCNC: 97 MMOL/L (ref 98–107)
CLARITY UR: CLEAR
CO2 SERPL-SCNC: 34 MMOL/L (ref 22–29)
COLOR UR: YELLOW
CREAT BLDA-MCNC: 0.5 MG/DL (ref 0.6–1.3)
CREAT SERPL-MCNC: 0.39 MG/DL (ref 0.57–1)
D-LACTATE SERPL-SCNC: 0.8 MMOL/L (ref 0.5–2)
D-LACTATE SERPL-SCNC: 5.2 MMOL/L (ref 0.5–2)
DEPRECATED RDW RBC AUTO: 45.1 FL (ref 37–54)
DEVELOPER EXPIRATION DATE: ABNORMAL
DEVELOPER LOT NUMBER: ABNORMAL
EGFRCR SERPLBLD CKD-EPI 2021: 98.3 ML/MIN/1.73
EOSINOPHIL # BLD AUTO: 0.15 10*3/MM3 (ref 0–0.4)
EOSINOPHIL NFR BLD AUTO: 1.1 % (ref 0.3–6.2)
ERYTHROCYTE [DISTWIDTH] IN BLOOD BY AUTOMATED COUNT: 13.1 % (ref 12.3–15.4)
EXPIRATION DATE: ABNORMAL
FECAL OCCULT BLOOD SCREEN, POC: POSITIVE
GEN 5 1HR TROPONIN T REFLEX: 24 NG/L
GLOBULIN UR ELPH-MCNC: 2.9 GM/DL
GLUCOSE BLDC GLUCOMTR-MCNC: 100 MG/DL (ref 70–130)
GLUCOSE SERPL-MCNC: 139 MG/DL (ref 65–99)
GLUCOSE UR STRIP-MCNC: NEGATIVE MG/DL
HCT VFR BLD AUTO: 31.9 % (ref 34–46.6)
HCT VFR BLDA CALC: 29 % (ref 38–51)
HGB BLD-MCNC: 9.6 G/DL (ref 12–15.9)
HGB BLDA-MCNC: 10.2 G/DL (ref 12–17)
HGB BLDA-MCNC: 9.9 G/DL (ref 12–17)
HGB UR QL STRIP.AUTO: NEGATIVE
HOLD SPECIMEN: NORMAL
IMM GRANULOCYTES # BLD AUTO: 0.04 10*3/MM3 (ref 0–0.05)
IMM GRANULOCYTES NFR BLD AUTO: 0.3 % (ref 0–0.5)
KETONES UR QL STRIP: ABNORMAL
LEUKOCYTE ESTERASE UR QL STRIP.AUTO: NEGATIVE
LIPASE SERPL-CCNC: 75 U/L (ref 13–60)
LYMPHOCYTES # BLD AUTO: 2.23 10*3/MM3 (ref 0.7–3.1)
LYMPHOCYTES NFR BLD AUTO: 16.4 % (ref 19.6–45.3)
Lab: ABNORMAL
MCH RBC QN AUTO: 28.6 PG (ref 26.6–33)
MCHC RBC AUTO-ENTMCNC: 30.1 G/DL (ref 31.5–35.7)
MCV RBC AUTO: 94.9 FL (ref 79–97)
MONOCYTES # BLD AUTO: 0.72 10*3/MM3 (ref 0.1–0.9)
MONOCYTES NFR BLD AUTO: 5.3 % (ref 5–12)
NEGATIVE CONTROL: NEGATIVE
NEUTROPHILS NFR BLD AUTO: 10.41 10*3/MM3 (ref 1.7–7)
NEUTROPHILS NFR BLD AUTO: 76.3 % (ref 42.7–76)
NITRITE UR QL STRIP: NEGATIVE
NRBC BLD AUTO-RTO: 0 /100 WBC (ref 0–0.2)
PH UR STRIP.AUTO: 5.5 [PH] (ref 5–8)
PLATELET # BLD AUTO: 340 10*3/MM3 (ref 140–450)
PMV BLD AUTO: 9.4 FL (ref 6–12)
POSITIVE CONTROL: POSITIVE
POTASSIUM BLDA-SCNC: 3.5 MMOL/L (ref 3.5–4.9)
POTASSIUM SERPL-SCNC: 3.3 MMOL/L (ref 3.5–5.2)
PROT SERPL-MCNC: 6.7 G/DL (ref 6–8.5)
PROT UR QL STRIP: NEGATIVE
RBC # BLD AUTO: 3.36 10*6/MM3 (ref 3.77–5.28)
RH BLD: POSITIVE
SODIUM BLD-SCNC: 142 MMOL/L (ref 138–146)
SODIUM SERPL-SCNC: 145 MMOL/L (ref 136–145)
SP GR UR STRIP: 1.1 (ref 1–1.03)
T&S EXPIRATION DATE: NORMAL
TROPONIN T NUMERIC DELTA: 14 NG/L
TROPONIN T SERPL HS-MCNC: 10 NG/L
UROBILINOGEN UR QL STRIP: ABNORMAL
WBC NRBC COR # BLD AUTO: 13.63 10*3/MM3 (ref 3.4–10.8)
WHOLE BLOOD HOLD COAG: NORMAL
WHOLE BLOOD HOLD SPECIMEN: NORMAL

## 2025-03-24 PROCEDURE — 25010000002 ONDANSETRON PER 1 MG: Performed by: EMERGENCY MEDICINE

## 2025-03-24 PROCEDURE — 36415 COLL VENOUS BLD VENIPUNCTURE: CPT

## 2025-03-24 PROCEDURE — 81003 URINALYSIS AUTO W/O SCOPE: CPT | Performed by: EMERGENCY MEDICINE

## 2025-03-24 PROCEDURE — 86901 BLOOD TYPING SEROLOGIC RH(D): CPT | Performed by: EMERGENCY MEDICINE

## 2025-03-24 PROCEDURE — 80047 BASIC METABLC PNL IONIZED CA: CPT

## 2025-03-24 PROCEDURE — 86850 RBC ANTIBODY SCREEN: CPT | Performed by: EMERGENCY MEDICINE

## 2025-03-24 PROCEDURE — 86900 BLOOD TYPING SEROLOGIC ABO: CPT | Performed by: EMERGENCY MEDICINE

## 2025-03-24 PROCEDURE — 85014 HEMATOCRIT: CPT

## 2025-03-24 PROCEDURE — 74178 CT ABD&PLV WO CNTR FLWD CNTR: CPT

## 2025-03-24 PROCEDURE — 99223 1ST HOSP IP/OBS HIGH 75: CPT | Performed by: INTERNAL MEDICINE

## 2025-03-24 PROCEDURE — 82270 OCCULT BLOOD FECES: CPT | Performed by: EMERGENCY MEDICINE

## 2025-03-24 PROCEDURE — G0378 HOSPITAL OBSERVATION PER HR: HCPCS

## 2025-03-24 PROCEDURE — 84484 ASSAY OF TROPONIN QUANT: CPT | Performed by: EMERGENCY MEDICINE

## 2025-03-24 PROCEDURE — P9612 CATHETERIZE FOR URINE SPEC: HCPCS

## 2025-03-24 PROCEDURE — 85025 COMPLETE CBC W/AUTO DIFF WBC: CPT | Performed by: EMERGENCY MEDICINE

## 2025-03-24 PROCEDURE — 94640 AIRWAY INHALATION TREATMENT: CPT

## 2025-03-24 PROCEDURE — 25510000001 IOPAMIDOL PER 1 ML: Performed by: EMERGENCY MEDICINE

## 2025-03-24 PROCEDURE — 94799 UNLISTED PULMONARY SVC/PX: CPT

## 2025-03-24 PROCEDURE — 83605 ASSAY OF LACTIC ACID: CPT | Performed by: EMERGENCY MEDICINE

## 2025-03-24 PROCEDURE — 25810000003 SODIUM CHLORIDE 0.9 % SOLUTION: Performed by: EMERGENCY MEDICINE

## 2025-03-24 PROCEDURE — 99285 EMERGENCY DEPT VISIT HI MDM: CPT

## 2025-03-24 PROCEDURE — 99223 1ST HOSP IP/OBS HIGH 75: CPT | Performed by: NURSE PRACTITIONER

## 2025-03-24 PROCEDURE — 93005 ELECTROCARDIOGRAM TRACING: CPT | Performed by: EMERGENCY MEDICINE

## 2025-03-24 PROCEDURE — 25810000003 SODIUM CHLORIDE 0.9 % SOLUTION: Performed by: INTERNAL MEDICINE

## 2025-03-24 PROCEDURE — 86923 COMPATIBILITY TEST ELECTRIC: CPT

## 2025-03-24 PROCEDURE — 80053 COMPREHEN METABOLIC PANEL: CPT | Performed by: EMERGENCY MEDICINE

## 2025-03-24 PROCEDURE — 82270 OCCULT BLOOD FECES: CPT

## 2025-03-24 PROCEDURE — 83690 ASSAY OF LIPASE: CPT | Performed by: INTERNAL MEDICINE

## 2025-03-24 RX ORDER — FERROUS SULFATE 325(65) MG
325 TABLET ORAL
Status: DISCONTINUED | OUTPATIENT
Start: 2025-03-25 | End: 2025-03-28

## 2025-03-24 RX ORDER — ROFLUMILAST 500 UG/1
500 TABLET ORAL DAILY
Status: DISCONTINUED | OUTPATIENT
Start: 2025-03-25 | End: 2025-03-30 | Stop reason: HOSPADM

## 2025-03-24 RX ORDER — PANTOPRAZOLE SODIUM 40 MG/1
40 TABLET, DELAYED RELEASE ORAL 2 TIMES DAILY
Status: DISCONTINUED | OUTPATIENT
Start: 2025-03-24 | End: 2025-03-30 | Stop reason: HOSPADM

## 2025-03-24 RX ORDER — ONDANSETRON 2 MG/ML
4 INJECTION INTRAMUSCULAR; INTRAVENOUS ONCE
Status: COMPLETED | OUTPATIENT
Start: 2025-03-24 | End: 2025-03-24

## 2025-03-24 RX ORDER — TRAMADOL HYDROCHLORIDE 50 MG/1
50 TABLET ORAL EVERY 6 HOURS PRN
Status: DISCONTINUED | OUTPATIENT
Start: 2025-03-24 | End: 2025-03-30 | Stop reason: HOSPADM

## 2025-03-24 RX ORDER — POTASSIUM CHLORIDE 1500 MG/1
40 TABLET, EXTENDED RELEASE ORAL EVERY 4 HOURS
Status: COMPLETED | OUTPATIENT
Start: 2025-03-24 | End: 2025-03-25

## 2025-03-24 RX ORDER — ONDANSETRON 2 MG/ML
4 INJECTION INTRAMUSCULAR; INTRAVENOUS EVERY 6 HOURS PRN
Status: DISCONTINUED | OUTPATIENT
Start: 2025-03-24 | End: 2025-03-30 | Stop reason: HOSPADM

## 2025-03-24 RX ORDER — ACETAMINOPHEN 650 MG/1
650 SUPPOSITORY RECTAL EVERY 4 HOURS PRN
Status: DISCONTINUED | OUTPATIENT
Start: 2025-03-24 | End: 2025-03-30 | Stop reason: HOSPADM

## 2025-03-24 RX ORDER — ASPIRIN 81 MG/1
81 TABLET ORAL DAILY
Status: DISCONTINUED | OUTPATIENT
Start: 2025-03-25 | End: 2025-03-30 | Stop reason: HOSPADM

## 2025-03-24 RX ORDER — AMOXICILLIN 250 MG
2 CAPSULE ORAL 2 TIMES DAILY PRN
Status: DISCONTINUED | OUTPATIENT
Start: 2025-03-24 | End: 2025-03-30 | Stop reason: HOSPADM

## 2025-03-24 RX ORDER — LATANOPROST 50 UG/ML
1 SOLUTION/ DROPS OPHTHALMIC NIGHTLY
Status: DISCONTINUED | OUTPATIENT
Start: 2025-03-24 | End: 2025-03-30 | Stop reason: HOSPADM

## 2025-03-24 RX ORDER — MIRABEGRON 50 MG/1
50 TABLET, FILM COATED, EXTENDED RELEASE ORAL DAILY
COMMUNITY

## 2025-03-24 RX ORDER — ACETAMINOPHEN 325 MG/1
650 TABLET ORAL EVERY 4 HOURS PRN
Status: DISCONTINUED | OUTPATIENT
Start: 2025-03-24 | End: 2025-03-30 | Stop reason: HOSPADM

## 2025-03-24 RX ORDER — ACETAMINOPHEN 160 MG/5ML
650 SOLUTION ORAL EVERY 4 HOURS PRN
Status: DISCONTINUED | OUTPATIENT
Start: 2025-03-24 | End: 2025-03-30 | Stop reason: HOSPADM

## 2025-03-24 RX ORDER — ONDANSETRON 4 MG/1
4 TABLET, ORALLY DISINTEGRATING ORAL EVERY 6 HOURS PRN
Status: DISCONTINUED | OUTPATIENT
Start: 2025-03-24 | End: 2025-03-30 | Stop reason: HOSPADM

## 2025-03-24 RX ORDER — ISOSORBIDE MONONITRATE 30 MG/1
30 TABLET, EXTENDED RELEASE ORAL DAILY
Status: DISCONTINUED | OUTPATIENT
Start: 2025-03-25 | End: 2025-03-25

## 2025-03-24 RX ORDER — SODIUM CHLORIDE 0.9 % (FLUSH) 0.9 %
10 SYRINGE (ML) INJECTION AS NEEDED
Status: DISCONTINUED | OUTPATIENT
Start: 2025-03-24 | End: 2025-03-30 | Stop reason: HOSPADM

## 2025-03-24 RX ORDER — ARFORMOTEROL TARTRATE 15 UG/2ML
15 SOLUTION RESPIRATORY (INHALATION)
Status: DISCONTINUED | OUTPATIENT
Start: 2025-03-24 | End: 2025-03-30 | Stop reason: HOSPADM

## 2025-03-24 RX ORDER — ONDANSETRON 2 MG/ML
4 INJECTION INTRAMUSCULAR; INTRAVENOUS ONCE
Status: DISCONTINUED | OUTPATIENT
Start: 2025-03-24 | End: 2025-03-24 | Stop reason: SDUPTHER

## 2025-03-24 RX ORDER — NITROFURANTOIN 25; 75 MG/1; MG/1
100 CAPSULE ORAL 2 TIMES DAILY
Status: COMPLETED | OUTPATIENT
Start: 2025-03-24 | End: 2025-03-28

## 2025-03-24 RX ORDER — BISACODYL 10 MG
10 SUPPOSITORY, RECTAL RECTAL DAILY PRN
Status: DISCONTINUED | OUTPATIENT
Start: 2025-03-24 | End: 2025-03-30 | Stop reason: HOSPADM

## 2025-03-24 RX ORDER — PANTOPRAZOLE SODIUM 40 MG/10ML
40 INJECTION, POWDER, LYOPHILIZED, FOR SOLUTION INTRAVENOUS ONCE
Status: COMPLETED | OUTPATIENT
Start: 2025-03-24 | End: 2025-03-24

## 2025-03-24 RX ORDER — DOFETILIDE 0.25 MG/1
250 CAPSULE ORAL 2 TIMES DAILY
Status: DISCONTINUED | OUTPATIENT
Start: 2025-03-24 | End: 2025-03-30 | Stop reason: HOSPADM

## 2025-03-24 RX ORDER — OXYBUTYNIN CHLORIDE 10 MG/1
10 TABLET, EXTENDED RELEASE ORAL DAILY
Status: DISCONTINUED | OUTPATIENT
Start: 2025-03-25 | End: 2025-03-30 | Stop reason: HOSPADM

## 2025-03-24 RX ORDER — SODIUM CHLORIDE 0.9 % (FLUSH) 0.9 %
10 SYRINGE (ML) INJECTION EVERY 12 HOURS SCHEDULED
Status: DISCONTINUED | OUTPATIENT
Start: 2025-03-24 | End: 2025-03-30 | Stop reason: HOSPADM

## 2025-03-24 RX ORDER — LEVOTHYROXINE SODIUM 100 UG/1
200 TABLET ORAL
Status: DISCONTINUED | OUTPATIENT
Start: 2025-03-25 | End: 2025-03-30 | Stop reason: HOSPADM

## 2025-03-24 RX ORDER — ATORVASTATIN CALCIUM 20 MG/1
20 TABLET, FILM COATED ORAL NIGHTLY
Status: DISCONTINUED | OUTPATIENT
Start: 2025-03-24 | End: 2025-03-30 | Stop reason: HOSPADM

## 2025-03-24 RX ORDER — BISACODYL 5 MG/1
5 TABLET, DELAYED RELEASE ORAL DAILY PRN
Status: DISCONTINUED | OUTPATIENT
Start: 2025-03-24 | End: 2025-03-30 | Stop reason: HOSPADM

## 2025-03-24 RX ORDER — LOSARTAN POTASSIUM 50 MG/1
50 TABLET ORAL
Status: DISCONTINUED | OUTPATIENT
Start: 2025-03-25 | End: 2025-03-30 | Stop reason: HOSPADM

## 2025-03-24 RX ORDER — IOPAMIDOL 755 MG/ML
80 INJECTION, SOLUTION INTRAVASCULAR
Status: COMPLETED | OUTPATIENT
Start: 2025-03-24 | End: 2025-03-24

## 2025-03-24 RX ORDER — POLYETHYLENE GLYCOL 3350 17 G/17G
17 POWDER, FOR SOLUTION ORAL DAILY PRN
Status: DISCONTINUED | OUTPATIENT
Start: 2025-03-24 | End: 2025-03-30 | Stop reason: HOSPADM

## 2025-03-24 RX ORDER — SODIUM CHLORIDE 9 MG/ML
75 INJECTION, SOLUTION INTRAVENOUS CONTINUOUS
Status: ACTIVE | OUTPATIENT
Start: 2025-03-24 | End: 2025-03-28

## 2025-03-24 RX ORDER — GABAPENTIN 300 MG/1
300 CAPSULE ORAL NIGHTLY
Status: DISCONTINUED | OUTPATIENT
Start: 2025-03-24 | End: 2025-03-30 | Stop reason: HOSPADM

## 2025-03-24 RX ORDER — FERROUS SULFATE 325(65) MG
325 TABLET ORAL
COMMUNITY
End: 2025-03-30 | Stop reason: HOSPADM

## 2025-03-24 RX ORDER — SERTRALINE HYDROCHLORIDE 100 MG/1
100 TABLET, FILM COATED ORAL DAILY
Status: DISCONTINUED | OUTPATIENT
Start: 2025-03-25 | End: 2025-03-30 | Stop reason: HOSPADM

## 2025-03-24 RX ORDER — SODIUM CHLORIDE 9 MG/ML
40 INJECTION, SOLUTION INTRAVENOUS AS NEEDED
Status: DISCONTINUED | OUTPATIENT
Start: 2025-03-24 | End: 2025-03-30 | Stop reason: HOSPADM

## 2025-03-24 RX ORDER — TRAMADOL HYDROCHLORIDE 50 MG/1
50 TABLET ORAL
Qty: 120 TABLET | Refills: 0 | Status: SHIPPED | OUTPATIENT
Start: 2025-03-24

## 2025-03-24 RX ORDER — BUDESONIDE 0.5 MG/2ML
0.5 INHALANT ORAL
Status: DISCONTINUED | OUTPATIENT
Start: 2025-03-24 | End: 2025-03-30 | Stop reason: HOSPADM

## 2025-03-24 RX ADMIN — ARFORMOTEROL TARTRATE 15 MCG: 15 SOLUTION RESPIRATORY (INHALATION) at 21:56

## 2025-03-24 RX ADMIN — SODIUM CHLORIDE 1000 ML: 9 INJECTION, SOLUTION INTRAVENOUS at 18:26

## 2025-03-24 RX ADMIN — PANTOPRAZOLE SODIUM 40 MG: 40 TABLET, DELAYED RELEASE ORAL at 22:06

## 2025-03-24 RX ADMIN — IOPAMIDOL 80 ML: 755 INJECTION, SOLUTION INTRAVENOUS at 14:20

## 2025-03-24 RX ADMIN — ONDANSETRON 4 MG: 2 INJECTION INTRAMUSCULAR; INTRAVENOUS at 15:20

## 2025-03-24 RX ADMIN — TRAMADOL HYDROCHLORIDE 50 MG: 50 TABLET, COATED ORAL at 22:05

## 2025-03-24 RX ADMIN — POTASSIUM CHLORIDE 40 MEQ: 1500 TABLET, EXTENDED RELEASE ORAL at 22:06

## 2025-03-24 RX ADMIN — LATANOPROST 1 DROP: 50 SOLUTION OPHTHALMIC at 22:09

## 2025-03-24 RX ADMIN — ATORVASTATIN CALCIUM 20 MG: 20 TABLET, FILM COATED ORAL at 22:05

## 2025-03-24 RX ADMIN — NITROFURANTOIN MONOHYDRATE/MACROCRYSTALS 100 MG: 75; 25 CAPSULE ORAL at 22:05

## 2025-03-24 RX ADMIN — GABAPENTIN 300 MG: 300 CAPSULE ORAL at 22:06

## 2025-03-24 RX ADMIN — SODIUM CHLORIDE 1000 ML: 9 INJECTION, SOLUTION INTRAVENOUS at 15:21

## 2025-03-24 RX ADMIN — BUDESONIDE 0.5 MG: 0.5 INHALANT RESPIRATORY (INHALATION) at 21:56

## 2025-03-24 RX ADMIN — PANTOPRAZOLE SODIUM 40 MG: 40 INJECTION, POWDER, FOR SOLUTION INTRAVENOUS at 18:27

## 2025-03-24 RX ADMIN — DOFETILIDE 250 MCG: 0.25 CAPSULE ORAL at 22:06

## 2025-03-24 RX ADMIN — Medication 10 ML: at 22:06

## 2025-03-24 NOTE — ED NOTES
" Avelina Ceron    Nursing Report ED to Floor:  Mental status: A+Ox4  Ambulatory status: Bedfast  Oxygen Therapy:  NC 2L  Cardiac Rhythm: ST  Admitted from: Home  Safety Concerns:  Falls  Precautions: Cdiff Rule out  Social Issues: N/A  ED Room #:  08    ED Nurse Phone Extension - [phone] or may call [phone].      HPI:   Chief Complaint   Patient presents with    Black or Bloody Stool       Past Medical History:  Past Medical History:   Diagnosis Date    Anemia     Anesthesia complication     Pt went into respiratory failure during total hip replacement in 2020    Arthritis of back     Arthritis of neck     Atrial fibrillation     Broken ribs     COPD (chronic obstructive pulmonary disease)     Coronary artery disease     Degenerative lumbar disc     Disease of thyroid gland     \"low thyroid\"    Fracture, fibula 1997?    Fracture, humerus 12/13/23    Fracture, tibia and fibula 1997?    GI bleed 09/2024    Hard to intubate     Hip arthrosis     History of colonoscopy     History of transfusion     bhl 10/2024, no reaction    Hyperlipidemia     Hypertension     Internal carotid artery stent present     bialteral    Kidney stone July 2023    Knee swelling     Lumbosacral disc disease     Murmur     On home O2     3L    Osteoarthritis     Periarthritis of shoulder     PVD (peripheral vascular disease)     bilateral stents    Urinary incontinence     Urinary tract infection         Past Surgical History:  Past Surgical History:   Procedure Laterality Date    CAROTID STENT Bilateral     COLONOSCOPY      COLONOSCOPY N/A 10/12/2024    Procedure: COLONOSCOPY;  Surgeon: Markie Bautista MD;  Location: [facility] ENDOSCOPY;  Service: Gastroenterology;  Laterality: N/A;    COLONOSCOPY N/A 3/7/2025    Procedure: COLONOSCOPY;  Surgeon: Shant Callahan MD;  Location:  [facility] ENDOSCOPY;  Service: Gastroenterology;  Laterality: N/A;    ENDOSCOPY N/A 10/11/2024    Procedure: ESOPHAGOGASTRODUODENOSCOPY;  Surgeon: Markie Bautista MD;  " Location:  [facility] ENDOSCOPY;  Service: Gastroenterology;  Laterality: N/A;    ENDOSCOPY N/A 10/12/2024    Procedure: ESOPHAGOGASTRODUODENOSCOPY;  Surgeon: Markie Bautista MD;  Location:  [facility] ENDOSCOPY;  Service: Gastroenterology;  Laterality: N/A;    EYE SURGERY  10/17/2022    yag surgery    GALLBLADDER SURGERY  2012    HYSTERECTOMY      ILIAC ARTERY STENT Bilateral     KIDNEY STONE SURGERY  July August 2023    OOPHORECTOMY  1976    1 ovary removed    TOTAL HIP ARTHROPLASTY Bilateral     URETEROSCOPY LASER LITHOTRIPSY WITH STENT INSERTION Right 07/26/2023    Procedure: CYSTOSCOPY, RIGHT RETROGRADE PYELOGRAM; WITH STENT INSERTION;  Surgeon: Lee Kim MD;  Location:  [facility] OR;  Service: Urology;  Laterality: Right;    URETEROSCOPY LASER LITHOTRIPSY WITH STENT INSERTION Right 08/10/2023    Procedure: URETEROSCOPY, LASER LITHOTRIPSY, STENT EXCHANGE;  Surgeon: Lee Kim MD;  Location:  [facility] OR;  Service: Urology;  Laterality: Right;        Admitting Doctor:   Al Miller DO    Consulting Provider(s):  Consults       No orders found from 2/23/2025 to 3/25/2025.             Admitting Diagnosis:   The primary encounter diagnosis was Rectal bleeding. Diagnoses of Other fatigue, Chest pressure, and Elevated lactic acid level were also pertinent to this visit.    Most Recent Vitals:   Vitals:    03/24/25 1730 03/24/25 1800 03/24/25 1807 03/24/25 1809   BP: 139/50 129/57  129/57   BP Location:       Patient Position:       Pulse: 99 98  98   Resp:    19   Temp:   97.9 °F (36.6 °C) 97.9 °F (36.6 °C)   TempSrc:   Oral Oral   SpO2: 90% 93%  94%   Weight:       Height:           Active LDAs/IV Access:   Lines, Drains & Airways       Active LDAs       Name Placement date Placement time Site Days    Peripheral IV 03/24/25 1307 Right Antecubital 03/24/25  1307  Antecubital  less than 1                    Labs (abnormal labs have a star):   Labs Reviewed   COMPREHENSIVE METABOLIC PANEL - Abnormal;  Notable for the following components:       Result Value    Glucose 139 (*)     Creatinine 0.39 (*)     Potassium 3.3 (*)     Chloride 97 (*)     CO2 34.0 (*)     BUN/Creatinine Ratio 35.9 (*)     All other components within normal limits    Narrative:     GFR Categories in Chronic Kidney Disease (CKD)      GFR Category          GFR (mL/min/1.73)    Interpretation  G1                     90 or greater         Normal or high (1)  G2                      60-89                Mild decrease (1)  G3a                   45-59                Mild to moderate decrease  G3b                   30-44                Moderate to severe decrease  G4                    15-29                Severe decrease  G5                    14 or less           Kidney failure          (1)In the absence of evidence of kidney disease, neither GFR category G1 or G2 fulfill the criteria for CKD.    eGFR calculation 2021 CKD-EPI creatinine equation, which does not include race as a factor   LACTIC ACID, PLASMA - Abnormal; Notable for the following components:    Lactate 5.2 (*)     All other components within normal limits   CBC WITH AUTO DIFFERENTIAL - Abnormal; Notable for the following components:    WBC 13.63 (*)     RBC 3.36 (*)     Hemoglobin 9.6 (*)     Hematocrit 31.9 (*)     MCHC 30.1 (*)     Neutrophil % 76.3 (*)     Lymphocyte % 16.4 (*)     Neutrophils, Absolute 10.41 (*)     All other components within normal limits   URINALYSIS W/ CULTURE IF INDICATED - Abnormal; Notable for the following components:    Specific Gravity, UA 1.097 (*)     Ketones, UA 40 mg/dL (2+) (*)     All other components within normal limits    Narrative:     In absence of clinical symptoms, the presence of pyuria, bacteria, and/or nitrites on the urinalysis result does not correlate with infection.  Urine microscopic not indicated.   LIPASE - Abnormal; Notable for the following components:    Lipase 75 (*)     All other components within normal limits   POCT OCCULT  BLOOD STOOL (ED ONLY) - Abnormal; Notable for the following components:    Fecal Occult Blood Positive (*)     All other components within normal limits   POCT CHEM 8 - Abnormal; Notable for the following components:    Hemoglobin 10.2 (*)     All other components within normal limits   POCT CHEM 8 - Abnormal; Notable for the following components:    Chloride 95 (*)     Creatinine 0.50 (*)     Hemoglobin 9.9 (*)     Hematocrit 29 (*)     All other components within normal limits   LACTIC ACID, REFLEX - Normal   TROPONIN - Normal    Narrative:     High Sensitive Troponin T Reference Range:  <14.0 ng/L- Negative Female for AMI  <22.0 ng/L- Negative Male for AMI  >=14 - Abnormal Female indicating possible myocardial injury.  >=22 - Abnormal Male indicating possible myocardial injury.   Clinicians would have to utilize clinical acumen, EKG, Troponin, and serial changes to determine if it is an Acute Myocardial Infarction or myocardial injury due to an underlying chronic condition.        CLOSTRIDIOIDES DIFFICILE TOXIN    Narrative:     The following orders were created for panel order Clostridioides difficile Toxin - Stool, Per Rectum.  Procedure                               Abnormality         Status                     ---------                               -----------         ------                     Clostridioides difficile...[[id number]]                                                   Please view results for these tests on the individual orders.   GASTROINTESTINAL PANEL, PCR (PREFERRED) DOES NOT INCLUDE CDIFF   CLOSTRIDIOIDES DIFFICILE TOXIN, PCR   RAINBOW DRAW    Narrative:     The following orders were created for panel order [place] Draw.  Procedure                               Abnormality         Status                     ---------                               -----------         ------                     Green Top (Gel)[[id number]]                                  Final result               Lavender  Top[[id number]]                                     Final result               Gold Top - SST[[id number]]                                   Final result               Mccord Top[[id number]]                                         Final result               Light Blue Top[[id number]]                                   Final result                 Please view results for these tests on the individual orders.   HIGH SENSITIVITIY TROPONIN T 1HR   HEMOGLOBIN AND HEMATOCRIT, BLOOD   TYPE AND SCREEN   PREPARE RBC   CBC AND DIFFERENTIAL    Narrative:     The following orders were created for panel order CBC & Differential.  Procedure                               Abnormality         Status                     ---------                               -----------         ------                     CBC Auto Differential[[id number]]        Abnormal            Final result                 Please view results for these tests on the individual orders.   GREEN TOP   LAVENDER TOP   GOLD TOP - SST   GRAY TOP   LIGHT BLUE TOP       Meds Given in ED:   Medications   sodium chloride 0.9 % flush 10 mL (has no administration in time range)   sodium chloride 0.9 % bolus 1,000 mL (1,000 mL Intravenous New Bag 3/24/25 1826)     Followed by   sodium chloride 0.9 % infusion (has no administration in time range)   aspirin EC tablet 81 mg ( Oral Dose Auto Held 4/2/25 0900)   ondansetron (ZOFRAN) injection 4 mg (4 mg Intravenous Given 3/24/25 1520)   iopamidol (ISOVUE-370) 76 % injection 80 mL (80 mL Intravenous Given 3/24/25 1420)   sodium chloride 0.9 % bolus 1,000 mL (1,000 mL Intravenous New Bag 3/24/25 1521)   pantoprazole (PROTONIX) injection 40 mg (40 mg Intravenous Given 3/24/25 1827)     sodium chloride, 75 mL/hr         Last NIH score:                                                          Dysphagia screening results:        [place] Coma Scale:  No data recorded     CIWA:        Restraint Type:            Isolation Status:  No active  isolations

## 2025-03-24 NOTE — ED PROVIDER NOTES
"Subjective   History of Present Illness  Patient is a pleasant 84-year-old female lives at home with her daughter.  Presents today with significant GI bleed, significant generalized weakness, nausea, and vomiting.  States that back in October of last year was admitted for significant GI bleed requiring blood transfusion.  Bleed was found to be coming from multiple polyps visualized on colonoscopy.  The majority of those polyps were removed during that hospitalization but she did have 1 large polyp which required outpatient surgery.  2 and half weeks ago had the surgery performed by Dr. Valerio's.  No known complication during the procedure.  Patient states that really ever since September of last year she has had frequency, intermittent, blood in her stool.  Unfortunately she is not an excellent historian regards to specific days or how frequent these episodes are.  She does note that she has had significant vomiting for the past 1 week especially.  5 days ago was diagnosed with a UTI and started antibiotics.  This morning had a bowel movement which had bright red blood and a blood clot was present.  This coupled with the multitude of other symptoms prompts her visit to the emergency department.  Denies definitive fever.  Denies chest pain, breathing difficulty.  Denies blood in the emesis.  Denies other acute complaints.  Family also notes that she has lost an unknown amount of weight over the past 2 weeks.    Patient has a history of A-fib and is taking Eliquis.        Review of Systems   All other systems reviewed and are negative.      Past Medical History:   Diagnosis Date    Anemia     Anesthesia complication     Pt went into respiratory failure during total hip replacement in 2020    Arthritis of back     Arthritis of neck     Atrial fibrillation     Broken ribs     COPD (chronic obstructive pulmonary disease)     Coronary artery disease     Degenerative lumbar disc     Disease of thyroid gland     \"low " "thyroid\"    Fracture, fibula 1997?    Fracture, humerus 12/13/23    Fracture, tibia and fibula 1997?    GI bleed 09/2024    Hard to intubate     Hip arthrosis     History of colonoscopy     History of transfusion     bhl 10/2024, no reaction    Hyperlipidemia     Hypertension     Internal carotid artery stent present     bialteral    Kidney stone July 2023    Knee swelling     Lumbosacral disc disease     Murmur     On home O2     3L    Osteoarthritis     Periarthritis of shoulder     PVD (peripheral vascular disease)     bilateral stents    Urinary incontinence     Urinary tract infection        Allergies   Allergen Reactions    Penicillins Hives, Swelling and Mental Status Change    Codeine Mental Status Change    Novocain [Procaine] Swelling    Dopamine Palpitations and Other (See Comments)     Other reaction(s): Hypertension       Past Surgical History:   Procedure Laterality Date    CAROTID STENT Bilateral     COLONOSCOPY      COLONOSCOPY N/A 10/12/2024    Procedure: COLONOSCOPY;  Surgeon: Markie Bautista MD;  Location: [facility];  Service: Gastroenterology;  Laterality: N/A;    COLONOSCOPY N/A 3/7/2025    Procedure: COLONOSCOPY;  Surgeon: Shant Callahan MD;  Location: [facility];  Service: Gastroenterology;  Laterality: N/A;    ENDOSCOPY N/A 10/11/2024    Procedure: ESOPHAGOGASTRODUODENOSCOPY;  Surgeon: Markie Bautista MD;  Location: [facility];  Service: Gastroenterology;  Laterality: N/A;    ENDOSCOPY N/A 10/12/2024    Procedure: ESOPHAGOGASTRODUODENOSCOPY;  Surgeon: Markie Bautista MD;  Location: [facility];  Service: Gastroenterology;  Laterality: N/A;    EYE SURGERY  10/17/2022    yag surgery    GALLBLADDER SURGERY  2012    HYSTERECTOMY      ILIAC ARTERY STENT Bilateral     KIDNEY STONE SURGERY  July August 2023    OOPHORECTOMY  1976    1 ovary removed    TOTAL HIP ARTHROPLASTY Bilateral     URETEROSCOPY LASER LITHOTRIPSY WITH STENT INSERTION Right 07/26/2023    " Procedure: CYSTOSCOPY, RIGHT RETROGRADE PYELOGRAM; WITH STENT INSERTION;  Surgeon: Lee Kim MD;  Location:  [facility];  Service: Urology;  Laterality: Right;    URETEROSCOPY LASER LITHOTRIPSY WITH STENT INSERTION Right 08/10/2023    Procedure: URETEROSCOPY, LASER LITHOTRIPSY, STENT EXCHANGE;  Surgeon: Lee Kim MD;  Location:  [facility];  Service: Urology;  Laterality: Right;       Family History   Problem Relation Age of Onset    Cancer Other         BREAST, COLON, OVARIAN    Depression Other     Hyperlipidemia Other     Hypertension Other     Heart disease Mother     Hypertension Mother     Arthritis Daughter     Cancer Daughter     Hyperlipidemia Daughter     Liver disease Daughter     Thyroid disease Daughter     Cancer Maternal Aunt     Diabetes Maternal Uncle     Alzheimer's disease Sister        Social History     Socioeconomic History    Marital status:    Tobacco Use    Smoking status: Former     Current packs/day: 0.00     Average packs/day: 1 pack/day for 50.0 years (50.0 ttl pk-yrs)     Types: Cigarettes     Start date: 1956     Quit date: 2006     Years since quittin.2     Passive exposure: Past    Smokeless tobacco: Never   Vaping Use    Vaping status: Never Used   Substance and Sexual Activity    Alcohol use: Not Currently     Comment: no etoh for past 10 years    Drug use: No    Sexual activity: Defer           Objective   Physical Exam  Vitals and nursing note reviewed.   Constitutional:       Appearance: She is ill-appearing.   HENT:      Head: Normocephalic and atraumatic.   Eyes:      Conjunctiva/sclera: Conjunctivae normal.      Pupils: Pupils are equal, round, and reactive to light.   Cardiovascular:      Rate and Rhythm: Regular rhythm. Tachycardia present.      Heart sounds: Normal heart sounds.   Pulmonary:      Effort: Pulmonary effort is normal. No respiratory distress.      Breath sounds: Normal breath sounds.   Abdominal:      General: Bowel  sounds are normal. There is no distension.      Palpations: Abdomen is soft. There is no mass.      Tenderness: There is no abdominal tenderness. There is no rebound.   Musculoskeletal:         General: Normal range of motion.      Cervical back: Normal range of motion and neck supple.   Skin:     General: Skin is warm and dry.      Coloration: Skin is pale.   Neurological:      Mental Status: She is alert and oriented to person, place, and time.   Psychiatric:         Behavior: Behavior normal.         Procedures           ED Course  ED Course as of 03/28/25 0046   Mon Mar 24, 2025   1444 GI paged [CP]   1546 Case discussed Dr. Cheema, gastroenterology.  Agrees with current treatment.  Case we discussed with internal medicine attending.  Patient will be admitted for further evaluation and management. [CP]      ED Course User Index  [CP] Lamont Jalloh DO         Latest Reference Range & Units 03/24/25 13:02 03/24/25 15:57 03/24/25 15:59 03/24/25 17:00   ABO Type  A      RH type  Positive      Antibody Screen  Negative      T&S Expiration Date  3/27/2025 11:59:59 PM      HS Troponin T <14 ng/L    10   Sodium 136 - 145 mmol/L 145      Sodium 138 - 146 mmol/L  138 - 146 mmol/L  140  142  142   Potassium 3.5 - 5.2 mmol/L 3.3 (L)      Potassium 3.5 - 4.9 mmol/L  3.5 - 4.9 mmol/L  3.4 (L)  3.5  3.5   Chloride 98 - 107 mmol/L 97 (L)      Chloride 98 - 109 mmol/L  98 - 109 mmol/L  94 (L)  95 (L)  95 !   CO2 22.0 - 29.0 mmol/L 34.0 (H)      Total CO2 24 - 29 mmol/L  33 (H)  32 (H)   Anion Gap 5.0 - 15.0 mmol/L 14.0      BUN 8 - 23 mg/dL 14      BUN 8 - 26 mg/dL  8 - 26 mg/dL  14  12  12   Creatinine 0.60 - 1.30 mg/dL  0.60 - 1.30 mg/dL 0.39 (L) 0.50 (L)  0.50 (L)  0.50 !   BUN/Creatinine Ratio 7.0 - 25.0  35.9 (H)      eGFR >60.0 mL/min/1.73 98.3 92.6  92.6   Glucose 70 - 130 mg/dL  70 - 130 mg/dL 139 (H) 103  100  100   Calcium 8.6 - 10.5 mg/dL 9.5      Ionized Calcium 1.20 - 1.32 mmol/L  1.17 (L)  1.15 (L)   Alkaline  Phosphatase 39 - 117 U/L 71      Total Protein 6.0 - 8.5 g/dL 6.7      Albumin 3.5 - 5.2 g/dL 3.8      Globulin gm/dL 2.9      A/G Ratio g/dL 1.3      AST (SGOT) 1 - 32 U/L 18      ALT (SGPT) 1 - 33 U/L 12      Total Bilirubin 0.0 - 1.2 mg/dL 0.3      Lactate 0.5 - 2.0 mmol/L 5.2 (C)   0.8   Lipase 13 - 60 U/L    75 (H)   WBC 3.40 - 10.80 10*3/mm3 13.63 (H)      RBC 3.77 - 5.28 10*6/mm3 3.36 (L)      Hemoglobin 12.0 - 15.9 g/dL 9.6 (L)      Hemoglobin 12.0 - 17.0 g/dL  12.0 - 17.0 g/dL  10.2 (L) 10.2 ! 9.9 (L)  9.9 !   Hematocrit 34.0 - 46.6 % 31.9 (L)      Hematocrit 38 - 51 %  38 - 51 %  30 (L)  29 (L)  29 !   Platelets 140 - 450 10*3/mm3 340      RDW 12.3 - 15.4 % 13.1      MCV 79.0 - 97.0 fL 94.9      MCH 26.6 - 33.0 pg 28.6      MCHC 31.5 - 35.7 g/dL 30.1 (L)      MPV 6.0 - 12.0 fL 9.4      RDW-SD 37.0 - 54.0 fl 45.1      Neutrophil Rel % 42.7 - 76.0 % 76.3 (H)      Lymphocyte Rel % 19.6 - 45.3 % 16.4 (L)      Monocyte Rel % 5.0 - 12.0 % 5.3      Eosinophil Rel % 0.3 - 6.2 % 1.1      Basophil Rel % 0.0 - 1.5 % 0.6      Immature Granulocyte Rel % 0.0 - 0.5 % 0.3      Neutrophils Absolute 1.70 - 7.00 10*3/mm3 10.41 (H)      Lymphocytes Absolute 0.70 - 3.10 10*3/mm3 2.23      Monocytes Absolute 0.10 - 0.90 10*3/mm3 0.72      Eosinophils Absolute 0.00 - 0.40 10*3/mm3 0.15      Basophils Absolute 0.00 - 0.20 10*3/mm3 0.08      Immature Grans, Absolute 0.00 - 0.05 10*3/mm3 0.04      nRBC 0.0 - 0.2 /100 WBC 0.0      Color, UA Yellow, Straw     Yellow   Appearance, UA Clear     Clear   Specific Gravity, UA 1.001 - 1.030     1.097 (H)   pH, UA 5.0 - 8.0     5.5   Glucose Negative     Negative   Ketones, UA Negative     40 mg/dL (2+) !   Blood, UA Negative     Negative   Nitrite, UA Negative     Negative   Leukocytes, UA Negative     Negative   Protein, UA Negative     Negative   Bilirubin, UA Negative     Negative   Urobilinogen, UA 0.2 - 1.0 E.U./dL     0.2 E.U./dL   (C): Data is critically high  (L): Data is  abnormally low  !: Data is abnormal  (H): Data is abnormally high    CT Abdomen Pelvis With & Without Contrast   Final Result   Impression:   1.No acute abnormality identified within the abdomen or pelvis.   2.No acute vascular abnormality is identified.   3.No active contrast extravasation is identified within the GI tract lumen.   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   5.Colonic diverticulosis.   6.Bilateral nonobstructing nephrolithiasis.   7.Hiatal hernia.   8.Additional findings as detailed above.            Electronically Signed: Ramon Escobar MD     3/24/2025 2:35 PM EDT     Workstation ID: [id number]        Vitals:    03/27/25 1025 03/27/25 2105 03/27/25 2112 03/27/25 2326   BP: 146/65   135/65   BP Location: Left arm   Left arm   Patient Position: Lying   Lying   Pulse: 96 97 96 100   Resp: 18 18  18   Temp: 97.9 °F (36.6 °C)   98.3 °F (36.8 °C)   TempSrc: Oral   Oral   SpO2: 100% 94% 100% 92%   Weight:       Height:         Medications   sodium chloride 0.9 % flush 10 mL (has no administration in time range)   sodium chloride 0.9 % bolus 1,000 mL (1,000 mL Intravenous New Bag 3/24/25 1826)     Followed by   sodium chloride 0.9 % infusion (75 mL/hr Intravenous New Bag 3/27/25 1033)   apixaban (ELIQUIS) tablet 2.5 mg ( Oral Dose Auto Held 4/9/25 2100)   dofetilide (TIKOSYN) capsule 250 mcg (250 mcg Oral Given 3/27/25 2024)   ferrous sulfate tablet 325 mg ( Oral Dose Auto Held 4/10/25 0800)   arformoterol (BROVANA) nebulizer solution 15 mcg (15 mcg Nebulization Given 3/27/25 2105)     And   budesonide (PULMICORT) nebulizer solution 0.5 mg (0.5 mg Nebulization Given 3/27/25 2105)     And   revefenacin (YUPELRI) nebulizer solution 175 mcg (175 mcg Nebulization Given 3/27/25 0914)   gabapentin (NEURONTIN) capsule 300 mg (300 mg Oral  Given 3/27/25 2024)   latanoprost (XALATAN) 0.005 % ophthalmic solution 1 drop (1 drop Both Eyes Given 3/27/25 2026)   levothyroxine (SYNTHROID, LEVOTHROID) tablet 200 mcg (200 mcg Oral Given 3/27/25 0604)   losartan (COZAAR) tablet 50 mg (50 mg Oral Given 3/27/25 1027)   oxybutynin XL (DITROPAN-XL) 24 hr tablet 10 mg (10 mg Oral Given 3/27/25 1028)   nitrofurantoin (macrocrystal-monohydrate) (MACROBID) capsule 100 mg (100 mg Oral Given 3/27/25 2025)   pantoprazole (PROTONIX) EC tablet 40 mg (40 mg Oral Given 3/27/25 2025)   roflumilast (DALIRESP) tablet 500 mcg (500 mcg Oral Given 3/27/25 1028)   sertraline (ZOLOFT) tablet 100 mg (100 mg Oral Given 3/27/25 1027)   atorvastatin (LIPITOR) tablet 20 mg (20 mg Oral Given 3/27/25 2024)   traMADol (ULTRAM) tablet 50 mg (50 mg Oral Given 3/27/25 2028)   sodium chloride 0.9 % flush 10 mL (10 mL Intravenous Given 3/27/25 2027)   sodium chloride 0.9 % flush 10 mL (has no administration in time range)   sodium chloride 0.9 % infusion 40 mL (has no administration in time range)   Magnesium Standard Dose Replacement - Follow Nurse / BPA Driven Protocol (has no administration in time range)   Phosphorus Replacement - Follow Nurse / BPA Driven Protocol (has no administration in time range)   Calcium Replacement - Follow Nurse / BPA Driven Protocol (has no administration in time range)   acetaminophen (TYLENOL) tablet 650 mg (has no administration in time range)     Or   acetaminophen (TYLENOL) 160 MG/5ML oral solution 650 mg (has no administration in time range)     Or   acetaminophen (TYLENOL) suppository 650 mg (has no administration in time range)   sennosides-docusate (PERICOLACE) 8.6-50 MG per tablet 2 tablet (has no administration in time range)     And   polyethylene glycol (MIRALAX) packet 17 g (has no administration in time range)     And   bisacodyl (DULCOLAX) EC tablet 5 mg (has no administration in time range)     And   bisacodyl (DULCOLAX) suppository 10 mg (has no  administration in time range)   ondansetron ODT (ZOFRAN-ODT) disintegrating tablet 4 mg (4 mg Oral Not Given 3/26/25 1101)     Or   ondansetron (ZOFRAN) injection 4 mg ( Intravenous Not Given:  See Alt 3/26/25 1101)   aspirin EC tablet 81 mg ( Oral Dose Auto Held 4/10/25 0900)   isosorbide mononitrate (IMDUR) 24 hr tablet 60 mg (60 mg Oral Given 3/27/25 1028)   Potassium Replacement - Follow Nurse / BPA Driven Protocol (has no administration in time range)   ondansetron (ZOFRAN) injection 4 mg (4 mg Intravenous Given 3/24/25 1520)   iopamidol (ISOVUE-370) 76 % injection 80 mL (80 mL Intravenous Given 3/24/25 1420)   sodium chloride 0.9 % bolus 1,000 mL (1,000 mL Intravenous New Bag 3/24/25 1521)   pantoprazole (PROTONIX) injection 40 mg (40 mg Intravenous Given 3/24/25 1827)   potassium chloride (KLOR-CON M20) CR tablet 40 mEq (40 mEq Oral Given 3/25/25 0232)   magnesium sulfate 2g/50 mL (PREMIX) infusion (2 g Intravenous New Bag 3/25/25 1701)   potassium chloride (KLOR-CON M20) CR tablet 20 mEq (20 mEq Oral Given 3/26/25 1101)     ECG/EMG Results (last 24 hours)       Procedure Component Value Units Date/Time    ECG 12 Lead Chest Pain [[id number]] Collected: 03/27/25 0305     Updated: 03/27/25 1039     QT Interval 374 ms      QTC Interval 465 ms     Narrative:      Test Reason : Chest Pain  Blood Pressure :   */*   mmHG  Vent. Rate :  93 BPM     Atrial Rate :  93 BPM     P-R Int : 154 ms          QRS Dur :  84 ms      QT Int : 374 ms       P-R-T Axes :  59  46  41 degrees    QTcB Int : 465 ms    Normal sinus rhythm  Normal ECG  When compared with ECG of 25-Mar-2025 13:39,  premature ventricular complexes are no longer present  Confirmed by CARLOS CID MD ([id number]) on 3/27/2025 10:39:21 AM    Referred By:            Confirmed By: CARLOS CID MD    ECG 12 Lead Rhythm Change [[id number]] Collected: 03/27/25 1004     Updated: 03/27/25 1503     QT Interval 366 ms      QTC Interval 442 ms     Narrative:      Test  Reason : Rhythm Change  Blood Pressure :   */*   mmHG  Vent. Rate :  88 BPM     Atrial Rate :  88 BPM     P-R Int : 144 ms          QRS Dur :  86 ms      QT Int : 366 ms       P-R-T Axes :  62  29  31 degrees    QTcB Int : 442 ms    Normal sinus rhythm  Normal ECG  When compared with ECG of 27-Mar-2025 03:05, (Unconfirmed)  No significant change was found  Confirmed by CARLOS CID MD ([id number]) on 3/27/2025 3:02:41 PM    Referred By:            Confirmed By: CARLOS CID MD          ECG 12 Lead Rhythm Change   Final Result   Test Reason : Rhythm Change   Blood Pressure :   */*   mmHG   Vent. Rate :  88 BPM     Atrial Rate :  88 BPM      P-R Int : 144 ms          QRS Dur :  86 ms       QT Int : 366 ms       P-R-T Axes :  62  29  31 degrees     QTcB Int : 442 ms      Normal sinus rhythm   Normal ECG   When compared with ECG of 27-Mar-2025 03:05, (Unconfirmed)   No significant change was found   Confirmed by CARLOS CID MD ([id number]) on 3/27/2025 3:02:41 PM      Referred By:            Confirmed By: CARLOS CID MD      ECG 12 Lead Chest Pain   Final Result   Test Reason : Chest Pain   Blood Pressure :   */*   mmHG   Vent. Rate :  93 BPM     Atrial Rate :  93 BPM      P-R Int : 154 ms          QRS Dur :  84 ms       QT Int : 374 ms       P-R-T Axes :  59  46  41 degrees     QTcB Int : 465 ms      Normal sinus rhythm   Normal ECG   When compared with ECG of 25-Mar-2025 13:39,   premature ventricular complexes are no longer present   Confirmed by CARLOS CID MD ([id number]) on 3/27/2025 10:39:21 AM      Referred By:            Confirmed By: CARLOS CID MD      ECG 12 Lead Chest Pain   Final Result   Test Reason : Chest Pain   Blood Pressure :   */*   mmHG   Vent. Rate :  82 BPM     Atrial Rate :  82 BPM      P-R Int : 146 ms          QRS Dur :  84 ms       QT Int : 404 ms       P-R-T Axes :  74  59  46 degrees     QTcB Int : 472 ms      Sinus rhythm with occasional and consecutive premature ventricular    complexes    Abnormal ECG   When compared with ECG of 24-Mar-2025 16:56, (Unconfirmed)   premature ventricular complexes are now present   aberrant conduction is no longer present   Nonspecific T wave abnormality no longer evident in Inferior leads   Confirmed by BISHNU HEAD MD ([id number]) on 3/25/2025 2:12:14 PM      Referred By:            Confirmed By: BISHNU HEAD MD      ECG 12 Lead Chest Pain   Preliminary Result   Test Reason : Chest Pain   Blood Pressure :   */*   mmHG   Vent. Rate : 100 BPM     Atrial Rate : 100 BPM      P-R Int : 154 ms          QRS Dur :  88 ms       QT Int : 378 ms       P-R-T Axes :  79  65  56 degrees     QTcB Int : 487 ms      Sinus rhythm with premature atrial complexes with aberrant conduction   Otherwise normal ECG   When compared with ECG of 24-Oct-2024 21:26,   ST no longer depressed in Anterior leads   T wave inversion no longer evident in Anterior leads      Referred By: EDMD           Confirmed By:                                                            Medical Decision Making  The patient was evaluated during a capacity surge environment. This includes evaluation in non-traditional EM settings as well as potential care, result, disposition delays. I have made every effort to evaluate and care for this patient as efficiently and thoroughly as possible.      Problems Addressed:  Chest pressure: complicated acute illness or injury  Elevated lactic acid level: complicated acute illness or injury  Other fatigue: complicated acute illness or injury  Rectal bleeding: complicated acute illness or injury    Amount and/or Complexity of Data Reviewed  External Data Reviewed: notes.  Labs: ordered. Decision-making details documented in ED Course.  Radiology: ordered and independent interpretation performed. Decision-making details documented in ED Course.  ECG/medicine tests: ordered and independent interpretation performed. Decision-making details documented in ED Course.    Risk  Prescription  drug management.  Decision regarding hospitalization.        Final diagnoses:   Rectal bleeding   Other fatigue   Chest pressure   Elevated lactic acid level       ED Disposition  ED Disposition       ED Disposition   Decision to Admit    Condition   --    Comment   Level of Care: Telemetry [5]   Diagnosis: Lower GI bleeding [059752]   Is patient appropriate for Inpatient Observation Unit?: No [0]                 No follow-up provider specified.       Medication List      No changes were made to your prescriptions during this visit.            Lamont Jalloh,   03/28/25 0053

## 2025-03-24 NOTE — CONSULTS
[facility]:  Inpatient Gastroenterology Consult      Consults lower GI bleed    Referring Provider: No ref. provider found    PCP: Dre Moura MD    Chief Complaint: Weakness, bloody stools    History of present illness:    Avelina Ceron is a 84 y.o. female who is admitted with lower gastrointestinal bleeding.  Patient underwent outpatient colonoscopy with polypectomy on 3/7/2025.  Following this procedure, notes she has had worsening loose stools/diarrhea, and generalized abdominal pain with subjective fever and chills.  Patient notes that she really only noticed having blood on the toilet tissue this morning with her bowel movements though it may have started a little bit prior to this with last evening's stools.  In addition to bleeding, patient has had ongoing issues with nausea and vomiting as well as loss of appetite.  Following colonoscopy, patient was diagnosed with an E. coli UTI and has since been on antibiotics for management thereof.  At time of admission, hemoglobin at baseline.  Stools are FOBT positive.  Noted positive urine culture and leukocytosis. Past medical, surgical, social, and family histories are reviewed for accuracy.  No documented alleviating or exacerbating factors.  Does not endorse pain at time of exam.    Allergies:  Penicillins, Codeine, Novocain [procaine], and Dopamine    Scheduled Meds:  [Held by provider] aspirin, 81 mg, Oral, Daily  sodium chloride, 1,000 mL, Intravenous, Once         Infusions:  sodium chloride, 75 mL/hr        PRN Meds:    sodium chloride    Home Meds:  (Not in a hospital admission)      ROS: Review of Systems   Constitutional:  Positive for activity change, appetite change, chills, fatigue, fever and unexpected weight change. Negative for diaphoresis.   HENT:  Negative for sore throat, trouble swallowing and voice change.    Eyes: Negative.    Respiratory:  Negative for apnea, cough, choking, chest tightness, shortness of  "breath, wheezing and stridor.    Cardiovascular:  Negative for chest pain, palpitations and leg swelling.   Gastrointestinal:  Positive for abdominal pain, blood in stool, diarrhea, nausea and vomiting. Negative for abdominal distention, anal bleeding, constipation and rectal pain.   Endocrine: Negative.    Genitourinary: Negative.    Musculoskeletal: Negative.    Skin: Negative.    Allergic/Immunologic: Negative.    Neurological: Negative.    Hematological:  Negative for adenopathy. Does not bruise/bleed easily.   Psychiatric/Behavioral: Negative.     All other systems reviewed and are negative.      PAST MED HX:  Past Medical History:   Diagnosis Date    Anemia     Anesthesia complication     Pt went into respiratory failure during total hip replacement in 2020    Arthritis of back     Arthritis of neck     Atrial fibrillation     Broken ribs     COPD (chronic obstructive pulmonary disease)     Coronary artery disease     Degenerative lumbar disc     Disease of thyroid gland     \"low thyroid\"    Fracture, fibula 1997?    Fracture, humerus 12/13/23    Fracture, tibia and fibula 1997?    GI bleed 09/2024    Hard to intubate     Hip arthrosis     History of colonoscopy     History of transfusion     bhl 10/2024, no reaction    Hyperlipidemia     Hypertension     Internal carotid artery stent present     bialteral    Kidney stone July 2023    Knee swelling     Lumbosacral disc disease     Murmur     On home O2     3L    Osteoarthritis     Periarthritis of shoulder     PVD (peripheral vascular disease)     bilateral stents    Urinary incontinence     Urinary tract infection        PAST SURG HX:  Past Surgical History:   Procedure Laterality Date    CAROTID STENT Bilateral     COLONOSCOPY      COLONOSCOPY N/A 10/12/2024    Procedure: COLONOSCOPY;  Surgeon: Markie Bautista MD;  Location: [facility] ENDOSCOPY;  Service: Gastroenterology;  Laterality: N/A;    COLONOSCOPY N/A 3/7/2025    Procedure: COLONOSCOPY;  Surgeon: " Shant Callahan MD;  Location:  [facility] ENDOSCOPY;  Service: Gastroenterology;  Laterality: N/A;    ENDOSCOPY N/A 10/11/2024    Procedure: ESOPHAGOGASTRODUODENOSCOPY;  Surgeon: Markie Bautista MD;  Location:  [facility] ENDOSCOPY;  Service: Gastroenterology;  Laterality: N/A;    ENDOSCOPY N/A 10/12/2024    Procedure: ESOPHAGOGASTRODUODENOSCOPY;  Surgeon: Markie Bautista MD;  Location:  [facility] ENDOSCOPY;  Service: Gastroenterology;  Laterality: N/A;    EYE SURGERY  10/17/2022    yag surgery    GALLBLADDER SURGERY      HYSTERECTOMY      ILIAC ARTERY STENT Bilateral     KIDNEY STONE SURGERY      OOPHORECTOMY      1 ovary removed    TOTAL HIP ARTHROPLASTY Bilateral     URETEROSCOPY LASER LITHOTRIPSY WITH STENT INSERTION Right 2023    Procedure: CYSTOSCOPY, RIGHT RETROGRADE PYELOGRAM; WITH STENT INSERTION;  Surgeon: Lee Kim MD;  Location:  [facility] OR;  Service: Urology;  Laterality: Right;    URETEROSCOPY LASER LITHOTRIPSY WITH STENT INSERTION Right 08/10/2023    Procedure: URETEROSCOPY, LASER LITHOTRIPSY, STENT EXCHANGE;  Surgeon: Lee Kim MD;  Location:  [facility] OR;  Service: Urology;  Laterality: Right;       FAM HX:  Family History   Problem Relation Age of Onset    Cancer Other         BREAST, COLON, OVARIAN    Depression Other     Hyperlipidemia Other     Hypertension Other     Heart disease Mother     Hypertension Mother     Arthritis Daughter     Cancer Daughter     Hyperlipidemia Daughter     Liver disease Daughter     Thyroid disease Daughter     Cancer Maternal Aunt     Diabetes Maternal Uncle     Alzheimer's disease Sister        SOC HX:  Social History     Socioeconomic History    Marital status:    Tobacco Use    Smoking status: Former     Current packs/day: 0.00     Average packs/day: 1 pack/day for 50.0 years (50.0 ttl pk-yrs)     Types: Cigarettes     Start date: 1956     Quit date: 2006     Years since quittin.2     Passive  "exposure: Past    Smokeless tobacco: Never   Vaping Use    Vaping status: Never Used   Substance and Sexual Activity    Alcohol use: Not Currently     Comment: no etoh for past 10 years    Drug use: No    Sexual activity: Defer       PHYSICAL EXAM  /57   Pulse 98   Temp 97.9 °F (36.6 °C) (Oral)   Resp 19   Ht 160 cm (63\")   Wt 61.2 kg (135 lb)   LMP  (LMP Unknown)   SpO2 94%   BMI 23.91 kg/m²   Wt Readings from Last 3 Encounters:   03/24/25 61.2 kg (135 lb)   03/11/25 63 kg (139 lb)   03/07/25 65.8 kg (145 lb)   ,body mass index is 23.91 kg/m².  Physical Exam  Vitals and nursing note reviewed.   Constitutional:       General: She is not in acute distress.     Appearance: Normal appearance. She is normal weight. She is not ill-appearing or toxic-appearing.   HENT:      Head: Normocephalic and atraumatic.   Eyes:      General: No scleral icterus.     Extraocular Movements: Extraocular movements intact.      Conjunctiva/sclera: Conjunctivae normal.      Pupils: Pupils are equal, round, and reactive to light.   Cardiovascular:      Rate and Rhythm: Normal rate and regular rhythm.      Pulses: Normal pulses.      Heart sounds: Normal heart sounds.   Pulmonary:      Effort: Pulmonary effort is normal. No respiratory distress.      Breath sounds: Normal breath sounds.   Abdominal:      General: Abdomen is flat. Bowel sounds are normal. There is no distension.      Palpations: Abdomen is soft. There is no mass.      Tenderness: There is no abdominal tenderness. There is no guarding or rebound.      Hernia: No hernia is present.   Genitourinary:     Rectum: Guaiac result positive.   Skin:     General: Skin is warm and dry.      Coloration: Skin is not jaundiced or pale.   Neurological:      General: No focal deficit present.      Mental Status: She is alert and oriented to person, place, and time.   Psychiatric:         Mood and Affect: Mood normal.         Behavior: Behavior normal.         Thought Content: " Thought content normal.         Judgment: Judgment normal.         Results Review:   I reviewed the patient's new clinical results.  I reviewed the patient's new imaging results and agree with the interpretation.  I reviewed the patient's other test results and agree with the interpretation  I personally viewed and interpreted the patient's EKG/Telemetry data    Lab Results   Component Value Date    WBC 13.63 (H) 03/24/2025    HGB 9.9 (A) 03/24/2025    HGB 10.2 (A) 03/24/2025    HGB 9.6 (L) 03/24/2025    HCT 29 (A) 03/24/2025    MCV 94.9 03/24/2025     03/24/2025       Lab Results   Component Value Date    INR 1.09 02/28/2025    INR 0.98 08/04/2023    INR 1.04 06/30/2020       Lab Results   Component Value Date    GLUCOSE 139 (H) 03/24/2025    BUN 14 03/24/2025    CREATININE 0.50 (A) 03/24/2025    EGFRIFNONA 119 11/24/2021    BCR 35.9 (H) 03/24/2025     03/24/2025    K 3.3 (L) 03/24/2025    CO2 34.0 (H) 03/24/2025    CALCIUM 9.5 03/24/2025    ALBUMIN 3.8 03/24/2025    ALKPHOS 71 03/24/2025    BILITOT 0.3 03/24/2025    ALT 12 03/24/2025    AST 18 03/24/2025       CT Abdomen Pelvis With & Without Contrast  Result Date: 3/24/2025  CT ABDOMEN PELVIS W WO CONTRAST Date of Exam: 3/24/2025 2:10 PM EDT Indication: intermittent abd pain, n/v, significant blood in stool this AM. Comparison: 3/14/2025 Technique: Axial CT images were obtained of the abdomen and pelvis before and after the uneventful intravenous administration of 80 mL Isovue-370. Sagittal and coronal reconstructions were performed.  Automated exposure control and iterative reconstruction methods were used. Findings: Liver: The liver is unremarkable in morphology. No focal liver lesion is seen. No biliary dilation is seen. Gallbladder: Surgically absent. Pancreas: Unremarkable. Spleen: Unremarkable. Adrenal glands: Unremarkable. Genitourinary tract: 8 x 5 mm nonobstructing calculus is seen at the lower pole of the left kidney. Several additional  punctate nonobstructing calculi are seen within both kidneys. No hydronephrosis is seen. Visualized portions of the ureters are unremarkable. Distal ureters and urinary bladder are partially obscured due to beam hardening artifact arising from bilateral hip arthroplasties. Suspect prior hysterectomy Gastrointestinal tract: Postsurgical changes of the ileocecal region. Colonic diverticulosis is present. Most of the colon is decompressed which limits its evaluation. Moderate hiatal hernia. No findings to suggest bowel obstruction. No active contrast extravasation is seen within the GI tract lumen. Small duodenal diverticulum. 2 linear radiodensities within the proximal colon, possibly endoscopically placed clips. Appendix: Absent. Other findings: No free air or free fluid is identified. No pathologically enlarged lymph nodes are seen. Vascular calcifications are present. Severe stenosis at the origin of the celiac artery. Moderate stenosis at the origin of the superior mesenteric artery. 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 Mild stenoses at the origins of the bilateral renal arteries. Stent extends from the right common to external iliac artery. A  left common iliac artery stent is also seen. Visualized iliofemoral arteries are otherwise grossly unremarkable. The IVC is grossly unremarkable. Bones and soft tissues: No acute osseous lesion is identified. Bones are demineralized. There are degenerative changes within the spine. Bilateral hip arthroplasties are partially visualized. There are chronic posttraumatic changes of the right iliac bone. Superficial soft tissues demonstrate no acute abnormality. Lung bases: The visualized lung bases are clear. Moderate hiatal hernia is present.     Impression: 1.No acute abnormality identified within the abdomen or pelvis. 2.No acute vascular abnormality is identified. 3.No active contrast  extravasation is identified within the GI tract lumen. 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 5.Colonic diverticulosis. 6.Bilateral nonobstructing nephrolithiasis. 7.Hiatal hernia. 8.Additional findings as detailed above. Electronically Signed: Ramon Escobar MD  3/24/2025 2:35 PM EDT  Workstation ID: [id number]    CT Abdomen Pelvis With & Without Contrast  Result Date: 3/14/2025  CT ABDOMEN PELVIS W WO CONTRAST Date of Exam: 3/14/2025 9:34 AM EDT Indication: Right Lower Quadrant Abdominal Pain. Comparison: October 24, 2024 Technique: Axial CT images were obtained of the abdomen and pelvis before and after the uneventful intravenous administration of 85 mL Isovue-300. Sagittal and coronal reconstructions were performed.  Automated exposure control and iterative reconstruction methods were used. Findings: The lung bases are clear. The liver, adrenal glands, spleen, and pancreas are unremarkable. The gallbladder is surgically absent. There are bilateral nonobstructing renal stones and a small left renal cyst. There is a moderate to large hiatal hernia. The small bowel appears normal in caliber and configuration. There is sigmoid diverticulosis. There is some wall thickening of the right colon. 5 surgical clips are present within the cecum and right colon. There is no evidence of acute bowel obstruction. The appendix is not visualized. There is no ascites or loculated collection. No abnormally enlarged lymph nodes are identified. The rectum and urinary bladder are unremarkable. The uterus is surgically absent. No aggressive osseous lesions are identified. There is an old healed fracture along the right iliac wing. Bilateral hip arthroplasties are present.     Impression: 1.Wall thickening of right colon, which could represent under distention  or colitis. 5 surgical clips within cecum and right colon. 2.Bilateral nonobstructing renal stones. 3.Moderate to large hiatal hernia. 4.Sigmoid diverticulosis. Electronically Signed: Hamlet Jean MD  3/14/2025 10:41 AM EDT  Workstation ID: [id number]     ASSESSMENTS/PLANS  1.  Lower gastrointestinal bleeding, recent polypectomy on 3/7/2025  2.  History of bleeding polyps  3.  Acute on chronic anemia  4.  Change in bowel habits, diarrhea, concern for infectious etiology  5.  Chronic functional abdominal pain.  6.  Celiac and SMA arterial stenosis  7.  Atrial fibrillation, CAD, PAD, on Eliquis  8.  E. coli UTI, POA  9.  Recent antibiotic exposure    Avelina Ceron is a 84 y.o. female who presents to hospital with bloody stools overnight and this morning.  Fortunately, hemoglobin has remained stable; may have passed a clot from prior polypectomy site.  At present, will opt for medical management and watchful waiting of anemia; if worsening anemia or persistent nausea, low threshold for repeat scopes.  Additionally, given her worsening diarrhea follow initiation of antibiotics, will check stool studies.    >>> Monitor H/H and transfuse per protocol   >>> Obtain GI PCR and C Diff stool studies  >>> GI prophylaxis PPI  >>> Okay for clear liquid diet  >>> Hold Eliquis; if hemoglobin stable, okay to resume in AM.  >>> If worsening bleeding or persistent nausea/vomiting, low threshold for scopes.    I discussed the patient's findings and my recommendations with patient, family, and consulting provider.    Jude Austin, AMADOR  03/24/25  18:36 EDT

## 2025-03-24 NOTE — H&P
"    [facility]  HISTORY AND PHYSICAL    Patient Name: Avelina Ceron  : 1940  MRN: [id number]  Primary Care Physician: Dre Moura MD  Date of admission: 3/24/2025      Subjective   Subjective     Chief Complaint:  Lower gi bleeding    HPI:  Avelina Ceron is a 84 y.o. female w/ CAD, PAD, COPD w/ chronic O2 use, HTN, HLD, 2 admissions last fall related to GI bleeding; 10/10/24-10/16/24 w/ EGD 10/11 that was unremarkable, colo  w/ multiple bleeding polyps that were removed except for ileocecal polyp requiring repeat colo w/ EMR; repeat admit 10/24/24-10/27/24 w/ melanotic stools managed conservatively; she had opt colo w/ polyp resection 3/7/25; at baseline she has chronic abdominal pain for years and nausea, last week she had increased N/V most of the week, over the last 24 hours she has had blood on her toilet tissue and this AM she started passing clots from her bowels, prompting eval in the ED. Her abd pain (bandlike across upper abdomen) is unchanged from baseline, there is no food aversion.      Personal History     Past Medical History:   Diagnosis Date   • Anemia    • Anesthesia complication     Pt went into respiratory failure during total hip replacement in    • Arthritis of back    • Arthritis of neck    • Atrial fibrillation    • Broken ribs    • COPD (chronic obstructive pulmonary disease)    • Coronary artery disease    • Degenerative lumbar disc    • Disease of thyroid gland     \"low thyroid\"   • Fracture, fibula ?   • Fracture, humerus 23   • Fracture, tibia and fibula ?   • GI bleed 2024   • Hard to intubate    • Hip arthrosis    • History of colonoscopy    • History of transfusion     bhl 10/2024, no reaction   • Hyperlipidemia    • Hypertension    • Internal carotid artery stent present     bialteral   • Kidney stone 2023   • Knee swelling    • Lumbosacral disc disease    • Murmur    • On home O2     3L   • " Osteoarthritis    • Periarthritis of shoulder    • PVD (peripheral vascular disease)     bilateral stents   • Urinary incontinence    • Urinary tract infection            Past Surgical History:   Procedure Laterality Date   • CAROTID STENT Bilateral    • COLONOSCOPY     • COLONOSCOPY N/A 10/12/2024    Procedure: COLONOSCOPY;  Surgeon: Markie Bautista MD;  Location:  [facility];  Service: Gastroenterology;  Laterality: N/A;   • COLONOSCOPY N/A 3/7/2025    Procedure: COLONOSCOPY;  Surgeon: Shant Callahan MD;  Location:  [facility];  Service: Gastroenterology;  Laterality: N/A;   • ENDOSCOPY N/A 10/11/2024    Procedure: ESOPHAGOGASTRODUODENOSCOPY;  Surgeon: Markie Bautista MD;  Location:  [facility];  Service: Gastroenterology;  Laterality: N/A;   • ENDOSCOPY N/A 10/12/2024    Procedure: ESOPHAGOGASTRODUODENOSCOPY;  Surgeon: Markie Bautista MD;  Location:  [facility];  Service: Gastroenterology;  Laterality: N/A;   • EYE SURGERY  10/17/2022    yag surgery   • GALLBLADDER SURGERY  2012   • HYSTERECTOMY     • ILIAC ARTERY STENT Bilateral    • KIDNEY STONE SURGERY  July August 2023   • OOPHORECTOMY  1976    1 ovary removed   • TOTAL HIP ARTHROPLASTY Bilateral    • URETEROSCOPY LASER LITHOTRIPSY WITH STENT INSERTION Right 07/26/2023    Procedure: CYSTOSCOPY, RIGHT RETROGRADE PYELOGRAM; WITH STENT INSERTION;  Surgeon: Lee Kim MD;  Location:  [facility];  Service: Urology;  Laterality: Right;   • URETEROSCOPY LASER LITHOTRIPSY WITH STENT INSERTION Right 08/10/2023    Procedure: URETEROSCOPY, LASER LITHOTRIPSY, STENT EXCHANGE;  Surgeon: Lee Kim MD;  Location:  [facility];  Service: Urology;  Laterality: Right;       Family History: family history includes Alzheimer's disease in her sister; Arthritis in her daughter; Cancer in her daughter, maternal aunt, and another family member; Depression in an other family member; Diabetes in her maternal uncle; Heart disease in her  mother; Hyperlipidemia in her daughter and another family member; Hypertension in her mother and another family member; Liver disease in her daughter; Thyroid disease in her daughter.     Social History:  reports that she quit smoking about 19 years ago. Her smoking use included cigarettes. She started smoking about 69 years ago. She has a 50 pack-year smoking history. She has been exposed to tobacco smoke. She has never used smokeless tobacco. She reports that she does not currently use alcohol. She reports that she does not use drugs.  Social History     Social History Narrative   • Not on file       Medications:  Available home medication information reviewed.  Fiber Select Gummies, Fluticasone-Umeclidin-Vilant, Mirabegron ER, Multiple Vitamins-Minerals, acetaminophen, apixaban, dofetilide, ferrous sulfate, gabapentin, isosorbide mononitrate, latanoprost, levothyroxine, losartan, nitrofurantoin (macrocrystal-monohydrate), nitroglycerin, ondansetron, pantoprazole, polyethylene glycol, roflumilast, sertraline, simvastatin, traMADol, and vitamin B-12    Allergies   Allergen Reactions   • Penicillins Hives, Swelling and Mental Status Change   • Codeine Mental Status Change   • Novocain [Procaine] Swelling   • Dopamine Palpitations and Other (See Comments)     Other reaction(s): Hypertension       Objective   Objective     Vital Signs:   Temp:  [97.8 °F (36.6 °C)-97.9 °F (36.6 °C)] 97.9 °F (36.6 °C)  Heart Rate:  [] 98  Resp:  [19-22] 19  BP: (106-176)/(50-64) 129/57  Flow (L/min) (Oxygen Therapy):  [2] 2       Physical Exam   Constitutional: Awake, alert, chronically ill appearing elderly female sitting on ED stretcher in NAD  Respiratory: Clear to auscultation bilaterally, respiratory effort normal   Cardiovascular: RRR, palpable radial pulse  Gastrointestinal: Positive bowel sounds, soft, nondistended, mild upper abdominal pain  Psychiatric: Appropriate affect, cooperative  Neurologic: Speech clear and  fluent    Result Review:  I have personally reviewed the results from the time of this admission to 3/24/2025 18:16 EDT and agree with these findings:  [x]  Laboratory list / accordion  []  Microbiology  [x]  Radiology  []  EKG/Telemetry   []  Cardiology/Vascular   []  Pathology  []  Old records  []  Other:  Most notable findings include: no contrast extravasation on CT abd/pel      LAB RESULTS:      Lab 03/24/25  1700 03/24/25  1559 03/24/25  1302   WBC  --   --  13.63*   HEMOGLOBIN  --   --  9.6*   HEMOGLOBIN, POC 9.9* 10.2*  --    HEMATOCRIT  --   --  31.9*   HEMATOCRIT POC 29*  --   --    PLATELETS  --   --  340   NEUTROS ABS  --   --  10.41*   IMMATURE GRANS (ABS)  --   --  0.04   LYMPHS ABS  --   --  2.23   MONOS ABS  --   --  0.72   EOS ABS  --   --  0.15   MCV  --   --  94.9   LACTATE 0.8  --  5.2*         Lab 03/24/25  1700 03/24/25  1302   SODIUM  --  145   POTASSIUM  --  3.3*   CHLORIDE  --  97*   CO2  --  34.0*   ANION GAP  --  14.0   BUN  --  14   CREATININE 0.50* 0.39*   EGFR  --  98.3   GLUCOSE  --  139*   CALCIUM  --  9.5         Lab 03/24/25  1302   TOTAL PROTEIN 6.7   ALBUMIN 3.8   GLOBULIN 2.9   ALT (SGPT) 12   AST (SGOT) 18   BILIRUBIN 0.3   ALK PHOS 71         Lab 03/24/25  1700   HSTROP T 10             Lab 03/24/25  1302   ABO TYPING A   RH TYPING Positive   ANTIBODY SCREEN Negative         UA          11/19/2024    15:31 3/12/2025    10:49 3/12/2025    11:03 3/24/2025    17:00   Urinalysis   Squamous Epithelial Cells, UA  0-2      Specific Gravity, UA    1.097    Ketones, UA Negative   15 mg/dL  40 mg/dL (2+)    Blood, UA    Negative    Leukocytes, UA 25 Yen/ul   Negative  Negative    Nitrite, UA    Negative    RBC, UA  11-20      WBC, UA  0-2      Bacteria, UA  3+          Microbiology Results (last 10 days)       ** No results found for the last 240 hours. **            CT Abdomen Pelvis With & Without Contrast  Result Date: 3/24/2025  CT ABDOMEN PELVIS W WO CONTRAST Date of Exam: 3/24/2025  2:10 PM EDT Indication: intermittent abd pain, n/v, significant blood in stool this AM. Comparison: 3/14/2025 Technique: Axial CT images were obtained of the abdomen and pelvis before and after the uneventful intravenous administration of 80 mL Isovue-370. Sagittal and coronal reconstructions were performed.  Automated exposure control and iterative reconstruction methods were used. Findings: Liver: The liver is unremarkable in morphology. No focal liver lesion is seen. No biliary dilation is seen. Gallbladder: Surgically absent. Pancreas: Unremarkable. Spleen: Unremarkable. Adrenal glands: Unremarkable. Genitourinary tract: 8 x 5 mm nonobstructing calculus is seen at the lower pole of the left kidney. Several additional punctate nonobstructing calculi are seen within both kidneys. No hydronephrosis is seen. Visualized portions of the ureters are unremarkable. Distal ureters and urinary bladder are partially obscured due to beam hardening artifact arising from bilateral hip arthroplasties. Suspect prior hysterectomy Gastrointestinal tract: Postsurgical changes of the ileocecal region. Colonic diverticulosis is present. Most of the colon is decompressed which limits its evaluation. Moderate hiatal hernia. No findings to suggest bowel obstruction. No active contrast extravasation is seen within the GI tract lumen. Small duodenal diverticulum. 2 linear radiodensities within the proximal colon, possibly endoscopically placed clips. Appendix: Absent. Other findings: No free air or free fluid is identified. No pathologically enlarged lymph nodes are seen. Vascular calcifications are present. Severe stenosis at the origin of the celiac artery. Moderate stenosis at the origin of the superior mesenteric artery. 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 Mild stenoses at the origins of the bilateral renal arteries. Stent extends from the right  common to external iliac artery. A  left common iliac artery stent is also seen. Visualized iliofemoral arteries are otherwise grossly unremarkable. The IVC is grossly unremarkable. Bones and soft tissues: No acute osseous lesion is identified. Bones are demineralized. There are degenerative changes within the spine. Bilateral hip arthroplasties are partially visualized. There are chronic posttraumatic changes of the right iliac bone. Superficial soft tissues demonstrate no acute abnormality. Lung bases: The visualized lung bases are clear. Moderate hiatal hernia is present.     Impression: Impression: 1.No acute abnormality identified within the abdomen or pelvis. 2.No acute vascular abnormality is identified. 3.No active contrast extravasation is identified within the GI tract lumen. 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 5.Colonic diverticulosis. 6.Bilateral nonobstructing nephrolithiasis. 7.Hiatal hernia. 8.Additional findings as detailed above. Electronically Signed: Ramon Escobar MD  3/24/2025 2:35 PM EDT  Workstation ID: [id number]      Results for orders placed during the hospital encounter of 08/26/24    Adult Transthoracic Echo Limited W/ Cont if Necessary Per Protocol    Interpretation Summary  •  Left ventricular systolic function is normal. Calculated left ventricular EF = 64%  •  Moderate tricuspid valve regurgitation is present.  •  Estimated right ventricular systolic pressure from tricuspid regurgitation is moderately elevated (45-55 mmHg).  •  Mild MR      Assessment & Plan   Assessment & Plan       Lower GI bleeding    Coronary arteriosclerosis in native artery    Peripheral arterial occlusive disease    Polyp of colon    Summary: This is an 83 y/o female w/ CAD, PAD, COPD w/ chronic O2 use, HTN, HLD, chronic abd pain w/  nausea, 2 admissions 10/2024 related to GI bleed, w/ multiple bleeding polyps removed except 1 which required subsequent colonoscopy w/ EMR completed 3/7/25; 1 week pta she developed worsening N/V and 1 day pta developed painless lower GI bleeding; while in the ED lactate was measured at 5.2, CT abd/pel w/ contrast showed multiple areas of stenosis w/in the great vessels of the abdomen but she has no acute abdominal pain    Assessment/Plan    Acute lower GI bleeding  S/p recent polypectomy 3/7/25  Hx bleeding polyps  Chronic anemia  -hold eliquis  -serial H&H, already typed and screened in the ED  -cont home bid ppi  -GI consulted in the ED, appreciate recommendations on expectant management vs endoscopy    Lactic acidosis  Chronic abd pain w/ vomiting  Celiac and SMA arterial stenosis  -s/p prior CCY, check lipase though expected normal  -CT a/p w/ severe celiac and moderate SMA stenosis, 4mm ELEAZAR aneurism; she had recent CT's 3/14/25, 10/24/24, 10/10/24 - attempted calling rads to discuss but unable to reach anyone; consider f/u in the AM  -supportive care w/ IVF, would benefit from vascular eval eventually, though no acute intractable abd syx (no significant pain current, no food avoidance, etc) so could be considered outpatient  -cont statin, start ASA once ok w/ GI    CAD  PAD s/p BL iliac artery stenting  S/p BL ICA stenting  HTN/HLD  Afib  -hold eliquis for GIB  -tokosyn, imdur, losartan, statin    Recent Dx E Coli UTI  - cont prev Rx'ed macrobid from pcp    COPD w/ chronic O2 use - brovana, pulmicort, yupelri, daliresp  Anxiety/Depression - zoloft  Hypothyroidism - levothyroxine      VTE Prophylaxis:  Mechanical & pharmacologic VTE prophylaxis orders are signed & held.           CODE STATUS:    Code Status and Medical Interventions: CPR (Attempt to Resuscitate); Full Support   Ordered at: 03/24/25 2012     Code Status (Patient has no pulse and is not breathing):    CPR (Attempt to Resuscitate)     Medical  Interventions (Patient has pulse or is breathing):    Full Support       Expected Discharge   Expected Discharge Date: 3/27/2025; Expected Discharge Time:      Al Miller DO  03/24/25

## 2025-03-25 LAB
ADV 40+41 DNA STL QL NAA+NON-PROBE: NOT DETECTED
ANION GAP SERPL CALCULATED.3IONS-SCNC: 10 MMOL/L (ref 5–15)
ASTRO TYP 1-8 RNA STL QL NAA+NON-PROBE: NOT DETECTED
BUN SERPL-MCNC: 7 MG/DL (ref 8–23)
BUN/CREAT SERPL: 24.1 (ref 7–25)
C CAYETANENSIS DNA STL QL NAA+NON-PROBE: NOT DETECTED
C COLI+JEJ+UPSA DNA STL QL NAA+NON-PROBE: NOT DETECTED
C DIFF TOX GENS STL QL NAA+PROBE: NOT DETECTED
CALCIUM SPEC-SCNC: 8.6 MG/DL (ref 8.6–10.5)
CHLORIDE SERPL-SCNC: 106 MMOL/L (ref 98–107)
CO2 SERPL-SCNC: 29 MMOL/L (ref 22–29)
CREAT SERPL-MCNC: 0.29 MG/DL (ref 0.57–1)
CRYPTOSP DNA STL QL NAA+NON-PROBE: NOT DETECTED
DEPRECATED RDW RBC AUTO: 45.8 FL (ref 37–54)
E HISTOLYT DNA STL QL NAA+NON-PROBE: NOT DETECTED
EAEC PAA PLAS AGGR+AATA ST NAA+NON-PRB: NOT DETECTED
EC STX1+STX2 GENES STL QL NAA+NON-PROBE: NOT DETECTED
EGFRCR SERPLBLD CKD-EPI 2021: 105.6 ML/MIN/1.73
EPEC EAE GENE STL QL NAA+NON-PROBE: NOT DETECTED
ERYTHROCYTE [DISTWIDTH] IN BLOOD BY AUTOMATED COUNT: 13.3 % (ref 12.3–15.4)
ETEC LTA+ST1A+ST1B TOX ST NAA+NON-PROBE: NOT DETECTED
G LAMBLIA DNA STL QL NAA+NON-PROBE: NOT DETECTED
GLUCOSE SERPL-MCNC: 104 MG/DL (ref 65–99)
HCT VFR BLD AUTO: 24.9 % (ref 34–46.6)
HCT VFR BLD AUTO: 25.5 % (ref 34–46.6)
HCT VFR BLD AUTO: 29.5 % (ref 34–46.6)
HGB BLD-MCNC: 7.7 G/DL (ref 12–15.9)
HGB BLD-MCNC: 7.8 G/DL (ref 12–15.9)
HGB BLD-MCNC: 8.1 G/DL (ref 12–15.9)
MAGNESIUM SERPL-MCNC: 1.4 MG/DL (ref 1.6–2.4)
MCH RBC QN AUTO: 28.8 PG (ref 26.6–33)
MCHC RBC AUTO-ENTMCNC: 30.6 G/DL (ref 31.5–35.7)
MCV RBC AUTO: 94.1 FL (ref 79–97)
NOROVIRUS GI+II RNA STL QL NAA+NON-PROBE: DETECTED
P SHIGELLOIDES DNA STL QL NAA+NON-PROBE: NOT DETECTED
PLATELET # BLD AUTO: 232 10*3/MM3 (ref 140–450)
PMV BLD AUTO: 10 FL (ref 6–12)
POTASSIUM SERPL-SCNC: 4.2 MMOL/L (ref 3.5–5.2)
QT INTERVAL: 404 MS
QTC INTERVAL: 472 MS
RBC # BLD AUTO: 2.71 10*6/MM3 (ref 3.77–5.28)
RVA RNA STL QL NAA+NON-PROBE: NOT DETECTED
S ENT+BONG DNA STL QL NAA+NON-PROBE: NOT DETECTED
SAPO I+II+IV+V RNA STL QL NAA+NON-PROBE: NOT DETECTED
SHIGELLA SP+EIEC IPAH ST NAA+NON-PROBE: NOT DETECTED
SODIUM SERPL-SCNC: 145 MMOL/L (ref 136–145)
TROPONIN T SERPL HS-MCNC: 35 NG/L
TROPONIN T SERPL HS-MCNC: 57 NG/L
V CHOL+PARA+VUL DNA STL QL NAA+NON-PROBE: NOT DETECTED
V CHOLERAE DNA STL QL NAA+NON-PROBE: NOT DETECTED
WBC NRBC COR # BLD AUTO: 10.98 10*3/MM3 (ref 3.4–10.8)
Y ENTEROCOL DNA STL QL NAA+NON-PROBE: NOT DETECTED

## 2025-03-25 PROCEDURE — 94799 UNLISTED PULMONARY SVC/PX: CPT

## 2025-03-25 PROCEDURE — 83735 ASSAY OF MAGNESIUM: CPT

## 2025-03-25 PROCEDURE — 93010 ELECTROCARDIOGRAM REPORT: CPT | Performed by: INTERNAL MEDICINE

## 2025-03-25 PROCEDURE — 36430 TRANSFUSION BLD/BLD COMPNT: CPT

## 2025-03-25 PROCEDURE — 25010000002 MAGNESIUM SULFATE 4 GM/100ML SOLUTION: Performed by: INTERNAL MEDICINE

## 2025-03-25 PROCEDURE — 85018 HEMOGLOBIN: CPT | Performed by: INTERNAL MEDICINE

## 2025-03-25 PROCEDURE — P9016 RBC LEUKOCYTES REDUCED: HCPCS

## 2025-03-25 PROCEDURE — 87493 C DIFF AMPLIFIED PROBE: CPT | Performed by: INTERNAL MEDICINE

## 2025-03-25 PROCEDURE — 86900 BLOOD TYPING SEROLOGIC ABO: CPT

## 2025-03-25 PROCEDURE — 85014 HEMATOCRIT: CPT | Performed by: INTERNAL MEDICINE

## 2025-03-25 PROCEDURE — 85027 COMPLETE CBC AUTOMATED: CPT | Performed by: INTERNAL MEDICINE

## 2025-03-25 PROCEDURE — 99232 SBSQ HOSP IP/OBS MODERATE 35: CPT | Performed by: PHYSICIAN ASSISTANT

## 2025-03-25 PROCEDURE — 99222 1ST HOSP IP/OBS MODERATE 55: CPT | Performed by: PHYSICIAN ASSISTANT

## 2025-03-25 PROCEDURE — 87507 IADNA-DNA/RNA PROBE TQ 12-25: CPT | Performed by: INTERNAL MEDICINE

## 2025-03-25 PROCEDURE — 80048 BASIC METABOLIC PNL TOTAL CA: CPT | Performed by: INTERNAL MEDICINE

## 2025-03-25 PROCEDURE — 94761 N-INVAS EAR/PLS OXIMETRY MLT: CPT

## 2025-03-25 PROCEDURE — 25810000003 SODIUM CHLORIDE 0.9 % SOLUTION: Performed by: INTERNAL MEDICINE

## 2025-03-25 PROCEDURE — 99232 SBSQ HOSP IP/OBS MODERATE 35: CPT | Performed by: INTERNAL MEDICINE

## 2025-03-25 PROCEDURE — 93005 ELECTROCARDIOGRAM TRACING: CPT | Performed by: INTERNAL MEDICINE

## 2025-03-25 PROCEDURE — 84484 ASSAY OF TROPONIN QUANT: CPT | Performed by: INTERNAL MEDICINE

## 2025-03-25 PROCEDURE — 94664 DEMO&/EVAL PT USE INHALER: CPT

## 2025-03-25 RX ORDER — ISOSORBIDE MONONITRATE 60 MG/1
60 TABLET, EXTENDED RELEASE ORAL DAILY
Status: DISCONTINUED | OUTPATIENT
Start: 2025-03-26 | End: 2025-03-30 | Stop reason: HOSPADM

## 2025-03-25 RX ORDER — MAGNESIUM SULFATE HEPTAHYDRATE 40 MG/ML
2 INJECTION, SOLUTION INTRAVENOUS
Status: COMPLETED | OUTPATIENT
Start: 2025-03-25 | End: 2025-03-25

## 2025-03-25 RX ADMIN — Medication 10 ML: at 20:22

## 2025-03-25 RX ADMIN — LOSARTAN POTASSIUM 50 MG: 50 TABLET, FILM COATED ORAL at 09:54

## 2025-03-25 RX ADMIN — DOFETILIDE 250 MCG: 0.25 CAPSULE ORAL at 09:55

## 2025-03-25 RX ADMIN — BUDESONIDE 0.5 MG: 0.5 INHALANT RESPIRATORY (INHALATION) at 23:03

## 2025-03-25 RX ADMIN — OXYBUTYNIN CHLORIDE 10 MG: 10 TABLET, EXTENDED RELEASE ORAL at 09:54

## 2025-03-25 RX ADMIN — ARFORMOTEROL TARTRATE 15 MCG: 15 SOLUTION RESPIRATORY (INHALATION) at 13:58

## 2025-03-25 RX ADMIN — GABAPENTIN 300 MG: 300 CAPSULE ORAL at 20:20

## 2025-03-25 RX ADMIN — MAGNESIUM SULFATE IN WATER FOR 2 G: 40 INJECTION INTRAVENOUS at 17:01

## 2025-03-25 RX ADMIN — PANTOPRAZOLE SODIUM 40 MG: 40 TABLET, DELAYED RELEASE ORAL at 20:21

## 2025-03-25 RX ADMIN — LATANOPROST 1 DROP: 50 SOLUTION OPHTHALMIC at 20:21

## 2025-03-25 RX ADMIN — DOFETILIDE 250 MCG: 0.25 CAPSULE ORAL at 20:21

## 2025-03-25 RX ADMIN — SERTRALINE 100 MG: 100 TABLET, FILM COATED ORAL at 09:54

## 2025-03-25 RX ADMIN — TRAMADOL HYDROCHLORIDE 50 MG: 50 TABLET, COATED ORAL at 20:21

## 2025-03-25 RX ADMIN — ROFLUMILAST 500 MCG: 500 TABLET ORAL at 10:00

## 2025-03-25 RX ADMIN — BUDESONIDE 0.5 MG: 0.5 INHALANT RESPIRATORY (INHALATION) at 13:58

## 2025-03-25 RX ADMIN — ATORVASTATIN CALCIUM 20 MG: 20 TABLET, FILM COATED ORAL at 20:20

## 2025-03-25 RX ADMIN — LEVOTHYROXINE SODIUM 200 MCG: 0.1 TABLET ORAL at 05:34

## 2025-03-25 RX ADMIN — Medication 10 ML: at 10:01

## 2025-03-25 RX ADMIN — PANTOPRAZOLE SODIUM 40 MG: 40 TABLET, DELAYED RELEASE ORAL at 09:55

## 2025-03-25 RX ADMIN — MAGNESIUM SULFATE IN WATER FOR 2 G: 40 INJECTION INTRAVENOUS at 11:50

## 2025-03-25 RX ADMIN — NITROFURANTOIN MONOHYDRATE/MACROCRYSTALS 100 MG: 75; 25 CAPSULE ORAL at 09:54

## 2025-03-25 RX ADMIN — POTASSIUM CHLORIDE 40 MEQ: 1500 TABLET, EXTENDED RELEASE ORAL at 02:32

## 2025-03-25 RX ADMIN — ARFORMOTEROL TARTRATE 15 MCG: 15 SOLUTION RESPIRATORY (INHALATION) at 23:03

## 2025-03-25 RX ADMIN — MAGNESIUM SULFATE IN WATER FOR 2 G: 40 INJECTION INTRAVENOUS at 15:01

## 2025-03-25 RX ADMIN — TRAMADOL HYDROCHLORIDE 50 MG: 50 TABLET, COATED ORAL at 06:26

## 2025-03-25 RX ADMIN — SODIUM CHLORIDE 75 ML/HR: 9 INJECTION, SOLUTION INTRAVENOUS at 11:49

## 2025-03-25 RX ADMIN — NITROFURANTOIN MONOHYDRATE/MACROCRYSTALS 100 MG: 75; 25 CAPSULE ORAL at 20:20

## 2025-03-25 RX ADMIN — ISOSORBIDE MONONITRATE 30 MG: 30 TABLET, EXTENDED RELEASE ORAL at 09:55

## 2025-03-25 NOTE — PROGRESS NOTES
[facility] Medicine Services  PROGRESS NOTE    Patient Name: Avelina Ceron  : 1940  MRN: [id number]    Date of Admission: 3/24/2025  Primary Care Physician: Dre Moura MD    Subjective   Subjective     CC:  diarrhea    HPI:  Still with diarrhea, spoke with her daughter over the phone regarding plan of care.      Objective   Objective     Vital Signs:   Temp:  [97.3 °F (36.3 °C)-98.1 °F (36.7 °C)] 97.3 °F (36.3 °C)  Heart Rate:  [] 84  Resp:  [18-19] 18  BP: (124-176)/(42-76) 134/42  Flow (L/min) (Oxygen Therapy):  [2] 2     Physical Exam:  Constitutional: No acute distress, awake, alert  HENT: NCAT, mucous membranes moist  Respiratory: Respiratory effort normal   Cardiovascular: RRR, no murmurs, rubs, or gallops  Gastrointestinal: Soft, nontender, nondistended  Musculoskeletal: No bilateral ankle edema  Psychiatric: Appropriate affect, cooperative  Neurologic: Oriented x 2-3, speech clear  Skin: No rashes      Results Reviewed:  LAB RESULTS:      Lab 25  0421 25  0015 25  1853 25  1700 25  1559 25  1302   WBC 10.98*  --   --   --   --  13.63*   HEMOGLOBIN 7.8* 8.1*  --   --   --  9.6*   HEMOGLOBIN, POC  --   --   --  9.9* 10.2*  --    HEMATOCRIT 25.5* 29.5*  --   --   --  31.9*   HEMATOCRIT POC  --   --   --  29*  --   --    PLATELETS 232  --   --   --   --  340   NEUTROS ABS  --   --   --   --   --  10.41*   IMMATURE GRANS (ABS)  --   --   --   --   --  0.04   LYMPHS ABS  --   --   --   --   --  2.23   MONOS ABS  --   --   --   --   --  0.72   EOS ABS  --   --   --   --   --  0.15   MCV 94.1  --   --   --   --  94.9   LACTATE  --   --   --  0.8  --  5.2*   HSTROP T 57*  --  24* 10  --   --          Lab 25  0421 25  1700 25  1302   SODIUM 145  --  145   POTASSIUM 4.2  --  3.3*   CHLORIDE 106  --  97*   CO2 29.0  --  34.0*   ANION GAP 10.0  --  14.0   BUN 7*  --  14   CREATININE 0.29* 0.50* 0.39*   EGFR 105.6  --   98.3   GLUCOSE 104*  --  139*   CALCIUM 8.6  --  9.5   MAGNESIUM 1.4*  --   --          Lab 03/24/25  1700 03/24/25  1302   TOTAL PROTEIN  --  6.7   ALBUMIN  --  3.8   GLOBULIN  --  2.9   ALT (SGPT)  --  12   AST (SGOT)  --  18   BILIRUBIN  --  0.3   ALK PHOS  --  71   LIPASE 75*  --          Lab 03/25/25  0421 03/24/25  1853 03/24/25  1700   HSTROP T 57* 24* 10             Lab 03/24/25  1302   ABO TYPING A   RH TYPING Positive   ANTIBODY SCREEN Negative         Brief Urine Lab Results  (Last result in the past 365 days)        Color   Clarity   Blood   Leuk Est   Nitrite   Protein   CREAT   Urine HCG        03/24/25 1700 Yellow   Clear   Negative   Negative   Negative   Negative                   Microbiology Results Abnormal       Procedure Component Value - Date/Time    Gastrointestinal Panel, PCR - Stool, Per Rectum [[id number]]  (Abnormal) Collected: 03/25/25 0854    Lab Status: Final result Specimen: Stool from Per Rectum Updated: 03/25/25 1106     Campylobacter Not Detected     Plesiomonas shigelloides Not Detected     Salmonella Not Detected     Vibrio Not Detected     Vibrio cholerae Not Detected     Yersinia enterocolitica Not Detected     Enteroaggregative E. coli (EAEC) Not Detected     Enteropathogenic E. coli (EPEC) Not Detected     Enterotoxigenic E. coli (ETEC) lt/st Not Detected     Shiga-like toxin-producing E. coli (STEC) stx1/stx2 Not Detected     Shigella/Enteroinvasive E. coli (EIEC) Not Detected     Cryptosporidium Not Detected     Cyclospora cayetanensis Not Detected     Entamoeba histolytica Not Detected     Giardia lamblia Not Detected     Adenovirus F40/41 Not Detected     Astrovirus Not Detected     Norovirus GI/GII Detected     Comment: If a positive Norovirus result is inconsistent with clinical presentation, the positive Norovirus result should be confirmed using another method.        Rotavirus A Not Detected     Sapovirus (I, II, IV or V) Not Detected            CT Abdomen Pelvis  With & Without Contrast  Result Date: 3/24/2025  CT ABDOMEN PELVIS W WO CONTRAST Date of Exam: 3/24/2025 2:10 PM EDT Indication: intermittent abd pain, n/v, significant blood in stool this AM. Comparison: 3/14/2025 Technique: Axial CT images were obtained of the abdomen and pelvis before and after the uneventful intravenous administration of 80 mL Isovue-370. Sagittal and coronal reconstructions were performed.  Automated exposure control and iterative reconstruction methods were used. Findings: Liver: The liver is unremarkable in morphology. No focal liver lesion is seen. No biliary dilation is seen. Gallbladder: Surgically absent. Pancreas: Unremarkable. Spleen: Unremarkable. Adrenal glands: Unremarkable. Genitourinary tract: 8 x 5 mm nonobstructing calculus is seen at the lower pole of the left kidney. Several additional punctate nonobstructing calculi are seen within both kidneys. No hydronephrosis is seen. Visualized portions of the ureters are unremarkable. Distal ureters and urinary bladder are partially obscured due to beam hardening artifact arising from bilateral hip arthroplasties. Suspect prior hysterectomy Gastrointestinal tract: Postsurgical changes of the ileocecal region. Colonic diverticulosis is present. Most of the colon is decompressed which limits its evaluation. Moderate hiatal hernia. No findings to suggest bowel obstruction. No active contrast extravasation is seen within the GI tract lumen. Small duodenal diverticulum. 2 linear radiodensities within the proximal colon, possibly endoscopically placed clips. Appendix: Absent. Other findings: No free air or free fluid is identified. No pathologically enlarged lymph nodes are seen. Vascular calcifications are present. Severe stenosis at the origin of the celiac artery. Moderate stenosis at the origin of the superior mesenteric artery. 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 Mild stenoses at the origins of the bilateral renal arteries. Stent extends from the right common to external iliac artery. A  left common iliac artery stent is also seen. Visualized iliofemoral arteries are otherwise grossly unremarkable. The IVC is grossly unremarkable. Bones and soft tissues: No acute osseous lesion is identified. Bones are demineralized. There are degenerative changes within the spine. Bilateral hip arthroplasties are partially visualized. There are chronic posttraumatic changes of the right iliac bone. Superficial soft tissues demonstrate no acute abnormality. Lung bases: The visualized lung bases are clear. Moderate hiatal hernia is present.     Impression: Impression: 1.No acute abnormality identified within the abdomen or pelvis. 2.No acute vascular abnormality is identified. 3.No active contrast extravasation is identified within the GI tract lumen. 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 5.Colonic diverticulosis. 6.Bilateral nonobstructing nephrolithiasis. 7.Hiatal hernia. 8.Additional findings as detailed above. Electronically Signed: Ramon Escobar MD  3/24/2025 2:35 PM EDT  Workstation ID: [id number]      Results for orders placed during the hospital encounter of 08/26/24    Adult Transthoracic Echo Limited W/ Cont if Necessary Per Protocol    Interpretation Summary    Left ventricular systolic function is normal. Calculated left ventricular EF = 64%    Moderate tricuspid valve regurgitation is present.    Estimated right ventricular systolic pressure from tricuspid regurgitation is moderately elevated (45-55 mmHg).    Mild MR      Current medications:  Scheduled Meds:[Held by provider] apixaban, 2.5 mg, Oral, Q12H  arformoterol, 15 mcg, Nebulization, BID - RT   And  budesonide, 0.5 mg, Nebulization,  BID - RT   And  revefenacin, 175 mcg, Nebulization, Daily - RT  [Held by provider] aspirin, 81 mg, Oral, Daily  atorvastatin, 20 mg, Oral, Nightly  dofetilide, 250 mcg, Oral, BID  [Held by provider] ferrous sulfate, 325 mg, Oral, Daily With Breakfast  gabapentin, 300 mg, Oral, Nightly  isosorbide mononitrate, 30 mg, Oral, Daily  latanoprost, 1 drop, Both Eyes, Nightly  levothyroxine, 200 mcg, Oral, Q AM  losartan, 50 mg, Oral, Q24H  magnesium sulfate, 2 g, Intravenous, Q2H  nitrofurantoin (macrocrystal-monohydrate), 100 mg, Oral, BID  oxybutynin XL, 10 mg, Oral, Daily  pantoprazole, 40 mg, Oral, BID  roflumilast, 500 mcg, Oral, Daily  sertraline, 100 mg, Oral, Daily  sodium chloride, 10 mL, Intravenous, Q12H      Continuous Infusions:sodium chloride, 75 mL/hr, Last Rate: 75 mL/hr (03/25/25 1149)      PRN Meds:.  acetaminophen **OR** acetaminophen **OR** acetaminophen    senna-docusate sodium **AND** polyethylene glycol **AND** bisacodyl **AND** bisacodyl    Calcium Replacement - Follow Nurse / BPA Driven Protocol    Magnesium Standard Dose Replacement - Follow Nurse / BPA Driven Protocol    ondansetron ODT **OR** ondansetron    Phosphorus Replacement - Follow Nurse / BPA Driven Protocol    Potassium Replacement - Follow Nurse / BPA Driven Protocol    sodium chloride    sodium chloride    sodium chloride    traMADol    Assessment & Plan   Assessment & Plan     Active Hospital Problems    Diagnosis  POA    **Lower GI bleeding [K92.2]  Yes    Polyp of colon [K63.5]  Yes    Peripheral arterial occlusive disease [I77.9]  Yes    Coronary arteriosclerosis in native artery [I25.10]  Yes      Resolved Hospital Problems   No resolved problems to display.        Brief Hospital Course to date:  Avelina Ceron is a 84 y.o. female with history of CAD, PAD, COPD w/ chronic O2 use, HTN, HLD, chronic abd pain w/ nausea, 2 admissions 10/2024 related to GI bleed, w/ multiple bleeding polyps removed except 1 which required subsequent  colonoscopy w/ EMR completed 3/7/25.   1 week pta she developed worsening N/V and 1 day pta developed painless lower GI bleeding.  While in the ED lactate was measured at 5.2, CT abd/pel w/ contrast showed multiple areas of stenosis w/in the great vessels of the abdomen but she did not have acute abdominal pain       Acute lower GI bleeding  S/p recent polypectomy 3/7/25  Hx bleeding polyps  Chronic anemia  -holding eliquis  -cont home bid ppi  -GI consulted  -1 unit PRBCs ordered  -Discussed with GI     Lactic acidosis, improved  Chronic abd pain w/ vomiting  Celiac and SMA arterial stenosis  -s/p prior CCY    -CT a/p w/ severe celiac and moderate SMA stenosis, 4mm ELEAZAR aneurysm.  She had recent CT's 3/14/25, 10/24/24, 10/10/24  -supportive care w/ IVF, would benefit from vascular eval eventually, though no acute intractable abd syx (no significant pain current, no food avoidance, etc) so could be considered outpatient  -cont statin  - start ASA once ok w/ GI    Positive norovirus     CAD  PAD s/p BL iliac artery stenting  S/p BL ICA stenting  HTN/HLD  Afib  -hold eliquis for now    Elevated troponin  -No chest pain currently  -Check repeat EKG  -cardio consult for angina symptoms     Recent Dx E Coli UTI  - cont prev Rx'ed macrobid from pcp     COPD w/ chronic O2 use   - brovana, pulmicort, yupelri, daliresp    Anxiety/Depression  - zoloft    Hypothyroidism   -Continue levothyroxine     Expected Discharge Location and Transportation: Home  Expected Discharge   Expected Discharge Date: 3/27/2025; Expected Discharge Time:      VTE Prophylaxis:  Pharmacologic & mechanical VTE prophylaxis orders are present.         AM-PAC 6 Clicks Score (PT): 24 (03/25/25 4965)    CODE STATUS:   Code Status and Medical Interventions: CPR (Attempt to Resuscitate); Full Support   Ordered at: 03/24/25 3458     Code Status (Patient has no pulse and is not breathing):    CPR (Attempt to Resuscitate)     Medical Interventions (Patient has  pulse or is breathing):    Full Support       Farrah Prado, DO  03/25/25

## 2025-03-25 NOTE — CASE MANAGEMENT/SOCIAL WORK
Discharge Planning Assessment  [facility]     Patient Name: Avelina Ceron  MRN: [id number]  Today's Date: 3/25/2025    Admit Date: 3/24/2025    Plan: Home   Discharge Needs Assessment       Row Name 03/25/25 1613       Resource/Environmental Concerns    Transportation Concerns none       Discharge Needs Assessment    Equipment Currently Used at Home oxygen;walker, rolling;cane, quad;ramp;rollator;wheelchair    Do you want help finding or keeping work or a job? I do not need or want help    Do you want help with school or training? For example, starting or completing job training or getting a high school diploma, GED or equivalent No    Anticipated Changes Related to Illness none    Equipment Needed After Discharge none      Row Name 03/25/25 1606       Living Environment    People in Home child(gabriela), adult    Unique Family Situation Lives with her daughter.    Current Living Arrangements home    In the past 12 months has the electric, gas, oil, or water company threatened to shut off services in your home? No    Primary Care Provided by self       Resource/Environmental Concerns    Transportation Concerns none       Transportation Needs    In the past 12 months, has lack of transportation kept you from medical appointments or from getting medications? no    In the past 12 months, has lack of transportation kept you from meetings, work, or from getting things needed for daily living? No       Food Insecurity    Within the past 12 months, you worried that your food would run out before you got the money to buy more. Never true    Within the past 12 months, the food you bought just didn't last and you didn't have money to get more. Never true       Transition Planning    Patient/Family Anticipates Transition to home with family    Patient/Family Anticipated Services at Transition none       Discharge Needs Assessment    Equipment Currently Used at Home walker, rolling;cane, quad;ramp;rollator;wheelchair     Anticipated Changes Related to Illness none    Equipment Needed After Discharge none                   Discharge Plan       Row Name 03/25/25 1607       Plan    Plan Home    Patient/Family in Agreement with Plan yes    Plan Comments Spoke with patient at bedside. She lives with her daughter in [facility]. She is independent with bathing and self-care. Daughter helps with cooking and medication in pill box for her. She has a rollator, walker, ramp, transport chair. She is not current with home health services. PCP is Dre Etienne MD. Insurance is Medicare A and B and [employer]. She gets her medication through [facility]. Her discharge plan is home with daughter to transport. CM will follow for any discharge needs.    Final Discharge Disposition Code 01 - home or self-care                  Selected Continued Care - Episodes Includes continued care and service providers with selected services from the active episodes listed below          Expected Discharge Date and Time       Expected Discharge Date Expected Discharge Time    Mar 27, 2025            Demographic Summary       Row Name 03/25/25 1605       General Information    Admission Type inpatient    Preferred Language English                   Functional Status       Row Name 03/25/25 1605       Functional Status    Usual Activity Tolerance moderate    Current Activity Tolerance fair       Physical Activity    On average, how many days per week do you engage in moderate to strenuous exercise (like a brisk walk)? 0 days    On average, how many minutes do you engage in exercise at this level? 0 min    Number of minutes of exercise per week 0       Functional Status, IADL    Medications assistive person    Meal Preparation assistive person    Housekeeping assistive equipment    Laundry assistive equipment    Shopping assistive equipment    IADL Comments Lives with daughter. She helps with medication and meals.       Mental Status    General  Appearance WDL WDL                   Psychosocial    No documentation.                  Abuse/Neglect    No documentation.                  Legal    No documentation.                  Substance Abuse    No documentation.                  Patient Forms    No documentation.                     Mitzy Mcfarland RN

## 2025-03-25 NOTE — PROGRESS NOTES
"GI Daily Progress Note  Subjective:    Chief Complaint:  Follow up diarrhea, chills and fever     Had one bowel movement this morning that was less bloody.    Complains of midsternal pressure that feels like heaviness.  It radiates to her neck.       Objective:    /42   Pulse 81   Temp 97.3 °F (36.3 °C) (Axillary)   Resp 17   Ht 160 cm (63\")   Wt 61.2 kg (135 lb)   LMP  (LMP Unknown)   SpO2 98%   BMI 23.91 kg/m²     Physical Exam  Cardiovascular:      Rate and Rhythm: Normal rate and regular rhythm.   Pulmonary:      Effort: Pulmonary effort is normal.   Abdominal:      General: Bowel sounds are normal.      Palpations: Abdomen is soft.      Tenderness: There is no abdominal tenderness.   Skin:     Coloration: Skin is pale.   Neurological:      Mental Status: She is oriented to person, place, and time.         Lab  Lab Results   Component Value Date    WBC 10.98 (H) 03/25/2025    HGB 7.8 (L) 03/25/2025    HGB 8.1 (L) 03/25/2025    HGB 9.9 (A) 03/24/2025    MCV 94.1 03/25/2025     03/25/2025    INR 1.09 02/28/2025    INR 0.98 08/04/2023    INR 1.04 06/30/2020       Lab Results   Component Value Date    GLUCOSE 104 (H) 03/25/2025    BUN 7 (L) 03/25/2025    CREATININE 0.29 (L) 03/25/2025    EGFRIFNONA 119 11/24/2021    BCR 24.1 03/25/2025     03/25/2025    K 4.2 03/25/2025    CO2 29.0 03/25/2025    CALCIUM 8.6 03/25/2025    ALBUMIN 3.8 03/24/2025    ALKPHOS 71 03/24/2025    BILITOT 0.3 03/24/2025    ALT 12 03/24/2025    AST 18 03/24/2025       Assessment:    Gastroenteritis due to Norovirus   Lower gastrointestinal bleeding, likely secondary to recent polypectomy, 3/7.     NSTEMI   History of bleeding polyps  Acute on chronic anemia  Celiac and SMA arterial stenosis  Atrial fibrillation, CAD, PAD, on Eliquis  E. coli UTI, POA      Plan:    GI panel PCR is positive for Norovirus.    Her daughter had similar symptoms preceding the patient's.        This does not typically cause bleeding.  " Suspect her bleeding is related to recent polypectomy.       She has ongoing angina symptoms today with elevated troponin.   Discussed with hospitalist whom will repeat EKG and consult cardiology.       >> Transfuse 1 unit of PRBCs      JT Cox  03/25/25  14:53 EDT

## 2025-03-25 NOTE — PROGRESS NOTES
[facility] Medicine Services  PROGRESS NOTE    Patient Name: Avelina Ceron  : 1940  MRN: [id number]    Date of Admission: 3/24/2025  Primary Care Physician: Dre Moura MD    Subjective   Subjective     CC:  Bloody stool, diarrhea    HPI:  Discussed plan with daughter at bedside.  Tried to answer most questions as best as I could      Objective   Objective     Vital Signs:   Temp:  [97.8 °F (36.6 °C)-98.2 °F (36.8 °C)] 98.2 °F (36.8 °C)  Heart Rate:  [81-96] 92  Resp:  [16-18] 18  BP: (114-155)/(53-61) 114/57  Flow (L/min) (Oxygen Therapy):  [1-2] 1     Physical Exam:  Constitutional: No acute distress, awake, alert  HENT: NCAT, mucous membranes moist  Respiratory: Respiratory effort normal   Cardiovascular: RRR, no murmurs, rubs, or gallops  Gastrointestinal: Soft, nontender, nondistended  Musculoskeletal: No bilateral ankle edema  Psychiatric: Appropriate affect, cooperative  Neurologic: Oriented x 2-3, speech clear  Skin: No rashes      Results Reviewed:  LAB RESULTS:      Lab 25  0824 25  2358 25  1601 25  1330 25  0421 25  0015 25  1853 25  1700 25  1559 25  1302   WBC 11.04*  --   --   --  10.98*  --   --   --   --  13.63*   HEMOGLOBIN 8.3* 9.0* 7.7*  --  7.8* 8.1*  --   --   --  9.6*   HEMOGLOBIN, POC  --   --   --   --   --   --   --  9.9*   < >  --    HEMATOCRIT 26.2* 30.0* 24.9*  --  25.5* 29.5*  --   --   --  31.9*   HEMATOCRIT POC  --   --   --   --   --   --   --  29*  --   --    PLATELETS 224  --   --   --  232  --   --   --   --  340   NEUTROS ABS 7.55*  --   --   --   --   --   --   --   --  10.41*   IMMATURE GRANS (ABS) 0.05  --   --   --   --   --   --   --   --  0.04   LYMPHS ABS 2.17  --   --   --   --   --   --   --   --  2.23   MONOS ABS 0.97*  --   --   --   --   --   --   --   --  0.72   EOS ABS 0.23  --   --   --   --   --   --   --   --  0.15   MCV 92.3  --   --   --  94.1  --   --   --    --  94.9   LACTATE  --   --   --   --   --   --   --  0.8  --  5.2*   HSTROP T  --   --   --  35* 57*  --  24* 10  --   --     < > = values in this interval not displayed.         Lab 03/26/25  0824 03/25/25  0421 03/24/25  1700 03/24/25  1302   SODIUM 143 145  --  145   POTASSIUM 3.9 4.2  --  3.3*   CHLORIDE 104 106  --  97*   CO2 31.0* 29.0  --  34.0*   ANION GAP 8.0 10.0  --  14.0   BUN 5* 7*  --  14   CREATININE 0.28* 0.29* 0.50* 0.39*   EGFR 106.5 105.6  --  98.3   GLUCOSE 85 104*  --  139*   CALCIUM 7.8* 8.6  --  9.5   MAGNESIUM 1.9 1.4*  --   --    PHOSPHORUS 2.7  --   --   --          Lab 03/26/25  0824 03/24/25  1700 03/24/25  1302   TOTAL PROTEIN  --   --  6.7   ALBUMIN 3.0*  --  3.8   GLOBULIN  --   --  2.9   ALT (SGPT)  --   --  12   AST (SGOT)  --   --  18   BILIRUBIN  --   --  0.3   ALK PHOS  --   --  71   LIPASE  --  75*  --          Lab 03/25/25  1330 03/25/25  0421 03/24/25  1853 03/24/25  1700   HSTROP T 35* 57* 24* 10             Lab 03/24/25  1302   ABO TYPING A   RH TYPING Positive   ANTIBODY SCREEN Negative         Brief Urine Lab Results  (Last result in the past 365 days)        Color   Clarity   Blood   Leuk Est   Nitrite   Protein   CREAT   Urine HCG        03/24/25 1700 Yellow   Clear   Negative   Negative   Negative   Negative                   Microbiology Results Abnormal       Procedure Component Value - Date/Time    Gastrointestinal Panel, PCR - Stool, Per Rectum [[id number]]  (Abnormal) Collected: 03/25/25 0854    Lab Status: Final result Specimen: Stool from Per Rectum Updated: 03/25/25 1106     Campylobacter Not Detected     Plesiomonas shigelloides Not Detected     Salmonella Not Detected     Vibrio Not Detected     Vibrio cholerae Not Detected     Yersinia enterocolitica Not Detected     Enteroaggregative E. coli (EAEC) Not Detected     Enteropathogenic E. coli (EPEC) Not Detected     Enterotoxigenic E. coli (ETEC) lt/st Not Detected     Shiga-like toxin-producing E. coli (STEC)  stx1/stx2 Not Detected     Shigella/Enteroinvasive E. coli (EIEC) Not Detected     Cryptosporidium Not Detected     Cyclospora cayetanensis Not Detected     Entamoeba histolytica Not Detected     Giardia lamblia Not Detected     Adenovirus F40/41 Not Detected     Astrovirus Not Detected     Norovirus GI/GII Detected     Comment: If a positive Norovirus result is inconsistent with clinical presentation, the positive Norovirus result should be confirmed using another method.        Rotavirus A Not Detected     Sapovirus (I, II, IV or V) Not Detected            CT Abdomen Pelvis With & Without Contrast  Result Date: 3/24/2025  CT ABDOMEN PELVIS W WO CONTRAST Date of Exam: 3/24/2025 2:10 PM EDT Indication: intermittent abd pain, n/v, significant blood in stool this AM. Comparison: 3/14/2025 Technique: Axial CT images were obtained of the abdomen and pelvis before and after the uneventful intravenous administration of 80 mL Isovue-370. Sagittal and coronal reconstructions were performed.  Automated exposure control and iterative reconstruction methods were used. Findings: Liver: The liver is unremarkable in morphology. No focal liver lesion is seen. No biliary dilation is seen. Gallbladder: Surgically absent. Pancreas: Unremarkable. Spleen: Unremarkable. Adrenal glands: Unremarkable. Genitourinary tract: 8 x 5 mm nonobstructing calculus is seen at the lower pole of the left kidney. Several additional punctate nonobstructing calculi are seen within both kidneys. No hydronephrosis is seen. Visualized portions of the ureters are unremarkable. Distal ureters and urinary bladder are partially obscured due to beam hardening artifact arising from bilateral hip arthroplasties. Suspect prior hysterectomy Gastrointestinal tract: Postsurgical changes of the ileocecal region. Colonic diverticulosis is present. Most of the colon is decompressed which limits its evaluation. Moderate hiatal hernia. No findings to suggest bowel  obstruction. No active contrast extravasation is seen within the GI tract lumen. Small duodenal diverticulum. 2 linear radiodensities within the proximal colon, possibly endoscopically placed clips. Appendix: Absent. Other findings: No free air or free fluid is identified. No pathologically enlarged lymph nodes are seen. Vascular calcifications are present. Severe stenosis at the origin of the celiac artery. Moderate stenosis at the origin of the superior mesenteric artery. 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 Mild stenoses at the origins of the bilateral renal arteries. Stent extends from the right common to external iliac artery. A  left common iliac artery stent is also seen. Visualized iliofemoral arteries are otherwise grossly unremarkable. The IVC is grossly unremarkable. Bones and soft tissues: No acute osseous lesion is identified. Bones are demineralized. There are degenerative changes within the spine. Bilateral hip arthroplasties are partially visualized. There are chronic posttraumatic changes of the right iliac bone. Superficial soft tissues demonstrate no acute abnormality. Lung bases: The visualized lung bases are clear. Moderate hiatal hernia is present.     Impression: Impression: 1.No acute abnormality identified within the abdomen or pelvis. 2.No acute vascular abnormality is identified. 3.No active contrast extravasation is identified within the GI tract lumen. 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 5.Colonic diverticulosis. 6.Bilateral nonobstructing nephrolithiasis. 7.Hiatal hernia. 8.Additional findings as detailed above. Electronically Signed: Ramon Escobar MD  3/24/2025 2:35 PM EDT  Workstation ID: [id number]      Results for orders  placed during the hospital encounter of 08/26/24    Adult Transthoracic Echo Limited W/ Cont if Necessary Per Protocol    Interpretation Summary    Left ventricular systolic function is normal. Calculated left ventricular EF = 64%    Moderate tricuspid valve regurgitation is present.    Estimated right ventricular systolic pressure from tricuspid regurgitation is moderately elevated (45-55 mmHg).    Mild MR      Current medications:  Scheduled Meds:[Held by provider] apixaban, 2.5 mg, Oral, Q12H  arformoterol, 15 mcg, Nebulization, BID - RT   And  budesonide, 0.5 mg, Nebulization, BID - RT   And  revefenacin, 175 mcg, Nebulization, Daily - RT  [Held by provider] aspirin, 81 mg, Oral, Daily  atorvastatin, 20 mg, Oral, Nightly  dofetilide, 250 mcg, Oral, BID  [Held by provider] ferrous sulfate, 325 mg, Oral, Daily With Breakfast  gabapentin, 300 mg, Oral, Nightly  isosorbide mononitrate, 60 mg, Oral, Daily  latanoprost, 1 drop, Both Eyes, Nightly  levothyroxine, 200 mcg, Oral, Q AM  losartan, 50 mg, Oral, Q24H  nitrofurantoin (macrocrystal-monohydrate), 100 mg, Oral, BID  oxybutynin XL, 10 mg, Oral, Daily  pantoprazole, 40 mg, Oral, BID  roflumilast, 500 mcg, Oral, Daily  sertraline, 100 mg, Oral, Daily  sodium chloride, 10 mL, Intravenous, Q12H      Continuous Infusions:sodium chloride, 75 mL/hr, Last Rate: 75 mL/hr (03/25/25 1149)      PRN Meds:.  acetaminophen **OR** acetaminophen **OR** acetaminophen    senna-docusate sodium **AND** polyethylene glycol **AND** bisacodyl **AND** bisacodyl    Calcium Replacement - Follow Nurse / BPA Driven Protocol    Magnesium Standard Dose Replacement - Follow Nurse / BPA Driven Protocol    ondansetron ODT **OR** ondansetron    Phosphorus Replacement - Follow Nurse / BPA Driven Protocol    Potassium Replacement - Follow Nurse / BPA Driven Protocol    sodium chloride    sodium chloride    sodium chloride    traMADol    Assessment & Plan   Assessment & Plan     Active Hospital  Problems    Diagnosis  POA    **Lower GI bleeding [K92.2]  Yes    Polyp of colon [K63.5]  Yes    Peripheral arterial occlusive disease [I77.9]  Yes    Coronary arteriosclerosis in native artery [I25.10]  Yes      Resolved Hospital Problems   No resolved problems to display.        Brief Hospital Course to date:  Avelina Ceron is a 84 y.o. female with history of CAD, PAD, COPD w/ chronic O2 use, HTN, HLD, chronic abd pain w/ nausea, 2 admissions 10/2024 related to GI bleed, w/ multiple bleeding polyps removed except 1 which required subsequent colonoscopy w/ EMR completed 3/7/25.   1 week pta she developed worsening N/V and 1 day pta developed painless lower GI bleeding.  While in the ED lactate was measured at 5.2, CT abd/pel w/ contrast showed multiple areas of stenosis w/in the great vessels of the abdomen but she did not have acute abdominal pain  GI PCR positive for norovirus.     Acute lower GI bleeding, improved  S/p recent polypectomy 3/7/25  Hx bleeding polyps  Chronic anemia  -holding eliquis, plan to resume tomorrow   -cont home bid ppi  -GI consulted  - s/p 1 unit PRBC on 3/25     Lactic acidosis, improved  Chronic abd pain w/ vomiting  Celiac and SMA arterial stenosis  -s/p prior CCY     -CT a/p w/ severe celiac and moderate SMA stenosis, 4mm ELEAZAR aneurysm.  She had recent CT's 3/14/25, 10/24/24, 10/10/24     Positive norovirus  -supportive care     CAD  PAD s/p BL iliac artery stenting  S/p BL ICA stenting  HTN/HLD  Afib  -hold eliquis for now  -Plan to restart tomorrow and monitor CBC for the next 24 hours      Elevated troponin  -No chest pain currently  -cardio consulted but defer ischemic evaluation until Hb remains stable.  Would be possible candidate for left atrial appendage closure     Recent Dx E Coli UTI  - cont prev Rx'ed macrobid from pcp through 3/28     COPD w/ chronic O2 use   - brovana, pulmicort, yupelri, daliresp     Anxiety/Depression  - zoloft     Hypothyroidism   -Continue  levothyroxine      Expected Discharge Location and Transportation: Home  Expected Discharge   Expected Discharge Date: 3/27/2025; Expected Discharge Time:      VTE Prophylaxis:  Pharmacologic & mechanical VTE prophylaxis orders are present.         AM-PAC 6 Clicks Score (PT): 21 (03/26/25 0848)    CODE STATUS:   Code Status and Medical Interventions: CPR (Attempt to Resuscitate); Full Support   Ordered at: 03/24/25 9787     Code Status (Patient has no pulse and is not breathing):    CPR (Attempt to Resuscitate)     Medical Interventions (Patient has pulse or is breathing):    Full Support       Farrah Prado,   03/26/25

## 2025-03-25 NOTE — CONSULTS
[facility] Cardiology, Inpatient Consult  Date of Hospital Visit: 25  Encounter Provider: Yessenia Dempsey PA-C    Place of Service: [facility]  Patient Name: Avelina Ceron  :1940  MRN: [id number]     Primary Care Provider: Dre Moura MD    Chief complaint: Elevated troponin    PROBLEM LIST    CARDIAC  Coronary Artery Disease:   Reported normal stress test     Myocardium:   Echo 2024: EF 64%    Valvular:   Moderate TR, RVSP 45-55    Electrical:   PAF on Tikosyn    Percardium:   Normal    VASCULAR  Cerebrovascular disease:   Bilateral carotid endarterectomies, data deficient    Peripheral vascular disease:   Bilateral common iliac stenting, data deficient    AAA:   CTA abdomen showing severe stenosis to celiac artery and SMA  Venous:      AUTONOMIC DYSFUNCTION  Episodic orthostatic intolerance/Vasovagal:    Chronic orthostatic intolerance/POTS:    Neurocardiogenic Orthostatic Hypotension:      CARDIAC RISK FACTORS:  Hypertension  Diabetes  Dyslipidemia  Physical Inactivity      NON-CARDIAC:  Multiple GI bleeds  Chronic back pain  Iron deficiency anemia  COPD   Hypothyroidism      SURGERIES:  Cholecystectomy  Hysterectomy  Bilateral total hip arthroplasties  Bilateral endarterectomies    History of Present Illness:  Patient is an 84-year-old history of COPD on chronic oxygen, PAD with bilateral common iliac stents, carotid stenosis s/p bilateral carotid endarterectomies, PAF on Tikosyn and Eliquis and multiple GI bleeds in the past presented to the hospital with GI bleeding and fatigue.  We have been asked to consult on this patient due to an elevated troponin and some chest pressure.  Patient does not report any history of CAD.  Per chart review patient had a normal stress test in  and has not had one since.  And fatigue.  We have been asked to consult on this patient due to an elevated troponin and some chest pressure.  Patient does not report any  history of CAD.  Per chart review patient had a normal stress test in 2014 and has not had one since.  She states intermittently she has had chest pressure over the last several weeks.  She is unsure of a trigger but does also state that she has had a belt around her stomach and that seems to coincide with her eating.  Patient has been on recent antibiotics for a UTI.  She also had a recent colonoscopy 3/7 with a polypectomy.  Patient has been on Eliquis at 2.5 twice daily given her history of GI bleeding and has had on and off melena episodes since October.  Patient reports that she feels as if her heart rate has been controlled on Tikosyn for her atrial fibrillation.  She has been seen by [facility] for her cardiology care but is wishing to transition over to [facility] [place] [place] by [facility].      I reviewed patient's past medical history, surgical history, family history and social history.    Current Outpatient Medications   Medication Instructions    acetaminophen (TYLENOL) 500 mg, Every 6 Hours PRN    apixaban (ELIQUIS) 2.5 mg, Oral, Every 12 Hours Scheduled    dofetilide (TIKOSYN) 250 mcg, Oral, 2 Times Daily    ferrous sulfate 325 mg, Daily With Breakfast    Fiber Select Gummies chewable tablet 2 tablets, Daily    Fluticasone-Umeclidin-Vilant (Trelegy Ellipta) 100-62.5-25 MCG/ACT inhaler 1 puff, Inhalation, Daily    gabapentin (NEURONTIN) 300 mg, Oral, Nightly    isosorbide mononitrate (IMDUR) 30 mg, Oral, Daily    latanoprost (XALATAN) 0.005 % ophthalmic solution Administer 1 drop to both eyes Every Night.    levothyroxine (SYNTHROID, LEVOTHROID) 200 mcg, Oral, Daily    losartan (COZAAR) 50 mg, Oral, Every 24 Hours Scheduled    Mirabegron ER (MYRBETRIQ) 50 mg, Daily    Multiple Vitamins-Minerals (PRESERVISION AREDS 2 PO) 1 tablet, 2 Times Daily    nitrofurantoin (macrocrystal-monohydrate) (MACROBID) 100 mg, Oral, 2 Times Daily    nitroglycerin (NITROSTAT) 0.4 mg, Sublingual, Every 5 Minutes PRN, Take no more than 3 doses in  "15 minutes.    ondansetron (ZOFRAN) 4 mg, Oral, Every 8 Hours PRN    pantoprazole (PROTONIX) 40 mg, Oral, 2 Times Daily    polyethylene glycol (MIRALAX) 17 g, As Needed    roflumilast (DALIRESP) 500 mcg, Oral, Daily    sertraline (ZOLOFT) 100 mg, Oral, Daily    simvastatin (ZOCOR) 40 mg, Nightly    traMADol (ULTRAM) 50 mg, Oral, Every 6 to 8 Hours PRN    vitamin B-12 ([employer] VITAMIN B-12) 1,000 mcg, Oral, Daily          Scheduled Meds:[Held by provider] apixaban, 2.5 mg, Oral, Q12H  arformoterol, 15 mcg, Nebulization, BID - RT   And  budesonide, 0.5 mg, Nebulization, BID - RT   And  revefenacin, 175 mcg, Nebulization, Daily - RT  [Held by provider] aspirin, 81 mg, Oral, Daily  atorvastatin, 20 mg, Oral, Nightly  dofetilide, 250 mcg, Oral, BID  [Held by provider] ferrous sulfate, 325 mg, Oral, Daily With Breakfast  gabapentin, 300 mg, Oral, Nightly  isosorbide mononitrate, 30 mg, Oral, Daily  latanoprost, 1 drop, Both Eyes, Nightly  levothyroxine, 200 mcg, Oral, Q AM  losartan, 50 mg, Oral, Q24H  magnesium sulfate, 2 g, Intravenous, Q2H  nitrofurantoin (macrocrystal-monohydrate), 100 mg, Oral, BID  oxybutynin XL, 10 mg, Oral, Daily  pantoprazole, 40 mg, Oral, BID  roflumilast, 500 mcg, Oral, Daily  sertraline, 100 mg, Oral, Daily  sodium chloride, 10 mL, Intravenous, Q12H      Continuous Infusions:sodium chloride, 75 mL/hr, Last Rate: 75 mL/hr (03/25/25 1149)        Review of Systems            Objective:     Vitals:    03/24/25 2156 03/25/25 0020 03/25/25 0339 03/25/25 0954   BP:  157/53 128/59 134/42   BP Location:  Right arm Right arm    Patient Position:  Lying Lying    Pulse: 104  90 84   Resp: 18 18 18    Temp:  98.1 °F (36.7 °C) 97.3 °F (36.3 °C)    TempSrc:  Oral Axillary    SpO2: 96%  97%    Weight:       Height:           Body mass index is 23.91 kg/m².  Flowsheet Rows      Flowsheet Row First Filed Value   Admission Height 160 cm (63\") Documented at 03/24/2025 1238   Admission Weight 61.2 kg (135 lb) " Documented at 03/24/2025 1238            Intake/Output Summary (Last 24 hours) at 3/25/2025 1340  Last data filed at 3/25/2025 0339  Gross per 24 hour   Intake --   Output 500 ml   Net -500 ml       Constitutional:       Appearance: Not in distress.   Neck:      Vascular: JVD normal.   Pulmonary:      Comments: No wheezing, shortly after patient received DuoNeb.  Cardiovascular:      Normal rate. Regular rhythm.      Murmurs: There is no murmur.   Edema:     Peripheral edema absent.   Abdominal:      General: There is no distension.   Neurological:      Mental Status: Alert.           Lab Review:                CBC:      Lab 03/25/25  0421 03/24/25  1559 03/24/25  1302   WBC 10.98*  --  13.63*   HEMOGLOBIN 7.8*   < > 9.6*   HEMOGLOBIN, POC  --    < >  --    HEMATOCRIT 25.5*   < > 31.9*   HEMATOCRIT POC  --    < >  --    PLATELETS 232  --  340   NEUTROS ABS  --   --  10.41*   IMMATURE GRANS (ABS)  --   --  0.04   LYMPHS ABS  --   --  2.23   MONOS ABS  --   --  0.72   EOS ABS  --   --  0.15   MCV 94.1  --  94.9    < > = values in this interval not displayed.     CMP:        Lab 03/25/25  0421 03/24/25  1700 03/24/25  1302   SODIUM 145  --  145   POTASSIUM 4.2  --  3.3*   CHLORIDE 106  --  97*   CO2 29.0  --  34.0*   ANION GAP 10.0  --  14.0   BUN 7*  --  14   CREATININE 0.29* 0.50* 0.39*   EGFR 105.6  --  98.3   GLUCOSE 104*  --  139*   CALCIUM 8.6  --  9.5   MAGNESIUM 1.4*  --   --    TOTAL PROTEIN  --   --  6.7   ALBUMIN  --   --  3.8   GLOBULIN  --   --  2.9   ALT (SGPT)  --   --  12   AST (SGOT)  --   --  18   BILIRUBIN  --   --  0.3   ALK PHOS  --   --  71   LIPASE  --  75*  --      Results from last 7 days   Lab Units 03/25/25  0421   MAGNESIUM mg/dL 1.4*     Lab Results   Component Value Date    HGBA1C 5.40 08/04/2023     Estimated Creatinine Clearance: 139.5 mL/min (A) (by C-G formula based on SCr of 0.29 mg/dL (L)).          Lab 03/25/25  0421 03/24/25  1853 03/24/25  1700   HSTROP T 57* 24* 10       Lab  Results   Component Value Date    CHOL 109 03/11/2025    TRIG 96 03/11/2025    HDL 45 03/11/2025    LDL 46 03/11/2025         CT Abdomen Pelvis With & Without Contrast  Result Date: 3/24/2025  Impression: 1.No acute abnormality identified within the abdomen or pelvis. 2.No acute vascular abnormality is identified. 3.No active contrast extravasation is identified within the GI tract lumen. 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 5.Colonic diverticulosis. 6.Bilateral nonobstructing nephrolithiasis. 7.Hiatal hernia. 8.Additional findings as detailed above. Electronically Signed: Ramon Escobar MD  3/24/2025 2:35 PM EDT  Workstation ID: [id number]       Results for orders placed during the hospital encounter of 08/26/24    Adult Transthoracic Echo Limited W/ Cont if Necessary Per Protocol    Interpretation Summary    Left ventricular systolic function is normal. Calculated left ventricular EF = 64%    Moderate tricuspid valve regurgitation is present.    Estimated right ventricular systolic pressure from tricuspid regurgitation is moderately elevated (45-55 mmHg).    Mild MR       EKG:3/24/2025: Sinus rhythm with a PAC, nonspecific T wave abnormalities unchanged from previous EKGs.    Result Review:  I have personally reviewed the results from the time of admission and agree with these findings:  [x]  Laboratory  [x]  Radiology  [x]  EKG/Telemetry   []  Pathology  []  Old records  []  Other:             Assessment:   Elevated troponin, 10-24-57-35, trending down  Likely type II NSTEMI in the setting of GI bleeding.  PAF   on Tikosyn and Eliquis at home  PAD and carotid stenosis  Celiac and SMA stenosis noted on CTA of abdomen could be contributing to patient's GI symptoms.    GI bleeding  On Eliquis, currently on hold  Norovirus  positive      Plan:   Patient is not currently having active chest pain and does not have reported history of CAD although has multiple risk factors given her peripheral artery disease as well as carotid stenosis.  In the future I think it would be reasonable to do an ischemic evaluation when patient is stable.  Continue to hold aspirin and Eliquis and monitor patient's H&H.  I will increase the patient's Imdur to 60 mg daily from 30 to see if this may help with some of her chest tightness.  Low H&H likely contributing to some of her symptoms.  Continue losartan 50 mg for hypertension.  If blood pressure is lower tomorrow may need to decrease in half so that patient can get increased dose of Imdur based on her symptoms.    Continue Tikosyn 250 mcg twice daily.  Agree with replacement of magnesium, would like for potassium to be 4 or above and magnesium to be 2 or above.  Based on patient's continual GI bleeding will likely need to see our electrophysiology team as an outpatient for possible left atrial appendage occlusive device.  We will continue to follow while patient is admitted.      Yessenia Dempsey PA-C

## 2025-03-26 LAB
ALBUMIN SERPL-MCNC: 3 G/DL (ref 3.5–5.2)
ANION GAP SERPL CALCULATED.3IONS-SCNC: 8 MMOL/L (ref 5–15)
BASOPHILS # BLD AUTO: 0.07 10*3/MM3 (ref 0–0.2)
BASOPHILS NFR BLD AUTO: 0.6 % (ref 0–1.5)
BH BB BLOOD EXPIRATION DATE: NORMAL
BH BB BLOOD TYPE BARCODE: 6200
BH BB DISPENSE STATUS: NORMAL
BH BB PRODUCT CODE: NORMAL
BH BB UNIT NUMBER: NORMAL
BUN BLDA-MCNC: 12 MG/DL (ref 8–26)
BUN BLDA-MCNC: 14 MG/DL (ref 8–26)
BUN SERPL-MCNC: 5 MG/DL (ref 8–23)
BUN/CREAT SERPL: 17.9 (ref 7–25)
CA-I BLDA-SCNC: 1.15 MMOL/L (ref 1.2–1.32)
CA-I BLDA-SCNC: 1.17 MMOL/L (ref 1.2–1.32)
CALCIUM SPEC-SCNC: 7.8 MG/DL (ref 8.6–10.5)
CHLORIDE BLDA-SCNC: 94 MMOL/L (ref 98–109)
CHLORIDE BLDA-SCNC: 95 MMOL/L (ref 98–109)
CHLORIDE SERPL-SCNC: 104 MMOL/L (ref 98–107)
CO2 BLDA-SCNC: 32 MMOL/L (ref 24–29)
CO2 BLDA-SCNC: 33 MMOL/L (ref 24–29)
CO2 SERPL-SCNC: 31 MMOL/L (ref 22–29)
CREAT BLDA-MCNC: 0.5 MG/DL (ref 0.6–1.3)
CREAT BLDA-MCNC: 0.5 MG/DL (ref 0.6–1.3)
CREAT SERPL-MCNC: 0.28 MG/DL (ref 0.57–1)
CROSSMATCH INTERPRETATION: NORMAL
DEPRECATED RDW RBC AUTO: 47.6 FL (ref 37–54)
EGFRCR SERPLBLD CKD-EPI 2021: 106.5 ML/MIN/1.73
EGFRCR SERPLBLD CKD-EPI 2021: 92.6 ML/MIN/1.73
EGFRCR SERPLBLD CKD-EPI 2021: 92.6 ML/MIN/1.73
EOSINOPHIL # BLD AUTO: 0.23 10*3/MM3 (ref 0–0.4)
EOSINOPHIL NFR BLD AUTO: 2.1 % (ref 0.3–6.2)
ERYTHROCYTE [DISTWIDTH] IN BLOOD BY AUTOMATED COUNT: 13.9 % (ref 12.3–15.4)
GLUCOSE BLDC GLUCOMTR-MCNC: 100 MG/DL (ref 70–130)
GLUCOSE BLDC GLUCOMTR-MCNC: 103 MG/DL (ref 70–130)
GLUCOSE SERPL-MCNC: 85 MG/DL (ref 65–99)
HCT VFR BLD AUTO: 25.1 % (ref 34–46.6)
HCT VFR BLD AUTO: 26.2 % (ref 34–46.6)
HCT VFR BLD AUTO: 30 % (ref 34–46.6)
HCT VFR BLDA CALC: 29 % (ref 38–51)
HCT VFR BLDA CALC: 30 % (ref 38–51)
HGB BLD-MCNC: 7.9 G/DL (ref 12–15.9)
HGB BLD-MCNC: 8.3 G/DL (ref 12–15.9)
HGB BLD-MCNC: 9 G/DL (ref 12–15.9)
HGB BLDA-MCNC: 10.2 G/DL (ref 12–17)
HGB BLDA-MCNC: 9.9 G/DL (ref 12–17)
IMM GRANULOCYTES # BLD AUTO: 0.05 10*3/MM3 (ref 0–0.05)
IMM GRANULOCYTES NFR BLD AUTO: 0.5 % (ref 0–0.5)
LYMPHOCYTES # BLD AUTO: 2.17 10*3/MM3 (ref 0.7–3.1)
LYMPHOCYTES NFR BLD AUTO: 19.7 % (ref 19.6–45.3)
MAGNESIUM SERPL-MCNC: 1.9 MG/DL (ref 1.6–2.4)
MCH RBC QN AUTO: 29.2 PG (ref 26.6–33)
MCHC RBC AUTO-ENTMCNC: 31.7 G/DL (ref 31.5–35.7)
MCV RBC AUTO: 92.3 FL (ref 79–97)
MONOCYTES # BLD AUTO: 0.97 10*3/MM3 (ref 0.1–0.9)
MONOCYTES NFR BLD AUTO: 8.8 % (ref 5–12)
NEUTROPHILS NFR BLD AUTO: 68.3 % (ref 42.7–76)
NEUTROPHILS NFR BLD AUTO: 7.55 10*3/MM3 (ref 1.7–7)
NRBC BLD AUTO-RTO: 0 /100 WBC (ref 0–0.2)
PHOSPHATE SERPL-MCNC: 2.7 MG/DL (ref 2.5–4.5)
PLATELET # BLD AUTO: 224 10*3/MM3 (ref 140–450)
PMV BLD AUTO: 9.8 FL (ref 6–12)
POTASSIUM BLDA-SCNC: 3.4 MMOL/L (ref 3.5–4.9)
POTASSIUM BLDA-SCNC: 3.5 MMOL/L (ref 3.5–4.9)
POTASSIUM SERPL-SCNC: 3.9 MMOL/L (ref 3.5–5.2)
POTASSIUM SERPL-SCNC: 4.3 MMOL/L (ref 3.5–5.2)
RBC # BLD AUTO: 2.84 10*6/MM3 (ref 3.77–5.28)
SODIUM BLD-SCNC: 140 MMOL/L (ref 138–146)
SODIUM BLD-SCNC: 142 MMOL/L (ref 138–146)
SODIUM SERPL-SCNC: 143 MMOL/L (ref 136–145)
UNIT  ABO: NORMAL
UNIT  RH: NORMAL
WBC NRBC COR # BLD AUTO: 11.04 10*3/MM3 (ref 3.4–10.8)

## 2025-03-26 PROCEDURE — 85025 COMPLETE CBC W/AUTO DIFF WBC: CPT | Performed by: INTERNAL MEDICINE

## 2025-03-26 PROCEDURE — 94799 UNLISTED PULMONARY SVC/PX: CPT

## 2025-03-26 PROCEDURE — 85014 HEMATOCRIT: CPT | Performed by: INTERNAL MEDICINE

## 2025-03-26 PROCEDURE — 99232 SBSQ HOSP IP/OBS MODERATE 35: CPT | Performed by: INTERNAL MEDICINE

## 2025-03-26 PROCEDURE — 80069 RENAL FUNCTION PANEL: CPT | Performed by: INTERNAL MEDICINE

## 2025-03-26 PROCEDURE — 25810000003 SODIUM CHLORIDE 0.9 % SOLUTION: Performed by: INTERNAL MEDICINE

## 2025-03-26 PROCEDURE — 85018 HEMOGLOBIN: CPT | Performed by: INTERNAL MEDICINE

## 2025-03-26 PROCEDURE — 63710000001 REVEFENACIN 175 MCG/3ML SOLUTION: Performed by: INTERNAL MEDICINE

## 2025-03-26 PROCEDURE — 83735 ASSAY OF MAGNESIUM: CPT

## 2025-03-26 PROCEDURE — 99232 SBSQ HOSP IP/OBS MODERATE 35: CPT | Performed by: PHYSICIAN ASSISTANT

## 2025-03-26 PROCEDURE — 84132 ASSAY OF SERUM POTASSIUM: CPT | Performed by: INTERNAL MEDICINE

## 2025-03-26 RX ORDER — POTASSIUM CHLORIDE 1500 MG/1
20 TABLET, EXTENDED RELEASE ORAL ONCE
Status: COMPLETED | OUTPATIENT
Start: 2025-03-26 | End: 2025-03-26

## 2025-03-26 RX ORDER — POTASSIUM CHLORIDE 1.5 G/1.58G
20 POWDER, FOR SOLUTION ORAL ONCE
Status: DISCONTINUED | OUTPATIENT
Start: 2025-03-26 | End: 2025-03-26

## 2025-03-26 RX ADMIN — BUDESONIDE 0.5 MG: 0.5 INHALANT RESPIRATORY (INHALATION) at 09:40

## 2025-03-26 RX ADMIN — GABAPENTIN 300 MG: 300 CAPSULE ORAL at 21:40

## 2025-03-26 RX ADMIN — TRAMADOL HYDROCHLORIDE 50 MG: 50 TABLET, COATED ORAL at 04:59

## 2025-03-26 RX ADMIN — LOSARTAN POTASSIUM 50 MG: 50 TABLET, FILM COATED ORAL at 08:48

## 2025-03-26 RX ADMIN — DOFETILIDE 250 MCG: 0.25 CAPSULE ORAL at 21:40

## 2025-03-26 RX ADMIN — NITROFURANTOIN MONOHYDRATE/MACROCRYSTALS 100 MG: 75; 25 CAPSULE ORAL at 21:40

## 2025-03-26 RX ADMIN — SERTRALINE 100 MG: 100 TABLET, FILM COATED ORAL at 08:48

## 2025-03-26 RX ADMIN — NITROFURANTOIN MONOHYDRATE/MACROCRYSTALS 100 MG: 75; 25 CAPSULE ORAL at 08:48

## 2025-03-26 RX ADMIN — POTASSIUM CHLORIDE 20 MEQ: 1500 TABLET, EXTENDED RELEASE ORAL at 11:01

## 2025-03-26 RX ADMIN — BUDESONIDE 0.5 MG: 0.5 INHALANT RESPIRATORY (INHALATION) at 21:42

## 2025-03-26 RX ADMIN — DOFETILIDE 250 MCG: 0.25 CAPSULE ORAL at 08:48

## 2025-03-26 RX ADMIN — REVEFENACIN 175 MCG: 175 SOLUTION RESPIRATORY (INHALATION) at 09:40

## 2025-03-26 RX ADMIN — SODIUM CHLORIDE 75 ML/HR: 9 INJECTION, SOLUTION INTRAVENOUS at 23:29

## 2025-03-26 RX ADMIN — TRAMADOL HYDROCHLORIDE 50 MG: 50 TABLET, COATED ORAL at 16:47

## 2025-03-26 RX ADMIN — TRAMADOL HYDROCHLORIDE 50 MG: 50 TABLET, COATED ORAL at 23:29

## 2025-03-26 RX ADMIN — Medication 10 ML: at 08:49

## 2025-03-26 RX ADMIN — LEVOTHYROXINE SODIUM 200 MCG: 0.1 TABLET ORAL at 04:52

## 2025-03-26 RX ADMIN — PANTOPRAZOLE SODIUM 40 MG: 40 TABLET, DELAYED RELEASE ORAL at 21:40

## 2025-03-26 RX ADMIN — Medication 10 ML: at 23:30

## 2025-03-26 RX ADMIN — LATANOPROST 1 DROP: 50 SOLUTION OPHTHALMIC at 23:30

## 2025-03-26 RX ADMIN — OXYBUTYNIN CHLORIDE 10 MG: 10 TABLET, EXTENDED RELEASE ORAL at 08:48

## 2025-03-26 RX ADMIN — ATORVASTATIN CALCIUM 20 MG: 20 TABLET, FILM COATED ORAL at 21:40

## 2025-03-26 RX ADMIN — ARFORMOTEROL TARTRATE 15 MCG: 15 SOLUTION RESPIRATORY (INHALATION) at 09:40

## 2025-03-26 RX ADMIN — ARFORMOTEROL TARTRATE 15 MCG: 15 SOLUTION RESPIRATORY (INHALATION) at 21:42

## 2025-03-26 RX ADMIN — ISOSORBIDE MONONITRATE 60 MG: 60 TABLET, EXTENDED RELEASE ORAL at 08:48

## 2025-03-26 RX ADMIN — PANTOPRAZOLE SODIUM 40 MG: 40 TABLET, DELAYED RELEASE ORAL at 08:48

## 2025-03-26 RX ADMIN — ROFLUMILAST 500 MCG: 500 TABLET ORAL at 08:48

## 2025-03-27 LAB
ALBUMIN SERPL-MCNC: 2.9 G/DL (ref 3.5–5.2)
ANION GAP SERPL CALCULATED.3IONS-SCNC: 7 MMOL/L (ref 5–15)
BASOPHILS # BLD AUTO: 0.06 10*3/MM3 (ref 0–0.2)
BASOPHILS NFR BLD AUTO: 0.6 % (ref 0–1.5)
BUN SERPL-MCNC: 7 MG/DL (ref 8–23)
BUN/CREAT SERPL: 20 (ref 7–25)
CALCIUM SPEC-SCNC: 8.5 MG/DL (ref 8.6–10.5)
CHLORIDE SERPL-SCNC: 104 MMOL/L (ref 98–107)
CO2 SERPL-SCNC: 32 MMOL/L (ref 22–29)
CREAT SERPL-MCNC: 0.35 MG/DL (ref 0.57–1)
DEPRECATED RDW RBC AUTO: 47.1 FL (ref 37–54)
EGFRCR SERPLBLD CKD-EPI 2021: 100.9 ML/MIN/1.73
EOSINOPHIL # BLD AUTO: 0.21 10*3/MM3 (ref 0–0.4)
EOSINOPHIL NFR BLD AUTO: 2 % (ref 0.3–6.2)
ERYTHROCYTE [DISTWIDTH] IN BLOOD BY AUTOMATED COUNT: 13.9 % (ref 12.3–15.4)
GLUCOSE SERPL-MCNC: 107 MG/DL (ref 65–99)
HCT VFR BLD AUTO: 26.2 % (ref 34–46.6)
HCT VFR BLD AUTO: 26.8 % (ref 34–46.6)
HCT VFR BLD AUTO: 27 % (ref 34–46.6)
HGB BLD-MCNC: 8 G/DL (ref 12–15.9)
HGB BLD-MCNC: 8.2 G/DL (ref 12–15.9)
HGB BLD-MCNC: 8.2 G/DL (ref 12–15.9)
IMM GRANULOCYTES # BLD AUTO: 0.04 10*3/MM3 (ref 0–0.05)
IMM GRANULOCYTES NFR BLD AUTO: 0.4 % (ref 0–0.5)
LYMPHOCYTES # BLD AUTO: 2.3 10*3/MM3 (ref 0.7–3.1)
LYMPHOCYTES NFR BLD AUTO: 21.9 % (ref 19.6–45.3)
MCH RBC QN AUTO: 28.7 PG (ref 26.6–33)
MCHC RBC AUTO-ENTMCNC: 30.5 G/DL (ref 31.5–35.7)
MCV RBC AUTO: 93.9 FL (ref 79–97)
MONOCYTES # BLD AUTO: 0.86 10*3/MM3 (ref 0.1–0.9)
MONOCYTES NFR BLD AUTO: 8.2 % (ref 5–12)
NEUTROPHILS NFR BLD AUTO: 66.9 % (ref 42.7–76)
NEUTROPHILS NFR BLD AUTO: 7.03 10*3/MM3 (ref 1.7–7)
NRBC BLD AUTO-RTO: 0 /100 WBC (ref 0–0.2)
PHOSPHATE SERPL-MCNC: 3.2 MG/DL (ref 2.5–4.5)
PLATELET # BLD AUTO: 231 10*3/MM3 (ref 140–450)
PMV BLD AUTO: 10 FL (ref 6–12)
POTASSIUM SERPL-SCNC: 4.2 MMOL/L (ref 3.5–5.2)
QT INTERVAL: 366 MS
QT INTERVAL: 374 MS
QTC INTERVAL: 442 MS
QTC INTERVAL: 465 MS
RBC # BLD AUTO: 2.79 10*6/MM3 (ref 3.77–5.28)
SODIUM SERPL-SCNC: 143 MMOL/L (ref 136–145)
WBC NRBC COR # BLD AUTO: 10.5 10*3/MM3 (ref 3.4–10.8)

## 2025-03-27 PROCEDURE — 94761 N-INVAS EAR/PLS OXIMETRY MLT: CPT

## 2025-03-27 PROCEDURE — 94799 UNLISTED PULMONARY SVC/PX: CPT

## 2025-03-27 PROCEDURE — 97530 THERAPEUTIC ACTIVITIES: CPT

## 2025-03-27 PROCEDURE — 94664 DEMO&/EVAL PT USE INHALER: CPT

## 2025-03-27 PROCEDURE — 97161 PT EVAL LOW COMPLEX 20 MIN: CPT

## 2025-03-27 PROCEDURE — 99232 SBSQ HOSP IP/OBS MODERATE 35: CPT | Performed by: INTERNAL MEDICINE

## 2025-03-27 PROCEDURE — 25810000003 SODIUM CHLORIDE 0.9 % SOLUTION: Performed by: INTERNAL MEDICINE

## 2025-03-27 PROCEDURE — 85018 HEMOGLOBIN: CPT | Performed by: PHYSICIAN ASSISTANT

## 2025-03-27 PROCEDURE — 63710000001 REVEFENACIN 175 MCG/3ML SOLUTION: Performed by: INTERNAL MEDICINE

## 2025-03-27 PROCEDURE — 93005 ELECTROCARDIOGRAM TRACING: CPT | Performed by: INTERNAL MEDICINE

## 2025-03-27 PROCEDURE — 93010 ELECTROCARDIOGRAM REPORT: CPT | Performed by: INTERNAL MEDICINE

## 2025-03-27 PROCEDURE — 99232 SBSQ HOSP IP/OBS MODERATE 35: CPT | Performed by: PHYSICIAN ASSISTANT

## 2025-03-27 PROCEDURE — 85014 HEMATOCRIT: CPT | Performed by: PHYSICIAN ASSISTANT

## 2025-03-27 PROCEDURE — 85025 COMPLETE CBC W/AUTO DIFF WBC: CPT | Performed by: INTERNAL MEDICINE

## 2025-03-27 PROCEDURE — 80069 RENAL FUNCTION PANEL: CPT | Performed by: INTERNAL MEDICINE

## 2025-03-27 RX ADMIN — ARFORMOTEROL TARTRATE 15 MCG: 15 SOLUTION RESPIRATORY (INHALATION) at 21:05

## 2025-03-27 RX ADMIN — PANTOPRAZOLE SODIUM 40 MG: 40 TABLET, DELAYED RELEASE ORAL at 20:25

## 2025-03-27 RX ADMIN — BUDESONIDE 0.5 MG: 0.5 INHALANT RESPIRATORY (INHALATION) at 09:14

## 2025-03-27 RX ADMIN — REVEFENACIN 175 MCG: 175 SOLUTION RESPIRATORY (INHALATION) at 09:14

## 2025-03-27 RX ADMIN — DOFETILIDE 250 MCG: 0.25 CAPSULE ORAL at 10:27

## 2025-03-27 RX ADMIN — DOFETILIDE 250 MCG: 0.25 CAPSULE ORAL at 20:24

## 2025-03-27 RX ADMIN — GABAPENTIN 300 MG: 300 CAPSULE ORAL at 20:24

## 2025-03-27 RX ADMIN — ROFLUMILAST 500 MCG: 500 TABLET ORAL at 10:28

## 2025-03-27 RX ADMIN — TRAMADOL HYDROCHLORIDE 50 MG: 50 TABLET, COATED ORAL at 20:28

## 2025-03-27 RX ADMIN — Medication 10 ML: at 10:29

## 2025-03-27 RX ADMIN — SODIUM CHLORIDE 75 ML/HR: 9 INJECTION, SOLUTION INTRAVENOUS at 10:33

## 2025-03-27 RX ADMIN — Medication 10 ML: at 20:27

## 2025-03-27 RX ADMIN — NITROFURANTOIN MONOHYDRATE/MACROCRYSTALS 100 MG: 75; 25 CAPSULE ORAL at 20:25

## 2025-03-27 RX ADMIN — SERTRALINE 100 MG: 100 TABLET, FILM COATED ORAL at 10:27

## 2025-03-27 RX ADMIN — LATANOPROST 1 DROP: 50 SOLUTION OPHTHALMIC at 20:26

## 2025-03-27 RX ADMIN — OXYBUTYNIN CHLORIDE 10 MG: 10 TABLET, EXTENDED RELEASE ORAL at 10:28

## 2025-03-27 RX ADMIN — ATORVASTATIN CALCIUM 20 MG: 20 TABLET, FILM COATED ORAL at 20:24

## 2025-03-27 RX ADMIN — BUDESONIDE 0.5 MG: 0.5 INHALANT RESPIRATORY (INHALATION) at 21:05

## 2025-03-27 RX ADMIN — NITROFURANTOIN MONOHYDRATE/MACROCRYSTALS 100 MG: 75; 25 CAPSULE ORAL at 10:28

## 2025-03-27 RX ADMIN — LOSARTAN POTASSIUM 50 MG: 50 TABLET, FILM COATED ORAL at 10:27

## 2025-03-27 RX ADMIN — LEVOTHYROXINE SODIUM 200 MCG: 0.1 TABLET ORAL at 06:04

## 2025-03-27 RX ADMIN — PANTOPRAZOLE SODIUM 40 MG: 40 TABLET, DELAYED RELEASE ORAL at 10:29

## 2025-03-27 RX ADMIN — ISOSORBIDE MONONITRATE 60 MG: 60 TABLET, EXTENDED RELEASE ORAL at 10:28

## 2025-03-27 RX ADMIN — TRAMADOL HYDROCHLORIDE 50 MG: 50 TABLET, COATED ORAL at 06:04

## 2025-03-27 NOTE — THERAPY EVALUATION
"Patient Name: Avelina Ceron  : 1940    MRN: [id number]                              Today's Date: 3/27/2025       Admit Date: 3/24/2025    Visit Dx:     ICD-10-CM ICD-9-CM   1. Rectal bleeding  K62.5 569.3   2. Other fatigue  R53.83 780.79   3. Chest pressure  R07.89 786.59   4. Elevated lactic acid level  R79.89 276.2     Patient Active Problem List   Diagnosis    Coronary arteriosclerosis in native artery    Chronic obstructive pulmonary disease    Depression    Gastroesophageal reflux disease with esophagitis    Hyperlipidemia    Hypertension    Hypothyroidism    Osteoarthritis    Peripheral arterial occlusive disease    Solitary pulmonary nodule    Vitamin D deficiency    Lower back pain    Carotid bruit    Hypoxemia    Chronic respiratory failure with hypoxia and hypercapnia    Allergic rhinitis    Chronic leukocytosis    Ureterolithiasis    Murmur, cardiac    Hospital discharge follow-up    Closed fracture of right proximal humerus    Right shoulder pain    Acute hypoxemic respiratory failure    Anemia due to blood loss, acute    Blood loss anemia    Anemia due to acute blood loss    Melena    Polyp of colon    O2 dependent    Lower GI bleeding     Past Medical History:   Diagnosis Date    Anemia     Anesthesia complication     Pt went into respiratory failure during total hip replacement in     Arthritis of back     Arthritis of neck     Atrial fibrillation     Broken ribs     COPD (chronic obstructive pulmonary disease)     Coronary artery disease     Degenerative lumbar disc     Disease of thyroid gland     \"low thyroid\"    Fracture, fibula ?    Fracture, humerus 23    Fracture, tibia and fibula ?    GI bleed 2024    Hard to intubate     Hip arthrosis     History of colonoscopy     History of transfusion     bhl 10/2024, no reaction    Hyperlipidemia     Hypertension     Internal carotid artery stent present     bialteral    Kidney stone 2023    Knee swelling     " Lumbosacral disc disease     Murmur     On home O2     3L    Osteoarthritis     Periarthritis of shoulder     PVD (peripheral vascular disease)     bilateral stents    Urinary incontinence     Urinary tract infection      Past Surgical History:   Procedure Laterality Date    CAROTID STENT Bilateral     COLONOSCOPY      COLONOSCOPY N/A 10/12/2024    Procedure: COLONOSCOPY;  Surgeon: Markie Bautista MD;  Location: [facility];  Service: Gastroenterology;  Laterality: N/A;    COLONOSCOPY N/A 3/7/2025    Procedure: COLONOSCOPY;  Surgeon: Shant Callahan MD;  Location: [facility];  Service: Gastroenterology;  Laterality: N/A;    ENDOSCOPY N/A 10/11/2024    Procedure: ESOPHAGOGASTRODUODENOSCOPY;  Surgeon: Markie Bautista MD;  Location: [facility];  Service: Gastroenterology;  Laterality: N/A;    ENDOSCOPY N/A 10/12/2024    Procedure: ESOPHAGOGASTRODUODENOSCOPY;  Surgeon: Markie Bautista MD;  Location: [facility];  Service: Gastroenterology;  Laterality: N/A;    EYE SURGERY  10/17/2022    yag surgery    GALLBLADDER SURGERY  2012    HYSTERECTOMY      ILIAC ARTERY STENT Bilateral     KIDNEY STONE SURGERY  July August 2023    OOPHORECTOMY  1976    1 ovary removed    TOTAL HIP ARTHROPLASTY Bilateral     URETEROSCOPY LASER LITHOTRIPSY WITH STENT INSERTION Right 07/26/2023    Procedure: CYSTOSCOPY, RIGHT RETROGRADE PYELOGRAM; WITH STENT INSERTION;  Surgeon: Lee Kim MD;  Location:  [facility];  Service: Urology;  Laterality: Right;    URETEROSCOPY LASER LITHOTRIPSY WITH STENT INSERTION Right 08/10/2023    Procedure: URETEROSCOPY, LASER LITHOTRIPSY, STENT EXCHANGE;  Surgeon: Lee Kim MD;  Location:  [facility];  Service: Urology;  Laterality: Right;      General Information       Row Name 03/27/25 1543          Physical Therapy Time and Intention    Document Type evaluation  -     Mode of Treatment physical therapy  -       Row Name 03/27/25 1543          General  Information    Patient Profile Reviewed yes  -     Prior Level of Function independent:;all household mobility;community mobility;gait;transfer;bed mobility;ADL's;dependent:;cooking;driving  Rollator for mobility. 2L O2 at baseline. Denies falls  -     Existing Precautions/Restrictions fall;oxygen therapy device and L/min  -     Barriers to Rehab medically complex;previous functional deficit  -       Row Name 03/27/25 1543          Living Environment    Current Living Arrangements home  -     People in Home child(gabriela), adult  -       Row Name 03/27/25 1543          Home Main Entrance    Number of Stairs, Main Entrance three  -     Stair Railings, Main Entrance railings on both sides of stairs  -       Row Name 03/27/25 1543          Stairs Within Home, Primary    Stairs, Within Home, Primary 1 step down into family room  -     Number of Stairs, Within Home, Primary one  -     Stair Railings, Within Home, Primary railings safe and in good condition  -       Row Name 03/27/25 1543          Cognition    Orientation Status (Cognition) oriented x 4  -       Row Name 03/27/25 1543          Safety Issues/Impairments Affecting Functional Mobility    Safety Issues Affecting Function (Mobility) awareness of need for assistance;safety precaution awareness;safety precautions follow-through/compliance;insight into deficits/self-awareness  -     Impairments Affecting Function (Mobility) endurance/activity tolerance;shortness of breath;strength  -               User Key  (r) = Recorded By, (t) = Taken By, (c) = Cosigned By      Initials Name Provider Type     Ju Bess PT Physical Therapist                   Mobility       Row Name 03/27/25 1545          Bed Mobility    Bed Mobility supine-sit  -     Supine-Sit [place] (Bed Mobility) modified independence  -     Assistive Device (Bed Mobility) head of bed elevated  -     Comment, (Bed Mobility) Denies dizziness w/ mobility  -        Row Name 03/27/25 1545          Transfers    Comment, (Transfers) demo good hand placement and sequencing  -[facility] Name 03/27/25 1545          Sit-Stand Transfer    Sit-Stand [place] (Transfers) standby assist  -     Assistive Device (Sit-Stand Transfers) walker, front-wheeled  -[facility] Name 03/27/25 1545          Gait/Stairs (Locomotion)    [place] Level (Gait) standby assist;verbal cues  -     Assistive Device (Gait) walker, front-wheeled  -     Patient was able to Ambulate yes  -     Distance in Feet (Gait) 40  -     Deviations/Abnormal Patterns (Gait) bilateral deviations;clarisse decreased;gait speed decreased;stride length decreased  -     Bilateral Gait Deviations forward flexed posture  -     Comment, (Gait/Stairs) Pt ambulated in room and demo step through gait pattern w/ decreased step length. VCs for PLB and AD management. No LOB. FIELDS noted w/ SpO2 84% after ambulation. Recovered >90% w/in 30 seconds w/ seated rest and cues for PLB  -               User Key  (r) = Recorded By, (t) = Taken By, (c) = Cosigned By      Initials Name Provider Type     Ju Bess, PT Physical Therapist                   Obj/Interventions       [facility] Name 03/27/25 1549          Range of Motion Comprehensive    General Range of Motion bilateral lower extremity ROM WFL  -[facility] Name 03/27/25 1549          Strength Comprehensive (MMT)    General Manual Muscle Testing (MMT) Assessment lower extremity strength deficits identified  -     Comment, General Manual Muscle Testing (MMT) Assessment BLEs grossly 4+/5  -[facility] Name 03/27/25 1549          Motor Skills    Motor Skills functional endurance  -     Functional Endurance FIELDS noted w/ SpO2 84% after ambulation. Recovered >90% w/in 30 seconds w/ seated rest and cues for PLB  -       Row Name 03/27/25 1549          Balance    Balance Assessment sitting static balance;sitting dynamic balance;standing static balance;standing  dynamic balance  -     Static Sitting Balance independent  -     Dynamic Sitting Balance supervision  -     Position, Sitting Balance unsupported;sitting edge of bed  -     Static Standing Balance standby assist  -     Dynamic Standing Balance standby assist;verbal cues  -     Position/Device Used, Standing Balance supported;walker, front-wheeled  -     Balance Interventions sitting;standing;sit to stand;supported;static;dynamic;occupation based/functional task  -[facility] Name 03/27/25 1546          Sensory Assessment (Somatosensory)    Sensory Assessment (Somatosensory) LE sensation intact  -               User Key  (r) = Recorded By, (t) = Taken By, (c) = Cosigned By      Initials Name Provider Type     Ju Bess, PT Physical Therapist                   Goals/Plan       [facility] Name 03/27/25 5631          Transfer Goal 1 (PT)    Activity/Assistive Device (Transfer Goal 1, PT) sit-to-stand/stand-to-sit;bed-to-chair/chair-to-bed  -     [place] Level/Cues Needed (Transfer Goal 1, PT) modified independence  -     Time Frame (Transfer Goal 1, PT) short term goal (STG);3 days  -[facility] Name 03/27/25 3491          Gait Training Goal 1 (PT)    Activity/Assistive Device (Gait Training Goal 1, PT) gait (walking locomotion);assistive device use  -     [place] Level (Gait Training Goal 1, PT) modified independence  [facility]     Distance (Gait Training Goal 1, PT) 150 ft  -     Time Frame (Gait Training Goal 1, PT) long term goal (LTG);5 days  -[facility] Name 03/27/25 5401          Therapy Assessment/Plan (PT)    Planned Therapy Interventions (PT) balance training;bed mobility training;gait training;home exercise program;neuromuscular re-education;patient/family education;postural re-education;ROM (range of motion);strengthening;stretching;transfer training  -               User Key  (r) = Recorded By, (t) = Taken By, (c) = Cosigned By      Initials Name Provider Type      Ju Bess, PT Physical Therapist                   Clinical Impression       [facility] Name 03/27/25 1550          Pain    Pretreatment Pain Rating 0/10 - no pain  -     Posttreatment Pain Rating 0/10 - no pain  -[facility] Name 03/27/25 1559          Plan of Care Review    Plan of Care Reviewed With patient  -     Outcome Evaluation PT eval completed. Pt presents slightly below baseline function d/t generalized weakness, mild balance deficits, and decreased activity tolerance. Pt ambulated 40 ft in room w/ FWW, SBA. No LOB or unsteadiness noted. Pt would benefit from IP PT services while hospitalized. Recommend home w/ assist at d/c.  -       Row Name 03/27/25 1553          Therapy Assessment/Plan (PT)    Patient/Family Therapy Goals Statement (PT) to go home  -     Rehab Potential (PT) good  -     Criteria for Skilled Interventions Met (PT) yes;meets criteria;skilled treatment is necessary  -     Therapy Frequency (PT) daily  -     Predicted Duration of Therapy Intervention (PT) 5 days  -[facility] Name 03/27/25 1550          Vital Signs    Pre Systolic BP Rehab 105  -LH     Pre Treatment Diastolic BP 47  -LH     Pretreatment Heart Rate (beats/min) 95  -     Posttreatment Heart Rate (beats/min) 89  -LH     O2 Delivery Pre Treatment nasal cannula  -     Intra SpO2 (%) 84  -LH     O2 Delivery Intra Treatment nasal cannula  -     Post SpO2 (%) 94  -LH     O2 Delivery Post Treatment nasal cannula  -LH     Pre Patient Position Supine  -     Intra Patient Position Sitting  -     Post Patient Position Sitting  -[facility] Name 03/27/25 1551          Positioning and Restraints    Pre-Treatment Position in bed  -     Post Treatment Position chair  -     In Chair notified nsg;reclined;sitting;call light within reach;encouraged to call for assist;exit alarm on;waffle cushion;legs elevated  -               User Key  (r) = Recorded By, (t) = Taken By, (c) = Cosigned By      Initials Name  Provider Type     Ju Bess, ROSA Physical Therapist                   Outcome Measures       Row Name 03/27/25 1552 03/27/25 0850       How much help from another person do you currently need...    Turning from your back to your side while in flat bed without using bedrails? 4  - 4  -AC    Moving from lying on back to sitting on the side of a flat bed without bedrails? 4  - 4  -AC    Moving to and from a bed to a chair (including a wheelchair)? 3  - 3  -AC    Standing up from a chair using your arms (e.g., wheelchair, bedside chair)? 3  - 4  -AC    Climbing 3-5 steps with a railing? 3  - 3  -AC    To walk in hospital room? 3  - 3  -AC    AM-PAC 6 Clicks Score (PT) 20  - 21  -AC    Highest Level of Mobility Goal 6 --> Walk 10 steps or more  - 6 --> Walk 10 steps or more  -      Row Name 03/27/25 1552          Functional Assessment    Outcome Measure Options AM-PAC 6 Clicks Basic Mobility (PT)  -               User Key  (r) = Recorded By, (t) = Taken By, (c) = Cosigned By      Initials Name Provider Type     Vera Randhawa, RN Registered Nurse     Ju Bess, PT Physical Therapist                                 Physical Therapy Education       Title: PT OT SLP Therapies (In Progress)       Topic: Physical Therapy (In Progress)       Point: Mobility training (Done)       Learning Progress Summary            Patient Acceptance, E, VU,NR by  at 3/27/2025 1503                      Point: Home exercise program (Not Started)       Learner Progress:  Not documented in this visit.              Point: Body mechanics (Done)       Learning Progress Summary            Patient Acceptance, E, VU,NR by  at 3/27/2025 1503                      Point: Precautions (Done)       Learning Progress Summary            Patient Acceptance, E, VU,NR by  at 3/27/2025 1503                                      User Key       Initials Effective Dates Name Provider Type [facility] 09/21/23 -  Shahriar  Ju, PT Physical Therapist PT                  PT Recommendation and Plan  Planned Therapy Interventions (PT): balance training, bed mobility training, gait training, home exercise program, neuromuscular re-education, patient/family education, postural re-education, ROM (range of motion), strengthening, stretching, transfer training  Outcome Evaluation: PT eval completed. Pt presents slightly below baseline function d/t generalized weakness, mild balance deficits, and decreased activity tolerance. Pt ambulated 40 ft in room w/ FWW, SBA. No LOB or unsteadiness noted. Pt would benefit from IP PT services while hospitalized. Recommend home w/ assist at d/c.     Time Calculation:   PT Evaluation Complexity  History, PT Evaluation Complexity: 3 or more personal factors and/or comorbidities  Examination of Body Systems (PT Eval Complexity): total of 4 or more elements  Clinical Presentation (PT Evaluation Complexity): stable  Clinical Decision Making (PT Evaluation Complexity): low complexity  Overall Complexity (PT Evaluation Complexity): low complexity     PT Charges       Row Name 03/27/25 1553             Time Calculation    Start Time 1503  -LH      PT Received On 03/27/25  -      PT Goal Re-Cert Due Date 04/06/25  -         Timed Charges    90848 - PT Therapeutic Activity Minutes 8  -LH         Untimed Charges    PT Eval/Re-eval Minutes 47  -LH         Total Minutes    Timed Charges Total Minutes 8  -LH      Untimed Charges Total Minutes 47  -LH       Total Minutes 55  -LH                User Key  (r) = Recorded By, (t) = Taken By, (c) = Cosigned By      Initials Name Provider Type     Ju Bess, PT Physical Therapist                  Therapy Charges for Today       Code Description Service Date Service Provider Modifiers Qty    73173317293 HC PT THERAPEUTIC ACT EA 15 MIN 3/27/2025 Ju Bess, PT GP 1    87466515219 HC PT EVAL LOW COMPLEXITY 4 3/27/2025 Ju Bess, PT GP 1            PT  G-Codes  Outcome Measure Options: AM-PAC 6 Clicks Basic Mobility (PT)  AM-PAC 6 Clicks Score (PT): 20  PT Discharge Summary  Anticipated Discharge Disposition (PT): home with assist    Ju Bess, ROSA  3/27/2025

## 2025-03-27 NOTE — PLAN OF CARE
Problem: Adult Inpatient Plan of Care  Goal: Plan of Care Review  Outcome: Progressing  Goal: Patient-Specific Goal (Individualized)  Outcome: Progressing  Goal: Absence of Hospital-Acquired Illness or Injury  Outcome: Progressing  Intervention: Identify and Manage Fall Risk  Description: Perform standard risk assessment on admission using a validated tool or comprehensive approach appropriate to the patient; reassess fall risk frequently, with change in status or transfer to another level of care.Communicate risk to interprofessional healthcare team; ensure fall risk visible cue.Determine need for increased observation, equipment and environmental modification, as well as use of supportive, nonskid footwear.Adjust safety measures to individual needs and identified risk factors.Reinforce the importance of active participation with fall risk prevention, safety, and physical activity with the patient and family.Perform regular intentional rounding to assess need for position change, pain assessment and personal needs, including assistance with toileting.  Recent Flowsheet Documentation  Taken 3/27/2025 1600 by Vera Randhawa, RN  Safety Promotion/Fall Prevention:   activity supervised   assistive device/personal items within reach   clutter free environment maintained   fall prevention program maintained   nonskid shoes/slippers when out of bed   safety round/check completed   room organization consistent  Taken 3/27/2025 1200 by Vera Randhawa, RN  Safety Promotion/Fall Prevention:   activity supervised   assistive device/personal items within reach   clutter free environment maintained   fall prevention program maintained   nonskid shoes/slippers when out of bed   room organization consistent   safety round/check completed  Taken 3/27/2025 0850 by Vera Randhawa, RN  Safety Promotion/Fall Prevention:   assistive device/personal items within reach   clutter free environment maintained   fall prevention program  maintained   nonskid shoes/slippers when out of bed   room organization consistent   safety round/check completed  Intervention: Prevent Skin Injury  Description: Perform a screening for skin injury risk, such as pressure or moisture-associated skin damage on admission and at regular intervals throughout hospital stay.Keep all areas of skin (especially folds) clean and dry.Maintain adequate skin hydration.Relieve and redistribute pressure and protect bony prominences and skin at risk for injury; implement measures based on patient-specific risk factors.Match turning and repositioning schedule to clinical condition.Encourage weight shift frequently; assist with reposition if unable to complete independently.Float heels off bed; avoid pressure on the Achilles tendon.Keep skin free from extended contact with medical devices.Optimize nutrition and hydration.Encourage functional activity and mobility, as early as tolerated.Use aids (e.g., slide boards, mechanical lift) during transfer.  Recent Flowsheet Documentation  Taken 3/27/2025 1600 by Vera Randhawa RN  Body Position:   position changed independently   lower extremity elevated   neutral body alignment   neutral head position  Skin Protection:   incontinence pads utilized   protective footwear used   silicone foam dressing in place  Taken 3/27/2025 1200 by Vera Randhawa RN  Body Position:   position changed independently   lower extremity elevated   neutral head position   neutral body alignment  Skin Protection:   incontinence pads utilized   protective footwear used   silicone foam dressing in place  Taken 3/27/2025 0850 by Vera Randhawa RN  Body Position:   position changed independently   lower extremity elevated   neutral body alignment   neutral head position  Skin Protection:   incontinence pads utilized   silicone foam dressing in place   protective footwear used  Intervention: Prevent Infection  Description: Maintain skin and mucous membrane  integrity; promote hand, oral and pulmonary hygiene.Optimize fluid balance, nutrition, sleep and glycemic control to maximize infection resistance.Identify potential sources of infection early to prevent or mitigate progression of infection (e.g., wound, lines, devices).Evaluate ongoing need for invasive devices; remove promptly when no longer indicated.Review vaccination status.  Recent Flowsheet Documentation  Taken 3/27/2025 1600 by Vera Randhawa RN  Infection Prevention:   environmental surveillance performed   equipment surfaces disinfected   personal protective equipment utilized   hand hygiene promoted   rest/sleep promoted   single patient room provided  Taken 3/27/2025 1200 by Vera Randhawa RN  Infection Prevention:   environmental surveillance performed   equipment surfaces disinfected   hand hygiene promoted   personal protective equipment utilized   rest/sleep promoted   single patient room provided  Taken 3/27/2025 0850 by Vera Randhawa RN  Infection Prevention:   environmental surveillance performed   equipment surfaces disinfected   hand hygiene promoted   personal protective equipment utilized   rest/sleep promoted   single patient room provided  Goal: Optimal Comfort and Wellbeing  Outcome: Progressing  Intervention: Monitor Pain and Promote Comfort  Description: Assess pain level, treatment efficacy and patient response at regular intervals using a consistent pain scale.Consider the presence and impact of preexisting chronic pain.Encourage patient and caregiver involvement in pain assessment, interventions and safety measures.Promote activity; balance with sleep and rest to enhance healing.  Recent Flowsheet Documentation  Taken 3/27/2025 0850 by Vera Randhawa RN  Pain Management Interventions:   pillow support provided   position adjusted   quiet environment facilitated  Intervention: Provide Person-Centered Care  Description: Use a family-focused approach to care; encourage support  system presence and participation.Develop trust and rapport by proactively providing information, encouraging questions, addressing concerns and offering reassurance.Acknowledge emotional response to hospitalization.Recognize and utilize personal coping strategies and strengths; develop goals via shared decision-making.Honor spiritual and cultural preferences.  Recent Flowsheet Documentation  Taken 3/27/2025 0850 by Vera Randhawa RN  Trust Relationship/Rapport:   care explained   choices provided   questions answered   questions encouraged   thoughts/feelings acknowledged  Goal: Readiness for Transition of Care  Outcome: Progressing   Goal Outcome Evaluation:

## 2025-03-27 NOTE — CASE MANAGEMENT/SOCIAL WORK
Continued Stay Note  [facility]     Patient Name: Avelina Ceron  MRN: [id number]  Today's Date: 3/27/2025    Admit Date: 3/24/2025    Plan: Home   Discharge Plan       Row Name 03/27/25 1523       Plan    Plan Home    Plan Comments Discussed in MDRs today. Patient discharge plan is home with once medically ready. Eliquis restarted, motoring for  at least 24 hours. CM will follow and help aide in any discharge needs.    Final Discharge Disposition Code 01 - home or self-care                   Discharge Codes    No documentation.                 Expected Discharge Date and Time       Expected Discharge Date Expected Discharge Time    Mar 31, 2025               Mitzy Mcfarland RN

## 2025-03-27 NOTE — PROGRESS NOTES
[facility] Medicine Services  PROGRESS NOTE    Patient Name: Avelina Ceron  : 1940  MRN: [id number]    Date of Admission: 3/24/2025  Primary Care Physician: Dre Moura MD    Subjective   Subjective     CC:  Anemia    HPI:  Patient had bowel movement earlier that was concerning for blood.  Patient did note that it was darker than normal.      Objective   Objective     Vital Signs:   Temp:  [97.1 °F (36.2 °C)-98.7 °F (37.1 °C)] 97.9 °F (36.6 °C)  Heart Rate:  [] 96  Resp:  [18] 18  BP: (115-167)/(55-71) 146/65  Flow (L/min) (Oxygen Therapy):  [1-2] 2     Physical Exam:  Constitutional: No acute distress, awake, alert  HENT: NCAT, mucous membranes moist  Respiratory: Respiratory effort normal   Cardiovascular: Irregular  Gastrointestinal: Soft, nontender, nondistended  Musculoskeletal: No edema  Psychiatric: Appropriate affect, cooperative  Neurologic: Oriented x 2-3, speech clear  Skin: No rashes      Results Reviewed:  LAB RESULTS:      Lab 25  0423 25  2350 25  1839 25  0824 25  2358 25  1601 25  1330 25  0421 25  0015 25  1853 25  1700 25  1557 25  1302   WBC 10.50  --   --  11.04*  --   --   --  10.98*  --   --   --   --  13.63*   HEMOGLOBIN 8.0* 8.2* 7.9* 8.3* 9.0*   < >  --  7.8*   < >  --   --   --  9.6*   HEMOGLOBIN, POC  --   --   --   --   --   --   --   --   --   --  9.9*  9.9*   < >  --    HEMATOCRIT 26.2* 26.8* 25.1* 26.2* 30.0*   < >  --  25.5*   < >  --   --   --  31.9*   HEMATOCRIT POC  --   --   --   --   --   --   --   --   --   --  29*  29*   < >  --    PLATELETS 231  --   --  224  --   --   --  232  --   --   --   --  340   NEUTROS ABS 7.03*  --   --  7.55*  --   --   --   --   --   --   --   --  10.41*   IMMATURE GRANS (ABS) 0.04  --   --  0.05  --   --   --   --   --   --   --   --  0.04   LYMPHS ABS 2.30  --   --  2.17  --   --   --   --   --   --   --   --  2.23    MONOS ABS 0.86  --   --  0.97*  --   --   --   --   --   --   --   --  0.72   EOS ABS 0.21  --   --  0.23  --   --   --   --   --   --   --   --  0.15   MCV 93.9  --   --  92.3  --   --   --  94.1  --   --   --   --  94.9   LACTATE  --   --   --   --   --   --   --   --   --   --  0.8  --  5.2*   HSTROP T  --   --   --   --   --   --  35* 57*  --  24* 10  --   --     < > = values in this interval not displayed.         Lab 03/27/25 0423 03/26/25  1839 03/26/25 0824 03/25/25  0421 03/24/25  1700 03/24/25  1557 03/24/25  1302   SODIUM 143  --  143 145  --   --  145   POTASSIUM 4.2 4.3 3.9 4.2  --   --  3.3*   CHLORIDE 104  --  104 106  --   --  97*   CO2 32.0*  --  31.0* 29.0  --   --  34.0*   ANION GAP 7.0  --  8.0 10.0  --   --  14.0   BUN 7*  --  5* 7*  --   --  14   CREATININE 0.35*  --  0.28* 0.29* 0.50*  0.50* 0.50* 0.39*   EGFR 100.9  --  106.5 105.6 92.6 92.6 98.3   GLUCOSE 107*  --  85 104*  --   --  139*   CALCIUM 8.5*  --  7.8* 8.6  --   --  9.5   MAGNESIUM  --   --  1.9 1.4*  --   --   --    PHOSPHORUS 3.2  --  2.7  --   --   --   --          Lab 03/27/25 0423 03/26/25  0824 03/24/25  1700 03/24/25  1302   TOTAL PROTEIN  --   --   --  6.7   ALBUMIN 2.9* 3.0*  --  3.8   GLOBULIN  --   --   --  2.9   ALT (SGPT)  --   --   --  12   AST (SGOT)  --   --   --  18   BILIRUBIN  --   --   --  0.3   ALK PHOS  --   --   --  71   LIPASE  --   --  75*  --          Lab 03/25/25  1330 03/25/25  0421 03/24/25  1853 03/24/25  1700   HSTROP T 35* 57* 24* 10             Lab 03/24/25  1302   ABO TYPING A   RH TYPING Positive   ANTIBODY SCREEN Negative         Brief Urine Lab Results  (Last result in the past 365 days)        Color   Clarity   Blood   Leuk Est   Nitrite   Protein   CREAT   Urine HCG        03/24/25 1700 Yellow   Clear   Negative   Negative   Negative   Negative                   Microbiology Results Abnormal       Procedure Component Value - Date/Time    Gastrointestinal Panel, PCR - Stool, Per Rectum  [[id number]]  (Abnormal) Collected: 03/25/25 0854    Lab Status: Final result Specimen: Stool from Per Rectum Updated: 03/25/25 1106     Campylobacter Not Detected     Plesiomonas shigelloides Not Detected     Salmonella Not Detected     Vibrio Not Detected     Vibrio cholerae Not Detected     Yersinia enterocolitica Not Detected     Enteroaggregative E. coli (EAEC) Not Detected     Enteropathogenic E. coli (EPEC) Not Detected     Enterotoxigenic E. coli (ETEC) lt/st Not Detected     Shiga-like toxin-producing E. coli (STEC) stx1/stx2 Not Detected     Shigella/Enteroinvasive E. coli (EIEC) Not Detected     Cryptosporidium Not Detected     Cyclospora cayetanensis Not Detected     Entamoeba histolytica Not Detected     Giardia lamblia Not Detected     Adenovirus F40/41 Not Detected     Astrovirus Not Detected     Norovirus GI/GII Detected     Comment: If a positive Norovirus result is inconsistent with clinical presentation, the positive Norovirus result should be confirmed using another method.        Rotavirus A Not Detected     Sapovirus (I, II, IV or V) Not Detected            No radiology results from the last 24 hrs    Results for orders placed during the hospital encounter of 08/26/24    Adult Transthoracic Echo Limited W/ Cont if Necessary Per Protocol    Interpretation Summary    Left ventricular systolic function is normal. Calculated left ventricular EF = 64%    Moderate tricuspid valve regurgitation is present.    Estimated right ventricular systolic pressure from tricuspid regurgitation is moderately elevated (45-55 mmHg).    Mild MR      Current medications:  Scheduled Meds:[Held by provider] apixaban, 2.5 mg, Oral, Q12H  arformoterol, 15 mcg, Nebulization, BID - RT   And  budesonide, 0.5 mg, Nebulization, BID - RT   And  revefenacin, 175 mcg, Nebulization, Daily - RT  [Held by provider] aspirin, 81 mg, Oral, Daily  atorvastatin, 20 mg, Oral, Nightly  dofetilide, 250 mcg, Oral, BID  [Held by provider]  ferrous sulfate, 325 mg, Oral, Daily With Breakfast  gabapentin, 300 mg, Oral, Nightly  isosorbide mononitrate, 60 mg, Oral, Daily  latanoprost, 1 drop, Both Eyes, Nightly  levothyroxine, 200 mcg, Oral, Q AM  losartan, 50 mg, Oral, Q24H  nitrofurantoin (macrocrystal-monohydrate), 100 mg, Oral, BID  oxybutynin XL, 10 mg, Oral, Daily  pantoprazole, 40 mg, Oral, BID  roflumilast, 500 mcg, Oral, Daily  sertraline, 100 mg, Oral, Daily  sodium chloride, 10 mL, Intravenous, Q12H      Continuous Infusions:sodium chloride, 75 mL/hr, Last Rate: 75 mL/hr (03/27/25 1033)      PRN Meds:.  acetaminophen **OR** acetaminophen **OR** acetaminophen    senna-docusate sodium **AND** polyethylene glycol **AND** bisacodyl **AND** bisacodyl    Calcium Replacement - Follow Nurse / BPA Driven Protocol    Magnesium Standard Dose Replacement - Follow Nurse / BPA Driven Protocol    ondansetron ODT **OR** ondansetron    Phosphorus Replacement - Follow Nurse / BPA Driven Protocol    Potassium Replacement - Follow Nurse / BPA Driven Protocol    sodium chloride    sodium chloride    sodium chloride    traMADol    Assessment & Plan   Assessment & Plan     Active Hospital Problems    Diagnosis  POA    **Lower GI bleeding [K92.2]  Yes    Polyp of colon [K63.5]  Yes    Peripheral arterial occlusive disease [I77.9]  Yes    Coronary arteriosclerosis in native artery [I25.10]  Yes      Resolved Hospital Problems   No resolved problems to display.        Brief Hospital Course to date:  Avelina Ceron is a 84 y.o. female with history of CAD, PAD, COPD w/ chronic O2 use, HTN, HLD, chronic abd pain w/ nausea, 2 admissions 10/2024 related to GI bleed, w/ multiple bleeding polyps removed except 1 which required subsequent colonoscopy w/ EMR completed 3/7/25.   1 week pta she developed worsening N/V and 1 day prior to admit developed painless lower GI bleeding.  While in the ED lactate was measured at 5.2, CT abd/pel w/ contrast showed multiple areas of stenosis  w/in the great vessels of the abdomen but she did not have acute abdominal pain  GI PCR positive for norovirus.     Acute lower GI bleeding, improved  S/p recent polypectomy 3/7/25  Hx bleeding polyps  Chronic anemia  -holding eliquis,  however she did have maroon-colored stool 3/27.  Will continue to hold Eliquis until no signs of bleeding.  Monitor CBC  -When Eliquis restarted, would keep a least 24 hours to monitor for signs of bleeding.  -cont home bid ppi  -GI consulted  - s/p 1 unit PRBC on 3/25     Lactic acidosis, improved  Chronic abd pain w/ vomiting  Celiac and SMA arterial stenosis  -s/p prior CCY  -CT a/p w/ severe celiac and moderate SMA stenosis, 4mm ELEAZAR aneurysm.  She had recent CT's 3/14/25, 10/24/24, 10/10/24     Positive norovirus  -supportive care     CAD  PAD s/p BL iliac artery stenting  S/p BL ICA stenting  HTN/HLD  Afib  -hold eliquis for now     Elevated troponin  -No chest pain currently  -cardio consulted but defer ischemic evaluation until Hb remains stable.  Would be possible candidate for left atrial appendage closure     Recent Dx E Coli UTI  - cont prev Rx'ed macrobid from pcp through 3/28     COPD w/ chronic O2 use   - brovana, pulmicort, yupelri, daliresp     Anxiety/Depression  - zoloft     Hypothyroidism   -Continue levothyroxine      Expected Discharge Location and Transportation: Home  Expected Discharge   Expected Discharge Date: 3/31/2025; Expected Discharge Time:      VTE Prophylaxis:  Pharmacologic & mechanical VTE prophylaxis orders are present.         AM-PAC 6 Clicks Score (PT): 21 (03/27/25 8424)    CODE STATUS:   Code Status and Medical Interventions: CPR (Attempt to Resuscitate); Full Support   Ordered at: 03/24/25 3808     Code Status (Patient has no pulse and is not breathing):    CPR (Attempt to Resuscitate)     Medical Interventions (Patient has pulse or is breathing):    Full Support       Farrah Prado, DO  03/27/25

## 2025-03-27 NOTE — PROGRESS NOTES
"GI Daily Progress Note  Subjective:    Chief Complaint:  Follow up norovirus, bloody stool     Nausea and vomiting remain resolved.   Tolerated advancing diet to solids yesterday.  Did not have any diarrhea yesterday.      Had 1 dark, possibly maroon colored BM this morning per the RN.      H&H and vital signs are stable.      Objective:    /65 (BP Location: Left arm, Patient Position: Lying)   Pulse 96   Temp 97.9 °F (36.6 °C) (Oral)   Resp 18   Ht 160 cm (63\")   Wt 61.2 kg (135 lb)   LMP  (LMP Unknown)   SpO2 100%   BMI 23.91 kg/m²     Physical Exam  Constitutional:       General: She is not in acute distress.  Cardiovascular:      Rate and Rhythm: Normal rate and regular rhythm.   Pulmonary:      Effort: Pulmonary effort is normal.   Abdominal:      General: Bowel sounds are normal.      Palpations: Abdomen is soft.      Tenderness: There is no abdominal tenderness.   Skin:     Coloration: Skin is pale.   Neurological:      Mental Status: She is alert and oriented to person, place, and time.         Lab  Lab Results   Component Value Date    WBC 10.50 03/27/2025    HGB 8.0 (L) 03/27/2025    HGB 8.2 (L) 03/26/2025    HGB 7.9 (L) 03/26/2025    MCV 93.9 03/27/2025     03/27/2025    INR 1.09 02/28/2025    INR 0.98 08/04/2023    INR 1.04 06/30/2020       Lab Results   Component Value Date    GLUCOSE 107 (H) 03/27/2025    BUN 7 (L) 03/27/2025    CREATININE 0.35 (L) 03/27/2025    EGFRIFNONA 119 11/24/2021    BCR 20.0 03/27/2025     03/27/2025    K 4.2 03/27/2025    CO2 32.0 (H) 03/27/2025    CALCIUM 8.5 (L) 03/27/2025    ALBUMIN 2.9 (L) 03/27/2025    ALKPHOS 71 03/24/2025    BILITOT 0.3 03/24/2025    ALT 12 03/24/2025    AST 18 03/24/2025       Assessment:    Gastroenteritis due to Norovirus   Lower gastrointestinal bleeding, likely secondary to recent large polypectomy, 3/7.   Pathology showed tubular adenoma without high grade dysplasia   NSTEMI   History of bleeding polyps  Acute on chronic " anemia  Celiac and SMA arterial stenosis  Atrial fibrillation, CAD, PAD, on Eliquis  E. coli UTI, POA    Plan:    H&H remained stable overnight.     Would continue to hold anticoagulation.        Repeat H&H this afternoon.        JT Cox  03/27/25  11:33 EDT

## 2025-03-27 NOTE — PLAN OF CARE
Goal Outcome Evaluation:  Plan of Care Reviewed With: patient           Outcome Evaluation: PT eval completed. Pt presents slightly below baseline function d/t generalized weakness, mild balance deficits, and decreased activity tolerance. Pt ambulated 40 ft in room w/ FWW, SBA. No LOB or unsteadiness noted. Pt would benefit from IP PT services while hospitalized. Recommend home w/ assist at d/c.    Anticipated Discharge Disposition (PT): home with assist

## 2025-03-28 LAB
BASOPHILS # BLD AUTO: 0.08 10*3/MM3 (ref 0–0.2)
BASOPHILS NFR BLD AUTO: 0.8 % (ref 0–1.5)
DEPRECATED RDW RBC AUTO: 47.1 FL (ref 37–54)
EOSINOPHIL # BLD AUTO: 0.3 10*3/MM3 (ref 0–0.4)
EOSINOPHIL NFR BLD AUTO: 2.9 % (ref 0.3–6.2)
ERYTHROCYTE [DISTWIDTH] IN BLOOD BY AUTOMATED COUNT: 13.9 % (ref 12.3–15.4)
HCT VFR BLD AUTO: 25.7 % (ref 34–46.6)
HGB BLD-MCNC: 7.8 G/DL (ref 12–15.9)
IMM GRANULOCYTES # BLD AUTO: 0.05 10*3/MM3 (ref 0–0.05)
IMM GRANULOCYTES NFR BLD AUTO: 0.5 % (ref 0–0.5)
LYMPHOCYTES # BLD AUTO: 2.57 10*3/MM3 (ref 0.7–3.1)
LYMPHOCYTES NFR BLD AUTO: 24.4 % (ref 19.6–45.3)
MCH RBC QN AUTO: 28.9 PG (ref 26.6–33)
MCHC RBC AUTO-ENTMCNC: 30.4 G/DL (ref 31.5–35.7)
MCV RBC AUTO: 95.2 FL (ref 79–97)
MONOCYTES # BLD AUTO: 0.84 10*3/MM3 (ref 0.1–0.9)
MONOCYTES NFR BLD AUTO: 8 % (ref 5–12)
NEUTROPHILS NFR BLD AUTO: 6.68 10*3/MM3 (ref 1.7–7)
NEUTROPHILS NFR BLD AUTO: 63.4 % (ref 42.7–76)
NRBC BLD AUTO-RTO: 0 /100 WBC (ref 0–0.2)
PLATELET # BLD AUTO: 249 10*3/MM3 (ref 140–450)
PMV BLD AUTO: 10.4 FL (ref 6–12)
RBC # BLD AUTO: 2.7 10*6/MM3 (ref 3.77–5.28)
WBC NRBC COR # BLD AUTO: 10.52 10*3/MM3 (ref 3.4–10.8)

## 2025-03-28 PROCEDURE — 25810000003 SODIUM CHLORIDE 0.9 % SOLUTION: Performed by: INTERNAL MEDICINE

## 2025-03-28 PROCEDURE — 94799 UNLISTED PULMONARY SVC/PX: CPT

## 2025-03-28 PROCEDURE — 94761 N-INVAS EAR/PLS OXIMETRY MLT: CPT

## 2025-03-28 PROCEDURE — 63710000001 REVEFENACIN 175 MCG/3ML SOLUTION: Performed by: INTERNAL MEDICINE

## 2025-03-28 PROCEDURE — 94664 DEMO&/EVAL PT USE INHALER: CPT

## 2025-03-28 PROCEDURE — 85025 COMPLETE CBC W/AUTO DIFF WBC: CPT | Performed by: PHYSICIAN ASSISTANT

## 2025-03-28 PROCEDURE — 99232 SBSQ HOSP IP/OBS MODERATE 35: CPT | Performed by: PHYSICIAN ASSISTANT

## 2025-03-28 PROCEDURE — 99232 SBSQ HOSP IP/OBS MODERATE 35: CPT | Performed by: INTERNAL MEDICINE

## 2025-03-28 RX ADMIN — OXYBUTYNIN CHLORIDE 10 MG: 10 TABLET, EXTENDED RELEASE ORAL at 09:37

## 2025-03-28 RX ADMIN — APIXABAN 2.5 MG: 2.5 TABLET, FILM COATED ORAL at 20:23

## 2025-03-28 RX ADMIN — LEVOTHYROXINE SODIUM 200 MCG: 0.1 TABLET ORAL at 04:13

## 2025-03-28 RX ADMIN — GABAPENTIN 300 MG: 300 CAPSULE ORAL at 20:23

## 2025-03-28 RX ADMIN — LATANOPROST 1 DROP: 50 SOLUTION OPHTHALMIC at 20:23

## 2025-03-28 RX ADMIN — SODIUM CHLORIDE 75 ML/HR: 9 INJECTION, SOLUTION INTRAVENOUS at 12:49

## 2025-03-28 RX ADMIN — NITROFURANTOIN MONOHYDRATE/MACROCRYSTALS 100 MG: 75; 25 CAPSULE ORAL at 09:37

## 2025-03-28 RX ADMIN — ISOSORBIDE MONONITRATE 60 MG: 60 TABLET, EXTENDED RELEASE ORAL at 09:38

## 2025-03-28 RX ADMIN — ROFLUMILAST 500 MCG: 500 TABLET ORAL at 09:38

## 2025-03-28 RX ADMIN — DOFETILIDE 250 MCG: 0.25 CAPSULE ORAL at 20:23

## 2025-03-28 RX ADMIN — ARFORMOTEROL TARTRATE 15 MCG: 15 SOLUTION RESPIRATORY (INHALATION) at 09:22

## 2025-03-28 RX ADMIN — ARFORMOTEROL TARTRATE 15 MCG: 15 SOLUTION RESPIRATORY (INHALATION) at 20:53

## 2025-03-28 RX ADMIN — TRAMADOL HYDROCHLORIDE 50 MG: 50 TABLET, COATED ORAL at 20:22

## 2025-03-28 RX ADMIN — BUDESONIDE 0.5 MG: 0.5 INHALANT RESPIRATORY (INHALATION) at 20:53

## 2025-03-28 RX ADMIN — ATORVASTATIN CALCIUM 20 MG: 20 TABLET, FILM COATED ORAL at 20:23

## 2025-03-28 RX ADMIN — LOSARTAN POTASSIUM 50 MG: 50 TABLET, FILM COATED ORAL at 09:37

## 2025-03-28 RX ADMIN — DOFETILIDE 250 MCG: 0.25 CAPSULE ORAL at 09:36

## 2025-03-28 RX ADMIN — SERTRALINE 100 MG: 100 TABLET, FILM COATED ORAL at 09:37

## 2025-03-28 RX ADMIN — BUDESONIDE 0.5 MG: 0.5 INHALANT RESPIRATORY (INHALATION) at 09:22

## 2025-03-28 RX ADMIN — TRAMADOL HYDROCHLORIDE 50 MG: 50 TABLET, COATED ORAL at 04:13

## 2025-03-28 RX ADMIN — PANTOPRAZOLE SODIUM 40 MG: 40 TABLET, DELAYED RELEASE ORAL at 20:23

## 2025-03-28 RX ADMIN — Medication 10 ML: at 09:39

## 2025-03-28 RX ADMIN — PANTOPRAZOLE SODIUM 40 MG: 40 TABLET, DELAYED RELEASE ORAL at 09:37

## 2025-03-28 RX ADMIN — REVEFENACIN 175 MCG: 175 SOLUTION RESPIRATORY (INHALATION) at 09:22

## 2025-03-28 NOTE — PROGRESS NOTES
"GI Daily Progress Note  Subjective:    Chief Complaint:  Follow up norovirus, bloody stool     No bowel movement today.  No recurrence in melena since yesterday early morning.        She denies dizziness upon standing nor lightheadedness.       Objective:    /61 (BP Location: Left arm, Patient Position: Sitting)   Pulse 95   Temp 98 °F (36.7 °C) (Oral)   Resp 18   Ht 160 cm (63\")   Wt 61.2 kg (135 lb)   LMP  (LMP Unknown)   SpO2 100%   BMI 23.91 kg/m²     Physical Exam  Constitutional:       General: She is not in acute distress.  Cardiovascular:      Rate and Rhythm: Normal rate and regular rhythm.   Pulmonary:      Effort: Pulmonary effort is normal.   Abdominal:      General: Bowel sounds are normal. There is no distension.      Palpations: Abdomen is soft.      Tenderness: There is no abdominal tenderness.   Skin:     Coloration: Skin is pale.   Neurological:      Mental Status: She is alert and oriented to person, place, and time.         Lab  Lab Results   Component Value Date    WBC 10.52 03/28/2025    HGB 7.8 (L) 03/28/2025    HGB 8.2 (L) 03/27/2025    HGB 8.0 (L) 03/27/2025    MCV 95.2 03/28/2025     03/28/2025    INR 1.09 02/28/2025    INR 0.98 08/04/2023    INR 1.04 06/30/2020       Lab Results   Component Value Date    GLUCOSE 107 (H) 03/27/2025    BUN 7 (L) 03/27/2025    CREATININE 0.35 (L) 03/27/2025    EGFRIFNONA 119 11/24/2021    BCR 20.0 03/27/2025     03/27/2025    K 4.2 03/27/2025    CO2 32.0 (H) 03/27/2025    CALCIUM 8.5 (L) 03/27/2025    ALBUMIN 2.9 (L) 03/27/2025    ALKPHOS 71 03/24/2025    BILITOT 0.3 03/24/2025    ALT 12 03/24/2025    AST 18 03/24/2025       Assessment:    Gastroenteritis due to Norovirus   Lower gastrointestinal bleeding, likely secondary to recent large polypectomy, 3/7.   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 coli UTI, POA    Plan:    >> No recurrence in bleeding.    Patient would like to proceed with resuming Eliquis this evening and observing overnight.      >> Discussed if recurrent bleeding would need to proceed with colonoscopy +/- EGD.         Discussed with her daughter, Clary via speakerphone as well.       JT Cox  03/28/25  15:06 EDT

## 2025-03-28 NOTE — PLAN OF CARE
Problem: Adult Inpatient Plan of Care  Goal: Plan of Care Review  Outcome: Progressing  Goal: Patient-Specific Goal (Individualized)  Outcome: Progressing  Goal: Absence of Hospital-Acquired Illness or Injury  Outcome: Progressing  Goal: Optimal Comfort and Wellbeing  Outcome: Progressing  Goal: Readiness for Transition of Care  Outcome: Progressing     Problem: Skin Injury Risk Increased  Goal: Skin Health and Integrity  Outcome: Progressing     Problem: Sepsis/Septic Shock  Goal: Optimal Coping  Outcome: Progressing  Goal: Absence of Bleeding  Outcome: Progressing  Goal: Blood Glucose Level Within Target Range  Outcome: Progressing  Goal: Absence of Infection Signs and Symptoms  Outcome: Progressing  Goal: Optimal Nutrition Delivery  Outcome: Progressing   Goal Outcome Evaluation:

## 2025-03-28 NOTE — PROGRESS NOTES
[facility] Medicine Services  PROGRESS NOTE    Patient Name: Avelina Ceron  : 1940  MRN: [id number]    Date of Admission: 3/24/2025  Primary Care Physician: Dre Moura MD    Subjective   Subjective     CC:  Anemia    HPI:  Resting in bed in no acute distress and overall comfortable.  Does not have any specific complaint.  No fever or chills.  No chest pain palpitation shortness of breath.      Objective   Objective     Vital Signs:   Temp:  [98 °F (36.7 °C)-98.3 °F (36.8 °C)] 98.3 °F (36.8 °C)  Heart Rate:  [] 94  Resp:  [18] 18  BP: (124-170)/(61-66) 154/66  Flow (L/min) (Oxygen Therapy):  [2] 2     Physical Exam:  Constitutional: No acute distress, awake, alert  HENT: NCAT, mucous membranes moist  Respiratory: Clear to auscultation bilaterally, respiratory effort normal   Cardiovascular: Irregular, no murmur gallop or rub audible.  Gastrointestinal: Soft, nontender, nondistended  Musculoskeletal: No edema  Psychiatric: Appropriate affect, cooperative  Neurologic: Oriented x 3, speech clear  Skin: No rashes      Results Reviewed:  LAB RESULTS:      Lab 25  0433 25  1956 25  0423 25  2350 25  1839 25  0824 25  1601 25  1330 25  0421 25  0015 25  1853 25  1700 25  1557 25  1302   WBC 10.52  --  10.50  --   --  11.04*  --   --  10.98*  --   --   --   --  13.63*   HEMOGLOBIN 7.8* 8.2* 8.0* 8.2* 7.9* 8.3*   < >  --  7.8*   < >  --   --   --  9.6*   HEMOGLOBIN, POC  --   --   --   --   --   --   --   --   --   --   --  9.9*  9.9*   < >  --    HEMATOCRIT 25.7* 27.0* 26.2* 26.8* 25.1* 26.2*   < >  --  25.5*   < >  --   --   --  31.9*   HEMATOCRIT POC  --   --   --   --   --   --   --   --   --   --   --  29*  29*   < >  --    PLATELETS 249  --  231  --   --  224  --   --  232  --   --   --   --  340   NEUTROS ABS 6.68  --  7.03*  --   --  7.55*  --   --   --   --   --   --   --  10.41*    IMMATURE GRANS (ABS) 0.05  --  0.04  --   --  0.05  --   --   --   --   --   --   --  0.04   LYMPHS ABS 2.57  --  2.30  --   --  2.17  --   --   --   --   --   --   --  2.23   MONOS ABS 0.84  --  0.86  --   --  0.97*  --   --   --   --   --   --   --  0.72   EOS ABS 0.30  --  0.21  --   --  0.23  --   --   --   --   --   --   --  0.15   MCV 95.2  --  93.9  --   --  92.3  --   --  94.1  --   --   --   --  94.9   LACTATE  --   --   --   --   --   --   --   --   --   --   --  0.8  --  5.2*   HSTROP T  --   --   --   --   --   --   --  35* 57*  --  24* 10  --   --     < > = values in this interval not displayed.         Lab 03/27/25  0423 03/26/25  1839 03/26/25  0824 03/25/25  0421 03/24/25  1700 03/24/25  1557 03/24/25  1302   SODIUM 143  --  143 145  --   --  145   POTASSIUM 4.2 4.3 3.9 4.2  --   --  3.3*   CHLORIDE 104  --  104 106  --   --  97*   CO2 32.0*  --  31.0* 29.0  --   --  34.0*   ANION GAP 7.0  --  8.0 10.0  --   --  14.0   BUN 7*  --  5* 7*  --   --  14   CREATININE 0.35*  --  0.28* 0.29* 0.50*  0.50* 0.50* 0.39*   EGFR 100.9  --  106.5 105.6 92.6 92.6 98.3   GLUCOSE 107*  --  85 104*  --   --  139*   CALCIUM 8.5*  --  7.8* 8.6  --   --  9.5   MAGNESIUM  --   --  1.9 1.4*  --   --   --    PHOSPHORUS 3.2  --  2.7  --   --   --   --          Lab 03/27/25  0423 03/26/25  0824 03/24/25  1700 03/24/25  1302   TOTAL PROTEIN  --   --   --  6.7   ALBUMIN 2.9* 3.0*  --  3.8   GLOBULIN  --   --   --  2.9   ALT (SGPT)  --   --   --  12   AST (SGOT)  --   --   --  18   BILIRUBIN  --   --   --  0.3   ALK PHOS  --   --   --  71   LIPASE  --   --  75*  --          Lab 03/25/25  1330 03/25/25  0421 03/24/25  1853 03/24/25  1700   HSTROP T 35* 57* 24* 10             Lab 03/24/25  1302   ABO TYPING A   RH TYPING Positive   ANTIBODY SCREEN Negative         Brief Urine Lab Results  (Last result in the past 365 days)        Color   Clarity   Blood   Leuk Est   Nitrite   Protein   CREAT   Urine HCG        03/24/25 1700  Yellow   Clear   Negative   Negative   Negative   Negative                   Microbiology Results Abnormal       Procedure Component Value - Date/Time    Gastrointestinal Panel, PCR - Stool, Per Rectum [[id number]]  (Abnormal) Collected: 03/25/25 0854    Lab Status: Final result Specimen: Stool from Per Rectum Updated: 03/25/25 1106     Campylobacter Not Detected     Plesiomonas shigelloides Not Detected     Salmonella Not Detected     Vibrio Not Detected     Vibrio cholerae Not Detected     Yersinia enterocolitica Not Detected     Enteroaggregative E. coli (EAEC) Not Detected     Enteropathogenic E. coli (EPEC) Not Detected     Enterotoxigenic E. coli (ETEC) lt/st Not Detected     Shiga-like toxin-producing E. coli (STEC) stx1/stx2 Not Detected     Shigella/Enteroinvasive E. coli (EIEC) Not Detected     Cryptosporidium Not Detected     Cyclospora cayetanensis Not Detected     Entamoeba histolytica Not Detected     Giardia lamblia Not Detected     Adenovirus F40/41 Not Detected     Astrovirus Not Detected     Norovirus GI/GII Detected     Comment: If a positive Norovirus result is inconsistent with clinical presentation, the positive Norovirus result should be confirmed using another method.        Rotavirus A Not Detected     Sapovirus (I, II, IV or V) Not Detected            No radiology results from the last 24 hrs    Results for orders placed during the hospital encounter of 08/26/24    Adult Transthoracic Echo Limited W/ Cont if Necessary Per Protocol    Interpretation Summary    Left ventricular systolic function is normal. Calculated left ventricular EF = 64%    Moderate tricuspid valve regurgitation is present.    Estimated right ventricular systolic pressure from tricuspid regurgitation is moderately elevated (45-55 mmHg).    Mild MR      Current medications:  Scheduled Meds:apixaban, 2.5 mg, Oral, Q12H  arformoterol, 15 mcg, Nebulization, BID - RT   And  budesonide, 0.5 mg, Nebulization, BID - RT    And  revefenacin, 175 mcg, Nebulization, Daily - RT  [Held by provider] aspirin, 81 mg, Oral, Daily  atorvastatin, 20 mg, Oral, Nightly  dofetilide, 250 mcg, Oral, BID  gabapentin, 300 mg, Oral, Nightly  isosorbide mononitrate, 60 mg, Oral, Daily  latanoprost, 1 drop, Both Eyes, Nightly  levothyroxine, 200 mcg, Oral, Q AM  losartan, 50 mg, Oral, Q24H  oxybutynin XL, 10 mg, Oral, Daily  pantoprazole, 40 mg, Oral, BID  roflumilast, 500 mcg, Oral, Daily  sertraline, 100 mg, Oral, Daily  sodium chloride, 10 mL, Intravenous, Q12H      Continuous Infusions:     PRN Meds:.  acetaminophen **OR** acetaminophen **OR** acetaminophen    senna-docusate sodium **AND** polyethylene glycol **AND** bisacodyl **AND** bisacodyl    Calcium Replacement - Follow Nurse / BPA Driven Protocol    Magnesium Standard Dose Replacement - Follow Nurse / BPA Driven Protocol    ondansetron ODT **OR** ondansetron    Phosphorus Replacement - Follow Nurse / BPA Driven Protocol    Potassium Replacement - Follow Nurse / BPA Driven Protocol    sodium chloride    sodium chloride    sodium chloride    traMADol    Assessment & Plan   Assessment & Plan     Active Hospital Problems    Diagnosis  POA    **Lower GI bleeding [K92.2]  Yes    Polyp of colon [K63.5]  Yes    Peripheral arterial occlusive disease [I77.9]  Yes    Coronary arteriosclerosis in native artery [I25.10]  Yes      Resolved Hospital Problems   No resolved problems to display.        Brief Hospital Course to date:  Avelina Ceron is a 84 y.o. female with history of CAD, PAD, COPD w/ chronic O2 use, HTN, HLD, chronic abd pain w/ nausea, 2 admissions 10/2024 related to GI bleed, w/ multiple bleeding polyps removed except 1 which required subsequent colonoscopy w/ EMR completed 3/7/25.   1 week pta she developed worsening N/V and 1 day prior to admit developed painless lower GI bleeding.  While in the ED lactate was measured at 5.2, CT abd/pel w/ contrast showed multiple areas of stenosis w/in the  great vessels of the abdomen but she did not have acute abdominal pain  GI PCR positive for norovirus.     Acute lower GI bleeding, improved  S/p recent polypectomy 3/7/25  Hx bleeding polyps  Chronic anemia  -holding eliquis,  however she did have maroon-colored stool 3/27.  Will continue to hold Eliquis until no signs of bleeding.  Monitor CBC  -When Eliquis restarted, would keep a least 24 hours to monitor for signs of bleeding.  -cont home bid ppi  -GI consulted  - s/p 1 unit PRBC on 3/25     Lactic acidosis, improved  Chronic abd pain w/ vomiting  Celiac and SMA arterial stenosis  -s/p prior CCY  -CT a/p w/ severe celiac and moderate SMA stenosis, 4mm ELEAZAR aneurysm.  She had recent CT's 3/14/25, 10/24/24, 10/10/24     Positive norovirus  -supportive care     CAD  PAD s/p BL iliac artery stenting  S/p BL ICA stenting  HTN/HLD  Afib  -hold eliquis for now     Elevated troponin  -No chest pain currently  -cardio consulted but defer ischemic evaluation until Hb remains stable.  Would be possible candidate for left atrial appendage closure     Recent Dx E Coli UTI  - cont prev Rx'ed macrobid from pcp through 3/28     COPD w/ chronic O2 use   - brovana, pulmicort, yupelri, daliresp     Anxiety/Depression  - zoloft     Hypothyroidism   -Continue levothyroxine      Expected Discharge Location and Transportation: Home  Expected Discharge   Expected Discharge Date: 3/31/2025; Expected Discharge Time:      VTE Prophylaxis:  Pharmacologic & mechanical VTE prophylaxis orders are present.         AM-PAC 6 Clicks Score (PT): 20 (03/28/25 5377)    CODE STATUS:   Code Status and Medical Interventions: CPR (Attempt to Resuscitate); Full Support   Ordered at: 03/24/25 8224     Code Status (Patient has no pulse and is not breathing):    CPR (Attempt to Resuscitate)     Medical Interventions (Patient has pulse or is breathing):    Full Support       Surjit Razo MD  03/28/25

## 2025-03-28 NOTE — PLAN OF CARE
Problem: Adult Inpatient Plan of Care  Goal: Plan of Care Review  3/28/2025 1808 by Vera Randhawa RN  Outcome: Progressing  3/28/2025 1808 by Vera Randhawa RN  Outcome: Not Progressing  Goal: Patient-Specific Goal (Individualized)  3/28/2025 1808 by Vera Randhawa RN  Outcome: Progressing  3/28/2025 1808 by Vera Randhawa RN  Outcome: Not Progressing  Goal: Absence of Hospital-Acquired Illness or Injury  3/28/2025 1808 by Vera Randhawa RN  Outcome: Progressing  3/28/2025 1808 by Vera Randhawa RN  Outcome: Not Progressing  Intervention: Identify and Manage Fall Risk  Description: Perform standard risk assessment on admission using a validated tool or comprehensive approach appropriate to the patient; reassess fall risk frequently, with change in status or transfer to another level of care.Communicate risk to interprofessional healthcare team; ensure fall risk visible cue.Determine need for increased observation, equipment and environmental modification, as well as use of supportive, nonskid footwear.Adjust safety measures to individual needs and identified risk factors.Reinforce the importance of active participation with fall risk prevention, safety, and physical activity with the patient and family.Perform regular intentional rounding to assess need for position change, pain assessment and personal needs, including assistance with toileting.  Recent Flowsheet Documentation  Taken 3/28/2025 1200 by Vera Randhawa RN  Safety Promotion/Fall Prevention:   assistive device/personal items within reach   clutter free environment maintained   activity supervised   fall prevention program maintained   nonskid shoes/slippers when out of bed   room organization consistent   safety round/check completed  Taken 3/28/2025 0933 by Vera Randhawa RN  Safety Promotion/Fall Prevention:   activity supervised   assistive device/personal items within reach   clutter free environment maintained   fall prevention  program maintained   nonskid shoes/slippers when out of bed   room organization consistent   safety round/check completed  Intervention: Prevent Skin Injury  Description: Perform a screening for skin injury risk, such as pressure or moisture-associated skin damage on admission and at regular intervals throughout hospital stay.Keep all areas of skin (especially folds) clean and dry.Maintain adequate skin hydration.Relieve and redistribute pressure and protect bony prominences and skin at risk for injury; implement measures based on patient-specific risk factors.Match turning and repositioning schedule to clinical condition.Encourage weight shift frequently; assist with reposition if unable to complete independently.Float heels off bed; avoid pressure on the Achilles tendon.Keep skin free from extended contact with medical devices.Optimize nutrition and hydration.Encourage functional activity and mobility, as early as tolerated.Use aids (e.g., slide boards, mechanical lift) during transfer.  Recent Flowsheet Documentation  Taken 3/28/2025 1200 by Vera Randhawa RN  Skin Protection:   incontinence pads utilized   protective footwear used   silicone foam dressing in place  Taken 3/28/2025 0933 by Vera Randhawa RN  Body Position:   position changed independently   neutral body alignment   lower extremity elevated   neutral head position  Skin Protection:   silicone foam dressing in place   protective footwear used   incontinence pads utilized  Intervention: Prevent Infection  Description: Maintain skin and mucous membrane integrity; promote hand, oral and pulmonary hygiene.Optimize fluid balance, nutrition, sleep and glycemic control to maximize infection resistance.Identify potential sources of infection early to prevent or mitigate progression of infection (e.g., wound, lines, devices).Evaluate ongoing need for invasive devices; remove promptly when no longer indicated.Review vaccination status.  Recent Flowsheet  Documentation  Taken 3/28/2025 1200 by Vera Randhawa RN  Infection Prevention:   environmental surveillance performed   equipment surfaces disinfected   hand hygiene promoted   personal protective equipment utilized   rest/sleep promoted   single patient room provided  Taken 3/28/2025 0933 by Vera Randhawa RN  Infection Prevention:   environmental surveillance performed   equipment surfaces disinfected   hand hygiene promoted   personal protective equipment utilized   rest/sleep promoted   single patient room provided  Goal: Optimal Comfort and Wellbeing  3/28/2025 1808 by Vera Randhawa RN  Outcome: Progressing  3/28/2025 1808 by Vera Randhawa RN  Outcome: Not Progressing  Intervention: Provide Person-Centered Care  Description: Use a family-focused approach to care; encourage support system presence and participation.Develop trust and rapport by proactively providing information, encouraging questions, addressing concerns and offering reassurance.Acknowledge emotional response to hospitalization.Recognize and utilize personal coping strategies and strengths; develop goals via shared decision-making.Honor spiritual and cultural preferences.  Recent Flowsheet Documentation  Taken 3/28/2025 0933 by Vera Randhawa RN  Trust Relationship/Rapport:   care explained   choices provided   questions answered   questions encouraged   thoughts/feelings acknowledged  Goal: Readiness for Transition of Care  3/28/2025 1808 by Vera Randhawa RN  Outcome: Progressing  3/28/2025 1808 by Vera Randhawa RN  Outcome: Not Progressing   Goal Outcome Evaluation:

## 2025-03-28 NOTE — CASE MANAGEMENT/SOCIAL WORK
Continued Stay Note  [facility]     Patient Name: Avelina Ceron  MRN: [id number]  Today's Date: 3/28/2025    Admit Date: 3/24/2025    Plan: Home   Discharge Plan       Row Name 03/28/25 1637       Plan    Plan Home    Patient/Family in Agreement with Plan yes    Plan Comments Discussed in MDR. Ms. Ceron is not being discharged today. CM will continue to follow for medical readiness and will assist with any discharge needs as indicated.    Final Discharge Disposition Code 01 - home or self-care                   Discharge Codes    No documentation.                 Expected Discharge Date and Time       Expected Discharge Date Expected Discharge Time    Mar 31, 2025               Dinora Joseph RN

## 2025-03-29 LAB
DEPRECATED RDW RBC AUTO: 46.8 FL (ref 37–54)
ERYTHROCYTE [DISTWIDTH] IN BLOOD BY AUTOMATED COUNT: 13.9 % (ref 12.3–15.4)
HCT VFR BLD AUTO: 27.4 % (ref 34–46.6)
HGB BLD-MCNC: 8.3 G/DL (ref 12–15.9)
MAGNESIUM SERPL-MCNC: 1.6 MG/DL (ref 1.6–2.4)
MCH RBC QN AUTO: 28.6 PG (ref 26.6–33)
MCHC RBC AUTO-ENTMCNC: 30.3 G/DL (ref 31.5–35.7)
MCV RBC AUTO: 94.5 FL (ref 79–97)
PLATELET # BLD AUTO: 254 10*3/MM3 (ref 140–450)
PMV BLD AUTO: 9.7 FL (ref 6–12)
RBC # BLD AUTO: 2.9 10*6/MM3 (ref 3.77–5.28)
WBC NRBC COR # BLD AUTO: 11.34 10*3/MM3 (ref 3.4–10.8)

## 2025-03-29 PROCEDURE — 25010000002 MAGNESIUM SULFATE 4 GM/100ML SOLUTION

## 2025-03-29 PROCEDURE — 83735 ASSAY OF MAGNESIUM: CPT

## 2025-03-29 PROCEDURE — 94664 DEMO&/EVAL PT USE INHALER: CPT

## 2025-03-29 PROCEDURE — 99232 SBSQ HOSP IP/OBS MODERATE 35: CPT | Performed by: NURSE PRACTITIONER

## 2025-03-29 PROCEDURE — 85027 COMPLETE CBC AUTOMATED: CPT | Performed by: PHYSICIAN ASSISTANT

## 2025-03-29 PROCEDURE — 63710000001 REVEFENACIN 175 MCG/3ML SOLUTION: Performed by: INTERNAL MEDICINE

## 2025-03-29 PROCEDURE — 93010 ELECTROCARDIOGRAM REPORT: CPT | Performed by: INTERNAL MEDICINE

## 2025-03-29 PROCEDURE — 94799 UNLISTED PULMONARY SVC/PX: CPT

## 2025-03-29 PROCEDURE — 94761 N-INVAS EAR/PLS OXIMETRY MLT: CPT

## 2025-03-29 PROCEDURE — 93005 ELECTROCARDIOGRAM TRACING: CPT

## 2025-03-29 RX ORDER — MAGNESIUM SULFATE HEPTAHYDRATE 40 MG/ML
4 INJECTION, SOLUTION INTRAVENOUS ONCE
Status: COMPLETED | OUTPATIENT
Start: 2025-03-29 | End: 2025-03-29

## 2025-03-29 RX ADMIN — REVEFENACIN 175 MCG: 175 SOLUTION RESPIRATORY (INHALATION) at 08:22

## 2025-03-29 RX ADMIN — ARFORMOTEROL TARTRATE 15 MCG: 15 SOLUTION RESPIRATORY (INHALATION) at 21:17

## 2025-03-29 RX ADMIN — BUDESONIDE 0.5 MG: 0.5 INHALANT RESPIRATORY (INHALATION) at 21:17

## 2025-03-29 RX ADMIN — TRAMADOL HYDROCHLORIDE 50 MG: 50 TABLET, COATED ORAL at 21:47

## 2025-03-29 RX ADMIN — SERTRALINE 100 MG: 100 TABLET, FILM COATED ORAL at 08:48

## 2025-03-29 RX ADMIN — APIXABAN 2.5 MG: 2.5 TABLET, FILM COATED ORAL at 08:48

## 2025-03-29 RX ADMIN — ARFORMOTEROL TARTRATE 15 MCG: 15 SOLUTION RESPIRATORY (INHALATION) at 08:22

## 2025-03-29 RX ADMIN — APIXABAN 2.5 MG: 2.5 TABLET, FILM COATED ORAL at 21:47

## 2025-03-29 RX ADMIN — GABAPENTIN 300 MG: 300 CAPSULE ORAL at 21:47

## 2025-03-29 RX ADMIN — MAGNESIUM SULFATE IN WATER FOR 4 G: 40 INJECTION INTRAVENOUS at 15:47

## 2025-03-29 RX ADMIN — PANTOPRAZOLE SODIUM 40 MG: 40 TABLET, DELAYED RELEASE ORAL at 08:48

## 2025-03-29 RX ADMIN — Medication 10 ML: at 21:48

## 2025-03-29 RX ADMIN — ATORVASTATIN CALCIUM 20 MG: 20 TABLET, FILM COATED ORAL at 21:47

## 2025-03-29 RX ADMIN — OXYBUTYNIN CHLORIDE 10 MG: 10 TABLET, EXTENDED RELEASE ORAL at 08:49

## 2025-03-29 RX ADMIN — ROFLUMILAST 500 MCG: 500 TABLET ORAL at 08:49

## 2025-03-29 RX ADMIN — LATANOPROST 1 DROP: 50 SOLUTION OPHTHALMIC at 21:48

## 2025-03-29 RX ADMIN — ISOSORBIDE MONONITRATE 60 MG: 60 TABLET, EXTENDED RELEASE ORAL at 08:48

## 2025-03-29 RX ADMIN — Medication 10 ML: at 08:49

## 2025-03-29 RX ADMIN — DOFETILIDE 250 MCG: 0.25 CAPSULE ORAL at 21:47

## 2025-03-29 RX ADMIN — PANTOPRAZOLE SODIUM 40 MG: 40 TABLET, DELAYED RELEASE ORAL at 21:47

## 2025-03-29 RX ADMIN — DOFETILIDE 250 MCG: 0.25 CAPSULE ORAL at 08:49

## 2025-03-29 RX ADMIN — LEVOTHYROXINE SODIUM 200 MCG: 0.1 TABLET ORAL at 08:48

## 2025-03-29 RX ADMIN — BUDESONIDE 0.5 MG: 0.5 INHALANT RESPIRATORY (INHALATION) at 08:22

## 2025-03-29 NOTE — PROGRESS NOTES
Psychiatric Medicine Services  PROGRESS NOTE    Patient Name: Avelina Ceron  : 1940  MRN: [id number]    Date of Admission: 3/24/2025  Primary Care Physician: Dre Moura MD    Subjective   Subjective     CC:  F/u anemia    HPI:  Patient resting in bed.  Says she is feeling better overall but feels a little shaky which she thinks is from her low hemoglobin.  Says she has not had a bowel movement since Eliquis was resumed, so she cannot say if she is having further melena.  Denies N/V. Says she wears home O2. Follows with Dr. Randa Whitney, cardiologist at [facility], for heart issues.       Objective   Objective     Vital Signs:   Temp:  [97.7 °F (36.5 °C)-98.3 °F (36.8 °C)] 98 °F (36.7 °C)  Heart Rate:  [] 87  Resp:  [18] 18  BP: (124-159)/(59-73) 133/59  Flow (L/min) (Oxygen Therapy):  [2] 2     Physical Exam:  Constitutional: No acute distress, awake, alert  HENT: NCAT, mucous membranes moist  Respiratory: Diminished at bases bilaterally, respiratory effort normal, 2L O2  Cardiovascular: RRR, no murmurs, rubs, or gallops  Gastrointestinal: Positive bowel sounds, soft, nontender, nondistended  Musculoskeletal: No bilateral ankle edema  Psychiatric: Appropriate affect, cooperative  Neurologic: Oriented x 3, moves all extremities, speech clear  Skin: No rashes      Results Reviewed:  LAB RESULTS:      Lab 25  0651 25  0433 25  1956 25  0423 25  2350 25  1839 25  0824 25  1601 25  1330 25  0421 25  0015 25  1853 25  1700 25  1557 25  1302   WBC 11.34* 10.52  --  10.50  --   --  11.04*  --   --  10.98*  --   --   --   --  13.63*   HEMOGLOBIN 8.3* 7.8* 8.2* 8.0* 8.2*   < > 8.3*   < >  --  7.8*   < >  --   --   --  9.6*   HEMOGLOBIN, POC  --   --   --   --   --   --   --   --   --   --   --   --  9.9*  9.9*   < >  --    HEMATOCRIT 27.4* 25.7* 27.0* 26.2* 26.8*   < > 26.2*   <  >  --  25.5*   < >  --   --   --  31.9*   HEMATOCRIT POC  --   --   --   --   --   --   --   --   --   --   --   --  29*  29*   < >  --    PLATELETS 254 249  --  231  --   --  224  --   --  232  --   --   --   --  340   NEUTROS ABS  --  6.68  --  7.03*  --   --  7.55*  --   --   --   --   --   --   --  10.41*   IMMATURE GRANS (ABS)  --  0.05  --  0.04  --   --  0.05  --   --   --   --   --   --   --  0.04   LYMPHS ABS  --  2.57  --  2.30  --   --  2.17  --   --   --   --   --   --   --  2.23   MONOS ABS  --  0.84  --  0.86  --   --  0.97*  --   --   --   --   --   --   --  0.72   EOS ABS  --  0.30  --  0.21  --   --  0.23  --   --   --   --   --   --   --  0.15   MCV 94.5 95.2  --  93.9  --   --  92.3  --   --  94.1  --   --   --   --  94.9   LACTATE  --   --   --   --   --   --   --   --   --   --   --   --  0.8  --  5.2*   HSTROP T  --   --   --   --   --   --   --   --  35* 57*  --  24* 10  --   --     < > = values in this interval not displayed.         Lab 03/29/25  0651 03/27/25  0423 03/26/25  1839 03/26/25  0824 03/25/25  0421 03/24/25  1700 03/24/25  1557 03/24/25  1302   SODIUM  --  143  --  143 145  --   --  145   POTASSIUM  --  4.2 4.3 3.9 4.2  --   --  3.3*   CHLORIDE  --  104  --  104 106  --   --  97*   CO2  --  32.0*  --  31.0* 29.0  --   --  34.0*   ANION GAP  --  7.0  --  8.0 10.0  --   --  14.0   BUN  --  7*  --  5* 7*  --   --  14   CREATININE  --  0.35*  --  0.28* 0.29* 0.50*  0.50* 0.50* 0.39*   EGFR  --  100.9  --  106.5 105.6 92.6 92.6 98.3   GLUCOSE  --  107*  --  85 104*  --   --  139*   CALCIUM  --  8.5*  --  7.8* 8.6  --   --  9.5   MAGNESIUM 1.6  --   --  1.9 1.4*  --   --   --    PHOSPHORUS  --  3.2  --  2.7  --   --   --   --          Lab 03/27/25  0423 03/26/25  0824 03/24/25  1700 03/24/25  1302   TOTAL PROTEIN  --   --   --  6.7   ALBUMIN 2.9* 3.0*  --  3.8   GLOBULIN  --   --   --  2.9   ALT (SGPT)  --   --   --  12   AST (SGOT)  --   --   --  18   BILIRUBIN  --   --   --  0.3    ALK PHOS  --   --   --  71   LIPASE  --   --  75*  --          Lab 03/25/25  1330 03/25/25  0421 03/24/25  1853 03/24/25  1700   HSTROP T 35* 57* 24* 10             Lab 03/24/25  1302   ABO TYPING A   RH TYPING Positive   ANTIBODY SCREEN Negative         Brief Urine Lab Results  (Last result in the past 365 days)        Color   Clarity   Blood   Leuk Est   Nitrite   Protein   CREAT   Urine HCG        03/24/25 1700 Yellow   Clear   Negative   Negative   Negative   Negative                   Microbiology Results Abnormal       Procedure Component Value - Date/Time    Gastrointestinal Panel, PCR - Stool, Per Rectum [[id number]]  (Abnormal) Collected: 03/25/25 0854    Lab Status: Final result Specimen: Stool from Per Rectum Updated: 03/25/25 1106     Campylobacter Not Detected     Plesiomonas shigelloides Not Detected     Salmonella Not Detected     Vibrio Not Detected     Vibrio cholerae Not Detected     Yersinia enterocolitica Not Detected     Enteroaggregative E. coli (EAEC) Not Detected     Enteropathogenic E. coli (EPEC) Not Detected     Enterotoxigenic E. coli (ETEC) lt/st Not Detected     Shiga-like toxin-producing E. coli (STEC) stx1/stx2 Not Detected     Shigella/Enteroinvasive E. coli (EIEC) Not Detected     Cryptosporidium Not Detected     Cyclospora cayetanensis Not Detected     Entamoeba histolytica Not Detected     Giardia lamblia Not Detected     Adenovirus F40/41 Not Detected     Astrovirus Not Detected     Norovirus GI/GII Detected     Comment: If a positive Norovirus result is inconsistent with clinical presentation, the positive Norovirus result should be confirmed using another method.        Rotavirus A Not Detected     Sapovirus (I, II, IV or V) Not Detected            No radiology results from the last 24 hrs    Results for orders placed during the hospital encounter of 08/26/24    Adult Transthoracic Echo Limited W/ Cont if Necessary Per Protocol    Interpretation Summary    Left ventricular  systolic function is normal. Calculated left ventricular EF = 64%    Moderate tricuspid valve regurgitation is present.    Estimated right ventricular systolic pressure from tricuspid regurgitation is moderately elevated (45-55 mmHg).    Mild MR      Current medications:  Scheduled Meds:apixaban, 2.5 mg, Oral, Q12H  arformoterol, 15 mcg, Nebulization, BID - RT   And  budesonide, 0.5 mg, Nebulization, BID - RT   And  revefenacin, 175 mcg, Nebulization, Daily - RT  [Held by provider] aspirin, 81 mg, Oral, Daily  atorvastatin, 20 mg, Oral, Nightly  dofetilide, 250 mcg, Oral, BID  gabapentin, 300 mg, Oral, Nightly  isosorbide mononitrate, 60 mg, Oral, Daily  latanoprost, 1 drop, Both Eyes, Nightly  levothyroxine, 200 mcg, Oral, Q AM  losartan, 50 mg, Oral, Q24H  oxybutynin XL, 10 mg, Oral, Daily  pantoprazole, 40 mg, Oral, BID  roflumilast, 500 mcg, Oral, Daily  sertraline, 100 mg, Oral, Daily  sodium chloride, 10 mL, Intravenous, Q12H      Continuous Infusions:   PRN Meds:.  acetaminophen **OR** acetaminophen **OR** acetaminophen    senna-docusate sodium **AND** polyethylene glycol **AND** bisacodyl **AND** bisacodyl    Calcium Replacement - Follow Nurse / BPA Driven Protocol    Magnesium Standard Dose Replacement - Follow Nurse / BPA Driven Protocol    ondansetron ODT **OR** ondansetron    Phosphorus Replacement - Follow Nurse / BPA Driven Protocol    Potassium Replacement - Follow Nurse / BPA Driven Protocol    sodium chloride    sodium chloride    sodium chloride    traMADol    Assessment & Plan   Assessment & Plan     Active Hospital Problems    Diagnosis  POA    **Lower GI bleeding [K92.2]  Yes    Polyp of colon [K63.5]  Yes    Peripheral arterial occlusive disease [I77.9]  Yes    Coronary arteriosclerosis in native artery [I25.10]  Yes      Resolved Hospital Problems   No resolved problems to display.        Brief Hospital Course to date:  Avelina Ceron is a 84 y.o. female with history of CAD, PAD, COPD w/  chronic O2 use, HTN, HLD, chronic abd pain w/ nausea, 2 admissions in October 2024 related to GI bleed, w/ multiple bleeding polyps removed except 1 which required subsequent colonoscopy w/ EMR completed 3/7/25.   One week pta she developed worsening N/V and 1 day prior to admit developed painless lower GI bleeding.  While in the ED lactate was measured at 5.2, CT abd/pel w/ contrast showed multiple areas of stenosis w/in the great vessels of the abdomen but she did not have acute abdominal pain. GI consulted.  GI PCR positive for norovirus.     This patient's problems and plans were partially entered by my partner and updated as appropriate by me 03/29/25.      Acute lower GI bleeding, improved  S/p recent polypectomy 3/7/25  Hx bleeding polyps  Chronic anemia  - s/p 1 unit PRBC on 3/25  -Eliquis held. Pt had maroon-colored stool 3/27 while off Eliquis. Resumed 3/28 after pt noted no further s/s bleeding.   -GI following, okay to resume Eliquis evening of 3/28 as pt notes no further bleeding. If recurrent bleeding would need colonoscopy +/- EGD  - Keep pt at least 24 hours after Eliquis resumed to monitor for further bleeding. Pt will need to have BM prior to DC to monitor for melena.  -cont home PPI BID  -AM CBC    Lactic acidosis, improved  Chronic abd pain w/ vomiting  Celiac and SMA arterial stenosis  -s/p prior CCY  -CT a/p w/ severe celiac and moderate SMA stenosis, 4mm ELEAZAR aneurysm.  She had recent CT's 3/14/25, 10/24/24, 10/10/24  -GI following     Positive norovirus  -supportive care     CAD  PAD s/p BL iliac artery stenting  S/p BL ICA stenting  HTN/HLD  -continue Imdur at increased dose, Losartan, monitor BP  -continue statin, holding ASA     Elevated troponin  Afib  -No chest pain currently  -Eliquis resumed evening of 3/28, monitor H&H  -on Tikosyn  -cardio consulted, deferring ischemic evaluation until Hb remains stable.  Would be possible candidate for left atrial appendage closure given recurrent  Nicole. Increased Imdur given chest tightness  -keep K > 4, mag > 2  -Patient to follow up with her primary cardiologist, Dr. Randa Whitney at [facility], dtr says pt has upcoming appointment next month, would benefit from EP cardiology referral to discuss NUNO ligation/Watchman  -AM BMP     Recent Dx E Coli UTI  - completed prev Rx'ed macrobid from pcp on 3/28     COPD w/ chronic O2 use   - brovana, pulmicort, yupelri, daliresp  - on home O2     Anxiety/Depression  - zoloft     Hypothyroidism   -TSH depressed, however free T4 wnl  -Continue levothyroxine 200 mcg daily       Expected Discharge Location and Transportation: Home  Expected Discharge pending stable H&H, no s/s bleeding  Expected Discharge Date: 3/31/2025; Expected Discharge Time:      VTE Prophylaxis:  Pharmacologic & mechanical VTE prophylaxis orders are present.         AM-PAC 6 Clicks Score (PT): 19 (03/29/25 1029)    CODE STATUS:   Code Status and Medical Interventions: CPR (Attempt to Resuscitate); Full Support   Ordered at: 03/24/25 0726     Code Status (Patient has no pulse and is not breathing):    CPR (Attempt to Resuscitate)     Medical Interventions (Patient has pulse or is breathing):    Full Support       Whitney Valadez, APRN  03/29/25

## 2025-03-29 NOTE — PLAN OF CARE
Problem: Adult Inpatient Plan of Care  Goal: Plan of Care Review  Outcome: Progressing  Goal: Patient-Specific Goal (Individualized)  Outcome: Progressing  Goal: Absence of Hospital-Acquired Illness or Injury  Outcome: Progressing  Intervention: Identify and Manage Fall Risk  Description: Perform standard risk assessment on admission using a validated tool or comprehensive approach appropriate to the patient; reassess fall risk frequently, with change in status or transfer to another level of care.Communicate risk to interprofessional healthcare team; ensure fall risk visible cue.Determine need for increased observation, equipment and environmental modification, as well as use of supportive, nonskid footwear.Adjust safety measures to individual needs and identified risk factors.Reinforce the importance of active participation with fall risk prevention, safety, and physical activity with the patient and family.Perform regular intentional rounding to assess need for position change, pain assessment and personal needs, including assistance with toileting.  Recent Flowsheet Documentation  Taken 3/29/2025 1600 by Vera Randhawa RN  Safety Promotion/Fall Prevention:   activity supervised   clutter free environment maintained   assistive device/personal items within reach   fall prevention program maintained   nonskid shoes/slippers when out of bed   room organization consistent   safety round/check completed  Taken 3/29/2025 1200 by Vera Randhawa RN  Safety Promotion/Fall Prevention: activity supervised  Taken 3/29/2025 0846 by Vera Randhawa, RN  Safety Promotion/Fall Prevention:   assistive device/personal items within reach   activity supervised   clutter free environment maintained   fall prevention program maintained   nonskid shoes/slippers when out of bed   room organization consistent   safety round/check completed  Intervention: Prevent Skin Injury  Description: Perform a screening for skin injury risk,  such as pressure or moisture-associated skin damage on admission and at regular intervals throughout hospital stay.Keep all areas of skin (especially folds) clean and dry.Maintain adequate skin hydration.Relieve and redistribute pressure and protect bony prominences and skin at risk for injury; implement measures based on patient-specific risk factors.Match turning and repositioning schedule to clinical condition.Encourage weight shift frequently; assist with reposition if unable to complete independently.Float heels off bed; avoid pressure on the Achilles tendon.Keep skin free from extended contact with medical devices.Optimize nutrition and hydration.Encourage functional activity and mobility, as early as tolerated.Use aids (e.g., slide boards, mechanical lift) during transfer.  Recent Flowsheet Documentation  Taken 3/29/2025 1600 by Vera Randhawa RN  Skin Protection:   incontinence pads utilized   protective footwear used   silicone foam dressing in place  Taken 3/29/2025 1200 by Vera Randhawa RN  Skin Protection:   incontinence pads utilized   protective footwear used   silicone foam dressing in place  Taken 3/29/2025 0846 by Vera Randhawa RN  Body Position: sitting up in bed  Skin Protection:   incontinence pads utilized   protective footwear used   silicone foam dressing in place  Intervention: Prevent Infection  Description: Maintain skin and mucous membrane integrity; promote hand, oral and pulmonary hygiene.Optimize fluid balance, nutrition, sleep and glycemic control to maximize infection resistance.Identify potential sources of infection early to prevent or mitigate progression of infection (e.g., wound, lines, devices).Evaluate ongoing need for invasive devices; remove promptly when no longer indicated.Review vaccination status.  Recent Flowsheet Documentation  Taken 3/29/2025 1600 by Vera Randhawa RN  Infection Prevention:   environmental surveillance performed   equipment surfaces  disinfected   hand hygiene promoted   personal protective equipment utilized   rest/sleep promoted   single patient room provided  Taken 3/29/2025 1200 by Vera Randhawa RN  Infection Prevention:   environmental surveillance performed   equipment surfaces disinfected   hand hygiene promoted   personal protective equipment utilized   single patient room provided   rest/sleep promoted  Taken 3/29/2025 0846 by Vera Randhawa RN  Infection Prevention:   environmental surveillance performed   equipment surfaces disinfected   personal protective equipment utilized   hand hygiene promoted   rest/sleep promoted   single patient room provided  Goal: Optimal Comfort and Wellbeing  Outcome: Progressing  Intervention: Provide Person-Centered Care  Description: Use a family-focused approach to care; encourage support system presence and participation.Develop trust and rapport by proactively providing information, encouraging questions, addressing concerns and offering reassurance.Acknowledge emotional response to hospitalization.Recognize and utilize personal coping strategies and strengths; develop goals via shared decision-making.Honor spiritual and cultural preferences.  Recent Flowsheet Documentation  Taken 3/29/2025 0846 by Vera Randhawa RN  Trust Relationship/Rapport:   care explained   choices provided   questions answered   questions encouraged   thoughts/feelings acknowledged  Goal: Readiness for Transition of Care  Outcome: Progressing   Goal Outcome Evaluation:

## 2025-03-30 ENCOUNTER — READMISSION MANAGEMENT (OUTPATIENT)
Dept: CALL CENTER | Facility: HOSPITAL | Age: 85
End: 2025-03-30
Payer: MEDICARE

## 2025-03-30 VITALS
SYSTOLIC BLOOD PRESSURE: 153 MMHG | HEART RATE: 84 BPM | DIASTOLIC BLOOD PRESSURE: 55 MMHG | BODY MASS INDEX: 23.92 KG/M2 | OXYGEN SATURATION: 93 % | RESPIRATION RATE: 18 BRPM | WEIGHT: 135 LBS | TEMPERATURE: 98.1 F | HEIGHT: 63 IN

## 2025-03-30 PROBLEM — K92.2 LOWER GI BLEEDING: Status: RESOLVED | Noted: 2025-03-24 | Resolved: 2025-03-30

## 2025-03-30 LAB
ANION GAP SERPL CALCULATED.3IONS-SCNC: 10 MMOL/L (ref 5–15)
BUN SERPL-MCNC: 4 MG/DL (ref 8–23)
BUN/CREAT SERPL: 12.9 (ref 7–25)
CALCIUM SPEC-SCNC: 8.7 MG/DL (ref 8.6–10.5)
CHLORIDE SERPL-SCNC: 100 MMOL/L (ref 98–107)
CO2 SERPL-SCNC: 30 MMOL/L (ref 22–29)
CREAT SERPL-MCNC: 0.31 MG/DL (ref 0.57–1)
DEPRECATED RDW RBC AUTO: 46.8 FL (ref 37–54)
EGFRCR SERPLBLD CKD-EPI 2021: 103.9 ML/MIN/1.73
ERYTHROCYTE [DISTWIDTH] IN BLOOD BY AUTOMATED COUNT: 13.8 % (ref 12.3–15.4)
GLUCOSE SERPL-MCNC: 99 MG/DL (ref 65–99)
HCT VFR BLD AUTO: 28.4 % (ref 34–46.6)
HGB BLD-MCNC: 8.8 G/DL (ref 12–15.9)
MAGNESIUM SERPL-MCNC: 2 MG/DL (ref 1.6–2.4)
MCH RBC QN AUTO: 29.2 PG (ref 26.6–33)
MCHC RBC AUTO-ENTMCNC: 31 G/DL (ref 31.5–35.7)
MCV RBC AUTO: 94.4 FL (ref 79–97)
PLATELET # BLD AUTO: 294 10*3/MM3 (ref 140–450)
PMV BLD AUTO: 9.9 FL (ref 6–12)
POTASSIUM SERPL-SCNC: 4.3 MMOL/L (ref 3.5–5.2)
PROCALCITONIN SERPL-MCNC: 0.04 NG/ML (ref 0–0.25)
QT INTERVAL: 360 MS
QTC INTERVAL: 450 MS
RBC # BLD AUTO: 3.01 10*6/MM3 (ref 3.77–5.28)
SODIUM SERPL-SCNC: 140 MMOL/L (ref 136–145)
WBC NRBC COR # BLD AUTO: 12.45 10*3/MM3 (ref 3.4–10.8)

## 2025-03-30 PROCEDURE — 94799 UNLISTED PULMONARY SVC/PX: CPT

## 2025-03-30 PROCEDURE — 84145 PROCALCITONIN (PCT): CPT | Performed by: NURSE PRACTITIONER

## 2025-03-30 PROCEDURE — 85027 COMPLETE CBC AUTOMATED: CPT | Performed by: NURSE PRACTITIONER

## 2025-03-30 PROCEDURE — 99239 HOSP IP/OBS DSCHRG MGMT >30: CPT | Performed by: NURSE PRACTITIONER

## 2025-03-30 PROCEDURE — 83735 ASSAY OF MAGNESIUM: CPT

## 2025-03-30 PROCEDURE — 80048 BASIC METABOLIC PNL TOTAL CA: CPT | Performed by: NURSE PRACTITIONER

## 2025-03-30 PROCEDURE — 94664 DEMO&/EVAL PT USE INHALER: CPT

## 2025-03-30 PROCEDURE — 63710000001 REVEFENACIN 175 MCG/3ML SOLUTION: Performed by: INTERNAL MEDICINE

## 2025-03-30 RX ORDER — ISOSORBIDE MONONITRATE 60 MG/1
60 TABLET, EXTENDED RELEASE ORAL DAILY
Qty: 30 TABLET | Refills: 0 | Status: SHIPPED | OUTPATIENT
Start: 2025-03-31

## 2025-03-30 RX ADMIN — ISOSORBIDE MONONITRATE 60 MG: 60 TABLET, EXTENDED RELEASE ORAL at 09:31

## 2025-03-30 RX ADMIN — APIXABAN 2.5 MG: 2.5 TABLET, FILM COATED ORAL at 09:31

## 2025-03-30 RX ADMIN — ROFLUMILAST 500 MCG: 500 TABLET ORAL at 09:31

## 2025-03-30 RX ADMIN — DOFETILIDE 250 MCG: 0.25 CAPSULE ORAL at 09:31

## 2025-03-30 RX ADMIN — ARFORMOTEROL TARTRATE 15 MCG: 15 SOLUTION RESPIRATORY (INHALATION) at 10:06

## 2025-03-30 RX ADMIN — REVEFENACIN 175 MCG: 175 SOLUTION RESPIRATORY (INHALATION) at 10:06

## 2025-03-30 RX ADMIN — SERTRALINE 100 MG: 100 TABLET, FILM COATED ORAL at 09:31

## 2025-03-30 RX ADMIN — PANTOPRAZOLE SODIUM 40 MG: 40 TABLET, DELAYED RELEASE ORAL at 09:31

## 2025-03-30 RX ADMIN — OXYBUTYNIN CHLORIDE 10 MG: 10 TABLET, EXTENDED RELEASE ORAL at 09:31

## 2025-03-30 RX ADMIN — Medication 10 ML: at 09:31

## 2025-03-30 RX ADMIN — LEVOTHYROXINE SODIUM 200 MCG: 0.1 TABLET ORAL at 05:52

## 2025-03-30 RX ADMIN — BUDESONIDE 0.5 MG: 0.5 INHALANT RESPIRATORY (INHALATION) at 10:07

## 2025-03-30 RX ADMIN — LOSARTAN POTASSIUM 50 MG: 50 TABLET, FILM COATED ORAL at 09:31

## 2025-03-30 NOTE — DISCHARGE SUMMARY
[facility] Medicine Services  DISCHARGE SUMMARY    Patient Name: Avelina Ceron  : 1940  MRN: [id number]    Date of Admission: 3/24/2025  1:24 PM  Date of Discharge:  3/30/2025  Primary Care Physician: Dre Moura MD    Consults       Date and Time Order Name Status Description    3/25/2025  1:07 PM Inpatient Cardiology Consult Completed     3/24/2025  9:15 PM Inpatient Gastroenterology Consult Completed             Hospital Course     Presenting Problem: Lower GI bleeding    Active Hospital Problems    Diagnosis  POA    Polyp of colon [K63.5]  Yes    Peripheral arterial occlusive disease [I77.9]  Yes    Coronary arteriosclerosis in native artery [I25.10]  Yes      Resolved Hospital Problems    Diagnosis Date Resolved POA    **Lower GI bleeding [K92.2] 2025 Yes          Hospital Course:  Avelina Ceron is a 84 y.o. female with history of CAD, PAD, COPD w/ chronic O2 use, HTN, HLD, chronic abd pain w/ nausea, 2 admissions in 2024 related to GI bleeds w/ multiple bleeding polyps removed on colonoscopy 10/23/2024 except one which required subsequent colonoscopy w/ EMR completed 3/7/25.     One week prior to admission, she developed worsening N/V and 1 day prior to admit developed painless lower GI bleeding.  While in the ED lactate was measured at 5.2, CT abd/pel w/ contrast showed multiple areas of stenosis w/in the great vessels of the abdomen but she did not have acute abdominal pain. GI was consulted for lower GIB. GI PCR positive for norovirus.     Acute lower GI bleeding, improved  S/p recent polypectomy 3/7/25  Hx bleeding polyps  Chronic anemia  -s/p 1 unit PRBC on 3/25  -Eliquis held. Pt had maroon-colored stool 3/27 while off Eliquis. Resumed 3/28 after pt noted no further s/s bleeding.   -Eliquis resumed evening of 3/28. Has had 2 BMs with no bleeding. Hgb 8.8 today, gradually increasing.   -cont home PPI BID  -cont low-fiber diet  -follow up with GI  for repeat colonoscopy, currently scheduled 6/20     Lactic acidosis, improved  Chronic abd pain w/ vomiting  Celiac and SMA arterial stenosis  -s/p prior CCY  -CT a/p w/ severe celiac and moderate SMA stenosis, 4mm ELEAZAR aneurysm.  She had recent CT's 3/14/25, 10/24/24, 10/10/24     Positive norovirus  -supportive care     CAD  PAD s/p BL iliac artery stenting  S/p BL ICA stenting  HTN/HLD  -continue Imdur at increased dose, Losartan, monitor BP  -continue statin, holding ASA     Elevated troponin  Afib  -Eliquis resumed evening of 3/28, monitor H&H  -on Tikosyn  -cardio consulted, deferred ischemic evaluation until Hb remains stable.  Would be possible candidate for left atrial appendage closure given recurrent GIBs.   -Cards increased Imdur given chest tightness, improved  -Patient to follow up with her primary cardiologist, Dr. Randa Whitney at [facility], would benefit from EP cardiology referral to discuss NUNO ligation/Watchman     Recent Dx E Coli UTI  - completed prev Rx'ed macrobid from PCP on 3/28     COPD w/ chronic O2 use   - continue home inhalers  - on home O2     Anxiety/Depression  - zoloft     Hypothyroidism   -TSH depressed, however free T4 wnl  -Continue levothyroxine 200 mcg daily      Discharge Follow Up Recommendations for outpatient labs/diagnostics:  --Follow up with PCP one week, check CBC  --Follow up with primary cardiologist Dr. Randa Whitney at [facility] in one month or as scheduled in May, defer to Dr. Whitney  --Follow up colonoscopy 3 months with Dr. Callahan    Day of Discharge     HPI:   Resting in bed.  Says she had another BM overnight but does not know if there was blood in it.  She would like to wait to see if she has another BM today before discharging home.  Per RN, patient had a BM later in the morning that was brown without any melena or bright red blood.  Patient stable for discharge home today.    Vital Signs:   Temp:  [98.1 °F (36.7 °C)-98.4 °F (36.9 °C)]  98.1 °F (36.7 °C)  Heart Rate:  [83-96] 84  Resp:  [18-19] 18  BP: (130-170)/(55-72) 153/55  Flow (L/min) (Oxygen Therapy):  [2] 2      Physical Exam:  Constitutional: No acute distress, awake, alert  HENT: NCAT, mucous membranes moist  Respiratory: Diminished at bases bilaterally, respiratory effort normal, 2L O2  Cardiovascular: RRR, no murmurs, rubs, or gallops  Gastrointestinal: Positive bowel sounds, soft, nontender, nondistended  Musculoskeletal: No bilateral ankle edema  Psychiatric: Appropriate affect, cooperative  Neurologic: Oriented x 3, moves all extremities, speech clear  Skin: No rashes    Pertinent  and/or Most Recent Results     LAB RESULTS:      Lab 03/30/25  0357 03/29/25  0651 03/28/25  0433 03/27/25  1956 03/27/25  0423 03/26/25  1839 03/26/25  0824 03/25/25  0015 03/24/25  1700 03/24/25  1557 03/24/25  1302   WBC 12.45* 11.34* 10.52  --  10.50  --  11.04*   < >  --   --  13.63*   HEMOGLOBIN 8.8* 8.3* 7.8* 8.2* 8.0*   < > 8.3*   < >  --   --  9.6*   HEMOGLOBIN, POC  --   --   --   --   --   --   --   --  9.9*  9.9*   < >  --    HEMATOCRIT 28.4* 27.4* 25.7* 27.0* 26.2*   < > 26.2*   < >  --   --  31.9*   HEMATOCRIT POC  --   --   --   --   --   --   --   --  29*  29*   < >  --    PLATELETS 294 254 249  --  231  --  224   < >  --   --  340   NEUTROS ABS  --   --  6.68  --  7.03*  --  7.55*  --   --   --  10.41*   IMMATURE GRANS (ABS)  --   --  0.05  --  0.04  --  0.05  --   --   --  0.04   LYMPHS ABS  --   --  2.57  --  2.30  --  2.17  --   --   --  2.23   MONOS ABS  --   --  0.84  --  0.86  --  0.97*  --   --   --  0.72   EOS ABS  --   --  0.30  --  0.21  --  0.23  --   --   --  0.15   MCV 94.4 94.5 95.2  --  93.9  --  92.3   < >  --   --  94.9   PROCALCITONIN 0.04  --   --   --   --   --   --   --   --   --   --    LACTATE  --   --   --   --   --   --   --   --  0.8  --  5.2*    < > = values in this interval not displayed.         Lab 03/30/25  0357 03/29/25  0651 03/27/25  0423 03/26/25  1835  03/26/25  0824 03/25/25  0421 03/24/25  1700 03/24/25  1557 03/24/25  1302   SODIUM 140  --  143  --  143 145  --   --  145   POTASSIUM 4.3  --  4.2 4.3 3.9 4.2  --   --  3.3*   CHLORIDE 100  --  104  --  104 106  --   --  97*   CO2 30.0*  --  32.0*  --  31.0* 29.0  --   --  34.0*   ANION GAP 10.0  --  7.0  --  8.0 10.0  --   --  14.0   BUN 4*  --  7*  --  5* 7*  --   --  14   CREATININE 0.31*  --  0.35*  --  0.28* 0.29* 0.50*  0.50*   < > 0.39*   EGFR 103.9  --  100.9  --  106.5 105.6 92.6   < > 98.3   GLUCOSE 99  --  107*  --  85 104*  --   --  139*   CALCIUM 8.7  --  8.5*  --  7.8* 8.6  --   --  9.5   MAGNESIUM 2.0 1.6  --   --  1.9 1.4*  --   --   --    PHOSPHORUS  --   --  3.2  --  2.7  --   --   --   --     < > = values in this interval not displayed.         Lab 03/27/25  0423 03/26/25  0824 03/24/25  1700 03/24/25  1302   TOTAL PROTEIN  --   --   --  6.7   ALBUMIN 2.9* 3.0*  --  3.8   GLOBULIN  --   --   --  2.9   ALT (SGPT)  --   --   --  12   AST (SGOT)  --   --   --  18   BILIRUBIN  --   --   --  0.3   ALK PHOS  --   --   --  71   LIPASE  --   --  75*  --          Lab 03/25/25  1330 03/25/25  0421 03/24/25  1853 03/24/25  1700   HSTROP T 35* 57* 24* 10             Lab 03/24/25  1302   ABO TYPING A   RH TYPING Positive   ANTIBODY SCREEN Negative         Brief Urine Lab Results  (Last result in the past 365 days)        Color   Clarity   Blood   Leuk Est   Nitrite   Protein   CREAT   Urine HCG        03/24/25 1700 Yellow   Clear   Negative   Negative   Negative   Negative                 Microbiology Results (last 10 days)       Procedure Component Value - Date/Time    Clostridioides difficile Toxin - Stool, Per Rectum [[id number]]  (Normal) Collected: 03/25/25 0854    Lab Status: Final result Specimen: Stool from Per Rectum Updated: 03/25/25 0956    Narrative:      The following orders were created for panel order Clostridioides difficile Toxin - Stool, Per Rectum.  Procedure                                Abnormality         Status                     ---------                               -----------         ------                     Clostridioides difficile...[[id number]]  Normal              Final result                 Please view results for these tests on the individual orders.    Gastrointestinal Panel, PCR - Stool, Per Rectum [[id number]]  (Abnormal) Collected: 03/25/25 0892    Lab Status: Final result Specimen: Stool from Per Rectum Updated: 03/25/25 1106     Campylobacter Not Detected     Plesiomonas shigelloides Not Detected     Salmonella Not Detected     Vibrio Not Detected     Vibrio cholerae Not Detected     Yersinia enterocolitica Not Detected     Enteroaggregative E. coli (EAEC) Not Detected     Enteropathogenic E. coli (EPEC) Not Detected     Enterotoxigenic E. coli (ETEC) lt/st Not Detected     Shiga-like toxin-producing E. coli (STEC) stx1/stx2 Not Detected     Shigella/Enteroinvasive E. coli (EIEC) Not Detected     Cryptosporidium Not Detected     Cyclospora cayetanensis Not Detected     Entamoeba histolytica Not Detected     Giardia lamblia Not Detected     Adenovirus F40/41 Not Detected     Astrovirus Not Detected     Norovirus GI/GII Detected     Comment: If a positive Norovirus result is inconsistent with clinical presentation, the positive Norovirus result should be confirmed using another method.        Rotavirus A Not Detected     Sapovirus (I, II, IV or V) Not Detected    Clostridioides difficile Toxin, PCR - Stool, Per Rectum [[id number]]  (Normal) Collected: 03/25/25 0854    Lab Status: Final result Specimen: Stool from Per Rectum Updated: 03/25/25 0956     Toxigenic C. difficile by PCR Not Detected    Narrative:      The result indicates the absence of toxigenic C. difficile from stool specimen.             CT Abdomen Pelvis With & Without Contrast  Result Date: 3/24/2025  CT ABDOMEN PELVIS W WO CONTRAST Date of Exam: 3/24/2025 2:10 PM EDT Indication: intermittent abd pain, n/v,  significant blood in stool this AM. Comparison: 3/14/2025 Technique: Axial CT images were obtained of the abdomen and pelvis before and after the uneventful intravenous administration of 80 mL Isovue-370. Sagittal and coronal reconstructions were performed.  Automated exposure control and iterative reconstruction methods were used. Findings: Liver: The liver is unremarkable in morphology. No focal liver lesion is seen. No biliary dilation is seen. Gallbladder: Surgically absent. Pancreas: Unremarkable. Spleen: Unremarkable. Adrenal glands: Unremarkable. Genitourinary tract: 8 x 5 mm nonobstructing calculus is seen at the lower pole of the left kidney. Several additional punctate nonobstructing calculi are seen within both kidneys. No hydronephrosis is seen. Visualized portions of the ureters are unremarkable. Distal ureters and urinary bladder are partially obscured due to beam hardening artifact arising from bilateral hip arthroplasties. Suspect prior hysterectomy Gastrointestinal tract: Postsurgical changes of the ileocecal region. Colonic diverticulosis is present. Most of the colon is decompressed which limits its evaluation. Moderate hiatal hernia. No findings to suggest bowel obstruction. No active contrast extravasation is seen within the GI tract lumen. Small duodenal diverticulum. 2 linear radiodensities within the proximal colon, possibly endoscopically placed clips. Appendix: Absent. Other findings: No free air or free fluid is identified. No pathologically enlarged lymph nodes are seen. Vascular calcifications are present. Severe stenosis at the origin of the celiac artery. Moderate stenosis at the origin of the superior mesenteric artery. 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 Mild stenoses at the origins of the bilateral renal arteries. Stent extends from the right common to external iliac artery. A  left common iliac  artery stent is also seen. Visualized iliofemoral arteries are otherwise grossly unremarkable. The IVC is grossly unremarkable. Bones and soft tissues: No acute osseous lesion is identified. Bones are demineralized. There are degenerative changes within the spine. Bilateral hip arthroplasties are partially visualized. There are chronic posttraumatic changes of the right iliac bone. Superficial soft tissues demonstrate no acute abnormality. Lung bases: The visualized lung bases are clear. Moderate hiatal hernia is present.     Impression: 1.No acute abnormality identified within the abdomen or pelvis. 2.No acute vascular abnormality is identified. 3.No active contrast extravasation is identified within the GI tract lumen. 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 5.Colonic diverticulosis. 6.Bilateral nonobstructing nephrolithiasis. 7.Hiatal hernia. 8.Additional findings as detailed above. Electronically Signed: Ramon Escobar MD  3/24/2025 2:35 PM EDT  Workstation ID: [id number]      Results for orders placed in visit on 11/01/18    SCANNED VASCULAR STUDIES      Results for orders placed in visit on 11/01/18    SCANNED VASCULAR STUDIES      Results for orders placed during the hospital encounter of 08/26/24    Adult Transthoracic Echo Limited W/ Cont if Necessary Per Protocol    Interpretation Summary    Left ventricular systolic function is normal. Calculated left ventricular EF = 64%    Moderate tricuspid valve regurgitation is present.    Estimated right ventricular systolic pressure from tricuspid regurgitation is moderately elevated (45-55 mmHg).    Mild MR      Plan for Follow-up of Pending Labs/Results:     Discharge Details        Discharge Medications        Changes to Medications        Instructions Start Date   isosorbide mononitrate  60 MG 24 hr tablet  Commonly known as: IMDUR  What changed:   medication strength  how much to take   60 mg, Oral, Daily   Start Date: March 31, 2025     levothyroxine 200 MCG tablet  Commonly known as: SYNTHROID, LEVOTHROID  What changed: when to take this   200 mcg, Oral, Daily      vitamin B-12 1000 MCG tablet  Commonly known as: [facility] Vitamin B-12  What changed: additional instructions   1,000 mcg, Oral, Daily             Continue These Medications        Instructions Start Date   acetaminophen 500 MG tablet  Commonly known as: TYLENOL   500 mg, Every 6 Hours PRN      apixaban 2.5 MG tablet tablet  Commonly known as: ELIQUIS   2.5 mg, Oral, Every 12 Hours Scheduled      dofetilide 250 MCG capsule  Commonly known as: TIKOSYN   250 mcg, Oral, 2 Times Daily      gabapentin 300 MG capsule  Commonly known as: NEURONTIN   300 mg, Oral, Nightly      latanoprost 0.005 % ophthalmic solution  Commonly known as: XALATAN   Administer 1 drop to both eyes Every Night.      losartan 50 MG tablet  Commonly known as: COZAAR   50 mg, Oral, Every 24 Hours Scheduled      Myrbetriq 50 MG tablet sustained-release 24 hour 24 hr tablet  Generic drug: Mirabegron ER   50 mg, Daily      nitroglycerin 0.4 MG SL tablet  Commonly known as: NITROSTAT   0.4 mg, Sublingual, Every 5 Minutes PRN, Take no more than 3 doses in 15 minutes.      pantoprazole 40 MG EC tablet  Commonly known as: PROTONIX   40 mg, Oral, 2 Times Daily      polyethylene glycol 17 GM/SCOOP powder  Commonly known as: MIRALAX   17 g, As Needed      PRESERVISION AREDS 2 PO   1 tablet, 2 Times Daily      roflumilast 500 MCG tablet tablet  Commonly known as: DALIRESP   500 mcg, Oral, Daily      sertraline 100 MG tablet  Commonly known as: ZOLOFT   100 mg, Oral, Daily      simvastatin 40 MG tablet  Commonly known as: ZOCOR   40 mg, Nightly      traMADol 50 MG tablet  Commonly known as: ULTRAM   50 mg, Oral, Every 6 to 8 Hours PRN      Trelegy Ellipta 100-62.5-25 MCG/ACT  inhaler  Generic drug: Fluticasone-Umeclidin-Vilant   1 puff, Inhalation, Daily             Stop These Medications      ferrous sulfate 325 (65 FE) MG tablet     Fiber Select Gummies chewable tablet     nitrofurantoin (macrocrystal-monohydrate) 100 MG capsule  Commonly known as: Macrobid     ondansetron 4 MG tablet  Commonly known as: Zofran              Allergies   Allergen Reactions    Penicillins Hives, Swelling and Mental Status Change    Codeine Mental Status Change    Novocain [Procaine] Swelling    Dopamine Palpitations and Other (See Comments)     Other reaction(s): Hypertension         Discharge Disposition:  Home or Self Care    Diet:  Hospital:  Diet Order   Procedures    Diet: Gastrointestinal; Fiber-Restricted; Fluid Consistency: Thin (IDDSI 0)       Diet Instructions       Diet: Gastrointestinal Diets; Fiber-Restricted; Regular (IDDSI 7); Thin (IDDSI 0)      Discharge Diet: Gastrointestinal Diets    Gastrointestinal Diet: Fiber-Restricted    Texture: Regular (IDDSI 7)    Fluid Consistency: Thin (IDDSI 0)             Activity:  Activity Instructions       Activity as Tolerated              Restrictions or Other Recommendations:         CODE STATUS:    Code Status and Medical Interventions: CPR (Attempt to Resuscitate); Full Support   Ordered at: 03/24/25 1806     Code Status (Patient has no pulse and is not breathing):    CPR (Attempt to Resuscitate)     Medical Interventions (Patient has pulse or is breathing):    Full Support       Future Appointments   Date Time Provider Department Center   4/14/2025 11:00 AM Florence Landrum DO [facility]   5/15/2025 11:00 AM Lee Kim MD [facility]   6/13/2025  8:30 AM PAT 1 [facility]   7/10/2025  9:00 AM Dre Moura MD [facility]       Additional Instructions for the Follow-ups that You Need to Schedule       Discharge Follow-up with PCP   As directed       Currently Documented PCP:    Dre Moura MD    PCP  Phone Number:    [phone]     Follow Up Details: Within 1 week, post-hospitalization follow-up, check CBC        Discharge Follow-up with Specified Provider: Dr. Randa Whitney with Cardiology at [facility]; 1 Month   As directed      To: Dr. Randa Whitney with Cardiology at [facility]   Follow Up: 1 Month   Follow Up Details: Or as scheduled if okay with Dr. Chelo Valadez, APRN  03/30/25      Time Spent on Discharge:  I spent  45  minutes on this discharge activity which included: face-to-face encounter with the patient, reviewing the data in the system, coordination of the care with the nursing staff as well as consultants, documentation, and entering orders.

## 2025-03-30 NOTE — OUTREACH NOTE
Prep Survey      Flowsheet Row Responses   [facility] patient discharged from? [place]   Is LACE score < 7 ? No   Eligibility [facility]   Date of Admission 03/24/25   Date of Discharge 03/30/25   Discharge Disposition Home or Self Care   Discharge diagnosis Lower GI bleeding, S/p recent polypectomy 3/7/25   Does the patient have one of the following disease processes/diagnoses(primary or secondary)? Other   Does the patient have Home health ordered? No   Is there a DME ordered? No   Prep survey completed? Yes            Yasmin NUNEZ - Registered Nurse

## 2025-03-31 ENCOUNTER — TRANSITIONAL CARE MANAGEMENT TELEPHONE ENCOUNTER (OUTPATIENT)
Dept: CALL CENTER | Facility: HOSPITAL | Age: 85
End: 2025-03-31
Payer: MEDICARE

## 2025-03-31 NOTE — OUTREACH NOTE
Call Center TCM Note      Flowsheet Row Responses   [facility] patient discharged from? [place]   Does the patient have one of the following disease processes/diagnoses(primary or secondary)? Other   TCM attempt successful? Yes   Call start time 1559   Call end time 1606   Discharge diagnosis Lower GI bleeding, S/p recent polypectomy 3/7/25   Person spoke with today (if not patient) and relationship Nan   Meds reviewed with patient/caregiver? Yes   Is the patient having any side effects they believe may be caused by any medication additions or changes? No   Does the patient have all medications ordered at discharge? Yes   Is the patient taking all medications as directed (includes completed medication regime)? Yes   Comments HOSP DC FU appt 4/4/25 1130 am   Does the patient have an appointment with their PCP within 7-14 days of discharge? Yes   Has home health visited the patient within 72 hours of discharge? N/A   Psychosocial issues? No   Did the patient receive a copy of their discharge instructions? Yes   Nursing interventions Reviewed instructions with patient   What is the patient's perception of their health status since discharge? Improving   Is the patient/caregiver able to teach back signs and symptoms related to disease process for when to call PCP? Yes   Is the patient/caregiver able to teach back signs and symptoms related to disease process for when to call 911? Yes   Is the patient/caregiver able to teach back the hierarchy of who to call/visit for symptoms/problems? PCP, Specialist, Home health nurse, Urgent Care, ED, 911 Yes   TCM call completed? Yes   Wrap up additional comments Denies any bleeding at this time, per daughter. Pt has cardio appt this week.   Call end time 1606            RENETTA VICENTE - Registered Nurse    3/31/2025, 16:07 EDT

## 2025-03-31 NOTE — OUTREACH NOTE
Call Center TCM Note      Flowsheet Row Responses   [facility] patient discharged from? [place]   Does the patient have one of the following disease processes/diagnoses(primary or secondary)? Other   TCM attempt successful? No   Unsuccessful attempts Attempt 1            RENETTA VICENTE - Registered Nurse    3/31/2025, 11:07 EDT

## 2025-04-01 ENCOUNTER — PATIENT OUTREACH (OUTPATIENT)
Dept: CASE MANAGEMENT | Facility: OTHER | Age: 85
End: 2025-04-01
Payer: MEDICARE

## 2025-04-01 NOTE — OUTREACH NOTE
AMBULATORY CASE MANAGEMENT NOTE    Names and Relationships of Patient/Support Persons: Contact: CLARY DAVIDSON; Relationship: Emergency Contact -     Patient Outreach    RN-ACM called patient , spoke with daughterClary, whom patient lives with.  Reminded of role of RN-ACM.  Daughter stated patient has been doing okay; she is independent with taking her shower, with ambulation using her Walker.  Daughter stated patient is eating 3 small meals per day and tolerating them well.  Daughter stated she is following a low fiber diet currently.  Daughter stated patient is compliant with medications daily as directed. Discussed patient's urinary incontinence; wearing Depends; prevention of UTI's.  Reviewed upcoming appointments that they are planning on:  4/2/25 w/ Cardio at [facility], Dr. Randa Whitney;  4/9/25 w/ Dr. Pinedo, PCP;  4/14/25 w/ Pulmonary, Dr. Landrum.  Daughter denied needs or concerns for RN-ACM to address further; voiced appreciation for the call.     Adult Patient Profile  Questions/Answers      Flowsheet Row Most Recent Value   Symptoms/Conditions Managed at Home gastrointestinal  [Patient was admitted to [facility] 3/24 - 3/30/25 with Lower GI Bleed.]   Barriers to Managing Health none   Gastrointestinal Symptoms/Conditions bleeding  [No further bleeding seen since being home from hospital.]   Barriers to Taking Medication as Prescribed none  [Daughter organizes medications for each day.]   Source of Information family, health record  [Talked with Clary espinal.]   Equipment Currently Used at Home ramp, other (see comments), oxygen, walker, rolling  [Transport Chair]   Primary Source of Support/Comfort child(gabriela)   Name of Support/Comfort Primary Source DaughterClary.   People in Home child(gabriela), adult   Name(s) of People in Home Clary Espinal   Current Living Arrangements home   Resource/Environmental Concerns none        SDOH updated and reviewed with the patient during this program:  --     Food  Insecurity: No Food Insecurity (4/1/2025)    Hunger Vital Sign     Worried About Running Out of Food in the Last Year: Never true     Ran Out of Food in the Last Year: Never true      --     Transportation Needs: No Transportation Needs (4/1/2025)    PRAPARE - Transportation     Lack of Transportation (Medical): No     Lack of Transportation (Non-Medical): No       Stefani NGUYEN  Ambulatory Case Management    4/1/2025, 14:10 EDT

## 2025-04-02 LAB
QT INTERVAL: 378 MS
QTC INTERVAL: 487 MS

## 2025-04-09 ENCOUNTER — OFFICE VISIT (OUTPATIENT)
Dept: INTERNAL MEDICINE | Facility: CLINIC | Age: 85
End: 2025-04-09
Payer: MEDICARE

## 2025-04-09 VITALS
BODY MASS INDEX: 23.6 KG/M2 | DIASTOLIC BLOOD PRESSURE: 62 MMHG | TEMPERATURE: 98.4 F | SYSTOLIC BLOOD PRESSURE: 120 MMHG | HEIGHT: 63 IN | HEART RATE: 80 BPM | WEIGHT: 133.2 LBS

## 2025-04-09 DIAGNOSIS — K92.2 LOWER GI BLEED: ICD-10-CM

## 2025-04-09 DIAGNOSIS — Z09 HOSPITAL DISCHARGE FOLLOW-UP: Primary | ICD-10-CM

## 2025-04-09 LAB
BASOPHILS # BLD AUTO: 0.09 10*3/MM3 (ref 0–0.2)
BASOPHILS NFR BLD AUTO: 0.9 % (ref 0–1.5)
DEPRECATED RDW RBC AUTO: 43.2 FL (ref 37–54)
EOSINOPHIL # BLD AUTO: 0.14 10*3/MM3 (ref 0–0.4)
EOSINOPHIL NFR BLD AUTO: 1.4 % (ref 0.3–6.2)
ERYTHROCYTE [DISTWIDTH] IN BLOOD BY AUTOMATED COUNT: 12.8 % (ref 12.3–15.4)
HCT VFR BLD AUTO: 30.1 % (ref 34–46.6)
HGB BLD-MCNC: 9.2 G/DL (ref 12–15.9)
IMM GRANULOCYTES # BLD AUTO: 0.02 10*3/MM3 (ref 0–0.05)
IMM GRANULOCYTES NFR BLD AUTO: 0.2 % (ref 0–0.5)
LYMPHOCYTES # BLD AUTO: 2.06 10*3/MM3 (ref 0.7–3.1)
LYMPHOCYTES NFR BLD AUTO: 20.9 % (ref 19.6–45.3)
MCH RBC QN AUTO: 28.5 PG (ref 26.6–33)
MCHC RBC AUTO-ENTMCNC: 30.6 G/DL (ref 31.5–35.7)
MCV RBC AUTO: 93.2 FL (ref 79–97)
MONOCYTES # BLD AUTO: 0.61 10*3/MM3 (ref 0.1–0.9)
MONOCYTES NFR BLD AUTO: 6.2 % (ref 5–12)
NEUTROPHILS NFR BLD AUTO: 6.94 10*3/MM3 (ref 1.7–7)
NEUTROPHILS NFR BLD AUTO: 70.4 % (ref 42.7–76)
NRBC BLD AUTO-RTO: 0 /100 WBC (ref 0–0.2)
PLATELET # BLD AUTO: 279 10*3/MM3 (ref 140–450)
PMV BLD AUTO: 10.2 FL (ref 6–12)
RBC # BLD AUTO: 3.23 10*6/MM3 (ref 3.77–5.28)
WBC NRBC COR # BLD AUTO: 9.86 10*3/MM3 (ref 3.4–10.8)

## 2025-04-09 PROCEDURE — 85025 COMPLETE CBC W/AUTO DIFF WBC: CPT | Performed by: INTERNAL MEDICINE

## 2025-04-09 PROCEDURE — 80053 COMPREHEN METABOLIC PANEL: CPT | Performed by: INTERNAL MEDICINE

## 2025-04-09 NOTE — PROGRESS NOTES
Chief Complaint   Patient presents with    Hospital Follow Up Visit       History of Present Illness     The patient presents for a hospital follow-up. She was admitted 3/24-3/30 for Acute lower GI bleeding. She states she is doing well today. She admits to generalized abdominal pain. She denies nausea, vomiting, hematemesis, and melana. She was taken off elequis in the hospital. She has a follow-up colonoscopy on 6/20 with [facility].     Medications      Current Outpatient Medications:     acetaminophen (TYLENOL) 500 MG tablet, Take 1 tablet by mouth Every 6 (Six) Hours As Needed for Mild Pain, Headache or Fever. OTC, Disp: , Rfl:     cyanocobalamin ([employer] Vitamin B-12) 1000 MCG tablet, Take 1 tablet by mouth Daily., Disp: 100 tablet, Rfl: 2    dofetilide (TIKOSYN) 250 MCG capsule, Take 1 capsule by mouth 2 (Two) Times a Day., Disp: 180 capsule, Rfl: 1    Fluticasone-Umeclidin-Vilant (Trelegy Ellipta) 100-62.5-25 MCG/ACT inhaler, Inhale 1 puff Daily., Disp: 180 each, Rfl: 3    gabapentin (NEURONTIN) 300 MG capsule, Take 1 capsule by mouth Every Night., Disp: 90 capsule, Rfl: 0    isosorbide mononitrate (IMDUR) 60 MG 24 hr tablet, Take 1 tablet by mouth Daily., Disp: 30 tablet, Rfl: 0    latanoprost (XALATAN) 0.005 % ophthalmic solution, Administer 1 drop to both eyes Every Night., Disp: , Rfl:     levothyroxine (SYNTHROID, LEVOTHROID) 200 MCG tablet, TAKE 1 TABLET DAILY (Patient taking differently: Take 1 tablet by mouth Every Morning.), Disp: 90 tablet, Rfl: 3    losartan (COZAAR) 50 MG tablet, Take 1 tablet by mouth Daily., Disp: 30 tablet, Rfl: 2    Mirabegron ER (Myrbetriq) 50 MG tablet sustained-release 24 hour 24 hr tablet, Take 50 mg by mouth Daily., Disp: , Rfl:     Multiple Vitamins-Minerals (PRESERVISION AREDS 2 PO), Take 1 tablet by mouth 2 (Two) Times a Day. OTC, Disp: , Rfl:     nitroglycerin (NITROSTAT) 0.4 MG SL tablet, Place 1 tablet under the tongue Every 5 (Five) Minutes As Needed for Chest  Pain. Take no more than 3 doses in 15 minutes., Disp: 25 tablet, Rfl: 0    pantoprazole (PROTONIX) 40 MG EC tablet, Take 1 tablet by mouth 2 (Two) Times a Day., Disp: 180 tablet, Rfl: 1    polyethylene glycol (MIRALAX) 17 GM/SCOOP powder, Take 17 g by mouth As Needed (Constipation). OTC, Disp: , Rfl:     roflumilast (DALIRESP) 500 MCG tablet tablet, Take 1 tablet by mouth Daily., Disp: 90 tablet, Rfl: 3    sertraline (ZOLOFT) 100 MG tablet, TAKE 1 TABLET DAILY, Disp: 90 tablet, Rfl: 3    simvastatin (ZOCOR) 40 MG tablet, TAKE 1 TABLET EVERY NIGHT (Patient taking differently: Take 1 tablet by mouth Every Night.), Disp: 90 tablet, Rfl: 3    traMADol (ULTRAM) 50 MG tablet, TAKE 1 TABLET BY MOUTH EVERY 6 HOURS AS NEEDED FOR MODERATE PAIN, Disp: 120 tablet, Rfl: 0    apixaban (ELIQUIS) 2.5 MG tablet tablet, Take 1 tablet by mouth Every 12 (Twelve) Hours. Indications: Atrial Fibrillation (Patient not taking: Reported on 4/9/2025), Disp: 180 tablet, Rfl: 1   Current outpatient and discharge medications have been reconciled for the patient.  Reviewed by: Dre Moura MD    Allergies    Allergies   Allergen Reactions    Penicillins Hives, Swelling and Mental Status Change    Codeine Mental Status Change    Novocain [Procaine] Swelling    Dopamine Palpitations and Other (See Comments)     Other reaction(s): Hypertension       Problem List    Patient Active Problem List   Diagnosis    Coronary arteriosclerosis in native artery    Chronic obstructive pulmonary disease    Depression    Gastroesophageal reflux disease with esophagitis    Hyperlipidemia    Hypertension    Hypothyroidism    Osteoarthritis    Peripheral arterial occlusive disease    Solitary pulmonary nodule    Vitamin D deficiency    Lower back pain    Carotid bruit    Hypoxemia    Chronic respiratory failure with hypoxia and hypercapnia    Allergic rhinitis    Chronic leukocytosis    Ureterolithiasis    Murmur, cardiac    Hospital discharge follow-up     "Closed fracture of right proximal humerus    Right shoulder pain    Acute hypoxemic respiratory failure    Anemia due to blood loss, acute    Blood loss anemia    Anemia due to acute blood loss    Melena    Polyp of colon    O2 dependent       Medications, Allergies, Problems List and Past History were reviewed and updated.    Physical Examination    /50 (BP Location: Left arm, Patient Position: Sitting, Cuff Size: Adult)   Pulse 80   Temp 98.4 °F (36.9 °C)   Ht 160 cm (63\")   Wt 60.4 kg (133 lb 3.2 oz)   LMP  (LMP Unknown)   BMI 23.60 kg/m²    Physical Exam  Constitutional:       General: She is not in acute distress.     Appearance: Normal appearance. She is not ill-appearing, toxic-appearing or diaphoretic.   Cardiovascular:      Rate and Rhythm: Normal rate and regular rhythm.      Pulses: Normal pulses.      Heart sounds: Normal heart sounds. No murmur heard.     No friction rub. No gallop.   Pulmonary:      Effort: Pulmonary effort is normal. No respiratory distress.      Breath sounds: Normal breath sounds. No stridor. No wheezing, rhonchi or rales.      Comments: On supplemental oxygen    Chest:      Chest wall: No tenderness.   Abdominal:      General: Abdomen is flat. Bowel sounds are normal. There is no distension.      Palpations: There is no mass.      Tenderness: There is abdominal tenderness. There is no right CVA tenderness, left CVA tenderness, guarding or rebound.      Hernia: No hernia is present.   Neurological:      Mental Status: She is alert.   Psychiatric:         Mood and Affect: Mood normal.         Behavior: Behavior normal.         Thought Content: Thought content normal.         Judgment: Judgment normal.      Diagnoses and all orders for this visit:    1. Hospital discharge follow-up (Primary)    2. Lower GI bleed  -     CBC & Differential; Future  -     Comprehensive Metabolic Panel; Future  -     CBC & Differential  -     Comprehensive Metabolic Panel        Continue on " prescribed medications. Discussed continued follow up for colonoscopy. Follow up in office to scheduled office appointment. Contact the office is new symptoms develop or if they worsen or persist.     Transitional Care Management Certification  I certify that the following are true:  Communication was made within 2 business days of discharge.  Complexity of Medical Decision Making is moderate.  Face to face visit occurred within 10 days.    *Note: 98668 is for high complexity patients with a face to face visit within 7 days of discharge.  25313 is for high complexity patients with a face to face on days 8-14 post discharge or medium complexity with face to face visit within 14 days post discharge.      Attending Note:   Patient was personally seen and examined by me.  I have obtained and corroborated the history and examination as documented above, and agree with the accuracy of the documented information.  All orders were reviewed and personally signed by me.   Dre Moura MD  07:35 EDT  04/10/25

## 2025-04-10 ENCOUNTER — READMISSION MANAGEMENT (OUTPATIENT)
Dept: CALL CENTER | Facility: HOSPITAL | Age: 85
End: 2025-04-10
Payer: MEDICARE

## 2025-04-10 LAB
ALBUMIN SERPL-MCNC: 3.7 G/DL (ref 3.5–5.2)
ALBUMIN/GLOB SERPL: 1.4 G/DL
ALP SERPL-CCNC: 70 U/L (ref 39–117)
ALT SERPL W P-5'-P-CCNC: 17 U/L (ref 1–33)
ANION GAP SERPL CALCULATED.3IONS-SCNC: 7.3 MMOL/L (ref 5–15)
AST SERPL-CCNC: 26 U/L (ref 1–32)
BILIRUB SERPL-MCNC: 0.3 MG/DL (ref 0–1.2)
BUN SERPL-MCNC: 6 MG/DL (ref 8–23)
BUN/CREAT SERPL: 15.4 (ref 7–25)
CALCIUM SPEC-SCNC: 9.3 MG/DL (ref 8.6–10.5)
CHLORIDE SERPL-SCNC: 99 MMOL/L (ref 98–107)
CO2 SERPL-SCNC: 33.7 MMOL/L (ref 22–29)
CREAT SERPL-MCNC: 0.39 MG/DL (ref 0.57–1)
EGFRCR SERPLBLD CKD-EPI 2021: 98.3 ML/MIN/1.73
GLOBULIN UR ELPH-MCNC: 2.7 GM/DL
GLUCOSE SERPL-MCNC: 97 MG/DL (ref 65–99)
POTASSIUM SERPL-SCNC: 3.9 MMOL/L (ref 3.5–5.2)
PROT SERPL-MCNC: 6.4 G/DL (ref 6–8.5)
SODIUM SERPL-SCNC: 140 MMOL/L (ref 136–145)

## 2025-04-10 NOTE — OUTREACH NOTE
Medical Week 2 Survey      Flowsheet Row Responses   [facility] patient discharged from? [facility]   Does the patient have one of the following disease processes/diagnoses(primary or secondary)? Other   Week 2 attempt successful? No   Unsuccessful attempts Attempt 1   oke Blayne Ramires Registered Nurse

## 2025-04-14 ENCOUNTER — OFFICE VISIT (OUTPATIENT)
Dept: PULMONOLOGY | Facility: CLINIC | Age: 85
End: 2025-04-14
Payer: MEDICARE

## 2025-04-14 VITALS
HEIGHT: 63 IN | OXYGEN SATURATION: 93 % | DIASTOLIC BLOOD PRESSURE: 64 MMHG | TEMPERATURE: 97.5 F | HEART RATE: 80 BPM | SYSTOLIC BLOOD PRESSURE: 122 MMHG | BODY MASS INDEX: 23.6 KG/M2

## 2025-04-14 DIAGNOSIS — J44.9 CHRONIC OBSTRUCTIVE PULMONARY DISEASE, UNSPECIFIED COPD TYPE: ICD-10-CM

## 2025-04-14 DIAGNOSIS — D62 ANEMIA DUE TO BLOOD LOSS, ACUTE: Primary | ICD-10-CM

## 2025-04-14 PROCEDURE — 3074F SYST BP LT 130 MM HG: CPT | Performed by: INTERNAL MEDICINE

## 2025-04-14 PROCEDURE — 3078F DIAST BP <80 MM HG: CPT | Performed by: INTERNAL MEDICINE

## 2025-04-14 PROCEDURE — 99214 OFFICE O/P EST MOD 30 MIN: CPT | Performed by: INTERNAL MEDICINE

## 2025-04-14 RX ORDER — FLUTICASONE FUROATE, UMECLIDINIUM BROMIDE AND VILANTEROL TRIFENATATE 100; 62.5; 25 UG/1; UG/1; UG/1
1 POWDER RESPIRATORY (INHALATION) DAILY
Qty: 180 EACH | Refills: 3 | Status: SHIPPED | OUTPATIENT
Start: 2025-04-14

## 2025-04-14 NOTE — PROGRESS NOTES
"Pulmonary Office Follow Up      Subjective   Chief Complaint: Shortness of Breath    Avelina Ceron is a 84 y.o. female is being seen in follow up for COPD    History of Present Illness    Ms. Ceron is an 83yo F who is followed for COPD and Chronic Respiratory Failure.     She was last seen in clinic on 1/2/25.    Since her last visit, she was hospitalized at [facility] for lower-GI bleeding associated with Norovirus infection and was discharged on 3/30/25.    She returns to clinic today for follow up. She does not note any breathing problems. She is currently off her Eliquis with plans to restart at the end of the week.     The following portions of the patient's history were reviewed and updated as appropriate: allergies, current medications, past family history, past medical history, past social history, past surgical history and problem list.    Review of Systems   Constitutional: Negative.    HENT: Negative.    Eyes: Negative.    Respiratory: Positive for shortness of breath.    Cardiovascular: Negative.    Gastrointestinal: Negative.    Endocrine: Negative.    Genitourinary: Negative.    Musculoskeletal: Negative.    Skin: Negative.    Allergic/Immunologic: Negative.    Neurological: Negative.    Hematological: Negative.    Psychiatric/Behavioral: Negative.          Objective   Blood pressure 122/64, pulse 80, temperature 97.5 °F (36.4 °C), height 160 cm (63\"), SpO2 93%, not currently breastfeeding.  Physical Exam  Vitals and nursing note reviewed.   Constitutional:       General: She is not in acute distress.     Appearance: She is well-developed.   HENT:      Head: Normocephalic and atraumatic.   Eyes:      General: No scleral icterus.     Conjunctiva/sclera: Conjunctivae normal.      Pupils: Pupils are equal, round, and reactive to light.   Neck:      Thyroid: No thyromegaly.      Trachea: No tracheal deviation.   Cardiovascular:      Rate and Rhythm: Normal rate and regular rhythm.      Heart sounds: Normal heart " sounds.   Pulmonary:      Effort: Pulmonary effort is normal. No respiratory distress.      Breath sounds: Decreased breath sounds present. No wheezing.   Abdominal:      General: Bowel sounds are normal.      Palpations: Abdomen is soft.      Tenderness: There is no abdominal tenderness.   Musculoskeletal:         General: Normal range of motion.      Cervical back: Normal range of motion and neck supple.   Lymphadenopathy:      Cervical: No cervical adenopathy.   Skin:     General: Skin is warm and dry.      Findings: No erythema or rash.   Neurological:      Mental Status: She is alert and oriented to person, place, and time.      Motor: No abnormal muscle tone.      Coordination: Coordination normal.   Psychiatric:         Speech: Speech normal.         Behavior: Behavior normal.         Judgment: Judgment normal.         PFTs:  No new PFTs.    Imaging:  No new imaging.     Assessment & Plan   Diagnoses and all orders for this visit:    1. Anemia due to blood loss, acute (Primary)  -     CBC (No Diff)    2. Chronic obstructive pulmonary disease, unspecified COPD type  -     Fluticasone-Umeclidin-Vilant (Trelegy Ellipta) 100-62.5-25 MCG/ACT inhaler; Inhale 1 puff Daily.  Dispense: 180 each; Refill: 3      Discussion:  Ms. Ceron is a 85yo F who presents for follow up of COPD.      1. Moderate COPD  - No exacerbations since her last visit.   - Continue Trelegy 100 inhaler.  Refill sent.   - Continue Daliresp 500mcg daily.   - Combivent/Albuterol as needed.   - Mucinex as needed. Continue IS and flutter valve when needed.      2. Chronic Hypoxic and Hypercapnic Respiratory Failure  - Continue supplemental O2. Currently on 2L.   - Previously used Trilogy machine but unable to tolerate secondary to recurrent sinus infections.     3. Recent GI Bleed  - Hospitalized again in March 2025 for lower GI bleeding after Norovirus infection.  - Recheck H/H today to monitor.     4. Afib  - Following with Cardiology at [facility].   - Currently off Eliquis with plans to restart at the end of the week.     Follow up in 6 months.      Avelina Ceron  reports that she quit smoking about 19 years ago. Her smoking use included cigarettes. She started smoking about 69 years ago. She has a 50 pack-year smoking history. She has been exposed to tobacco smoke. She has never used smokeless tobacco.         Florence V Case, DO  Pulmonary and Critical Care Medicine  Note Electronically Signed

## 2025-04-28 DIAGNOSIS — J44.9 CHRONIC OBSTRUCTIVE PULMONARY DISEASE, UNSPECIFIED COPD TYPE: ICD-10-CM

## 2025-04-28 DIAGNOSIS — M54.50 CHRONIC MIDLINE LOW BACK PAIN WITHOUT SCIATICA: ICD-10-CM

## 2025-04-28 DIAGNOSIS — G89.29 CHRONIC MIDLINE LOW BACK PAIN WITHOUT SCIATICA: ICD-10-CM

## 2025-04-28 RX ORDER — FLUTICASONE FUROATE, UMECLIDINIUM BROMIDE AND VILANTEROL TRIFENATATE 100; 62.5; 25 UG/1; UG/1; UG/1
1 POWDER RESPIRATORY (INHALATION) DAILY
Qty: 180 EACH | Refills: 3 | OUTPATIENT
Start: 2025-04-28

## 2025-04-28 RX ORDER — TRAMADOL HYDROCHLORIDE 50 MG/1
50 TABLET ORAL
Qty: 120 TABLET | Refills: 0 | Status: SHIPPED | OUTPATIENT
Start: 2025-04-28

## 2025-04-28 NOTE — TELEPHONE ENCOUNTER
Rx Refill Note  Requested Prescriptions     Pending Prescriptions Disp Refills    traMADol (ULTRAM) 50 MG tablet 120 tablet 0     Sig: Take 1 tablet by mouth every 6 (six) to 8 (eight) hours as needed for Moderate Pain.      Last office visit with prescribing clinician: 4/9/2025   Last telemedicine visit with prescribing clinician: Visit date not found   Next office visit with prescribing clinician: 7/10/2025     Halima Mejia MA  04/28/25, 11:24 EDT

## 2025-04-30 ENCOUNTER — LAB (OUTPATIENT)
Dept: INTERNAL MEDICINE | Facility: CLINIC | Age: 85
End: 2025-04-30
Payer: MEDICARE

## 2025-04-30 DIAGNOSIS — I10 ESSENTIAL HYPERTENSION: Primary | ICD-10-CM

## 2025-04-30 LAB
ALBUMIN SERPL-MCNC: 3.9 G/DL (ref 3.5–5.2)
ALBUMIN/GLOB SERPL: 1.4 G/DL
ALP SERPL-CCNC: 75 U/L (ref 39–117)
ALT SERPL W P-5'-P-CCNC: 8 U/L (ref 1–33)
ANION GAP SERPL CALCULATED.3IONS-SCNC: 9.1 MMOL/L (ref 5–15)
AST SERPL-CCNC: 16 U/L (ref 1–32)
BASOPHILS # BLD AUTO: 0.08 10*3/MM3 (ref 0–0.2)
BASOPHILS NFR BLD AUTO: 0.7 % (ref 0–1.5)
BILIRUB SERPL-MCNC: 0.3 MG/DL (ref 0–1.2)
BUN SERPL-MCNC: 7 MG/DL (ref 8–23)
BUN/CREAT SERPL: 16.3 (ref 7–25)
CALCIUM SPEC-SCNC: 9.7 MG/DL (ref 8.6–10.5)
CHLORIDE SERPL-SCNC: 95 MMOL/L (ref 98–107)
CO2 SERPL-SCNC: 33.9 MMOL/L (ref 22–29)
CREAT SERPL-MCNC: 0.43 MG/DL (ref 0.57–1)
DEPRECATED RDW RBC AUTO: 41.1 FL (ref 37–54)
EGFRCR SERPLBLD CKD-EPI 2021: 96 ML/MIN/1.73
EOSINOPHIL # BLD AUTO: 0.16 10*3/MM3 (ref 0–0.4)
EOSINOPHIL NFR BLD AUTO: 1.4 % (ref 0.3–6.2)
ERYTHROCYTE [DISTWIDTH] IN BLOOD BY AUTOMATED COUNT: 12.4 % (ref 12.3–15.4)
GLOBULIN UR ELPH-MCNC: 2.7 GM/DL
GLUCOSE SERPL-MCNC: 113 MG/DL (ref 65–99)
HCT VFR BLD AUTO: 33 % (ref 34–46.6)
HGB BLD-MCNC: 10.3 G/DL (ref 12–15.9)
IMM GRANULOCYTES # BLD AUTO: 0.03 10*3/MM3 (ref 0–0.05)
IMM GRANULOCYTES NFR BLD AUTO: 0.3 % (ref 0–0.5)
LYMPHOCYTES # BLD AUTO: 2.02 10*3/MM3 (ref 0.7–3.1)
LYMPHOCYTES NFR BLD AUTO: 17.5 % (ref 19.6–45.3)
MCH RBC QN AUTO: 28.7 PG (ref 26.6–33)
MCHC RBC AUTO-ENTMCNC: 31.2 G/DL (ref 31.5–35.7)
MCV RBC AUTO: 91.9 FL (ref 79–97)
MONOCYTES # BLD AUTO: 0.74 10*3/MM3 (ref 0.1–0.9)
MONOCYTES NFR BLD AUTO: 6.4 % (ref 5–12)
NEUTROPHILS NFR BLD AUTO: 73.7 % (ref 42.7–76)
NEUTROPHILS NFR BLD AUTO: 8.48 10*3/MM3 (ref 1.7–7)
NRBC BLD AUTO-RTO: 0 /100 WBC (ref 0–0.2)
PLATELET # BLD AUTO: 236 10*3/MM3 (ref 140–450)
PMV BLD AUTO: 10.5 FL (ref 6–12)
POTASSIUM SERPL-SCNC: 3.5 MMOL/L (ref 3.5–5.2)
PROT SERPL-MCNC: 6.6 G/DL (ref 6–8.5)
RBC # BLD AUTO: 3.59 10*6/MM3 (ref 3.77–5.28)
SODIUM SERPL-SCNC: 138 MMOL/L (ref 136–145)
WBC NRBC COR # BLD AUTO: 11.51 10*3/MM3 (ref 3.4–10.8)

## 2025-04-30 PROCEDURE — 85025 COMPLETE CBC W/AUTO DIFF WBC: CPT | Performed by: INTERNAL MEDICINE

## 2025-04-30 PROCEDURE — 80053 COMPREHEN METABOLIC PANEL: CPT | Performed by: INTERNAL MEDICINE

## 2025-04-30 PROCEDURE — 36415 COLL VENOUS BLD VENIPUNCTURE: CPT | Performed by: INTERNAL MEDICINE

## 2025-05-12 RX ORDER — GABAPENTIN 300 MG/1
300 CAPSULE ORAL NIGHTLY
Qty: 90 CAPSULE | Refills: 0 | Status: SHIPPED | OUTPATIENT
Start: 2025-05-12

## 2025-05-22 ENCOUNTER — OFFICE VISIT (OUTPATIENT)
Dept: UROLOGY | Facility: CLINIC | Age: 85
End: 2025-05-22
Payer: MEDICARE

## 2025-05-22 DIAGNOSIS — N39.41 URGENCY INCONTINENCE: Primary | ICD-10-CM

## 2025-05-22 DIAGNOSIS — N20.0 LEFT NEPHROLITHIASIS: ICD-10-CM

## 2025-05-22 DIAGNOSIS — N32.81 OAB (OVERACTIVE BLADDER): ICD-10-CM

## 2025-05-22 DIAGNOSIS — N39.0 RECURRENT UTI: ICD-10-CM

## 2025-05-22 PROCEDURE — 99214 OFFICE O/P EST MOD 30 MIN: CPT | Performed by: STUDENT IN AN ORGANIZED HEALTH CARE EDUCATION/TRAINING PROGRAM

## 2025-05-22 PROCEDURE — 1159F MED LIST DOCD IN RCRD: CPT | Performed by: STUDENT IN AN ORGANIZED HEALTH CARE EDUCATION/TRAINING PROGRAM

## 2025-05-22 PROCEDURE — 1160F RVW MEDS BY RX/DR IN RCRD: CPT | Performed by: STUDENT IN AN ORGANIZED HEALTH CARE EDUCATION/TRAINING PROGRAM

## 2025-05-22 RX ORDER — NITROFURANTOIN 25; 75 MG/1; MG/1
100 CAPSULE ORAL NIGHTLY
Qty: 90 CAPSULE | Refills: 1 | Status: SHIPPED | OUTPATIENT
Start: 2025-05-22

## 2025-05-22 RX ORDER — MIRABEGRON 50 MG/1
50 TABLET, FILM COATED, EXTENDED RELEASE ORAL DAILY
Qty: 90 TABLET | Refills: 3 | Status: SHIPPED | OUTPATIENT
Start: 2025-05-22

## 2025-05-22 NOTE — PROGRESS NOTES
Follow Up Office Visit      Patient Name: Avelina Ceron  : 1940   MRN: [id number]     Chief Complaint:    Chief Complaint   Patient presents with    Urgency incontinence    overactive bladder       Referring Provider: No ref. provider found    History of Present Illness: Avelina Ceron is a 84 y.o. female who presents today for follow up of urgency incontinence, OAB, recurrent UTI, nephrolithiasis.  She began following with our group in  at which point she was admitted for an obstructing right ureteral stone in the setting of urinary tract infection.  Patient was taken the operating room for right retrograde pyelogram and stent.  Eventually taken to the operating room in 2023 for right ureteroscopy laser lithotripsy.  Right ureteral stone was removed, additional right sided kidney stone removed.  She has been suffering for OAB and urgency incontinence and is currently managing with Myrbetriq 50 mg.  Had previously trialed solifenacin and Gemtesa.  Myrbetriq works the best.  She has also suffer from urgency incontinence requiring use of pads or diapers.  Now on the Myrbetriq she is only using light pad for security and is rarely wet.  She was last seen in 2024.  She had a urinary tract infection in March with pan susceptible E. coli.  This was treated with 10 days of Macrobid.  She was admitted to the hospital GI bleed underwent CT scan in 2025 demonstrating a 9 mm left lower pole kidney stone.  She desires monitoring rather than treatment for this.    CMP 2025- Cr 0.43, GFR >96.   AST ALT normal.     Subjective      Review of System: Review of Systems   Genitourinary:  Positive for frequency and urgency. Negative for decreased urine volume, difficulty urinating, dysuria, enuresis, flank pain and hematuria.      I have reviewed the ROS documented by my clinical staff, I have updated appropriately and I agree. Lee Kim MD    I have reviewed and the following  portions of the patient's history were updated as appropriate: past family history, past medical history, past social history, past surgical history and problem list.    Medications:     Current Outpatient Medications:     acetaminophen (TYLENOL) 500 MG tablet, Take 1 tablet by mouth Every 6 (Six) Hours As Needed for Mild Pain, Headache or Fever. OTC, Disp: , Rfl:     apixaban (ELIQUIS) 2.5 MG tablet tablet, Take 1 tablet by mouth Every 12 (Twelve) Hours. Indications: Atrial Fibrillation, Disp: 180 tablet, Rfl: 1    cyanocobalamin ([employer] Vitamin B-12) 1000 MCG tablet, Take 1 tablet by mouth Daily., Disp: 100 tablet, Rfl: 2    dofetilide (TIKOSYN) 250 MCG capsule, Take 1 capsule by mouth 2 (Two) Times a Day., Disp: 180 capsule, Rfl: 1    Fluticasone-Umeclidin-Vilant (Trelegy Ellipta) 100-62.5-25 MCG/ACT inhaler, Inhale 1 puff Daily., Disp: 180 each, Rfl: 3    gabapentin (NEURONTIN) 300 MG capsule, Take 1 capsule by mouth Every Night., Disp: 90 capsule, Rfl: 0    isosorbide mononitrate (IMDUR) 60 MG 24 hr tablet, Take 1 tablet by mouth Daily., Disp: 30 tablet, Rfl: 0    latanoprost (XALATAN) 0.005 % ophthalmic solution, Administer 1 drop to both eyes Every Night., Disp: , Rfl:     levothyroxine (SYNTHROID, LEVOTHROID) 200 MCG tablet, TAKE 1 TABLET DAILY (Patient taking differently: Take 1 tablet by mouth Every Morning.), Disp: 90 tablet, Rfl: 3    losartan (COZAAR) 50 MG tablet, Take 1 tablet by mouth Daily. (Patient taking differently: Take 0.5 tablets by mouth Daily.), Disp: 30 tablet, Rfl: 2    Mirabegron ER (Myrbetriq) 50 MG tablet sustained-release 24 hour 24 hr tablet, Take 50 mg by mouth Daily., Disp: 90 tablet, Rfl: 3    Multiple Vitamins-Minerals (PRESERVISION AREDS 2 PO), Take 1 tablet by mouth 2 (Two) Times a Day. OTC, Disp: , Rfl:     pantoprazole (PROTONIX) 40 MG EC tablet, Take 1 tablet by mouth 2 (Two) Times a Day., Disp: 180 tablet, Rfl: 1    polyethylene glycol (MIRALAX) 17 GM/SCOOP powder, Take 17 g by  mouth As Needed (Constipation). OTC, Disp: , Rfl:     roflumilast (DALIRESP) 500 MCG tablet tablet, Take 1 tablet by mouth Daily., Disp: 90 tablet, Rfl: 3    sertraline (ZOLOFT) 100 MG tablet, TAKE 1 TABLET DAILY, Disp: 90 tablet, Rfl: 3    simvastatin (ZOCOR) 40 MG tablet, TAKE 1 TABLET EVERY NIGHT (Patient taking differently: Take 1 tablet by mouth Every Night.), Disp: 90 tablet, Rfl: 3    traMADol (ULTRAM) 50 MG tablet, Take 1 tablet by mouth every 6 (six) to 8 (eight) hours as needed for Moderate Pain., Disp: 120 tablet, Rfl: 0    nitrofurantoin, macrocrystal-monohydrate, (Macrobid) 100 MG capsule, Take 1 capsule by mouth Every Night., Disp: 90 capsule, Rfl: 1    nitroglycerin (NITROSTAT) 0.4 MG SL tablet, Place 1 tablet under the tongue Every 5 (Five) Minutes As Needed for Chest Pain. Take no more than 3 doses in 15 minutes. (Patient not taking: Reported on 5/22/2025), Disp: 25 tablet, Rfl: 0    Allergies:   Allergies   Allergen Reactions    Penicillins Hives, Swelling and Mental Status Change    Codeine Mental Status Change    Novocain [Procaine] Swelling    Dopamine Palpitations and Other (See Comments)     Other reaction(s): Hypertension       Bladder & Bowel Symptom Questionnaire    How often do you usually urinate during the day ?   1 - About every 3-4 hours   2.   How many timed do you urinate at night?   1 - 2 times at night   3.   What is the reason that you usually urinate?   4 - Desperate urge (must go immediately)   4.   Once you get the urge to go, how long can you     comfortably delay?   4 - Must go immediately    5.   How often do you get a sudden urge that makes you rush to the bathroom?   3 - A few times a week   6.   How often does a sudden urge to urinate result in you leaking urine or wetting pads?   2 - A few times a month   7.  In your opinion, how good is your bladder control?   3 - Poor   8.  Do you have accidental bowel leakage?   no   9.  Do you have difficulty fully emptying your  bladder?   no   10.  Do you experience accidental leakage when performing some physical activity such as coughing, sneezing, laughing or exercise?   yes   11. Have you tried medications to help improve your symptoms?   yes   12. Would you be interested in learning about a long-lasting option that may help you with your symptoms?   no                                                                             Total Score   18     0-7 (Mild) 8-16 (Moderate) 17-28 (Severe)          Objective     Physical Exam:   Vital Signs: There were no vitals filed for this visit.  There is no height or weight on file to calculate BMI.     Physical Exam  Constitutional:       Appearance: Normal appearance.   HENT:      Head: Normocephalic and atraumatic.      Nose: Nose normal.      Mouth/Throat:      Mouth: Mucous membranes are moist.      Pharynx: Oropharynx is clear.   Eyes:      Extraocular Movements: Extraocular movements intact.      Pupils: Pupils are equal, round, and reactive to light.   Pulmonary:      Effort: Pulmonary effort is normal. No respiratory distress.   Musculoskeletal:         General: No swelling or deformity. Normal range of motion.      Cervical back: Normal range of motion and neck supple.   Skin:     General: Skin is warm and dry.   Neurological:      General: No focal deficit present.      Mental Status: She is alert and oriented to person, place, and time. Mental status is at baseline.   Psychiatric:         Mood and Affect: Mood normal.         Behavior: Behavior normal.         Labs:   Brief Urine Lab Results  (Last result in the past 365 days)        Color   Clarity   Blood   Leuk Est   Nitrite   Protein   CREAT   Urine HCG        03/24/25 1700 Yellow   Clear   Negative   Negative   Negative   Negative                   Urine Culture          10/10/2024    12:14 10/24/2024    23:28 3/12/2025    10:44   Urine Culture   Urine Culture No growth  50,000 CFU/mL Mixed Mirian Isolated  >100,000 CFU/mL  Escherichia coli         Lab Results   Component Value Date    GLUCOSE 113 (H) 04/30/2025    CALCIUM 9.7 04/30/2025     04/30/2025    K 3.5 04/30/2025    CO2 33.9 (H) 04/30/2025    CL 95 (L) 04/30/2025    BUN 7 (L) 04/30/2025    CREATININE 0.43 (L) 04/30/2025    EGFRIFNONA 119 11/24/2021    BCR 16.3 04/30/2025    ANIONGAP 9.1 04/30/2025       Lab Results   Component Value Date    WBC 11.51 (H) 04/30/2025    HGB 10.3 (L) 04/30/2025    HCT 33.0 (L) 04/30/2025    MCV 91.9 04/30/2025     04/30/2025       Images:   CT Abdomen Pelvis With & Without Contrast  Result Date: 3/24/2025  Impression: 1.No acute abnormality identified within the abdomen or pelvis. 2.No acute vascular abnormality is identified. 3.No active contrast extravasation is identified within the GI tract lumen. 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 5.Colonic diverticulosis. 6.Bilateral nonobstructing nephrolithiasis. 7.Hiatal hernia. 8.Additional findings as detailed above. Electronically Signed: Ramon Escobar MD  3/24/2025 2:35 PM EDT  Workstation ID: [id number]    CT Abdomen Pelvis With & Without Contrast  Result Date: 3/14/2025  Impression: 1.Wall thickening of right colon, which could represent under distention or colitis. 5 surgical clips within cecum and right colon. 2.Bilateral nonobstructing renal stones. 3.Moderate to large hiatal hernia. 4.Sigmoid diverticulosis. Electronically Signed: Hamlet Jean MD  3/14/2025 10:41 AM EDT  Workstation ID: [id number]      Measures:   Tobacco:   Avelina Ceron  reports that she quit smoking about 19 years ago. Her smoking use included cigarettes. She started smoking about 69 years ago. She has a 50 pack-year smoking history. She has been exposed to tobacco smoke. She has never used smokeless tobacco.     Urine  Incontinence: ( NOUI)  Patient reports that she is not currently experiencing any symptoms of urinary incontinence.      Assessment / Plan      Assessment/Plan:   84 y.o. female who presented today for follow up of OAB, urgency incontinence, left nephrolithiasis, recurrent UTI.  She remains concerned about the frequency of developing urinary tract infections, safest option would likely be to start a nightly Macrobid prophylaxis given stable renal function this appears to be safe option.  I will have her see our nurse practitioner team back in 3 months for repeat urine culture and symptom check.  I would recommend extending her Macrobid prophylaxis for at least 3 to 12 months based on patient preference and renal function preservation.  She has a large left-sided renal stone on the left lower pole and we discussed given her age, oxygen requirement, anticoagulation use the risk of surgery likely outweigh the benefits.  We would likely recommend repeat KUB in 1 to 2 years to determine if stone has changed in size.  We discussed her kidney stone does potentially serve as a site of bacterial colonization that may worsen recurrent UTI.  They would rather treat with prophylactic antibiotic then definitive kidney stone treatment given the risks of surgery.  She desires to stay on Myrbetriq refilled.    Diagnoses and all orders for this visit:    1. Urgency incontinence (Primary)  -     Mirabegron ER (Myrbetriq) 50 MG tablet sustained-release 24 hour 24 hr tablet; Take 50 mg by mouth Daily.  Dispense: 90 tablet; Refill: 3    2. OAB (overactive bladder)  -     Mirabegron ER (Myrbetriq) 50 MG tablet sustained-release 24 hour 24 hr tablet; Take 50 mg by mouth Daily.  Dispense: 90 tablet; Refill: 3    3. Recurrent UTI  -     nitrofurantoin, macrocrystal-monohydrate, (Macrobid) 100 MG capsule; Take 1 capsule by mouth Every Night.  Dispense: 90 capsule; Refill: 1    4. Left nephrolithiasis           Follow Up:   Return in about 3  months (around 8/22/2025) for 3 months Yvette or Beena lugo.    I spent approximately 30 minutes providing clinical care for this patient; including review of patient's chart and provider documentation, face to face time spent with patient in examination room (obtaining history, performing physical exam, discussing diagnosis and management options), placing orders, and completing patient documentation.     Lee Kim MD  [facility] [place]

## 2025-06-03 DIAGNOSIS — G89.29 CHRONIC MIDLINE LOW BACK PAIN WITHOUT SCIATICA: ICD-10-CM

## 2025-06-03 DIAGNOSIS — M54.50 CHRONIC MIDLINE LOW BACK PAIN WITHOUT SCIATICA: ICD-10-CM

## 2025-06-04 RX ORDER — TRAMADOL HYDROCHLORIDE 50 MG/1
50 TABLET ORAL EVERY 6 HOURS PRN
Qty: 120 TABLET | Refills: 2 | Status: SHIPPED | OUTPATIENT
Start: 2025-06-04

## 2025-06-13 ENCOUNTER — PRE-ADMISSION TESTING (OUTPATIENT)
Dept: PREADMISSION TESTING | Facility: HOSPITAL | Age: 85
End: 2025-06-13
Payer: MEDICARE

## 2025-06-13 VITALS — WEIGHT: 135.58 LBS | BODY MASS INDEX: 26.62 KG/M2 | HEIGHT: 60 IN

## 2025-06-13 LAB
APTT PPP: 35.4 SECONDS (ref 22–39)
DEPRECATED RDW RBC AUTO: 48.2 FL (ref 37–54)
ERYTHROCYTE [DISTWIDTH] IN BLOOD BY AUTOMATED COUNT: 14.2 % (ref 12.3–15.4)
HCT VFR BLD AUTO: 31.7 % (ref 34–46.6)
HGB BLD-MCNC: 9.3 G/DL (ref 12–15.9)
INR PPP: 1.13 (ref 0.89–1.12)
MCH RBC QN AUTO: 27.3 PG (ref 26.6–33)
MCHC RBC AUTO-ENTMCNC: 29.3 G/DL (ref 31.5–35.7)
MCV RBC AUTO: 93 FL (ref 79–97)
PLATELET # BLD AUTO: 302 10*3/MM3 (ref 140–450)
PMV BLD AUTO: 9.2 FL (ref 6–12)
POTASSIUM SERPL-SCNC: 3.6 MMOL/L (ref 3.5–5.2)
PROTHROMBIN TIME: 15.2 SECONDS (ref 12.2–15.3)
RBC # BLD AUTO: 3.41 10*6/MM3 (ref 3.77–5.28)
WBC NRBC COR # BLD AUTO: 15.87 10*3/MM3 (ref 3.4–10.8)

## 2025-06-13 PROCEDURE — 84132 ASSAY OF SERUM POTASSIUM: CPT

## 2025-06-13 PROCEDURE — 85610 PROTHROMBIN TIME: CPT

## 2025-06-13 PROCEDURE — 36415 COLL VENOUS BLD VENIPUNCTURE: CPT

## 2025-06-13 PROCEDURE — 85027 COMPLETE CBC AUTOMATED: CPT

## 2025-06-13 PROCEDURE — 85730 THROMBOPLASTIN TIME PARTIAL: CPT

## 2025-06-13 NOTE — PAT
An arrival time for procedure was not provided during PAT visit. If patient had any questions or concerns about their arrival time, they were instructed to contact their surgeon/physician.  Additionally, if the patient referred to an arrival time that was acquired from their my chart account, patient was encouraged to verify that time with their surgeon/physician. Arrival times are NOT provided in Pre Admission Testing Department.    Per Anesthesia Request, patient instructed not to take their ACE/ARB medications on the AM of surgery.    Patient denies any current skin issues.

## 2025-06-16 RX ORDER — SODIUM, POTASSIUM,MAG SULFATES 17.5-3.13G
SOLUTION, RECONSTITUTED, ORAL ORAL
Qty: 354 ML | Refills: 0 | Status: SHIPPED | OUTPATIENT
Start: 2025-06-16

## 2025-06-20 ENCOUNTER — ANESTHESIA (OUTPATIENT)
Dept: GASTROENTEROLOGY | Facility: HOSPITAL | Age: 85
End: 2025-06-20
Payer: MEDICARE

## 2025-06-20 ENCOUNTER — HOSPITAL ENCOUNTER (OUTPATIENT)
Facility: HOSPITAL | Age: 85
Setting detail: HOSPITAL OUTPATIENT SURGERY
Discharge: HOME | End: 2025-06-20
Attending: INTERNAL MEDICINE | Admitting: INTERNAL MEDICINE
Payer: MEDICARE

## 2025-06-20 ENCOUNTER — ANESTHESIA EVENT (OUTPATIENT)
Dept: GASTROENTEROLOGY | Facility: HOSPITAL | Age: 85
End: 2025-06-20
Payer: MEDICARE

## 2025-06-20 VITALS
TEMPERATURE: 98 F | OXYGEN SATURATION: 90 % | RESPIRATION RATE: 16 BRPM | DIASTOLIC BLOOD PRESSURE: 59 MMHG | HEART RATE: 84 BPM | SYSTOLIC BLOOD PRESSURE: 152 MMHG

## 2025-06-20 DIAGNOSIS — Z99.81 O2 DEPENDENT: ICD-10-CM

## 2025-06-20 DIAGNOSIS — K63.5 POLYP OF COLON, UNSPECIFIED PART OF COLON, UNSPECIFIED TYPE: ICD-10-CM

## 2025-06-20 LAB — GLUCOSE BLDC GLUCOMTR-MCNC: 97 MG/DL (ref 70–130)

## 2025-06-20 PROCEDURE — 82948 REAGENT STRIP/BLOOD GLUCOSE: CPT

## 2025-06-20 PROCEDURE — C1052 HEMOSTATIC AGENT, GI, TOPIC: HCPCS | Performed by: INTERNAL MEDICINE

## 2025-06-20 PROCEDURE — 88342 IMHCHEM/IMCYTCHM 1ST ANTB: CPT | Performed by: INTERNAL MEDICINE

## 2025-06-20 PROCEDURE — 88305 TISSUE EXAM BY PATHOLOGIST: CPT | Performed by: INTERNAL MEDICINE

## 2025-06-20 PROCEDURE — 25010000002 LIDOCAINE PF 1% 1 % SOLUTION: Performed by: STUDENT IN AN ORGANIZED HEALTH CARE EDUCATION/TRAINING PROGRAM

## 2025-06-20 PROCEDURE — 25810000003 LACTATED RINGERS PER 1000 ML: Performed by: STUDENT IN AN ORGANIZED HEALTH CARE EDUCATION/TRAINING PROGRAM

## 2025-06-20 PROCEDURE — 25010000002 PROPOFOL 10 MG/ML EMULSION: Performed by: STUDENT IN AN ORGANIZED HEALTH CARE EDUCATION/TRAINING PROGRAM

## 2025-06-20 PROCEDURE — 45385 COLONOSCOPY W/LESION REMOVAL: CPT | Performed by: INTERNAL MEDICINE

## 2025-06-20 PROCEDURE — 43235 EGD DIAGNOSTIC BRUSH WASH: CPT | Performed by: INTERNAL MEDICINE

## 2025-06-20 DEVICE — DEV CLIP ENDO RESOLUTION360 CONTRL ROT 235CM: Type: IMPLANTABLE DEVICE | Site: ASCENDING COLON | Status: FUNCTIONAL

## 2025-06-20 RX ORDER — SODIUM CHLORIDE, SODIUM LACTATE, POTASSIUM CHLORIDE, CALCIUM CHLORIDE 600; 310; 30; 20 MG/100ML; MG/100ML; MG/100ML; MG/100ML
INJECTION, SOLUTION INTRAVENOUS CONTINUOUS PRN
Status: DISCONTINUED | OUTPATIENT
Start: 2025-06-20 | End: 2025-06-20 | Stop reason: SURG

## 2025-06-20 RX ORDER — ONDANSETRON 2 MG/ML
4 INJECTION INTRAMUSCULAR; INTRAVENOUS ONCE AS NEEDED
Status: DISCONTINUED | OUTPATIENT
Start: 2025-06-20 | End: 2025-06-20 | Stop reason: HOSPADM

## 2025-06-20 RX ORDER — PROPOFOL 10 MG/ML
VIAL (ML) INTRAVENOUS AS NEEDED
Status: DISCONTINUED | OUTPATIENT
Start: 2025-06-20 | End: 2025-06-20 | Stop reason: SURG

## 2025-06-20 RX ORDER — LIDOCAINE HYDROCHLORIDE 10 MG/ML
INJECTION, SOLUTION EPIDURAL; INFILTRATION; INTRACAUDAL; PERINEURAL AS NEEDED
Status: DISCONTINUED | OUTPATIENT
Start: 2025-06-20 | End: 2025-06-20 | Stop reason: SURG

## 2025-06-20 RX ADMIN — PROPOFOL 100 MCG/KG/MIN: 10 INJECTION, EMULSION INTRAVENOUS at 16:43

## 2025-06-20 RX ADMIN — PROPOFOL 30 MG: 10 INJECTION, EMULSION INTRAVENOUS at 16:46

## 2025-06-20 RX ADMIN — LIDOCAINE HYDROCHLORIDE 50 MG: 10 INJECTION, SOLUTION EPIDURAL; INFILTRATION; INTRACAUDAL; PERINEURAL at 16:42

## 2025-06-20 RX ADMIN — PROPOFOL 50 MG: 10 INJECTION, EMULSION INTRAVENOUS at 16:42

## 2025-06-20 RX ADMIN — SODIUM CHLORIDE, POTASSIUM CHLORIDE, SODIUM LACTATE AND CALCIUM CHLORIDE: 600; 310; 30; 20 INJECTION, SOLUTION INTRAVENOUS at 17:01

## 2025-06-20 RX ADMIN — SODIUM CHLORIDE, POTASSIUM CHLORIDE, SODIUM LACTATE AND CALCIUM CHLORIDE: 600; 310; 30; 20 INJECTION, SOLUTION INTRAVENOUS at 16:35

## 2025-06-20 NOTE — H&P
"GASTROENTEROLOGY OFFICE NOTE  Avelina Ceron  [id number]  1940      CHIEF COMPLAINT  Follow-up of sessile colonic polyp  Recently noted anemia with history suggestive of melena    HISTORY OF PRESENT ILLNESS:  84-year-old white female status post colonoscopy in March 7, 2025 for removal of a sessile right-sided colon polyp that had been noted during her recent hospitalization in October 12, 2024.  This large polyp was left in situ for future removal and it was felt to have been removed in its entirety I her most recent March 7, 2024 colonoscopy    Incidentally since then she is describes some dark stools and has been noted to be anemic.    She presents without dysphagia, dyne aphasia, early satiety, unexplained weight loss, melena or bright red blood per rectum.  Her most recent hemoglobin was 9.3.  It has been consistently in the low range the entirety of this year.  The last normal hemoglobin that I can find was years ago.    PAST MEDICAL HISTORY  Past Medical History:    Anemia    Anesthesia complication    Pt went into respiratory failure during total hip replacement in 2020    Arthritis of back    Arthritis of neck    Asthma    Atrial fibrillation    Broken ribs    Cataract    Cholelithiasis    Chronic kidney disease    Clotting disorder    blood thinners    Colon polyp    COPD (chronic obstructive pulmonary disease)    Coronary artery disease    Degenerative lumbar disc    Depression    Disease of thyroid gland    \"low thyroid\"    Dysphagia    Fracture, fibula    Fracture, humerus    Fracture, tibia and fibula    Frozen shoulder    GI bleed    Glaucoma    Hard to intubate    Hip arthrosis    History of colonoscopy    History of transfusion    bhl 10/2024, no reaction    Hyperlipidemia    Hypertension    Hypothyroidism    Internal carotid artery stent present    bialteral    Kidney stone    treated Aug 2023    Knee swelling    Low back pain    Lumbosacral disc disease    Murmur    Obesity    On home O2    " 3L    Osteoarthritis    Osteopenia    Periarthritis of shoulder    PVD (peripheral vascular disease)    bilateral stents    Urinary incontinence    Urinary tract infection        PAST SURGICAL HISTORY  Past Surgical History:    CAROTID STENT    CHOLECYSTECTOMY    COLONOSCOPY    COLONOSCOPY    Procedure: COLONOSCOPY;  Surgeon: Markie Bautista MD;  Location:  [facility];  Service: Gastroenterology;  Laterality: N/A;    COLONOSCOPY    Procedure: COLONOSCOPY;  Surgeon: Shant Callahan MD;  Location:  [facility];  Service: Gastroenterology;  Laterality: N/A;    CORONARY STENT PLACEMENT    ENDOSCOPY    Procedure: ESOPHAGOGASTRODUODENOSCOPY;  Surgeon: Markie Bautista MD;  Location:  [facility];  Service: Gastroenterology;  Laterality: N/A;    ENDOSCOPY    Procedure: ESOPHAGOGASTRODUODENOSCOPY;  Surgeon: Markie Bautista MD;  Location: [facility];  Service: Gastroenterology;  Laterality: N/A;    EYE SURGERY    yag surgery    FRACTURE SURGERY    GALLBLADDER SURGERY    HYSTERECTOMY    ILIAC ARTERY STENT    JOINT REPLACEMENT    KIDNEY STONE SURGERY    OOPHORECTOMY    1 ovary removed    TOTAL HIP ARTHROPLASTY    URETEROSCOPY LASER LITHOTRIPSY WITH STENT INSERTION    Procedure: CYSTOSCOPY, RIGHT RETROGRADE PYELOGRAM; WITH STENT INSERTION;  Surgeon: Lee Kim MD;  Location:  [facility];  Service: Urology;  Laterality: Right;    URETEROSCOPY LASER LITHOTRIPSY WITH STENT INSERTION    Procedure: URETEROSCOPY, LASER LITHOTRIPSY, STENT EXCHANGE;  Surgeon: Lee Kim MD;  Location:  [facility];  Service: Urology;  Laterality: Right;        MEDICATIONS:  Fluticasone-Umeclidin-Vilant, Mirabegron ER, Multiple Vitamins-Minerals, acetaminophen, apixaban, dofetilide, gabapentin, isosorbide mononitrate, latanoprost, levothyroxine, losartan, nitrofurantoin (macrocrystal-monohydrate), nitroglycerin, pantoprazole, polyethylene glycol, roflumilast, sertraline, simvastatin, sodium-potassium-magnesium  sulfates, traMADol, and vitamin B-12     ALLERGIES  is allergic to penicillins, codeine, dopamine, and novocain [procaine].    FAMILY HISTORY:  Cancer-related family history includes Cancer in her daughter, maternal aunt, and another family member.  Colon Cancer-related family history is not on file.    SOCIAL HISTORY  She  reports that she quit smoking about 19 years ago. Her smoking use included cigarettes. She started smoking about 69 years ago. She has a 50 pack-year smoking history. She has been exposed to tobacco smoke. She has never used smokeless tobacco. She reports that she does not currently use alcohol. She reports that she does not use drugs.   She is a .  She has 2 daughters.  1 grand child.  She is retired.  She is accompanied by her daughter    REVIEW OF SYSTEMS  Cardiovascular, pulmonary and generalized review of systems are pertinent as reviewed above    PHYSICAL EXAM   /55 (BP Location: Left arm, Patient Position: Lying)   Pulse 80   Temp 98 °F (36.7 °C) (Temporal)   Resp 16   LMP  (LMP Unknown)   SpO2 95%   General: Alert and oriented x 3. In no apparent or acute distress.  and No stigmata of chronic liver disease  HEENT: Anicteric sclerae. Normal oropharynx  Neck: Supple. Without lymphadenopathy  CV: Regular rate and rhythm, S1, S2  Lungs: Clear to ausculation. Without rales, rhonchi and wheezing  Abdomen:  Soft,non-distended without palpable masses or hepatosplenomeagaly, areas of rebound tenderness or guarding.   Extremeties: without clubbing, cyanosis or edema  Neurologic:  Alert and oriented x 3 without focal motor or sensory deficits  Rectal exam: deferred     Results for orders placed or performed in visit on 06/13/25   Potassium    Collection Time: 06/13/25  8:39 AM    Specimen: Blood   Result Value Ref Range    Potassium 3.6 3.5 - 5.2 mmol/L   CBC (No Diff)    Collection Time: 06/13/25  8:39 AM    Specimen: Blood   Result Value Ref Range    WBC 15.87 (H) 3.40 - 10.80  10*3/mm3    RBC 3.41 (L) 3.77 - 5.28 10*6/mm3    Hemoglobin 9.3 (L) 12.0 - 15.9 g/dL    Hematocrit 31.7 (L) 34.0 - 46.6 %    MCV 93.0 79.0 - 97.0 fL    MCH 27.3 26.6 - 33.0 pg    MCHC 29.3 (L) 31.5 - 35.7 g/dL    RDW 14.2 12.3 - 15.4 %    RDW-SD 48.2 37.0 - 54.0 fl    MPV 9.2 6.0 - 12.0 fL    Platelets 302 140 - 450 10*3/mm3   PTT    Collection Time: 06/13/25  8:39 AM    Specimen: Blood   Result Value Ref Range    PTT 35.4 22.0 - 39.0 seconds   Protime-INR    Collection Time: 06/13/25  8:39 AM    Specimen: Blood   Result Value Ref Range    Protime 15.2 12.2 - 15.3 Seconds    INR 1.13 (H) 0.89 - 1.12        Results Review:  I reviewed the patient's new clinical results.      ASSESSMENT  1.-Follow-up of sessile colonic adenoma  2.-Anemia    PLAN  1.-Proceed with EGD and colonoscopy for evaluation of anemia and reassessment of prior polypectomy site      Shant Callahan MD  6/20/2025   16:40 EDT      Addendum  EGD was remarkable for Robert's ulcers.  Given the fact that she is on twice daily proton pump inhibitor will get her started on Voquezna 20 mg daily.    Colonoscopy revealed residual polyp perhaps 15 mm in size removed after submucosal saline and spot ink injection with cold snare Hemospray was necessary to obtain hemostasis and 2 endoclips were placed on defect.  I would like to recheck this area in 3 months to make sure there is no residual polyp as visualization was made difficult after polypectomy with some oozing of blood.

## 2025-06-20 NOTE — ANESTHESIA PREPROCEDURE EVALUATION
Anesthesia Evaluation     Patient summary reviewed and Nursing notes reviewed   history of anesthetic complications (hx of difficult intubation in the remote past (no issues with Gtz at [facility])):  Difficult airway  NPO Solid Status: > 8 hours  NPO Liquid Status: > 2 hours           Airway   Mallampati: II  TM distance: >3 FB  Neck ROM: full  Possible difficult intubation and Anterior  Dental    (+) poor dentition    Comment: Few remaining teeth    Pulmonary - normal exam    breath sounds clear to auscultation  (+) a smoker Former, COPD, asthma,home oxygen (3L O2)    ROS comment: pHTN (RVSP 45-55 mmHg)  Cardiovascular - normal exam    ECG reviewed  Rhythm: regular  Rate: normal    (+) hypertension, valvular problems/murmurs (Moderate TR) TI, CAD, dysrhythmias Atrial Fib, PVD, hyperlipidemia    ROS comment: EKG 3/29/25:  HR 94  Normal sinus rhythm  Normal ECG    ECHO 08/2024:  ·  Left ventricular systolic function is normal. Calculated left ventricular EF = 64%  ·  Moderate tricuspid valve regurgitation is present.  ·  Estimated right ventricular systolic pressure from tricuspid regurgitation is moderately elevated (45-55 mmHg).  ·  Mild MR        Neuro/Psych  (+) psychiatric history Depression  GI/Hepatic/Renal/Endo    (+) GI bleeding , renal disease- CRI and stones, thyroid problem hypothyroidism    Musculoskeletal     (+) back pain, chronic pain (home tramadol)  Abdominal    Substance History - negative use     OB/GYN          Other   arthritis,     ROS/Med Hx Other: Airway note 08/2023:  Gtz 3, 7.0 ETT, G1V    Labs 6/13/25:  Hgb 9.3, Plt 302    Colonoscopy 3/7/25 done under Propofol TIVA                Anesthesia Plan    ASA 3     general   total IV anesthesia  intravenous induction     Anesthetic plan, risks, benefits, and alternatives have been provided, discussed and informed consent has been obtained with: patient.    Plan discussed with CRNA.    CODE STATUS:

## 2025-06-20 NOTE — ANESTHESIA POSTPROCEDURE EVALUATION
Patient: Avelina Ceron    Procedure Summary       Date: 06/20/25 Room / Location: [facility] ENDOSCOPY 2 /  [facility]    Anesthesia Start: 1635 Anesthesia Stop: 1725    Procedures:       COLONOSCOPY WITH POSSIBLE POLYPECTOMY, BIOPSY, AND CONTROL OF GI BLEEDING      ESOPHAGOGASTRODUODENOSCOPY Diagnosis:       Polyp of colon, unspecified part of colon, unspecified type      O2 dependent      (Polyp of colon, unspecified part of colon, unspecified type [K63.5])      (O2 dependent [Z99.81])    Surgeons: Shant Callahan MD Provider: Andrew Durán MD    Anesthesia Type: general ASA Status: 3            Anesthesia Type: general    Vitals  Vitals Value Taken Time   BP     Temp     Pulse     Resp     SpO2 97 % 06/20/25 17:25           Post Anesthesia Care and Evaluation    Patient location during evaluation: PACU  Patient participation: waiting for patient participation  Level of consciousness: responsive to light touch  Pain management: adequate    Airway patency: patent  Anesthetic complications: No anesthetic complications  PONV Status: none  Cardiovascular status: hemodynamically stable and acceptable  Respiratory status: nonlabored ventilation, acceptable and nasal cannula  Hydration status: acceptable

## 2025-06-23 ENCOUNTER — PREP FOR SURGERY (OUTPATIENT)
Dept: OTHER | Facility: HOSPITAL | Age: 85
End: 2025-06-23
Payer: MEDICARE

## 2025-06-23 DIAGNOSIS — Z86.0100 HX OF COLONIC POLYP: Primary | ICD-10-CM

## 2025-06-23 DIAGNOSIS — K25.9 CAMERON ULCER, UNSPECIFIED ULCER CHRONICITY: ICD-10-CM

## 2025-06-23 RX ORDER — ISOSORBIDE MONONITRATE 60 MG/1
60 TABLET, EXTENDED RELEASE ORAL DAILY
Qty: 90 TABLET | Refills: 1 | Status: SHIPPED | OUTPATIENT
Start: 2025-06-23

## 2025-06-24 RX ORDER — VONOPRAZAN FUMARATE 26.72 MG/1
20 TABLET ORAL DAILY
Qty: 30 TABLET | Refills: 11 | Status: SHIPPED | OUTPATIENT
Start: 2025-06-24 | End: 2025-06-26 | Stop reason: SDUPTHER

## 2025-06-25 LAB
CYTO UR: NORMAL
LAB AP CASE REPORT: NORMAL
LAB AP CLINICAL INFORMATION: NORMAL
PATH REPORT.FINAL DX SPEC: NORMAL
PATH REPORT.GROSS SPEC: NORMAL

## 2025-06-26 RX ORDER — VONOPRAZAN FUMARATE 26.72 MG/1
20 TABLET ORAL DAILY
Qty: 30 TABLET | Refills: 11 | Status: SHIPPED | OUTPATIENT
Start: 2025-06-26

## 2025-06-28 ENCOUNTER — RESULTS FOLLOW-UP (OUTPATIENT)
Dept: GASTROENTEROLOGY | Facility: HOSPITAL | Age: 85
End: 2025-06-28
Payer: MEDICARE

## 2025-07-02 ENCOUNTER — PRIOR AUTHORIZATION (OUTPATIENT)
Dept: GASTROENTEROLOGY | Facility: CLINIC | Age: 85
End: 2025-07-02
Payer: MEDICARE

## 2025-07-02 RX ORDER — VONOPRAZAN FUMARATE 26.72 MG/1
20 TABLET ORAL DAILY
Qty: 30 TABLET | Refills: 11 | OUTPATIENT
Start: 2025-07-02

## 2025-07-02 NOTE — TELEPHONE ENCOUNTER
A Prior Authorization has been started for Voquezna.     Approved today by [employer] 2017  Approved. This drug has been approved under the Member's Medicare Part D benefit. Approved quantity: 30 units per 30 day(s). You may fill up to a 90 day supply except for those on Specialty Tier 5, which can be filled up to a 30 day supply

## 2025-07-10 ENCOUNTER — OFFICE VISIT (OUTPATIENT)
Dept: INTERNAL MEDICINE | Facility: CLINIC | Age: 85
End: 2025-07-10
Payer: MEDICARE

## 2025-07-10 VITALS
RESPIRATION RATE: 20 BRPM | HEART RATE: 84 BPM | TEMPERATURE: 97.5 F | SYSTOLIC BLOOD PRESSURE: 128 MMHG | DIASTOLIC BLOOD PRESSURE: 58 MMHG | WEIGHT: 129 LBS | BODY MASS INDEX: 25.19 KG/M2

## 2025-07-10 DIAGNOSIS — K21.00 GASTROESOPHAGEAL REFLUX DISEASE WITH ESOPHAGITIS WITHOUT HEMORRHAGE: ICD-10-CM

## 2025-07-10 DIAGNOSIS — I10 ESSENTIAL HYPERTENSION: Primary | ICD-10-CM

## 2025-07-10 DIAGNOSIS — E78.5 HYPERLIPIDEMIA, UNSPECIFIED HYPERLIPIDEMIA TYPE: ICD-10-CM

## 2025-07-10 DIAGNOSIS — D64.9 ANEMIA, UNSPECIFIED TYPE: ICD-10-CM

## 2025-07-10 DIAGNOSIS — E03.9 HYPOTHYROIDISM, UNSPECIFIED TYPE: ICD-10-CM

## 2025-07-10 DIAGNOSIS — J44.9 CHRONIC OBSTRUCTIVE PULMONARY DISEASE, UNSPECIFIED COPD TYPE: ICD-10-CM

## 2025-07-10 DIAGNOSIS — R63.4 ABNORMAL WEIGHT LOSS: ICD-10-CM

## 2025-07-10 DIAGNOSIS — F32.9 REACTIVE DEPRESSION: ICD-10-CM

## 2025-07-10 LAB
ALBUMIN SERPL-MCNC: 3.9 G/DL (ref 3.5–5.2)
ALBUMIN/GLOB SERPL: 1.3 G/DL
ALP SERPL-CCNC: 76 U/L (ref 39–117)
ALT SERPL W P-5'-P-CCNC: 9 U/L (ref 1–33)
ANION GAP SERPL CALCULATED.3IONS-SCNC: 11 MMOL/L (ref 5–15)
AST SERPL-CCNC: 18 U/L (ref 1–32)
BASOPHILS # BLD AUTO: 0.08 10*3/MM3 (ref 0–0.2)
BASOPHILS NFR BLD AUTO: 0.8 % (ref 0–1.5)
BILIRUB SERPL-MCNC: 0.3 MG/DL (ref 0–1.2)
BUN SERPL-MCNC: 6 MG/DL (ref 8–23)
BUN/CREAT SERPL: 15 (ref 7–25)
CALCIUM SPEC-SCNC: 9.8 MG/DL (ref 8.6–10.5)
CHLORIDE SERPL-SCNC: 98 MMOL/L (ref 98–107)
CHOLEST SERPL-MCNC: 118 MG/DL (ref 0–200)
CO2 SERPL-SCNC: 32 MMOL/L (ref 22–29)
CREAT SERPL-MCNC: 0.4 MG/DL (ref 0.57–1)
DEPRECATED RDW RBC AUTO: 43.6 FL (ref 37–54)
EGFRCR SERPLBLD CKD-EPI 2021: 97.7 ML/MIN/1.73
EOSINOPHIL # BLD AUTO: 0.17 10*3/MM3 (ref 0–0.4)
EOSINOPHIL NFR BLD AUTO: 1.7 % (ref 0.3–6.2)
ERYTHROCYTE [DISTWIDTH] IN BLOOD BY AUTOMATED COUNT: 14 % (ref 12.3–15.4)
FERRITIN SERPL-MCNC: 78.9 NG/ML (ref 13–150)
FOLATE SERPL-MCNC: 4.03 NG/ML (ref 4.78–24.2)
GLOBULIN UR ELPH-MCNC: 3.1 GM/DL
GLUCOSE SERPL-MCNC: 107 MG/DL (ref 65–99)
HCT VFR BLD AUTO: 32.2 % (ref 34–46.6)
HDLC SERPL-MCNC: 48 MG/DL (ref 40–60)
HGB BLD-MCNC: 10.2 G/DL (ref 12–15.9)
IMM GRANULOCYTES # BLD AUTO: 0.04 10*3/MM3 (ref 0–0.05)
IMM GRANULOCYTES NFR BLD AUTO: 0.4 % (ref 0–0.5)
IRON 24H UR-MRATE: 41 MCG/DL (ref 37–145)
IRON SATN MFR SERPL: 9 % (ref 20–50)
LDLC SERPL CALC-MCNC: 55 MG/DL (ref 0–100)
LDLC/HDLC SERPL: 1.15 {RATIO}
LYMPHOCYTES # BLD AUTO: 1.97 10*3/MM3 (ref 0.7–3.1)
LYMPHOCYTES NFR BLD AUTO: 19.5 % (ref 19.6–45.3)
MCH RBC QN AUTO: 27.3 PG (ref 26.6–33)
MCHC RBC AUTO-ENTMCNC: 31.7 G/DL (ref 31.5–35.7)
MCV RBC AUTO: 86.1 FL (ref 79–97)
MONOCYTES # BLD AUTO: 0.71 10*3/MM3 (ref 0.1–0.9)
MONOCYTES NFR BLD AUTO: 7 % (ref 5–12)
NEUTROPHILS NFR BLD AUTO: 7.13 10*3/MM3 (ref 1.7–7)
NEUTROPHILS NFR BLD AUTO: 70.6 % (ref 42.7–76)
NRBC BLD AUTO-RTO: 0 /100 WBC (ref 0–0.2)
PLATELET # BLD AUTO: 241 10*3/MM3 (ref 140–450)
PMV BLD AUTO: 10.2 FL (ref 6–12)
POTASSIUM SERPL-SCNC: 4.3 MMOL/L (ref 3.5–5.2)
PROT SERPL-MCNC: 7 G/DL (ref 6–8.5)
RBC # BLD AUTO: 3.74 10*6/MM3 (ref 3.77–5.28)
SODIUM SERPL-SCNC: 141 MMOL/L (ref 136–145)
T4 FREE SERPL-MCNC: 1.74 NG/DL (ref 0.92–1.68)
TIBC SERPL-MCNC: 440 MCG/DL (ref 298–536)
TRANSFERRIN SERPL-MCNC: 295 MG/DL (ref 200–360)
TRIGL SERPL-MCNC: 74 MG/DL (ref 0–150)
TSH SERPL DL<=0.05 MIU/L-ACNC: 0.05 UIU/ML (ref 0.27–4.2)
VIT B12 BLD-MCNC: >2000 PG/ML (ref 211–946)
VLDLC SERPL-MCNC: 15 MG/DL (ref 5–40)
WBC NRBC COR # BLD AUTO: 10.1 10*3/MM3 (ref 3.4–10.8)

## 2025-07-10 PROCEDURE — 82728 ASSAY OF FERRITIN: CPT | Performed by: INTERNAL MEDICINE

## 2025-07-10 PROCEDURE — 82607 VITAMIN B-12: CPT | Performed by: INTERNAL MEDICINE

## 2025-07-10 PROCEDURE — 80061 LIPID PANEL: CPT | Performed by: INTERNAL MEDICINE

## 2025-07-10 PROCEDURE — 82746 ASSAY OF FOLIC ACID SERUM: CPT | Performed by: INTERNAL MEDICINE

## 2025-07-10 PROCEDURE — 84439 ASSAY OF FREE THYROXINE: CPT | Performed by: INTERNAL MEDICINE

## 2025-07-10 PROCEDURE — 80053 COMPREHEN METABOLIC PANEL: CPT | Performed by: INTERNAL MEDICINE

## 2025-07-10 PROCEDURE — 84466 ASSAY OF TRANSFERRIN: CPT | Performed by: INTERNAL MEDICINE

## 2025-07-10 PROCEDURE — 85025 COMPLETE CBC W/AUTO DIFF WBC: CPT | Performed by: INTERNAL MEDICINE

## 2025-07-10 PROCEDURE — 83540 ASSAY OF IRON: CPT | Performed by: INTERNAL MEDICINE

## 2025-07-10 PROCEDURE — 84443 ASSAY THYROID STIM HORMONE: CPT | Performed by: INTERNAL MEDICINE

## 2025-07-10 RX ORDER — LOSARTAN POTASSIUM 50 MG/1
50 TABLET ORAL DAILY
Qty: 90 TABLET | Refills: 1 | Status: SHIPPED | OUTPATIENT
Start: 2025-07-10

## 2025-07-10 RX ORDER — VONOPRAZAN FUMARATE 26.72 MG/1
20 TABLET ORAL DAILY
Qty: 90 TABLET | Refills: 1 | Status: SHIPPED | OUTPATIENT
Start: 2025-07-10

## 2025-07-10 RX ORDER — BUPROPION HYDROCHLORIDE 150 MG/1
150 TABLET ORAL DAILY
Qty: 30 TABLET | Refills: 2 | Status: SHIPPED | OUTPATIENT
Start: 2025-07-10

## 2025-07-10 NOTE — PROGRESS NOTES
Chief Complaint   Patient presents with    Follow-up     4 month follow up chronic medical problems       History of Present Illness      The patient presents for a follow-up related to hypertension. The patient reports that she has had dyspnea but she denies having headaches, chest pain, edema, syncope or blurred vision. She states that she is taking her medication as prescribed. She is not having medication side effects.    The patient presents for a follow-up related to GERD. The patient is on pantoprazole for her gastroesophageal reflux. The medication is taken on a regular basis and gives complete relief of the symptoms. She reports weight loss but she denies abdominal pain, belching, diarrhea, dysphagia, early satiety, heartburn, hoarseness, nausea, odynophagia, rectal bleeding or vomiting. The GERD has no known aggravating factors.    The patient presents for a follow-up related to hyperlipidemia. She is following a low fat diet. She reports that she is exercising. She is taking simvastatin. The patient is taking her medication as prescribed. She reports no medication side effects, including muscle cramps, abdominal pain, headaches or weakness. She denies orthopnea, paroxysmal nocturnal dyspnea or dyspnea on exertion.    The patient presents for a follow-up related to chronic obstructive pulmonary disease. Rescue inhaler usage is reported to be daily. The patient reports that she has no known triggers. Since the last office visit, the patient has not sought emergency care. She currently reports no wheezing, cough or sputum production. The shortness of breath is stable. The patient is using an inhaled steroid. She does rinse her mouth after using her steroid inhaler. She thrush symptoms or a sore mouth. She denies hemoptysis. She is not a smoker.    The patient presents for a follow-up related to depression. She reports currently having depression symptoms. She denies suicidal ideation. Her energy level is  very low. She reports agorophobia. She sleeps well. She is tearful. She states that her current depression symptoms are worsened. She is currently taking a medication. The current medication regimen consists of sertraline. The patient denies having medication side effects including nausea, headaches, anxiety, increased depression or fatigue.    The patient presents for a follow-up related to anemia. There are no reports of blood loss. The patient has no symptoms of a dry cough, a wet cough, fever, myalgias, sweats, decreased appetite or chills. The patient's energy level is decreased.    The patient presents for a follow-up related to hypothyroidism. She reports a good energy level. She reports no hair loss, heat intolerance, cold intolerance, constipation or sweats. She is taking her medication as prescribed.    The patient has a history of abnormal weight loss which is not associated with loss of appetite. There has been a loss of more than 30 pounds over the past six months. The patient denies night sweats. There has not been dysphagia. The patient denies dental pain or difficulty with mastication. The patient states that she has adequate access to food. She denies polyuria, polydypsia, polyphagia, dyspepsia or dysuria. The patient reports depression symptoms. There is no history of current excessive alcohol usage.    Medications      Current Outpatient Medications:     acetaminophen (TYLENOL) 500 MG tablet, Take 1 tablet by mouth Every 6 (Six) Hours As Needed for Mild Pain, Headache or Fever. OTC, Disp: , Rfl:     apixaban (ELIQUIS) 2.5 MG tablet tablet, Take 1 tablet by mouth Every 12 (Twelve) Hours. Indications: Atrial Fibrillation, Disp: 180 tablet, Rfl: 1    cyanocobalamin ([employer] Vitamin B-12) 1000 MCG tablet, Take 1 tablet by mouth Daily., Disp: 100 tablet, Rfl: 2    dofetilide (TIKOSYN) 250 MCG capsule, Take 1 capsule by mouth 2 (Two) Times a Day., Disp: 180 capsule, Rfl: 1    Fluticasone-Umeclidin-Vilant  (Trelegy Ellipta) 100-62.5-25 MCG/ACT inhaler, Inhale 1 puff Daily., Disp: 180 each, Rfl: 3    gabapentin (NEURONTIN) 300 MG capsule, Take 1 capsule by mouth Every Night., Disp: 90 capsule, Rfl: 0    isosorbide mononitrate (IMDUR) 60 MG 24 hr tablet, Take 1 tablet by mouth Daily., Disp: 90 tablet, Rfl: 1    latanoprost (XALATAN) 0.005 % ophthalmic solution, Administer 1 drop to both eyes Every Night., Disp: , Rfl:     levothyroxine (SYNTHROID, LEVOTHROID) 200 MCG tablet, TAKE 1 TABLET DAILY (Patient taking differently: Take 1 tablet by mouth Every Morning.), Disp: 90 tablet, Rfl: 3    losartan (COZAAR) 50 MG tablet, Take 1 tablet by mouth Daily. (Patient taking differently: Take 0.5 tablets by mouth Daily.), Disp: 30 tablet, Rfl: 2    Mirabegron ER (Myrbetriq) 50 MG tablet sustained-release 24 hour 24 hr tablet, Take 50 mg by mouth Daily., Disp: 90 tablet, Rfl: 3    Multiple Vitamins-Minerals (PRESERVISION AREDS 2 PO), Take 1 tablet by mouth 2 (Two) Times a Day. OTC, Disp: , Rfl:     nitrofurantoin, macrocrystal-monohydrate, (Macrobid) 100 MG capsule, Take 1 capsule by mouth Every Night., Disp: 90 capsule, Rfl: 1    nitroglycerin (NITROSTAT) 0.4 MG SL tablet, Place 1 tablet under the tongue Every 5 (Five) Minutes As Needed for Chest Pain. Take no more than 3 doses in 15 minutes., Disp: 25 tablet, Rfl: 0    pantoprazole (PROTONIX) 40 MG EC tablet, Take 1 tablet by mouth 2 (Two) Times a Day., Disp: 180 tablet, Rfl: 1    polyethylene glycol (MIRALAX) 17 GM/SCOOP powder, Take 17 g by mouth As Needed (Constipation). OTC, Disp: , Rfl:     roflumilast (DALIRESP) 500 MCG tablet tablet, Take 1 tablet by mouth Daily., Disp: 90 tablet, Rfl: 3    sertraline (ZOLOFT) 100 MG tablet, TAKE 1 TABLET DAILY, Disp: 90 tablet, Rfl: 3    simvastatin (ZOCOR) 40 MG tablet, TAKE 1 TABLET EVERY NIGHT (Patient taking differently: Take 1 tablet by mouth Every Night.), Disp: 90 tablet, Rfl: 3    traMADol (ULTRAM) 50 MG tablet, Take 1 tablet by  mouth Every 6 (Six) Hours As Needed for Moderate Pain., Disp: 120 tablet, Rfl: 2    Vonoprazan Fumarate (Voquezna) 20 MG tablet, Take 1 tablet by mouth Daily., Disp: 90 tablet, Rfl: 1    buPROPion XL (Wellbutrin XL) 150 MG 24 hr tablet, Take 1 tablet by mouth Daily., Disp: 30 tablet, Rfl: 2     Allergies    Allergies   Allergen Reactions    Penicillins Hives, Swelling and Mental Status Change    Codeine Mental Status Change    Dopamine Palpitations and Other (See Comments)     Other reaction(s): Hypertension    Novocain [Procaine] Swelling       Problem List    Patient Active Problem List   Diagnosis    Coronary arteriosclerosis in native artery    Chronic obstructive pulmonary disease    Depression    Gastroesophageal reflux disease with esophagitis    Hyperlipidemia    Hypertension    Hypothyroidism    Osteoarthritis    Peripheral arterial occlusive disease    Solitary pulmonary nodule    Vitamin D deficiency    Lower back pain    Carotid bruit    Hypoxemia    Chronic respiratory failure with hypoxia and hypercapnia    Allergic rhinitis    Chronic leukocytosis    Ureterolithiasis    Murmur, cardiac    Hospital discharge follow-up    Closed fracture of right proximal humerus    Right shoulder pain    Acute hypoxemic respiratory failure    Anemia due to blood loss, acute    Blood loss anemia    Anemia due to acute blood loss    Melena    Polyp of colon    O2 dependent    Hx of colonic polyp    Robert ulcer       Medications, Allergies, Problems List and Past History were reviewed and updated.    Physical Examination    /58 (BP Location: Left arm, Patient Position: Sitting, Cuff Size: Adult)   Pulse 84   Temp 97.5 °F (36.4 °C) (Infrared)   Resp 20   Wt 58.5 kg (129 lb)   LMP  (LMP Unknown)   BMI 25.19 kg/m²       HEENT: Facies- Within normal limits. Lids- Normal bilaterally. Conjunctiva- Clear bilaterally. Sclera- Anicteric bilaterally.    Neck: Thyroid- non enlarged, symmetric and has no nodules. No  bruits are detected. ROM- Normal Range of Motion with no rigidity.    Lungs: Auscultation- Clear to auscultation bilaterally. There are no retractions, clubbing or cyanosis. The Expiratory to Inspiratory ratio is equal.    Lymph Nodes: Cervical- no enlarged lymph nodes noted.    Cardiovascular: There are no carotid bruits. Heart- Normal Rate with Regular rhythm and no murmurs. There are no gallops. There are no rubs. In the lower extremities there is no edema. The upper extremities do not have edema.      Abdomen: Soft, benign, non-tender with no masses, hernias, organomegaly or scars.    Impression and Assessment    Essential Hypertension.    Gastroesophageal Reflux Disease.    Hyperlipidemia.    Chronic Obstructive Pulmonary Disease.    Reactive Depression.    Anemia.    Hypothyroidism.    Abnormal Weight Loss.    Plan    Gastroesophageal Reflux Disease Plan: The patient was instructed to continue the current medications.    Essential Hypertension Plan: The patient was instructed to continue the current medications.    Hyperlipidemia Plan: The patient was instructed to exercise daily, eat a low fat diet and continue her medications.    Hypothyroidism Plan: The patient was instructed to continue the current medications.    Chronic Obstructive Pulmonary Disease Plan: Continue the current medication regimen.    Abnormal Weight Loss Plan: Further plans will be made after the test results are received and reviewed.    Reactive Depression Plan: The patient was instructed to continue the current medications. Medication will be added as noted.    Anemia Plan: She will follow-up with her gastroenterologist. Further plans will be made after testing.      Diagnoses and all orders for this visit:    1. Essential hypertension (Primary)  -     Comprehensive Metabolic Panel; Future  -     POC Urinalysis Dipstick, Automated; Future  -     Comprehensive Metabolic Panel    2. Gastroesophageal reflux disease with esophagitis without  hemorrhage    3. Hyperlipidemia, unspecified hyperlipidemia type  -     Comprehensive Metabolic Panel; Future  -     Lipid Panel; Future  -     Comprehensive Metabolic Panel  -     Lipid Panel    4. Chronic obstructive pulmonary disease, unspecified COPD type  -     CT Chest With Contrast; Future    5. Hypothyroidism, unspecified type  -     TSH; Future  -     T4, Free; Future  -     TSH  -     T4, Free    6. Anemia, unspecified type  -     CBC & Differential; Future  -     Iron Profile w/o Ferritin; Future  -     Ferritin; Future  -     Folate; Future  -     Vitamin B12; Future  -     CBC & Differential  -     Iron Profile w/o Ferritin  -     Ferritin  -     Folate  -     Vitamin B12    7. Reactive depression  -     buPROPion XL (Wellbutrin XL) 150 MG 24 hr tablet; Take 1 tablet by mouth Daily.  Dispense: 30 tablet; Refill: 2    8. Abnormal weight loss  -     CT Chest With Contrast; Future    Other orders  -     Vonoprazan Fumarate (Voquezna) 20 MG tablet; Take 1 tablet by mouth Daily.  Dispense: 90 tablet; Refill: 1          Return to Office    The patient was instructed to return for follow-up in 3 months. The patient was instructed to return sooner if the condition changes, worsens, or does not resolve.

## 2025-07-15 DIAGNOSIS — E03.9 HYPOTHYROIDISM, UNSPECIFIED TYPE: ICD-10-CM

## 2025-07-15 RX ORDER — LEVOTHYROXINE SODIUM 175 UG/1
175 TABLET ORAL DAILY
Qty: 90 TABLET | Refills: 1 | Status: SHIPPED | OUTPATIENT
Start: 2025-07-15

## 2025-07-22 ENCOUNTER — HOSPITAL ENCOUNTER (OUTPATIENT)
Dept: CT IMAGING | Facility: HOSPITAL | Age: 85
Discharge: HOME | End: 2025-07-22
Admitting: INTERNAL MEDICINE
Payer: MEDICARE

## 2025-07-22 DIAGNOSIS — J44.9 CHRONIC OBSTRUCTIVE PULMONARY DISEASE, UNSPECIFIED COPD TYPE: ICD-10-CM

## 2025-07-22 DIAGNOSIS — R63.4 ABNORMAL WEIGHT LOSS: ICD-10-CM

## 2025-07-22 PROCEDURE — 71260 CT THORAX DX C+: CPT

## 2025-07-22 PROCEDURE — 25510000001 IOPAMIDOL 61 % SOLUTION: Performed by: INTERNAL MEDICINE

## 2025-07-22 RX ORDER — IOPAMIDOL 612 MG/ML
85 INJECTION, SOLUTION INTRAVASCULAR
Status: COMPLETED | OUTPATIENT
Start: 2025-07-22 | End: 2025-07-22

## 2025-07-22 RX ADMIN — IOPAMIDOL 85 ML: 612 INJECTION, SOLUTION INTRAVENOUS at 15:19

## 2025-07-24 ENCOUNTER — LAB (OUTPATIENT)
Dept: INTERNAL MEDICINE | Facility: CLINIC | Age: 85
End: 2025-07-24
Payer: MEDICARE

## 2025-07-24 DIAGNOSIS — R31.9 HEMATURIA, UNSPECIFIED TYPE: Primary | ICD-10-CM

## 2025-07-24 DIAGNOSIS — I10 ESSENTIAL HYPERTENSION: ICD-10-CM

## 2025-07-24 LAB
BACTERIA UR QL AUTO: NORMAL /HPF
BILIRUB BLD-MCNC: NEGATIVE MG/DL
CLARITY, POC: CLEAR
COLOR UR: YELLOW
EXPIRATION DATE: ABNORMAL
GLUCOSE UR STRIP-MCNC: NEGATIVE MG/DL
HYALINE CASTS UR QL AUTO: NORMAL /LPF
KETONES UR QL: NEGATIVE
LEUKOCYTE EST, POC: NEGATIVE
Lab: ABNORMAL
NITRITE UR-MCNC: NEGATIVE MG/ML
PH UR: 6 [PH] (ref 5–8)
PROT UR STRIP-MCNC: NEGATIVE MG/DL
RBC # UR STRIP: ABNORMAL /UL
RBC # UR STRIP: NORMAL /HPF
REF LAB TEST METHOD: NORMAL
SP GR UR: 1 (ref 1–1.03)
SQUAMOUS #/AREA URNS HPF: NORMAL /HPF
UROBILINOGEN UR QL: NORMAL
WBC # UR STRIP: NORMAL /HPF

## 2025-07-24 PROCEDURE — 81003 URINALYSIS AUTO W/O SCOPE: CPT | Performed by: INTERNAL MEDICINE

## 2025-07-24 PROCEDURE — 81015 MICROSCOPIC EXAM OF URINE: CPT | Performed by: INTERNAL MEDICINE

## 2025-07-27 RX ORDER — DOFETILIDE 0.25 MG/1
250 CAPSULE ORAL 2 TIMES DAILY
Qty: 180 CAPSULE | Refills: 3 | Status: SHIPPED | OUTPATIENT
Start: 2025-07-27

## 2025-07-28 RX ORDER — GABAPENTIN 300 MG/1
300 CAPSULE ORAL NIGHTLY
Qty: 90 CAPSULE | Refills: 0 | Status: SHIPPED | OUTPATIENT
Start: 2025-07-28

## 2025-07-28 RX ORDER — ONDANSETRON 4 MG/1
4 TABLET, ORALLY DISINTEGRATING ORAL EVERY 8 HOURS PRN
Qty: 90 TABLET | Refills: 0 | Status: SHIPPED | OUTPATIENT
Start: 2025-07-28

## 2025-08-26 ENCOUNTER — PATIENT MESSAGE (OUTPATIENT)
Dept: INTERNAL MEDICINE | Facility: CLINIC | Age: 85
End: 2025-08-26
Payer: MEDICARE

## 2025-08-26 DIAGNOSIS — F32.9 REACTIVE DEPRESSION: ICD-10-CM

## 2025-08-26 RX ORDER — BUPROPION HYDROCHLORIDE 150 MG/1
150 TABLET ORAL DAILY
Qty: 30 TABLET | Refills: 2 | Status: SHIPPED | OUTPATIENT
Start: 2025-08-26

## (undated) DEVICE — ERBE NESSY®PLATE 170 SPLIT; 168CM²; CABLE 3M: Brand: ERBE

## (undated) DEVICE — SAFELINER SUCTION CANISTER 1000CC: Brand: DEROYAL

## (undated) DEVICE — THE DISPOSABLE ROTH NET FOREIGN BODY STANDARD RETRIEVAL DEVICE IS USED IN THE ENDOSCOPIC RETRIEVAL OF FOREIGN BODY, FOOD BOLUS AND EXCISED TISSUE SUCH AS POLYPS.: Brand: ROTH NET

## (undated) DEVICE — LUBE JELLY FOIL PACK 1.4 OZ: Brand: MEDLINE INDUSTRIES, INC.

## (undated) DEVICE — THE CARR-LOCKE INJECTION NEEDLE IS A SINGLE USE, DISPOSABLE, FLEXIBLE SHEATH INJECTION NEEDLE USED FOR THE INJECTION OF VARIOUS TYPES OF MEDIA THROUGH FLEXIBLE ENDOSCOPES.

## (undated) DEVICE — SNAR POLYP CAPTIVATOR HEX 27MM 240CM

## (undated) DEVICE — SNAR POLYP CAPTIVATOR RND STFF 2.4 240CM 10MM 1P/U

## (undated) DEVICE — HYBRID CO2 TUBING/CAP SET FOR OLYMPUS® SCOPES & CO2 SOURCE: Brand: ERBE

## (undated) DEVICE — CONTN GRAD MEAS TRIANG 32OZ BLK

## (undated) DEVICE — LUBE GEL ENDOGLIDE 1.1OZ

## (undated) DEVICE — NITINOL WIRE WITH HYDROPHILIC TIP: Brand: SENSOR

## (undated) DEVICE — SOL IRR H2O BTL 1000ML STRL

## (undated) DEVICE — SYR LUERLOK 50ML

## (undated) DEVICE — AIR/WATER CLEANING VALVES: Brand: AIR/WATER CLEANING VALVES

## (undated) DEVICE — GOWN,NON-REINFORCED,SIRUS,SET IN SLV,XXL: Brand: MEDLINE

## (undated) DEVICE — ST LINER SAFECAP GRN RED CP STRL

## (undated) DEVICE — URETERAL ACCESS SHEATH SET: Brand: NAVIGATOR HD

## (undated) DEVICE — FIRST STEP BEDSIDE ADD WATER KIT - RESEALABLE STAND-UP POUCH, ENDOSCOPIC CLEANING PAD - 1 POUCH: Brand: FIRST STEP BEDSIDE ADD WATER KIT - RESEALABLE STAND-UP POUCH, ENDOSCOPIC CLEANIN

## (undated) DEVICE — SOL IRR H2O BO 1000ML STRL

## (undated) DEVICE — KT ORCA ORCAPOD DISP STRL

## (undated) DEVICE — SOLIDIFIER LIQ PREMISORB 1500CC

## (undated) DEVICE — DEV HEMOSPRAY ENDO HEMOST 7F 220CM

## (undated) DEVICE — INTRO ACCSR BLNT TP

## (undated) DEVICE — Device: Brand: SPOT EX ENDOSCOPIC TATTOO

## (undated) DEVICE — GLV SURG BIOGEL LTX PF 8

## (undated) DEVICE — THE BITE BLOCK MAXI, LATEX FREE STRAP IS USED TO PROTECT THE ENDOSCOPE INSERTION TUBE FROM BEING BITTEN BY THE PATIENT.

## (undated) DEVICE — PK CYSTO-TUR BASIC 10

## (undated) DEVICE — CATH URETRL FLXITP POLLACK STD 5F 70CM

## (undated) DEVICE — TUBING, SUCTION, 1/4" X 10', STRAIGHT: Brand: MEDLINE

## (undated) DEVICE — SNAR POLYP CAPTIVATOR/COLD STFF RND 10MM 240CM

## (undated) DEVICE — SNAR POLYP CAPTIVATOR MICROHEX 13 240CM

## (undated) DEVICE — NITINOL STONE RETRIEVAL BASKET: Brand: ZERO TIP

## (undated) DEVICE — FIBR LASR SOLTIVE BALL/TP 200MICRON 1P/U